# Patient Record
Sex: MALE | Race: WHITE | NOT HISPANIC OR LATINO | Employment: OTHER | URBAN - NONMETROPOLITAN AREA
[De-identification: names, ages, dates, MRNs, and addresses within clinical notes are randomized per-mention and may not be internally consistent; named-entity substitution may affect disease eponyms.]

---

## 2017-05-19 RX ORDER — VITAMIN E 268 MG
400 CAPSULE ORAL DAILY
COMMUNITY
End: 2020-04-04

## 2017-05-19 RX ORDER — LANSOPRAZOLE 30 MG/1
30 CAPSULE, DELAYED RELEASE ORAL DAILY
COMMUNITY
End: 2020-04-04

## 2017-05-19 RX ORDER — FLUOXETINE HYDROCHLORIDE 20 MG/1
60 CAPSULE ORAL DAILY
COMMUNITY
End: 2020-04-04

## 2017-05-19 RX ORDER — ASPIRIN 81 MG/1
81 TABLET ORAL DAILY
COMMUNITY

## 2017-05-19 RX ORDER — ATORVASTATIN CALCIUM 80 MG/1
80 TABLET, FILM COATED ORAL DAILY
COMMUNITY

## 2017-05-19 RX ORDER — ASCORBIC ACID 500 MG
500 TABLET ORAL 2 TIMES DAILY
COMMUNITY
End: 2020-04-04

## 2017-05-19 RX ORDER — METOPROLOL TARTRATE 50 MG/1
50 TABLET, FILM COATED ORAL DAILY
Status: ON HOLD | COMMUNITY
End: 2020-04-04 | Stop reason: ALTCHOICE

## 2017-05-20 ENCOUNTER — PREP FOR SURGERY (OUTPATIENT)
Dept: SURGERY | Facility: HOSPITAL | Age: 77
End: 2017-05-20

## 2017-05-20 RX ORDER — SODIUM CHLORIDE 0.9 % (FLUSH) 0.9 %
1-10 SYRINGE (ML) INJECTION AS NEEDED
Status: CANCELLED | OUTPATIENT
Start: 2017-05-20

## 2017-05-20 RX ORDER — ENALAPRILAT 2.5 MG/2ML
1.25 INJECTION INTRAVENOUS ONCE AS NEEDED
Status: CANCELLED | OUTPATIENT
Start: 2017-05-20

## 2017-05-20 RX ORDER — PREDNISOLONE ACETATE 10 MG/ML
1 SUSPENSION/ DROPS OPHTHALMIC
Status: CANCELLED | OUTPATIENT
Start: 2017-05-20

## 2017-05-24 ENCOUNTER — ANESTHESIA (OUTPATIENT)
Dept: PERIOP | Facility: HOSPITAL | Age: 77
End: 2017-05-24

## 2017-05-24 ENCOUNTER — HOSPITAL ENCOUNTER (OUTPATIENT)
Facility: HOSPITAL | Age: 77
Setting detail: HOSPITAL OUTPATIENT SURGERY
Discharge: HOME | End: 2017-05-24
Attending: OPHTHALMOLOGY | Admitting: OPHTHALMOLOGY

## 2017-05-24 ENCOUNTER — ANESTHESIA EVENT (OUTPATIENT)
Dept: PERIOP | Facility: HOSPITAL | Age: 77
End: 2017-05-24

## 2017-05-24 VITALS
HEART RATE: 72 BPM | BODY MASS INDEX: 20.49 KG/M2 | OXYGEN SATURATION: 99 % | RESPIRATION RATE: 18 BRPM | DIASTOLIC BLOOD PRESSURE: 69 MMHG | TEMPERATURE: 98.9 F | SYSTOLIC BLOOD PRESSURE: 145 MMHG | WEIGHT: 120 LBS | HEIGHT: 64 IN

## 2017-05-24 PROBLEM — H26.9 CATARACT, LEFT EYE: Status: RESOLVED | Noted: 2017-05-24 | Resolved: 2017-05-24

## 2017-05-24 PROBLEM — H26.9 CATARACT, LEFT EYE: Status: ACTIVE | Noted: 2017-05-24

## 2017-05-24 LAB — GLUCOSE BLDC GLUCOMTR-MCNC: 77 MG/DL (ref 70–130)

## 2017-05-24 PROCEDURE — 25010000002 EPINEPHRINE 1 MG/ML SOLUTION: Performed by: OPHTHALMOLOGY

## 2017-05-24 PROCEDURE — 82962 GLUCOSE BLOOD TEST: CPT

## 2017-05-24 PROCEDURE — 25010000002 MIDAZOLAM PER 1 MG: Performed by: NURSE ANESTHETIST, CERTIFIED REGISTERED

## 2017-05-24 PROCEDURE — V2632 POST CHMBR INTRAOCULAR LENS: HCPCS | Performed by: OPHTHALMOLOGY

## 2017-05-24 PROCEDURE — V2787 ASTIGMATISM-CORRECT FUNCTION: HCPCS | Performed by: OPHTHALMOLOGY

## 2017-05-24 DEVICE — IMPLANTABLE DEVICE: Type: IMPLANTABLE DEVICE | Site: POSTERIOR CHAMBER | Status: FUNCTIONAL

## 2017-05-24 RX ORDER — TETRACAINE HYDROCHLORIDE 5 MG/ML
SOLUTION OPHTHALMIC
Status: COMPLETED
Start: 2017-05-24 | End: 2017-05-24

## 2017-05-24 RX ORDER — PREDNISOLONE ACETATE 10 MG/ML
1 SUSPENSION/ DROPS OPHTHALMIC
Status: DISCONTINUED | OUTPATIENT
Start: 2017-05-24 | End: 2017-05-24 | Stop reason: HOSPADM

## 2017-05-24 RX ORDER — MIDAZOLAM HYDROCHLORIDE 1 MG/ML
INJECTION INTRAMUSCULAR; INTRAVENOUS AS NEEDED
Status: DISCONTINUED | OUTPATIENT
Start: 2017-05-24 | End: 2017-05-24 | Stop reason: SURG

## 2017-05-24 RX ORDER — TETRACAINE HYDROCHLORIDE 5 MG/ML
1 SOLUTION OPHTHALMIC SEE ADMIN INSTRUCTIONS
Status: COMPLETED | OUTPATIENT
Start: 2017-05-24 | End: 2017-05-24

## 2017-05-24 RX ORDER — LIDOCAINE HYDROCHLORIDE 40 MG/ML
INJECTION, SOLUTION RETROBULBAR; TOPICAL AS NEEDED
Status: DISCONTINUED | OUTPATIENT
Start: 2017-05-24 | End: 2017-05-24 | Stop reason: HOSPADM

## 2017-05-24 RX ORDER — SODIUM CHLORIDE 0.9 % (FLUSH) 0.9 %
1-10 SYRINGE (ML) INJECTION AS NEEDED
Status: DISCONTINUED | OUTPATIENT
Start: 2017-05-24 | End: 2017-05-24 | Stop reason: HOSPADM

## 2017-05-24 RX ORDER — ENALAPRILAT 2.5 MG/2ML
1.25 INJECTION INTRAVENOUS ONCE AS NEEDED
Status: DISCONTINUED | OUTPATIENT
Start: 2017-05-24 | End: 2017-05-24 | Stop reason: HOSPADM

## 2017-05-24 RX ORDER — PREDNISOLONE ACETATE 10 MG/ML
SUSPENSION/ DROPS OPHTHALMIC
Status: DISCONTINUED
Start: 2017-05-24 | End: 2017-05-24 | Stop reason: HOSPADM

## 2017-05-24 RX ORDER — BALANCED SALT SOLUTION 6.4; .75; .48; .3; 3.9; 1.7 MG/ML; MG/ML; MG/ML; MG/ML; MG/ML; MG/ML
SOLUTION OPHTHALMIC AS NEEDED
Status: DISCONTINUED | OUTPATIENT
Start: 2017-05-24 | End: 2017-05-24 | Stop reason: HOSPADM

## 2017-05-24 RX ORDER — PHENYLEPHRINE HYDROCHLORIDE 100 MG/ML
SOLUTION/ DROPS OPHTHALMIC
Status: COMPLETED
Start: 2017-05-24 | End: 2017-05-24

## 2017-05-24 RX ORDER — NEOMYCIN SULFATE, POLYMYXIN B SULFATE, AND DEXAMETHASONE 3.5; 10000; 1 MG/G; [USP'U]/G; MG/G
OINTMENT OPHTHALMIC AS NEEDED
Status: DISCONTINUED | OUTPATIENT
Start: 2017-05-24 | End: 2017-05-24 | Stop reason: HOSPADM

## 2017-05-24 RX ORDER — CYCLOPENTOLATE HYDROCHLORIDE 20 MG/ML
1 SOLUTION/ DROPS OPHTHALMIC
Status: COMPLETED | OUTPATIENT
Start: 2017-05-24 | End: 2017-05-24

## 2017-05-24 RX ORDER — EPINEPHRINE 1 MG/ML
INJECTION INTRAMUSCULAR; INTRAVENOUS; SUBCUTANEOUS AS NEEDED
Status: DISCONTINUED | OUTPATIENT
Start: 2017-05-24 | End: 2017-05-24 | Stop reason: HOSPADM

## 2017-05-24 RX ORDER — PREDNISOLONE ACETATE 10 MG/ML
SUSPENSION/ DROPS OPHTHALMIC AS NEEDED
Status: DISCONTINUED | OUTPATIENT
Start: 2017-05-24 | End: 2017-05-24 | Stop reason: HOSPADM

## 2017-05-24 RX ORDER — CYCLOPENTOLATE HYDROCHLORIDE 20 MG/ML
SOLUTION/ DROPS OPHTHALMIC
Status: COMPLETED
Start: 2017-05-24 | End: 2017-05-24

## 2017-05-24 RX ORDER — SODIUM CHLORIDE, SODIUM LACTATE, POTASSIUM CHLORIDE, CALCIUM CHLORIDE 600; 310; 30; 20 MG/100ML; MG/100ML; MG/100ML; MG/100ML
1000 INJECTION, SOLUTION INTRAVENOUS CONTINUOUS PRN
Status: DISCONTINUED | OUTPATIENT
Start: 2017-05-24 | End: 2017-05-24 | Stop reason: HOSPADM

## 2017-05-24 RX ORDER — PHENYLEPHRINE HYDROCHLORIDE 100 MG/ML
1 SOLUTION/ DROPS OPHTHALMIC
Status: COMPLETED | OUTPATIENT
Start: 2017-05-24 | End: 2017-05-24

## 2017-05-24 RX ORDER — SODIUM CHLORIDE 0.9 % (FLUSH) 0.9 %
3 SYRINGE (ML) INJECTION AS NEEDED
Status: DISCONTINUED | OUTPATIENT
Start: 2017-05-24 | End: 2017-05-24 | Stop reason: HOSPADM

## 2017-05-24 RX ORDER — PILOCARPINE HYDROCHLORIDE 10 MG/ML
SOLUTION/ DROPS OPHTHALMIC AS NEEDED
Status: DISCONTINUED | OUTPATIENT
Start: 2017-05-24 | End: 2017-05-24 | Stop reason: HOSPADM

## 2017-05-24 RX ADMIN — CYCLOPENTOLATE HYDROCHLORIDE 1 DROP: 20 SOLUTION/ DROPS OPHTHALMIC at 10:13

## 2017-05-24 RX ADMIN — PHENYLEPHRINE HYDROCHLORIDE 1 DROP: 100 SOLUTION/ DROPS OPHTHALMIC at 10:18

## 2017-05-24 RX ADMIN — PHENYLEPHRINE HYDROCHLORIDE 1 DROP: 100 SOLUTION/ DROPS OPHTHALMIC at 10:23

## 2017-05-24 RX ADMIN — MIDAZOLAM HYDROCHLORIDE 1 MG: 1 INJECTION, SOLUTION INTRAMUSCULAR; INTRAVENOUS at 11:56

## 2017-05-24 RX ADMIN — TETRACAINE HYDROCHLORIDE 1 DROP: 5 SOLUTION OPHTHALMIC at 10:12

## 2017-05-24 RX ADMIN — TETRACAINE HYDROCHLORIDE 1 DROP: 5 SOLUTION OPHTHALMIC at 10:11

## 2017-05-24 RX ADMIN — PHENYLEPHRINE HYDROCHLORIDE 1 DROP: 100 SOLUTION/ DROPS OPHTHALMIC at 10:13

## 2017-05-24 RX ADMIN — CYCLOPENTOLATE HYDROCHLORIDE 1 DROP: 20 SOLUTION/ DROPS OPHTHALMIC at 10:18

## 2017-05-24 RX ADMIN — SODIUM CHLORIDE, POTASSIUM CHLORIDE, SODIUM LACTATE AND CALCIUM CHLORIDE 1000 ML: 600; 310; 30; 20 INJECTION, SOLUTION INTRAVENOUS at 10:19

## 2017-05-24 RX ADMIN — CYCLOPENTOLATE HYDROCHLORIDE 1 DROP: 20 SOLUTION/ DROPS OPHTHALMIC at 10:23

## 2017-05-24 RX ADMIN — MIDAZOLAM HYDROCHLORIDE 1 MG: 1 INJECTION, SOLUTION INTRAMUSCULAR; INTRAVENOUS at 12:05

## 2017-06-14 ENCOUNTER — ANESTHESIA EVENT (OUTPATIENT)
Dept: PERIOP | Facility: HOSPITAL | Age: 77
End: 2017-06-14

## 2017-06-14 ENCOUNTER — ANESTHESIA (OUTPATIENT)
Dept: PERIOP | Facility: HOSPITAL | Age: 77
End: 2017-06-14

## 2017-06-14 ENCOUNTER — HOSPITAL ENCOUNTER (OUTPATIENT)
Facility: HOSPITAL | Age: 77
Setting detail: HOSPITAL OUTPATIENT SURGERY
Discharge: HOME | End: 2017-06-14
Attending: OPHTHALMOLOGY | Admitting: OPHTHALMOLOGY

## 2017-06-14 VITALS
SYSTOLIC BLOOD PRESSURE: 148 MMHG | BODY MASS INDEX: 20.49 KG/M2 | TEMPERATURE: 97.7 F | DIASTOLIC BLOOD PRESSURE: 87 MMHG | WEIGHT: 120 LBS | HEIGHT: 64 IN | OXYGEN SATURATION: 96 % | HEART RATE: 45 BPM | RESPIRATION RATE: 16 BRPM

## 2017-06-14 PROBLEM — H26.9 CATARACT OF RIGHT EYE: Status: ACTIVE | Noted: 2017-06-14

## 2017-06-14 PROBLEM — H26.9 CATARACT OF RIGHT EYE: Status: RESOLVED | Noted: 2017-06-14 | Resolved: 2017-06-14

## 2017-06-14 PROCEDURE — V2632 POST CHMBR INTRAOCULAR LENS: HCPCS | Performed by: OPHTHALMOLOGY

## 2017-06-14 PROCEDURE — 25010000002 EPINEPHRINE 1 MG/ML SOLUTION: Performed by: OPHTHALMOLOGY

## 2017-06-14 PROCEDURE — 25010000002 MIDAZOLAM PER 1 MG: Performed by: NURSE ANESTHETIST, CERTIFIED REGISTERED

## 2017-06-14 PROCEDURE — V2787 ASTIGMATISM-CORRECT FUNCTION: HCPCS | Performed by: OPHTHALMOLOGY

## 2017-06-14 DEVICE — IMPLANTABLE DEVICE: Type: IMPLANTABLE DEVICE | Site: POSTERIOR CHAMBER | Status: FUNCTIONAL

## 2017-06-14 RX ORDER — ENALAPRILAT 2.5 MG/2ML
1.25 INJECTION INTRAVENOUS ONCE AS NEEDED
Status: DISCONTINUED | OUTPATIENT
Start: 2017-06-14 | End: 2017-06-14 | Stop reason: HOSPADM

## 2017-06-14 RX ORDER — MIDAZOLAM HYDROCHLORIDE 1 MG/ML
INJECTION INTRAMUSCULAR; INTRAVENOUS AS NEEDED
Status: DISCONTINUED | OUTPATIENT
Start: 2017-06-14 | End: 2017-06-14 | Stop reason: SURG

## 2017-06-14 RX ORDER — PILOCARPINE HYDROCHLORIDE 10 MG/ML
SOLUTION/ DROPS OPHTHALMIC AS NEEDED
Status: DISCONTINUED | OUTPATIENT
Start: 2017-06-14 | End: 2017-06-14 | Stop reason: HOSPADM

## 2017-06-14 RX ORDER — NEOMYCIN SULFATE, POLYMYXIN B SULFATE, AND DEXAMETHASONE 3.5; 10000; 1 MG/G; [USP'U]/G; MG/G
OINTMENT OPHTHALMIC AS NEEDED
Status: DISCONTINUED | OUTPATIENT
Start: 2017-06-14 | End: 2017-06-14 | Stop reason: HOSPADM

## 2017-06-14 RX ORDER — SODIUM CHLORIDE 0.9 % (FLUSH) 0.9 %
3 SYRINGE (ML) INJECTION AS NEEDED
Status: DISCONTINUED | OUTPATIENT
Start: 2017-06-14 | End: 2017-06-14 | Stop reason: HOSPADM

## 2017-06-14 RX ORDER — PHENYLEPHRINE HYDROCHLORIDE 100 MG/ML
1 SOLUTION/ DROPS OPHTHALMIC
Status: COMPLETED | OUTPATIENT
Start: 2017-06-14 | End: 2017-06-14

## 2017-06-14 RX ORDER — TETRACAINE HYDROCHLORIDE 5 MG/ML
1 SOLUTION OPHTHALMIC SEE ADMIN INSTRUCTIONS
Status: COMPLETED | OUTPATIENT
Start: 2017-06-14 | End: 2017-06-14

## 2017-06-14 RX ORDER — PREDNISOLONE ACETATE 10 MG/ML
SUSPENSION/ DROPS OPHTHALMIC AS NEEDED
Status: DISCONTINUED | OUTPATIENT
Start: 2017-06-14 | End: 2017-06-14 | Stop reason: HOSPADM

## 2017-06-14 RX ORDER — LIDOCAINE HYDROCHLORIDE 40 MG/ML
INJECTION, SOLUTION RETROBULBAR; TOPICAL AS NEEDED
Status: DISCONTINUED | OUTPATIENT
Start: 2017-06-14 | End: 2017-06-14 | Stop reason: HOSPADM

## 2017-06-14 RX ORDER — TETRACAINE HYDROCHLORIDE 5 MG/ML
SOLUTION OPHTHALMIC
Status: COMPLETED
Start: 2017-06-14 | End: 2017-06-14

## 2017-06-14 RX ORDER — EPINEPHRINE 1 MG/ML
INJECTION INTRAMUSCULAR; INTRAVENOUS; SUBCUTANEOUS AS NEEDED
Status: DISCONTINUED | OUTPATIENT
Start: 2017-06-14 | End: 2017-06-14 | Stop reason: HOSPADM

## 2017-06-14 RX ORDER — BALANCED SALT SOLUTION 6.4; .75; .48; .3; 3.9; 1.7 MG/ML; MG/ML; MG/ML; MG/ML; MG/ML; MG/ML
SOLUTION OPHTHALMIC AS NEEDED
Status: DISCONTINUED | OUTPATIENT
Start: 2017-06-14 | End: 2017-06-14 | Stop reason: HOSPADM

## 2017-06-14 RX ORDER — PREDNISOLONE ACETATE 10 MG/ML
SUSPENSION/ DROPS OPHTHALMIC
Status: DISCONTINUED
Start: 2017-06-14 | End: 2017-06-14 | Stop reason: HOSPADM

## 2017-06-14 RX ORDER — CYCLOPENTOLATE HYDROCHLORIDE 20 MG/ML
SOLUTION/ DROPS OPHTHALMIC
Status: COMPLETED
Start: 2017-06-14 | End: 2017-06-14

## 2017-06-14 RX ORDER — SODIUM CHLORIDE, SODIUM LACTATE, POTASSIUM CHLORIDE, CALCIUM CHLORIDE 600; 310; 30; 20 MG/100ML; MG/100ML; MG/100ML; MG/100ML
1000 INJECTION, SOLUTION INTRAVENOUS CONTINUOUS PRN
Status: DISCONTINUED | OUTPATIENT
Start: 2017-06-14 | End: 2017-06-14 | Stop reason: HOSPADM

## 2017-06-14 RX ORDER — PHENYLEPHRINE HYDROCHLORIDE 100 MG/ML
SOLUTION/ DROPS OPHTHALMIC
Status: COMPLETED
Start: 2017-06-14 | End: 2017-06-14

## 2017-06-14 RX ORDER — PREDNISOLONE ACETATE 10 MG/ML
1 SUSPENSION/ DROPS OPHTHALMIC
Status: DISCONTINUED | OUTPATIENT
Start: 2017-06-14 | End: 2017-06-14 | Stop reason: HOSPADM

## 2017-06-14 RX ORDER — CYCLOPENTOLATE HYDROCHLORIDE 20 MG/ML
1 SOLUTION/ DROPS OPHTHALMIC
Status: COMPLETED | OUTPATIENT
Start: 2017-06-14 | End: 2017-06-14

## 2017-06-14 RX ORDER — SODIUM CHLORIDE 0.9 % (FLUSH) 0.9 %
1-10 SYRINGE (ML) INJECTION AS NEEDED
Status: DISCONTINUED | OUTPATIENT
Start: 2017-06-14 | End: 2017-06-14 | Stop reason: HOSPADM

## 2017-06-14 RX ADMIN — PHENYLEPHRINE HYDROCHLORIDE 1 DROP: 100 SOLUTION/ DROPS OPHTHALMIC at 08:50

## 2017-06-14 RX ADMIN — TETRACAINE HYDROCHLORIDE 1 DROP: 5 SOLUTION OPHTHALMIC at 08:47

## 2017-06-14 RX ADMIN — PHENYLEPHRINE HYDROCHLORIDE 1 DROP: 100 SOLUTION/ DROPS OPHTHALMIC at 08:55

## 2017-06-14 RX ADMIN — SODIUM CHLORIDE, POTASSIUM CHLORIDE, SODIUM LACTATE AND CALCIUM CHLORIDE 1000 ML: 600; 310; 30; 20 INJECTION, SOLUTION INTRAVENOUS at 09:10

## 2017-06-14 RX ADMIN — MIDAZOLAM HYDROCHLORIDE 1 MG: 1 INJECTION, SOLUTION INTRAMUSCULAR; INTRAVENOUS at 10:08

## 2017-06-14 RX ADMIN — TETRACAINE HYDROCHLORIDE 1 DROP: 5 SOLUTION OPHTHALMIC at 08:48

## 2017-06-14 RX ADMIN — PHENYLEPHRINE HYDROCHLORIDE 1 DROP: 100 SOLUTION/ DROPS OPHTHALMIC at 09:00

## 2017-06-14 RX ADMIN — CYCLOPENTOLATE HYDROCHLORIDE 1 DROP: 20 SOLUTION/ DROPS OPHTHALMIC at 08:50

## 2017-06-14 RX ADMIN — CYCLOPENTOLATE HYDROCHLORIDE 1 DROP: 20 SOLUTION/ DROPS OPHTHALMIC at 08:55

## 2017-06-14 RX ADMIN — CYCLOPENTOLATE HYDROCHLORIDE 1 DROP: 20 SOLUTION/ DROPS OPHTHALMIC at 09:00

## 2017-06-14 NOTE — ANESTHESIA POSTPROCEDURE EVALUATION
Patient: Gama Davila    Procedure Summary     Date Anesthesia Start Anesthesia Stop Room / Location    06/14/17 0951 1019  [facility]       Procedure Diagnosis Surgeon Provider    CATARACT PHACO EXTRACTION WITH INTRAOCULAR LENS IMPLANT RIGHT WITH TORIC LENS (Right Eye) No diagnosis on file. MD Peace Abdi CRNA          Anesthesia Type: MAC  Last vitals  /62 (06/14/17 1027)    Temp 97.7 °F (36.5 °C) (06/14/17 1027)    Pulse (!) 42 (06/14/17 1027)   Resp 16 (06/14/17 1027)    SpO2 96 % (06/14/17 1027)      Post Anesthesia Care and Evaluation    Patient location during evaluation: PHASE II  Patient participation: complete - patient participated  Level of consciousness: awake and alert  Pain score: 0  Pain management: satisfactory to patient  Airway patency: patent  Anesthetic complications: No anesthetic complications  PONV Status: none  Cardiovascular status: acceptable and stable  Respiratory status: acceptable  Hydration status: acceptable

## 2017-06-14 NOTE — ANESTHESIA PREPROCEDURE EVALUATION
Anesthesia Evaluation     Patient summary reviewed and Nursing notes reviewed   no history of anesthetic complications:  NPO Solid Status: > 8 hours  NPO Liquid Status: > 8 hours     Airway   Mallampati: I  TM distance: >3 FB  Neck ROM: full  Dental    (+) upper dentures    Pulmonary - normal exam   (+) a smoker Former,   Cardiovascular - normal exam    Patient on routine beta blocker and Beta blocker given within 24 hours of surgery    (+) hypertension, CAD, CABG, dysrhythmias (ablation now in nsr), hyperlipidemia      Neuro/Psych- negative ROS  GI/Hepatic/Renal/Endo    (+)  diabetes mellitus,     Musculoskeletal     Abdominal    Substance History - negative use     OB/GYN negative ob/gyn ROS         Other   (+) arthritis   history of cancer remission                                  Anesthesia Plan    ASA 3     MAC   (Risks and benefits discussed including risk of aspiration, recall and dental damage. All patient questions answered. Will continue with POC.)  intravenous induction   Anesthetic plan and risks discussed with patient.    Plan discussed with CRNA.

## 2017-06-14 NOTE — BRIEF OP NOTE
CATARACT PHACO EXTRACTION WITH INTRAOCULAR LENS IMPLANT  Procedure Note    Gama Davila  6/14/2017    Pre-op Diagnosis:   * No pre-op diagnosis entered *    Post-op Diagnosis:     * No Diagnosis Codes entered *    Procedure/CPT® Codes:      Procedure(s):  CATARACT PHACO EXTRACTION WITH INTRAOCULAR LENS IMPLANT RIGHT WITH TORIC LENS    Surgeon(s):  Alma Benavidez MD    Anesthesia: Monitor Anesthesia Care    Staff:   Circulator: Melodie Chandler RN; Joceline Anderson RN  Scrub Person: Radha Dominguez    Estimated Blood Loss: 0 mL  Urine Voided: * No values recorded between 6/14/2017  9:50 AM and 6/14/2017 10:24 AM *    Specimens:                * No specimens in log *      Drains:           Findings: none    Complications: none      Alma Benavidez MD     Date: 6/14/2017  Time: 10:39 AM

## 2017-06-14 NOTE — PLAN OF CARE
Problem: Cataract (Adult)  Goal: Signs and Symptoms of Listed Potential Problems Will be Absent or Manageable (Cataract)    06/14/17 0908   Cataract   Problems Assessed (Cataract) all   Problems Present (Cataract) none

## 2017-06-14 NOTE — DISCHARGE INSTRUCTIONS
Rest today  No pushing,pulling,tugging,heavy lifting, or strenuous activity   No major decision making,driving,or drinking alcoholic beverages for 24 hours due to the sedation you received  Always use good hand hygiene/washing technique  No driving on pain medicationsRest today  No pushing,pulling,tugging,heavy lifting, or strenuous activity   No major decision making,driving,or drinking alcoholic beverages for 24 hours due to the sedation you received  Always use good hand hygiene/washing technique  No driving on pain medicationsTo assist you in voiding:  Drink plenty of fluids  Listen to running water while attempting to void.    If you are unable to urinate and you have an uncomfortable urge to void or it has been   6 hours since you were discharged, return to the Emergency Room

## 2017-06-17 NOTE — OP NOTE
DATE OF PROCEDURE: 06/14/2017    PREOPERATIVE DIAGNOSIS: Right nuclear sclerotic cataract.    POSTOPERATIVE DIAGNOSIS: Right nuclear sclerotic cataract.    PROCEDURE PERFORMED: Phacoemulsification with implantation of a 22.0 diopter, 3.00 CYL foldable posterior chamber intraocular lens, right eye.    SURGEON:  Alma Benavidez MD     INDICATIONS:  The patient is a 76-year-old male with a right nuclear sclerotic cataract with a preoperative visual acuity of 20/80. The patient desires better vision and is brought to the operating room at this time for elective cataract extraction with planned IOL.      DESCRIPTION OF PROCEDURE:  The patient was brought to the operating room in the fasting state. Once all the vital signs were established to be within normal limits and supplemental oxygen was given, the area of the operative eye was prepped and draped in the usual fashion.  A wire lid speculum was inserted into the cul-de-sac. Additional topical anesthetic drops were instilled. A fornix-based conjunctival flap was performed from the 11-oclock to 1-o'clock position. A stab incision was made in the peripheral cornea with the #75 Micro-Sharp blade. Next 0.3 mL of 1% unpreserved Xylocaine was injected in the anterior chamber. The anterior chamber was then entered with a 2.4 mm keratome blade and then under viscoelastic a capsulotomy was performed using the disposable cystotome in a continuous curvilinear fashion.  The anterior capsule was then removed and the phacoemulsification handpiece was then introduced into the anterior segment and the nucleus was emulsified without difficulty. The CDE was 7.83. Once this was accomplished, the irrigation and aspiration handpiece was then used to aspirate the residual cortex. The posterior capsule was cleaned of any residual material and then polished with the irrigation and aspiration handpiece and the viscoelastic was used to inflate the capsular bag.  The [employer] TORIC IOL  implant, style [id number] lens was then loaded into the microcartridge. It measured 22.0 diopters and the serial number was [id number]. The injector was then introduced into the capsular bag and the implant was slowly implanted without difficulty. The lens was then rotated to the appropriate axis and the viscoelastic was aspirated. The conjunctiva was then repositioned. Topical Betoptic-S, pilocarpine, and Pred-Forte drops were applied. Polysporin ointment was then instilled. Chilled BSS was used as the irrigating solution. There were no anesthetic or surgical difficulties. The patient tolerated the procedure very well and was returned to same day surgery in satisfactory condition.         Alma Benavidez M.D.  D: DANNY 06/17/2017 00:00:00  T: abe 06/17/2017 16:23:46  Job ID: [id number]  Document ID: [id number]

## 2018-03-12 ENCOUNTER — TELEPHONE (OUTPATIENT)
Dept: CARDIAC SURGERY | Facility: CLINIC | Age: 78
End: 2018-03-12

## 2018-03-12 NOTE — TELEPHONE ENCOUNTER
CALLED PT REGARDING APPT THAT WAS MADE FOR HIM WITH DR BLAND. PT'S WIFE (MIKAEL) STATED THAT PT HAS AN APPT WITH ANOTHER DR AND DOESN'T WISH TO BE SEEN AT THIS TIME BY DR BLNAD. PT'S WIFE SAID SHE HAD CALLED THE REFERRING DR AND LET THEM KNOW.

## 2019-04-04 ENCOUNTER — NURSING HOME (OUTPATIENT)
Dept: INTERNAL MEDICINE | Facility: CLINIC | Age: 79
End: 2019-04-04

## 2019-04-04 VITALS
HEART RATE: 84 BPM | BODY MASS INDEX: 24.89 KG/M2 | RESPIRATION RATE: 14 BRPM | WEIGHT: 145 LBS | TEMPERATURE: 99.2 F | DIASTOLIC BLOOD PRESSURE: 96 MMHG | SYSTOLIC BLOOD PRESSURE: 136 MMHG

## 2019-04-04 DIAGNOSIS — E78.00 HIGH CHOLESTEROL: ICD-10-CM

## 2019-04-04 DIAGNOSIS — I10 ESSENTIAL HYPERTENSION: ICD-10-CM

## 2019-04-04 DIAGNOSIS — I25.119 CORONARY ARTERY DISEASE INVOLVING NATIVE HEART WITH ANGINA PECTORIS, UNSPECIFIED VESSEL OR LESION TYPE (HCC): ICD-10-CM

## 2019-04-04 DIAGNOSIS — I49.9 IRREGULAR HEART BEAT: ICD-10-CM
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
## 2019-04-04 DIAGNOSIS — E11.9 TYPE 2 DIABETES MELLITUS WITHOUT COMPLICATION, WITHOUT LONG-TERM CURRENT USE OF INSULIN (HCC): ICD-10-CM

## 2019-04-04 PROCEDURE — 99305 1ST NF CARE MODERATE MDM 35: CPT | Performed by: INTERNAL MEDICINE

## 2019-04-08 PROBLEM — E11.9 DIABETES MELLITUS (HCC): Status: ACTIVE | Noted: 2019-04-08

## 2019-04-08 PROBLEM — H91.90 HOH (HARD OF HEARING): Status: ACTIVE | Noted: 2019-04-08

## 2019-04-08 PROBLEM — I25.10 CORONARY ARTERY DISEASE: Status: ACTIVE | Noted: 2019-04-08

## 2019-04-08 PROBLEM — E78.00 HIGH CHOLESTEROL: Status: ACTIVE | Noted: 2019-04-08

## 2019-04-08 PROBLEM — I49.9 IRREGULAR HEART BEAT: Status: ACTIVE | Noted: 2019-04-08

## 2019-04-08 PROBLEM — I10 HYPERTENSION: Status: ACTIVE | Noted: 2019-04-08

## 2019-04-08 NOTE — PROGRESS NOTES
Nursing Home Progress Note        Mitul Stephen DO ?  ALESSANDRA Ho ?  [address], [place] [address]  Phone: [phone]  Fax: [phone] Inés Vega MD ?  Alex Burk DO [x]   [address] [place] [address]  Phone: [phone]  Fax: [phone]     PATIENT NAME: Gama Davila                                                                          YOB: 1940           DATE OF SERVICE: 04/04/2019  FACILITY:  [] [facility]   [] [facility]   [] [facility]   [x] [facility]   [] Other ______________________________________________________________________     CHIEF COMPLAINT:  Falls      HISTORY OF PRESENT ILLNESS:   Patient is a 77 yo W M with history of CABG, hiatal hernia s/p repair, and chronic back pain s/p lumbar spine surgery who was admitted to [facility] after a fall and injury to right shoulder.  He was evaluated in the ER with imaging and bloodwork.  No significant abnormalities were noted. PT was started and will be continued in the nursing facility for strengthening.  Patient was seen this AM and was not very pleasant as he was furstrated at the fact he did not have his clothing as his wife had taken it home to wash.     PAST MEDICAL & SURGICAL HISTORY:   Past Medical History:   Diagnosis Date   • Arthritis    • Cancer (CMS/HCC)     SKIN    • Cataract    • Coronary artery disease    • Diabetes mellitus (CMS/HCC)    • High cholesterol    • [place] (hard of hearing)     PATIENT HAS HEARING AIDS   • Hypertension    • Irregular heart beat     HISTORY OF CARDIAC ABLATION IN 2003   • Wears dentures     FULL UPPER PLATE      Past Surgical History:   Procedure Laterality Date   • BACK SURGERY  1978    THEN AGAIN IN 1982   • CARDIAC ABLATION  2003   • CARDIAC SURGERY     • CATARACT EXTRACTION W/ INTRAOCULAR LENS IMPLANT Left 5/24/2017    Procedure: CATARACT PHACO EXTRACTION WITH INTRAOCULAR LENS IMPLANT LEFT WITH TORIC LENS;  Surgeon: Alma Benavidez,  MD;  Location: [facility];  Service:    • CATARACT EXTRACTION W/ INTRAOCULAR LENS IMPLANT Right 2017    Procedure: CATARACT PHACO EXTRACTION WITH INTRAOCULAR LENS IMPLANT RIGHT WITH TORIC LENS;  Surgeon: Alma Benavidez MD;  Location: [facility];  Service:    • CORONARY ARTERY BYPASS GRAFT      SPOUSE UNSURE OF HOW MANY VESSELS   • HIATAL HERNIA REPAIR     • JOINT REPLACEMENT Left     HIP - DONE BY DR DALAL   • KNEE ARTHROSCOPY Left    • NOSE SURGERY      SPOUSE REPORTS FOR A BROKEN NOSE   • OTHER SURGICAL HISTORY Left     TENDON TORN LOOSE FROM BONE, LEFT ELBOW   • OTHER SURGICAL HISTORY      RUPTURED DISC   • OTHER SURGICAL HISTORY      NECK SURGERY FOR RUPTURED DISC   • OTHER SURGICAL HISTORY      HAD SECOND NECK SURGERY   • OTHER SURGICAL HISTORY      RUPTURED DISC   • OTHER SURGICAL HISTORY      RUPTURED DISC   • OTHER SURGICAL HISTORY      HARDWARE REMOVAL FROM BACK BY DR HAY         MEDICATIONS:  I have reviewed and reconciled the patients medication list in the patients chart at the skilled nursing facility today.      ALLERGIES:  Allergies   Allergen Reactions   • Phenergan [Promethazine Hcl] Nausea And Vomiting         SOCIAL HISTORY:  Social History     Socioeconomic History   • Marital status:      Spouse name: Not on file   • Number of children: Not on file   • Years of education: Not on file   • Highest education level: Not on file   Tobacco Use   • Smoking status: Former Smoker     Types: Cigarettes     Last attempt to quit:      Years since quittin.2   • Smokeless tobacco: Never Used   Substance and Sexual Activity   • Alcohol use: No   • Drug use: No   • Sexual activity: Defer       FAMILY HISTORY:  No family history on file.    REVIEW OF SYSTEMS:  Review of Systems   Constitutional: Negative for chills, fatigue and fever.   HENT: Negative for congestion, ear pain, rhinorrhea, sinus pressure and sore throat.    Eyes: Negative for  visual disturbance.   Respiratory: Negative for cough, chest tightness, shortness of breath and wheezing.    Cardiovascular: Negative for chest pain, palpitations and leg swelling.   Gastrointestinal: Negative for abdominal pain, blood in stool, constipation, diarrhea, nausea and vomiting.   Endocrine: Negative for polydipsia and polyuria.   Genitourinary: Negative for dysuria and hematuria.   Musculoskeletal: Negative for arthralgias and back pain.   Skin: Negative for rash.   Neurological: Negative for dizziness, light-headedness, numbness and headaches.   Psychiatric/Behavioral: Negative for dysphoric mood and sleep disturbance. The patient is not nervous/anxious.           PHYSICAL EXAMINATION:     VITAL SIGNS:  /96   Pulse 84   Temp 99.2 °F (37.3 °C)   Resp 14   Wt 65.8 kg (145 lb)   BMI 24.89 kg/m²     Physical Exam   Constitutional: He is oriented to person, place, and time. He appears well-developed and well-nourished.   HENT:   Head: Normocephalic and atraumatic.   Mouth/Throat: Oropharynx is clear and moist. No oropharyngeal exudate.   Eyes: Conjunctivae and EOM are normal. Pupils are equal, round, and reactive to light. No scleral icterus.   Neck: Normal range of motion. Neck supple. No thyromegaly present.   Cardiovascular: Normal rate, regular rhythm and normal heart sounds. Exam reveals no gallop and no friction rub.   No murmur heard.  Pulmonary/Chest: Effort normal and breath sounds normal. No respiratory distress. He has no wheezes.   Abdominal: Soft. Bowel sounds are normal. He exhibits no distension. There is no tenderness.   Musculoskeletal: Normal range of motion.   Lymphadenopathy:     He has no cervical adenopathy.   Neurological: He is alert and oriented to person, place, and time.   Skin: Skin is warm and dry. No rash noted.   Psychiatric: He has a normal mood and affect. His behavior is normal.   Nursing note and vitals reviewed.      RECORDS REVIEW:   Discharge summary  [facility]  4/3/19     ASSESSMENT     Diagnoses and all orders for this visit:    Fall, subsequent encounter    High cholesterol    Coronary artery disease involving native heart with angina pectoris, unspecified vessel or lesion type (CMS/[facility])    Essential hypertension    Irregular heart beat    Type 2 diabetes mellitus without complication, without long-term current use of insulin (CMS/[facility])        PLAN    Falls - PT/OT    HLD - stable with current meds    CAD s/p CABG / AFIB - stable with current cardiac medications    T2DM - cont home medications, will monitor glucose in facility.    GERD - stable.     Mood disorder - cont seroquel    Alzheimer disease - stable    Check CBC and CMP in one week.     [x]  Discussed Patient in detail with nursing/staff, addressed all needs today.     [x]  Plan of Care Reviewed   [x]  PT/OT Reviewed   [x]  Order Changes  []  Discharge Plans Reviewed  [x]  Advance Directive on file with Nursing Home.   [x]  POA on file with Nursing Home.    [x]  Code Status listed and reviewed.          Alex Burk DO.  4/8/2019

## 2019-04-18 ENCOUNTER — NURSING HOME (OUTPATIENT)
Dept: INTERNAL MEDICINE | Facility: CLINIC | Age: 79
End: 2019-04-18

## 2019-04-18 VITALS
DIASTOLIC BLOOD PRESSURE: 80 MMHG | TEMPERATURE: 98.7 F | SYSTOLIC BLOOD PRESSURE: 128 MMHG | WEIGHT: 160 LBS | HEART RATE: 84 BPM | BODY MASS INDEX: 27.46 KG/M2 | RESPIRATION RATE: 20 BRPM

## 2019-04-18 DIAGNOSIS — E11.9 TYPE 2 DIABETES MELLITUS WITHOUT COMPLICATION, WITHOUT LONG-TERM CURRENT USE OF INSULIN (HCC): ICD-10-CM

## 2019-04-18 DIAGNOSIS — F02.80 LATE ONSET ALZHEIMER'S DISEASE WITHOUT BEHAVIORAL DISTURBANCE (HCC): ICD-10-CM

## 2019-04-18 DIAGNOSIS — G30.1 LATE ONSET ALZHEIMER'S DISEASE WITHOUT BEHAVIORAL DISTURBANCE (HCC): ICD-10-CM
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
## 2019-04-18 DIAGNOSIS — E78.00 HIGH CHOLESTEROL: Primary | ICD-10-CM

## 2019-04-18 DIAGNOSIS — I10 ESSENTIAL HYPERTENSION: ICD-10-CM

## 2019-04-18 DIAGNOSIS — I49.9 IRREGULAR HEART BEAT: ICD-10-CM

## 2019-04-18 DIAGNOSIS — I25.119 CORONARY ARTERY DISEASE INVOLVING NATIVE HEART WITH ANGINA PECTORIS, UNSPECIFIED VESSEL OR LESION TYPE (HCC): ICD-10-CM

## 2019-04-18 PROCEDURE — 99309 SBSQ NF CARE MODERATE MDM 30: CPT | Performed by: INTERNAL MEDICINE

## 2019-04-22 NOTE — PROGRESS NOTES
Nursing Home Progress Note        Mitul Stephen DO ?  Charleen Randall, APRN ?  [address], [place] [address]  Phone: [phone]  Fax: [phone] Inés Vega MD ?  Alex Burk DO [x]   [address] [place] [address]  Phone: [phone]  Fax: [phone]     PATIENT NAME: Gama Davila                                                                          YOB: 1940           DATE OF SERVICE: 04/18/2019  FACILITY:  [] [facility]   [] [place]   [] [facility]   [x] [facility]   [] Other ______________________________________________________________________     CHIEF COMPLAINT:  Falls      HISTORY OF PRESENT ILLNESS:   Patient has been showing signs of confusion and agitation intermittently while being in the facility.  Distraction and nurses talking to the patient usually calms him down.    Patient has been working with physical therapy quite well.  Labs:    PAST MEDICAL & SURGICAL HISTORY:   Past Medical History:   Diagnosis Date   • Arthritis    • Cancer (CMS/HCC)     SKIN    • Cataract    • Coronary artery disease    • Diabetes mellitus (CMS/HCC)    • High cholesterol    • [place] (hard of hearing)     PATIENT HAS HEARING AIDS   • Hypertension    • Irregular heart beat     HISTORY OF CARDIAC ABLATION IN 2003   • Wears dentures     FULL UPPER PLATE      Past Surgical History:   Procedure Laterality Date   • BACK SURGERY  1978    THEN AGAIN IN 1982   • CARDIAC ABLATION  2003   • CARDIAC SURGERY     • CATARACT EXTRACTION W/ INTRAOCULAR LENS IMPLANT Left 5/24/2017    Procedure: CATARACT PHACO EXTRACTION WITH INTRAOCULAR LENS IMPLANT LEFT WITH TORIC LENS;  Surgeon: Alma Benavidez MD;  Location: [facility] OR;  Service:    • CATARACT EXTRACTION W/ INTRAOCULAR LENS IMPLANT Right 6/14/2017    Procedure: CATARACT PHACO EXTRACTION WITH INTRAOCULAR LENS IMPLANT RIGHT WITH TORIC LENS;  Surgeon: Alma Benavidez MD;  Location: [facility] OR;  Service:    • CORONARY ARTERY  BYPASS GRAFT      SPOUSE UNSURE OF HOW MANY VESSELS   • HIATAL HERNIA REPAIR     • JOINT REPLACEMENT Left     HIP - DONE BY DR DALAL   • KNEE ARTHROSCOPY Left    • NOSE SURGERY  1970    SPOUSE REPORTS FOR A BROKEN NOSE   • OTHER SURGICAL HISTORY Left     TENDON TORN LOOSE FROM BONE, LEFT ELBOW   • OTHER SURGICAL HISTORY      RUPTURED DISC   • OTHER SURGICAL HISTORY      NECK SURGERY FOR RUPTURED DISC   • OTHER SURGICAL HISTORY      HAD SECOND NECK SURGERY   • OTHER SURGICAL HISTORY      RUPTURED DISC   • OTHER SURGICAL HISTORY      RUPTURED DISC   • OTHER SURGICAL HISTORY      HARDWARE REMOVAL FROM BACK BY DR HAY         MEDICATIONS:  I have reviewed and reconciled the patients medication list in the patients chart at the skilled nursing facility today.      ALLERGIES:  Allergies   Allergen Reactions   • Phenergan [Promethazine Hcl] Nausea And Vomiting         SOCIAL HISTORY:  Social History     Socioeconomic History   • Marital status:      Spouse name: Not on file   • Number of children: Not on file   • Years of education: Not on file   • Highest education level: Not on file   Tobacco Use   • Smoking status: Former Smoker     Types: Cigarettes     Last attempt to quit:      Years since quittin.3   • Smokeless tobacco: Never Used   Substance and Sexual Activity   • Alcohol use: No   • Drug use: No   • Sexual activity: Defer       FAMILY HISTORY:  No family history on file.    REVIEW OF SYSTEMS:  Review of Systems   Constitutional: Negative for chills, fatigue and fever.   HENT: Negative for congestion, ear pain, rhinorrhea, sinus pressure and sore throat.    Eyes: Negative for visual disturbance.   Respiratory: Negative for cough, chest tightness, shortness of breath and wheezing.    Cardiovascular: Negative for chest pain, palpitations and leg swelling.   Gastrointestinal: Negative for abdominal pain, blood in stool, constipation, diarrhea, nausea and vomiting.    Endocrine: Negative for polydipsia and polyuria.   Genitourinary: Negative for dysuria and hematuria.   Musculoskeletal: Negative for arthralgias and back pain.   Skin: Negative for rash.   Neurological: Negative for dizziness, light-headedness, numbness and headaches.   Psychiatric/Behavioral: Positive for behavioral problems and confusion. Negative for dysphoric mood and sleep disturbance. The patient is not nervous/anxious.           PHYSICAL EXAMINATION:     VITAL SIGNS:  /80   Pulse 84   Temp 98.7 °F (37.1 °C)   Resp 20   Wt 72.6 kg (160 lb)   BMI 27.46 kg/m²     Physical Exam   Constitutional: He is oriented to person, place, and time. He appears well-developed and well-nourished.   HENT:   Head: Normocephalic and atraumatic.   Mouth/Throat: Oropharynx is clear and moist. No oropharyngeal exudate.   Eyes: Conjunctivae and EOM are normal. Pupils are equal, round, and reactive to light. No scleral icterus.   Neck: Normal range of motion. Neck supple. No thyromegaly present.   Cardiovascular: Normal rate, regular rhythm and normal heart sounds. Exam reveals no gallop and no friction rub.   No murmur heard.  Pulmonary/Chest: Effort normal and breath sounds normal. No respiratory distress. He has no wheezes.   Abdominal: Soft. Bowel sounds are normal. He exhibits no distension. There is no tenderness.   Musculoskeletal: Normal range of motion.   Lymphadenopathy:     He has no cervical adenopathy.   Neurological: He is alert and oriented to person, place, and time.   Skin: Skin is warm and dry. No rash noted.   Psychiatric: He has a normal mood and affect. His behavior is normal.   Nursing note and vitals reviewed.      RECORDS REVIEW:   A1c 5.8, CBC, CMP: Creatinine 1.4, hemoglobin 9.8.    ASSESSMENT     Diagnoses and all orders for this visit:    High cholesterol    Coronary artery disease involving native heart with angina pectoris, unspecified vessel or lesion type (CMS/HCC)    Essential  hypertension    Irregular heart beat    Type 2 diabetes mellitus without complication, without long-term current use of insulin (CMS/[facility])    Fall, subsequent encounter    Late onset Alzheimer's disease without behavioral disturbance        PLAN    Dementia with intermittent delirium  - start seroquel at night time, monitor symptoms.     Falls - PT/OT    HLD - stable with current meds    CAD s/p CABG / AFIB - stable with current cardiac medications    T2DM - cont home medications, will monitor glucose in facility.    GERD - stable.     Mood disorder - cont seroquel    Alzheimer disease - stable    CKD III - Cr. Stable at 1.4     [x]  Discussed Patient in detail with nursing/staff, addressed all needs today.     [x]  Plan of Care Reviewed   [x]  PT/OT Reviewed   [x]  Order Changes  []  Discharge Plans Reviewed  [x]  Advance Directive on file with Nursing Home.   [x]  POA on file with Nursing Home.    [x]  Code Status listed and reviewed.          Alex Burk DO.  4/22/2019

## 2019-04-23 RX ORDER — TRAMADOL HYDROCHLORIDE 50 MG/1
50 TABLET ORAL EVERY 6 HOURS PRN
Qty: 90 TABLET | Refills: 0 | Status: CANCELLED | OUTPATIENT
Start: 2019-04-23

## 2019-04-25 RX ORDER — GABAPENTIN 600 MG/1
600 TABLET ORAL 3 TIMES DAILY
Qty: 90 TABLET | Refills: 5 | Status: SHIPPED | OUTPATIENT
Start: 2019-04-25 | End: 2020-04-04

## 2020-01-16 ENCOUNTER — APPOINTMENT (OUTPATIENT)
Dept: CT IMAGING | Facility: HOSPITAL | Age: 80
End: 2020-01-16

## 2020-01-16 ENCOUNTER — HOSPITAL ENCOUNTER (EMERGENCY)
Facility: HOSPITAL | Age: 80
Discharge: HOME | End: 2020-01-16
Attending: EMERGENCY MEDICINE | Admitting: EMERGENCY MEDICINE

## 2020-01-16 VITALS
OXYGEN SATURATION: 94 % | DIASTOLIC BLOOD PRESSURE: 77 MMHG | TEMPERATURE: 97.8 F | BODY MASS INDEX: 27.31 KG/M2 | HEIGHT: 64 IN | RESPIRATION RATE: 18 BRPM | SYSTOLIC BLOOD PRESSURE: 122 MMHG | HEART RATE: 73 BPM | WEIGHT: 160 LBS

## 2020-01-16 DIAGNOSIS — E88.09 HYPOALBUMINEMIA: ICD-10-CM

## 2020-01-16 DIAGNOSIS — R73.09 ELEVATED GLUCOSE: ICD-10-CM

## 2020-01-16 DIAGNOSIS — M54.50 LOW BACK PAIN AT MULTIPLE SITES: ICD-10-CM

## 2020-01-16 DIAGNOSIS — N28.9 RENAL INSUFFICIENCY: ICD-10-CM

## 2020-01-16 DIAGNOSIS — R06.00 DYSPNEA, UNSPECIFIED TYPE: ICD-10-CM
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
## 2020-01-16 DIAGNOSIS — S20.212A RIB CONTUSION, LEFT, INITIAL ENCOUNTER: ICD-10-CM

## 2020-01-16 LAB
ALBUMIN SERPL-MCNC: 3.4 G/DL (ref 3.5–5.2)
ALBUMIN/GLOB SERPL: 1.3 G/DL
ALP SERPL-CCNC: 65 U/L (ref 39–117)
ALT SERPL W P-5'-P-CCNC: 16 U/L (ref 1–41)
ANION GAP SERPL CALCULATED.3IONS-SCNC: 13 MMOL/L (ref 5–15)
AST SERPL-CCNC: 17 U/L (ref 1–40)
BASOPHILS # BLD AUTO: 0.01 10*3/MM3 (ref 0–0.2)
BASOPHILS NFR BLD AUTO: 0.1 % (ref 0–1.5)
BILIRUB SERPL-MCNC: 0.3 MG/DL (ref 0.2–1.2)
BILIRUB UR QL STRIP: NEGATIVE
BUN BLD-MCNC: 24 MG/DL (ref 8–23)
BUN/CREAT SERPL: 16.7 (ref 7–25)
CALCIUM SPEC-SCNC: 8.6 MG/DL (ref 8.6–10.5)
CHLORIDE SERPL-SCNC: 99 MMOL/L (ref 98–107)
CLARITY UR: CLEAR
CO2 SERPL-SCNC: 28 MMOL/L (ref 22–29)
COLOR UR: YELLOW
CREAT BLD-MCNC: 1.44 MG/DL (ref 0.76–1.27)
DEPRECATED RDW RBC AUTO: 47.4 FL (ref 37–54)
EOSINOPHIL # BLD AUTO: 0.14 10*3/MM3 (ref 0–0.4)
EOSINOPHIL NFR BLD AUTO: 2 % (ref 0.3–6.2)
ERYTHROCYTE [DISTWIDTH] IN BLOOD BY AUTOMATED COUNT: 13.7 % (ref 12.3–15.4)
GFR SERPL CREATININE-BSD FRML MDRD: 47 ML/MIN/1.73
GLOBULIN UR ELPH-MCNC: 2.7 GM/DL
GLUCOSE BLD-MCNC: 136 MG/DL (ref 65–99)
GLUCOSE UR STRIP-MCNC: NEGATIVE MG/DL
HCT VFR BLD AUTO: 38.6 % (ref 37.5–51)
HGB BLD-MCNC: 12.6 G/DL (ref 13–17.7)
HGB UR QL STRIP.AUTO: NEGATIVE
IMM GRANULOCYTES # BLD AUTO: 0.02 10*3/MM3 (ref 0–0.05)
IMM GRANULOCYTES NFR BLD AUTO: 0.3 % (ref 0–0.5)
KETONES UR QL STRIP: NEGATIVE
LEUKOCYTE ESTERASE UR QL STRIP.AUTO: NEGATIVE
LYMPHOCYTES # BLD AUTO: 2.16 10*3/MM3 (ref 0.7–3.1)
LYMPHOCYTES NFR BLD AUTO: 30.3 % (ref 19.6–45.3)
MCH RBC QN AUTO: 30.8 PG (ref 26.6–33)
MCHC RBC AUTO-ENTMCNC: 32.6 G/DL (ref 31.5–35.7)
MCV RBC AUTO: 94.4 FL (ref 79–97)
MONOCYTES # BLD AUTO: 1.09 10*3/MM3 (ref 0.1–0.9)
MONOCYTES NFR BLD AUTO: 15.3 % (ref 5–12)
NEUTROPHILS # BLD AUTO: 3.72 10*3/MM3 (ref 1.7–7)
NEUTROPHILS NFR BLD AUTO: 52 % (ref 42.7–76)
NITRITE UR QL STRIP: NEGATIVE
NRBC BLD AUTO-RTO: 0 /100 WBC (ref 0–0.2)
NT-PROBNP SERPL-MCNC: 321.8 PG/ML (ref 5–1800)
PH UR STRIP.AUTO: 5.5 [PH] (ref 5–8)
PLATELET # BLD AUTO: 188 10*3/MM3 (ref 140–450)
PMV BLD AUTO: 10.7 FL (ref 6–12)
POTASSIUM BLD-SCNC: 3.4 MMOL/L (ref 3.5–5.2)
PROT SERPL-MCNC: 6.1 G/DL (ref 6–8.5)
PROT UR QL STRIP: NEGATIVE
RBC # BLD AUTO: 4.09 10*6/MM3 (ref 4.14–5.8)
SODIUM BLD-SCNC: 140 MMOL/L (ref 136–145)
SP GR UR STRIP: 1.01 (ref 1–1.03)
TROPONIN T SERPL-MCNC: <0.01 NG/ML (ref 0–0.03)
UROBILINOGEN UR QL STRIP: NORMAL
WBC NRBC COR # BLD: 7.14 10*3/MM3 (ref 3.4–10.8)

## 2020-01-16 PROCEDURE — 99284 EMERGENCY DEPT VISIT MOD MDM: CPT

## 2020-01-16 PROCEDURE — 85025 COMPLETE CBC W/AUTO DIFF WBC: CPT | Performed by: EMERGENCY MEDICINE

## 2020-01-16 PROCEDURE — 83880 ASSAY OF NATRIURETIC PEPTIDE: CPT | Performed by: EMERGENCY MEDICINE

## 2020-01-16 PROCEDURE — 96375 TX/PRO/DX INJ NEW DRUG ADDON: CPT

## 2020-01-16 PROCEDURE — 96374 THER/PROPH/DIAG INJ IV PUSH: CPT

## 2020-01-16 PROCEDURE — 25010000002 HYDROMORPHONE PER 4 MG: Performed by: EMERGENCY MEDICINE

## 2020-01-16 PROCEDURE — 93005 ELECTROCARDIOGRAM TRACING: CPT | Performed by: EMERGENCY MEDICINE

## 2020-01-16 PROCEDURE — 25010000002 ONDANSETRON PER 1 MG: Performed by: EMERGENCY MEDICINE

## 2020-01-16 PROCEDURE — 84484 ASSAY OF TROPONIN QUANT: CPT | Performed by: EMERGENCY MEDICINE

## 2020-01-16 PROCEDURE — 74176 CT ABD & PELVIS W/O CONTRAST: CPT

## 2020-01-16 PROCEDURE — 81003 URINALYSIS AUTO W/O SCOPE: CPT | Performed by: EMERGENCY MEDICINE

## 2020-01-16 PROCEDURE — 80053 COMPREHEN METABOLIC PANEL: CPT | Performed by: EMERGENCY MEDICINE

## 2020-01-16 PROCEDURE — 71250 CT THORAX DX C-: CPT

## 2020-01-16 RX ORDER — ACETAMINOPHEN 500 MG
1000 TABLET ORAL ONCE
Status: COMPLETED | OUTPATIENT
Start: 2020-01-16 | End: 2020-01-16

## 2020-01-16 RX ORDER — HYDROMORPHONE HYDROCHLORIDE 1 MG/ML
0.25 INJECTION, SOLUTION INTRAMUSCULAR; INTRAVENOUS; SUBCUTANEOUS ONCE
Status: COMPLETED | OUTPATIENT
Start: 2020-01-16 | End: 2020-01-16

## 2020-01-16 RX ORDER — HYDROCODONE BITARTRATE AND ACETAMINOPHEN 5; 325 MG/1; MG/1
1 TABLET ORAL EVERY 6 HOURS PRN
Qty: 12 TABLET | Refills: 0 | Status: SHIPPED | OUTPATIENT
Start: 2020-01-16 | End: 2020-04-07 | Stop reason: HOSPADM

## 2020-01-16 RX ORDER — ONDANSETRON 2 MG/ML
4 INJECTION INTRAMUSCULAR; INTRAVENOUS ONCE
Status: COMPLETED | OUTPATIENT
Start: 2020-01-16 | End: 2020-01-16

## 2020-01-16 RX ADMIN — HYDROMORPHONE HYDROCHLORIDE 0.25 MG: 1 INJECTION, SOLUTION INTRAMUSCULAR; INTRAVENOUS; SUBCUTANEOUS at 06:41

## 2020-01-16 RX ADMIN — ACETAMINOPHEN 1000 MG: 500 TABLET, FILM COATED ORAL at 06:41

## 2020-01-16 RX ADMIN — ONDANSETRON 4 MG: 2 INJECTION INTRAMUSCULAR; INTRAVENOUS at 06:40

## 2020-01-16 NOTE — ED PROVIDER NOTES
Subjective   This is a pleasantly demented 79-year-old is brought to the emergency room today by EMS.  I suspect he is in a nursing facility of some type.  I reviewed his old charts and it appears that he was admitted to [facility] in April 2019 after a fall this had a couple visits with a primary care doctor in [facility] due to dementia.    He tells me he gets around without the help of a cane or a walker.  He tells me this morning that he was feeling short of breath and try to get out of bed and put on his pants and ended up stuck between a shower stall and a toilet after falling.  Currently complains of left back and rib pain as well as left lower back pain and some pain around his left hip.  He also tells me he struck his left face and head he has no neck pain or headache currently.  Tells me he still feels a little bit short of breath.  Tells me has had problems with chronic back pain but can ever remember having any sort of back procedure done.  With careful questioning I can actually get a fairly good review of systems from him he tells me he has not had fever or chills has not had any recent illness such as runny nose, sore throat, or cough.  His appetite is been okay.  Tells me he is thirsty currently.  He has had no urinary or bowel complaints.  He does not know the date or quite where he lives at however.          Review of Systems   All other systems reviewed and are negative.      Past Medical History:   Diagnosis Date   • Arthritis    • Cancer (CMS/[facility])     SKIN    • Cataract    • Coronary artery disease    • Diabetes mellitus (CMS/HCC)    • High cholesterol    • [place] (hard of hearing)     PATIENT HAS HEARING AIDS   • Hypertension    • Irregular heart beat     HISTORY OF CARDIAC ABLATION IN 2003   • Wears dentures     FULL UPPER PLATE       Allergies   Allergen Reactions   • Phenergan [Promethazine Hcl] Nausea And Vomiting       Past Surgical History:   Procedure Laterality Date   •  BACK SURGERY      THEN AGAIN IN    • CARDIAC ABLATION     • CARDIAC SURGERY     • CATARACT EXTRACTION W/ INTRAOCULAR LENS IMPLANT Left 2017    Procedure: CATARACT PHACO EXTRACTION WITH INTRAOCULAR LENS IMPLANT LEFT WITH TORIC LENS;  Surgeon: Alma Benavidez MD;  Location: [facility];  Service:    • CATARACT EXTRACTION W/ INTRAOCULAR LENS IMPLANT Right 2017    Procedure: CATARACT PHACO EXTRACTION WITH INTRAOCULAR LENS IMPLANT RIGHT WITH TORIC LENS;  Surgeon: Alma Benavidez MD;  Location: [facility];  Service:    • CORONARY ARTERY BYPASS GRAFT      SPOUSE UNSURE OF HOW MANY VESSELS   • HIATAL HERNIA REPAIR     • JOINT REPLACEMENT Left     HIP - DONE BY DR DALAL   • KNEE ARTHROSCOPY Left    • NOSE SURGERY      SPOUSE REPORTS FOR A BROKEN NOSE   • OTHER SURGICAL HISTORY Left     TENDON TORN LOOSE FROM BONE, LEFT ELBOW   • OTHER SURGICAL HISTORY      RUPTURED DISC   • OTHER SURGICAL HISTORY      NECK SURGERY FOR RUPTURED DISC   • OTHER SURGICAL HISTORY      HAD SECOND NECK SURGERY   • OTHER SURGICAL HISTORY      RUPTURED DISC   • OTHER SURGICAL HISTORY      RUPTURED DISC   • OTHER SURGICAL HISTORY      HARDWARE REMOVAL FROM BACK BY DR HAY       History reviewed. No pertinent family history.    Social History     Socioeconomic History   • Marital status:      Spouse name: Not on file   • Number of children: Not on file   • Years of education: Not on file   • Highest education level: Not on file   Tobacco Use   • Smoking status: Former Smoker     Types: Cigarettes     Last attempt to quit:      Years since quittin.0   • Smokeless tobacco: Never Used   Substance and Sexual Activity   • Alcohol use: No   • Drug use: No   • Sexual activity: Defer           Objective   Physical Exam   Constitutional: He appears well-developed and well-nourished.   This is an older man in no acute distress.  Engages in conversation slowly with careful  questioning I can get answers to most of my questions.   HENT:   Head: Normocephalic and atraumatic.   Right Ear: External ear normal.   Left Ear: External ear normal.   Nose: Nose normal.   Mouth/Throat: Oropharynx is clear and moist.   I will see any marks on his head I palpated his head and it is nontender.    Denture plate is in place   Eyes: Pupils are equal, round, and reactive to light. Conjunctivae and EOM are normal.   Neck: Normal range of motion. Neck supple. No JVD present. No tracheal deviation present. No thyromegaly present.   Cervical spine is nontender demonstrates good range of motion without pain   Cardiovascular: Normal rate, regular rhythm, normal heart sounds and intact distal pulses.   No murmur heard.  Pulmonary/Chest: Effort normal and breath sounds normal. No respiratory distress.   His left posterior ribs are modestly tender to palpation without crepitation or bruising that I can see.  His thoracic spine has moderate tenderness but no mass or bruising or rash   Abdominal: Soft. Bowel sounds are normal. He exhibits no distension. There is no tenderness.   Is a well healed lower midline incision.  His abdomen is soft but he is tender in the left flank area and into the left hip again without mass or rash or ecchymosis that I see currently.   Musculoskeletal:   His upper extremities demonstrate good range of motion and really is not tender neurovascularly intact.  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  His lower extremities see issues in place he is nontender to palpation from his feet but is little tender in his left hip.  When I do a straight leg raise on the left he has back pain with this but no radicular component.  His good pulses and no edema in his feet no active synovitis   Lymphadenopathy:     He has no cervical adenopathy.   Neurological: No sensory deficit. He exhibits normal muscle tone.   He is alert but has what appears to be moderate forgetfulness but is not  completely oriented but engages in conversation slowly and cautiously.  His face is symmetric, his voice is strong, vision, hearing, and speech are all preserved.  He can move his upper and lower extremities symmetrically but it hurts when he moves his left leg in his low back area.   Skin: Skin is warm and dry. Capillary refill takes less than 2 seconds.   Psychiatric: He has a normal mood and affect. His behavior is normal.   Nursing note and vitals reviewed.      Procedures           ED Course  ED Course as of Jan 16 0803   Th Jan 16, 2020   0756 SERGEY Sawant complete. [id number]    [TB]      ED Course User Index  [TB] Meza, Karlos      Recent Results (from the past 24 hour(s))   Comprehensive Metabolic Panel    Collection Time: 01/16/20  6:42 AM   Result Value Ref Range    Glucose 136 (H) 65 - 99 mg/dL    BUN 24 (H) 8 - 23 mg/dL    Creatinine 1.44 (H) 0.76 - 1.27 mg/dL    Sodium 140 136 - 145 mmol/L    Potassium 3.4 (L) 3.5 - 5.2 mmol/L    Chloride 99 98 - 107 mmol/L    CO2 28.0 22.0 - 29.0 mmol/L    Calcium 8.6 8.6 - 10.5 mg/dL    Total Protein 6.1 6.0 - 8.5 g/dL    Albumin 3.40 (L) 3.50 - 5.20 g/dL    ALT (SGPT) 16 1 - 41 U/L    AST (SGOT) 17 1 - 40 U/L    Alkaline Phosphatase 65 39 - 117 U/L    Total Bilirubin 0.3 0.2 - 1.2 mg/dL    eGFR Non African Amer 47 (L) >60 mL/min/1.73    Globulin 2.7 gm/dL    A/G Ratio 1.3 g/dL    BUN/Creatinine Ratio 16.7 7.0 - 25.0    Anion Gap 13.0 5.0 - 15.0 mmol/L   Troponin    Collection Time: 01/16/20  6:42 AM   Result Value Ref Range    Troponin T <0.010 0.000 - 0.030 ng/mL   BNP    Collection Time: 01/16/20  6:42 AM   Result Value Ref Range    proBNP 321.8 5.0-1,800.0 pg/mL   CBC Auto Differential    Collection Time: 01/16/20  6:42 AM   Result Value Ref Range    WBC 7.14 3.40 - 10.80 10*3/mm3    RBC 4.09 (L) 4.14 - 5.80 10*6/mm3    Hemoglobin 12.6 (L) 13.0 - 17.7 g/dL    Hematocrit 38.6 37.5 - 51.0 %    MCV 94.4 79.0 - 97.0 fL    MCH 30.8 26.6 - 33.0 pg    MCHC 32.6 31.5 -  35.7 g/dL    RDW 13.7 12.3 - 15.4 %    RDW-SD 47.4 37.0 - 54.0 fl    MPV 10.7 6.0 - 12.0 fL    Platelets 188 140 - 450 10*3/mm3    Neutrophil % 52.0 42.7 - 76.0 %    Lymphocyte % 30.3 19.6 - 45.3 %    Monocyte % 15.3 (H) 5.0 - 12.0 %    Eosinophil % 2.0 0.3 - 6.2 %    Basophil % 0.1 0.0 - 1.5 %    Immature Grans % 0.3 0.0 - 0.5 %    Neutrophils, Absolute 3.72 1.70 - 7.00 10*3/mm3    Lymphocytes, Absolute 2.16 0.70 - 3.10 10*3/mm3    Monocytes, Absolute 1.09 (H) 0.10 - 0.90 10*3/mm3    Eosinophils, Absolute 0.14 0.00 - 0.40 10*3/mm3    Basophils, Absolute 0.01 0.00 - 0.20 10*3/mm3    Immature Grans, Absolute 0.02 0.00 - 0.05 10*3/mm3    nRBC 0.0 0.0 - 0.2 /100 WBC   Urinalysis With Microscopic If Indicated (No Culture) - Urine, Clean Catch    Collection Time: 01/16/20  6:56 AM   Result Value Ref Range    Color, UA Yellow Yellow, Straw    Appearance, UA Clear Clear    pH, UA 5.5 5.0 - 8.0    Specific Gravity, UA 1.010 1.001 - 1.030    Glucose, UA Negative Negative    Ketones, UA Negative Negative    Bilirubin, UA Negative Negative    Blood, UA Negative Negative    Protein, UA Negative Negative    Leuk Esterase, UA Negative Negative    Nitrite, UA Negative Negative    Urobilinogen, UA 1.0 E.U./dL 0.2 - 1.0 E.U./dL     Note: In addition to lab results from this visit, the labs listed above may include labs taken at another facility or during a different encounter within the last 24 hours. Please correlate lab times with ED admission and discharge times for further clarification of the services performed during this visit.    CT Chest Without Contrast   Final Result   Old rib fractures noted on the left. No acute fractures. Both lungs are fully expanded and clear. A hiatal hernia is noted.      Signer Name: Tremayne Kidd MD    Signed: 1/16/2020 6:44 AM    Workstation Name: [id number]     [facility]      CT Abdomen Pelvis Without Contrast   Final Result   No acute findings. Diverticulosis. Fatty  atrophy of the pancreas.               Signer Name: Tremayne Kidd MD    Signed: 1/16/2020 6:46 AM    Workstation Name: [id number]     [facility]        Vitals:    01/16/20 0645 01/16/20 0700 01/16/20 0729 01/16/20 0730   BP:  161/82  125/86   BP Location:       Patient Position:       Pulse:  73     Resp:       Temp:       TempSrc:       SpO2: 95% 94% 94% 94%   Weight:       Height:         Medications   acetaminophen (TYLENOL) tablet 1,000 mg (1,000 mg Oral Given 1/16/20 0641)   HYDROmorphone (DILAUDID) injection 0.25 mg (0.25 mg Intravenous Given 1/16/20 0641)   ondansetron (ZOFRAN) injection 4 mg (4 mg Intravenous Given 1/16/20 0640)     ECG/EMG Results (last 24 hours)     Procedure Component Value Units Date/Time    ECG 12 Lead [[id number]] Collected:  01/16/20 0642     Updated:  01/16/20 0649    Narrative:       Test Reason : dyspnea  Blood Pressure : **/** mmHG  Vent. Rate : 070 BPM     Atrial Rate : 070 BPM     P-R Int : 140 ms          QRS Dur : 092 ms      QT Int : 436 ms       P-R-T Axes : 060 -10 155 degrees     QTc Int : 470 ms    Normal sinus rhythm  Cannot rule out Anterior infarct , age undetermined  T wave abnormality, consider lateral ischemia  Abnormal ECG  No previous ECGs available  Confirmed by NE ROSALES MD ([id number]) on 1/16/2020 6:49:16 AM    Referred By:  luther           Confirmed By:NE ROSALES MD        ECG 12 Lead   Final Result   Test Reason : dyspnea   Blood Pressure : **/** mmHG   Vent. Rate : 070 BPM     Atrial Rate : 070 BPM      P-R Int : 140 ms          QRS Dur : 092 ms       QT Int : 436 ms       P-R-T Axes : 060 -10 155 degrees      QTc Int : 470 ms      Normal sinus rhythm   Cannot rule out Anterior infarct , age undetermined   T wave abnormality, consider lateral ischemia   Abnormal ECG   No previous ECGs available   Confirmed by NE ROSALES MD ([id number]) on 1/16/2020 6:49:16 AM      Referred By:  luther           Confirmed By:NE ROSALES MD                         [place] Coma Scale Score: 14                          MDM  Number of Diagnoses or Management Options  Dyspnea, unspecified type:   Elevated glucose:   Fall (on) (from) other stairs and steps, initial encounter:   Hypoalbuminemia:   Low back pain at multiple sites:   Renal insufficiency:   Rib contusion, left, initial encounter:   Diagnosis management comments:       I have reviewed all available studies at the bedside with the patient.  He is resting comfortably.  The pain medicine seem to help him quite a bit.  On review of his CT scan he has had previous PLIF of his back and hardware appears to be intact.  I see no evidence of intra-abdominal bleeding.  His old rib fractures on the right but nothing on the left.  His labs are bland.  His vital signs have been acceptable here.    This is a gentleman with moderate dementia had a fall today but likely even though he was complaining of dyspnea cannot find any etiology for this he has no evidence of heart failure MI.  This point a minute discharge him back to his memory care facility.  Of asked that he follow-up with his primary doctor if he has ongoing pain he may need physical therapy.  I did write him a short course of pain medicine to take and I reviewed his Jese mostly is on Neurontin for a suspect chronic neuropathic pain.    He does have a few minor lab abnormalities such as minor renal insufficiency and hyperglycemia and his albumin is a little bit on the low side I suspect this just indicates a senescent process.    All are agreeable with the plan       Amount and/or Complexity of Data Reviewed  Clinical lab tests: reviewed  Tests in the radiology section of CPT®: reviewed  Tests in the medicine section of CPT®: reviewed  Decide to obtain previous medical records or to obtain history from someone other than the patient: yes        Final diagnoses:   Fall (on) (from) other stairs and steps, initial encounter   Low back pain at multiple  sites   Renal insufficiency   Hypoalbuminemia   Elevated glucose   Rib contusion, left, initial encounter   Dyspnea, unspecified type            Lewis Mendoza MD  01/16/20 0846

## 2020-04-04 ENCOUNTER — APPOINTMENT (OUTPATIENT)
Dept: CT IMAGING | Facility: HOSPITAL | Age: 80
End: 2020-04-04

## 2020-04-04 ENCOUNTER — HOSPITAL ENCOUNTER (INPATIENT)
Facility: HOSPITAL | Age: 80
LOS: 3 days | Discharge: HOME HEALTH | End: 2020-04-07
Attending: EMERGENCY MEDICINE | Admitting: INTERNAL MEDICINE

## 2020-04-04 DIAGNOSIS — R50.9 ACUTE FEBRILE ILLNESS: ICD-10-CM

## 2020-04-04 DIAGNOSIS — R21 RASH: ICD-10-CM

## 2020-04-04 DIAGNOSIS — R41.82 ALTERED MENTAL STATUS, UNSPECIFIED ALTERED MENTAL STATUS TYPE: Primary | ICD-10-CM

## 2020-04-04 PROBLEM — G93.40 ACUTE ENCEPHALOPATHY: Status: ACTIVE | Noted: 2020-04-04

## 2020-04-04 PROBLEM — R65.21 SEPTIC SHOCK: Status: ACTIVE | Noted: 2020-04-04

## 2020-04-04 PROBLEM — D64.9 ANEMIA: Status: ACTIVE | Noted: 2020-04-04

## 2020-04-04 PROBLEM — A41.9 SEPSIS: Status: ACTIVE | Noted: 2020-04-04

## 2020-04-04 PROBLEM — R09.02 HYPOXIA: Status: ACTIVE | Noted: 2020-04-04

## 2020-04-04 PROBLEM — M62.82 RHABDOMYOLYSIS: Status: ACTIVE | Noted: 2020-04-04

## 2020-04-04 PROBLEM — F03.90 DEMENTIA (HCC): Status: ACTIVE | Noted: 2020-04-04

## 2020-04-04 PROBLEM — R60.9 EDEMA: Status: ACTIVE | Noted: 2020-04-04

## 2020-04-04 PROBLEM — N17.9 AKI (ACUTE KIDNEY INJURY): Status: ACTIVE | Noted: 2020-04-04

## 2020-04-04 PROBLEM — E83.42 HYPOMAGNESEMIA: Status: ACTIVE | Noted: 2020-04-04

## 2020-04-04 LAB
ALBUMIN SERPL-MCNC: 3.6 G/DL (ref 3.5–5.2)
ALBUMIN/GLOB SERPL: 1.2 G/DL
ALP SERPL-CCNC: 53 U/L (ref 39–117)
ALT SERPL W P-5'-P-CCNC: 25 U/L (ref 1–41)
ANION GAP SERPL CALCULATED.3IONS-SCNC: 16 MMOL/L (ref 5–15)
APTT PPP: 28.1 SECONDS (ref 24–37)
ARTERIAL PATENCY WRIST A: ABNORMAL
AST SERPL-CCNC: 44 U/L (ref 1–40)
ATMOSPHERIC PRESS: ABNORMAL MM[HG]
B PARAPERT DNA SPEC QL NAA+PROBE: NOT DETECTED
B PERT DNA SPEC QL NAA+PROBE: NOT DETECTED
BACTERIA UR QL AUTO: ABNORMAL /HPF
BASE EXCESS BLDA CALC-SCNC: 0.4 MMOL/L (ref 0–2)
BASOPHILS # BLD AUTO: 0.02 10*3/MM3 (ref 0–0.2)
BASOPHILS NFR BLD AUTO: 0.4 % (ref 0–1.5)
BDY SITE: ABNORMAL
BILIRUB SERPL-MCNC: 0.4 MG/DL (ref 0.2–1.2)
BILIRUB UR QL STRIP: ABNORMAL
BODY TEMPERATURE: 37 C
BUN BLD-MCNC: 29 MG/DL (ref 8–23)
BUN/CREAT SERPL: 16.4 (ref 7–25)
C PNEUM DNA NPH QL NAA+NON-PROBE: NOT DETECTED
CA-I SERPL ISE-MCNC: 1.19 MMOL/L (ref 1.12–1.32)
CALCIUM SPEC-SCNC: 8.6 MG/DL (ref 8.6–10.5)
CHLORIDE SERPL-SCNC: 101 MMOL/L (ref 98–107)
CK SERPL-CCNC: 614 U/L (ref 20–200)
CLARITY UR: CLEAR
CO2 BLDA-SCNC: 25.9 MMOL/L (ref 22–33)
CO2 SERPL-SCNC: 24 MMOL/L (ref 22–29)
COHGB MFR BLD: 1 % (ref 0–2)
COLOR UR: ABNORMAL
CREAT BLD-MCNC: 1.77 MG/DL (ref 0.76–1.27)
CRP SERPL-MCNC: 0.17 MG/DL (ref 0–0.5)
D DIMER PPP FEU-MCNC: 0.96 MCGFEU/ML (ref 0–0.56)
D-LACTATE SERPL-SCNC: 2.9 MMOL/L (ref 0.5–2)
DEPRECATED RDW RBC AUTO: 50 FL (ref 37–54)
EOSINOPHIL # BLD AUTO: 0.02 10*3/MM3 (ref 0–0.4)
EOSINOPHIL NFR BLD AUTO: 0.4 % (ref 0.3–6.2)
ERYTHROCYTE [DISTWIDTH] IN BLOOD BY AUTOMATED COUNT: 14.6 % (ref 12.3–15.4)
FERRITIN SERPL-MCNC: 252 NG/ML (ref 30–400)
FLUAV H1 2009 PAND RNA NPH QL NAA+PROBE: NOT DETECTED
FLUAV H1 HA GENE NPH QL NAA+PROBE: NOT DETECTED
FLUAV H3 RNA NPH QL NAA+PROBE: NOT DETECTED
FLUAV SUBTYP SPEC NAA+PROBE: NOT DETECTED
FLUBV RNA ISLT QL NAA+PROBE: NOT DETECTED
GFR SERPL CREATININE-BSD FRML MDRD: 37 ML/MIN/1.73
GLOBULIN UR ELPH-MCNC: 3 GM/DL
GLUCOSE BLD-MCNC: 176 MG/DL (ref 65–99)
GLUCOSE BLDC GLUCOMTR-MCNC: 113 MG/DL (ref 70–130)
GLUCOSE UR STRIP-MCNC: NEGATIVE MG/DL
HADV DNA SPEC NAA+PROBE: NOT DETECTED
HCO3 BLDA-SCNC: 24.7 MMOL/L (ref 20–26)
HCOV 229E RNA SPEC QL NAA+PROBE: NOT DETECTED
HCOV HKU1 RNA SPEC QL NAA+PROBE: NOT DETECTED
HCOV NL63 RNA SPEC QL NAA+PROBE: NOT DETECTED
HCOV OC43 RNA SPEC QL NAA+PROBE: NOT DETECTED
HCT VFR BLD AUTO: 37.5 % (ref 37.5–51)
HCT VFR BLD CALC: 33.4 %
HGB BLD-MCNC: 12.6 G/DL (ref 13–17.7)
HGB BLDA-MCNC: 10.9 G/DL (ref 13.5–17.5)
HGB UR QL STRIP.AUTO: NEGATIVE
HMPV RNA NPH QL NAA+NON-PROBE: NOT DETECTED
HOLD SPECIMEN: NORMAL
HOLD SPECIMEN: NORMAL
HOROWITZ INDEX BLD+IHG-RTO: 28 %
HPIV1 RNA SPEC QL NAA+PROBE: NOT DETECTED
HPIV2 RNA SPEC QL NAA+PROBE: NOT DETECTED
HPIV3 RNA NPH QL NAA+PROBE: NOT DETECTED
HPIV4 P GENE NPH QL NAA+PROBE: NOT DETECTED
HYALINE CASTS UR QL AUTO: ABNORMAL /LPF
IMM GRANULOCYTES # BLD AUTO: 0.05 10*3/MM3 (ref 0–0.05)
IMM GRANULOCYTES NFR BLD AUTO: 0.9 % (ref 0–0.5)
INR PPP: 1.04 (ref 0.85–1.16)
KETONES UR QL STRIP: ABNORMAL
LACTATE HOLD SPECIMEN: NORMAL
LDH SERPL-CCNC: 241 U/L (ref 135–225)
LEUKOCYTE ESTERASE UR QL STRIP.AUTO: ABNORMAL
LYMPHOCYTES # BLD AUTO: 1.23 10*3/MM3 (ref 0.7–3.1)
LYMPHOCYTES NFR BLD AUTO: 21.8 % (ref 19.6–45.3)
M PNEUMO IGG SER IA-ACNC: NOT DETECTED
MAGNESIUM SERPL-MCNC: 1.4 MG/DL (ref 1.6–2.4)
MCH RBC QN AUTO: 31.4 PG (ref 26.6–33)
MCHC RBC AUTO-ENTMCNC: 33.6 G/DL (ref 31.5–35.7)
MCV RBC AUTO: 93.5 FL (ref 79–97)
METHGB BLD QL: 1.1 % (ref 0–1.5)
MODALITY: ABNORMAL
MONOCYTES # BLD AUTO: 0.77 10*3/MM3 (ref 0.1–0.9)
MONOCYTES NFR BLD AUTO: 13.6 % (ref 5–12)
NEUTROPHILS # BLD AUTO: 3.56 10*3/MM3 (ref 1.7–7)
NEUTROPHILS NFR BLD AUTO: 62.9 % (ref 42.7–76)
NITRITE UR QL STRIP: NEGATIVE
NOTE: ABNORMAL
NRBC BLD AUTO-RTO: 0 /100 WBC (ref 0–0.2)
OXYHGB MFR BLDV: 94.5 % (ref 94–99)
PCO2 BLDA: 37.6 MM HG (ref 35–45)
PCO2 TEMP ADJ BLD: 37.6 MM HG (ref 35–48)
PH BLDA: 7.43 PH UNITS (ref 7.35–7.45)
PH UR STRIP.AUTO: <=5 [PH] (ref 5–8)
PH, TEMP CORRECTED: 7.43 PH UNITS
PHOSPHATE SERPL-MCNC: 2.3 MG/DL (ref 2.5–4.5)
PLATELET # BLD AUTO: 208 10*3/MM3 (ref 140–450)
PMV BLD AUTO: 10.6 FL (ref 6–12)
PO2 BLDA: 88.7 MM HG (ref 83–108)
PO2 TEMP ADJ BLD: 88.7 MM HG (ref 83–108)
POTASSIUM BLD-SCNC: 3.8 MMOL/L (ref 3.5–5.2)
PROT SERPL-MCNC: 6.6 G/DL (ref 6–8.5)
PROT UR QL STRIP: NEGATIVE
PROTHROMBIN TIME: 13.3 SECONDS (ref 11.5–14)
RBC # BLD AUTO: 4.01 10*6/MM3 (ref 4.14–5.8)
RBC # UR: ABNORMAL /HPF
REF LAB TEST METHOD: ABNORMAL
RHINOVIRUS RNA SPEC NAA+PROBE: NOT DETECTED
RSV RNA NPH QL NAA+NON-PROBE: NOT DETECTED
SODIUM BLD-SCNC: 141 MMOL/L (ref 136–145)
SP GR UR STRIP: 1.03 (ref 1–1.03)
SQUAMOUS #/AREA URNS HPF: ABNORMAL /HPF
TSH SERPL DL<=0.05 MIU/L-ACNC: 2.01 UIU/ML (ref 0.27–4.2)
UROBILINOGEN UR QL STRIP: ABNORMAL
VALPROATE SERPL-MCNC: 74.6 MCG/ML (ref 50–125)
VENTILATOR MODE: ABNORMAL
WBC NRBC COR # BLD: 5.65 10*3/MM3 (ref 3.4–10.8)
WBC UR QL AUTO: ABNORMAL /HPF
WHOLE BLOOD HOLD SPECIMEN: NORMAL
WHOLE BLOOD HOLD SPECIMEN: NORMAL

## 2020-04-04 PROCEDURE — 85379 FIBRIN DEGRADATION QUANT: CPT | Performed by: INTERNAL MEDICINE

## 2020-04-04 PROCEDURE — 25010000002 PIPERACILLIN SOD-TAZOBACTAM PER 1 G: Performed by: EMERGENCY MEDICINE

## 2020-04-04 PROCEDURE — 82330 ASSAY OF CALCIUM: CPT | Performed by: EMERGENCY MEDICINE

## 2020-04-04 PROCEDURE — 84443 ASSAY THYROID STIM HORMONE: CPT | Performed by: INTERNAL MEDICINE

## 2020-04-04 PROCEDURE — 70450 CT HEAD/BRAIN W/O DYE: CPT

## 2020-04-04 PROCEDURE — G0378 HOSPITAL OBSERVATION PER HR: HCPCS

## 2020-04-04 PROCEDURE — 85610 PROTHROMBIN TIME: CPT | Performed by: EMERGENCY MEDICINE

## 2020-04-04 PROCEDURE — 25010000002 VANCOMYCIN 10 G RECONSTITUTED SOLUTION: Performed by: EMERGENCY MEDICINE

## 2020-04-04 PROCEDURE — 25010000002 MAGNESIUM SULFATE 2 GM/50ML SOLUTION: Performed by: INTERNAL MEDICINE

## 2020-04-04 PROCEDURE — 99223 1ST HOSP IP/OBS HIGH 75: CPT | Performed by: INTERNAL MEDICINE

## 2020-04-04 PROCEDURE — 84100 ASSAY OF PHOSPHORUS: CPT | Performed by: EMERGENCY MEDICINE

## 2020-04-04 PROCEDURE — 36600 WITHDRAWAL OF ARTERIAL BLOOD: CPT

## 2020-04-04 PROCEDURE — 74176 CT ABD & PELVIS W/O CONTRAST: CPT

## 2020-04-04 PROCEDURE — 82550 ASSAY OF CK (CPK): CPT | Performed by: EMERGENCY MEDICINE

## 2020-04-04 PROCEDURE — 82962 GLUCOSE BLOOD TEST: CPT

## 2020-04-04 PROCEDURE — 82805 BLOOD GASES W/O2 SATURATION: CPT

## 2020-04-04 PROCEDURE — 81001 URINALYSIS AUTO W/SCOPE: CPT | Performed by: EMERGENCY MEDICINE

## 2020-04-04 PROCEDURE — 85025 COMPLETE CBC W/AUTO DIFF WBC: CPT | Performed by: EMERGENCY MEDICINE

## 2020-04-04 PROCEDURE — 0100U HC BIOFIRE FILMARRAY RESP PANEL 2: CPT | Performed by: INTERNAL MEDICINE

## 2020-04-04 PROCEDURE — 87040 BLOOD CULTURE FOR BACTERIA: CPT | Performed by: EMERGENCY MEDICINE

## 2020-04-04 PROCEDURE — 71250 CT THORAX DX C-: CPT

## 2020-04-04 PROCEDURE — 80053 COMPREHEN METABOLIC PANEL: CPT | Performed by: EMERGENCY MEDICINE

## 2020-04-04 PROCEDURE — 80164 ASSAY DIPROPYLACETIC ACD TOT: CPT | Performed by: INTERNAL MEDICINE

## 2020-04-04 PROCEDURE — 86140 C-REACTIVE PROTEIN: CPT | Performed by: EMERGENCY MEDICINE

## 2020-04-04 PROCEDURE — 83615 LACTATE (LD) (LDH) ENZYME: CPT | Performed by: INTERNAL MEDICINE

## 2020-04-04 PROCEDURE — 82728 ASSAY OF FERRITIN: CPT | Performed by: INTERNAL MEDICINE

## 2020-04-04 PROCEDURE — 85730 THROMBOPLASTIN TIME PARTIAL: CPT | Performed by: EMERGENCY MEDICINE

## 2020-04-04 PROCEDURE — 93005 ELECTROCARDIOGRAM TRACING: CPT | Performed by: EMERGENCY MEDICINE

## 2020-04-04 PROCEDURE — 83605 ASSAY OF LACTIC ACID: CPT | Performed by: EMERGENCY MEDICINE

## 2020-04-04 PROCEDURE — 83735 ASSAY OF MAGNESIUM: CPT | Performed by: EMERGENCY MEDICINE

## 2020-04-04 PROCEDURE — 99285 EMERGENCY DEPT VISIT HI MDM: CPT

## 2020-04-04 PROCEDURE — 84145 PROCALCITONIN (PCT): CPT | Performed by: INTERNAL MEDICINE

## 2020-04-04 PROCEDURE — 87086 URINE CULTURE/COLONY COUNT: CPT | Performed by: EMERGENCY MEDICINE

## 2020-04-04 RX ORDER — ACETAMINOPHEN 325 MG/1
650 TABLET ORAL EVERY 6 HOURS PRN
COMMUNITY

## 2020-04-04 RX ORDER — MELATONIN 200 MCG
3 TABLET ORAL NIGHTLY PRN
COMMUNITY
End: 2020-05-13 | Stop reason: HOSPADM

## 2020-04-04 RX ORDER — ACETAMINOPHEN 650 MG/1
650 SUPPOSITORY RECTAL EVERY 4 HOURS PRN
Status: DISCONTINUED | OUTPATIENT
Start: 2020-04-04 | End: 2020-04-07 | Stop reason: HOSPADM

## 2020-04-04 RX ORDER — NICOTINE POLACRILEX 4 MG
15 LOZENGE BUCCAL
Status: DISCONTINUED | OUTPATIENT
Start: 2020-04-04 | End: 2020-04-07 | Stop reason: HOSPADM

## 2020-04-04 RX ORDER — ATORVASTATIN CALCIUM 40 MG/1
80 TABLET, FILM COATED ORAL NIGHTLY
Status: DISCONTINUED | OUTPATIENT
Start: 2020-04-05 | End: 2020-04-07 | Stop reason: HOSPADM

## 2020-04-04 RX ORDER — MAGNESIUM SULFATE HEPTAHYDRATE 40 MG/ML
2 INJECTION, SOLUTION INTRAVENOUS AS NEEDED
Status: DISCONTINUED | OUTPATIENT
Start: 2020-04-04 | End: 2020-04-07 | Stop reason: HOSPADM

## 2020-04-04 RX ORDER — SODIUM CHLORIDE 0.9 % (FLUSH) 0.9 %
10 SYRINGE (ML) INJECTION AS NEEDED
Status: DISCONTINUED | OUTPATIENT
Start: 2020-04-04 | End: 2020-04-07 | Stop reason: HOSPADM

## 2020-04-04 RX ORDER — DIVALPROEX SODIUM 125 MG/1
500 CAPSULE, COATED PELLETS ORAL 2 TIMES DAILY
COMMUNITY
End: 2020-05-13 | Stop reason: HOSPADM

## 2020-04-04 RX ORDER — ACETAMINOPHEN 325 MG/1
650 TABLET ORAL EVERY 4 HOURS PRN
Status: DISCONTINUED | OUTPATIENT
Start: 2020-04-04 | End: 2020-04-07 | Stop reason: HOSPADM

## 2020-04-04 RX ORDER — METOPROLOL TARTRATE 50 MG/1
50 TABLET, FILM COATED ORAL DAILY
Status: DISCONTINUED | OUTPATIENT
Start: 2020-04-05 | End: 2020-04-07 | Stop reason: HOSPADM

## 2020-04-04 RX ORDER — MAGNESIUM SULFATE HEPTAHYDRATE 40 MG/ML
4 INJECTION, SOLUTION INTRAVENOUS AS NEEDED
Status: DISCONTINUED | OUTPATIENT
Start: 2020-04-04 | End: 2020-04-07 | Stop reason: HOSPADM

## 2020-04-04 RX ORDER — CITALOPRAM 20 MG/1
20 TABLET ORAL EVERY MORNING
COMMUNITY
End: 2020-04-07 | Stop reason: HOSPADM

## 2020-04-04 RX ORDER — MAGNESIUM SULFATE HEPTAHYDRATE 40 MG/ML
2 INJECTION, SOLUTION INTRAVENOUS ONCE
Status: COMPLETED | OUTPATIENT
Start: 2020-04-04 | End: 2020-04-05

## 2020-04-04 RX ORDER — HYDRALAZINE HYDROCHLORIDE 100 MG/1
100 TABLET, FILM COATED ORAL 3 TIMES DAILY
COMMUNITY
End: 2020-04-07 | Stop reason: HOSPADM

## 2020-04-04 RX ORDER — DOCUSATE SODIUM 100 MG/1
200 CAPSULE, LIQUID FILLED ORAL DAILY PRN
COMMUNITY

## 2020-04-04 RX ORDER — POTASSIUM CHLORIDE 750 MG/1
40 CAPSULE, EXTENDED RELEASE ORAL AS NEEDED
Status: DISCONTINUED | OUTPATIENT
Start: 2020-04-04 | End: 2020-04-07 | Stop reason: HOSPADM

## 2020-04-04 RX ORDER — POTASSIUM CHLORIDE 1.5 G/1.77G
40 POWDER, FOR SOLUTION ORAL AS NEEDED
Status: DISCONTINUED | OUTPATIENT
Start: 2020-04-04 | End: 2020-04-07 | Stop reason: HOSPADM

## 2020-04-04 RX ORDER — SODIUM CHLORIDE 9 MG/ML
75 INJECTION, SOLUTION INTRAVENOUS CONTINUOUS
Status: DISCONTINUED | OUTPATIENT
Start: 2020-04-04 | End: 2020-04-04

## 2020-04-04 RX ORDER — FLUOXETINE HYDROCHLORIDE 20 MG/1
60 CAPSULE ORAL DAILY
Status: DISCONTINUED | OUTPATIENT
Start: 2020-04-05 | End: 2020-04-07 | Stop reason: HOSPADM

## 2020-04-04 RX ORDER — DEXTROSE MONOHYDRATE 25 G/50ML
25 INJECTION, SOLUTION INTRAVENOUS
Status: DISCONTINUED | OUTPATIENT
Start: 2020-04-04 | End: 2020-04-07 | Stop reason: HOSPADM

## 2020-04-04 RX ORDER — POTASSIUM CHLORIDE 7.45 MG/ML
10 INJECTION INTRAVENOUS
Status: DISCONTINUED | OUTPATIENT
Start: 2020-04-04 | End: 2020-04-07 | Stop reason: HOSPADM

## 2020-04-04 RX ORDER — ACETAMINOPHEN 160 MG/5ML
650 SOLUTION ORAL EVERY 4 HOURS PRN
Status: DISCONTINUED | OUTPATIENT
Start: 2020-04-04 | End: 2020-04-07 | Stop reason: HOSPADM

## 2020-04-04 RX ORDER — SODIUM CHLORIDE 0.9 % (FLUSH) 0.9 %
10 SYRINGE (ML) INJECTION EVERY 12 HOURS SCHEDULED
Status: DISCONTINUED | OUTPATIENT
Start: 2020-04-04 | End: 2020-04-07 | Stop reason: HOSPADM

## 2020-04-04 RX ORDER — GABAPENTIN 100 MG/1
100 CAPSULE ORAL 3 TIMES DAILY
COMMUNITY
End: 2020-05-13 | Stop reason: HOSPADM

## 2020-04-04 RX ORDER — QUETIAPINE FUMARATE 100 MG/1
100 TABLET, FILM COATED ORAL NIGHTLY
COMMUNITY
End: 2020-05-13 | Stop reason: HOSPADM

## 2020-04-04 RX ORDER — BISACODYL 5 MG/1
5 TABLET, DELAYED RELEASE ORAL DAILY PRN
Status: DISCONTINUED | OUTPATIENT
Start: 2020-04-04 | End: 2020-04-07 | Stop reason: HOSPADM

## 2020-04-04 RX ORDER — PANTOPRAZOLE SODIUM 40 MG/1
40 TABLET, DELAYED RELEASE ORAL DAILY
COMMUNITY

## 2020-04-04 RX ORDER — ASPIRIN 81 MG/1
81 TABLET ORAL DAILY
Status: DISCONTINUED | OUTPATIENT
Start: 2020-04-05 | End: 2020-04-07 | Stop reason: HOSPADM

## 2020-04-04 RX ORDER — HEPARIN SODIUM 5000 [USP'U]/ML
5000 INJECTION, SOLUTION INTRAVENOUS; SUBCUTANEOUS EVERY 8 HOURS SCHEDULED
Status: DISCONTINUED | OUTPATIENT
Start: 2020-04-05 | End: 2020-04-07 | Stop reason: HOSPADM

## 2020-04-04 RX ORDER — MEMANTINE HYDROCHLORIDE 10 MG/1
10 TABLET ORAL 2 TIMES DAILY
COMMUNITY
End: 2020-04-07 | Stop reason: HOSPADM

## 2020-04-04 RX ORDER — ONDANSETRON 2 MG/ML
4 INJECTION INTRAMUSCULAR; INTRAVENOUS EVERY 6 HOURS PRN
Status: DISCONTINUED | OUTPATIENT
Start: 2020-04-04 | End: 2020-04-07 | Stop reason: HOSPADM

## 2020-04-04 RX ADMIN — MAGNESIUM SULFATE 2 G: 2 INJECTION INTRAVENOUS at 22:26

## 2020-04-04 RX ADMIN — SODIUM CHLORIDE 2178 ML: 9 INJECTION, SOLUTION INTRAVENOUS at 17:00

## 2020-04-04 RX ADMIN — TAZOBACTAM SODIUM AND PIPERACILLIN SODIUM 3.38 G: 375; 3 INJECTION, SOLUTION INTRAVENOUS at 18:14

## 2020-04-04 RX ADMIN — SODIUM CHLORIDE 1000 ML: 9 INJECTION, SOLUTION INTRAVENOUS at 18:40

## 2020-04-04 RX ADMIN — VANCOMYCIN HYDROCHLORIDE 1500 MG: 10 INJECTION, POWDER, LYOPHILIZED, FOR SOLUTION INTRAVENOUS at 16:58

## 2020-04-05 ENCOUNTER — APPOINTMENT (OUTPATIENT)
Dept: MRI IMAGING | Facility: HOSPITAL | Age: 80
End: 2020-04-05

## 2020-04-05 ENCOUNTER — APPOINTMENT (OUTPATIENT)
Dept: CARDIOLOGY | Facility: HOSPITAL | Age: 80
End: 2020-04-05

## 2020-04-05 ENCOUNTER — APPOINTMENT (OUTPATIENT)
Dept: CT IMAGING | Facility: HOSPITAL | Age: 80
End: 2020-04-05

## 2020-04-05 PROBLEM — R33.8 ACUTE RETENTION OF URINE: Status: ACTIVE | Noted: 2020-04-05

## 2020-04-05 LAB
ALBUMIN SERPL-MCNC: 2.9 G/DL (ref 3.5–5.2)
ALBUMIN/GLOB SERPL: 1.2 G/DL
ALP SERPL-CCNC: 42 U/L (ref 39–117)
ALT SERPL W P-5'-P-CCNC: 18 U/L (ref 1–41)
ANION GAP SERPL CALCULATED.3IONS-SCNC: 10 MMOL/L (ref 5–15)
AST SERPL-CCNC: 30 U/L (ref 1–40)
BACTERIA SPEC AEROBE CULT: NO GROWTH
BASOPHILS # BLD AUTO: 0.02 10*3/MM3 (ref 0–0.2)
BASOPHILS NFR BLD AUTO: 0.3 % (ref 0–1.5)
BH CV LOW VAS RIGHT LESSER SAPH VESSEL: 1
BH CV LOWER VASCULAR LEFT COMMON FEMORAL AUGMENT: NORMAL
BH CV LOWER VASCULAR LEFT COMMON FEMORAL COMPRESS: NORMAL
BH CV LOWER VASCULAR LEFT COMMON FEMORAL PHASIC: NORMAL
BH CV LOWER VASCULAR LEFT COMMON FEMORAL SPONT: NORMAL
BH CV LOWER VASCULAR LEFT DISTAL FEMORAL AUGMENT: NORMAL
BH CV LOWER VASCULAR LEFT DISTAL FEMORAL COMPRESS: NORMAL
BH CV LOWER VASCULAR LEFT DISTAL FEMORAL PHASIC: NORMAL
BH CV LOWER VASCULAR LEFT DISTAL FEMORAL SPONT: NORMAL
BH CV LOWER VASCULAR LEFT GASTRONEMIUS COMPRESS: NORMAL
BH CV LOWER VASCULAR LEFT GREATER SAPH AK COMPRESS: NORMAL
BH CV LOWER VASCULAR LEFT GREATER SAPH BK COMPRESS: NORMAL
BH CV LOWER VASCULAR LEFT LESSER SAPH COMPRESS: NORMAL
BH CV LOWER VASCULAR LEFT MID FEMORAL AUGMENT: NORMAL
BH CV LOWER VASCULAR LEFT MID FEMORAL COMPRESS: NORMAL
BH CV LOWER VASCULAR LEFT MID FEMORAL PHASIC: NORMAL
BH CV LOWER VASCULAR LEFT MID FEMORAL SPONT: NORMAL
BH CV LOWER VASCULAR LEFT PERONEAL AUGMENT: NORMAL
BH CV LOWER VASCULAR LEFT PERONEAL COMPRESS: NORMAL
BH CV LOWER VASCULAR LEFT POPLITEAL AUGMENT: NORMAL
BH CV LOWER VASCULAR LEFT POPLITEAL COMPRESS: NORMAL
BH CV LOWER VASCULAR LEFT POPLITEAL PHASIC: NORMAL
BH CV LOWER VASCULAR LEFT POPLITEAL SPONT: NORMAL
BH CV LOWER VASCULAR LEFT POSTERIOR TIBIAL AUGMENT: NORMAL
BH CV LOWER VASCULAR LEFT POSTERIOR TIBIAL COMPRESS: NORMAL
BH CV LOWER VASCULAR LEFT PROFUNDA FEMORAL AUGMENT: NORMAL
BH CV LOWER VASCULAR LEFT PROFUNDA FEMORAL COMPRESS: NORMAL
BH CV LOWER VASCULAR LEFT PROFUNDA FEMORAL PHASIC: NORMAL
BH CV LOWER VASCULAR LEFT PROFUNDA FEMORAL SPONT: NORMAL
BH CV LOWER VASCULAR LEFT PROXIMAL FEMORAL AUGMENT: NORMAL
BH CV LOWER VASCULAR LEFT PROXIMAL FEMORAL COMPRESS: NORMAL
BH CV LOWER VASCULAR LEFT PROXIMAL FEMORAL PHASIC: NORMAL
BH CV LOWER VASCULAR LEFT PROXIMAL FEMORAL SPONT: NORMAL
BH CV LOWER VASCULAR LEFT SAPHENOFEMORAL JUNCTION AUGMENT: NORMAL
BH CV LOWER VASCULAR LEFT SAPHENOFEMORAL JUNCTION COMPRESS: NORMAL
BH CV LOWER VASCULAR LEFT SAPHENOFEMORAL JUNCTION PHASIC: NORMAL
BH CV LOWER VASCULAR LEFT SAPHENOFEMORAL JUNCTION SPONT: NORMAL
BH CV LOWER VASCULAR RIGHT COMMON FEMORAL AUGMENT: NORMAL
BH CV LOWER VASCULAR RIGHT COMMON FEMORAL COMPRESS: NORMAL
BH CV LOWER VASCULAR RIGHT COMMON FEMORAL PHASIC: NORMAL
BH CV LOWER VASCULAR RIGHT COMMON FEMORAL SPONT: NORMAL
BH CV LOWER VASCULAR RIGHT DISTAL FEMORAL AUGMENT: NORMAL
BH CV LOWER VASCULAR RIGHT DISTAL FEMORAL COMPRESS: NORMAL
BH CV LOWER VASCULAR RIGHT DISTAL FEMORAL PHASIC: NORMAL
BH CV LOWER VASCULAR RIGHT DISTAL FEMORAL SPONT: NORMAL
BH CV LOWER VASCULAR RIGHT GASTRONEMIUS COMPRESS: NORMAL
BH CV LOWER VASCULAR RIGHT GREATER SAPH AK COMPRESS: NORMAL
BH CV LOWER VASCULAR RIGHT GREATER SAPH BK COMPRESS: NORMAL
BH CV LOWER VASCULAR RIGHT LESSER SAPH COMPRESS: NORMAL
BH CV LOWER VASCULAR RIGHT MID FEMORAL AUGMENT: NORMAL
BH CV LOWER VASCULAR RIGHT MID FEMORAL COMPRESS: NORMAL
BH CV LOWER VASCULAR RIGHT MID FEMORAL PHASIC: NORMAL
BH CV LOWER VASCULAR RIGHT MID FEMORAL SPONT: NORMAL
BH CV LOWER VASCULAR RIGHT PERONEAL AUGMENT: NORMAL
BH CV LOWER VASCULAR RIGHT PERONEAL COMPRESS: NORMAL
BH CV LOWER VASCULAR RIGHT POPLITEAL AUGMENT: NORMAL
BH CV LOWER VASCULAR RIGHT POPLITEAL COMPRESS: NORMAL
BH CV LOWER VASCULAR RIGHT POPLITEAL PHASIC: NORMAL
BH CV LOWER VASCULAR RIGHT POPLITEAL SPONT: NORMAL
BH CV LOWER VASCULAR RIGHT POSTERIOR TIBIAL AUGMENT: NORMAL
BH CV LOWER VASCULAR RIGHT POSTERIOR TIBIAL COMPRESS: NORMAL
BH CV LOWER VASCULAR RIGHT PROFUNDA FEMORAL AUGMENT: NORMAL
BH CV LOWER VASCULAR RIGHT PROFUNDA FEMORAL COMPRESS: NORMAL
BH CV LOWER VASCULAR RIGHT PROFUNDA FEMORAL PHASIC: NORMAL
BH CV LOWER VASCULAR RIGHT PROFUNDA FEMORAL SPONT: NORMAL
BH CV LOWER VASCULAR RIGHT PROXIMAL FEMORAL AUGMENT: NORMAL
BH CV LOWER VASCULAR RIGHT PROXIMAL FEMORAL COMPRESS: NORMAL
BH CV LOWER VASCULAR RIGHT PROXIMAL FEMORAL PHASIC: NORMAL
BH CV LOWER VASCULAR RIGHT PROXIMAL FEMORAL SPONT: NORMAL
BH CV LOWER VASCULAR RIGHT SAPHENOFEMORAL JUNCTION AUGMENT: NORMAL
BH CV LOWER VASCULAR RIGHT SAPHENOFEMORAL JUNCTION COMPRESS: NORMAL
BH CV LOWER VASCULAR RIGHT SAPHENOFEMORAL JUNCTION PHASIC: NORMAL
BH CV LOWER VASCULAR RIGHT SAPHENOFEMORAL JUNCTION SPONT: NORMAL
BH CV LOWER VASCULAR RIGHT SOLEAL COMPRESS: NORMAL
BH CV POP FLUID COLLECT LEFT: 1
BILIRUB SERPL-MCNC: 0.3 MG/DL (ref 0.2–1.2)
BUN BLD-MCNC: 24 MG/DL (ref 8–23)
BUN/CREAT SERPL: 17 (ref 7–25)
CALCIUM SPEC-SCNC: 7.8 MG/DL (ref 8.6–10.5)
CHLORIDE SERPL-SCNC: 107 MMOL/L (ref 98–107)
CK SERPL-CCNC: 362 U/L (ref 20–200)
CO2 SERPL-SCNC: 24 MMOL/L (ref 22–29)
CREAT BLD-MCNC: 1.41 MG/DL (ref 0.76–1.27)
D-LACTATE SERPL-SCNC: 0.8 MMOL/L (ref 0.5–2)
D-LACTATE SERPL-SCNC: 1.2 MMOL/L (ref 0.5–2)
DEPRECATED RDW RBC AUTO: 52.4 FL (ref 37–54)
EOSINOPHIL # BLD AUTO: 0.02 10*3/MM3 (ref 0–0.4)
EOSINOPHIL NFR BLD AUTO: 0.3 % (ref 0.3–6.2)
ERYTHROCYTE [DISTWIDTH] IN BLOOD BY AUTOMATED COUNT: 14.7 % (ref 12.3–15.4)
GFR SERPL CREATININE-BSD FRML MDRD: 48 ML/MIN/1.73
GLOBULIN UR ELPH-MCNC: 2.4 GM/DL
GLUCOSE BLD-MCNC: 108 MG/DL (ref 65–99)
GLUCOSE BLDC GLUCOMTR-MCNC: 116 MG/DL (ref 70–130)
GLUCOSE BLDC GLUCOMTR-MCNC: 262 MG/DL (ref 70–130)
GLUCOSE BLDC GLUCOMTR-MCNC: 88 MG/DL (ref 70–130)
GLUCOSE BLDC GLUCOMTR-MCNC: 97 MG/DL (ref 70–130)
HCT VFR BLD AUTO: 34.9 % (ref 37.5–51)
HGB BLD-MCNC: 11.4 G/DL (ref 13–17.7)
IMM GRANULOCYTES # BLD AUTO: 0.03 10*3/MM3 (ref 0–0.05)
IMM GRANULOCYTES NFR BLD AUTO: 0.4 % (ref 0–0.5)
LYMPHOCYTES # BLD AUTO: 1.79 10*3/MM3 (ref 0.7–3.1)
LYMPHOCYTES NFR BLD AUTO: 26.3 % (ref 19.6–45.3)
MAGNESIUM SERPL-MCNC: 1.9 MG/DL (ref 1.6–2.4)
MCH RBC QN AUTO: 31.8 PG (ref 26.6–33)
MCHC RBC AUTO-ENTMCNC: 32.7 G/DL (ref 31.5–35.7)
MCV RBC AUTO: 97.2 FL (ref 79–97)
MONOCYTES # BLD AUTO: 1.17 10*3/MM3 (ref 0.1–0.9)
MONOCYTES NFR BLD AUTO: 17.2 % (ref 5–12)
MRSA DNA SPEC QL NAA+PROBE: NEGATIVE
NEUTROPHILS # BLD AUTO: 3.78 10*3/MM3 (ref 1.7–7)
NEUTROPHILS NFR BLD AUTO: 55.5 % (ref 42.7–76)
NRBC BLD AUTO-RTO: 0 /100 WBC (ref 0–0.2)
NT-PROBNP SERPL-MCNC: 932.1 PG/ML (ref 5–1800)
PLATELET # BLD AUTO: 167 10*3/MM3 (ref 140–450)
PMV BLD AUTO: 10.6 FL (ref 6–12)
POTASSIUM BLD-SCNC: 4.1 MMOL/L (ref 3.5–5.2)
PROCALCITONIN SERPL-MCNC: 0.07 NG/ML (ref 0.1–0.25)
PROCALCITONIN SERPL-MCNC: 0.23 NG/ML (ref 0.1–0.25)
PROT SERPL-MCNC: 5.3 G/DL (ref 6–8.5)
RBC # BLD AUTO: 3.59 10*6/MM3 (ref 4.14–5.8)
SARS-COV-2 RNA RESP QL NAA+PROBE: NOT DETECTED
SODIUM BLD-SCNC: 141 MMOL/L (ref 136–145)
TROPONIN T SERPL-MCNC: <0.01 NG/ML (ref 0–0.03)
TSH SERPL DL<=0.05 MIU/L-ACNC: 1.1 UIU/ML (ref 0.27–4.2)
WBC NRBC COR # BLD: 6.81 10*3/MM3 (ref 3.4–10.8)

## 2020-04-05 PROCEDURE — 84443 ASSAY THYROID STIM HORMONE: CPT | Performed by: PHYSICIAN ASSISTANT

## 2020-04-05 PROCEDURE — 84145 PROCALCITONIN (PCT): CPT | Performed by: PHYSICIAN ASSISTANT

## 2020-04-05 PROCEDURE — 82962 GLUCOSE BLOOD TEST: CPT

## 2020-04-05 PROCEDURE — 83880 ASSAY OF NATRIURETIC PEPTIDE: CPT | Performed by: PHYSICIAN ASSISTANT

## 2020-04-05 PROCEDURE — 93970 EXTREMITY STUDY: CPT | Performed by: INTERNAL MEDICINE

## 2020-04-05 PROCEDURE — 83605 ASSAY OF LACTIC ACID: CPT | Performed by: PHYSICIAN ASSISTANT

## 2020-04-05 PROCEDURE — 70551 MRI BRAIN STEM W/O DYE: CPT

## 2020-04-05 PROCEDURE — 25010000002 HEPARIN (PORCINE) PER 1000 UNITS: Performed by: PHYSICIAN ASSISTANT

## 2020-04-05 PROCEDURE — 82550 ASSAY OF CK (CPK): CPT | Performed by: INTERNAL MEDICINE

## 2020-04-05 PROCEDURE — 83605 ASSAY OF LACTIC ACID: CPT | Performed by: EMERGENCY MEDICINE

## 2020-04-05 PROCEDURE — 87641 MR-STAPH DNA AMP PROBE: CPT | Performed by: INTERNAL MEDICINE

## 2020-04-05 PROCEDURE — 94799 UNLISTED PULMONARY SVC/PX: CPT

## 2020-04-05 PROCEDURE — 99233 SBSQ HOSP IP/OBS HIGH 50: CPT | Performed by: INTERNAL MEDICINE

## 2020-04-05 PROCEDURE — 80053 COMPREHEN METABOLIC PANEL: CPT | Performed by: PHYSICIAN ASSISTANT

## 2020-04-05 PROCEDURE — 93970 EXTREMITY STUDY: CPT

## 2020-04-05 PROCEDURE — 85025 COMPLETE CBC W/AUTO DIFF WBC: CPT | Performed by: PHYSICIAN ASSISTANT

## 2020-04-05 PROCEDURE — 84484 ASSAY OF TROPONIN QUANT: CPT | Performed by: PHYSICIAN ASSISTANT

## 2020-04-05 PROCEDURE — 83735 ASSAY OF MAGNESIUM: CPT | Performed by: PHYSICIAN ASSISTANT

## 2020-04-05 PROCEDURE — 25010000002 PIPERACILLIN SOD-TAZOBACTAM PER 1 G: Performed by: PHYSICIAN ASSISTANT

## 2020-04-05 PROCEDURE — 25010000003 MAGNESIUM SULFATE 4 GM/100ML SOLUTION: Performed by: INTERNAL MEDICINE

## 2020-04-05 PROCEDURE — 87635 SARS-COV-2 COVID-19 AMP PRB: CPT | Performed by: INTERNAL MEDICINE

## 2020-04-05 RX ORDER — TAMSULOSIN HYDROCHLORIDE 0.4 MG/1
0.4 CAPSULE ORAL DAILY
Status: DISCONTINUED | OUTPATIENT
Start: 2020-04-05 | End: 2020-04-07 | Stop reason: HOSPADM

## 2020-04-05 RX ORDER — DIVALPROEX SODIUM 125 MG/1
500 CAPSULE, COATED PELLETS ORAL 2 TIMES DAILY
Status: DISCONTINUED | OUTPATIENT
Start: 2020-04-05 | End: 2020-04-07 | Stop reason: HOSPADM

## 2020-04-05 RX ORDER — PANTOPRAZOLE SODIUM 40 MG/1
40 TABLET, DELAYED RELEASE ORAL DAILY
Status: DISCONTINUED | OUTPATIENT
Start: 2020-04-05 | End: 2020-04-07 | Stop reason: HOSPADM

## 2020-04-05 RX ORDER — DOCUSATE SODIUM 100 MG/1
200 CAPSULE, LIQUID FILLED ORAL DAILY PRN
Status: DISCONTINUED | OUTPATIENT
Start: 2020-04-05 | End: 2020-04-07 | Stop reason: HOSPADM

## 2020-04-05 RX ORDER — CHOLECALCIFEROL (VITAMIN D3) 125 MCG
2.5 CAPSULE ORAL NIGHTLY
Status: DISCONTINUED | OUTPATIENT
Start: 2020-04-05 | End: 2020-04-07 | Stop reason: HOSPADM

## 2020-04-05 RX ORDER — MEMANTINE HYDROCHLORIDE 10 MG/1
5 TABLET ORAL EVERY 12 HOURS SCHEDULED
Status: DISCONTINUED | OUTPATIENT
Start: 2020-04-05 | End: 2020-04-07 | Stop reason: HOSPADM

## 2020-04-05 RX ORDER — QUETIAPINE FUMARATE 25 MG/1
25 TABLET, FILM COATED ORAL EVERY 6 HOURS PRN
Status: DISCONTINUED | OUTPATIENT
Start: 2020-04-05 | End: 2020-04-07 | Stop reason: HOSPADM

## 2020-04-05 RX ADMIN — HEPARIN SODIUM 5000 UNITS: 5000 INJECTION, SOLUTION INTRAVENOUS; SUBCUTANEOUS at 21:38

## 2020-04-05 RX ADMIN — DIVALPROEX SODIUM 500 MG: 125 CAPSULE ORAL at 21:38

## 2020-04-05 RX ADMIN — HEPARIN SODIUM 5000 UNITS: 5000 INJECTION, SOLUTION INTRAVENOUS; SUBCUTANEOUS at 06:35

## 2020-04-05 RX ADMIN — MEMANTINE 5 MG: 10 TABLET ORAL at 21:38

## 2020-04-05 RX ADMIN — MEMANTINE 5 MG: 10 TABLET ORAL at 11:37

## 2020-04-05 RX ADMIN — METOPROLOL TARTRATE 50 MG: 50 TABLET, FILM COATED ORAL at 08:27

## 2020-04-05 RX ADMIN — TAZOBACTAM SODIUM AND PIPERACILLIN SODIUM 3.38 G: 375; 3 INJECTION, SOLUTION INTRAVENOUS at 11:37

## 2020-04-05 RX ADMIN — MAGNESIUM SULFATE 4 G: 4 INJECTION INTRAVENOUS at 08:27

## 2020-04-05 RX ADMIN — PANTOPRAZOLE SODIUM 40 MG: 40 TABLET, DELAYED RELEASE ORAL at 11:37

## 2020-04-05 RX ADMIN — TAZOBACTAM SODIUM AND PIPERACILLIN SODIUM 3.38 G: 375; 3 INJECTION, SOLUTION INTRAVENOUS at 21:37

## 2020-04-05 RX ADMIN — TAMSULOSIN HYDROCHLORIDE 0.4 MG: 0.4 CAPSULE ORAL at 11:37

## 2020-04-05 RX ADMIN — HEPARIN SODIUM 5000 UNITS: 5000 INJECTION, SOLUTION INTRAVENOUS; SUBCUTANEOUS at 14:49

## 2020-04-05 RX ADMIN — TAZOBACTAM SODIUM AND PIPERACILLIN SODIUM 3.38 G: 375; 3 INJECTION, SOLUTION INTRAVENOUS at 04:00

## 2020-04-05 RX ADMIN — ASPIRIN 81 MG: 81 TABLET, COATED ORAL at 08:27

## 2020-04-05 RX ADMIN — INSULIN LISPRO 4 UNITS: 100 INJECTION, SOLUTION INTRAVENOUS; SUBCUTANEOUS at 11:42

## 2020-04-05 RX ADMIN — FLUOXETINE HYDROCHLORIDE 60 MG: 20 CAPSULE ORAL at 08:27

## 2020-04-05 RX ADMIN — MELATONIN TAB 5 MG 2.5 MG: 5 TAB at 21:38

## 2020-04-05 RX ADMIN — ATORVASTATIN CALCIUM 80 MG: 40 TABLET, FILM COATED ORAL at 21:37

## 2020-04-06 LAB
ANION GAP SERPL CALCULATED.3IONS-SCNC: 9 MMOL/L (ref 5–15)
BUN BLD-MCNC: 19 MG/DL (ref 8–23)
BUN/CREAT SERPL: 14.3 (ref 7–25)
CALCIUM SPEC-SCNC: 7.9 MG/DL (ref 8.6–10.5)
CHLORIDE SERPL-SCNC: 99 MMOL/L (ref 98–107)
CO2 SERPL-SCNC: 26 MMOL/L (ref 22–29)
CREAT BLD-MCNC: 1.33 MG/DL (ref 0.76–1.27)
DEPRECATED RDW RBC AUTO: 51.4 FL (ref 37–54)
ERYTHROCYTE [DISTWIDTH] IN BLOOD BY AUTOMATED COUNT: 14.4 % (ref 12.3–15.4)
GFR SERPL CREATININE-BSD FRML MDRD: 52 ML/MIN/1.73
GLUCOSE BLD-MCNC: 110 MG/DL (ref 65–99)
GLUCOSE BLDC GLUCOMTR-MCNC: 156 MG/DL (ref 70–130)
GLUCOSE BLDC GLUCOMTR-MCNC: 194 MG/DL (ref 70–130)
GLUCOSE BLDC GLUCOMTR-MCNC: 92 MG/DL (ref 70–130)
GLUCOSE BLDC GLUCOMTR-MCNC: 97 MG/DL (ref 70–130)
HCT VFR BLD AUTO: 35.6 % (ref 37.5–51)
HGB BLD-MCNC: 11.5 G/DL (ref 13–17.7)
MAGNESIUM SERPL-MCNC: 1.8 MG/DL (ref 1.6–2.4)
MCH RBC QN AUTO: 31.3 PG (ref 26.6–33)
MCHC RBC AUTO-ENTMCNC: 32.3 G/DL (ref 31.5–35.7)
MCV RBC AUTO: 97 FL (ref 79–97)
PLATELET # BLD AUTO: 160 10*3/MM3 (ref 140–450)
PMV BLD AUTO: 10.7 FL (ref 6–12)
POTASSIUM BLD-SCNC: 3.7 MMOL/L (ref 3.5–5.2)
RBC # BLD AUTO: 3.67 10*6/MM3 (ref 4.14–5.8)
SODIUM BLD-SCNC: 134 MMOL/L (ref 136–145)
WBC NRBC COR # BLD: 7.47 10*3/MM3 (ref 3.4–10.8)

## 2020-04-06 PROCEDURE — 82962 GLUCOSE BLOOD TEST: CPT

## 2020-04-06 PROCEDURE — 83735 ASSAY OF MAGNESIUM: CPT | Performed by: INTERNAL MEDICINE

## 2020-04-06 PROCEDURE — 25010000002 HEPARIN (PORCINE) PER 1000 UNITS: Performed by: PHYSICIAN ASSISTANT

## 2020-04-06 PROCEDURE — 99233 SBSQ HOSP IP/OBS HIGH 50: CPT | Performed by: INTERNAL MEDICINE

## 2020-04-06 PROCEDURE — 25010000002 PIPERACILLIN SOD-TAZOBACTAM PER 1 G: Performed by: PHYSICIAN ASSISTANT

## 2020-04-06 PROCEDURE — 85027 COMPLETE CBC AUTOMATED: CPT | Performed by: INTERNAL MEDICINE

## 2020-04-06 PROCEDURE — 80048 BASIC METABOLIC PNL TOTAL CA: CPT | Performed by: INTERNAL MEDICINE

## 2020-04-06 PROCEDURE — 25010000003 MAGNESIUM SULFATE 4 GM/100ML SOLUTION: Performed by: INTERNAL MEDICINE

## 2020-04-06 RX ORDER — AMOXICILLIN AND CLAVULANATE POTASSIUM 875; 125 MG/1; MG/1
1 TABLET, FILM COATED ORAL EVERY 12 HOURS SCHEDULED
Status: DISCONTINUED | OUTPATIENT
Start: 2020-04-06 | End: 2020-04-07 | Stop reason: HOSPADM

## 2020-04-06 RX ADMIN — SODIUM CHLORIDE, PRESERVATIVE FREE 10 ML: 5 INJECTION INTRAVENOUS at 20:34

## 2020-04-06 RX ADMIN — DIVALPROEX SODIUM 500 MG: 125 CAPSULE ORAL at 20:33

## 2020-04-06 RX ADMIN — TAMSULOSIN HYDROCHLORIDE 0.4 MG: 0.4 CAPSULE ORAL at 08:52

## 2020-04-06 RX ADMIN — ASPIRIN 81 MG: 81 TABLET, COATED ORAL at 08:52

## 2020-04-06 RX ADMIN — HEPARIN SODIUM 5000 UNITS: 5000 INJECTION, SOLUTION INTRAVENOUS; SUBCUTANEOUS at 20:40

## 2020-04-06 RX ADMIN — TAZOBACTAM SODIUM AND PIPERACILLIN SODIUM 3.38 G: 375; 3 INJECTION, SOLUTION INTRAVENOUS at 05:05

## 2020-04-06 RX ADMIN — INSULIN LISPRO 2 UNITS: 100 INJECTION, SOLUTION INTRAVENOUS; SUBCUTANEOUS at 17:06

## 2020-04-06 RX ADMIN — METOPROLOL TARTRATE 50 MG: 50 TABLET, FILM COATED ORAL at 08:52

## 2020-04-06 RX ADMIN — ATORVASTATIN CALCIUM 80 MG: 40 TABLET, FILM COATED ORAL at 20:33

## 2020-04-06 RX ADMIN — MELATONIN TAB 5 MG 2.5 MG: 5 TAB at 20:33

## 2020-04-06 RX ADMIN — MEMANTINE 5 MG: 10 TABLET ORAL at 08:52

## 2020-04-06 RX ADMIN — MAGNESIUM SULFATE 4 G: 4 INJECTION INTRAVENOUS at 08:52

## 2020-04-06 RX ADMIN — HEPARIN SODIUM 5000 UNITS: 5000 INJECTION, SOLUTION INTRAVENOUS; SUBCUTANEOUS at 05:05

## 2020-04-06 RX ADMIN — HEPARIN SODIUM 5000 UNITS: 5000 INJECTION, SOLUTION INTRAVENOUS; SUBCUTANEOUS at 14:06

## 2020-04-06 RX ADMIN — MEMANTINE 5 MG: 10 TABLET ORAL at 20:33

## 2020-04-06 RX ADMIN — SODIUM CHLORIDE, PRESERVATIVE FREE 10 ML: 5 INJECTION INTRAVENOUS at 08:53

## 2020-04-06 RX ADMIN — FLUOXETINE HYDROCHLORIDE 60 MG: 20 CAPSULE ORAL at 08:52

## 2020-04-06 RX ADMIN — PANTOPRAZOLE SODIUM 40 MG: 40 TABLET, DELAYED RELEASE ORAL at 08:52

## 2020-04-06 RX ADMIN — AMOXICILLIN AND CLAVULANATE POTASSIUM 1 TABLET: 875; 125 TABLET, FILM COATED ORAL at 20:33

## 2020-04-06 RX ADMIN — TAZOBACTAM SODIUM AND PIPERACILLIN SODIUM 3.38 G: 375; 3 INJECTION, SOLUTION INTRAVENOUS at 11:25

## 2020-04-06 RX ADMIN — DIVALPROEX SODIUM 500 MG: 125 CAPSULE ORAL at 08:52

## 2020-04-07 VITALS
RESPIRATION RATE: 16 BRPM | HEIGHT: 67 IN | TEMPERATURE: 98.2 F | WEIGHT: 151.2 LBS | SYSTOLIC BLOOD PRESSURE: 136 MMHG | DIASTOLIC BLOOD PRESSURE: 89 MMHG | OXYGEN SATURATION: 96 % | HEART RATE: 53 BPM | BODY MASS INDEX: 23.73 KG/M2

## 2020-04-07 LAB
ANION GAP SERPL CALCULATED.3IONS-SCNC: 11 MMOL/L (ref 5–15)
BASOPHILS # BLD AUTO: 0.02 10*3/MM3 (ref 0–0.2)
BASOPHILS NFR BLD AUTO: 0.2 % (ref 0–1.5)
BUN BLD-MCNC: 19 MG/DL (ref 8–23)
BUN/CREAT SERPL: 15.7 (ref 7–25)
CALCIUM SPEC-SCNC: 7.8 MG/DL (ref 8.6–10.5)
CHLORIDE SERPL-SCNC: 98 MMOL/L (ref 98–107)
CO2 SERPL-SCNC: 26 MMOL/L (ref 22–29)
CREAT BLD-MCNC: 1.21 MG/DL (ref 0.76–1.27)
DEPRECATED RDW RBC AUTO: 50.2 FL (ref 37–54)
EOSINOPHIL # BLD AUTO: 0.17 10*3/MM3 (ref 0–0.4)
EOSINOPHIL NFR BLD AUTO: 2 % (ref 0.3–6.2)
ERYTHROCYTE [DISTWIDTH] IN BLOOD BY AUTOMATED COUNT: 14.6 % (ref 12.3–15.4)
GFR SERPL CREATININE-BSD FRML MDRD: 58 ML/MIN/1.73
GLUCOSE BLD-MCNC: 111 MG/DL (ref 65–99)
GLUCOSE BLDC GLUCOMTR-MCNC: 118 MG/DL (ref 70–130)
GLUCOSE BLDC GLUCOMTR-MCNC: 131 MG/DL (ref 70–130)
GLUCOSE BLDC GLUCOMTR-MCNC: 157 MG/DL (ref 70–130)
HCT VFR BLD AUTO: 33.8 % (ref 37.5–51)
HGB BLD-MCNC: 11.1 G/DL (ref 13–17.7)
IMM GRANULOCYTES # BLD AUTO: 0.06 10*3/MM3 (ref 0–0.05)
IMM GRANULOCYTES NFR BLD AUTO: 0.7 % (ref 0–0.5)
LYMPHOCYTES # BLD AUTO: 1.99 10*3/MM3 (ref 0.7–3.1)
LYMPHOCYTES NFR BLD AUTO: 23.4 % (ref 19.6–45.3)
MAGNESIUM SERPL-MCNC: 1.9 MG/DL (ref 1.6–2.4)
MCH RBC QN AUTO: 30.9 PG (ref 26.6–33)
MCHC RBC AUTO-ENTMCNC: 32.8 G/DL (ref 31.5–35.7)
MCV RBC AUTO: 94.2 FL (ref 79–97)
MONOCYTES # BLD AUTO: 1.62 10*3/MM3 (ref 0.1–0.9)
MONOCYTES NFR BLD AUTO: 19 % (ref 5–12)
NEUTROPHILS # BLD AUTO: 4.65 10*3/MM3 (ref 1.7–7)
NEUTROPHILS NFR BLD AUTO: 54.7 % (ref 42.7–76)
NRBC BLD AUTO-RTO: 0 /100 WBC (ref 0–0.2)
PLATELET # BLD AUTO: 162 10*3/MM3 (ref 140–450)
PMV BLD AUTO: 11.3 FL (ref 6–12)
POTASSIUM BLD-SCNC: 3.6 MMOL/L (ref 3.5–5.2)
RBC # BLD AUTO: 3.59 10*6/MM3 (ref 4.14–5.8)
SODIUM BLD-SCNC: 135 MMOL/L (ref 136–145)
WBC NRBC COR # BLD: 8.51 10*3/MM3 (ref 3.4–10.8)

## 2020-04-07 PROCEDURE — 83735 ASSAY OF MAGNESIUM: CPT | Performed by: INTERNAL MEDICINE

## 2020-04-07 PROCEDURE — 85025 COMPLETE CBC W/AUTO DIFF WBC: CPT | Performed by: INTERNAL MEDICINE

## 2020-04-07 PROCEDURE — 63710000001 INSULIN LISPRO (HUMAN) PER 5 UNITS: Performed by: PHYSICIAN ASSISTANT

## 2020-04-07 PROCEDURE — 82962 GLUCOSE BLOOD TEST: CPT

## 2020-04-07 PROCEDURE — 99239 HOSP IP/OBS DSCHRG MGMT >30: CPT | Performed by: INTERNAL MEDICINE

## 2020-04-07 PROCEDURE — 25010000002 HEPARIN (PORCINE) PER 1000 UNITS: Performed by: PHYSICIAN ASSISTANT

## 2020-04-07 PROCEDURE — 80048 BASIC METABOLIC PNL TOTAL CA: CPT | Performed by: INTERNAL MEDICINE

## 2020-04-07 RX ORDER — AMOXICILLIN AND CLAVULANATE POTASSIUM 875; 125 MG/1; MG/1
1 TABLET, FILM COATED ORAL EVERY 12 HOURS SCHEDULED
Qty: 12 TABLET | Refills: 0 | Status: SHIPPED | OUTPATIENT
Start: 2020-04-07 | End: 2020-04-13

## 2020-04-07 RX ORDER — METOPROLOL TARTRATE 50 MG/1
50 TABLET, FILM COATED ORAL DAILY
Qty: 30 TABLET | Refills: 0 | Status: ON HOLD | OUTPATIENT
Start: 2020-04-07 | End: 2020-05-13

## 2020-04-07 RX ORDER — TAMSULOSIN HYDROCHLORIDE 0.4 MG/1
0.4 CAPSULE ORAL DAILY
Qty: 30 CAPSULE | Refills: 0 | Status: SHIPPED | OUTPATIENT
Start: 2020-04-08 | End: 2020-05-13 | Stop reason: HOSPADM

## 2020-04-07 RX ORDER — MEMANTINE HYDROCHLORIDE 5 MG/1
5 TABLET ORAL EVERY 12 HOURS SCHEDULED
Qty: 60 TABLET | Refills: 0 | Status: SHIPPED | OUTPATIENT
Start: 2020-04-07 | End: 2020-05-13 | Stop reason: HOSPADM

## 2020-04-07 RX ORDER — FLUOXETINE HYDROCHLORIDE 20 MG/1
60 CAPSULE ORAL DAILY
Qty: 90 CAPSULE | Refills: 0 | Status: SHIPPED | OUTPATIENT
Start: 2020-04-07 | End: 2020-05-13 | Stop reason: HOSPADM

## 2020-04-07 RX ADMIN — INSULIN LISPRO 2 UNITS: 100 INJECTION, SOLUTION INTRAVENOUS; SUBCUTANEOUS at 11:36

## 2020-04-07 RX ADMIN — ASPIRIN 81 MG: 81 TABLET, COATED ORAL at 08:50

## 2020-04-07 RX ADMIN — PANTOPRAZOLE SODIUM 40 MG: 40 TABLET, DELAYED RELEASE ORAL at 08:50

## 2020-04-07 RX ADMIN — MEMANTINE 5 MG: 10 TABLET ORAL at 08:50

## 2020-04-07 RX ADMIN — METOPROLOL TARTRATE 50 MG: 50 TABLET, FILM COATED ORAL at 08:50

## 2020-04-07 RX ADMIN — DIVALPROEX SODIUM 500 MG: 125 CAPSULE ORAL at 08:50

## 2020-04-07 RX ADMIN — HEPARIN SODIUM 5000 UNITS: 5000 INJECTION, SOLUTION INTRAVENOUS; SUBCUTANEOUS at 05:17

## 2020-04-07 RX ADMIN — FLUOXETINE HYDROCHLORIDE 60 MG: 20 CAPSULE ORAL at 08:50

## 2020-04-07 RX ADMIN — HEPARIN SODIUM 5000 UNITS: 5000 INJECTION, SOLUTION INTRAVENOUS; SUBCUTANEOUS at 14:42

## 2020-04-07 RX ADMIN — TAMSULOSIN HYDROCHLORIDE 0.4 MG: 0.4 CAPSULE ORAL at 08:50

## 2020-04-07 RX ADMIN — AMOXICILLIN AND CLAVULANATE POTASSIUM 1 TABLET: 875; 125 TABLET, FILM COATED ORAL at 08:50

## 2020-04-09 LAB
BACTERIA SPEC AEROBE CULT: NORMAL
BACTERIA SPEC AEROBE CULT: NORMAL

## 2020-04-17 ENCOUNTER — LAB REQUISITION (OUTPATIENT)
Dept: LAB | Facility: HOSPITAL | Age: 80
End: 2020-04-17

## 2020-04-17 DIAGNOSIS — R53.1 WEAKNESS: ICD-10-CM

## 2020-04-17 LAB
ALBUMIN SERPL-MCNC: 3.5 G/DL (ref 3.5–5.2)
ALBUMIN/GLOB SERPL: 1 G/DL
ALP SERPL-CCNC: 53 U/L (ref 39–117)
ALT SERPL W P-5'-P-CCNC: 21 U/L (ref 1–41)
ANION GAP SERPL CALCULATED.3IONS-SCNC: 11.5 MMOL/L (ref 5–15)
AST SERPL-CCNC: 29 U/L (ref 1–40)
BASOPHILS # BLD AUTO: 0.02 10*3/MM3 (ref 0–0.2)
BASOPHILS NFR BLD AUTO: 0.3 % (ref 0–1.5)
BILIRUB SERPL-MCNC: 0.4 MG/DL (ref 0.2–1.2)
BUN BLD-MCNC: 15 MG/DL (ref 8–23)
BUN/CREAT SERPL: 11.1 (ref 7–25)
CALCIUM SPEC-SCNC: 9.3 MG/DL (ref 8.6–10.5)
CHLORIDE SERPL-SCNC: 98 MMOL/L (ref 98–107)
CO2 SERPL-SCNC: 26.5 MMOL/L (ref 22–29)
CREAT BLD-MCNC: 1.35 MG/DL (ref 0.76–1.27)
DEPRECATED RDW RBC AUTO: 47.6 FL (ref 37–54)
EOSINOPHIL # BLD AUTO: 0.07 10*3/MM3 (ref 0–0.4)
EOSINOPHIL NFR BLD AUTO: 1 % (ref 0.3–6.2)
ERYTHROCYTE [DISTWIDTH] IN BLOOD BY AUTOMATED COUNT: 13.9 % (ref 12.3–15.4)
GFR SERPL CREATININE-BSD FRML MDRD: 51 ML/MIN/1.73
GLOBULIN UR ELPH-MCNC: 3.5 GM/DL
GLUCOSE BLD-MCNC: 73 MG/DL (ref 65–99)
HCT VFR BLD AUTO: 43.5 % (ref 37.5–51)
HGB BLD-MCNC: 14.9 G/DL (ref 13–17.7)
IMM GRANULOCYTES # BLD AUTO: 0.05 10*3/MM3 (ref 0–0.05)
IMM GRANULOCYTES NFR BLD AUTO: 0.7 % (ref 0–0.5)
LYMPHOCYTES # BLD AUTO: 2.33 10*3/MM3 (ref 0.7–3.1)
LYMPHOCYTES NFR BLD AUTO: 31.8 % (ref 19.6–45.3)
MCH RBC QN AUTO: 31.8 PG (ref 26.6–33)
MCHC RBC AUTO-ENTMCNC: 34.3 G/DL (ref 31.5–35.7)
MCV RBC AUTO: 92.9 FL (ref 79–97)
MONOCYTES # BLD AUTO: 0.84 10*3/MM3 (ref 0.1–0.9)
MONOCYTES NFR BLD AUTO: 11.5 % (ref 5–12)
NEUTROPHILS # BLD AUTO: 4.02 10*3/MM3 (ref 1.7–7)
NEUTROPHILS NFR BLD AUTO: 54.7 % (ref 42.7–76)
NRBC BLD AUTO-RTO: 0 /100 WBC (ref 0–0.2)
NT-PROBNP SERPL-MCNC: 1034 PG/ML (ref 5–1800)
PLATELET # BLD AUTO: 286 10*3/MM3 (ref 140–450)
PMV BLD AUTO: 10.6 FL (ref 6–12)
POTASSIUM BLD-SCNC: 4.8 MMOL/L (ref 3.5–5.2)
PROT SERPL-MCNC: 7 G/DL (ref 6–8.5)
RBC # BLD AUTO: 4.68 10*6/MM3 (ref 4.14–5.8)
SODIUM BLD-SCNC: 136 MMOL/L (ref 136–145)
WBC NRBC COR # BLD: 7.33 10*3/MM3 (ref 3.4–10.8)

## 2020-04-17 PROCEDURE — 85025 COMPLETE CBC W/AUTO DIFF WBC: CPT | Performed by: PHYSICAL MEDICINE & REHABILITATION

## 2020-04-17 PROCEDURE — 83880 ASSAY OF NATRIURETIC PEPTIDE: CPT | Performed by: PHYSICAL MEDICINE & REHABILITATION

## 2020-04-17 PROCEDURE — 80053 COMPREHEN METABOLIC PANEL: CPT | Performed by: PHYSICAL MEDICINE & REHABILITATION

## 2020-05-06 ENCOUNTER — APPOINTMENT (OUTPATIENT)
Dept: CT IMAGING | Facility: HOSPITAL | Age: 80
End: 2020-05-06

## 2020-05-06 ENCOUNTER — HOSPITAL ENCOUNTER (INPATIENT)
Facility: HOSPITAL | Age: 80
LOS: 7 days | Discharge: NURSING, REHAB OR LONG-TERM CARE | End: 2020-05-13
Attending: EMERGENCY MEDICINE | Admitting: INTERNAL MEDICINE

## 2020-05-06 ENCOUNTER — APPOINTMENT (OUTPATIENT)
Dept: GENERAL RADIOLOGY | Facility: HOSPITAL | Age: 80
End: 2020-05-06

## 2020-05-06 DIAGNOSIS — R41.82 ALTERED MENTAL STATUS, UNSPECIFIED ALTERED MENTAL STATUS TYPE: Primary | ICD-10-CM

## 2020-05-06 DIAGNOSIS — E86.0 MILD DEHYDRATION: ICD-10-CM

## 2020-05-06 DIAGNOSIS — Z74.09 IMPAIRED MOBILITY AND ADLS: ICD-10-CM

## 2020-05-06 DIAGNOSIS — Z78.9 IMPAIRED MOBILITY AND ADLS: ICD-10-CM

## 2020-05-06 DIAGNOSIS — R13.10 DYSPHAGIA, UNSPECIFIED TYPE: ICD-10-CM

## 2020-05-06 DIAGNOSIS — J18.9 PNEUMONIA OF BOTH LOWER LOBES DUE TO INFECTIOUS ORGANISM: ICD-10-CM

## 2020-05-06 LAB
ALBUMIN SERPL-MCNC: 2.9 G/DL (ref 3.5–5.2)
ALBUMIN/GLOB SERPL: 0.9 G/DL
ALP SERPL-CCNC: 51 U/L (ref 39–117)
ALT SERPL W P-5'-P-CCNC: 14 U/L (ref 1–41)
ANION GAP SERPL CALCULATED.3IONS-SCNC: 10 MMOL/L (ref 5–15)
AST SERPL-CCNC: 27 U/L (ref 1–40)
BASOPHILS # BLD AUTO: 0.02 10*3/MM3 (ref 0–0.2)
BASOPHILS NFR BLD AUTO: 0.2 % (ref 0–1.5)
BILIRUB SERPL-MCNC: 0.4 MG/DL (ref 0.2–1.2)
BILIRUB UR QL STRIP: NEGATIVE
BUN BLD-MCNC: 20 MG/DL (ref 8–23)
BUN/CREAT SERPL: 18.2 (ref 7–25)
CALCIUM SPEC-SCNC: 8.5 MG/DL (ref 8.6–10.5)
CHLORIDE SERPL-SCNC: 102 MMOL/L (ref 98–107)
CLARITY UR: CLEAR
CO2 SERPL-SCNC: 28 MMOL/L (ref 22–29)
COLOR UR: ABNORMAL
CREAT BLD-MCNC: 1.1 MG/DL (ref 0.76–1.27)
D-LACTATE SERPL-SCNC: 0.9 MMOL/L (ref 0.5–2)
DEPRECATED RDW RBC AUTO: 51.7 FL (ref 37–54)
EOSINOPHIL # BLD AUTO: 0.05 10*3/MM3 (ref 0–0.4)
EOSINOPHIL NFR BLD AUTO: 0.6 % (ref 0.3–6.2)
ERYTHROCYTE [DISTWIDTH] IN BLOOD BY AUTOMATED COUNT: 14.8 % (ref 12.3–15.4)
GFR SERPL CREATININE-BSD FRML MDRD: 65 ML/MIN/1.73
GLOBULIN UR ELPH-MCNC: 3.2 GM/DL
GLUCOSE BLD-MCNC: 101 MG/DL (ref 65–99)
GLUCOSE BLDC GLUCOMTR-MCNC: 113 MG/DL (ref 70–130)
GLUCOSE UR STRIP-MCNC: NEGATIVE MG/DL
HCT VFR BLD AUTO: 43 % (ref 37.5–51)
HGB BLD-MCNC: 14 G/DL (ref 13–17.7)
HGB UR QL STRIP.AUTO: NEGATIVE
HOLD SPECIMEN: NORMAL
HOLD SPECIMEN: NORMAL
IMM GRANULOCYTES # BLD AUTO: 0.06 10*3/MM3 (ref 0–0.05)
IMM GRANULOCYTES NFR BLD AUTO: 0.7 % (ref 0–0.5)
KETONES UR QL STRIP: ABNORMAL
LEUKOCYTE ESTERASE UR QL STRIP.AUTO: NEGATIVE
LYMPHOCYTES # BLD AUTO: 1.76 10*3/MM3 (ref 0.7–3.1)
LYMPHOCYTES NFR BLD AUTO: 21 % (ref 19.6–45.3)
MAGNESIUM SERPL-MCNC: 1.2 MG/DL (ref 1.6–2.4)
MCH RBC QN AUTO: 30.7 PG (ref 26.6–33)
MCHC RBC AUTO-ENTMCNC: 32.6 G/DL (ref 31.5–35.7)
MCV RBC AUTO: 94.3 FL (ref 79–97)
MONOCYTES # BLD AUTO: 1.15 10*3/MM3 (ref 0.1–0.9)
MONOCYTES NFR BLD AUTO: 13.7 % (ref 5–12)
NEUTROPHILS # BLD AUTO: 5.33 10*3/MM3 (ref 1.7–7)
NEUTROPHILS NFR BLD AUTO: 63.8 % (ref 42.7–76)
NITRITE UR QL STRIP: NEGATIVE
NRBC BLD AUTO-RTO: 0 /100 WBC (ref 0–0.2)
PH UR STRIP.AUTO: 5.5 [PH] (ref 5–8)
PLATELET # BLD AUTO: 213 10*3/MM3 (ref 140–450)
PMV BLD AUTO: 10.8 FL (ref 6–12)
POTASSIUM BLD-SCNC: 4 MMOL/L (ref 3.5–5.2)
PROCALCITONIN SERPL-MCNC: <0.02 NG/ML (ref 0.1–0.25)
PROT SERPL-MCNC: 6.1 G/DL (ref 6–8.5)
PROT UR QL STRIP: NEGATIVE
RBC # BLD AUTO: 4.56 10*6/MM3 (ref 4.14–5.8)
SARS-COV-2 RNA RESP QL NAA+PROBE: NOT DETECTED
SODIUM BLD-SCNC: 140 MMOL/L (ref 136–145)
SP GR UR STRIP: 1.02 (ref 1–1.03)
TROPONIN T SERPL-MCNC: <0.01 NG/ML (ref 0–0.03)
UROBILINOGEN UR QL STRIP: ABNORMAL
VALPROATE SERPL-MCNC: 50.5 MCG/ML (ref 50–125)
WBC NRBC COR # BLD: 8.37 10*3/MM3 (ref 3.4–10.8)
WHOLE BLOOD HOLD SPECIMEN: NORMAL
WHOLE BLOOD HOLD SPECIMEN: NORMAL

## 2020-05-06 PROCEDURE — 84484 ASSAY OF TROPONIN QUANT: CPT | Performed by: EMERGENCY MEDICINE

## 2020-05-06 PROCEDURE — 84145 PROCALCITONIN (PCT): CPT | Performed by: FAMILY MEDICINE

## 2020-05-06 PROCEDURE — 80164 ASSAY DIPROPYLACETIC ACD TOT: CPT | Performed by: EMERGENCY MEDICINE

## 2020-05-06 PROCEDURE — 25010000002 CEFTRIAXONE PER 250 MG: Performed by: EMERGENCY MEDICINE

## 2020-05-06 PROCEDURE — 83605 ASSAY OF LACTIC ACID: CPT | Performed by: EMERGENCY MEDICINE

## 2020-05-06 PROCEDURE — 83735 ASSAY OF MAGNESIUM: CPT | Performed by: EMERGENCY MEDICINE

## 2020-05-06 PROCEDURE — 99285 EMERGENCY DEPT VISIT HI MDM: CPT

## 2020-05-06 PROCEDURE — 82962 GLUCOSE BLOOD TEST: CPT

## 2020-05-06 PROCEDURE — 93005 ELECTROCARDIOGRAM TRACING: CPT | Performed by: EMERGENCY MEDICINE

## 2020-05-06 PROCEDURE — 25010000003 MAGNESIUM SULFATE 4 GM/100ML SOLUTION: Performed by: FAMILY MEDICINE

## 2020-05-06 PROCEDURE — 70450 CT HEAD/BRAIN W/O DYE: CPT

## 2020-05-06 PROCEDURE — 99223 1ST HOSP IP/OBS HIGH 75: CPT | Performed by: FAMILY MEDICINE

## 2020-05-06 PROCEDURE — 87635 SARS-COV-2 COVID-19 AMP PRB: CPT | Performed by: FAMILY MEDICINE

## 2020-05-06 PROCEDURE — 25010000002 PIPERACILLIN SOD-TAZOBACTAM PER 1 G: Performed by: FAMILY MEDICINE

## 2020-05-06 PROCEDURE — 25010000002 AZITHROMYCIN PER 500 MG: Performed by: EMERGENCY MEDICINE

## 2020-05-06 PROCEDURE — 87086 URINE CULTURE/COLONY COUNT: CPT | Performed by: EMERGENCY MEDICINE

## 2020-05-06 PROCEDURE — 80053 COMPREHEN METABOLIC PANEL: CPT | Performed by: EMERGENCY MEDICINE

## 2020-05-06 PROCEDURE — 85025 COMPLETE CBC W/AUTO DIFF WBC: CPT | Performed by: EMERGENCY MEDICINE

## 2020-05-06 PROCEDURE — 25010000002 ENOXAPARIN PER 10 MG: Performed by: FAMILY MEDICINE

## 2020-05-06 PROCEDURE — 25010000002 MAGNESIUM SULFATE 2 GM/50ML SOLUTION: Performed by: FAMILY MEDICINE

## 2020-05-06 PROCEDURE — 87040 BLOOD CULTURE FOR BACTERIA: CPT | Performed by: EMERGENCY MEDICINE

## 2020-05-06 PROCEDURE — 81003 URINALYSIS AUTO W/O SCOPE: CPT | Performed by: EMERGENCY MEDICINE

## 2020-05-06 PROCEDURE — 71045 X-RAY EXAM CHEST 1 VIEW: CPT

## 2020-05-06 PROCEDURE — P9612 CATHETERIZE FOR URINE SPEC: HCPCS

## 2020-05-06 RX ORDER — MAGNESIUM SULFATE HEPTAHYDRATE 40 MG/ML
2 INJECTION, SOLUTION INTRAVENOUS AS NEEDED
Status: DISCONTINUED | OUTPATIENT
Start: 2020-05-06 | End: 2020-05-13 | Stop reason: HOSPADM

## 2020-05-06 RX ORDER — SODIUM CHLORIDE 0.9 % (FLUSH) 0.9 %
10 SYRINGE (ML) INJECTION AS NEEDED
Status: DISCONTINUED | OUTPATIENT
Start: 2020-05-06 | End: 2020-05-13 | Stop reason: HOSPADM

## 2020-05-06 RX ORDER — QUETIAPINE FUMARATE 100 MG/1
100 TABLET, FILM COATED ORAL NIGHTLY
Status: DISCONTINUED | OUTPATIENT
Start: 2020-05-06 | End: 2020-05-07

## 2020-05-06 RX ORDER — GABAPENTIN 100 MG/1
100 CAPSULE ORAL 3 TIMES DAILY
Status: DISCONTINUED | OUTPATIENT
Start: 2020-05-06 | End: 2020-05-07

## 2020-05-06 RX ORDER — DIVALPROEX SODIUM 125 MG/1
500 CAPSULE, COATED PELLETS ORAL NIGHTLY
Status: DISCONTINUED | OUTPATIENT
Start: 2020-05-06 | End: 2020-05-07

## 2020-05-06 RX ORDER — ASPIRIN 81 MG/1
81 TABLET ORAL DAILY
Status: DISCONTINUED | OUTPATIENT
Start: 2020-05-06 | End: 2020-05-13 | Stop reason: HOSPADM

## 2020-05-06 RX ORDER — POTASSIUM CHLORIDE 1.5 G/1.77G
40 POWDER, FOR SOLUTION ORAL AS NEEDED
Status: DISCONTINUED | OUTPATIENT
Start: 2020-05-06 | End: 2020-05-13 | Stop reason: HOSPADM

## 2020-05-06 RX ORDER — SODIUM CHLORIDE 9 MG/ML
250 INJECTION, SOLUTION INTRAVENOUS CONTINUOUS
Status: DISCONTINUED | OUTPATIENT
Start: 2020-05-06 | End: 2020-05-06

## 2020-05-06 RX ORDER — NICOTINE POLACRILEX 4 MG
15 LOZENGE BUCCAL
Status: DISCONTINUED | OUTPATIENT
Start: 2020-05-06 | End: 2020-05-13 | Stop reason: HOSPADM

## 2020-05-06 RX ORDER — TAMSULOSIN HYDROCHLORIDE 0.4 MG/1
0.4 CAPSULE ORAL DAILY
Status: DISPENSED | OUTPATIENT
Start: 2020-05-06 | End: 2020-05-08

## 2020-05-06 RX ORDER — MAGNESIUM SULFATE HEPTAHYDRATE 40 MG/ML
4 INJECTION, SOLUTION INTRAVENOUS AS NEEDED
Status: DISCONTINUED | OUTPATIENT
Start: 2020-05-06 | End: 2020-05-13 | Stop reason: HOSPADM

## 2020-05-06 RX ORDER — CASTOR OIL AND BALSAM, PERU 788; 87 MG/G; MG/G
OINTMENT TOPICAL EVERY 12 HOURS SCHEDULED
Status: DISCONTINUED | OUTPATIENT
Start: 2020-05-06 | End: 2020-05-13 | Stop reason: HOSPADM

## 2020-05-06 RX ORDER — POTASSIUM CHLORIDE 750 MG/1
40 CAPSULE, EXTENDED RELEASE ORAL AS NEEDED
Status: DISCONTINUED | OUTPATIENT
Start: 2020-05-06 | End: 2020-05-13 | Stop reason: HOSPADM

## 2020-05-06 RX ORDER — ACETAMINOPHEN 650 MG/1
650 SUPPOSITORY RECTAL EVERY 4 HOURS PRN
Status: DISCONTINUED | OUTPATIENT
Start: 2020-05-06 | End: 2020-05-13 | Stop reason: HOSPADM

## 2020-05-06 RX ORDER — SODIUM CHLORIDE 0.9 % (FLUSH) 0.9 %
10 SYRINGE (ML) INJECTION EVERY 12 HOURS SCHEDULED
Status: DISCONTINUED | OUTPATIENT
Start: 2020-05-06 | End: 2020-05-13 | Stop reason: HOSPADM

## 2020-05-06 RX ORDER — SODIUM CHLORIDE 9 MG/ML
100 INJECTION, SOLUTION INTRAVENOUS CONTINUOUS
Status: ACTIVE | OUTPATIENT
Start: 2020-05-06 | End: 2020-05-07

## 2020-05-06 RX ORDER — MEMANTINE HYDROCHLORIDE 10 MG/1
5 TABLET ORAL EVERY 12 HOURS SCHEDULED
Status: COMPLETED | OUTPATIENT
Start: 2020-05-06 | End: 2020-05-07

## 2020-05-06 RX ORDER — ATORVASTATIN CALCIUM 40 MG/1
80 TABLET, FILM COATED ORAL NIGHTLY
Status: DISCONTINUED | OUTPATIENT
Start: 2020-05-06 | End: 2020-05-13 | Stop reason: HOSPADM

## 2020-05-06 RX ORDER — ACETAMINOPHEN 325 MG/1
650 TABLET ORAL EVERY 4 HOURS PRN
Status: DISCONTINUED | OUTPATIENT
Start: 2020-05-06 | End: 2020-05-13 | Stop reason: HOSPADM

## 2020-05-06 RX ORDER — POTASSIUM CHLORIDE 7.45 MG/ML
10 INJECTION INTRAVENOUS
Status: DISCONTINUED | OUTPATIENT
Start: 2020-05-06 | End: 2020-05-13 | Stop reason: HOSPADM

## 2020-05-06 RX ORDER — METOPROLOL TARTRATE 50 MG/1
50 TABLET, FILM COATED ORAL DAILY
Status: ACTIVE | OUTPATIENT
Start: 2020-05-06 | End: 2020-05-07

## 2020-05-06 RX ORDER — FLUOXETINE HYDROCHLORIDE 20 MG/1
60 CAPSULE ORAL DAILY
Status: ACTIVE | OUTPATIENT
Start: 2020-05-06 | End: 2020-05-07

## 2020-05-06 RX ORDER — PANTOPRAZOLE SODIUM 40 MG/1
40 TABLET, DELAYED RELEASE ORAL DAILY
Status: DISCONTINUED | OUTPATIENT
Start: 2020-05-06 | End: 2020-05-13 | Stop reason: HOSPADM

## 2020-05-06 RX ORDER — ACETAMINOPHEN 160 MG/5ML
650 SOLUTION ORAL EVERY 4 HOURS PRN
Status: DISCONTINUED | OUTPATIENT
Start: 2020-05-06 | End: 2020-05-13 | Stop reason: HOSPADM

## 2020-05-06 RX ORDER — ATORVASTATIN CALCIUM 40 MG/1
80 TABLET, FILM COATED ORAL DAILY
Status: DISCONTINUED | OUTPATIENT
Start: 2020-05-06 | End: 2020-05-06

## 2020-05-06 RX ORDER — DEXTROSE MONOHYDRATE 25 G/50ML
25 INJECTION, SOLUTION INTRAVENOUS
Status: DISCONTINUED | OUTPATIENT
Start: 2020-05-06 | End: 2020-05-13 | Stop reason: HOSPADM

## 2020-05-06 RX ADMIN — MAGNESIUM SULFATE 4 G: 4 INJECTION INTRAVENOUS at 17:09

## 2020-05-06 RX ADMIN — SODIUM CHLORIDE, PRESERVATIVE FREE 10 ML: 5 INJECTION INTRAVENOUS at 20:54

## 2020-05-06 RX ADMIN — CASTOR OIL AND BALSAM, PERU: 788; 87 OINTMENT TOPICAL at 20:54

## 2020-05-06 RX ADMIN — SODIUM CHLORIDE 100 ML/HR: 9 INJECTION, SOLUTION INTRAVENOUS at 15:16

## 2020-05-06 RX ADMIN — CEFTRIAXONE 1 G: 1 INJECTION, POWDER, FOR SOLUTION INTRAMUSCULAR; INTRAVENOUS at 12:49

## 2020-05-06 RX ADMIN — SODIUM CHLORIDE 100 ML/HR: 9 INJECTION, SOLUTION INTRAVENOUS at 22:26

## 2020-05-06 RX ADMIN — QUETIAPINE FUMARATE 100 MG: 100 TABLET ORAL at 20:52

## 2020-05-06 RX ADMIN — ENOXAPARIN SODIUM 40 MG: 80 INJECTION SUBCUTANEOUS at 15:17

## 2020-05-06 RX ADMIN — TAZOBACTAM SODIUM AND PIPERACILLIN SODIUM 3.38 G: 375; 3 INJECTION, SOLUTION INTRAVENOUS at 18:18

## 2020-05-06 RX ADMIN — MEMANTINE 5 MG: 10 TABLET ORAL at 20:52

## 2020-05-06 RX ADMIN — MAGNESIUM SULFATE 2 G: 2 INJECTION INTRAVENOUS at 15:18

## 2020-05-06 RX ADMIN — SODIUM CHLORIDE 250 ML/HR: 9 INJECTION, SOLUTION INTRAVENOUS at 10:55

## 2020-05-06 RX ADMIN — DIVALPROEX SODIUM 500 MG: 125 CAPSULE ORAL at 20:48

## 2020-05-06 RX ADMIN — GABAPENTIN 100 MG: 100 CAPSULE ORAL at 20:52

## 2020-05-06 RX ADMIN — ATORVASTATIN CALCIUM 80 MG: 40 TABLET, FILM COATED ORAL at 20:50

## 2020-05-06 RX ADMIN — AZITHROMYCIN 500 MG: 500 INJECTION, POWDER, LYOPHILIZED, FOR SOLUTION INTRAVENOUS at 12:49

## 2020-05-06 NOTE — PLAN OF CARE
VSS. Afib on monitor and currently RA. Pt mostly non-verbal and mumbles when asked questions. Spoke with patient's daughter and he is normally alert and oriented to self and situation at baseline. Patient was also found to have a deep tissue injury upon admission - WOC consulted (see orders). Mag 1.2 and replacing per protocol. Swabbed for COVID-19. No current complaints from pt at this time. Will continue to monitor.

## 2020-05-06 NOTE — ED PROVIDER NOTES
Subjective   This patient is a very nice 79-year-old gentleman sent from [facility] secondary to altered mental status.  According to the report we received, the patient has been relatively nonverbal since Friday of last week.  He has not been answering questions.  When EMS arrived, they indicated that the patient was answering questions for them, yet he has not been doing so with a nursing home for 3 to 4 days and was unwilling to do it here on HPI.  Patient has had no record of fever, chills, cough, shortness of breath or COVID-19 exposure.  No complaints of nausea, vomiting, diarrhea, or other specific complaints.  He does have a history of atrial fibrillation according to report.  Medication list indicates seizure disorder as well, with history of Depakote.  Patient also on Namenda.  Despite history of atrial fibrillation, no history of anticoagulant other than aspirin noted on medication reconciliation form.  Patient is nonverbal and will not provide history.  Accordingly, all history is provided by documentation from the nursing staff and the nurse to nurse transfer, in addition to what was provided by EMS, which is relatively limited.  It should be noted that transfer note, which was handwritten by the nursing home staff, also indicated that the patient has had a tremor since last week as well.  Tremor appears to be in the right arm but was not noted here.    Past medical history  Dementia, hypercholesterolemia, seizure disorder, atrial fibrillation    Family history  Unable to provide secondary to patient's current medical status          Review of Systems   Constitutional: Positive for activity change. Negative for appetite change, chills and diaphoresis.   HENT: Negative for sore throat, trouble swallowing and voice change.    Respiratory: Negative for cough, chest tightness, shortness of breath and wheezing.    Gastrointestinal: Positive for constipation. Negative for abdominal pain, diarrhea  and nausea.   Skin: Negative for rash and wound.   Neurological: Positive for tremors and speech difficulty. Negative for dizziness and seizures.   Psychiatric/Behavioral: Positive for confusion.       Past Medical History:   Diagnosis Date   • Arthritis    • Cancer (CMS/HCC)     SKIN    • Cataract    • Coronary artery disease    • Diabetes mellitus (CMS/HCC)    • High cholesterol    • [place] (hard of hearing)     PATIENT HAS HEARING AIDS   • Hypertension    • Irregular heart beat     HISTORY OF CARDIAC ABLATION IN 2003   • Wears dentures     FULL UPPER PLATE       Allergies   Allergen Reactions   • Phenergan [Promethazine Hcl] Nausea And Vomiting       Past Surgical History:   Procedure Laterality Date   • BACK SURGERY  1978    THEN AGAIN IN 1982   • CARDIAC ABLATION  2003   • CARDIAC SURGERY     • CATARACT EXTRACTION W/ INTRAOCULAR LENS IMPLANT Left 5/24/2017    Procedure: CATARACT PHACO EXTRACTION WITH INTRAOCULAR LENS IMPLANT LEFT WITH TORIC LENS;  Surgeon: Alma Benavidez MD;  Location: [facility];  Service:    • CATARACT EXTRACTION W/ INTRAOCULAR LENS IMPLANT Right 6/14/2017    Procedure: CATARACT PHACO EXTRACTION WITH INTRAOCULAR LENS IMPLANT RIGHT WITH TORIC LENS;  Surgeon: Alma Benavidez MD;  Location: [facility];  Service:    • CORONARY ARTERY BYPASS GRAFT  2003    SPOUSE UNSURE OF HOW MANY VESSELS   • HIATAL HERNIA REPAIR  1972   • JOINT REPLACEMENT Left     HIP - DONE BY DR DALAL   • KNEE ARTHROSCOPY Left    • NOSE SURGERY  1970    SPOUSE REPORTS FOR A BROKEN NOSE   • OTHER SURGICAL HISTORY Left     TENDON TORN LOOSE FROM BONE, LEFT ELBOW   • OTHER SURGICAL HISTORY  1986    RUPTURED DISC   • OTHER SURGICAL HISTORY  1994    NECK SURGERY FOR RUPTURED DISC   • OTHER SURGICAL HISTORY  1996    HAD SECOND NECK SURGERY   • OTHER SURGICAL HISTORY  1998    RUPTURED DISC   • OTHER SURGICAL HISTORY  2003    RUPTURED DISC   • OTHER SURGICAL HISTORY  2009    HARDWARE REMOVAL FROM BACK BY DR HAY        History reviewed. No pertinent family history.    Social History     Socioeconomic History   • Marital status:      Spouse name: Not on file   • Number of children: Not on file   • Years of education: Not on file   • Highest education level: Not on file   Tobacco Use   • Smoking status: Former Smoker     Types: Cigarettes     Last attempt to quit:      Years since quittin.3   • Smokeless tobacco: Never Used   Substance and Sexual Activity   • Alcohol use: No   • Drug use: No   • Sexual activity: Defer           Objective   Physical Exam   Constitutional: He appears well-developed.  Non-toxic appearance. No distress.   HENT:   Head: Normocephalic and atraumatic.   Right Ear: Tympanic membrane, external ear and ear canal normal.   Left Ear: Tympanic membrane, external ear and ear canal normal.   Nose: Nose normal. No nasal septal hematoma.   Mouth/Throat: Mucous membranes are not dry. No oral lesions. No trismus in the jaw. No dental abscesses or uvula swelling. No posterior oropharyngeal erythema or tonsillar abscesses. No tonsillar exudate.   Dry mucous membranes   Eyes: Pupils are equal, round, and reactive to light. EOM are normal. Right conjunctiva is not injected. Left conjunctiva is not injected.   Neck: Normal range of motion. Neck supple. No JVD present. No tracheal tenderness present. No neck rigidity. Normal range of motion present.   Cardiovascular: Normal rate and normal heart sounds. Exam reveals no gallop and no friction rub.   Pulmonary/Chest: Effort normal and breath sounds normal. He has no wheezes. He has no rales. He exhibits no tenderness.   Abdominal: Soft. Bowel sounds are normal. He exhibits no distension, no pulsatile midline mass and no mass. There is no tenderness. There is no rigidity, no rebound, no guarding and no tenderness at McBurney's point.   No signs of acute abdomen.  No pain at McBurney's point.  No pulsatile abdominal mass.   Musculoskeletal: Normal range of  motion. He exhibits no edema, tenderness or deformity.   Lymphadenopathy:     He has no cervical adenopathy.   Neurological: He has normal strength. He displays no tremor. No cranial nerve deficit or sensory deficit. He exhibits normal muscle tone.   4/5 strength bilaterally with flexion and extension of fingers wrist and elbows.  Moves hips and knees equally and symmetrically with good strength, but not on verbal command, but rather only in response to painful stimuli.  Awake but with eyes closed.   Skin: Skin is warm and dry. No rash noted. No erythema.   No diaphoresis, lesions, nevi, petechia, purpura   Psychiatric: He has a normal mood and affect. His speech is normal. He is attentive.   Nursing note and vitals reviewed.      Procedures           ED Course  ED Course as of May 06 1321   Wed May 06, 2020   1031 I have reviewed the patient's past medical documentation and the information provided by the nursing home.  It does look like he has a power of  we plan to have the nursing staff contact them for additional information and update them on presentation here and plan.  At this point, the patient is certainly answering some questions but does appear to be confused.  Without any family here at the bedside we have to go on the nursing home report.  Unfortunately it is relatively lacking and only indicates that he has not talked for the last 3 days.  No record or evidence currently of fever, chills, cough, or COVID exposure.  He currently has no signs of trauma or specific complaint.  Patient appears to be resting comfortably.  He does attempt to answer questions but at the same time appears to be purposely keeping his eyes closed and not answering some questions while choosing to answer others.    [CATHLEEN]   1032 I have added a CT of the head in addition to multiple laboratory studies.  At this point, I am on the fence on whether or not the patient will require admission.  Plan to keep a close eye on the  patient, and monitor him closely in addition to the results.    [CATHLEEN]   1033   Final disposition and plan following completion of work-up.    [CATHLEEN]   1054 Alert back through past medical documentation and confirmed that the patient has had a COVID-19 test in April.  On April 5, 2020, patient had a negative test.  Depending on how things progress here and where the patient ends up going from the emergency department, another test may be warranted.    [CATHLEEN]   1218 CBC is unremarkable.  CT of the head shows chronic changes but no evidence of acute disease process.  Chest x-ray shows bibasilar opacifications concerning for atelectasis versus early airspace disease.    [CATHLEEN]   1219 I plan to add blood cultures and give the patient Rocephin secondary to chest x-ray results.    [CATHLEEN]   1231 Discussed case in detail with the nursing staff.  Antibiotics in the form of Rocephin and Zithromax have been ordered.  Blood cultures as well.  I discussed the case with [facility] and let them know about my concern for potential COVID exposure given his current residence.  We have tried several times to call the family in [place] including several different numbers without success.  I will continue to have the nursing staff attempt this in hopes that we can update them on the plan for admission.  Given the altered mental status, concern for early pneumonia on chest x-ray and need for COVID testing, I plan to bring the patient into telemetry under the care of the hospitalist.  Hospitalist has been paged accordingly.  IV fluid rehydration continues.  Impression will include altered mental status, pneumonia, suspected COVID-19, mild dehydration.    [CATHLEEN]   1243 Discussed the case in detail with Dr. Patterson.  He is going to place the patient on a COVID floor and check another study today.  Patient will be admitted accordingly.  We discussed the CT of the chest findings from last month as well as the x-ray findings today which are relatively consistent.   We talked about this being an exacerbation of his chronic dementia.  Patient will be admitted with the aforementioned impressions and plans in place.    [CATHLEEN]      ED Course User Index  [CATHLEEN] Mick Mccauley MD     Recent Results (from the past 24 hour(s))   Light Blue Top    Collection Time: 05/06/20 10:27 AM   Result Value Ref Range    Extra Tube hold for add-on    Lavender Top    Collection Time: 05/06/20 10:27 AM   Result Value Ref Range    Extra Tube hold for add-on    CBC Auto Differential    Collection Time: 05/06/20 10:27 AM   Result Value Ref Range    WBC 8.37 3.40 - 10.80 10*3/mm3    RBC 4.56 4.14 - 5.80 10*6/mm3    Hemoglobin 14.0 13.0 - 17.7 g/dL    Hematocrit 43.0 37.5 - 51.0 %    MCV 94.3 79.0 - 97.0 fL    MCH 30.7 26.6 - 33.0 pg    MCHC 32.6 31.5 - 35.7 g/dL    RDW 14.8 12.3 - 15.4 %    RDW-SD 51.7 37.0 - 54.0 fl    MPV 10.8 6.0 - 12.0 fL    Platelets 213 140 - 450 10*3/mm3    Neutrophil % 63.8 42.7 - 76.0 %    Lymphocyte % 21.0 19.6 - 45.3 %    Monocyte % 13.7 (H) 5.0 - 12.0 %    Eosinophil % 0.6 0.3 - 6.2 %    Basophil % 0.2 0.0 - 1.5 %    Immature Grans % 0.7 (H) 0.0 - 0.5 %    Neutrophils, Absolute 5.33 1.70 - 7.00 10*3/mm3    Lymphocytes, Absolute 1.76 0.70 - 3.10 10*3/mm3    Monocytes, Absolute 1.15 (H) 0.10 - 0.90 10*3/mm3    Eosinophils, Absolute 0.05 0.00 - 0.40 10*3/mm3    Basophils, Absolute 0.02 0.00 - 0.20 10*3/mm3    Immature Grans, Absolute 0.06 (H) 0.00 - 0.05 10*3/mm3    nRBC 0.0 0.0 - 0.2 /100 WBC   Urinalysis With Culture If Indicated - Urine, Catheter In/Out    Collection Time: 05/06/20 10:36 AM   Result Value Ref Range    Color, UA Dark Yellow (A) Yellow, Straw    Appearance, UA Clear Clear    pH, UA 5.5 5.0 - 8.0    Specific Gravity, UA 1.025 1.001 - 1.030    Glucose, UA Negative Negative    Ketones, UA Trace (A) Negative    Bilirubin, UA Negative Negative    Blood, UA Negative Negative    Protein, UA Negative Negative    Leuk Esterase, UA Negative Negative    Nitrite, UA  Negative Negative    Urobilinogen, UA 2.0 E.U./dL (A) 0.2 - 1.0 E.U./dL   Comprehensive Metabolic Panel    Collection Time: 05/06/20 12:17 PM   Result Value Ref Range    Glucose 101 (H) 65 - 99 mg/dL    BUN 20 8 - 23 mg/dL    Creatinine 1.10 0.76 - 1.27 mg/dL    Sodium 140 136 - 145 mmol/L    Potassium 4.0 3.5 - 5.2 mmol/L    Chloride 102 98 - 107 mmol/L    CO2 28.0 22.0 - 29.0 mmol/L    Calcium 8.5 (L) 8.6 - 10.5 mg/dL    Total Protein 6.1 6.0 - 8.5 g/dL    Albumin 2.90 (L) 3.50 - 5.20 g/dL    ALT (SGPT) 14 1 - 41 U/L    AST (SGOT) 27 1 - 40 U/L    Alkaline Phosphatase 51 39 - 117 U/L    Total Bilirubin 0.4 0.2 - 1.2 mg/dL    eGFR Non African Amer 65 >60 mL/min/1.73    Globulin 3.2 gm/dL    A/G Ratio 0.9 g/dL    BUN/Creatinine Ratio 18.2 7.0 - 25.0    Anion Gap 10.0 5.0 - 15.0 mmol/L   Troponin    Collection Time: 05/06/20 12:17 PM   Result Value Ref Range    Troponin T <0.010 0.000 - 0.030 ng/mL   Magnesium    Collection Time: 05/06/20 12:17 PM   Result Value Ref Range    Magnesium 1.2 (L) 1.6 - 2.4 mg/dL   Valproic Acid Level, Total    Collection Time: 05/06/20 12:17 PM   Result Value Ref Range    Valproic Acid 50.5 50.0 - 125.0 mcg/mL   Lactic Acid, Plasma    Collection Time: 05/06/20 12:36 PM   Result Value Ref Range    Lactate 0.9 0.5 - 2.0 mmol/L     Note: In addition to lab results from this visit, the labs listed above may include labs taken at another facility or during a different encounter within the last 24 hours. Please correlate lab times with ED admission and discharge times for further clarification of the services performed during this visit.    CT Head Without Contrast   Preliminary Result   No acute intracranial abnormality with questionable mild   central volume loss unchanged from prior comparison.              XR Chest 1 View   Preliminary Result   Bibasilar opacifications concerning for atelectasis versus   early airspace disease without effusion or overt edema.       D:  05/06/2020   E:   "05/06/2020            Vitals:    05/06/20 1021 05/06/20 1041 05/06/20 1042 05/06/20 1120   BP:    137/100   BP Location:       Patient Position:       Pulse:    (!) 48   Resp:    20   Temp: 96.4 °F (35.8 °C)      TempSrc: Axillary      SpO2:  99%  100%   Weight:   68 kg (150 lb)    Height:   172.7 cm (68\")      Medications   sodium chloride 0.9 % flush 10 mL (has no administration in time range)   sodium chloride 0.9 % infusion (250 mL/hr Intravenous New Bag 5/6/20 1055)   azithromycin (ZITHROMAX) 500 mg in 250mL NS IVPB (500 mg Intravenous New Bag 5/6/20 1249)   cefTRIAXone (ROCEPHIN) 1 g/50 mL 0.9% NS VTB (mbp) (1 g Intravenous Given 5/6/20 1249)     ECG/EMG Results (last 24 hours)     Procedure Component Value Units Date/Time    ECG 12 Lead [[id number]] Collected:  05/06/20 1019     Updated:  05/06/20 1238    Narrative:       Test Reason : Weak/Dizzy/AMS protocol  Blood Pressure : **/** mmHG  Vent. Rate : 059 BPM     Atrial Rate : 059 BPM     P-R Int : 116 ms          QRS Dur : 086 ms      QT Int : 444 ms       P-R-T Axes : 034 -15 178 degrees     QTc Int : 439 ms    Sinus bradycardia with premature atrial complexes  Possible Anterior infarct , age undetermined  T wave abnormality, consider lateral ischemia  Abnormal ECG  When compared with ECG of 04-APR-2020 16:45,  premature atrial complexes are now present  Vent. rate has decreased BY  44 BPM  Inverted T waves have replaced nonspecific T wave abnormality in Anterior  leads  Confirmed by ALE BERG MD ([id number]) on 5/6/2020 12:38:01 PM    Referred By:  EDMD           Confirmed By:ALE BERG MD        ECG 12 Lead   Final Result   Test Reason : Weak/Dizzy/AMS protocol   Blood Pressure : **/** mmHG   Vent. Rate : 059 BPM     Atrial Rate : 059 BPM      P-R Int : 116 ms          QRS Dur : 086 ms       QT Int : 444 ms       P-R-T Axes : 034 -15 178 degrees      QTc Int : 439 ms      Sinus bradycardia with premature atrial complexes   Possible Anterior infarct , " age undetermined   T wave abnormality, consider lateral ischemia   Abnormal ECG   When compared with ECG of 04-APR-2020 16:45,   premature atrial complexes are now present   Vent. rate has decreased BY  44 BPM   Inverted T waves have replaced nonspecific T wave abnormality in Anterior   leads   Confirmed by MICK MCCAULEY MD ([id number]) on 5/6/2020 12:38:01 PM      Referred By:  EDMD           Confirmed By:MICK MCCAULEY MD                                                  [facility]    Final diagnoses:   Altered mental status, unspecified altered mental status type   Mild dehydration   Pneumonia of both lower lobes due to infectious organism (CMS/HCC)            Mick Mccauley MD  05/06/20 [id number]

## 2020-05-06 NOTE — NURSING NOTE
Consulted to assess patient of a POA wound to his coccyx:     Patient is a COVID (rule out).     Patient presents with a DTPI to his coccyx POA.   Wound bed is purple/maroon and non-blanching.     Will order PT wound care for MIST therapy if COVID is negative.  Venelex ointment ordered BID.   Will order a Dolphin bed without topper.     See order for care needs.   Will contnue to follow.     Thanks

## 2020-05-06 NOTE — H&P
"    [facility] Medicine Services  HISTORY AND PHYSICAL    Patient Name: Gama Davila  : 1940  MRN: [id number]  Primary Care Physician: Provider, No Known  Date of admission: 2020      Subjective   Subjective     Chief Complaint:  AMS    HPI:  Gama Davila is a 79 y.o. male with PMH Dementia, HTN, CAD, A fib, DM II resident of [facility] was sent for AMS. Per his daughter, whom has been in contact with facility, he has not been back to baseline in since his last admission. He has been especially confused and has been \"staring off\" and less \"spunky\" for the past 5 days. He also has not been eating as much as well. Patient is oriented to self only is not able to answer questions. He did make eye contact and say, \"I am Mr Daivla.\" His daughter is concerned that he is having depression symptoms due to his lack of family contact.   In the ED, CT of head showed no acute findings. CXR showed bibasilar opacifications concerning for atelectasis versus  early airspace disease without effusion or overt edema.   He will be admitted to hospitalist service for futher treatment and evaluation of AMS and pneumonia.     Review of Systems   Unable to obtain due to mental status.   All other systems reviewed and are negative.     Personal History     Past Medical History:   Diagnosis Date   • Arthritis    • Cancer (CMS/HCC)     SKIN    • Cataract    • Coronary artery disease    • Diabetes mellitus (CMS/HCC)    • High cholesterol    • [place] (hard of hearing)     PATIENT HAS HEARING AIDS   • Hypertension    • Irregular heart beat     HISTORY OF CARDIAC ABLATION IN    • Wears dentures     FULL UPPER PLATE       Past Surgical History:   Procedure Laterality Date   • BACK SURGERY      THEN AGAIN IN    • CARDIAC ABLATION     • CARDIAC SURGERY     • CATARACT EXTRACTION W/ INTRAOCULAR LENS IMPLANT Left 2017    Procedure: CATARACT PHACO EXTRACTION WITH INTRAOCULAR LENS IMPLANT LEFT WITH " TORIC LENS;  Surgeon: Alma Benavidez MD;  Location: [facility];  Service:    • CATARACT EXTRACTION W/ INTRAOCULAR LENS IMPLANT Right 6/14/2017    Procedure: CATARACT PHACO EXTRACTION WITH INTRAOCULAR LENS IMPLANT RIGHT WITH TORIC LENS;  Surgeon: Alma Benavidez MD;  Location: [facility];  Service:    • CORONARY ARTERY BYPASS GRAFT  2003    SPOUSE UNSURE OF HOW MANY VESSELS   • HIATAL HERNIA REPAIR  1972   • JOINT REPLACEMENT Left     HIP - DONE BY DR DALAL   • KNEE ARTHROSCOPY Left    • NOSE SURGERY  1970    SPOUSE REPORTS FOR A BROKEN NOSE   • OTHER SURGICAL HISTORY Left     TENDON TORN LOOSE FROM BONE, LEFT ELBOW   • OTHER SURGICAL HISTORY  1986    RUPTURED DISC   • OTHER SURGICAL HISTORY  1994    NECK SURGERY FOR RUPTURED DISC   • OTHER SURGICAL HISTORY  1996    HAD SECOND NECK SURGERY   • OTHER SURGICAL HISTORY  1998    RUPTURED DISC   • OTHER SURGICAL HISTORY  2003    RUPTURED DISC   • OTHER SURGICAL HISTORY  2009    HARDWARE REMOVAL FROM BACK BY DR HAY       Family History: family history is not on file. Otherwise pertinent FHx was reviewed and unremarkable.     Social History:  reports that he quit smoking about 37 years ago. His smoking use included cigarettes. He has never used smokeless tobacco. He reports that he does not drink alcohol or use drugs.  Social History     Social History Narrative   • Not on file       Medications:  Available home medication information reviewed.  Medications Prior to Admission   Medication Sig Dispense Refill Last Dose   • acetaminophen (TYLENOL) 325 MG tablet Take 650 mg by mouth Every 6 (Six) Hours As Needed for Mild Pain  or Fever.   Unknown at Unknown time   • aspirin 81 MG EC tablet Take 81 mg by mouth Daily.   4/4/2020 at Unknown time   • atorvastatin (LIPITOR) 80 MG tablet Take 80 mg by mouth Daily.   4/4/2020 at Unknown time   • Divalproex Sodium (DEPAKOTE SPRINKLE) 125 MG capsule Take 500 mg by mouth 2 (Two) Times a Day.   4/4/2020 at Unknown  time   • docusate sodium (COLACE) 100 MG capsule Take 200 mg by mouth Daily As Needed for Constipation.   4/4/2020 at Unknown time   • FLUoxetine (PROzac) 20 MG capsule Take 3 capsules by mouth Daily for 30 days. 90 capsule 0    • gabapentin (NEURONTIN) 100 MG capsule Take 100 mg by mouth 3 (Three) Times a Day.   4/4/2020 at Unknown time   • insulin aspart (NovoLOG FlexPen) 100 UNIT/ML solution pen-injector sc pen Inject  under the skin into the appropriate area as directed 3 (Three) Times a Day With Meals. Sliding scale   Unknown at Unknown time   • Melatonin 3 MG tablet Take 3 mg by mouth At Night As Needed for Sleep.   Unknown at Unknown time   • memantine (NAMENDA) 5 MG tablet Take 1 tablet by mouth Every 12 (Twelve) Hours for 30 days. 60 tablet 0    • metFORMIN (GLUCOPHAGE) 500 MG tablet Take 500 mg by mouth Daily With Breakfast.   4/4/2020 at Unknown time   • metoprolol tartrate (LOPRESSOR) 50 MG tablet Take 1 tablet by mouth Daily for 30 days. 30 tablet 0    • pantoprazole (PROTONIX) 40 MG EC tablet Take 40 mg by mouth Daily.   4/4/2020 at Unknown time   • QUEtiapine (SEROquel) 100 MG tablet Take 100 mg by mouth Every Night.   4/3/2020 at Unknown time   • tamsulosin (FLOMAX) 0.4 MG capsule 24 hr capsule Take 1 capsule by mouth Daily for 30 days. 30 capsule 0 Unknown at Unknown time       Allergies   Allergen Reactions   • Phenergan [Promethazine Hcl] Nausea And Vomiting       Objective   Objective     Vital Signs:   Temp:  [96.4 °F (35.8 °C)-97.4 °F (36.3 °C)] 97.4 °F (36.3 °C)  Heart Rate:  [48-64] 53  Resp:  [18-20] 18  BP: (105-147)/() 105/91        Physical Exam   Constitutional: No acute distress, awake, elderly male in no acute distress   HENT: NCAT, mucous membranes moist  Respiratory: Clear to auscultation bilaterally, respiratory effort normal   Cardiovascular: irregularly irregular , no murmurs, rubs, or gallops  Gastrointestinal: Positive bowel sounds, soft, nontender,  nondistended  Musculoskeletal: No bilateral ankle edema  Psychiatric: unable to assess   Neurologic: Oriented to person, moves all 4 extremities equally   Skin: wound (see pic)       Results Reviewed:  I have personally reviewed current lab and radiology data.    Results from last 7 days   Lab Units 05/06/20  1027   WBC 10*3/mm3 8.37   HEMOGLOBIN g/dL 14.0   HEMATOCRIT % 43.0   PLATELETS 10*3/mm3 213     Results from last 7 days   Lab Units 05/06/20  1236 05/06/20  1217   SODIUM mmol/L  --  140   POTASSIUM mmol/L  --  4.0   CHLORIDE mmol/L  --  102   CO2 mmol/L  --  28.0   BUN mg/dL  --  20   CREATININE mg/dL  --  1.10   GLUCOSE mg/dL  --  101*   CALCIUM mg/dL  --  8.5*   ALT (SGPT) U/L  --  14   AST (SGOT) U/L  --  27   TROPONIN T ng/mL  --  <0.010   LACTATE mmol/L 0.9  --      Estimated Creatinine Clearance: 52.4 mL/min (by C-G formula based on SCr of 1.1 mg/dL).  Brief Urine Lab Results  (Last result in the past 365 days)      Color   Clarity   Blood   Leuk Est   Nitrite   Protein   CREAT   Urine HCG        05/06/20 1036 Dark Yellow Clear Negative Negative Negative Negative             Imaging Results (Last 24 Hours)     Procedure Component Value Units Date/Time    XR Chest 1 View [[id number]] Collected:  05/06/20 1126     Updated:  05/06/20 1223    Narrative:       EXAMINATION: XR CHEST 1 VW-      INDICATION: Weak/Dizzy/AMS triage protocol.      COMPARISON: None.     FINDINGS: Cardiac size borderline enlarged status post median sternotomy  without overt edema, however, minimal opacifications at the lung bases  concerning for atelectasis versus airspace disease. No pneumothorax or  large effusion. Degenerative changes of the spine.           Impression:       Bibasilar opacifications concerning for atelectasis versus  early airspace disease without effusion or overt edema.     D:  05/06/2020  E:  05/06/2020       CT Head Without Contrast [[id number]] Collected:  05/06/20 1141     Updated:  05/06/20 1142     Narrative:       EXAMINATION: CT HEAD WO CONTRAST-      INDICATION: AMS      TECHNIQUE: CT head without intravenous contrast     The radiation dose reduction device was turned on for each scan per the  ALARA (As Low as Reasonably Achievable) protocol.     COMPARISON: CT head dated 04/04/2020     FINDINGS: Midline structures are symmetric without evidence of mass,  mass effect or midline shift. Adrenals demonstrate mild central  prominence concerning for central volume loss however no intra-axial  hemorrhage or extra-axial fluid collection and no low-attenuation signal  gradient in the periventricular and deep white matter to 5. Globes and  orbits unremarkable. Visualized paranasal sinuses and mastoid air cells  are grossly clear and well pneumatized. Calvarium intact.             Impression:       No acute intracranial abnormality with questionable mild  central volume loss unchanged from prior comparison.                   Assessment/Plan   Assessment & Plan     Active Hospital Problems    Diagnosis POA   • AMS (altered mental status) [R41.82] Yes     79 year old with Dementia, AFib, DM admitted for AMS and pneumonia.     AMS  Dementia   Depression  -CT head no acute finding  -Consider neurology consult when COVID negative  -Cont namenda, seroquel, prozac, for now   Pneumonia  -received rocephin and azithromycin in ED, start Zosyn (MRSA PCR was neg last admit)  -check procal   NANCI  -IV fluids  -Most likely volume depletion due to poor po intake   DM  -SSI  HTN  Afib  -metoprolol  -not anticoagulated   CAD  -ASA    DVT prophylaxis:  lovenox       Admission Communication  Due to current limited visitation policies, an attempt will be made to update patient's identified best point-of-contact(s) at admission to discuss plan of care.   Contact: Palma Jones [phone]   Relation: Daughter   Time of communication: [phone]   Notes (if applicable): She was happy for update.          CODE STATUS:    Code Status and  Medical Interventions:   Ordered at: 05/06/20 1533     Limited Support to NOT Include:    Intubation     Level Of Support Discussed With:    Health Care Surrogate     Code Status:    No CPR     Medical Interventions (Level of Support Prior to Arrest):    Limited       Admission Status:  I believe this patient meets INPATIENT status due to AMS and pneumonia.  I feel patient’s risk for adverse outcomes and need for care warrant INPATIENT evaluation and I predict the patient’s care encounter to likely last beyond 2 midnights.      Electronically signed by Eboni Bowles DO, 05/06/20, 3:35 PM.

## 2020-05-07 LAB
ALBUMIN SERPL-MCNC: 2.5 G/DL (ref 3.5–5.2)
ALBUMIN/GLOB SERPL: 0.9 G/DL
ALP SERPL-CCNC: 46 U/L (ref 39–117)
ALT SERPL W P-5'-P-CCNC: 12 U/L (ref 1–41)
ANION GAP SERPL CALCULATED.3IONS-SCNC: 10 MMOL/L (ref 5–15)
AST SERPL-CCNC: 21 U/L (ref 1–40)
BACTERIA SPEC AEROBE CULT: NO GROWTH
BILIRUB SERPL-MCNC: 0.3 MG/DL (ref 0.2–1.2)
BUN BLD-MCNC: 14 MG/DL (ref 8–23)
BUN/CREAT SERPL: 12.8 (ref 7–25)
CALCIUM SPEC-SCNC: 7.9 MG/DL (ref 8.6–10.5)
CHLORIDE SERPL-SCNC: 104 MMOL/L (ref 98–107)
CO2 SERPL-SCNC: 25 MMOL/L (ref 22–29)
CREAT BLD-MCNC: 1.09 MG/DL (ref 0.76–1.27)
DEPRECATED RDW RBC AUTO: 51.6 FL (ref 37–54)
ERYTHROCYTE [DISTWIDTH] IN BLOOD BY AUTOMATED COUNT: 14.5 % (ref 12.3–15.4)
GFR SERPL CREATININE-BSD FRML MDRD: 65 ML/MIN/1.73
GLOBULIN UR ELPH-MCNC: 2.7 GM/DL
GLUCOSE BLD-MCNC: 93 MG/DL (ref 65–99)
GLUCOSE BLDC GLUCOMTR-MCNC: 126 MG/DL (ref 70–130)
GLUCOSE BLDC GLUCOMTR-MCNC: 189 MG/DL (ref 70–130)
GLUCOSE BLDC GLUCOMTR-MCNC: 91 MG/DL (ref 70–130)
GLUCOSE BLDC GLUCOMTR-MCNC: 98 MG/DL (ref 70–130)
HCT VFR BLD AUTO: 35.4 % (ref 37.5–51)
HGB BLD-MCNC: 11.4 G/DL (ref 13–17.7)
MAGNESIUM SERPL-MCNC: 2 MG/DL (ref 1.6–2.4)
MCH RBC QN AUTO: 31.2 PG (ref 26.6–33)
MCHC RBC AUTO-ENTMCNC: 32.2 G/DL (ref 31.5–35.7)
MCV RBC AUTO: 97 FL (ref 79–97)
PLATELET # BLD AUTO: 178 10*3/MM3 (ref 140–450)
PMV BLD AUTO: 10.5 FL (ref 6–12)
POTASSIUM BLD-SCNC: 3.5 MMOL/L (ref 3.5–5.2)
PROT SERPL-MCNC: 5.2 G/DL (ref 6–8.5)
RBC # BLD AUTO: 3.65 10*6/MM3 (ref 4.14–5.8)
SODIUM BLD-SCNC: 139 MMOL/L (ref 136–145)
WBC NRBC COR # BLD: 6.38 10*3/MM3 (ref 3.4–10.8)

## 2020-05-07 PROCEDURE — 82962 GLUCOSE BLOOD TEST: CPT

## 2020-05-07 PROCEDURE — 25010000002 PIPERACILLIN SOD-TAZOBACTAM PER 1 G: Performed by: FAMILY MEDICINE

## 2020-05-07 PROCEDURE — 25010000002 ENOXAPARIN PER 10 MG: Performed by: FAMILY MEDICINE

## 2020-05-07 PROCEDURE — 99233 SBSQ HOSP IP/OBS HIGH 50: CPT | Performed by: INTERNAL MEDICINE

## 2020-05-07 PROCEDURE — 83735 ASSAY OF MAGNESIUM: CPT | Performed by: PHYSICIAN ASSISTANT

## 2020-05-07 PROCEDURE — 85027 COMPLETE CBC AUTOMATED: CPT | Performed by: FAMILY MEDICINE

## 2020-05-07 PROCEDURE — 97162 PT EVAL MOD COMPLEX 30 MIN: CPT

## 2020-05-07 PROCEDURE — 80053 COMPREHEN METABOLIC PANEL: CPT | Performed by: FAMILY MEDICINE

## 2020-05-07 PROCEDURE — 97610 LOW FREQUENCY NON-THERMAL US: CPT

## 2020-05-07 PROCEDURE — 99223 1ST HOSP IP/OBS HIGH 75: CPT | Performed by: PSYCHIATRY & NEUROLOGY

## 2020-05-07 RX ORDER — FLUOXETINE HYDROCHLORIDE 20 MG/1
60 CAPSULE ORAL DAILY
Status: DISCONTINUED | OUTPATIENT
Start: 2020-05-07 | End: 2020-05-07

## 2020-05-07 RX ORDER — FLUOXETINE HYDROCHLORIDE 20 MG/1
40 CAPSULE ORAL DAILY
Status: DISCONTINUED | OUTPATIENT
Start: 2020-05-08 | End: 2020-05-08

## 2020-05-07 RX ORDER — QUETIAPINE FUMARATE 25 MG/1
50 TABLET, FILM COATED ORAL NIGHTLY
Status: DISCONTINUED | OUTPATIENT
Start: 2020-05-07 | End: 2020-05-08

## 2020-05-07 RX ORDER — GABAPENTIN 100 MG/1
100 CAPSULE ORAL EVERY 12 HOURS SCHEDULED
Status: DISCONTINUED | OUTPATIENT
Start: 2020-05-08 | End: 2020-05-11

## 2020-05-07 RX ORDER — DIVALPROEX SODIUM 125 MG/1
250 CAPSULE, COATED PELLETS ORAL EVERY 8 HOURS SCHEDULED
Status: DISCONTINUED | OUTPATIENT
Start: 2020-05-07 | End: 2020-05-08

## 2020-05-07 RX ADMIN — GABAPENTIN 100 MG: 100 CAPSULE ORAL at 09:09

## 2020-05-07 RX ADMIN — TAZOBACTAM SODIUM AND PIPERACILLIN SODIUM 3.38 G: 375; 3 INJECTION, SOLUTION INTRAVENOUS at 15:57

## 2020-05-07 RX ADMIN — GABAPENTIN 100 MG: 100 CAPSULE ORAL at 15:57

## 2020-05-07 RX ADMIN — CARBIDOPA AND LEVODOPA 1 TABLET: 25; 100 TABLET ORAL at 19:21

## 2020-05-07 RX ADMIN — MEMANTINE 5 MG: 10 TABLET ORAL at 09:09

## 2020-05-07 RX ADMIN — TAZOBACTAM SODIUM AND PIPERACILLIN SODIUM 3.38 G: 375; 3 INJECTION, SOLUTION INTRAVENOUS at 23:48

## 2020-05-07 RX ADMIN — SODIUM CHLORIDE, PRESERVATIVE FREE 10 ML: 5 INJECTION INTRAVENOUS at 21:01

## 2020-05-07 RX ADMIN — ASPIRIN 81 MG: 81 TABLET, COATED ORAL at 09:09

## 2020-05-07 RX ADMIN — PANTOPRAZOLE SODIUM 40 MG: 40 TABLET, DELAYED RELEASE ORAL at 09:09

## 2020-05-07 RX ADMIN — TAZOBACTAM SODIUM AND PIPERACILLIN SODIUM 3.38 G: 375; 3 INJECTION, SOLUTION INTRAVENOUS at 00:20

## 2020-05-07 RX ADMIN — ATORVASTATIN CALCIUM 80 MG: 40 TABLET, FILM COATED ORAL at 21:01

## 2020-05-07 RX ADMIN — SODIUM CHLORIDE 100 ML/HR: 9 INJECTION, SOLUTION INTRAVENOUS at 09:07

## 2020-05-07 RX ADMIN — ENOXAPARIN SODIUM 40 MG: 80 INJECTION SUBCUTANEOUS at 15:57

## 2020-05-07 RX ADMIN — TAZOBACTAM SODIUM AND PIPERACILLIN SODIUM 3.38 G: 375; 3 INJECTION, SOLUTION INTRAVENOUS at 09:06

## 2020-05-07 RX ADMIN — TAMSULOSIN HYDROCHLORIDE 0.4 MG: 0.4 CAPSULE ORAL at 09:09

## 2020-05-07 RX ADMIN — DIVALPROEX SODIUM 250 MG: 125 CAPSULE ORAL at 21:01

## 2020-05-07 RX ADMIN — QUETIAPINE FUMARATE 50 MG: 25 TABLET ORAL at 21:01

## 2020-05-07 RX ADMIN — FLUOXETINE HYDROCHLORIDE 60 MG: 20 CAPSULE ORAL at 15:57

## 2020-05-07 RX ADMIN — SODIUM CHLORIDE, PRESERVATIVE FREE 10 ML: 5 INJECTION INTRAVENOUS at 09:13

## 2020-05-07 RX ADMIN — INSULIN LISPRO 2 UNITS: 100 INJECTION, SOLUTION INTRAVENOUS; SUBCUTANEOUS at 17:59

## 2020-05-07 RX ADMIN — CASTOR OIL AND BALSAM, PERU: 788; 87 OINTMENT TOPICAL at 22:08

## 2020-05-07 NOTE — CONSULTS
Neurology    Referring provider:   No referring provider defined for this encounter.    Reason for Consultation: Failure to thrive    Chief complaint: No complaints    History of present illness: 79-year-old man seen for Dr. Bowles for evaluation of failure to thrive.    I spoke to his daughter Virginia who indicates that he was diagnosed with dementia 3 years ago.  He has had some mild difficulty getting around and she has been suspicious that he might of had Parkinson's disease in addition.    He was getting along reasonably well until he developed septic shock last month and subsequently has been apathetic.    He has been at the [facility] but he has not been particularly active.    His medication burden seems quite heavy including Prozac 60 mg daily.  Depakote 500 mg twice daily.  Seroquel 100 mg nightly.  Gabapentin 100 mg 3 times daily.    Review of Systems: Patient is unable to participate    Home meds:   Medications Prior to Admission   Medication Sig Dispense Refill Last Dose   • acetaminophen (TYLENOL) 325 MG tablet Take 650 mg by mouth Every 6 (Six) Hours As Needed for Mild Pain  or Fever.   Unknown at Unknown time   • aspirin 81 MG EC tablet Take 81 mg by mouth Daily.   4/4/2020 at Unknown time   • atorvastatin (LIPITOR) 80 MG tablet Take 80 mg by mouth Daily.   4/4/2020 at Unknown time   • Divalproex Sodium (DEPAKOTE SPRINKLE) 125 MG capsule Take 500 mg by mouth 2 (Two) Times a Day.   4/4/2020 at Unknown time   • docusate sodium (COLACE) 100 MG capsule Take 200 mg by mouth Daily As Needed for Constipation.   4/4/2020 at Unknown time   • FLUoxetine (PROzac) 20 MG capsule Take 3 capsules by mouth Daily for 30 days. 90 capsule 0    • gabapentin (NEURONTIN) 100 MG capsule Take 100 mg by mouth 3 (Three) Times a Day.   4/4/2020 at Unknown time   • insulin aspart (NovoLOG FlexPen) 100 UNIT/ML solution pen-injector sc pen Inject  under the skin into the appropriate area as directed 3 (Three)  Times a Day With Meals. Sliding scale   Unknown at Unknown time   • Melatonin 3 MG tablet Take 3 mg by mouth At Night As Needed for Sleep.   Unknown at Unknown time   • memantine (NAMENDA) 5 MG tablet Take 1 tablet by mouth Every 12 (Twelve) Hours for 30 days. 60 tablet 0    • metFORMIN (GLUCOPHAGE) 500 MG tablet Take 500 mg by mouth Daily With Breakfast.   4/4/2020 at Unknown time   • metoprolol tartrate (LOPRESSOR) 50 MG tablet Take 1 tablet by mouth Daily for 30 days. 30 tablet 0    • pantoprazole (PROTONIX) 40 MG EC tablet Take 40 mg by mouth Daily.   4/4/2020 at Unknown time   • QUEtiapine (SEROquel) 100 MG tablet Take 100 mg by mouth Every Night.   4/3/2020 at Unknown time   • tamsulosin (FLOMAX) 0.4 MG capsule 24 hr capsule Take 1 capsule by mouth Daily for 30 days. 30 capsule 0 Unknown at Unknown time       History  Past Medical History:   Diagnosis Date   • Arthritis    • Cancer (CMS/HCC)     SKIN    • Cataract    • Coronary artery disease    • Diabetes mellitus (CMS/HCC)    • High cholesterol    • [place] (hard of hearing)     PATIENT HAS HEARING AIDS   • Hypertension    • Irregular heart beat     HISTORY OF CARDIAC ABLATION IN 2003   • Wears dentures     FULL UPPER PLATE   ,   Past Surgical History:   Procedure Laterality Date   • BACK SURGERY  1978    THEN AGAIN IN 1982   • CARDIAC ABLATION  2003   • CARDIAC SURGERY     • CATARACT EXTRACTION W/ INTRAOCULAR LENS IMPLANT Left 5/24/2017    Procedure: CATARACT PHACO EXTRACTION WITH INTRAOCULAR LENS IMPLANT LEFT WITH TORIC LENS;  Surgeon: Alma Benavidez MD;  Location: [facility] OR;  Service:    • CATARACT EXTRACTION W/ INTRAOCULAR LENS IMPLANT Right 6/14/2017    Procedure: CATARACT PHACO EXTRACTION WITH INTRAOCULAR LENS IMPLANT RIGHT WITH TORIC LENS;  Surgeon: Alma Benavidez MD;  Location: [facility] OR;  Service:    • CORONARY ARTERY BYPASS GRAFT  2003    SPOUSE UNSURE OF HOW MANY VESSELS   • HIATAL HERNIA REPAIR  1972   • JOINT REPLACEMENT Left   "   HIP - DONE BY DR DALAL   • KNEE ARTHROSCOPY Left    • NOSE SURGERY      SPOUSE REPORTS FOR A BROKEN NOSE   • OTHER SURGICAL HISTORY Left     TENDON TORN LOOSE FROM BONE, LEFT ELBOW   • OTHER SURGICAL HISTORY      RUPTURED DISC   • OTHER SURGICAL HISTORY      NECK SURGERY FOR RUPTURED DISC   • OTHER SURGICAL HISTORY      HAD SECOND NECK SURGERY   • OTHER SURGICAL HISTORY      RUPTURED DISC   • OTHER SURGICAL HISTORY      RUPTURED DISC   • OTHER SURGICAL HISTORY      HARDWARE REMOVAL FROM BACK BY DR HAY   , History reviewed. No pertinent family history.,   Social History     Tobacco Use   • Smoking status: Former Smoker     Types: Cigarettes     Last attempt to quit:      Years since quittin.3   • Smokeless tobacco: Never Used   Substance Use Topics   • Alcohol use: No   • Drug use: No    and Allergies:  Phenergan [promethazine hcl],    Vital Signs   Blood pressure 135/90, pulse 54, temperature 97.3 °F (36.3 °C), temperature source Oral, resp. rate 18, height 172.7 cm (68\"), weight 68 kg (150 lb), SpO2 93 %.  Body mass index is 22.81 kg/m².    Physical Exam:   General: Calm elderly white male              Head: Positive glabellar tap              Neck: Mild stiffness              Resp: Normal breath sounds              Cor: Regular rhythm              Extremities: No edema              Skin: Warm and dry              Neuro: Apathetic.  The patient will awaken and answer a few simple questions.  He will follow some commands.    Speech is soft.  Is articulate.  He does not seem to have word finding problems.    Cranial nerves show a positive glabellar tap.  His eye movements are full his face is masked with marked immobility he opens his mouth and elevates his palate 6 out his tongue without difficulty.    Reflexes are 1+ and equal bilaterally.    Motor testing shows bilateral cogwheel rigidity.    Sensory testing cannot be done adequately.        Results Review: CT scan shows " mild atrophy    Labs:  Lab Results (last 72 hours)     Procedure Component Value Units Date/Time    Blood Culture - Blood, Blood, Venous Line [[id number]] Collected:  05/06/20 1236    Specimen:  Blood, Venous Line Updated:  05/07/20 1315     Blood Culture No growth at 24 hours    Blood Culture - Blood, Blood, Venous Line [[id number]] Collected:  05/06/20 1236    Specimen:  Blood, Venous Line Updated:  05/07/20 1315     Blood Culture No growth at 24 hours    Urine Culture - Urine, Urine, Catheter In/Out [[id number]]  (Normal) Collected:  05/06/20 1036    Specimen:  Urine, Catheter In/Out Updated:  05/07/20 1015     Urine Culture No growth    POC Glucose Once [[id number]]  (Normal) Collected:  05/07/20 0831    Specimen:  Blood Updated:  05/07/20 0851     Glucose 126 mg/dL     Magnesium [[id number]]  (Normal) Collected:  05/07/20 0544    Specimen:  Blood Updated:  05/07/20 0802     Magnesium 2.0 mg/dL     Comprehensive Metabolic Panel [[id number]]  (Abnormal) Collected:  05/07/20 0544    Specimen:  Blood Updated:  05/07/20 0756     Glucose 93 mg/dL      BUN 14 mg/dL      Creatinine 1.09 mg/dL      Sodium 139 mmol/L      Potassium 3.5 mmol/L      Chloride 104 mmol/L      CO2 25.0 mmol/L      Calcium 7.9 mg/dL      Total Protein 5.2 g/dL      Albumin 2.50 g/dL      ALT (SGPT) 12 U/L      AST (SGOT) 21 U/L      Alkaline Phosphatase 46 U/L      Total Bilirubin 0.3 mg/dL      eGFR Non African Amer 65 mL/min/1.73      Globulin 2.7 gm/dL      A/G Ratio 0.9 g/dL      BUN/Creatinine Ratio 12.8     Anion Gap 10.0 mmol/L     Narrative:       GFR Normal >60  Chronic Kidney Disease <60  Kidney Failure <15      CBC (No Diff) [[id number]]  (Abnormal) Collected:  05/07/20 0544    Specimen:  Blood Updated:  05/07/20 0744     WBC 6.38 10*3/mm3      RBC 3.65 10*6/mm3      Hemoglobin 11.4 g/dL      Hematocrit 35.4 %      MCV 97.0 fL      MCH 31.2 pg      MCHC 32.2 g/dL      RDW 14.5 %      RDW-SD 51.6 fl      MPV 10.5 fL      Platelets 178  "10*3/mm3     POC Glucose Once [[id number]]  (Normal) Collected:  05/06/20 1709    Specimen:  Blood Updated:  05/07/20 0032     Glucose 91 mg/dL     POC Glucose Once [[id number]]  (Normal) Collected:  05/06/20 2206    Specimen:  Blood Updated:  05/06/20 2207     Glucose 113 mg/dL     SARS-CoV-2 PCR ([facility] IN-HOUSE PERFORMED), NP SWAB IN TRANSPORT MEDIA - Swab, Nasopharynx [[id number]]  (Normal) Collected:  05/06/20 1520    Specimen:  Swab from Nasopharynx Updated:  05/06/20 2001     COVID19 Not Detected    Procalcitonin [[id number]]  (Abnormal) Collected:  05/06/20 1217    Specimen:  Blood Updated:  05/06/20 1753     Procalcitonin <0.02 ng/mL     Narrative:       As a Marker for Sepsis (Non-Neonates):   1. <0.5 ng/mL represents a low risk of severe sepsis and/or septic shock.  1. >2 ng/mL represents a high risk of severe sepsis and/or septic shock.    As a Marker for Lower Respiratory Tract Infections that require antibiotic therapy:  PCT on Admission     Antibiotic Therapy             6-12 Hrs later  > 0.5                Strongly Recommended            >0.25 - <0.5         Recommended  0.1 - 0.25           Discouraged                   Remeasure/reassess PCT  <0.1                 Strongly Discouraged          Remeasure/reassess PCT      As 28 day mortality risk marker: \"Change in Procalcitonin Result\" (> 80 % or <=80 %) if Day 0 (or Day 1) and Day 4 values are available. Refer to http://[email or web address]-pct-calculator.com/   Change in PCT <=80 %   A decrease of PCT levels below or equal to 80 % defines a positive change in PCT test result representing a higher risk for 28-day all-cause mortality of patients diagnosed with severe sepsis or septic shock.  Change in PCT > 80 %   A decrease of PCT levels of more than 80 % defines a negative change in PCT result representing a lower risk for 28-day all-cause mortality of patients diagnosed with severe sepsis or septic shock.                Results may be falsely decreased if " patient taking Biotin.     [place] Draw [[id number]] Collected:  05/06/20 1027    Specimen:  Blood Updated:  05/06/20 1330    Narrative:       The following orders were created for panel order [place] Draw.  Procedure                               Abnormality         Status                     ---------                               -----------         ------                     Light Blue Top[[id number]]                                   Final result               Green Top (Gel)[[id number]]                                  Final result               Lavender Top[[id number]]                                     Final result               Gold Top - SST[[id number]]                                   Final result                 Please view results for these tests on the individual orders.    Green Top (Gel) [[id number]] Collected:  05/06/20 1217    Specimen:  Blood Updated:  05/06/20 1330     Extra Tube Hold for add-ons.     Comment: Auto resulted.       Gold Top - SST [[id number]] Collected:  05/06/20 1217    Specimen:  Blood Updated:  05/06/20 1330     Extra Tube Hold for add-ons.     Comment: Auto resulted.       Lactic Acid, Plasma [[id number]]  (Normal) Collected:  05/06/20 1236    Specimen:  Blood Updated:  05/06/20 1315     Lactate 0.9 mmol/L      Comment: Falsely depressed results may occur on samples drawn from patients receiving N-Acetylcysteine (NAC) or Metamizole.       Comprehensive Metabolic Panel [[id number]]  (Abnormal) Collected:  05/06/20 1217    Specimen:  Blood Updated:  05/06/20 1259     Glucose 101 mg/dL      BUN 20 mg/dL      Creatinine 1.10 mg/dL      Sodium 140 mmol/L      Potassium 4.0 mmol/L      Chloride 102 mmol/L      CO2 28.0 mmol/L      Calcium 8.5 mg/dL      Total Protein 6.1 g/dL      Albumin 2.90 g/dL      ALT (SGPT) 14 U/L      AST (SGOT) 27 U/L      Alkaline Phosphatase 51 U/L      Total Bilirubin 0.4 mg/dL      eGFR Non African Amer 65 mL/min/1.73      Globulin 3.2 gm/dL      A/G Ratio 0.9  g/dL      BUN/Creatinine Ratio 18.2     Anion Gap 10.0 mmol/L     Narrative:       GFR Normal >60  Chronic Kidney Disease <60  Kidney Failure <15      Valproic Acid Level, Total [[id number]]  (Normal) Collected:  05/06/20 1217    Specimen:  Blood Updated:  05/06/20 1259     Valproic Acid 50.5 mcg/mL     Magnesium [[id number]]  (Abnormal) Collected:  05/06/20 1217    Specimen:  Blood Updated:  05/06/20 1259     Magnesium 1.2 mg/dL     Troponin [[id number]]  (Normal) Collected:  05/06/20 1217    Specimen:  Blood Updated:  05/06/20 1255     Troponin T <0.010 ng/mL     Narrative:       Troponin T Reference Range:  <= 0.03 ng/mL-   Negative for AMI  >0.03 ng/mL-     Abnormal for myocardial necrosis.  Clinicians would have to utilize clinical acumen, EKG, Troponin and serial changes to determine if it is an Acute Myocardial Infarction or myocardial injury due to an underlying chronic condition.       Results may be falsely decreased if patient taking Biotin.      Lavender Top [[id number]] Collected:  05/06/20 1027    Specimen:  Blood Updated:  05/06/20 1130     Extra Tube hold for add-on     Comment: Auto resulted       Light Blue Top [[id number]] Collected:  05/06/20 1027    Specimen:  Blood Updated:  05/06/20 1130     Extra Tube hold for add-on     Comment: Auto resulted       CBC & Differential [[id number]] Collected:  05/06/20 1027    Specimen:  Blood Updated:  05/06/20 1109    Narrative:       The following orders were created for panel order CBC & Differential.  Procedure                               Abnormality         Status                     ---------                               -----------         ------                     CBC Auto Differential[[id number]]        Abnormal            Final result                 Please view results for these tests on the individual orders.    CBC Auto Differential [[id number]]  (Abnormal) Collected:  05/06/20 1027    Specimen:  Blood Updated:  05/06/20 1109     WBC 8.37 10*3/mm3       RBC 4.56 10*6/mm3      Hemoglobin 14.0 g/dL      Hematocrit 43.0 %      MCV 94.3 fL      MCH 30.7 pg      MCHC 32.6 g/dL      RDW 14.8 %      RDW-SD 51.7 fl      MPV 10.8 fL      Platelets 213 10*3/mm3      Neutrophil % 63.8 %      Lymphocyte % 21.0 %      Monocyte % 13.7 %      Eosinophil % 0.6 %      Basophil % 0.2 %      Immature Grans % 0.7 %      Neutrophils, Absolute 5.33 10*3/mm3      Lymphocytes, Absolute 1.76 10*3/mm3      Monocytes, Absolute 1.15 10*3/mm3      Eosinophils, Absolute 0.05 10*3/mm3      Basophils, Absolute 0.02 10*3/mm3      Immature Grans, Absolute 0.06 10*3/mm3      nRBC 0.0 /100 WBC     Urinalysis With Culture If Indicated - Urine, Catheter In/Out [[id number]]  (Abnormal) Collected:  05/06/20 1036    Specimen:  Urine, Catheter In/Out Updated:  05/06/20 1102     Color, UA Dark Yellow     Appearance, UA Clear     pH, UA 5.5     Specific Gravity, UA 1.025     Glucose, UA Negative     Ketones, UA Trace     Bilirubin, UA Negative     Blood, UA Negative     Protein, UA Negative     Leuk Esterase, UA Negative     Nitrite, UA Negative     Urobilinogen, UA 2.0 E.U./dL    Narrative:       Urine microscopic not indicated.          Rads:  Imaging Results (Last 72 Hours)     Procedure Component Value Units Date/Time    CT Head Without Contrast [[id number]] Collected:  05/06/20 1141     Updated:  05/06/20 1701    Narrative:       EXAMINATION: CT HEAD WO CONTRAST-05/06/2020:      INDICATION: AMS.      TECHNIQUE: CT head without intravenous contrast.     The radiation dose reduction device was turned on for each scan per the  ALARA (As Low as Reasonably Achievable) protocol.     COMPARISON: CT head dated 04/04/2020.     FINDINGS: The midline structures are symmetric without evidence of mass,  mass effect or midline shift. The ventricles demonstrate mild central  prominence concerning for central volume loss, however, no intra-axial  hemorrhage or extra-axial fluid collection and no low-attenuation  signal  gradient in the periventricular and deep white matter identified. Globes  and orbits are unremarkable.  The visualized paranasal sinuses and  mastoid air cells are grossly clear and well pneumatized. Calvarium  intact.       Impression:       No acute intracranial abnormality with questionable mild  central volume loss unchanged from prior comparison.     D:  05/06/2020  E:  05/06/2020           This report was finalized on 5/6/2020 4:57 PM by Dr. aDmián Dewitt.       XR Chest 1 View [[id number]] Collected:  05/06/20 1126     Updated:  05/06/20 1701    Narrative:       EXAMINATION: XR CHEST 1 VW-      INDICATION: Weak/Dizzy/AMS triage protocol.      COMPARISON: None.     FINDINGS: Cardiac size borderline enlarged status post median sternotomy  without overt edema, however, minimal opacifications at the lung bases  concerning for atelectasis versus airspace disease. No pneumothorax or  large effusion. Degenerative changes of the spine.           Impression:       Bibasilar opacifications concerning for atelectasis versus  early airspace disease without effusion or overt edema.     D:  05/06/2020  E:  05/06/2020     This report was finalized on 5/6/2020 4:57 PM by Dr. Damián Dewitt.               Assessment: Dementia, worse following hospitalization for septic shock.    Increased muscle tone compatible with Parkinson's disease.    Polypharmacy       Plan:    Agree with cutting the Seroquel to 50 mg.    Decrease Depakote to 250 mg every 8 hours.    Depakene level in the morning.    Decrease gabapentin 200 mg twice daily.    Decrease Prozac to 40 mg daily.    Add Sinemet 25 100 three times daily.    Up in a chair twice daily.    PT and OT consult    Comment:   The patient is currently in the process of dying of immobility unless we are able to get him to mobilize will likely not survive long-term.    He is already developing pressure sores.    Question is does he need all of the medication that he is taking and at  this point will make an effort to reduce his medication burden and continue to do that throughout his hospitalization if he does not wake back up.    That said people with septic encephalopathy as well off and never could back to baseline and the daughter was apprised of the fact that this process may not be reversible.    I discussed the patients findings and my recommendations with patient, family and nursing staff      Rebel Perry MD  05/07/20  16:30

## 2020-05-07 NOTE — PLAN OF CARE
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 Pt will benefit from MIST therapy to help improve healing potential.

## 2020-05-07 NOTE — PROGRESS NOTES
Discharge Planning Assessment  [facility]     Patient Name: Gama Davila  MRN: [id number]  Today's Date: 5/7/2020    Admit Date: 5/6/2020    Discharge Needs Assessment     Row Name 05/07/20 1511       Living Environment    Lives With  facility resident    Current Living Arrangements  residential facility    Primary Care Provided by  self;other (see comments);homecare agency facility staff    Provides Primary Care For  no one, unable/limited ability to care for self    Family Caregiver if Needed  spouse    Quality of Family Relationships  supportive;involved;helpful    Able to Return to Prior Arrangements  -- TBD       Transition Planning    Patient/Family Anticipates Transition to  other (see comments)    Patient/Family Anticipated Services at Transition      Transportation Anticipated  -- TBD       Discharge Needs Assessment    Readmission Within the Last 30 Days  previous discharge plan unsuccessful    Concerns to be Addressed  discharge planning    Equipment Currently Used at Home  walker, rolling    Anticipated Changes Related to Illness  other (see comments) Inability to return to Memory Care Unit    Equipment Needed After Discharge  -- TBD    Outpatient/Agency/Support Group Needs  skilled nursing facility    Discharge Facility/Level of Care Needs  nursing facility, skilled    Current Discharge Risk  chronically ill;dependent with mobility/activities of daily living        Discharge Plan     Row Name 05/07/20 1515       Plan    Plan  ONGOING    Patient/Family in Agreement with Plan  yes    Plan Comments  Spoke with wife via phone and with the  at [facility] in [place], [place] where pt is a resident in their memory care unit.  Both report that pt has had a functional decline over the last month.  At baseline, he is able to dress himself, feed himself, and toilet himself.  Despite having [facility] services (SN, PT, OT, SLP.  Dx:  sepsis, PNA, DMII), pt has failed to improve.   Confirmed pt has Medicare with Rx coverage.  Anticipate STR will be necessary upon DC.  PT/OT evals pending.  CM will cont to follow.    Final Discharge Disposition Code  30 - still a patient        Destination      Coordination has not been started for this encounter.      Durable Medical Equipment      Coordination has not been started for this encounter.      Dialysis/Infusion      Coordination has not been started for this encounter.      Home Medical Care      Service Provider Request Status Selected Services Address Phone Number Fax Number    [facility] [place] Pending - No Request Sent N/A [address] [address] [phone]      Therapy      Coordination has not been started for this encounter.      Community Resources      Coordination has not been started for this encounter.        Expected Discharge Date and Time     Expected Discharge Date Expected Discharge Time    May 8, 2020         Demographic Summary     Row Name 05/07/20 1507       General Information    Admission Type  inpatient    Referral Source  admission list    Reason for Consult  discharge planning    Preferred Language  English       Contact Information    Permission Granted to Share Info With      Contact Information Obtained for          Functional Status     Row Name 05/07/20 1510       Functional Status    Usual Activity Tolerance  fair       Functional Status, IADL    Medications  completely dependent    Meal Preparation  completely dependent    Housekeeping  completely dependent    Laundry  completely dependent    Shopping  completely dependent        Psychosocial    No documentation.       Abuse/Neglect    No documentation.       Legal    No documentation.       Substance Abuse    No documentation.       Patient Forms    No documentation.           Charleen Celis RN

## 2020-05-07 NOTE — PLAN OF CARE
Problem: Patient Care Overview  Goal: Plan of Care Review  Outcome: Ongoing (interventions implemented as appropriate)  Flowsheets (Taken 5/7/2020 7461)  Progress: no change  Plan of Care Reviewed With: patient  Outcome Summary: Bradycardic throughout night with frequent PACs; HR in 50's majority of the time; BP tolerating low HR. Remains mostly nonverbal, but occasionally answers yes/no questions. COVID-19 results negative, but remains in Airborne precautions per APRN.

## 2020-05-07 NOTE — PLAN OF CARE
Problem: Patient Care Overview  Goal: Plan of Care Review  Outcome: Ongoing (interventions implemented as appropriate)  Flowsheets (Taken 5/7/2020 9309)  Progress: no change  Plan of Care Reviewed With: patient  Outcome Summary: Pt received from 6A today. VSS. Oriented to self. No reports of pain. Pt takes medications with encouragement. Will continue to monitor.

## 2020-05-07 NOTE — NURSING NOTE
Spoke with Dior GONZALES regarding low Raphael of 11. Per Dior, pt on Dophin bed; no further skin issues; skin interventions in place, including Q2H turning.  Nurse will f/u. Please contact [facility] nurse as needed for concerns.

## 2020-05-07 NOTE — THERAPY EVALUATION
Acute Care - Wound/Debridement Initial Evaluation   [place]     Patient Name: Gama Davila  : 1940  MRN: [id number]  Today's Date: 2020                Admit Date: 2020    Visit Dx:    ICD-10-CM ICD-9-CM   1. Altered mental status, unspecified altered mental status type R41.82 780.97   2. Mild dehydration E86.0 276.51   3. Pneumonia of both lower lobes due to infectious organism (CMS/HCC) J18.1 483.8       Patient Active Problem List   Diagnosis   • [place] (hard of hearing)   • High cholesterol   • Coronary artery disease   • Hypertension   • Irregular heart beat   • Diabetes mellitus (CMS/HCC)   • Septic shock (CMS/HCC)   • NANCI (acute kidney injury) (CMS/HCC)   • AMS (altered mental status)   • Hypoxia   • Anemia   • Edema   • Acute encephalopathy   • Dementia (CMS/HCC)   • Hypomagnesemia   • Rhabdomyolysis   • Acute retention of urine        Past Medical History:   Diagnosis Date   • Arthritis    • Cancer (CMS/HCC)     SKIN    • Cataract    • Coronary artery disease    • Diabetes mellitus (CMS/HCC)    • High cholesterol    • [place] (hard of hearing)     PATIENT HAS HEARING AIDS   • Hypertension    • Irregular heart beat     HISTORY OF CARDIAC ABLATION IN    • Wears dentures     FULL UPPER PLATE        Past Surgical History:   Procedure Laterality Date   • BACK SURGERY      THEN AGAIN IN    • CARDIAC ABLATION     • CARDIAC SURGERY     • CATARACT EXTRACTION W/ INTRAOCULAR LENS IMPLANT Left 2017    Procedure: CATARACT PHACO EXTRACTION WITH INTRAOCULAR LENS IMPLANT LEFT WITH TORIC LENS;  Surgeon: Alma Benavidez MD;  Location: [facility] OR;  Service:    • CATARACT EXTRACTION W/ INTRAOCULAR LENS IMPLANT Right 2017    Procedure: CATARACT PHACO EXTRACTION WITH INTRAOCULAR LENS IMPLANT RIGHT WITH TORIC LENS;  Surgeon: Alma Benavidez MD;  Location: [facility] OR;  Service:    • CORONARY ARTERY BYPASS GRAFT      SPOUSE UNSURE OF HOW MANY VESSELS   • HIATAL HERNIA  REPAIR  1972   • JOINT REPLACEMENT Left     HIP - DONE BY DR DALAL   • KNEE ARTHROSCOPY Left    • NOSE SURGERY  1970    SPOUSE REPORTS FOR A BROKEN NOSE   • OTHER SURGICAL HISTORY Left     TENDON TORN LOOSE FROM BONE, LEFT ELBOW   • OTHER SURGICAL HISTORY  1986    RUPTURED DISC   • OTHER SURGICAL HISTORY  1994    NECK SURGERY FOR RUPTURED DISC   • OTHER SURGICAL HISTORY  1996    HAD SECOND NECK SURGERY   • OTHER SURGICAL HISTORY  1998    RUPTURED DISC   • OTHER SURGICAL HISTORY  2003    RUPTURED DISC   • OTHER SURGICAL HISTORY  2009    HARDWARE REMOVAL FROM BACK BY DR HAY           Wound 05/06/20 1410 coccyx Pressure Injury (Active)   Dressing Appearance dry;intact 5/7/2020 12:00 PM   Base non-blanchable;purple 5/7/2020 11:00 AM   Periwound intact;pink;blanchable 5/7/2020 11:00 AM   Periwound Temperature warm 5/7/2020 11:00 AM   Periwound Skin Turgor soft 5/7/2020 11:00 AM   Edges irregular 5/7/2020 11:00 AM   Wound Length (cm) 4.5 cm 5/7/2020 11:00 AM   Wound Width (cm) 4 cm 5/7/2020 11:00 AM   Wound Depth (cm) 0.1 cm 5/7/2020 11:00 AM   Care, Wound cleansed with;soap and water 5/7/2020  4:29 AM   Dressing Care, Wound other (see comments);low-adherent;foam;dressing applied 5/7/2020  4:29 AM         WOUND DEBRIDEMENT                        PT ASSESSMENT (last 12 hours)      Physical Therapy Evaluation     Row Name 05/07/20 1100          PT Evaluation Time/Intention    Subjective Information  no complaints  -MF     Document Type  evaluation;therapy note (daily note);wound care  -MF     Mode of Treatment  individual therapy;physical therapy  -     Row Name 05/07/20 1100          General Information    Patient Profile Reviewed?  yes  -MF     Patient Observations  alert;cooperative;agree to therapy  -MF     Pertinent History of Current Functional Problem  Pt with limited mobility and POA DTPI to scarum  -MF     Risks Reviewed  patient:;increased discomfort  -MF     Benefits Reviewed  patient:;improve skin integrity   -     Barriers to Rehab  medically complex;previous functional deficit;physical barrier;cognitive status  -     Row Name 05/07/20 1100          Cognitive Assessment/Intervention- PT/OT    Orientation Status (Cognition)  oriented to;person  -MF     Follows Commands (Cognition)  follows one step commands;50-74% accuracy;verbal cues/prompting required  -     Row Name 05/07/20 1100          Wound 05/06/20 1410 coccyx Pressure Injury    Wound - Properties Group Date first assessed: 05/06/20  -AS Time first assessed: 1410  -AS Present on Hospital Admission: Y  -AS Location: coccyx  -AS Primary Wound Type: Pressure inj  -AS Stage, Pressure Injury: deep tissue injury  -AS    Dressing Appearance  dry;intact  -MF     Base  non-blanchable;purple  -MF     Periwound  intact;pink;blanchable  -     Periwound Temperature  warm  -     Periwound Skin Turgor  soft  -     Edges  irregular  -MF     Wound Length (cm)  4.5 cm  -MF     Wound Width (cm)  4 cm  -MF     Wound Depth (cm)  0.1 cm  -     Row Name 05/07/20 1100          Coping    Observed Emotional State  accepting;calm;cooperative  -     Verbalized Emotional State  acceptance  -     Row Name 05/07/20 1100          Plan of Care Review    Plan of Care Reviewed With  patient  -     Outcome Summary  Pt presents with complex DTPI to sacrum with limited mobility and high risk for further skin break down. Pt will benefit from MIST therapy to help improve healing potential.   -     Row Name 05/07/20 1100          Physical Therapy Clinical Impression    PT Diagnosis (PT Clinical Impression)  DTPI to sacrum POA   -     Criteria for Skilled Interventions Met (PT Clinical Impression)  yes;treatment indicated  -     Pathology/Pathophysiology Noted (Describe Specifically for Each System)  integumentary  -     Rehab Potential (PT Clinical Summary)  fair, will monitor progress closely  -     Care Plan Review (PT)  evaluation/treatment results reviewed;care  plan/treatment goals reviewed;risks/benefits reviewed;current/potential barriers reviewed  -     Row Name 05/07/20 1100          Physical Therapy Goals    Wound Care Goal Selection (PT)  wound care, PT goal 1  -     Additional Documentation  Wound Care Goal Selection (PT) (Row)  -     Row Name 05/07/20 1100          Wound Care Goal 1 (PT)    Wound Care Goal 1 (PT)  Decrease wound size by 10% as evidence of improved skin integrity and wound healing .  -     Time Frame (Wound Care Goal 1, PT)  10 days  -     Row Name 05/07/20 1100          Positioning and Restraints    Pre-Treatment Position  in bed  -     Post Treatment Position  bed  -     In Bed  supine;call light within reach  -       User Key  (r) = Recorded By, (t) = Taken By, (c) = Cosigned By    Initials Name Provider Type    John Gonzalez, PT Physical Therapist    AS Cornel Sloan RN Registered Nurse            Recommendation and Plan  Planned Therapy Interventions (PT Eval): wound care, patient/family education  Plan of Care Reviewed With: patient           Outcome Summary: Pt presents with complex DTPI to sacrum with limited mobility and high risk for further skin break down. Pt will benefit from MIST therapy to help improve healing potential.   Plan of Care Reviewed With: patient            Time Calculation  PT Charges     Row Name 05/07/20 1100             Time Calculation    Start Time  1100  -      PT Goal Re-Cert Due Date  05/17/20  -        User Key  (r) = Recorded By, (t) = Taken By, (c) = Cosigned By    Initials Name Provider Type    John Gonzalez, PT Physical Therapist            Therapy Charges for Today     Code Description Service Date Service Provider Modifiers Qty    [id number]  PT EVAL MOD COMPLEXITY 4 5/7/2020 John Liu, PT GP 1    [id number]  PT NLFU MIST 5/7/2020 John Liu, PT GP 1                    John Liu, PT  5/7/2020

## 2020-05-07 NOTE — PROGRESS NOTES
"    [facility] Medicine Services  PROGRESS NOTE    Patient Name: Gama Davila  : 1940  MRN: [id number]    Date of Admission: 2020  Primary Care Physician: Provider, No Known    Subjective   Subjective     CC:  Altered mental status    HPI:  Patient denies problems, no dyspnea or cough. Introduces self as \"Hello!  My name is Gama Davila.\"   However, history limited because he sometimes stares blankly and doesn't answer me.     Doesn't answer when I ask if I can call family for him.     Review of Systems  Limited as above.     Objective   Objective     Vital Signs:   Temp:  [96.3 °F (35.7 °C)-97.4 °F (36.3 °C)] 96.4 °F (35.8 °C)  Heart Rate:  [47-64] 52  Resp:  [16-20] 16  BP: ()/() 97/83        Physical Exam:  Gen:  WD/WN elderly man in bed, feeding himself a cupcake which is partly in his beard.  Initially responds appropriately and introduces himself; later he stops responding, is caught up in licking his fingers, and stops answering questions.    Neuro: alert, oriented to self, clear speech, inattentive, BEST.   HEENT:  NC/AT PERRL, OP benign  Neck:  Supple, no LAD  Heart RRR no murmur, rub, or gallop  Lungs cTA nonlabored  Abd:  Soft, nontender, no rebound or guarding, pos BS  Extrem:  No c/c/e; partial finger amputation noted.       Results Reviewed:  Results from last 7 days   Lab Units 20  0544 20  1217 20  1027   WBC 10*3/mm3 6.38  --  8.37   HEMOGLOBIN g/dL 11.4*  --  14.0   HEMATOCRIT % 35.4*  --  43.0   PLATELETS 10*3/mm3 178  --  213   PROCALCITONIN ng/mL  --  <0.02*  --      Results from last 7 days   Lab Units 20  0544 20  1217   SODIUM mmol/L 139 140   POTASSIUM mmol/L 3.5 4.0   CHLORIDE mmol/L 104 102   CO2 mmol/L 25.0 28.0   BUN mg/dL 14 20   CREATININE mg/dL 1.09 1.10   GLUCOSE mg/dL 93 101*   CALCIUM mg/dL 7.9* 8.5*   ALT (SGPT) U/L 12 14   AST (SGOT) U/L 21 27   TROPONIN T ng/mL  --  <0.010     Estimated Creatinine " Clearance: 52.9 mL/min (by C-G formula based on SCr of 1.09 mg/dL).    Microbiology Results Abnormal     Procedure Component Value - Date/Time    SARS-CoV-2 PCR ([place] IN-HOUSE PERFORMED), NP SWAB IN TRANSPORT MEDIA - Swab, Nasopharynx [[id number]]  (Normal) Collected:  05/06/20 1520    Lab Status:  Final result Specimen:  Swab from Nasopharynx Updated:  05/06/20 2001     COVID19 Not Detected          Imaging Results (Last 24 Hours)     Procedure Component Value Units Date/Time    CT Head Without Contrast [[id number]] Collected:  05/06/20 1141     Updated:  05/06/20 1701    Narrative:       EXAMINATION: CT HEAD WO CONTRAST-05/06/2020:      INDICATION: AMS.      TECHNIQUE: CT head without intravenous contrast.     The radiation dose reduction device was turned on for each scan per the  ALARA (As Low as Reasonably Achievable) protocol.     COMPARISON: CT head dated 04/04/2020.     FINDINGS: The midline structures are symmetric without evidence of mass,  mass effect or midline shift. The ventricles demonstrate mild central  prominence concerning for central volume loss, however, no intra-axial  hemorrhage or extra-axial fluid collection and no low-attenuation signal  gradient in the periventricular and deep white matter identified. Globes  and orbits are unremarkable.  The visualized paranasal sinuses and  mastoid air cells are grossly clear and well pneumatized. Calvarium  intact.       Impression:       No acute intracranial abnormality with questionable mild  central volume loss unchanged from prior comparison.     D:  05/06/2020  E:  05/06/2020           This report was finalized on 5/6/2020 4:57 PM by Dr. Damián Dewitt.       XR Chest 1 View [[id number]] Collected:  05/06/20 1126     Updated:  05/06/20 1701    Narrative:       EXAMINATION: XR CHEST 1 VW-      INDICATION: Weak/Dizzy/AMS triage protocol.      COMPARISON: None.     FINDINGS: Cardiac size borderline enlarged status post median sternotomy  without  "overt edema, however, minimal opacifications at the lung bases  concerning for atelectasis versus airspace disease. No pneumothorax or  large effusion. Degenerative changes of the spine.           Impression:       Bibasilar opacifications concerning for atelectasis versus  early airspace disease without effusion or overt edema.     D:  05/06/2020  E:  05/06/2020     This report was finalized on 5/6/2020 4:57 PM by Dr. Damián Dewitt.                  I have reviewed the medications:  Scheduled Meds:  aspirin 81 mg Oral Daily   atorvastatin 80 mg Oral Nightly   castor oil-balsam peru  Topical Q12H   Divalproex Sodium 500 mg Oral Nightly   enoxaparin 40 mg Subcutaneous Q24H   FLUoxetine 60 mg Oral Daily   gabapentin 100 mg Oral TID   insulin lispro 0-7 Units Subcutaneous TID AC   memantine 5 mg Oral Q12H   metoprolol tartrate 50 mg Oral Daily   pantoprazole 40 mg Oral Daily   piperacillin-tazobactam 3.375 g Intravenous Q8H   QUEtiapine 100 mg Oral Nightly   sodium chloride 10 mL Intravenous Q12H   tamsulosin 0.4 mg Oral Daily     Continuous Infusions:  sodium chloride 100 mL/hr Last Rate: 100 mL/hr (05/06/20 2226)     PRN Meds:.•  acetaminophen **OR** acetaminophen **OR** acetaminophen  •  dextrose  •  dextrose  •  glucagon (human recombinant)  •  magnesium sulfate **OR** magnesium sulfate **OR** magnesium sulfate  •  potassium chloride **OR** potassium chloride **OR** potassium chloride  •  sodium chloride  •  sodium chloride    Assessment/Plan   Assessment & Plan     Active Hospital Problems    Diagnosis  POA   • AMS (altered mental status) [R41.82]  Yes      Resolved Hospital Problems   No resolved problems to display.        Brief Hospital Course to date:  Gama Davila is a 79 y.o. male with Dementia, AFib, DM  Lives at [facility].  Brought for confusion and staring, not his usual \"spunky\" self.  Admitted for AMS and question of pneumonia. Was admiotted for pna and sepsis in early April and DCd to [facility] " care.     AMS  Dementia   Depression  -CT and  MRI brain no acute findings  -Cont namenda, prozac, for now  - Cut seroquel in half. QTC nl  - may be at his baseline    Mild bibasilar infiltrates by CXR  - COVID neg  - temps 96.5 range, WBC nl, procal nl  -received rocephin and azithromycin in ED, started Zosyn day 2 (MRSA PCR was neg last admit)  - unclear that this is pna - wean abx tomorrow if no fevers    DM  -SSI; controlled    HTN, currently low-nl  Afib  - fritz on metoprolol - decrease  -not anticoagulated     CAD  -ASA    Debility  - lives in memory care and reportedly is able to walk.  PT pending.      DVT prophylaxis:  lovenox          Daily Care Communication  Due to current limited visitation policies, an attempt will be made daily to update patient's identified best point-of-contact(s)   Contact: matt ramirez    Relation: wife    Type of communication (phone or televideo): phone   Time of communication: 1300   Notes (if applicable): called, no answer, unable to leave msg.  RN talked to wife this morning.      Disposition: I expect the patient to be discharged tbd    CODE STATUS:   Code Status and Medical Interventions:   Ordered at: 05/06/20 1533     Limited Support to NOT Include:    Intubation     Level Of Support Discussed With:    Health Care Surrogate     Code Status:    No CPR     Medical Interventions (Level of Support Prior to Arrest):    Limited         Electronically signed by Edie Rosado MD, 05/07/20, 08:34.

## 2020-05-08 ENCOUNTER — APPOINTMENT (OUTPATIENT)
Dept: GENERAL RADIOLOGY | Facility: HOSPITAL | Age: 80
End: 2020-05-08

## 2020-05-08 PROBLEM — J96.01 ACUTE RESPIRATORY FAILURE WITH HYPOXIA: Status: ACTIVE | Noted: 2020-05-08

## 2020-05-08 PROBLEM — F03.90 DEMENTIA: Chronic | Status: ACTIVE | Noted: 2020-04-04

## 2020-05-08 PROBLEM — J18.9 HCAP (HEALTHCARE-ASSOCIATED PNEUMONIA): Status: ACTIVE | Noted: 2020-05-08

## 2020-05-08 PROBLEM — E11.9 DIABETES MELLITUS (HCC): Chronic | Status: ACTIVE | Noted: 2019-04-08

## 2020-05-08 LAB
ANION GAP SERPL CALCULATED.3IONS-SCNC: 10 MMOL/L (ref 5–15)
BUN BLD-MCNC: 11 MG/DL (ref 8–23)
BUN/CREAT SERPL: 10.5 (ref 7–25)
CALCIUM SPEC-SCNC: 8 MG/DL (ref 8.6–10.5)
CHLORIDE SERPL-SCNC: 100 MMOL/L (ref 98–107)
CO2 SERPL-SCNC: 25 MMOL/L (ref 22–29)
CREAT BLD-MCNC: 1.05 MG/DL (ref 0.76–1.27)
DEPRECATED RDW RBC AUTO: 50.5 FL (ref 37–54)
ERYTHROCYTE [DISTWIDTH] IN BLOOD BY AUTOMATED COUNT: 14.3 % (ref 12.3–15.4)
GFR SERPL CREATININE-BSD FRML MDRD: 68 ML/MIN/1.73
GLUCOSE BLD-MCNC: 120 MG/DL (ref 65–99)
GLUCOSE BLDC GLUCOMTR-MCNC: 116 MG/DL (ref 70–130)
GLUCOSE BLDC GLUCOMTR-MCNC: 130 MG/DL (ref 70–130)
GLUCOSE BLDC GLUCOMTR-MCNC: 206 MG/DL (ref 70–130)
HCT VFR BLD AUTO: 35.3 % (ref 37.5–51)
HGB BLD-MCNC: 11.5 G/DL (ref 13–17.7)
MCH RBC QN AUTO: 31.3 PG (ref 26.6–33)
MCHC RBC AUTO-ENTMCNC: 32.6 G/DL (ref 31.5–35.7)
MCV RBC AUTO: 95.9 FL (ref 79–97)
PLATELET # BLD AUTO: 176 10*3/MM3 (ref 140–450)
PMV BLD AUTO: 10.1 FL (ref 6–12)
POTASSIUM BLD-SCNC: 3.7 MMOL/L (ref 3.5–5.2)
RBC # BLD AUTO: 3.68 10*6/MM3 (ref 4.14–5.8)
SODIUM BLD-SCNC: 135 MMOL/L (ref 136–145)
VALPROATE SERPL-MCNC: 35.5 MCG/ML (ref 50–125)
WBC NRBC COR # BLD: 6.12 10*3/MM3 (ref 3.4–10.8)

## 2020-05-08 PROCEDURE — 85027 COMPLETE CBC AUTOMATED: CPT | Performed by: INTERNAL MEDICINE

## 2020-05-08 PROCEDURE — 92610 EVALUATE SWALLOWING FUNCTION: CPT

## 2020-05-08 PROCEDURE — 99232 SBSQ HOSP IP/OBS MODERATE 35: CPT | Performed by: FAMILY MEDICINE

## 2020-05-08 PROCEDURE — 99232 SBSQ HOSP IP/OBS MODERATE 35: CPT | Performed by: PSYCHIATRY & NEUROLOGY

## 2020-05-08 PROCEDURE — 25010000002 PIPERACILLIN SOD-TAZOBACTAM PER 1 G: Performed by: FAMILY MEDICINE

## 2020-05-08 PROCEDURE — 80048 BASIC METABOLIC PNL TOTAL CA: CPT | Performed by: INTERNAL MEDICINE

## 2020-05-08 PROCEDURE — 97166 OT EVAL MOD COMPLEX 45 MIN: CPT | Performed by: OCCUPATIONAL THERAPIST

## 2020-05-08 PROCEDURE — 97164 PT RE-EVAL EST PLAN CARE: CPT

## 2020-05-08 PROCEDURE — 25010000002 ENOXAPARIN PER 10 MG: Performed by: FAMILY MEDICINE

## 2020-05-08 PROCEDURE — 97610 LOW FREQUENCY NON-THERMAL US: CPT

## 2020-05-08 PROCEDURE — 97110 THERAPEUTIC EXERCISES: CPT

## 2020-05-08 PROCEDURE — 82962 GLUCOSE BLOOD TEST: CPT

## 2020-05-08 PROCEDURE — 80164 ASSAY DIPROPYLACETIC ACD TOT: CPT | Performed by: PSYCHIATRY & NEUROLOGY

## 2020-05-08 PROCEDURE — 71045 X-RAY EXAM CHEST 1 VIEW: CPT

## 2020-05-08 PROCEDURE — 97530 THERAPEUTIC ACTIVITIES: CPT

## 2020-05-08 RX ORDER — CITALOPRAM 20 MG/1
20 TABLET ORAL DAILY
Status: DISCONTINUED | OUTPATIENT
Start: 2020-05-09 | End: 2020-05-13 | Stop reason: HOSPADM

## 2020-05-08 RX ORDER — QUETIAPINE FUMARATE 25 MG/1
25 TABLET, FILM COATED ORAL NIGHTLY
Status: DISCONTINUED | OUTPATIENT
Start: 2020-05-08 | End: 2020-05-08

## 2020-05-08 RX ADMIN — DIVALPROEX SODIUM 250 MG: 125 CAPSULE ORAL at 06:06

## 2020-05-08 RX ADMIN — INSULIN LISPRO 3 UNITS: 100 INJECTION, SOLUTION INTRAVENOUS; SUBCUTANEOUS at 17:50

## 2020-05-08 RX ADMIN — TAZOBACTAM SODIUM AND PIPERACILLIN SODIUM 3.38 G: 375; 3 INJECTION, SOLUTION INTRAVENOUS at 08:54

## 2020-05-08 RX ADMIN — ATORVASTATIN CALCIUM 80 MG: 40 TABLET, FILM COATED ORAL at 20:32

## 2020-05-08 RX ADMIN — ENOXAPARIN SODIUM 40 MG: 80 INJECTION SUBCUTANEOUS at 15:31

## 2020-05-08 RX ADMIN — ASPIRIN 81 MG: 81 TABLET, COATED ORAL at 09:02

## 2020-05-08 RX ADMIN — CARBIDOPA AND LEVODOPA 1 TABLET: 25; 100 TABLET ORAL at 08:56

## 2020-05-08 RX ADMIN — FLUOXETINE HYDROCHLORIDE 40 MG: 20 CAPSULE ORAL at 08:59

## 2020-05-08 RX ADMIN — CARBIDOPA AND LEVODOPA 1 TABLET: 25; 100 TABLET ORAL at 17:51

## 2020-05-08 RX ADMIN — CARBIDOPA AND LEVODOPA 1 TABLET: 25; 100 TABLET ORAL at 14:10

## 2020-05-08 RX ADMIN — GABAPENTIN 100 MG: 100 CAPSULE ORAL at 08:54

## 2020-05-08 RX ADMIN — PANTOPRAZOLE SODIUM 40 MG: 40 TABLET, DELAYED RELEASE ORAL at 08:54

## 2020-05-08 RX ADMIN — TAZOBACTAM SODIUM AND PIPERACILLIN SODIUM 3.38 G: 375; 3 INJECTION, SOLUTION INTRAVENOUS at 23:15

## 2020-05-08 RX ADMIN — SODIUM CHLORIDE, PRESERVATIVE FREE 10 ML: 5 INJECTION INTRAVENOUS at 20:32

## 2020-05-08 RX ADMIN — CASTOR OIL AND BALSAM, PERU: 788; 87 OINTMENT TOPICAL at 20:32

## 2020-05-08 RX ADMIN — SODIUM CHLORIDE, PRESERVATIVE FREE 10 ML: 5 INJECTION INTRAVENOUS at 09:01

## 2020-05-08 RX ADMIN — TAZOBACTAM SODIUM AND PIPERACILLIN SODIUM 3.38 G: 375; 3 INJECTION, SOLUTION INTRAVENOUS at 15:32

## 2020-05-08 RX ADMIN — CASTOR OIL AND BALSAM, PERU: 788; 87 OINTMENT TOPICAL at 08:55

## 2020-05-08 RX ADMIN — SODIUM CHLORIDE, PRESERVATIVE FREE 10 ML: 5 INJECTION INTRAVENOUS at 15:34

## 2020-05-08 RX ADMIN — GABAPENTIN 100 MG: 100 CAPSULE ORAL at 20:31

## 2020-05-08 NOTE — THERAPY WOUND CARE TREATMENT
Acute Care - Wound/Debridement Treatment Note  [facility]     Patient Name: Gama Davila  : 1940  MRN: [id number]  Today's Date: 2020                  Admit Date: 2020    Visit Dx:    ICD-10-CM ICD-9-CM   1. Altered mental status, unspecified altered mental status type R41.82 780.97   2. Mild dehydration E86.0 276.51   3. Pneumonia of both lower lobes due to infectious organism (CMS/[facility]) J18.1 483.8       Patient Active Problem List   Diagnosis   • [place] (hard of hearing)   • High cholesterol   • Coronary artery disease   • Hypertension   • Irregular heart beat   • Diabetes mellitus (CMS/[facility])   • Septic shock (CMS/[facility])   • NANCI (acute kidney injury) (CMS/[facility])   • AMS (altered mental status)   • Hypoxia   • Anemia   • Edema   • Acute encephalopathy   • Dementia (CMS/[facility])   • Hypomagnesemia   • Rhabdomyolysis   • Acute retention of urine   • HCAP (healthcare-associated pneumonia)   • Acute respiratory failure with hypoxia (CMS/[facility])           Wound 20 1410 coccyx Pressure Injury (Active)   Dressing Appearance intact;dry 2020 10:42 AM   Base moist;purple;red 2020 10:42 AM   Periwound intact;pink;blanchable 2020 10:42 AM   Periwound Temperature warm 2020 10:42 AM   Periwound Skin Turgor soft 2020 10:42 AM   Edges irregular 2020 10:42 AM   Care, Wound cleansed with;wound cleanser;ultrasound therapy, non contact low frequency 2020 10:42 AM   Dressing Care, Wound dressing applied;low-adherent;foam 2020 10:42 AM   Periwound Care, Wound cleansed with pH balanced cleanser;dry periwound area maintained 2020 10:42 AM         WOUND DEBRIDEMENT                  Therapy Treatment    Rehabilitation Treatment Summary     Row Name 20 1042             Treatment Time/Intention    Discipline  physical therapist  -      Document Type  wound care;therapy note (daily note)  -      Subjective Information  no complaints  -      Mode of Treatment  physical therapy;individual  therapy  -MC      Recorded by [] Stephanie Zacarias, PT 05/08/20 1237      Row Name 05/08/20 1042             Cognitive Assessment/Intervention- PT/OT    Orientation Status (Cognition)  oriented to;person;unable/difficult to assess limited/slow processing  -MC      Recorded by [] Stephanie Zacarias, PT 05/08/20 1237      Row Name 05/08/20 1042             Bed Mobility Assessment/Treatment    Bed Mobility Assessment/Treatment  rolling right  -MC      Rolling Right [place] (Bed Mobility)  maximum assist (25% patient effort)  -MC      Recorded by [] Stephanie Zacarias, PT 05/08/20 1237      Row Name 05/08/20 1042             Positioning and Restraints    Pre-Treatment Position  in bed  -MC      Post Treatment Position  bed  -MC      In Bed  supine;call light within reach;encouraged to call for assist;with other staff;exit alarm on;waffle boots/both  -MC      Recorded by [] Stephanie Zacarias, PT 05/08/20 1237      Row Name 05/08/20 1042             Pain Assessment    Additional Documentation  Pain Scale: FACES Pre/Post-Treatment (Group)  -      Recorded by [] Stephanie Zacarias, PT 05/08/20 1237      Row Name 05/08/20 1042             Pain Scale: FACES Pre/Post-Treatment    Pain: FACES Scale, Pretreatment  0-->no hurt  -      Pain: FACES Scale, Post-Treatment  0-->no hurt  -MC      Recorded by [] Stephanie Zacarias, PT 05/08/20 1237      Row Name 05/08/20 1042             Wound 05/06/20 1410 coccyx Pressure Injury    Wound - Properties Group Date first assessed: 05/06/20 [AS] Time first assessed: 1410 [AS] Present on Hospital Admission: Y [AS] Location: coccyx [AS] Primary Wound Type: Pressure inj [AS] Stage, Pressure Injury: deep tissue injury [AS] Recorded by:  [AS] Cornel Sloan RN 05/06/20 1418    Dressing Appearance  intact;dry  -      Base  moist;purple;red  -MC      Periwound  intact;pink;blanchable  -MC      Periwound Temperature  warm  -MC      Periwound Skin Turgor  soft  -MC       "Edges  irregular  -      Care, Wound  cleansed with;wound cleanser;ultrasound therapy, non contact low frequency MIST 6 minutes, venelex  -      Dressing Care, Wound  dressing applied;low-adherent;foam 6\" optifoam  -      Periwound Care, Wound  cleansed with pH balanced cleanser;dry periwound area maintained  -      Recorded by [] Stephanie Zacarias, PT 05/08/20 1237      Row Name 05/08/20 1042             Coping    Observed Emotional State  accepting;calm;cooperative  -      Verbalized Emotional State  acceptance  -MC      Recorded by [] Stephanie Zacarias, PT 05/08/20 1237      Row Name 05/08/20 1042             Plan of Care Review    Plan of Care Reviewed With  patient  -      Progress  no change  -      Outcome Summary  Buttock DTPIs stable since initial assessment. Pt will continue to benefit from MIST ultrasound to increase blood flow and promote cellular activity to promote healing.  -      Recorded by [] Stephanie Zacarias, PT 05/08/20 1237        User Key  (r) = Recorded By, (t) = Taken By, (c) = Cosigned By    Initials Name Effective Dates Discipline    Stephanie Fernandez, PT 04/03/18 -  PT    AS Cornel Sloan, RN 06/16/16 -  Nurse                PT Recommendation and Plan  Planned Therapy Interventions (PT Eval): wound care, patient/family education        Progress: no change      Progress: no change  Outcome Summary: Buttock DTPIs stable since initial assessment. Pt will continue to benefit from MIST ultrasound to increase blood flow and promote cellular activity to promote healing.  Plan of Care Reviewed With: patient            Time Calculation  PT Charges     Row Name 05/08/20 1042             Time Calculation    Start Time  1042  -      PT Goal Re-Cert Due Date  05/17/20  -        User Key  (r) = Recorded By, (t) = Taken By, (c) = Cosigned By    Initials Name Provider Type    Stephanie Fernandez, PT Physical Therapist           Therapy Charges for Today     Code " Description Service Date Service Provider Modifiers Qty    [id number] HC PT NLFU MIST 5/8/2020 Stephanie Zacarias, PT GP 1                     Stephanie Zacarias, PT  5/8/2020

## 2020-05-08 NOTE — PLAN OF CARE
Problem: Patient Care Overview  Goal: Plan of Care Review  Outcome: Ongoing (interventions implemented as appropriate)  Flowsheets (Taken 5/8/2020 1042 by Stephanie Zacarias, PT)  Plan of Care Reviewed With: patient  Note:   SLP evaluation completed. Will address dysphagia in treatment via monitor of diet tolerance and adjustments as appropriate. Please see note for further details and recommendations.

## 2020-05-08 NOTE — PROGRESS NOTES
Neurology       Patient Care Team:  Provider, No Known as PCP - General    Chief complaint: Weakness and confusion    History: The patient remains weak and confused although he was able to walk about with physical therapy earlier today.      Past Medical History:   Diagnosis Date   • Arthritis    • Cancer (CMS/HCC)     SKIN    • Cataract    • Coronary artery disease    • Diabetes mellitus (CMS/HCC)    • High cholesterol    • [place] (hard of hearing)     PATIENT HAS HEARING AIDS   • Hypertension    • Irregular heart beat     HISTORY OF CARDIAC ABLATION IN 2003   • Wears dentures     FULL UPPER PLATE       Vital Signs   Vitals:    05/08/20 0500 05/08/20 0708 05/08/20 1111 05/08/20 1512   BP:  134/93 129/83 123/93   BP Location:  Right arm Right arm Left arm   Patient Position:  Lying Lying Sitting   Pulse: 54 56 62 70   Resp:  18 18 16   Temp:  98.1 °F (36.7 °C) 98.5 °F (36.9 °C) 98.8 °F (37.1 °C)   TempSrc:  Oral Oral Oral   SpO2: 100% 100% 96% 99%   Weight:       Height:           Physical Exam:   General: Awake              Neuro: He will speak to me but would not converse with his son.    Face is still masked.    Is a mild resting tremor and increased tone with cogwheel rigidity bilaterally.          Results Review:  Valproate level 35.5.      Results from last 7 days   Lab Units 05/08/20  0730   WBC 10*3/mm3 6.12   HEMOGLOBIN g/dL 11.5*   HEMATOCRIT % 35.3*   PLATELETS 10*3/mm3 176     Results from last 7 days   Lab Units 05/08/20  0730 05/07/20  0544 05/06/20  1217   SODIUM mmol/L 135* 139 140   POTASSIUM mmol/L 3.7 3.5 4.0   CHLORIDE mmol/L 100 104 102   CO2 mmol/L 25.0 25.0 28.0   BUN mg/dL 11 14 20   CREATININE mg/dL 1.05 1.09 1.10   CALCIUM mg/dL 8.0* 7.9* 8.5*   BILIRUBIN mg/dL  --  0.3 0.4   ALK PHOS U/L  --  46 51   ALT (SGPT) U/L  --  12 14   AST (SGOT) U/L  --  21 27   GLUCOSE mg/dL 120* 93 101*       Imaging Results (Last 24 Hours)     Procedure Component Value Units Date/Time    XR Chest 1 View  [[id number]] Collected:  05/08/20 1154     Updated:  05/08/20 1354    Narrative:       EXAMINATION: XR CHEST 1 VW - 05/08/2020     INDICATION: Evaluate for pneumonia.     R41.82-Altered mental status, unspecified; E86.0-Dehydration;  J18.1-Lobar pneumonia, unspecified organism.     COMPARISON: 05/06/2020     FINDINGS: Lung volumes are relatively low. Right lung appears clear.  Left lung shows slight coarsening of interstitial markings, similar to  prior study, and small focus of discoid atelectasis and peribronchial  thickening in the medial left base which appears a little increased, not  extensive. This appears to be associated with a small hiatal hernia. No  pneumothorax, lung consolidation or effusion is seen. There are multiple  old healed left-sided rib fractures.       Impression:       1. Low lung volumes.  2. Mildly increased peribronchial thickening and patchy interstitial  opacity in the medial left lung base compared to prior study.  3. Small hiatal hernia noted.      DICTATED:   05/08/2020  EDITED/ls :   05/08/2020              Assessment:  Parkinson's disease versus parkinsonism    Dementia    Plan:  DC Seroquel.    DALI Prozac.    DC valproate    Add Celexa 20 mg daily    Comment:  Question is whether this is sedation related to polypharmacy, residual septic encephalopathy on top of an ongoing dementia, or some combination of the above.    Situation would not be clear etiology is been cleared of medications.    The next trying to taper and discontinue his gabapentin.    In the meantime we will wait to see the results of Sinemet therapy.         I discussed the patients findings and my recommendations with patient, nursing staff and primary care team    Rebel Perry MD  05/08/20  15:17

## 2020-05-08 NOTE — PROGRESS NOTES
Continued Stay Note  [facility]     Patient Name: Gama Davila  MRN: [id number]  Today's Date: 5/8/2020    Admit Date: 5/6/2020    Discharge Plan     Row Name 05/08/20 1344       Plan    Plan  TBD    Patient/Family in Agreement with Plan  yes    Plan Comments  Anticipate the need for SNF at discharge.  Will need therapy evals prior to making referrals.  Attempted to update wife by phone.  No answer.  CM will continue to follow.  Evi Pablo RN [phone]    Final Discharge Disposition Code  30 - still a patient        Discharge Codes    No documentation.       Expected Discharge Date and Time     Expected Discharge Date Expected Discharge Time    May 11, 2020             Evi Pablo RN

## 2020-05-08 NOTE — PLAN OF CARE
Problem: Patient Care Overview  Goal: Plan of Care Review  Outcome: Ongoing (interventions implemented as appropriate)  Flowsheets (Taken 5/8/2020 2886)  Progress: no change  Plan of Care Reviewed With: patient  Outcome Summary: Patient mostly nonverbal. No pain noted on assessments.  Patient up in chair in afternoon and tolerated well. Skin and pericare done several times for uninary incontinence.  Patient had zoom meeting today with son. Appetite good and requires feeding assistance.

## 2020-05-08 NOTE — THERAPY EVALUATION
Acute Care - Occupational Therapy Initial Evaluation   [facility]     Patient Name: Gama Davila  : 1940  MRN: [id number]  Today's Date: 2020             Admit Date: 2020       ICD-10-CM ICD-9-CM   1. Altered mental status, unspecified altered mental status type R41.82 780.97   2. Mild dehydration E86.0 276.51   3. Pneumonia of both lower lobes due to infectious organism (CMS/HCC) J18.1 483.8   4. Dysphagia, unspecified type R13.10 787.20   5. Impaired mobility and ADLs Z74.09 799.89     Patient Active Problem List   Diagnosis   • [place] (hard of hearing)   • High cholesterol   • Coronary artery disease   • Hypertension   • Irregular heart beat   • Diabetes mellitus (CMS/HCC)   • Septic shock (CMS/HCC)   • NANCI (acute kidney injury) (CMS/HCC)   • AMS (altered mental status)   • Hypoxia   • Anemia   • Edema   • Acute encephalopathy   • Dementia (CMS/HCC)   • Hypomagnesemia   • Rhabdomyolysis   • Acute retention of urine   • HCAP (healthcare-associated pneumonia)   • Acute respiratory failure with hypoxia (CMS/HCC)     Past Medical History:   Diagnosis Date   • Arthritis    • Cancer (CMS/HCC)     SKIN    • Cataract    • Coronary artery disease    • Diabetes mellitus (CMS/HCC)    • High cholesterol    • [place] (hard of hearing)     PATIENT HAS HEARING AIDS   • Hypertension    • Irregular heart beat     HISTORY OF CARDIAC ABLATION IN    • Wears dentures     FULL UPPER PLATE     Past Surgical History:   Procedure Laterality Date   • BACK SURGERY      THEN AGAIN IN    • CARDIAC ABLATION     • CARDIAC SURGERY     • CATARACT EXTRACTION W/ INTRAOCULAR LENS IMPLANT Left 2017    Procedure: CATARACT PHACO EXTRACTION WITH INTRAOCULAR LENS IMPLANT LEFT WITH TORIC LENS;  Surgeon: Alma Benavidez MD;  Location: [facility];  Service:    • CATARACT EXTRACTION W/ INTRAOCULAR LENS IMPLANT Right 2017    Procedure: CATARACT PHACO EXTRACTION WITH INTRAOCULAR LENS IMPLANT RIGHT WITH TORIC LENS;   Surgeon: Alma Benavidez MD;  Location: [facility] OR;  Service:    • CORONARY ARTERY BYPASS GRAFT  2003    SPOUSE UNSURE OF HOW MANY VESSELS   • HIATAL HERNIA REPAIR  1972   • JOINT REPLACEMENT Left     HIP - DONE BY DR DALAL   • KNEE ARTHROSCOPY Left    • NOSE SURGERY  1970    SPOUSE REPORTS FOR A BROKEN NOSE   • OTHER SURGICAL HISTORY Left     TENDON TORN LOOSE FROM BONE, LEFT ELBOW   • OTHER SURGICAL HISTORY  1986    RUPTURED DISC   • OTHER SURGICAL HISTORY  1994    NECK SURGERY FOR RUPTURED DISC   • OTHER SURGICAL HISTORY  1996    HAD SECOND NECK SURGERY   • OTHER SURGICAL HISTORY  1998    RUPTURED DISC   • OTHER SURGICAL HISTORY  2003    RUPTURED DISC   • OTHER SURGICAL HISTORY  2009    HARDWARE REMOVAL FROM BACK BY DR HAY          OT ASSESSMENT FLOWSHEET (last 12 hours)      Occupational Therapy Evaluation     Row Name 05/08/20 1310                   OT Evaluation Time/Intention    Subjective Information  no complaints  -AR        Document Type  evaluation  -AR        Mode of Treatment  occupational therapy  -AR           General Information    Patient Profile Reviewed?  yes  -AR        Existing Precautions/Restrictions  fall  -AR        Barriers to Rehab  cognitive status;medically complex;previous functional deficit  -AR           Relationship/Environment    Primary Source of Support/Comfort  child(christiano)  -AR        Lives With  facility resident  -AR           Resource/Environmental Concerns    Current Living Arrangements  residential facility  -AR           Cognitive Assessment/Interventions    Additional Documentation  Cognitive Assessment/Intervention (Group)  -AR           Cognitive Assessment/Intervention- PT/OT    Affect/Mental Status (Cognitive)  flat/blunted affect  -AR        Orientation Status (Cognition)  oriented to;person;disoriented to;place;situation;time  -AR        Follows Commands (Cognition)  follows one step commands;50-74% accuracy  -AR           Safety Issues, Functional  Mobility    Safety Issues Affecting Function (Mobility)  judgment;problem solving;safety precaution awareness;safety precautions follow-through/compliance;sequencing abilities  -AR        Impairments Affecting Function (Mobility)  balance;cognition;endurance/activity tolerance;pain;range of motion (ROM);strength  -AR           Bed Mobility Assessment/Treatment    Bed Mobility Assessment/Treatment  scooting/bridging;supine-sit  -AR        Rolling Right [place] (Bed Mobility)  maximum assist (25% patient effort);2 person assist;verbal cues  -AR        Supine-Sit [place] (Bed Mobility)  dependent (less than 25% patient effort)  -AR        Assistive Device (Bed Mobility)  bed rails;draw sheet;head of bed elevated  -AR           Transfer Assessment/Treatment    Transfer Assessment/Treatment  stand-sit transfer;sit-stand transfer;bed-chair transfer  -AR        Comment (Transfers)  Cues for hand placement and chair approach. Initial retrograde stance and forward-flexed posture.   -AR           Bed-Chair Transfer    Bed-Chair [place] (Transfers)  maximum assist (25% patient effort);2 person assist;verbal cues  -AR        Assistive Device (Bed-Chair Transfers)  other (see comments) BUE support  -AR           Sit-Stand Transfer    Sit-Stand [place] (Transfers)  maximum assist (25% patient effort);2 person assist;verbal cues  -AR        Assistive Device (Sit-Stand Transfers)  other (see comments) BUE support  -AR           Stand-Sit Transfer    Stand-Sit [place] (Transfers)  maximum assist (25% patient effort);2 person assist;verbal cues  -AR        Assistive Device (Stand-Sit Transfers)  other (see comments) BUE support  -AR           ADL Assessment/Intervention    BADL Assessment/Intervention  upper body dressing;lower body dressing;feeding  -AR           Upper Body Dressing Assessment/Training    Upper Body Dressing [place] Level  don;sonia/jessica;maximum assist (25% patient effort)  -AR         Upper Body Dressing Position  edge of bed sitting  -AR           Lower Body Dressing Assessment/Training    Lower Body Dressing [place] Level  don;socks;dependent (less than 25% patient effort)  -AR        Lower Body Dressing Position  supine  -AR           Self-Feeding Assessment/Training    [place] Level (Feeding)  feeding skills;supervision;set up  -AR        Self-Feeding Assess/Train, Spillage Amount  minimal  -AR        Position (Self-Feeding)  supported sitting  -AR           BADL Safety/Performance    Impairments, BADL Safety/Performance  balance;cognition;endurance/activity tolerance;coordination;motor control;motor planning;range of motion;strength  -AR        Cognitive Impairments, BADL Safety/Performance  judgment;problem solving/reasoning;safety precaution awareness;safety precaution follow-through;sequencing abilities;attention  -AR           General ROM    GENERAL ROM COMMENTS  B shoulder FE limited 120, difficult to assess d/t guarding and rigidity  -AR           MMT (Manual Muscle Testing)    General MMT Comments  Grossly 3/5  -AR           Motor Assessment/Interventions    Additional Documentation  Balance (Group);Balance Interventions (Group)  -AR           Static Sitting Balance    Level of [place] (Unsupported Sitting, Static Balance)  maximal assist, 25 to 49% patient effort  -AR        Sitting Position (Unsupported Sitting, Static Balance)  sitting on edge of bed  -AR        Time Able to Maintain Position (Unsupported Sitting, Static Balance)  more than 5 minutes  -AR           Static Standing Balance    Level of [place] (Supported Standing, Static Balance)  minimal assist, 75% patient effort;2 person assist  -AR        Assistive Device Utilized (Supported Standing, Static Balance)  other (see comments) BUE support  -AR           Positioning and Restraints    Pre-Treatment Position  in bed  -AR        Post Treatment Position  chair  -AR        In Chair  reclined;call light  within reach;encouraged to call for assist;exit alarm on;notified nsg;legs elevated  -AR           Pain Scale: FACES Pre/Post-Treatment    Pain: FACES Scale, Pretreatment  0-->no hurt  -AR        Pain: FACES Scale, Post-Treatment  0-->no hurt  -AR           Wound 05/06/20 1410 coccyx Pressure Injury    Wound - Properties Group Date first assessed: 05/06/20  -AS Time first assessed: 1410  -AS Present on Hospital Admission: Y  -AS Location: coccyx  -AS Primary Wound Type: Pressure inj  -AS Stage, Pressure Injury: deep tissue injury  -AS       Plan of Care Review    Plan of Care Reviewed With  patient  -AR           Clinical Impression (OT)    Therapy Frequency (OT Eval)  daily  -AR        Anticipated Discharge Disposition (OT)  skilled nursing facility  -AR           Vital Signs    Pre Patient Position  Supine  -AR        Intra Patient Position  Standing  -AR        Post Patient Position  Sitting  -AR           OT Goals    Transfer Goal Selection (OT)  transfer, OT goal 1  -AR        Dressing Goal Selection (OT)  dressing, OT goal 1  -AR        Balance Goal Selection (OT)  balance, OT goal 1  -AR        Additional Documentation  Balance Goal Selection (OT) (Row)  -AR           Transfer Goal 1 (OT)    Activity/Assistive Device (Transfer Goal 1, OT)  sit-to-stand/stand-to-sit;toilet;commode, bedside without drop arms;walker, rolling  -AR        [place] Level/Cues Needed (Transfer Goal 1, OT)  moderate assist (50-74% patient effort);verbal cues required  -AR        Time Frame (Transfer Goal 1, OT)  short term goal (STG);1 week  -AR        Progress/Outcome (Transfer Goal 1, OT)  goal ongoing  -AR           Dressing Goal 1 (OT)    Activity/Assistive Device (Dressing Goal 1, OT)  upper body dressing  -AR        [place]/Cues Needed (Dressing Goal 1, OT)  minimum assist (75% or more patient effort);verbal cues required  -AR        Time Frame (Dressing Goal 1, OT)  short term goal (STG);1 week  -AR         Progress/Outcome (Dressing Goal 1, OT)  goal ongoing  -AR           Balance Goal 1 (OT)    Activity/Assistive Device (Balance Goal 1, OT)  sitting, static  -AR        [place] Level/Cues Needed (Balance Goal 1, OT)  minimum assist (75% or more patient effort);verbal cues required  -AR        Time Frame (Balance Goal 1, OT)  short term goal (STG);1 week  -AR        Progress/Outcomes (Balance Goal 1, OT)  goal ongoing  -AR          User Key  (r) = Recorded By, (t) = Taken By, (c) = Cosigned By    Initials Name Effective Dates    Meeta Rosado, OT 06/22/15 -     AS Cornel Sloan RN 06/16/16 -                OT Recommendation and Plan  Outcome Summary/Treatment Plan (OT)  Anticipated Discharge Disposition (OT): skilled nursing facility  Therapy Frequency (OT Eval): daily  Plan of Care Review  Plan of Care Reviewed With: patient  Plan of Care Reviewed With: patient  Outcome Summary: Pt alert, oriented to self only and follows commands to participate with cues. He required dependence bed mobility, dependence LB dressing, UB dressing with max assist, transfer to chair with max assist x2. He presents with confusion, impaired balance, rigidity. Recommend SNF-level rehab.    Outcome Measures     Row Name 05/08/20 1310             How much help from another is currently needed...    Putting on and taking off regular lower body clothing?  1  -AR      Bathing (including washing, rinsing, and drying)  1  -AR      Toileting (which includes using toilet bed pan or urinal)  1  -AR      Putting on and taking off regular upper body clothing  2  -AR      Taking care of personal grooming (such as brushing teeth)  3  -AR      Eating meals  3  -AR      AM-PAC 6 Clicks Score (OT)  11  -AR         Functional Assessment    Outcome Measure Options  AM-PAC 6 Clicks Daily Activity (OT)  -AR        User Key  (r) = Recorded By, (t) = Taken By, (c) = Cosigned By    Initials Name Provider Type    Meeta Rosado, OT  Occupational Therapist          Time Calculation:   Time Calculation- OT     Row Name 05/08/20 1310             Time Calculation- OT    OT Start Time  1310  -AR      OT Received On  05/08/20  -AR      OT Goal Re-Cert Due Date  05/18/20  -AR        User Key  (r) = Recorded By, (t) = Taken By, (c) = Cosigned By    Initials Name Provider Type    Meeta Rosado OT Occupational Therapist        Therapy Charges for Today     Code Description Service Date Service Provider Modifiers Qty    [id number] HC OT EVAL MOD COMPLEXITY 4 5/8/2020 Meeta Waldron OT GO 1               Meeta Waldron OT  5/8/2020

## 2020-05-08 NOTE — PLAN OF CARE
Problem: Patient Care Overview  Goal: Plan of Care Review  Flowsheets  Taken 5/8/2020 1410 by Samantha Leo PT  Plan of Care Reviewed With: patient  Taken 5/8/2020 1718 by Meeta Waldron, ANDREW  Outcome Summary: Pt alert, oriented to self only and follows commands to participate with cues. He required dependence bed mobility, dependence LB dressing, UB dressing with max assist, transfer to chair with max assist x2. He presents with confusion, impaired balance, rigidity. Recommend SNF-level rehab.

## 2020-05-08 NOTE — PLAN OF CARE
Problem: Patient Care Overview  Goal: Plan of Care Review  Outcome: Ongoing (interventions implemented as appropriate)  Flowsheets (Taken 5/8/2020 1042)  Progress: no change  Plan of Care Reviewed With: patient  Outcome Summary: Buttock DTPIs stable since initial assessment. Pt will continue to benefit from MIST ultrasound to increase blood flow and promote cellular activity to promote healing.

## 2020-05-08 NOTE — PLAN OF CARE
Problem: Patient Care Overview  Goal: Plan of Care Review  Outcome: Ongoing (interventions implemented as appropriate)  Flowsheets (Taken 5/8/2020 1410)  Plan of Care Reviewed With: patient  Outcome Summary: PT PRESENTS WITH EVOLVING SYMPTOMS TO INCLUDE IMPAIRED BALANCE, GENERALIZED WEAKNESS, RIGIDITY WITH ROM AND DECLINE IN FUNCTIONAL MOBILITY. PT FOLLOWING COMMANDS LESS THAN 50% AND NEEDS CUES FOR SEQUENCING BUT IS COOPERATIVE. REQUIRED MAX ASSIST OF 2 FOR PIVOT TRANSFER AND GAIT SHORT DISTANCE VIA B UE SUPPORT AND GAIT BELT. PT HAS SACRAL WOUND. RECOMMEND SNF AT D/C.

## 2020-05-08 NOTE — PROGRESS NOTES
"    [facility] Medicine Services  PROGRESS NOTE    Patient Name: Gama Davila  : 1940  MRN: [id number]    Date of Admission: 2020  Primary Care Physician: Provider, No Known    Subjective   Subjective     CC:  Altered mental status    HPI:  Patient was admitted with altered mental status from a rehab facility after he had been hospitalized for pneumonia and sepsis in April.  He was tested for COVID-19 and was negative.  The patient's daughter Virginia noted that even during face time at the rehab facility he was taking a lot of time to process things and had to be prompted 3 or 4 times just to tell her hello or to tell her he left her.  She reports he was never on a medication that had to have levels checked at home.  He is never had a history of seizures.  She had never heard of Depakote previously.  I reviewed Dr. Perry's notes about reducing some of these medications and I discussed with Virginia that we should probably just stop the Depakote and she is in agreement.  I will also go ahead and lower his Seroquel dosing although he was taking some Seroquel at home at night for dementia.  The patient was able to tolerate some breakfast.  He is unable to have a conversation with me.  He stares blankly when asked questions.  He made a few attempts at trying to talk to me repeating mostly what I asked him such as \"you are 1 of my doctors.\"    I gave him a drink of orange juice which was on his tray and he seemed to have a little difficulty with getting a little choked.    Review of Systems  General: No fever, chills, pos fatigue  ENT: No sore throat, possibly some trouble swallowing  Respiratory: Does not complain of any shortness of air in particular but unable to answer most of my questions specifically; O2 was noted to be as low as 75 on room air overnight he is currently on nasal cannula   Psychiatric: Apathetic, lack of interest, slow processing.       Objective   Objective "     Vital Signs:   Temp:  [97.3 °F (36.3 °C)-98.5 °F (36.9 °C)] 98.5 °F (36.9 °C)  Heart Rate:  [53-62] 62  Resp:  [18] 18  BP: (101-136)/(65-93) 129/83        Physical Exam:  Constitutional: No acute distress, awake, alert, nontoxic, elderly and frail body habitus  Eyes: Pupils equal, sclerae anicteric, no conjunctival injection  HENT: NCAT, mucous membranes moist  Neck: Supple, no thyromegaly, no lymphadenopathy  Respiratory: Clear to auscultation bilaterally, poor effort, nonlabored respirations   Cardiovascular: RRR  Gastrointestinal: Positive bowel sounds, soft, nontender, nondistended  Musculoskeletal: No peripheral edema, generally weak with some rigidity  Psychiatric: Flat affect, apathetic, slow processing    Results Reviewed:  Results from last 7 days   Lab Units 05/08/20  0730 05/07/20  0544 05/06/20  1217 05/06/20  1027   WBC 10*3/mm3 6.12 6.38  --  8.37   HEMOGLOBIN g/dL 11.5* 11.4*  --  14.0   HEMATOCRIT % 35.3* 35.4*  --  43.0   PLATELETS 10*3/mm3 176 178  --  213   PROCALCITONIN ng/mL  --   --  <0.02*  --      Results from last 7 days   Lab Units 05/08/20  0730 05/07/20  0544 05/06/20  1217   SODIUM mmol/L 135* 139 140   POTASSIUM mmol/L 3.7 3.5 4.0   CHLORIDE mmol/L 100 104 102   CO2 mmol/L 25.0 25.0 28.0   BUN mg/dL 11 14 20   CREATININE mg/dL 1.05 1.09 1.10   GLUCOSE mg/dL 120* 93 101*   CALCIUM mg/dL 8.0* 7.9* 8.5*   ALT (SGPT) U/L  --  12 14   AST (SGOT) U/L  --  21 27   TROPONIN T ng/mL  --   --  <0.010     Estimated Creatinine Clearance: 54.9 mL/min (by C-G formula based on SCr of 1.05 mg/dL).    Microbiology Results Abnormal     Procedure Component Value - Date/Time    Urine Culture - Urine, Urine, Catheter In/Out [[id number]]  (Normal) Collected:  05/06/20 1036    Lab Status:  Final result Specimen:  Urine, Catheter In/Out Updated:  05/07/20 2046     Urine Culture No growth    Blood Culture - Blood, Blood, Venous Line [[id number]] Collected:  05/06/20 1236    Lab Status:  Preliminary result  Specimen:  Blood, Venous Line Updated:  05/07/20 1315     Blood Culture No growth at 24 hours    Blood Culture - Blood, Blood, Venous Line [[id number]] Collected:  05/06/20 1236    Lab Status:  Preliminary result Specimen:  Blood, Venous Line Updated:  05/07/20 1315     Blood Culture No growth at 24 hours    SARS-CoV-2 PCR ([place] IN-HOUSE PERFORMED), NP SWAB IN TRANSPORT MEDIA - Swab, Nasopharynx [[id number]]  (Normal) Collected:  05/06/20 1520    Lab Status:  Final result Specimen:  Swab from Nasopharynx Updated:  05/06/20 2001     COVID19 Not Detected          Imaging Results (Last 24 Hours)     ** No results found for the last 24 hours. **               I have reviewed the medications:  Scheduled Meds:    aspirin 81 mg Oral Daily   atorvastatin 80 mg Oral Nightly   carbidopa-levodopa 1 tablet Oral TID   castor oil-balsam peru  Topical Q12H   enoxaparin 40 mg Subcutaneous Q24H   FLUoxetine 40 mg Oral Daily   gabapentin 100 mg Oral Q12H   insulin lispro 0-7 Units Subcutaneous TID AC   pantoprazole 40 mg Oral Daily   piperacillin-tazobactam 3.375 g Intravenous Q8H   QUEtiapine 25 mg Oral Nightly   sodium chloride 10 mL Intravenous Q12H     Continuous Infusions:   PRN Meds:.•  acetaminophen **OR** acetaminophen **OR** acetaminophen  •  dextrose  •  dextrose  •  glucagon (human recombinant)  •  magnesium sulfate **OR** magnesium sulfate **OR** magnesium sulfate  •  potassium chloride **OR** potassium chloride **OR** potassium chloride  •  sodium chloride  •  sodium chloride    Assessment/Plan   Assessment & Plan     Active Hospital Problems    Diagnosis  POA   • **HCAP (healthcare-associated pneumonia) [J18.9]  Yes   • AMS (altered mental status) [R41.82]  Yes   • Dementia (CMS/HCC) [F03.90]  Yes   • Hypoxia [R09.02]  Yes   • Diabetes mellitus (CMS/HCC) [E11.9]  Yes      Resolved Hospital Problems   No resolved problems to display.        Brief Hospital Course to date:  Gama Davila is a 79 y.o. male admitted  with altered mental status of uncertain etiology and found to have healthcare associated pneumonia, acute respiratory failure with hypoxia, possibly drug-induced apathy and weakness.  Neurology is consulted and following.  They are considering Parkinson's.    Altered mental status of uncertain etiology  -Continuing to address polypharmacy and reducing mind altering drugs  -Specifically stopped Depakote, discussed with neurology  -Plan to taper or stop other meds like Seroquel, Prozac    Healthcare associated pneumonia  -Currently on Zosyn, could switch to Augmentin  -Order chest x-ray pending on 5/8/2020    Acute respiratory failure with hypoxia  -Continue O2 to maintain sats greater than 90    Polypharmacy    Dementia, possibly Parkinson's    Diabetes mellitus    Ataxia and immobility    Skin breakdown      DVT Prophylaxis:  lovenox      Daily Care Communication  Due to current limited visitation policies, an attempt will be made daily to update patient's identified best point-of-contact(s)   Contact: Virginia   Relation: poa daughter   Type of communication (phone or televideo): phone   Time of communication: 11:30am   Notes (if applicable): discussed continued changes in meds and coordination with neurology.      Disposition: I expect the patient's care to extend several more days otherwise we are stopping and/or tapering off many medications and he has had a significant change in his baseline mental status and alertness.    CODE STATUS:   Code Status and Medical Interventions:   Ordered at: 05/06/20 1533     Limited Support to NOT Include:    Intubation     Level Of Support Discussed With:    Health Care Surrogate     Code Status:    No CPR     Medical Interventions (Level of Support Prior to Arrest):    Limited         Electronically signed by Maggie Dominguez MD, 05/08/20, 11:26.

## 2020-05-08 NOTE — THERAPY EVALUATION
Acute Care - Speech Language Pathology   Swallow Initial Evaluation [facility]   Clinical Swallow Evaluation       Patient Name: Gama Davila  : 1940  MRN: [id number]  Today's Date: 2020               Admit Date: 2020    Visit Dx:     ICD-10-CM ICD-9-CM   1. Altered mental status, unspecified altered mental status type R41.82 780.97   2. Mild dehydration E86.0 276.51   3. Pneumonia of both lower lobes due to infectious organism (CMS/HCC) J18.1 483.8   4. Dysphagia, unspecified type R13.10 787.20     Patient Active Problem List   Diagnosis   • [place] (hard of hearing)   • High cholesterol   • Coronary artery disease   • Hypertension   • Irregular heart beat   • Diabetes mellitus (CMS/HCC)   • Septic shock (CMS/HCC)   • NANCI (acute kidney injury) (CMS/HCC)   • AMS (altered mental status)   • Hypoxia   • Anemia   • Edema   • Acute encephalopathy   • Dementia (CMS/HCC)   • Hypomagnesemia   • Rhabdomyolysis   • Acute retention of urine   • HCAP (healthcare-associated pneumonia)   • Acute respiratory failure with hypoxia (CMS/HCC)     Past Medical History:   Diagnosis Date   • Arthritis    • Cancer (CMS/HCC)     SKIN    • Cataract    • Coronary artery disease    • Diabetes mellitus (CMS/HCC)    • High cholesterol    • [place] (hard of hearing)     PATIENT HAS HEARING AIDS   • Hypertension    • Irregular heart beat     HISTORY OF CARDIAC ABLATION IN    • Wears dentures     FULL UPPER PLATE     Past Surgical History:   Procedure Laterality Date   • BACK SURGERY      THEN AGAIN IN    • CARDIAC ABLATION     • CARDIAC SURGERY     • CATARACT EXTRACTION W/ INTRAOCULAR LENS IMPLANT Left 2017    Procedure: CATARACT PHACO EXTRACTION WITH INTRAOCULAR LENS IMPLANT LEFT WITH TORIC LENS;  Surgeon: Alma Benavidez MD;  Location: [facility];  Service:    • CATARACT EXTRACTION W/ INTRAOCULAR LENS IMPLANT Right 2017    Procedure: CATARACT PHACO EXTRACTION WITH INTRAOCULAR LENS IMPLANT RIGHT  WITH TORIC LENS;  Surgeon: Alma Benavidez MD;  Location: [facility];  Service:    • CORONARY ARTERY BYPASS GRAFT  2003    SPOUSE UNSURE OF HOW MANY VESSELS   • HIATAL HERNIA REPAIR  1972   • JOINT REPLACEMENT Left     HIP - DONE BY DR DALAL   • KNEE ARTHROSCOPY Left    • NOSE SURGERY  1970    SPOUSE REPORTS FOR A BROKEN NOSE   • OTHER SURGICAL HISTORY Left     TENDON TORN LOOSE FROM BONE, LEFT ELBOW   • OTHER SURGICAL HISTORY  1986    RUPTURED DISC   • OTHER SURGICAL HISTORY  1994    NECK SURGERY FOR RUPTURED DISC   • OTHER SURGICAL HISTORY  1996    HAD SECOND NECK SURGERY   • OTHER SURGICAL HISTORY  1998    RUPTURED DISC   • OTHER SURGICAL HISTORY  2003    RUPTURED DISC   • OTHER SURGICAL HISTORY  2009    HARDWARE REMOVAL FROM BACK BY DR HAY        SWALLOW EVALUATION (last 72 hours)      SLP Adult Swallow Evaluation     Row Name 05/08/20 1145                   Rehab Evaluation    Document Type  evaluation  -CH        Subjective Information  no complaints  -        Patient Observations  alert;cooperative;agree to therapy  -CH        Patient/Family Observations  Patient seen during noon meal. Sitting upright in bed. No family present  -CH        Patient Effort  good  -CH           General Information    Patient Profile Reviewed  yes  -CH        Pertinent History Of Current Problem  79 y.o. male admitted with altered mental status of uncertain etiology and found to have healthcare associated pneumonia, acute respiratory failure with hypoxia, possibly drug-induced apathy and weakness. Dysphagia consult received d/t patient getting choked on his orange juice this am. CSE completed to assess for dysphagia   -        Current Method of Nutrition  regular textures;honey-thick liquids;other (see comments)  -CH        Precautions/Limitations, Vision  WFL;for purposes of eval  -CH        Precautions/Limitations, Hearing  WFL;for purposes of eval  -CH        Prior Level of Function-Communication   cognitive-linguistic impairment;other (see comments) dementia at baseline  -        Prior Level of Function-Swallowing  no diet consistency restrictions  -        Plans/Goals Discussed with  patient;agreed upon  -        Barriers to Rehab  none identified  -        Patient's Goals for Discharge  patient did not state  -           Pain Assessment    Additional Documentation  Pain Scale: FACES Pre/Post-Treatment (Group)  -           Pain Scale: FACES Pre/Post-Treatment    Pain: FACES Scale, Pretreatment  0-->no hurt  -        Pain: FACES Scale, Post-Treatment  0-->no hurt  -           Oral Motor and Function    Dentition Assessment  missing teeth  -        Secretion Management  WNL/WFL  -CH        Mucosal Quality  moist, healthy  -        Volitional Swallow  WFL  -           Oral Musculature and Cranial Nerve Assessment    Oral Motor General Assessment  generalized oral motor weakness  -           General Eating/Swallowing Observations    Eating/Swallowing Skills  self-fed;appropriate self-feeding skills observed  -        Positioning During Eating  upright 90 degree;upright in bed  -        Utensils Used  spoon;cup;straw  -        Consistencies Trialed  regular textures;soft textures;mechanical soft, no mixed consistencies;pureed;thin liquids  -           Clinical Swallow Eval    Oral Prep Phase  impaired  -        Oral Transit  WFL  -        Oral Residue  WFL  -CH        Pharyngeal Phase  no overt signs/symptoms of pharyngeal impairment  -        Esophageal Phase  unremarkable  -        Clinical Swallow Evaluation Summary  CSE completed. Patient seen at noon meal. Given trials of thin via spoon/cup/straw, pureed, soft solids, mixed consistencies and regular solids. Oral holding of thin liquids with eventual independent swallow. Increased mastication with regular solids. No s/s of aspiration with any trial observed. Recommend soft whole and thin liquids via small bites and sips  and upright positioning.  -           Oral Prep Concerns    Oral Prep Concerns  oral holding;increased prep time  -        Oral Holding  thin  -        Increased Prep Time  thin;regular consistencies  -           Clinical Impression    SLP Swallowing Diagnosis  mild;oral dysfunction;other (see comments) no s/s of pharyngeal dysfunction observed  -        Functional Impact  risk of aspiration/pneumonia;risk of malnutrition;risk of dehydration  -        Rehab Potential/Prognosis, Swallowing  good, to achieve stated therapy goals  -        Swallow Criteria for Skilled Therapeutic Interventions Met  demonstrates skilled criteria  -           Recommendations    Therapy Frequency (Swallow)  PRN  -        SLP Diet Recommendation  soft textures;whole;thin liquids  -        Recommended Precautions and Strategies  upright posture during/after eating;small bites of food and sips of liquid  -        SLP Rec. for Method of Medication Administration  meds whole;with thin liquids;with pudding or applesauce;as tolerated  -        Monitor for Signs of Aspiration  yes;notify SLP if any concerns  -        Anticipated Dischage Disposition  unknown;anticipate therapy at next level of care  -          User Key  (r) = Recorded By, (t) = Taken By, (c) = Cosigned By    Initials Name Effective Dates     Zuly Harris, MS CCC-SLP 02/14/19 -           EDUCATION  The patient has been educated in the following areas:   Dysphagia (Swallowing Impairment) Modified Diet Instruction.    SLP Recommendation and Plan  SLP Swallowing Diagnosis: mild, oral dysfunction, other (see comments)(no s/s of pharyngeal dysfunction observed)  SLP Diet Recommendation: soft textures, whole, thin liquids  Recommended Precautions and Strategies: upright posture during/after eating, small bites of food and sips of liquid  SLP Rec. for Method of Medication Administration: meds whole, with thin liquids, with pudding or applesauce, as  tolerated     Monitor for Signs of Aspiration: yes, notify SLP if any concerns     Swallow Criteria for Skilled Therapeutic Interventions Met: demonstrates skilled criteria  Anticipated Dischage Disposition: unknown, anticipate therapy at next level of care  Rehab Potential/Prognosis, Swallowing: good, to achieve stated therapy goals  Therapy Frequency (Swallow): PRN               SLP GOALS     Row Name 05/08/20 1145             Oral Nutrition/Hydration Goal 1 (SLP)    Oral Nutrition/Hydration Goal 1, SLP  LTG: Patient will tolerate soft whole and thin liquid diet consistency without s/s of aspiration or dfifficulty  -      Time Frame (Oral Nutrition/Hydration Goal 1, SLP)  by discharge  -         Oral Nutrition/Hydration Goal 2 (SLP)    Oral Nutrition/Hydration Goal 2, SLP  STG: Patient will tolerate soft solid and thin liquid trials without s/s of aspiration  -      Time Frame (Oral Nutrition/Hydration Goal 2, SLP)  by discharge  -        User Key  (r) = Recorded By, (t) = Taken By, (c) = Cosigned By    Initials Name Provider Type    Zuly Skelton MS CCC-SLP Speech and Language Pathologist             Time Calculation:   Time Calculation- SLP     Row Name 05/08/20 1405             Time Calculation- SLP    SLP Start Time  1145  -      SLP Received On  05/08/20  -        User Key  (r) = Recorded By, (t) = Taken By, (c) = Cosigned By    Initials Name Provider Type    Zuly Skelton MS CCC-SLP Speech and Language Pathologist          Therapy Charges for Today     Code Description Service Date Service Provider Modifiers Qty    [id number] HC ST EVAL ORAL PHARYNG SWALLOW 4 5/8/2020 Zuly Harris MS CCC-SLP GN 1               Zuly Harris MS CCC-MARIA TERESA  5/8/2020

## 2020-05-08 NOTE — PLAN OF CARE
Patient fed himself 80 % of dinner under supervision, o2 sat monitor on forehead, changed as needed, pleasant and cooperative disposition.

## 2020-05-08 NOTE — THERAPY RE-EVALUATION
Patient Name: Gama Davila  : 1940    MRN: [id number]                              Today's Date: 2020       Admit Date: 2020    Visit Dx:     ICD-10-CM ICD-9-CM   1. Altered mental status, unspecified altered mental status type R41.82 780.97   2. Mild dehydration E86.0 276.51   3. Pneumonia of both lower lobes due to infectious organism (CMS/HCC) J18.1 483.8   4. Dysphagia, unspecified type R13.10 787.20     Patient Active Problem List   Diagnosis   • [place] (hard of hearing)   • High cholesterol   • Coronary artery disease   • Hypertension   • Irregular heart beat   • Diabetes mellitus (CMS/HCC)   • Septic shock (CMS/HCC)   • NANCI (acute kidney injury) (CMS/HCC)   • AMS (altered mental status)   • Hypoxia   • Anemia   • Edema   • Acute encephalopathy   • Dementia (CMS/HCC)   • Hypomagnesemia   • Rhabdomyolysis   • Acute retention of urine   • HCAP (healthcare-associated pneumonia)   • Acute respiratory failure with hypoxia (CMS/HCC)     Past Medical History:   Diagnosis Date   • Arthritis    • Cancer (CMS/HCC)     SKIN    • Cataract    • Coronary artery disease    • Diabetes mellitus (CMS/HCC)    • High cholesterol    • [place] (hard of hearing)     PATIENT HAS HEARING AIDS   • Hypertension    • Irregular heart beat     HISTORY OF CARDIAC ABLATION IN    • Wears dentures     FULL UPPER PLATE     Past Surgical History:   Procedure Laterality Date   • BACK SURGERY      THEN AGAIN IN    • CARDIAC ABLATION     • CARDIAC SURGERY     • CATARACT EXTRACTION W/ INTRAOCULAR LENS IMPLANT Left 2017    Procedure: CATARACT PHACO EXTRACTION WITH INTRAOCULAR LENS IMPLANT LEFT WITH TORIC LENS;  Surgeon: Alma Benavidez MD;  Location: [facility] OR;  Service:    • CATARACT EXTRACTION W/ INTRAOCULAR LENS IMPLANT Right 2017    Procedure: CATARACT PHACO EXTRACTION WITH INTRAOCULAR LENS IMPLANT RIGHT WITH TORIC LENS;  Surgeon: Alma Benavidez MD;  Location: [facility] OR;  Service:    •  CORONARY ARTERY BYPASS GRAFT  2003    SPOUSE UNSURE OF HOW MANY VESSELS   • HIATAL HERNIA REPAIR  1972   • JOINT REPLACEMENT Left     HIP - DONE BY DR DALAL   • KNEE ARTHROSCOPY Left    • NOSE SURGERY  1970    SPOUSE REPORTS FOR A BROKEN NOSE   • OTHER SURGICAL HISTORY Left     TENDON TORN LOOSE FROM BONE, LEFT ELBOW   • OTHER SURGICAL HISTORY  1986    RUPTURED DISC   • OTHER SURGICAL HISTORY  1994    NECK SURGERY FOR RUPTURED DISC   • OTHER SURGICAL HISTORY  1996    HAD SECOND NECK SURGERY   • OTHER SURGICAL HISTORY  1998    RUPTURED DISC   • OTHER SURGICAL HISTORY  2003    RUPTURED DISC   • OTHER SURGICAL HISTORY  2009    HARDWARE REMOVAL FROM BACK BY DR HAY     General Information     Row Name 05/08/20 1357          PT Evaluation Time/Intention    Document Type  evaluation  -CD     Mode of Treatment  physical therapy  -CD     Row Name 05/08/20 1357          General Information    Patient Profile Reviewed?  yes  -CD     Prior Level of Function  -- PT POOR HISTORIAN. PER CHART RECENT DECLINE SINCE LAST ADMIT WITH SEPTIC SHOCK. LIVES AT [facility].   -CD     Existing Precautions/Restrictions  fall  -CD     Barriers to Rehab  medically complex;previous functional deficit;cognitive status  -CD     Row Name 05/08/20 1353          Relationship/Environment    Lives With  facility resident  -CD     Row Name 05/08/20 1357          Resource/Environmental Concerns    Current Living Arrangements  residential facility  -CD     Row Name 05/08/20 135          Cognitive Assessment/Intervention- PT/OT    Orientation Status (Cognition)  oriented to;person;disoriented to;place;situation;time  -CD     Cognitive Assessment/Intervention Comment  SLOW TO RESPOND. FOLLOWS COMMANDS <50% BUT COOPERATIVE AND SOME LAUGHING/SMILING.   -CD     Row Name 05/08/20 1359          Safety Issues, Functional Mobility    Safety Issues Affecting Function (Mobility)  ability to follow commands;awareness of need for assistance;insight into  deficits/self awareness;safety precaution awareness;safety precautions follow-through/compliance  -CD     Impairments Affecting Function (Mobility)  balance;cognition;coordination;endurance/activity tolerance;strength;postural/trunk control;motor planning  -CD       User Key  (r) = Recorded By, (t) = Taken By, (c) = Cosigned By    Initials Name Provider Type    CD Samantha Leo, PT Physical Therapist        Mobility     Row Name 05/08/20 1402          Bed Mobility Assessment/Treatment    Bed Mobility Assessment/Treatment  supine-sit  -CD     Supine-Sit [place] (Bed Mobility)  dependent (less than 25% patient effort);2 person assist  -CD     Assistive Device (Bed Mobility)  draw sheet;head of bed elevated  -CD     Comment (Bed Mobility)  PT IN CAI'S LEANING HEAVILY TO THE L UPON ARRIVING.   -CD     Row Name 05/08/20 1402          Transfer Assessment/Treatment    Comment (Transfers)  CUES FOR HAND PLACEMENT.   -CD     Row Name 05/08/20 1402          Bed-Chair Transfer    Bed-Chair [place] (Transfers)  maximum assist (25% patient effort);2 person assist VIA B UE SUPPORT AND GAIT BELT.   -CD     Row Name 05/08/20 1402          Sit-Stand Transfer    Sit-Stand [place] (Transfers)  verbal cues;maximum assist (25% patient effort);2 person assist  -CD     Assistive Device (Sit-Stand Transfers)  -- VIA B UE SUPPORT AND GAIT BELT.   -CD     Row Name 05/08/20 1402          Gait/Stairs Assessment/Training    Gait/Stairs Assessment/Training  gait/ambulation independence  -CD     [place] Level (Gait)  maximum assist (25% patient effort);2 person assist  -CD     Assistive Device (Gait)  -- VIA B UE SUPPORT AND GAIT BELT.   -CD     Distance in Feet (Gait)  6 STEPS AWAY FROM BED TO TURN AND SIT IN RECLINER. TRANSFERS WITH O.T. FOR SAFETY.   -CD     Deviations/Abnormal Patterns (Gait)  ataxic;base of support, wide;gait speed decreased;stride length decreased;festinating/shuffling  -CD     Bilateral Gait  Deviations  heel strike decreased  -CD     Comment (Gait/Stairs)  RECLINER BROUGHT UP TO PT TO SIT.   -CD       User Key  (r) = Recorded By, (t) = Taken By, (c) = Cosigned By    Initials Name Provider Type    CD Samantha Leo, PT Physical Therapist        Obj/Interventions     Row Name 05/08/20 1406          General ROM    GENERAL ROM COMMENTS  LIMITED APPROX 25% B LE - RIGIDITY NOTED.   -CD     Row Name 05/08/20 1406          MMT (Manual Muscle Testing)    General MMT Comments  GROSSLY 3/5. FUNCTIONAL TO STAND WITH ASSIST OF 2.   -CD     Row Name 05/08/20 1406          Static Sitting Balance    Level of [place] (Unsupported Sitting, Static Balance)  maximal assist, 25 to 49% patient effort  -CD     Sitting Position (Unsupported Sitting, Static Balance)  sitting on edge of bed  -CD     Time Able to Maintain Position (Unsupported Sitting, Static Balance)  more than 5 minutes  -CD     Comment (Unsupported Sitting, Static Balance)  PROGRESSED TO MIN ASSIST WITH CUES AND POSITIONING.   -CD     Row Name 05/08/20 1406          Dynamic Sitting Balance    Level of [place], Reaches Outside Midline (Sitting, Dynamic Balance)  maximal assist, 25 to 49% patient effort  -CD     Comment, Reaches Outside Midline (Sitting, Dynamic Balance)  LOSES BALANCE TO THE R. PROGRESSED TO MIN ASSIST WITH CUES AND UE SUPPORT.   -CD     Row Name 05/08/20 1406          Static Standing Balance    Level of [place] (Supported Standing, Static Balance)  minimal assist, 75% patient effort;2 person assist  -CD     Assistive Device Utilized (Supported Standing, Static Balance)  walker, rolling  -CD     Row Name 05/08/20 1406          Dynamic Standing Balance    Level of [place], Reaches Outside Midline (Standing, Dynamic Balance)  maximal assist, 25 to 49% patient effort;2 person assist  -CD     Assistive Device Utilized (Supported Standing, Dynamic Balance)  -- VIA B UE SUPPORT AND GAIT BELT.   -CD       User Key  (r) = Recorded  By, (t) = Taken By, (c) = Cosigned By    Initials Name Provider Type    CD Samantha eLo PT Physical Therapist        Goals/Plan     Row Name 05/08/20 1415          Bed Mobility Goal 1 (PT)    Activity/Assistive Device (Bed Mobility Goal 1, PT)  sit to supine/supine to sit  -CD     [place] Level/Cues Needed (Bed Mobility Goal 1, PT)  moderate assist (50-74% patient effort)  -CD     Time Frame (Bed Mobility Goal 1, PT)  long term goal (LTG);2 weeks  -CD     Row Name 05/08/20 1415          Transfer Goal 1 (PT)    Activity/Assistive Device (Transfer Goal 1, PT)  sit-to-stand/stand-to-sit;bed-to-chair/chair-to-bed  -CD     [place] Level/Cues Needed (Transfer Goal 1, PT)  moderate assist (50-74% patient effort)  -CD     Time Frame (Transfer Goal 1, PT)  long term goal (LTG);2 weeks  -CD     Row Name 05/08/20 1415          Gait Training Goal 1 (PT)    Activity/Assistive Device (Gait Training Goal 1, PT)  gait (walking locomotion);walker, rolling  -CD     [place] Level (Gait Training Goal 1, PT)  moderate assist (50-74% patient effort)  -CD     Distance (Gait Goal 1, PT)  200 FEET  -CD     Time Frame (Gait Training Goal 1, PT)  long term goal (LTG);2 weeks  -CD       User Key  (r) = Recorded By, (t) = Taken By, (c) = Cosigned By    Initials Name Provider Type     Samantha Leo PT Physical Therapist        Clinical Impression     Row Name 05/08/20 1410          Pain Scale: FACES Pre/Post-Treatment    Pain: FACES Scale, Pretreatment  0-->no hurt  -CD     Pain: FACES Scale, Post-Treatment  0-->no hurt  -CD     Row Name 05/08/20 1410          Plan of Care Review    Plan of Care Reviewed With  patient  -CD     Outcome Summary  PT PRESENTS WITH EVOLVING SYMPTOMS TO INCLUDE IMPAIRED BALANCE, GENERALIZED WEAKNESS, RIGIDITY WITH ROM AND DECLINE IN FUNCTIONAL MOBILITY. PT FOLLOWING COMMANDS LESS THAN 50% AND NEEDS CUES FOR SEQUENCING BUT IS COOPERATIVE. REQUIRED MAX ASSIST OF 2 FOR PIVOT TRANSFER AND GAIT SHORT  DISTANCE VIA B UE SUPPORT AND GAIT BELT. PT HAS SACRAL WOUND. RECOMMEND SNF AT D/C.   -CD     Row Name 05/08/20 1410          Physical Therapy Clinical Impression    Patient/Family Goals Statement (PT Clinical Impression)  DID NOT STATE.   -CD     Criteria for Skilled Interventions Met (PT Clinical Impression)  yes;treatment indicated  -CD     Rehab Potential (PT Clinical Summary)  good, to achieve stated therapy goals  -CD     Row Name 05/08/20 1410          Vital Signs    Pre Systolic BP Rehab  -- VSS. NSG CLEARED FOR TREATMENT.   -CD     Pre Patient Position  Supine  -CD     Intra Patient Position  Standing  -CD     Post Patient Position  Sitting  -CD     Row Name 05/08/20 1410          Positioning and Restraints    Pre-Treatment Position  in bed  -CD     Post Treatment Position  chair  -CD     In Chair  reclined;notified nsg;call light within reach;encouraged to call for assist;exit alarm on;legs elevated;LUE elevated PT SET UP WITH LUNCH TRAY AND ATTEMPTING TO EAT. NSG NOTIFIED OF CURRENT MOBILITY STATUS AND REC FOR LIFT BACK TO SPECIALTY BED.   -CD       User Key  (r) = Recorded By, (t) = Taken By, (c) = Cosigned By    Initials Name Provider Type    CD Samantha Leo, PT Physical Therapist        Outcome Measures     Row Name 05/08/20 1416          How much help from another person do you currently need...    Turning from your back to your side while in flat bed without using bedrails?  1  -CD     Moving from lying on back to sitting on the side of a flat bed without bedrails?  1  -CD     Moving to and from a bed to a chair (including a wheelchair)?  2  -CD     Standing up from a chair using your arms (e.g., wheelchair, bedside chair)?  2  -CD     Climbing 3-5 steps with a railing?  1  -CD     To walk in hospital room?  2  -CD     AM-PAC 6 Clicks Score (PT)  9  -CD     Row Name 05/08/20 1416          Functional Assessment    Outcome Measure Options  AM-PAC 6 Clicks Basic Mobility (PT)  -CD       User Key  (r)  = Recorded By, (t) = Taken By, (c) = Cosigned By    Initials Name Provider Type    CD Samantha Leo PT Physical Therapist        Physical Therapy Education                 Title: PT OT SLP Therapies (In Progress)     Topic: Physical Therapy (In Progress)     Point: Mobility training (In Progress)     Description:   Instruct learner(s) on safety and technique for assisting patient out of bed, chair or wheelchair.  Instruct in the proper use of assistive devices, such as walker, crutches, cane or brace.              Patient Friendly Description:   It's important to get you on your feet again, but we need to do so in a way that is safe for you. Falling has serious consequences, and your personal safety is the most important thing of all.        When it's time to get out of bed, one of us or a family member will sit next to you on the bed to give you support.     If your doctor or nurse tells you to use a walker, crutches, a cane, or a brace, be sure you use it every time you get out of bed, even if you think you don't need it.    Learning Progress Summary           Patient Acceptance, E, NR,NL by CD at 5/8/2020 1417    Comment:  BENEFITS OF OOB ACTIVITY, SAFETY WITH MOBILITY, PROGRESSION OF POC.                   Point: Home exercise program (In Progress)     Description:   Instruct learner(s) on appropriate technique for monitoring, assisting and/or progressing patient with therapeutic exercises and activities.              Learning Progress Summary           Patient Acceptance, E, NR,NL by CD at 5/8/2020 1417    Comment:  BENEFITS OF OOB ACTIVITY, SAFETY WITH MOBILITY, PROGRESSION OF POC.                   Point: Body mechanics (In Progress)     Description:   Instruct learner(s) on proper positioning and spine alignment for patient and/or caregiver during mobility tasks and/or exercises.              Learning Progress Summary           Patient Acceptance, E, NR,NL by CD at 5/8/2020 1417    Comment:  BENEFITS OF  OOB ACTIVITY, SAFETY WITH MOBILITY, PROGRESSION OF POC.                   Point: Precautions (In Progress)     Description:   Instruct learner(s) on prescribed precautions during mobility and gait tasks              Learning Progress Summary           Patient Acceptance, E, NR,NL by  at 5/8/2020 1417    Comment:  BENEFITS OF OOB ACTIVITY, SAFETY WITH MOBILITY, PROGRESSION OF POC.                               User Key     Initials Effective Dates Name Provider Type Discipline     06/19/15 -  Samantha Leo, PT Physical Therapist PT              PT Recommendation and Plan  Planned Therapy Interventions (PT Eval): balance training, bed mobility training, gait training, home exercise program, transfer training, strengthening  Outcome Summary/Treatment Plan (PT)  Anticipated Discharge Disposition (PT): skilled nursing facility  Plan of Care Reviewed With: patient  Outcome Summary: PT PRESENTS WITH EVOLVING SYMPTOMS TO INCLUDE IMPAIRED BALANCE, GENERALIZED WEAKNESS, RIGIDITY WITH ROM AND DECLINE IN FUNCTIONAL MOBILITY. PT FOLLOWING COMMANDS LESS THAN 50% AND NEEDS CUES FOR SEQUENCING BUT IS COOPERATIVE. REQUIRED MAX ASSIST OF 2 FOR PIVOT TRANSFER AND GAIT SHORT DISTANCE VIA B UE SUPPORT AND GAIT BELT. PT HAS SACRAL WOUND. RECOMMEND SNF AT D/C.      Time Calculation:   PT Charges     Row Name 05/08/20 1419 05/08/20 1042          Time Calculation    Start Time  1315  -  1042  -     PT Received On  05/08/20  -  --     PT Goal Re-Cert Due Date  05/17/20  -  05/17/20  -        Time Calculation- PT    Total Timed Code Minutes- PT  30 minute(s)  -CD  --        Timed Charges    43388 - PT Therapeutic Exercise Minutes  10  -CD  --     97646 - PT Therapeutic Activity Minutes  20  -CD  --       User Key  (r) = Recorded By, (t) = Taken By, (c) = Cosigned By    Initials Name Provider Type    CD Samantha Leo, PT Physical Therapist    Stephanie Fernandez PT Physical Therapist        Therapy Charges for Today     Code  Description Service Date Service Provider Modifiers Qty    86441769520 HC PT THER PROC EA 15 MIN 5/8/2020 Samantha Leo, PT GP 1    42177595621 HC PT THERAPEUTIC ACT EA 15 MIN 5/8/2020 Samantha Leo, PT GP 1    11438711249 HC PT RE-EVAL ESTABLISHED PLAN 2 5/8/2020 Samantha Leo, PT GP 1          PT G-Codes  Outcome Measure Options: AM-PAC 6 Clicks Basic Mobility (PT)  AM-PAC 6 Clicks Score (PT): 9    Samantha Leo, PT  5/8/2020

## 2020-05-09 LAB
GLUCOSE BLDC GLUCOMTR-MCNC: 112 MG/DL (ref 70–130)
GLUCOSE BLDC GLUCOMTR-MCNC: 114 MG/DL (ref 70–130)
GLUCOSE BLDC GLUCOMTR-MCNC: 161 MG/DL (ref 70–130)

## 2020-05-09 PROCEDURE — 99233 SBSQ HOSP IP/OBS HIGH 50: CPT | Performed by: PSYCHIATRY & NEUROLOGY

## 2020-05-09 PROCEDURE — 25010000002 PIPERACILLIN SOD-TAZOBACTAM PER 1 G: Performed by: FAMILY MEDICINE

## 2020-05-09 PROCEDURE — 25010000002 ENOXAPARIN PER 10 MG: Performed by: FAMILY MEDICINE

## 2020-05-09 PROCEDURE — 97610 LOW FREQUENCY NON-THERMAL US: CPT

## 2020-05-09 PROCEDURE — 99232 SBSQ HOSP IP/OBS MODERATE 35: CPT | Performed by: FAMILY MEDICINE

## 2020-05-09 PROCEDURE — 82962 GLUCOSE BLOOD TEST: CPT

## 2020-05-09 PROCEDURE — 92526 ORAL FUNCTION THERAPY: CPT

## 2020-05-09 RX ADMIN — TAZOBACTAM SODIUM AND PIPERACILLIN SODIUM 3.38 G: 375; 3 INJECTION, SOLUTION INTRAVENOUS at 11:25

## 2020-05-09 RX ADMIN — CARBIDOPA AND LEVODOPA 1 TABLET: 25; 100 TABLET ORAL at 17:38

## 2020-05-09 RX ADMIN — PANTOPRAZOLE SODIUM 40 MG: 40 TABLET, DELAYED RELEASE ORAL at 09:30

## 2020-05-09 RX ADMIN — CASTOR OIL AND BALSAM, PERU: 788; 87 OINTMENT TOPICAL at 11:25

## 2020-05-09 RX ADMIN — GABAPENTIN 100 MG: 100 CAPSULE ORAL at 09:30

## 2020-05-09 RX ADMIN — TAZOBACTAM SODIUM AND PIPERACILLIN SODIUM 3.38 G: 375; 3 INJECTION, SOLUTION INTRAVENOUS at 23:31

## 2020-05-09 RX ADMIN — CARBIDOPA AND LEVODOPA 1 TABLET: 25; 100 TABLET ORAL at 09:30

## 2020-05-09 RX ADMIN — CITALOPRAM HYDROBROMIDE 20 MG: 20 TABLET ORAL at 09:30

## 2020-05-09 RX ADMIN — SODIUM CHLORIDE, PRESERVATIVE FREE 10 ML: 5 INJECTION INTRAVENOUS at 09:30

## 2020-05-09 RX ADMIN — ENOXAPARIN SODIUM 40 MG: 80 INJECTION SUBCUTANEOUS at 15:14

## 2020-05-09 RX ADMIN — ATORVASTATIN CALCIUM 80 MG: 40 TABLET, FILM COATED ORAL at 20:06

## 2020-05-09 RX ADMIN — CARBIDOPA AND LEVODOPA 1 TABLET: 25; 100 TABLET ORAL at 15:14

## 2020-05-09 RX ADMIN — CASTOR OIL AND BALSAM, PERU: 788; 87 OINTMENT TOPICAL at 20:06

## 2020-05-09 RX ADMIN — GABAPENTIN 100 MG: 100 CAPSULE ORAL at 20:06

## 2020-05-09 RX ADMIN — ASPIRIN 81 MG: 81 TABLET, COATED ORAL at 09:30

## 2020-05-09 RX ADMIN — TAZOBACTAM SODIUM AND PIPERACILLIN SODIUM 3.38 G: 375; 3 INJECTION, SOLUTION INTRAVENOUS at 17:38

## 2020-05-09 RX ADMIN — INSULIN LISPRO 2 UNITS: 100 INJECTION, SOLUTION INTRAVENOUS; SUBCUTANEOUS at 11:25

## 2020-05-09 NOTE — PROGRESS NOTES
"    [facility] Medicine Services  PROGRESS NOTE    Patient Name: Gama Davila  : 1940  MRN: [id number]    Date of Admission: 2020  Primary Care Physician: Provider, No Known    Subjective   Subjective     CC:  Altered mental status    HPI:   - Patient was admitted with altered mental status from a rehab facility after he had been hospitalized for pneumonia and sepsis in April.  He was tested for COVID-19 and was negative.  The patient's daughter Virginia noted that even during face time at the rehab facility he was taking a lot of time to process things and had to be prompted 3 or 4 times just to tell her hello or to tell her he left her.  She reports he was never on a medication that had to have levels checked at home.  He is never had a history of seizures.  She had never heard of Depakote previously.  I reviewed Dr. Perry's notes about reducing some of these medications and I discussed with Virginia that we should probably just stop the Depakote and she is in agreement.  I will also go ahead and lower his Seroquel dosing although he was taking some Seroquel at home at night for dementia.  The patient was able to tolerate some breakfast.  He is unable to have a conversation with me.  He stares blankly when asked questions.  He made a few attempts at trying to talk to me repeating mostly what I asked him such as \"you are 1 of my doctors.\"  I gave him a drink of orange juice which was on his tray and he seemed to have a little difficulty with getting a little choked.     -patient continues to struggle with communication.  He is trying to eat breakfast this morning but he is using his fork in his right hand to stab a muffin in his left hand.  I tried to coax him have to eat the muffin but he did not want to follow my instructions and wanted to do things on his own.  He did stare at me but did not respond any of my questions today.    Review of Systems    General: No " fever  ENT: possibly some trouble swallowing  Respiratory: Does not complain of any shortness of air in particular, remains on nasal cannula   psychiatric: Apathetic, lack of interest, slow processing.  Seems to be electively not answering my questions       Objective   Objective     Vital Signs:   Temp:  [98.2 °F (36.8 °C)-98.8 °F (37.1 °C)] 98.4 °F (36.9 °C)  Heart Rate:  [61-78] 68  Resp:  [16-18] 18  BP: ()/(62-93) 97/69        Physical Exam:  Constitutional: No acute distress, awake, alert, nontoxic, elderly and frail body habitus; feeding himself breakfast with odd behaviors  Eyes: Pupils equal, sclerae anicteric, no conjunctival injection  HENT: NCAT, mucous membranes moist  Neck: Supple, no thyromegaly, no lymphadenopathy  Respiratory: Clear to auscultation bilaterally, poor effort, nonlabored respirations   Cardiovascular: RRR  Gastrointestinal: Positive bowel sounds, soft, nontender, nondistended  Musculoskeletal: No peripheral edema, generally weak with some rigidity  Neuro/psychiatric: Flat affect, apathetic, slow processing, does not answer any of my questions, does not follow commands    Results Reviewed:  Results from last 7 days   Lab Units 05/08/20  0730 05/07/20  0544 05/06/20  1217 05/06/20  1027   WBC 10*3/mm3 6.12 6.38  --  8.37   HEMOGLOBIN g/dL 11.5* 11.4*  --  14.0   HEMATOCRIT % 35.3* 35.4*  --  43.0   PLATELETS 10*3/mm3 176 178  --  213   PROCALCITONIN ng/mL  --   --  <0.02*  --      Results from last 7 days   Lab Units 05/08/20  0730 05/07/20  0544 05/06/20  1217   SODIUM mmol/L 135* 139 140   POTASSIUM mmol/L 3.7 3.5 4.0   CHLORIDE mmol/L 100 104 102   CO2 mmol/L 25.0 25.0 28.0   BUN mg/dL 11 14 20   CREATININE mg/dL 1.05 1.09 1.10   GLUCOSE mg/dL 120* 93 101*   CALCIUM mg/dL 8.0* 7.9* 8.5*   ALT (SGPT) U/L  --  12 14   AST (SGOT) U/L  --  21 27   TROPONIN T ng/mL  --   --  <0.010     Estimated Creatinine Clearance: 54.9 mL/min (by C-G formula based on SCr of 1.05  mg/dL).    Microbiology Results Abnormal     Procedure Component Value - Date/Time    Blood Culture - Blood, Blood, Venous Line [[id number]] Collected:  05/06/20 1236    Lab Status:  Preliminary result Specimen:  Blood, Venous Line Updated:  05/08/20 1315     Blood Culture No growth at 2 days    Blood Culture - Blood, Blood, Venous Line [[id number]] Collected:  05/06/20 1236    Lab Status:  Preliminary result Specimen:  Blood, Venous Line Updated:  05/08/20 1315     Blood Culture No growth at 2 days    Urine Culture - Urine, Urine, Catheter In/Out [[id number]]  (Normal) Collected:  05/06/20 1036    Lab Status:  Final result Specimen:  Urine, Catheter In/Out Updated:  05/07/20 2046     Urine Culture No growth    SARS-CoV-2 PCR ([place] IN-HOUSE PERFORMED), NP SWAB IN TRANSPORT MEDIA - Swab, Nasopharynx [[id number]]  (Normal) Collected:  05/06/20 1520    Lab Status:  Final result Specimen:  Swab from Nasopharynx Updated:  05/06/20 2001     COVID19 Not Detected          Imaging Results (Last 24 Hours)     Procedure Component Value Units Date/Time    XR Chest 1 View [[id number]] Collected:  05/08/20 1154     Updated:  05/08/20 2300    Narrative:       EXAMINATION: XR CHEST 1 VW - 05/08/2020     INDICATION: Evaluate for pneumonia.     R41.82-Altered mental status, unspecified; E86.0-Dehydration;  J18.1-Lobar pneumonia, unspecified organism.     COMPARISON: 05/06/2020     FINDINGS: Lung volumes are relatively low. Right lung appears clear.  Left lung shows slight coarsening of interstitial markings, similar to  prior study, and small focus of discoid atelectasis and peribronchial  thickening in the medial left base which appears a little increased, not  extensive. This appears to be associated with a small hiatal hernia. No  pneumothorax, lung consolidation or effusion is seen. There are multiple  old healed left-sided rib fractures.       Impression:       1. Low lung volumes.  2. Mildly increased peribronchial thickening  and patchy interstitial  opacity in the medial left lung base compared to prior study.  3. Small hiatal hernia noted.      DICTATED:   05/08/2020  EDITED/ls :   05/08/2020      This report was finalized on 5/8/2020 10:57 PM by Dr. Steve Sow MD.                  I have reviewed the medications:  Scheduled Meds:    aspirin 81 mg Oral Daily   atorvastatin 80 mg Oral Nightly   carbidopa-levodopa 1 tablet Oral TID   castor oil-balsam peru  Topical Q12H   citalopram 20 mg Oral Daily   enoxaparin 40 mg Subcutaneous Q24H   gabapentin 100 mg Oral Q12H   insulin lispro 0-7 Units Subcutaneous TID AC   pantoprazole 40 mg Oral Daily   piperacillin-tazobactam 3.375 g Intravenous Q8H   sodium chloride 10 mL Intravenous Q12H     Continuous Infusions:   PRN Meds:.•  acetaminophen **OR** acetaminophen **OR** acetaminophen  •  dextrose  •  dextrose  •  glucagon (human recombinant)  •  magnesium sulfate **OR** magnesium sulfate **OR** magnesium sulfate  •  potassium chloride **OR** potassium chloride **OR** potassium chloride  •  sodium chloride  •  sodium chloride    Assessment/Plan   Assessment & Plan     Active Hospital Problems    Diagnosis  POA   • **HCAP (healthcare-associated pneumonia) [J18.9]  Yes   • Acute respiratory failure with hypoxia (CMS/HCC) [J96.01]  Yes   • AMS (altered mental status) [R41.82]  Yes   • Dementia (CMS/HCC) [F03.90]  Yes   • Hypoxia [R09.02]  Yes   • Diabetes mellitus (CMS/HCC) [E11.9]  Yes      Resolved Hospital Problems   No resolved problems to display.        Brief Hospital Course to date:  Gama Davila is a 79 y.o. male admitted with altered mental status of uncertain etiology and found to have healthcare associated pneumonia, acute respiratory failure with hypoxia, possibly drug-induced apathy and weakness.  Neurology is consulted and following.  They are considering Parkinson's.    Altered mental status of uncertain etiology  -Continuing to address polypharmacy and reducing mind altering  drugs  -Specifically stopped Depakote, Seroquel, Prozac discussed with neurology  -Plan to taper or stop other meds like gabapentin  -Empiric trial of Sinemet    Healthcare associated pneumonia  -Currently on Zosyn, could switch to Augmentin  -Order chest x-ray on 5/8/2020 shows left lower lobe opacity    Acute respiratory failure with hypoxia  -Continue O2 to maintain sats greater than 90    Polypharmacy    Dementia, possibly Parkinson's    Diabetes mellitus    Ataxia and immobility    Skin breakdown      DVT Prophylaxis:  lovenox      Daily Care Communication  Due to current limited visitation policies, an attempt will be made daily to update patient's identified best point-of-contact(s)   Contact: Virginia [phone]   Relation: poa daughter   Type of communication (phone or televideo): phone   Time of communication: 10:40am   Notes (if applicable): discussed continued changes in meds and coordination with neurology.  Really no change today as he is still not communicative and does not really want to follow directions.  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    CODE STATUS:   Code Status and Medical Interventions:   Ordered at: 05/06/20 1533     Limited Support to NOT Include:    Intubation     Level Of Support Discussed With:    Health Care Surrogate     Code Status:    No CPR     Medical Interventions (Level of Support Prior to Arrest):    Limited         Electronically signed by Maggie Dominguez MD, 05/09/20, 10:37.

## 2020-05-09 NOTE — THERAPY DISCHARGE NOTE
Acute Care - Speech Language Pathology   Swallow Treatment Note/Discharge   [facility]     Patient Name: Gama Davila  : 1940  MRN: [id number]  Today's Date: 2020               Admit Date: 2020    Visit Dx:      ICD-10-CM ICD-9-CM   1. Altered mental status, unspecified altered mental status type R41.82 780.97   2. Mild dehydration E86.0 276.51   3. Pneumonia of both lower lobes due to infectious organism (CMS/HCC) J18.1 483.8   4. Dysphagia, unspecified type R13.10 787.20   5. Impaired mobility and ADLs Z74.09 799.89     Patient Active Problem List   Diagnosis   • [place] (hard of hearing)   • High cholesterol   • Coronary artery disease   • Hypertension   • Irregular heart beat   • Diabetes mellitus (CMS/HCC)   • Septic shock (CMS/HCC)   • NANCI (acute kidney injury) (CMS/[facility])   • AMS (altered mental status)   • Hypoxia   • Anemia   • Edema   • Acute encephalopathy   • Dementia (CMS/HCC)   • Hypomagnesemia   • Rhabdomyolysis   • Acute retention of urine   • HCAP (healthcare-associated pneumonia)   • Acute respiratory failure with hypoxia (CMS/HCC)       Therapy Treatment  Rehabilitation Treatment Summary     Row Name 20 0980             Treatment Time/Intention    Discipline  speech language pathologist  -      Document Type  discharge treatment  -      Subjective Information  no complaints  -      Mode of Treatment  individual therapy;speech-language pathology  -      Patient/Family Observations  No familyu present. Patient with no verbal responses today. Only head nods/shakes.  -      Care Plan Review  care plan/treatment goals reviewed;patient/other agree to care plan  -      Therapy Frequency (Swallow)  PRN  -      Patient Effort  adequate  -      Existing Precautions/Restrictions  fall  -CH      Recorded by [] Zuly Harris, MS CCC-SLP 20 1036      Row Name 20 7769             Pain Assessment    Additional Documentation  Pain Scale: FACES Pre/Post-Treatment  (Group)  -CH      Recorded by [CH] Zuly Harris MS CCC-SLP 05/09/20 1036      Row Name 05/09/20 0940             Pain Scale: FACES Pre/Post-Treatment    Pain: FACES Scale, Pretreatment  0-->no hurt  -CH      Pain: FACES Scale, Post-Treatment  0-->no hurt  -CH      Recorded by [CH] Zuly Harris MS CCC-SLP 05/09/20 1036      Row Name                Wound 05/06/20 1410 coccyx Pressure Injury    Wound - Properties Group Date first assessed: 05/06/20 [AS] Time first assessed: 1410 [AS] Present on Hospital Admission: Y [AS] Location: coccyx [AS] Primary Wound Type: Pressure inj [AS] Stage, Pressure Injury: deep tissue injury [AS] Recorded by:  [AS] Cornel Sloan RN 05/06/20 1418    Row Name 05/09/20 0940             Outcome Summary/Treatment Plan (SLP)    Daily Summary of Progress (SLP)  progress toward functional goals as expected  -      Plan for Continued Treatment (SLP)  Will sign off as patient tolerating soft whole diet and thin liquids without s/s of aspiration or difficulty at this time.  -CH      Anticipated Dischage Disposition  unknown  -CH      Reason for Discharge  all goals and outcomes met, no further needs identified  -CH      Recorded by [CH] Zuly Harris MS CCC-SLP 05/09/20 1036        User Key  (r) = Recorded By, (t) = Taken By, (c) = Cosigned By    Initials Name Effective Dates Discipline    AS Cornel Sloan RN 06/16/16 -  Nurse    CH Zuly Harris MS CCC-SLP 02/14/19 -  SLP        Outcome Summary  Outcome Summary/Treatment Plan (SLP)  Daily Summary of Progress (SLP): progress toward functional goals as expected (05/09/20 0940 : Zuly Harris MS CCC-SLP)  Plan for Continued Treatment (SLP): Will sign off as patient tolerating soft whole diet and thin liquids without s/s of aspiration or difficulty at this time. (05/09/20 0940 : Zuly Harris MS CCC-SLP)  Anticipated Dischage Disposition: unknown (05/09/20 0940 : Zuly Harris MS CCC-SLP)  Reason for Discharge:  all goals and outcomes met, no further needs identified (05/09/20 0940 : Zuly Harris MS CCC-SLP)  SLP GOALS     Row Name 05/09/20 0940 05/08/20 1145          Oral Nutrition/Hydration Goal 1 (SLP)    Oral Nutrition/Hydration Goal 1, SLP  LTG: Patient will tolerate soft whole and thin liquid diet consistency without s/s of aspiration or dfifficulty  -CH  LTG: Patient will tolerate soft whole and thin liquid diet consistency without s/s of aspiration or dfifficulty  -CH     Time Frame (Oral Nutrition/Hydration Goal 1, SLP)  by discharge  -CH  by discharge  -CH     Progress/Outcomes (Oral Nutrition/Hydration Goal 1, SLP)  goal met  -CH  --        Oral Nutrition/Hydration Goal 2 (SLP)    Oral Nutrition/Hydration Goal 2, SLP  STG: Patient will tolerate soft solid and thin liquid trials without s/s of aspiration  -CH  STG: Patient will tolerate soft solid and thin liquid trials without s/s of aspiration  -CH     Time Frame (Oral Nutrition/Hydration Goal 2, SLP)  by discharge  -CH  by discharge  -CH     Progress/Outcomes (Oral Nutrition/Hydration Goal 2, SLP)  goal met  -CH  --       User Key  (r) = Recorded By, (t) = Taken By, (c) = Cosigned By    Initials Name Provider Type     Zuly Harris MS CCC-SLP Speech and Language Pathologist          EDUCATION  The patient has been educated in the following areas:   Dysphagia (Swallowing Impairment) Oral Care/Hydration Modified Diet Instruction.    SLP Recommendation and Plan  Daily Summary of Progress (SLP): progress toward functional goals as expected     Plan for Continued Treatment (SLP): Will sign off as patient tolerating soft whole diet and thin liquids without s/s of aspiration or difficulty at this time.  Anticipated Dischage Disposition: unknown        Reason for Discharge: all goals and outcomes met, no further needs identified           Time Calculation:   Time Calculation- SLP     Row Name 05/09/20 1036             Time Calculation- SLP    SLP Start  Time  0940  -      SLP Received On  05/09/20  -        User Key  (r) = Recorded By, (t) = Taken By, (c) = Cosigned By    Initials Name Provider Type    Zuly Skelton MS CCC-SLP Speech and Language Pathologist          Therapy Charges for Today     Code Description Service Date Service Provider Modifiers Qty    84039652113 HC ST EVAL ORAL PHARYNG SWALLOW 4 5/8/2020 Zuly Harris MS CCC-SLP GN 1    13595233932 HC ST TREATMENT SWALLOW 3 5/9/2020 Zuly Harris MS CCC-SLP GN 1               SLP Discharge Summary  Anticipated Dischage Disposition: unknown  Reason for Discharge: all goals and outcomes met, no further needs identified    MS CHECO Morales  5/9/2020

## 2020-05-09 NOTE — PLAN OF CARE
Problem: Patient Care Overview  Goal: Plan of Care Review  Outcome: Ongoing (interventions implemented as appropriate)  Flowsheets (Taken 5/9/2020 0800 by Eladia Wiggins RN)  Plan of Care Reviewed With: patient  Note:   SLP treatment completed. Will sign-off as patient is tolerating recommended diet consistency with no s/s of aspiration or difficulty at this time. Please see note for further details and recommendations.

## 2020-05-09 NOTE — PLAN OF CARE
Problem: Patient Care Overview  Goal: Plan of Care Review  Outcome: Ongoing (interventions implemented as appropriate)  Flowsheets (Taken 5/9/2020 1100)  Plan of Care Reviewed With: patient  Outcome Summary: sacral DTPI stable with no increase in size noted and no darkening of discolored areas noted. PT to cont with MIST therapy to help further improve healing potential.

## 2020-05-09 NOTE — PROGRESS NOTES
Daily Progress Note  Neurology     LOS: 3 days     Subjective     Chief Complaint: Altered mental status.    Interval History: No interval change.  Continues to have difficulty with communication and to follow instructions or commands.    ROS: Unable to obtain due to patient's mental status.    Objective     Vital signs in last 24 hours:  Temp:  [98 °F (36.7 °C)-98.8 °F (37.1 °C)] 98 °F (36.7 °C)  Heart Rate:  [67-78] 67  Resp:  [16-18] 18  BP: ()/(62-93) 115/88      Physical Exam:      Neurologic Exam:  Elderly male laying in bed with eyes open.  Not in apparent distress.  Does not follow commands has mask facies, very slow in processing, does not answer any questions.  Moves all 4 extremities spontaneously and to pain stimuli.  DTRs 1+ in both upper and lower extremity.  Rigidity noted in both upper extremities.      Results Review:    Results from last 7 days   Lab Units 05/08/20  0730   WBC 10*3/mm3 6.12   HEMOGLOBIN g/dL 11.5*   HEMATOCRIT % 35.3*   PLATELETS 10*3/mm3 176     Results from last 7 days   Lab Units 05/08/20  0730   SODIUM mmol/L 135*   POTASSIUM mmol/L 3.7   CHLORIDE mmol/L 100   CO2 mmol/L 25.0   BUN mg/dL 11   CREATININE mg/dL 1.05   CALCIUM mg/dL 8.0*       Assessment/Plan     1.  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  He is on Celexa 20 mg daily now.  -Can consider stopping gabapentin as well.  -Started Sinemet yesterday  mg tablet, 1 tablet 3 times daily.  -OT, PT.    Natanael Bueno MD  05/09/20  14:54

## 2020-05-09 NOTE — THERAPY WOUND CARE TREATMENT
Acute Care - Wound/Debridement Treatment Note  [facility]     Patient Name: Gama Davila  : 1940  MRN: [id number]  Today's Date: 2020                  Admit Date: 2020    Visit Dx:    ICD-10-CM ICD-9-CM   1. Altered mental status, unspecified altered mental status type R41.82 780.97   2. Mild dehydration E86.0 276.51   3. Pneumonia of both lower lobes due to infectious organism (CMS/[facility]) J18.1 483.8   4. Dysphagia, unspecified type R13.10 787.20   5. Impaired mobility and ADLs Z74.09 799.89       Patient Active Problem List   Diagnosis   • [place] (hard of hearing)   • High cholesterol   • Coronary artery disease   • Hypertension   • Irregular heart beat   • Diabetes mellitus (CMS/[facility])   • Septic shock (CMS/[facility])   • NANCI (acute kidney injury) (CMS/[facility])   • AMS (altered mental status)   • Hypoxia   • Anemia   • Edema   • Acute encephalopathy   • Dementia (CMS/[facility])   • Hypomagnesemia   • Rhabdomyolysis   • Acute retention of urine   • HCAP (healthcare-associated pneumonia)   • Acute respiratory failure with hypoxia (CMS/[facility])           Wound 20 1410 coccyx Pressure Injury (Active)   Dressing Appearance dry;intact 2020 11:00 AM   Base non-blanchable;maroon/purple;granulating;slough 2020 11:00 AM   Periwound intact;blanchable;pink 2020 11:00 AM   Periwound Temperature warm 2020 11:00 AM   Periwound Skin Turgor soft 2020 11:00 AM   Edges irregular 2020 11:00 AM   Care, Wound irrigated with;sterile normal saline;ultrasound therapy, non contact low frequency 2020 11:00 AM         WOUND DEBRIDEMENT                  Therapy Treatment    Rehabilitation Treatment Summary     Row Name 20 1100 20 0940          Treatment Time/Intention    Discipline  physical therapist  -MF  speech language pathologist  -CH     Document Type  therapy note (daily note);wound care  -MF  discharge treatment  -CH     Subjective Information  no complaints  -MF  no complaints  -CH     Mode of  Treatment  --  individual therapy;speech-language pathology  -CH     Patient/Family Observations  --  No familyu present. Patient with no verbal responses today. Only head nods/shakes.  -     Care Plan Review  --  care plan/treatment goals reviewed;patient/other agree to care plan  -     Therapy Frequency (Swallow)  --  PRN  -CH     Patient Effort  --  adequate  -     Existing Precautions/Restrictions  --  fall  -     Recorded by [] John Liu, PT 05/09/20 1342 [CH] Zuly Harris, MS CCC-SLP 05/09/20 1036     Row Name 05/09/20 1100             Positioning and Restraints    Pre-Treatment Position  in bed  -      Post Treatment Position  bed  -      In Bed  supine;call light within reach  -      Recorded by [] John Liu, PT 05/09/20 1342      Row Name 05/09/20 1100 05/09/20 0940          Pain Assessment    Additional Documentation  Pain Scale: FACES Pre/Post-Treatment (Group)  -  Pain Scale: FACES Pre/Post-Treatment (Group)  -     Recorded by [MF] John Liu, PT 05/09/20 1342 [CH] Zuly Harris, MS CCC-SLP 05/09/20 1036     Row Name 05/09/20 1100 05/09/20 0940          Pain Scale: FACES Pre/Post-Treatment    Pain: FACES Scale, Pretreatment  0-->no hurt  -MF  0-->no hurt  -     Pain: FACES Scale, Post-Treatment  0-->no hurt  -MF  0-->no hurt  -     Pre/Post Treatment Pain Comment  2/10 with rolling  -  --     Recorded by [MF] John Liu, PT 05/09/20 1342 [CH] Zuly Harris, MS CCC-SLP 05/09/20 1036     Row Name 05/09/20 1100             Wound 05/06/20 1410 coccyx Pressure Injury    Wound - Properties Group Date first assessed: 05/06/20 [AS] Time first assessed: 1410 [AS] Present on Hospital Admission: Y [AS] Location: coccyx [AS] Primary Wound Type: Pressure inj [AS] Stage, Pressure Injury: deep tissue injury [AS] Recorded by:  [AS] Cornel Sloan RN 05/06/20 1418    Dressing Appearance  dry;intact  -      Base   non-blanchable;maroon/purple;granulating;slough  -MF      Periwound  intact;blanchable;pink  -MF      Periwound Temperature  warm  -MF      Periwound Skin Turgor  soft  -MF      Edges  irregular  -MF      Care, Wound  irrigated with;sterile normal saline;ultrasound therapy, non contact low frequency 6 min MIST   -MF      Recorded by [] John Liu, PT 05/09/20 1342      Row Name 05/09/20 1100             Coping    Observed Emotional State  calm;cooperative  -MF      Verbalized Emotional State  acceptance  -MF      Recorded by [] John Liu, PT 05/09/20 1342      Row Name 05/09/20 1100             Plan of Care Review    Plan of Care Reviewed With  patient  -MF      Outcome Summary  sacral DTPI stable with no increase in size noted and no darkening of discolored areas noted. PT to cont with MIST therapy to help further improve healing potential.    -MF      Recorded by [] John Liu, PT 05/09/20 1342      Row Name 05/09/20 0940             Outcome Summary/Treatment Plan (SLP)    Daily Summary of Progress (SLP)  progress toward functional goals as expected  -      Plan for Continued Treatment (SLP)  Will sign off as patient tolerating soft whole diet and thin liquids without s/s of aspiration or difficulty at this time.  -      Anticipated Dischage Disposition  unknown  -      Reason for Discharge  all goals and outcomes met, no further needs identified  -      Recorded by [] Zuly Harris MS CCC-SLP 05/09/20 1036        User Key  (r) = Recorded By, (t) = Taken By, (c) = Cosigned By    Initials Name Effective Dates Discipline     John Liu, PT 06/19/15 -  PT    AS Cornel Sloan RN 06/16/16 -  Nurse    CH Zuly Harris, MS CCC-SLP 02/14/19 -  SLP        Rehab Goal Summary     Row Name 05/09/20 0940             Swallow Goals (SLP)    Oral Nutrition/Hydration Goal Selection (SLP)  oral nutrition/hydration, SLP goal 1;oral nutrition/hydration, SLP goal 2  -          Oral Nutrition/Hydration Goal 1 (SLP)    Oral Nutrition/Hydration Goal 1, SLP  LTG: Patient will tolerate soft whole and thin liquid diet consistency without s/s of aspiration or dfifficulty  -CH      Time Frame (Oral Nutrition/Hydration Goal 1, SLP)  by discharge  -CH      Progress/Outcomes (Oral Nutrition/Hydration Goal 1, SLP)  goal met  -CH         Oral Nutrition/Hydration Goal 2 (SLP)    Oral Nutrition/Hydration Goal 2, SLP  STG: Patient will tolerate soft solid and thin liquid trials without s/s of aspiration  -CH      Time Frame (Oral Nutrition/Hydration Goal 2, SLP)  by discharge  -CH      Progress/Outcomes (Oral Nutrition/Hydration Goal 2, SLP)  goal met  -CH        User Key  (r) = Recorded By, (t) = Taken By, (c) = Cosigned By    Initials Name Provider Type Discipline    Zuly Skelton MS CCC-SLP Speech and Language Pathologist SLP        Physical Therapy Education                 Title: PT OT SLP Therapies (In Progress)     Topic: Physical Therapy (In Progress)     Point: Mobility training (In Progress)     Description:   Instruct learner(s) on safety and technique for assisting patient out of bed, chair or wheelchair.  Instruct in the proper use of assistive devices, such as walker, crutches, cane or brace.              Patient Friendly Description:   It's important to get you on your feet again, but we need to do so in a way that is safe for you. Falling has serious consequences, and your personal safety is the most important thing of all.        When it's time to get out of bed, one of us or a family member will sit next to you on the bed to give you support.     If your doctor or nurse tells you to use a walker, crutches, a cane, or a brace, be sure you use it every time you get out of bed, even if you think you don't need it.    Learning Progress Summary           Patient Acceptance, E, NR,NL by CD at 5/8/2020 9442    Comment:  BENEFITS OF OOB ACTIVITY, SAFETY WITH MOBILITY, PROGRESSION OF  POC.                   Point: Home exercise program (In Progress)     Description:   Instruct learner(s) on appropriate technique for monitoring, assisting and/or progressing patient with therapeutic exercises and activities.              Learning Progress Summary           Patient Acceptance, E, NR,NL by CD at 5/8/2020 1417    Comment:  BENEFITS OF OOB ACTIVITY, SAFETY WITH MOBILITY, PROGRESSION OF POC.                   Point: Body mechanics (In Progress)     Description:   Instruct learner(s) on proper positioning and spine alignment for patient and/or caregiver during mobility tasks and/or exercises.              Learning Progress Summary           Patient Acceptance, E, NR,NL by CD at 5/8/2020 1417    Comment:  BENEFITS OF OOB ACTIVITY, SAFETY WITH MOBILITY, PROGRESSION OF POC.                   Point: Precautions (In Progress)     Description:   Instruct learner(s) on prescribed precautions during mobility and gait tasks              Learning Progress Summary           Patient Acceptance, E, NR,NL by CD at 5/8/2020 1417    Comment:  BENEFITS OF OOB ACTIVITY, SAFETY WITH MOBILITY, PROGRESSION OF POC.                               User Key     Initials Effective Dates Name Provider Type Discipline    CD 06/19/15 -  Samantha Leo, PT Physical Therapist PT                  PT Recommendation and Plan  Anticipated Discharge Disposition (PT): skilled nursing facility  Planned Therapy Interventions (PT Eval): balance training, bed mobility training, gait training, home exercise program, transfer training, strengthening  Therapy Frequency (PT Clinical Impression): daily               Outcome Summary: sacral DTPI stable with no increase in size noted and no darkening of discolored areas noted. PT to cont with MIST therapy to help further improve healing potential.    Plan of Care Reviewed With: patient    Outcome Measures     Row Name 05/08/20 1310             How much help from another is currently needed...     Putting on and taking off regular lower body clothing?  1  -AR      Bathing (including washing, rinsing, and drying)  1  -AR      Toileting (which includes using toilet bed pan or urinal)  1  -AR      Putting on and taking off regular upper body clothing  2  -AR      Taking care of personal grooming (such as brushing teeth)  3  -AR      Eating meals  3  -AR      AM-PAC 6 Clicks Score (OT)  11  -AR         Functional Assessment    Outcome Measure Options  AM-PAC 6 Clicks Daily Activity (OT)  -AR        User Key  (r) = Recorded By, (t) = Taken By, (c) = Cosigned By    Initials Name Provider Type    Meeta Rosado, OT Occupational Therapist              Time Calculation  PT Charges     Row Name 05/09/20 1100             Time Calculation    Start Time  1100  -      PT Goal Re-Cert Due Date  05/17/20  -        User Key  (r) = Recorded By, (t) = Taken By, (c) = Cosigned By    Initials Name Provider Type    John Gonzalez, PT Physical Therapist           Therapy Charges for Today     Code Description Service Date Service Provider Modifiers Qty    17275660310 HC PT [facility] MIST 5/9/2020 John Liu, PT GP 1            PT G-Codes  Outcome Measure Options: AM-PAC 6 Clicks Basic Mobility (PT)  AM-PAC 6 Clicks Score (PT): 9  AM-PAC 6 Clicks Score (OT): 11        John Liu, PT  5/9/2020

## 2020-05-10 LAB
GLUCOSE BLDC GLUCOMTR-MCNC: 103 MG/DL (ref 70–130)
GLUCOSE BLDC GLUCOMTR-MCNC: 105 MG/DL (ref 70–130)
GLUCOSE BLDC GLUCOMTR-MCNC: 169 MG/DL (ref 70–130)

## 2020-05-10 PROCEDURE — 25010000002 ENOXAPARIN PER 10 MG: Performed by: FAMILY MEDICINE

## 2020-05-10 PROCEDURE — 97535 SELF CARE MNGMENT TRAINING: CPT | Performed by: OCCUPATIONAL THERAPIST

## 2020-05-10 PROCEDURE — 97597 DBRDMT OPN WND 1ST 20 CM/<: CPT

## 2020-05-10 PROCEDURE — 99233 SBSQ HOSP IP/OBS HIGH 50: CPT | Performed by: PSYCHIATRY & NEUROLOGY

## 2020-05-10 PROCEDURE — 97610 LOW FREQUENCY NON-THERMAL US: CPT

## 2020-05-10 PROCEDURE — 99233 SBSQ HOSP IP/OBS HIGH 50: CPT | Performed by: INTERNAL MEDICINE

## 2020-05-10 PROCEDURE — 25010000002 PIPERACILLIN SOD-TAZOBACTAM PER 1 G: Performed by: FAMILY MEDICINE

## 2020-05-10 PROCEDURE — 82962 GLUCOSE BLOOD TEST: CPT

## 2020-05-10 RX ORDER — DONEPEZIL HYDROCHLORIDE 5 MG/1
5 TABLET, FILM COATED ORAL NIGHTLY
Status: DISCONTINUED | OUTPATIENT
Start: 2020-05-10 | End: 2020-05-11

## 2020-05-10 RX ADMIN — ENOXAPARIN SODIUM 40 MG: 80 INJECTION SUBCUTANEOUS at 17:16

## 2020-05-10 RX ADMIN — GABAPENTIN 100 MG: 100 CAPSULE ORAL at 21:13

## 2020-05-10 RX ADMIN — INSULIN LISPRO 2 UNITS: 100 INJECTION, SOLUTION INTRAVENOUS; SUBCUTANEOUS at 12:30

## 2020-05-10 RX ADMIN — CASTOR OIL AND BALSAM, PERU: 788; 87 OINTMENT TOPICAL at 21:13

## 2020-05-10 RX ADMIN — ASPIRIN 81 MG: 81 TABLET, COATED ORAL at 08:33

## 2020-05-10 RX ADMIN — DONEPEZIL HYDROCHLORIDE 5 MG: 5 TABLET, FILM COATED ORAL at 21:13

## 2020-05-10 RX ADMIN — PANTOPRAZOLE SODIUM 40 MG: 40 TABLET, DELAYED RELEASE ORAL at 08:34

## 2020-05-10 RX ADMIN — CARBIDOPA AND LEVODOPA 1 TABLET: 25; 100 TABLET ORAL at 17:19

## 2020-05-10 RX ADMIN — ATORVASTATIN CALCIUM 80 MG: 40 TABLET, FILM COATED ORAL at 21:14

## 2020-05-10 RX ADMIN — SODIUM CHLORIDE, PRESERVATIVE FREE 10 ML: 5 INJECTION INTRAVENOUS at 08:36

## 2020-05-10 RX ADMIN — CITALOPRAM HYDROBROMIDE 20 MG: 20 TABLET ORAL at 08:34

## 2020-05-10 RX ADMIN — GABAPENTIN 100 MG: 100 CAPSULE ORAL at 08:34

## 2020-05-10 RX ADMIN — CARBIDOPA AND LEVODOPA 1 TABLET: 25; 100 TABLET ORAL at 08:33

## 2020-05-10 RX ADMIN — CARBIDOPA AND LEVODOPA 1 TABLET: 25; 100 TABLET ORAL at 15:00

## 2020-05-10 RX ADMIN — TAZOBACTAM SODIUM AND PIPERACILLIN SODIUM 3.38 G: 375; 3 INJECTION, SOLUTION INTRAVENOUS at 08:32

## 2020-05-10 RX ADMIN — CASTOR OIL AND BALSAM, PERU: 788; 87 OINTMENT TOPICAL at 08:34

## 2020-05-10 RX ADMIN — SODIUM CHLORIDE, PRESERVATIVE FREE 10 ML: 5 INJECTION INTRAVENOUS at 21:13

## 2020-05-10 RX ADMIN — TAZOBACTAM SODIUM AND PIPERACILLIN SODIUM 3.38 G: 375; 3 INJECTION, SOLUTION INTRAVENOUS at 17:17

## 2020-05-10 NOTE — PLAN OF CARE
Problem: Patient Care Overview  Goal: Plan of Care Review  Outcome: Ongoing (interventions implemented as appropriate)  Flowsheets (Taken 5/10/2020 6707)  Plan of Care Reviewed With: patient  Outcome Summary: PT able to debride moderate amount of slough / biofilm from sacral wound to help decrease bioburden and improve healing potential.  PT will cont with mist therapy and debridement 2-3 x/week to help further improve skin integrity.

## 2020-05-10 NOTE — PLAN OF CARE
Problem: Patient Care Overview  Goal: Plan of Care Review  Outcome: Ongoing (interventions implemented as appropriate)  Flowsheets  Taken 5/10/2020 1752 by Aida Gamble, Nursing Student  Progress: no change (Pended)  Taken 5/10/2020 1631 by Meeta Waldron OT  Plan of Care Reviewed With: patient  Note:   Pt oriented to self only today, affect remains flat. Tremors continue this afternoon. Venelex and new dressing applied to deep tissue injury on coccyx. Turned q2 hours, heel boots applied. Able to feed himself with tray set up and appropriate utensils. VSS, antibiotics given as ordered. Condom cath changed, perineal area looks itchy, red, yeasty. Noted for MD to please evaluate.   Goal: Individualization and Mutuality  Outcome: Ongoing (interventions implemented as appropriate)  Goal: Discharge Needs Assessment  Outcome: Ongoing (interventions implemented as appropriate)  Goal: Interprofessional Rounds/Family Conf  Outcome: Ongoing (interventions implemented as appropriate)     Problem: Fall Risk (Adult)  Goal: Absence of Fall  Outcome: Ongoing (interventions implemented as appropriate)     Problem: Skin Injury Risk (Adult)  Goal: Skin Health and Integrity  Outcome: Ongoing (interventions implemented as appropriate)     Problem: Confusion, Acute (Adult)  Goal: Cognitive/Functional Impairments Minimized  Outcome: Ongoing (interventions implemented as appropriate)  Goal: Safety  Outcome: Ongoing (interventions implemented as appropriate)     Problem: Pneumonia (Adult)  Goal: Signs and Symptoms of Listed Potential Problems Will be Absent, Minimized or Managed (Pneumonia)  Outcome: Ongoing (interventions implemented as appropriate)

## 2020-05-10 NOTE — PROGRESS NOTES
[facility] Medicine Services  PROGRESS NOTE    Patient Name: Gama Davila  : 1940  MRN: [id number]    Date of Admission: 2020  Primary Care Physician: Provider, No Known    Subjective   Subjective     CC:  Altered mental status    HPI:  Patient remains minimally interactive. He is confused. He is able to tell me his name after several prompts. He continues to stare off into the distance and loose focus.    Review of Systems  Unable to obtain secondary to confusion.    Objective   Objective     Vital Signs:   Temp:  [97.5 °F (36.4 °C)-98.4 °F (36.9 °C)] 98 °F (36.7 °C)  Heart Rate:  [54-70] 58  Resp:  [16-18] 18  BP: (110-172)/(57-88) 172/69        Physical Exam:  Constitutional: No acute distress, frail appearing  HENT: NCAT, mucous membranes moist  Respiratory: Clear to auscultation bilaterally, respiratory effort normal   Cardiovascular: RRR, no murmurs, rubs, or gallops  Gastrointestinal: Positive bowel sounds, soft, nontender, nondistended  Musculoskeletal: No bilateral ankle edema  Psychiatric: flat affect  Neurologic: confused, does not follow commands, answers simple questions only with redirection. Rigid upper extremities  Skin: No rashes    Results Reviewed:  Results from last 7 days   Lab Units 20  0730 20  0544 20  1217 20  1027   WBC 10*3/mm3 6.12 6.38  --  8.37   HEMOGLOBIN g/dL 11.5* 11.4*  --  14.0   HEMATOCRIT % 35.3* 35.4*  --  43.0   PLATELETS 10*3/mm3 176 178  --  213   PROCALCITONIN ng/mL  --   --  <0.02*  --      Results from last 7 days   Lab Units 20  0730 20  0544 20  1217   SODIUM mmol/L 135* 139 140   POTASSIUM mmol/L 3.7 3.5 4.0   CHLORIDE mmol/L 100 104 102   CO2 mmol/L 25.0 25.0 28.0   BUN mg/dL 11 14 20   CREATININE mg/dL 1.05 1.09 1.10   GLUCOSE mg/dL 120* 93 101*   CALCIUM mg/dL 8.0* 7.9* 8.5*   ALT (SGPT) U/L  --  12 14   AST (SGOT) U/L  --  21 27   TROPONIN T ng/mL  --   --  <0.010     Estimated  Creatinine Clearance: 54.9 mL/min (by C-G formula based on SCr of 1.05 mg/dL).    Microbiology Results Abnormal     Procedure Component Value - Date/Time    Blood Culture - Blood, Blood, Venous Line [[id number]] Collected:  05/06/20 1236    Lab Status:  Preliminary result Specimen:  Blood, Venous Line Updated:  05/09/20 1316     Blood Culture No growth at 3 days    Blood Culture - Blood, Blood, Venous Line [[id number]] Collected:  05/06/20 1236    Lab Status:  Preliminary result Specimen:  Blood, Venous Line Updated:  05/09/20 1316     Blood Culture No growth at 3 days    Urine Culture - Urine, Urine, Catheter In/Out [[id number]]  (Normal) Collected:  05/06/20 1036    Lab Status:  Final result Specimen:  Urine, Catheter In/Out Updated:  05/07/20 2046     Urine Culture No growth    SARS-CoV-2 PCR ([place] IN-HOUSE PERFORMED), NP SWAB IN TRANSPORT MEDIA - Swab, Nasopharynx [[id number]]  (Normal) Collected:  05/06/20 1520    Lab Status:  Final result Specimen:  Swab from Nasopharynx Updated:  05/06/20 2001     COVID19 Not Detected          Imaging Results (Last 24 Hours)     ** No results found for the last 24 hours. **               I have reviewed the medications:  Scheduled Meds:    aspirin 81 mg Oral Daily   atorvastatin 80 mg Oral Nightly   carbidopa-levodopa 1 tablet Oral TID   castor oil-balsam peru  Topical Q12H   citalopram 20 mg Oral Daily   enoxaparin 40 mg Subcutaneous Q24H   gabapentin 100 mg Oral Q12H   insulin lispro 0-7 Units Subcutaneous TID AC   pantoprazole 40 mg Oral Daily   piperacillin-tazobactam 3.375 g Intravenous Q8H   sodium chloride 10 mL Intravenous Q12H     Continuous Infusions:   PRN Meds:.•  acetaminophen **OR** acetaminophen **OR** acetaminophen  •  dextrose  •  dextrose  •  glucagon (human recombinant)  •  magnesium sulfate **OR** magnesium sulfate **OR** magnesium sulfate  •  potassium chloride **OR** potassium chloride **OR** potassium chloride  •  sodium chloride  •  sodium  chloride    Assessment/Plan   Assessment & Plan     Active Hospital Problems    Diagnosis  POA   • **HCAP (healthcare-associated pneumonia) [J18.9]  Yes   • Acute respiratory failure with hypoxia (CMS/[facility]) [J96.01]  Yes   • AMS (altered mental status) [R41.82]  Yes   • Dementia (CMS/[facility]) [F03.90]  Yes   • Hypoxia [R09.02]  Yes   • Diabetes mellitus (CMS/[facility]) [E11.9]  Yes   • Coronary artery disease [I25.10]  Yes      Resolved Hospital Problems   No resolved problems to display.        Brief Hospital Course to date:  Gama Davila is a 79 y.o. male admitted with altered mental status of uncertain etiology and found to have healthcare associated pneumonia, acute respiratory failure with hypoxia, possibly drug-induced apathy and weakness.  Neurology is consulted and following.  They are considering Parkinson's.    This patient's hospital course, problems, and plans entered by my partner were reviewed and updated as appropriate by me on 5/10/2020    Encephalopathy   Dementia with possible Parkinsonism  -Continuing to address polypharmacy, neurology following  -Specifically stopped Depakote, Seroquel, Prozac  -taper gabapentin, consider stopping celexa  -Empiric trial of Sinemet  -PT/OT for mobility    [facility] associated pneumonia  -Currently on Zosyn, plan to switch to Augmentin at discharge to complete course  -demonstrated on 5/8/2020   - SLP found no signs or symptoms of aspiration    Acute respiratory failure with hypoxia  -resolved, now on room air, likely secondary to pneumonia    Diabetes mellitus  - SSI    Skin breakdown  - PT wound care following    CAD:  - continue ASA and statin    DVT Prophylaxis:  lovenox      Daily Care Communication  Due to current limited visitation policies, an attempt will be made daily to update patient's identified best point-of-contact(s)   Contact: [place] [phone]   Relation: Daughter and POA   Type of communication (phone or televideo): phone   Time of communication:  10:40am   Notes (if applicable): discussed current status.  No big changes today in his mental status, but daughter understands this may take a couple days. Appreciative of call and is going to try to zoom with patient later.     Disposition: likely here several more days as we make medication changes and monitor his mental status.    CODE STATUS:   Code Status and Medical Interventions:   Ordered at: 05/06/20 1533     Limited Support to NOT Include:    Intubation     Level Of Support Discussed With:    Health Care Surrogate     Code Status:    No CPR     Medical Interventions (Level of Support Prior to Arrest):    Limited     >35 minutes spent on patient care, this includes chart review, discussion with specialists, Case Management and nursing staff, as well as patient and family. more than 50% of time spent face to face counseling patient on current illness and plan of care.      Electronically signed by Pavithra Crowell DO, 05/10/20, 09:57.

## 2020-05-10 NOTE — PROGRESS NOTES
Daily Progress Note  Neurology     LOS: 4 days     Subjective     Chief Complaint: Altered mental status.    Interval History: No acute issues overnight.  Remains minimally interactive and confused.  Unable to tell me his name even after asking repeatedly.    ROS: Unable to obtain due to patient's mental status.    Objective     Vital signs in last 24 hours:  Temp:  [97.5 °F (36.4 °C)-98.4 °F (36.9 °C)] 97.9 °F (36.6 °C)  Heart Rate:  [54-70] 62  Resp:  [16-18] 16  BP: (106-172)/(51-78) 130/78         Physical Exam:                            Neurologic Exam:  Elderly male laying in bed with eyes open.  Not in apparent distress.  Does not follow commands has mask facies, very slow in processing, does not answer any questions.  Moves all 4 extremities spontaneously and to pain stimuli.  DTRs 1+ in both upper and lower extremity.  Rigidity noted in both upper extremities.       Results Review:    Results from last 7 days   Lab Units 05/08/20  0730   WBC 10*3/mm3 6.12   HEMOGLOBIN g/dL 11.5*   HEMATOCRIT % 35.3*   PLATELETS 10*3/mm3 176     Results from last 7 days   Lab Units 05/08/20  0730   SODIUM mmol/L 135*   POTASSIUM mmol/L 3.7   CHLORIDE mmol/L 100   CO2 mmol/L 25.0   BUN mg/dL 11   CREATININE mg/dL 1.05   CALCIUM mg/dL 8.0*         Assessment/Plan     1.  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  Dementia  -Mental status remains unchanged.  -Seroquel, Prozac and valproic acid has been stopped.  Gabapentin is being tapered.  -Continue with Sinemet  mg tablet, 1 tablet 3 times a day.  This will eventually need to be increased for better therapeutic effect.  -Start Aricept 5 mg at bedtime.    Natanael Bueno MD  05/10/20  12:40

## 2020-05-10 NOTE — THERAPY WOUND CARE TREATMENT
Acute Care - Wound/Debridement Treatment Note  [facility]     Patient Name: Gama Davila  : 1940  MRN: [id number]  Today's Date: 5/10/2020                  Admit Date: 2020    Visit Dx:    ICD-10-CM ICD-9-CM   1. Altered mental status, unspecified altered mental status type R41.82 780.97   2. Mild dehydration E86.0 276.51   3. Pneumonia of both lower lobes due to infectious organism (CMS/[facility]) J18.1 483.8   4. Dysphagia, unspecified type R13.10 787.20   5. Impaired mobility and ADLs Z74.09 799.89       Patient Active Problem List   Diagnosis   • [place] (hard of hearing)   • High cholesterol   • Coronary artery disease   • Hypertension   • Irregular heart beat   • Diabetes mellitus (CMS/[facility])   • Septic shock (CMS/[facility])   • NANCI (acute kidney injury) (CMS/[facility])   • AMS (altered mental status)   • Hypoxia   • Anemia   • Edema   • Acute encephalopathy   • Dementia (CMS/[facility])   • Hypomagnesemia   • Rhabdomyolysis   • Acute retention of urine   • HCAP (healthcare-associated pneumonia)   • Acute respiratory failure with hypoxia (CMS/[facility])           Wound 20 1410 coccyx Pressure Injury (Active)   Dressing Appearance dry;intact 5/10/2020  1:45 PM   Closure Adhesive bandage 5/10/2020  8:45 AM   Base maroon/purple;slough;red;yellow 5/10/2020  1:45 PM   Periwound intact;pink;warm;dry 5/10/2020  1:45 PM   Periwound Temperature warm 5/10/2020  1:45 PM   Periwound Skin Turgor soft 5/10/2020  1:45 PM   Edges irregular 5/10/2020  1:45 PM   Drainage Characteristics/Odor sanguineous 5/10/2020  1:45 PM   Drainage Amount small 5/10/2020  1:45 PM   Care, Wound irrigated with;sterile normal saline;debrided;ultrasound therapy, non contact low frequency 5/10/2020  1:45 PM   Dressing Care, Wound foam;low-adherent 5/10/2020  1:45 PM         WOUND DEBRIDEMENT  Total area of Debridement: ~5cm2  Debridement Site 1  Location- Site 1: sacrum  Selective Debridement- Site 1: Wound Surface <20cmsq  Instruments- Site 1: tweezers  Excised  Tissue Description- Site 1: minimum, slough  Bleeding- Site 1: seeping, held pressure, 1 minute            Therapy Treatment    Rehabilitation Treatment Summary     Row Name 05/10/20 1345             Treatment Time/Intention    Discipline  physical therapist  -MF      Document Type  therapy note (daily note);wound care  -MF      Subjective Information  no complaints  -MF      Recorded by [MF] John Liu, PT 05/10/20 1421      Row Name 05/10/20 1345             Positioning and Restraints    Pre-Treatment Position  in bed  -MF      Post Treatment Position  bed  -MF      In Bed  supine;call light within reach  -MF      Recorded by [MF] John Liu, PT 05/10/20 1421      Row Name 05/10/20 1345             Pain Assessment    Additional Documentation  Pain Scale: FACES Pre/Post-Treatment (Group)  -MF      Recorded by [MF] John Liu, PT 05/10/20 1421      Row Name 05/10/20 1345             Pain Scale: FACES Pre/Post-Treatment    Pain: FACES Scale, Pretreatment  0-->no hurt  -MF      Pain: FACES Scale, Post-Treatment  0-->no hurt  -MF      Recorded by [MF] John Liu, PT 05/10/20 1421      Row Name 05/10/20 1345             Wound 05/06/20 1410 coccyx Pressure Injury    Wound - Properties Group Date first assessed: 05/06/20 [AS] Time first assessed: 1410 [AS] Present on Hospital Admission: Y [AS] Location: coccyx [AS] Primary Wound Type: Pressure inj [AS] Stage, Pressure Injury: deep tissue injury [AS] Recorded by:  [AS] Cornel Sloan RN 05/06/20 1418    Dressing Appearance  dry;intact  -MF      Base  maroon/purple;slough;red;yellow  -MF      Periwound  intact;pink;warm;dry  -MF      Periwound Temperature  warm  -MF      Periwound Skin Turgor  soft  -MF      Edges  irregular  -MF      Drainage Characteristics/Odor  sanguineous  -MF      Drainage Amount  small  -MF      Care, Wound  irrigated with;sterile normal saline;debrided;ultrasound therapy, non contact low frequency 5 min MIST  -MF       Dressing Care, Wound  foam;low-adherent venelex  -MF      Recorded by [MF] John Liu, PT 05/10/20 1421      Row Name 05/10/20 1345             Coping    Observed Emotional State  calm;cooperative  -MF      Verbalized Emotional State  acceptance  -MF      Recorded by [MF] John Liu, PT 05/10/20 1421      Row Name 05/10/20 1345             Plan of Care Review    Plan of Care Reviewed With  patient  -MF      Outcome Summary  PT able to debride moderate amount of slough / biofilm from sacral wound to help decrease bioburden and improve healing potential.  PT will cont with mist therapy and debridement 2-3 x/week to help further improve skin integrity.   -MF      Recorded by [] John Liu, PT 05/10/20 1421        User Key  (r) = Recorded By, (t) = Taken By, (c) = Cosigned By    Initials Name Effective Dates Discipline     LetaJohn june, PT 06/19/15 -  PT    AS Cornel Sloan RN 06/16/16 -  Nurse          Physical Therapy Education                 Title: PT OT SLP Therapies (In Progress)     Topic: Physical Therapy (In Progress)     Point: Mobility training (In Progress)     Description:   Instruct learner(s) on safety and technique for assisting patient out of bed, chair or wheelchair.  Instruct in the proper use of assistive devices, such as walker, crutches, cane or brace.              Patient Friendly Description:   It's important to get you on your feet again, but we need to do so in a way that is safe for you. Falling has serious consequences, and your personal safety is the most important thing of all.        When it's time to get out of bed, one of us or a family member will sit next to you on the bed to give you support.     If your doctor or nurse tells you to use a walker, crutches, a cane, or a brace, be sure you use it every time you get out of bed, even if you think you don't need it.    Learning Progress Summary           Patient Acceptance, E, NR,NL by CD at 5/8/2020 4695     Comment:  BENEFITS OF OOB ACTIVITY, SAFETY WITH MOBILITY, PROGRESSION OF POC.                   Point: Home exercise program (In Progress)     Description:   Instruct learner(s) on appropriate technique for monitoring, assisting and/or progressing patient with therapeutic exercises and activities.              Learning Progress Summary           Patient Acceptance, E, NR,NL by CD at 5/8/2020 1417    Comment:  BENEFITS OF OOB ACTIVITY, SAFETY WITH MOBILITY, PROGRESSION OF POC.                   Point: Body mechanics (In Progress)     Description:   Instruct learner(s) on proper positioning and spine alignment for patient and/or caregiver during mobility tasks and/or exercises.              Learning Progress Summary           Patient Acceptance, E, NR,NL by CD at 5/8/2020 1417    Comment:  BENEFITS OF OOB ACTIVITY, SAFETY WITH MOBILITY, PROGRESSION OF POC.                   Point: Precautions (In Progress)     Description:   Instruct learner(s) on prescribed precautions during mobility and gait tasks              Learning Progress Summary           Patient Acceptance, E, NR,NL by CD at 5/8/2020 1417    Comment:  BENEFITS OF OOB ACTIVITY, SAFETY WITH MOBILITY, PROGRESSION OF POC.                               User Key     Initials Effective Dates Name Provider Type Discipline    CD 06/19/15 -  Samantha Leo, PT Physical Therapist PT                  PT Recommendation and Plan  Anticipated Discharge Disposition (PT): skilled nursing facility  Planned Therapy Interventions (PT Eval): balance training, bed mobility training, gait training, home exercise program, transfer training, strengthening  Therapy Frequency (PT Clinical Impression): daily               Outcome Summary: PT able to debride moderate amount of slough / biofilm from sacral wound to help decrease bioburden and improve healing potential.  PT will cont with mist therapy and debridement 2-3 x/week to help further improve skin integrity.   Plan of Care  Reviewed With: patient    Outcome Measures     Row Name 05/08/20 1310             How much help from another is currently needed...    Putting on and taking off regular lower body clothing?  1  -AR      Bathing (including washing, rinsing, and drying)  1  -AR      Toileting (which includes using toilet bed pan or urinal)  1  -AR      Putting on and taking off regular upper body clothing  2  -AR      Taking care of personal grooming (such as brushing teeth)  3  -AR      Eating meals  3  -AR      AM-PAC 6 Clicks Score (OT)  11  -AR         Functional Assessment    Outcome Measure Options  AM-PAC 6 Clicks Daily Activity (OT)  -AR        User Key  (r) = Recorded By, (t) = Taken By, (c) = Cosigned By    Initials Name Provider Type    Meeta Rosado, OT Occupational Therapist              Time Calculation  PT Charges     Row Name 05/10/20 1345             Time Calculation    Start Time  1345  -MF      PT Goal Re-Cert Due Date  05/17/20  -        User Key  (r) = Recorded By, (t) = Taken By, (c) = Cosigned By    Initials Name Provider Type    John Gonzalez, PT Physical Therapist           Therapy Charges for Today     Code Description Service Date Service Provider Modifiers Qty    83210715098 HC PT NLFU MIST 5/9/2020 John Liu, PT GP 1    52323861649 HC PT NLFU MIST 5/10/2020 John Liu, PT GP 1    98659945765 HC CHANDRIKA DEBRIDE OPEN WOUND UP TO 20CM 5/10/2020 John Liu, PT GP 1            PT G-Codes  Outcome Measure Options: AM-PAC 6 Clicks Basic Mobility (PT)  AM-PAC 6 Clicks Score (PT): 9  AM-PAC 6 Clicks Score (OT): 11        John Liu, PT  5/10/2020

## 2020-05-10 NOTE — PLAN OF CARE
Problem: Patient Care Overview  Goal: Plan of Care Review  Flowsheets  Taken 5/10/2020 1631  Plan of Care Reviewed With: patient  Taken 5/10/2020 1701  Outcome Summary: Pt alert, minimal verbalization and oriented to self. He completed BUE AAROM, limited with rigidity. He required mod assist complete simple grooming task. Issued good  utensils per request of nurse, pt able to complete hand-to-mouth pattern. Pt following 25% 1-step commands with cues, increased processing time required. Recommend SNF-level rehab.

## 2020-05-10 NOTE — THERAPY TREATMENT NOTE
Acute Care - Occupational Therapy Treatment Note   [facility]     Patient Name: Gama Davila  : 1940  MRN: [id number]  Today's Date: 5/10/2020             Admit Date: 2020       ICD-10-CM ICD-9-CM   1. Altered mental status, unspecified altered mental status type R41.82 780.97   2. Mild dehydration E86.0 276.51   3. Pneumonia of both lower lobes due to infectious organism (CMS/[facility]) J18.1 483.8   4. Dysphagia, unspecified type R13.10 787.20   5. Impaired mobility and ADLs Z74.09 799.89     Patient Active Problem List   Diagnosis   • [place] (hard of hearing)   • High cholesterol   • Coronary artery disease   • Hypertension   • Irregular heart beat   • Diabetes mellitus (CMS/[facility])   • Septic shock (CMS/[facility])   • NANCI (acute kidney injury) (CMS/[facility])   • AMS (altered mental status)   • Hypoxia   • Anemia   • Edema   • Acute encephalopathy   • Dementia (CMS/HCC)   • Hypomagnesemia   • Rhabdomyolysis   • Acute retention of urine   • HCAP (healthcare-associated pneumonia)   • Acute respiratory failure with hypoxia (CMS/[facility])     Past Medical History:   Diagnosis Date   • Arthritis    • Cancer (CMS/HCC)     SKIN    • Cataract    • Coronary artery disease    • Diabetes mellitus (CMS/[facility])    • High cholesterol    • [place] (hard of hearing)     PATIENT HAS HEARING AIDS   • Hypertension    • Irregular heart beat     HISTORY OF CARDIAC ABLATION IN    • Wears dentures     FULL UPPER PLATE     Past Surgical History:   Procedure Laterality Date   • BACK SURGERY      THEN AGAIN IN    • CARDIAC ABLATION     • CARDIAC SURGERY     • CATARACT EXTRACTION W/ INTRAOCULAR LENS IMPLANT Left 2017    Procedure: CATARACT PHACO EXTRACTION WITH INTRAOCULAR LENS IMPLANT LEFT WITH TORIC LENS;  Surgeon: Alma Benavidez MD;  Location: [facility];  Service:    • CATARACT EXTRACTION W/ INTRAOCULAR LENS IMPLANT Right 2017    Procedure: CATARACT PHACO EXTRACTION WITH INTRAOCULAR LENS IMPLANT RIGHT WITH TORIC LENS;   Surgeon: Alma Benavidez MD;  Location: [facility];  Service:    • CORONARY ARTERY BYPASS GRAFT  2003    SPOUSE UNSURE OF HOW MANY VESSELS   • HIATAL HERNIA REPAIR  1972   • JOINT REPLACEMENT Left     HIP - DONE BY DR DALAL   • KNEE ARTHROSCOPY Left    • NOSE SURGERY  1970    SPOUSE REPORTS FOR A BROKEN NOSE   • OTHER SURGICAL HISTORY Left     TENDON TORN LOOSE FROM BONE, LEFT ELBOW   • OTHER SURGICAL HISTORY  1986    RUPTURED DISC   • OTHER SURGICAL HISTORY  1994    NECK SURGERY FOR RUPTURED DISC   • OTHER SURGICAL HISTORY  1996    HAD SECOND NECK SURGERY   • OTHER SURGICAL HISTORY  1998    RUPTURED DISC   • OTHER SURGICAL HISTORY  2003    RUPTURED DISC   • OTHER SURGICAL HISTORY  2009    HARDWARE REMOVAL FROM BACK BY DR HAY       Therapy Treatment    Rehabilitation Treatment Summary     Row Name 05/10/20 1631 05/10/20 1345          Treatment Time/Intention    Discipline  occupational therapist  -AR  physical therapist  -MF     Document Type  therapy note (daily note)  -AR  therapy note (daily note);wound care  -MF     Subjective Information  no complaints  -AR  no complaints  -MF     Existing Precautions/Restrictions  fall alejandra   -AR  --     Recorded by [AR] Meeta Waldron, OT 05/10/20 1700 [MF] John Liu, PT 05/10/20 1421     Row Name 05/10/20 1631             Vital Signs    Pre Patient Position  Supine  -AR      Intra Patient Position  Supine  -AR      Post Patient Position  Supine  -AR      Recorded by [AR] Meeta Waldron OT 05/10/20 1700      Row Name 05/10/20 1631             Cognitive Assessment/Intervention- PT/OT    Affect/Mental Status (Cognitive)  flat/blunted affect  -AR      Orientation Status (Cognition)  oriented to;person  -AR      Follows Commands (Cognition)  follows one step commands;25-49% accuracy  -AR      Recorded by [AR] Meeta Waldron OT 05/10/20 1700      Row Name 05/10/20 1631             Safety Issues, Functional Mobility    Safety Issues Affecting  Function (Mobility)  ability to follow commands;judgment;problem solving  -AR      Impairments Affecting Function (Mobility)  balance;cognition;endurance/activity tolerance;pain;range of motion (ROM);strength;motor control  -AR      Recorded by [AR] Meeta Waldron, OT 05/10/20 1700      Row Name 05/10/20 1631             ADL Assessment/Intervention    04964 - OT Self Care/Mgmt Minutes  8  -AR      BADL Assessment/Intervention  grooming;feeding  -AR      Recorded by [AR] Meeta Waldron OT 05/10/20 1700      Row Name 05/10/20 1631             Grooming Assessment/Training    [place] Level (Grooming)  wash face, hands;moderate assist (50% patient effort)  -AR      Grooming Position  supine  -AR      Comment (Grooming)  Initially required dependence and [place] assist to initiate, pt perseverating on washing hands, then started to scrub adaptive utensils that were at bedside. Not following cues for assist.   -AR      Recorded by [AR] Meeta Waldron OT 05/10/20 1700      Row Name 05/10/20 1631             Self-Feeding Assessment/Training    [place] Level (Feeding)  liquids to mouth;minimum assist (75% patient effort)  -AR      Position (Self-Feeding)  supine  -AR      Comment (Feeding)  Issued good  utensils per nurse request and curved in at angle. Pt able to hold in R hand without issue and complete hand-to-mouth pattern.   -AR      Recorded by [AR] Meeta Waldron OT 05/10/20 1700      Row Name 05/10/20 1631             Motor Skills Assessment/Interventions    Additional Documentation  Therapeutic Exercise (Group);Therapeutic Exercise Interventions (Group)  -AR      Recorded by [AR] Meeta Waldron OT 05/10/20 1700      Row Name 05/10/20 1631             Therapeutic Exercise    49974 - OT Therapeutic Exercise Minutes  5  -AR      Recorded by [AR] Meeta Waldron OT 05/10/20 1700      Row Name 05/10/20 1631             Therapeutic Exercise    Upper Extremity Range of Motion  (Therapeutic Exercise)  shoulder flexion/extension, bilateral;elbow flexion/extension, bilateral;wrist flexion/extension, bilateral  -AR      Hand (Therapeutic Exercise)  finger flexion/extension, bilateral  -AR      Exercise Type (Therapeutic Exercise)  AAROM (active assistive range of motion)  -AR      Position (Therapeutic Exercise)  supine  -AR      Sets/Reps (Therapeutic Exercise)  1/10  -AR      Comment (Therapeutic Exercise)  Limited with rigidity  -AR      Recorded by [AR] Meeta Waldron, OT 05/10/20 1700      Row Name 05/10/20 1631 05/10/20 1345          Positioning and Restraints    Pre-Treatment Position  in bed  -AR  in bed  -MF     Post Treatment Position  bed  -AR  bed  -MF     In Bed  supine;call light within reach;encouraged to call for assist;exit alarm on;waffle boots/both  -AR  supine;call light within reach  -MF     Recorded by [AR] Meeta Waldron, OT 05/10/20 1700 [MF] John Liu, PT 05/10/20 1421     Row Name 05/10/20 1345             Pain Assessment    Additional Documentation  Pain Scale: FACES Pre/Post-Treatment (Group)  -MF      Recorded by [MF] John Liu, PT 05/10/20 1421      Row Name 05/10/20 1631 05/10/20 1345          Pain Scale: FACES Pre/Post-Treatment    Pain: FACES Scale, Pretreatment  0-->no hurt  -AR  0-->no hurt  -MF     Pain: FACES Scale, Post-Treatment  0-->no hurt  -AR  0-->no hurt  -MF     Recorded by [AR] Meeta Waldron, OT 05/10/20 1700 [MF] John Liu, PT 05/10/20 1421     Row Name 05/10/20 1345             Wound 05/06/20 1410 coccyx Pressure Injury    Wound - Properties Group Date first assessed: 05/06/20 [AS] Time first assessed: 1410 [AS] Present on Hospital Admission: Y [AS] Location: coccyx [AS] Primary Wound Type: Pressure inj [AS] Stage, Pressure Injury: deep tissue injury [AS] Recorded by:  [AS] Cornel Sloan RN 05/06/20 1418    Dressing Appearance  dry;intact  -MF      Base  maroon/purple;slough;red;yellow  -MF       Periwound  intact;pink;warm;dry  -MF      Periwound Temperature  warm  -MF      Periwound Skin Turgor  soft  -MF      Edges  irregular  -MF      Drainage Characteristics/Odor  sanguineous  -MF      Drainage Amount  small  -MF      Care, Wound  irrigated with;sterile normal saline;debrided;ultrasound therapy, non contact low frequency 5 min MIST  -MF      Dressing Care, Wound  foam;low-adherent venelex  -MF      Recorded by [MF] John Liu, PT 05/10/20 1421      Row Name 05/10/20 1345             Coping    Observed Emotional State  calm;cooperative  -MF      Verbalized Emotional State  acceptance  -MF      Recorded by [MF] John Liu, PT 05/10/20 1421      Row Name 05/10/20 1631 05/10/20 1345          Plan of Care Review    Plan of Care Reviewed With  patient  -AR  patient  -MF     Progress  no change  -AR  --     Outcome Summary  --  PT able to debride moderate amount of slough / biofilm from sacral wound to help decrease bioburden and improve healing potential.  PT will cont with mist therapy and debridement 2-3 x/week to help further improve skin integrity.   -MF     Recorded by [AR] Meeta Waldron, OT 05/10/20 1700 [MF] John Liu, PT 05/10/20 1421     Row Name 05/10/20 1631             Outcome Summary/Treatment Plan (OT)    Daily Summary of Progress (OT)  progress toward functional goals is gradual  -AR      Anticipated Discharge Disposition (OT)  skilled nursing facility  -AR      Recorded by [AR] Meeta Waldron, OT 05/10/20 1700        User Key  (r) = Recorded By, (t) = Taken By, (c) = Cosigned By    Initials Name Effective Dates Discipline     John Liu, PT 06/19/15 -  PT    AR Meeta Waldron, OT 06/22/15 -  OT    AS Cornel Sloan RN 06/16/16 -  Nurse        Wound 05/06/20 1410 coccyx Pressure Injury (Active)   Dressing Appearance dry;intact 5/10/2020  1:45 PM   Closure Adhesive bandage 5/10/2020  8:45 AM   Base maroon/purple;slough;red;yellow 5/10/2020  1:45  PM   Periwound intact;pink;warm;dry 5/10/2020  1:45 PM   Periwound Temperature warm 5/10/2020  1:45 PM   Periwound Skin Turgor soft 5/10/2020  1:45 PM   Edges irregular 5/10/2020  1:45 PM   Drainage Characteristics/Odor sanguineous 5/10/2020  1:45 PM   Drainage Amount small 5/10/2020  1:45 PM   Care, Wound irrigated with;sterile normal saline;debrided;ultrasound therapy, non contact low frequency 5/10/2020  1:45 PM   Dressing Care, Wound foam;low-adherent 5/10/2020  1:45 PM       Occupational Therapy Education                 Title: PT OT SLP Therapies (In Progress)     Topic: Occupational Therapy (In Progress)     Point: ADL training (In Progress)     Description:   Instruct learner(s) on proper safety adaptation and remediation techniques during self care or transfers.   Instruct in proper use of assistive devices.              Learning Progress Summary           Patient Acceptance, E,D, NL by AR at 5/10/2020 1631                   Point: Precautions (In Progress)     Description:   Instruct learner(s) on prescribed precautions during self-care and functional transfers.              Learning Progress Summary           Patient Acceptance, E,D, NL by AR at 5/10/2020 1631                   Point: Body mechanics (In Progress)     Description:   Instruct learner(s) on proper positioning and spine alignment during self-care, functional mobility activities and/or exercises.              Learning Progress Summary           Patient Acceptance, E,D, NL by AR at 5/10/2020 1631                               User Key     Initials Effective Dates Name Provider Type Discipline    AR 06/22/15 -  Meeta Waldron, OT Occupational Therapist OT                OT Recommendation and Plan  Outcome Summary/Treatment Plan (OT)  Daily Summary of Progress (OT): progress toward functional goals is gradual  Anticipated Discharge Disposition (OT): skilled nursing facility  Therapy Frequency (OT Eval): daily  Daily Summary of Progress (OT):  progress toward functional goals is gradual  Plan of Care Review  Plan of Care Reviewed With: patient  Plan of Care Reviewed With: patient  Outcome Summary: Pt alert, minimal verbalization and oriented to self. He completed BUE AAROM, limited with rigidity. He required mod assist complete simple grooming task. Issued good  utensils per request of nurse, pt able to complete hand-to-mouth pattern. Pt following 25% 1-step commands with cues, increased processing time required. Recommend SNF-level rehab.  Outcome Measures     Row Name 05/10/20 1631 05/08/20 1310          How much help from another is currently needed...    Putting on and taking off regular lower body clothing?  1  -AR  1  -AR     Bathing (including washing, rinsing, and drying)  1  -AR  1  -AR     Toileting (which includes using toilet bed pan or urinal)  1  -AR  1  -AR     Putting on and taking off regular upper body clothing  2  -AR  2  -AR     Taking care of personal grooming (such as brushing teeth)  2  -AR  3  -AR     Eating meals  2  -AR  3  -AR     AM-PAC 6 Clicks Score (OT)  9  -AR  11  -AR        Functional Assessment    Outcome Measure Options  AM-PAC 6 Clicks Daily Activity (OT)  -AR  AM-PAC 6 Clicks Daily Activity (OT)  -AR       User Key  (r) = Recorded By, (t) = Taken By, (c) = Cosigned By    Initials Name Provider Type    Meeta Rosado, OT Occupational Therapist           Time Calculation:   Time Calculation- OT     Row Name 05/10/20 1631             Time Calculation- OT    OT Start Time  1631  -AR      Total Timed Code Minutes- OT  13 minute(s)  -AR      OT Received On  05/10/20  -AR      OT Goal Re-Cert Due Date  05/18/20  -AR         Timed Charges    47538 - OT Therapeutic Exercise Minutes  5  -AR      19634 - OT Self Care/Mgmt Minutes  8  -AR        User Key  (r) = Recorded By, (t) = Taken By, (c) = Cosigned By    Initials Name Provider Type    Meeta Rosado, OT Occupational Therapist        Therapy Charges for  Today     Code Description Service Date Service Provider Modifiers Qty    81502195153 HC OT SELF CARE/MGMT/TRAIN EA 15 MIN 5/10/2020 Meeta Waldron OT GO 1               Meeta Waldron OT  5/10/2020

## 2020-05-11 LAB
ANION GAP SERPL CALCULATED.3IONS-SCNC: 9 MMOL/L (ref 5–15)
BACTERIA SPEC AEROBE CULT: NORMAL
BACTERIA SPEC AEROBE CULT: NORMAL
BASOPHILS # BLD AUTO: 0.03 10*3/MM3 (ref 0–0.2)
BASOPHILS NFR BLD AUTO: 0.4 % (ref 0–1.5)
BUN BLD-MCNC: 12 MG/DL (ref 8–23)
BUN/CREAT SERPL: 10.2 (ref 7–25)
CALCIUM SPEC-SCNC: 8.5 MG/DL (ref 8.6–10.5)
CHLORIDE SERPL-SCNC: 97 MMOL/L (ref 98–107)
CO2 SERPL-SCNC: 28 MMOL/L (ref 22–29)
CREAT BLD-MCNC: 1.18 MG/DL (ref 0.76–1.27)
DEPRECATED RDW RBC AUTO: 48.5 FL (ref 37–54)
EOSINOPHIL # BLD AUTO: 0.04 10*3/MM3 (ref 0–0.4)
EOSINOPHIL NFR BLD AUTO: 0.6 % (ref 0.3–6.2)
ERYTHROCYTE [DISTWIDTH] IN BLOOD BY AUTOMATED COUNT: 14 % (ref 12.3–15.4)
GFR SERPL CREATININE-BSD FRML MDRD: 60 ML/MIN/1.73
GLUCOSE BLD-MCNC: 98 MG/DL (ref 65–99)
GLUCOSE BLDC GLUCOMTR-MCNC: 139 MG/DL (ref 70–130)
GLUCOSE BLDC GLUCOMTR-MCNC: 172 MG/DL (ref 70–130)
GLUCOSE BLDC GLUCOMTR-MCNC: 208 MG/DL (ref 70–130)
GLUCOSE BLDC GLUCOMTR-MCNC: 94 MG/DL (ref 70–130)
HCT VFR BLD AUTO: 34.5 % (ref 37.5–51)
HGB BLD-MCNC: 11.3 G/DL (ref 13–17.7)
IMM GRANULOCYTES # BLD AUTO: 0.06 10*3/MM3 (ref 0–0.05)
IMM GRANULOCYTES NFR BLD AUTO: 0.8 % (ref 0–0.5)
LYMPHOCYTES # BLD AUTO: 2.27 10*3/MM3 (ref 0.7–3.1)
LYMPHOCYTES NFR BLD AUTO: 31.2 % (ref 19.6–45.3)
MCH RBC QN AUTO: 30.9 PG (ref 26.6–33)
MCHC RBC AUTO-ENTMCNC: 32.8 G/DL (ref 31.5–35.7)
MCV RBC AUTO: 94.3 FL (ref 79–97)
MONOCYTES # BLD AUTO: 1.41 10*3/MM3 (ref 0.1–0.9)
MONOCYTES NFR BLD AUTO: 19.4 % (ref 5–12)
NEUTROPHILS # BLD AUTO: 3.46 10*3/MM3 (ref 1.7–7)
NEUTROPHILS NFR BLD AUTO: 47.6 % (ref 42.7–76)
NRBC BLD AUTO-RTO: 0 /100 WBC (ref 0–0.2)
PLATELET # BLD AUTO: 171 10*3/MM3 (ref 140–450)
PMV BLD AUTO: 9.8 FL (ref 6–12)
POTASSIUM BLD-SCNC: 3.8 MMOL/L (ref 3.5–5.2)
RBC # BLD AUTO: 3.66 10*6/MM3 (ref 4.14–5.8)
SODIUM BLD-SCNC: 134 MMOL/L (ref 136–145)
WBC NRBC COR # BLD: 7.27 10*3/MM3 (ref 3.4–10.8)

## 2020-05-11 PROCEDURE — 25010000002 ENOXAPARIN PER 10 MG: Performed by: FAMILY MEDICINE

## 2020-05-11 PROCEDURE — 80048 BASIC METABOLIC PNL TOTAL CA: CPT | Performed by: INTERNAL MEDICINE

## 2020-05-11 PROCEDURE — 85025 COMPLETE CBC W/AUTO DIFF WBC: CPT | Performed by: INTERNAL MEDICINE

## 2020-05-11 PROCEDURE — 97530 THERAPEUTIC ACTIVITIES: CPT

## 2020-05-11 PROCEDURE — 82962 GLUCOSE BLOOD TEST: CPT

## 2020-05-11 PROCEDURE — 99231 SBSQ HOSP IP/OBS SF/LOW 25: CPT | Performed by: PSYCHIATRY & NEUROLOGY

## 2020-05-11 PROCEDURE — 25010000002 PIPERACILLIN SOD-TAZOBACTAM PER 1 G: Performed by: FAMILY MEDICINE

## 2020-05-11 PROCEDURE — 99232 SBSQ HOSP IP/OBS MODERATE 35: CPT | Performed by: INTERNAL MEDICINE

## 2020-05-11 RX ORDER — GABAPENTIN 100 MG/1
100 CAPSULE ORAL NIGHTLY
Status: DISCONTINUED | OUTPATIENT
Start: 2020-05-11 | End: 2020-05-12

## 2020-05-11 RX ORDER — DONEPEZIL HYDROCHLORIDE 10 MG/1
10 TABLET, FILM COATED ORAL NIGHTLY
Status: DISCONTINUED | OUTPATIENT
Start: 2020-05-11 | End: 2020-05-13 | Stop reason: HOSPADM

## 2020-05-11 RX ORDER — NYSTATIN 100000 [USP'U]/G
POWDER TOPICAL EVERY 12 HOURS SCHEDULED
Status: DISCONTINUED | OUTPATIENT
Start: 2020-05-11 | End: 2020-05-13 | Stop reason: HOSPADM

## 2020-05-11 RX ADMIN — NYSTATIN: 100000 POWDER TOPICAL at 12:17

## 2020-05-11 RX ADMIN — TAZOBACTAM SODIUM AND PIPERACILLIN SODIUM 3.38 G: 375; 3 INJECTION, SOLUTION INTRAVENOUS at 00:12

## 2020-05-11 RX ADMIN — TAZOBACTAM SODIUM AND PIPERACILLIN SODIUM 3.38 G: 375; 3 INJECTION, SOLUTION INTRAVENOUS at 16:06

## 2020-05-11 RX ADMIN — TAZOBACTAM SODIUM AND PIPERACILLIN SODIUM 3.38 G: 375; 3 INJECTION, SOLUTION INTRAVENOUS at 09:11

## 2020-05-11 RX ADMIN — ASPIRIN 81 MG: 81 TABLET, COATED ORAL at 09:12

## 2020-05-11 RX ADMIN — NYSTATIN: 100000 POWDER TOPICAL at 22:40

## 2020-05-11 RX ADMIN — GABAPENTIN 100 MG: 100 CAPSULE ORAL at 22:41

## 2020-05-11 RX ADMIN — CARBIDOPA AND LEVODOPA 1 TABLET: 25; 100 TABLET ORAL at 17:59

## 2020-05-11 RX ADMIN — CASTOR OIL AND BALSAM, PERU: 788; 87 OINTMENT TOPICAL at 22:42

## 2020-05-11 RX ADMIN — SODIUM CHLORIDE, PRESERVATIVE FREE 10 ML: 5 INJECTION INTRAVENOUS at 09:14

## 2020-05-11 RX ADMIN — INSULIN LISPRO 3 UNITS: 100 INJECTION, SOLUTION INTRAVENOUS; SUBCUTANEOUS at 17:59

## 2020-05-11 RX ADMIN — CASTOR OIL AND BALSAM, PERU: 788; 87 OINTMENT TOPICAL at 10:00

## 2020-05-11 RX ADMIN — ENOXAPARIN SODIUM 40 MG: 80 INJECTION SUBCUTANEOUS at 17:59

## 2020-05-11 RX ADMIN — DONEPEZIL HYDROCHLORIDE 10 MG: 10 TABLET, FILM COATED ORAL at 22:41

## 2020-05-11 RX ADMIN — ATORVASTATIN CALCIUM 80 MG: 40 TABLET, FILM COATED ORAL at 22:41

## 2020-05-11 RX ADMIN — CITALOPRAM HYDROBROMIDE 20 MG: 20 TABLET ORAL at 09:12

## 2020-05-11 RX ADMIN — PANTOPRAZOLE SODIUM 40 MG: 40 TABLET, DELAYED RELEASE ORAL at 09:12

## 2020-05-11 RX ADMIN — SODIUM CHLORIDE, PRESERVATIVE FREE 10 ML: 5 INJECTION INTRAVENOUS at 22:41

## 2020-05-11 RX ADMIN — CARBIDOPA AND LEVODOPA 1 TABLET: 25; 100 TABLET ORAL at 14:29

## 2020-05-11 RX ADMIN — CARBIDOPA AND LEVODOPA 1 TABLET: 25; 100 TABLET ORAL at 09:12

## 2020-05-11 RX ADMIN — GABAPENTIN 100 MG: 100 CAPSULE ORAL at 09:12

## 2020-05-11 NOTE — PLAN OF CARE
Problem: Patient Care Overview  Goal: Plan of Care Review  Outcome: Ongoing (interventions implemented as appropriate)  Flowsheets (Taken 5/11/2020 8269)  Outcome Summary: Patient continues to be limited by congition and generalized weakness. He completed B rolling Dep x2, supine>sit with Max A x2, and completed squat pivot transfer to the recliner with Max A x2 and BUE support with B knees blocked. Pt following approx. 25% of commands today. Will continue per PT POC.

## 2020-05-11 NOTE — THERAPY TREATMENT NOTE
Patient Name: Gama Davila  : 1940    MRN: [id number]                              Today's Date: 2020       Admit Date: 2020    Visit Dx:     ICD-10-CM ICD-9-CM   1. Altered mental status, unspecified altered mental status type R41.82 780.97   2. Mild dehydration E86.0 276.51   3. Pneumonia of both lower lobes due to infectious organism (CMS/HCC) J18.1 483.8   4. Dysphagia, unspecified type R13.10 787.20   5. Impaired mobility and ADLs Z74.09 799.89     Patient Active Problem List   Diagnosis   • [place] (hard of hearing)   • High cholesterol   • Coronary artery disease   • Hypertension   • Irregular heart beat   • Diabetes mellitus (CMS/HCC)   • Septic shock (CMS/HCC)   • NANCI (acute kidney injury) (CMS/HCC)   • AMS (altered mental status)   • Hypoxia   • Anemia   • Edema   • Acute encephalopathy   • Dementia (CMS/HCC)   • Hypomagnesemia   • Rhabdomyolysis   • Acute retention of urine   • HCAP (healthcare-associated pneumonia)   • Acute respiratory failure with hypoxia (CMS/HCC)     Past Medical History:   Diagnosis Date   • Arthritis    • Cancer (CMS/HCC)     SKIN    • Cataract    • Coronary artery disease    • Diabetes mellitus (CMS/HCC)    • High cholesterol    • [place] (hard of hearing)     PATIENT HAS HEARING AIDS   • Hypertension    • Irregular heart beat     HISTORY OF CARDIAC ABLATION IN    • Wears dentures     FULL UPPER PLATE     Past Surgical History:   Procedure Laterality Date   • BACK SURGERY      THEN AGAIN IN    • CARDIAC ABLATION     • CARDIAC SURGERY     • CATARACT EXTRACTION W/ INTRAOCULAR LENS IMPLANT Left 2017    Procedure: CATARACT PHACO EXTRACTION WITH INTRAOCULAR LENS IMPLANT LEFT WITH TORIC LENS;  Surgeon: Alma Benavidez MD;  Location: [facility];  Service:    • CATARACT EXTRACTION W/ INTRAOCULAR LENS IMPLANT Right 2017    Procedure: CATARACT PHACO EXTRACTION WITH INTRAOCULAR LENS IMPLANT RIGHT WITH TORIC LENS;  Surgeon: Alma Orozco  MD Reema;  Location: [facility] OR;  Service:    • CORONARY ARTERY BYPASS GRAFT  2003    SPOUSE UNSURE OF HOW MANY VESSELS   • HIATAL HERNIA REPAIR  1972   • JOINT REPLACEMENT Left     HIP - DONE BY DR DALAL   • KNEE ARTHROSCOPY Left    • NOSE SURGERY  1970    SPOUSE REPORTS FOR A BROKEN NOSE   • OTHER SURGICAL HISTORY Left     TENDON TORN LOOSE FROM BONE, LEFT ELBOW   • OTHER SURGICAL HISTORY  1986    RUPTURED DISC   • OTHER SURGICAL HISTORY  1994    NECK SURGERY FOR RUPTURED DISC   • OTHER SURGICAL HISTORY  1996    HAD SECOND NECK SURGERY   • OTHER SURGICAL HISTORY  1998    RUPTURED DISC   • OTHER SURGICAL HISTORY  2003    RUPTURED DISC   • OTHER SURGICAL HISTORY  2009    HARDWARE REMOVAL FROM BACK BY DR HAY     General Information     Row Name 05/11/20 0929          PT Evaluation Time/Intention    Document Type  therapy note (daily note)  -NS     Mode of Treatment  individual therapy;physical therapy  -NS     Row Name 05/11/20 0929          General Information    Patient Profile Reviewed?  yes  -NS     Existing Precautions/Restrictions  fall;other (see comments) aleajndra, dementia  -NS     Row Name 05/11/20 0929          Cognitive Assessment/Intervention- PT/OT    Orientation Status (Cognition)  oriented to;person  -NS     Cognitive Assessment/Intervention Comment  Patient often staring into space and is slow to respond. Pt followed approx. 25% of commands today.   -NS     Row Name 05/11/20 0929          Safety Issues, Functional Mobility    Safety Issues Affecting Function (Mobility)  ability to follow commands;judgment;sequencing abilities;insight into deficits/self awareness  -NS     Impairments Affecting Function (Mobility)  balance;cognition;endurance/activity tolerance;pain;range of motion (ROM);strength;motor control;postural/trunk control  -NS       User Key  (r) = Recorded By, (t) = Taken By, (c) = Cosigned By    Initials Name Provider Type    NS Mala Gamble PT Physical Therapist        Mobility     Row  Name 05/11/20 0929          Bed Mobility Assessment/Treatment    Bed Mobility Assessment/Treatment  supine-sit;rolling left;rolling right  -NS     Rolling Left [place] (Bed Mobility)  dependent (less than 25% patient effort);2 person assist;verbal cues  -NS     Rolling Right [place] (Bed Mobility)  dependent (less than 25% patient effort);2 person assist;verbal cues  -NS     Supine-Sit [place] (Bed Mobility)  maximum assist (25% patient effort);2 person assist;verbal cues  -NS     Assistive Device (Bed Mobility)  draw sheet;head of bed elevated  -NS     Comment (Bed Mobility)  Patient completed B rolling for pericare. Dependent for pericare. Supine>sit with Max A x2 and VCs for sequencing bed mobility.   -NS     Row Name 05/11/20 0929          Transfer Assessment/Treatment    Comment (Transfers)  VCs for hand placement. B knees blocked due to weakness and VCs for sequencing. Patient completed a squat pivot transfer to the chair; unable to come to full standing. Rigidity noted in BLEs.   -NS     Row Name 05/11/20 0929          Bed-Chair Transfer    Bed-Chair [place] (Transfers)  maximum assist (25% patient effort);2 person assist;verbal cues  -NS     Assistive Device (Bed-Chair Transfers)  other (see comments) BUE support  -NS     Row Name 05/11/20 0929          Sit-Stand Transfer    Sit-Stand [place] (Transfers)  maximum assist (25% patient effort);2 person assist;verbal cues  -NS     Assistive Device (Sit-Stand Transfers)  other (see comments) BUE support  -NS       User Key  (r) = Recorded By, (t) = Taken By, (c) = Cosigned By    Initials Name Provider Type    Mala Gaston PT Physical Therapist        Obj/Interventions     Row Name 05/11/20 0929          Therapeutic Exercise    Lower Extremity (Therapeutic Exercise)  marching while seated;LAQ (long arc quad), bilateral  -NS     Lower Extremity Range of Motion (Therapeutic Exercise)  ankle dorsiflexion/plantar flexion, bilateral  -NS      Exercise Type (Therapeutic Exercise)  AAROM (active assistive range of motion)  -NS     Position (Therapeutic Exercise)  seated  -NS     Sets/Reps (Therapeutic Exercise)  1x10  -NS     Expected Outcome (Therapeutic Exercise)  facilitate normal movement patterns;improve neuromuscular control  -NS     Comment (Therapeutic Exercise)  Cues for technique, rigidity noted.   -NS     Row Name 05/11/20 0929          Static Sitting Balance    Level of [place] (Unsupported Sitting, Static Balance)  moderate assist, 50 to 74% patient effort  -NS     Sitting Position (Unsupported Sitting, Static Balance)  sitting on edge of bed  -NS     Time Able to Maintain Position (Unsupported Sitting, Static Balance)  30 to 45 seconds  -NS     Comment (Unsupported Sitting, Static Balance)  Pt leaning to L  -NS     Row Name 05/11/20 0929          Static Standing Balance    Level of [place] (Supported Standing, Static Balance)  maximal assist, 25 to 49% patient effort;2 person assist  -NS     Time Able to Maintain Position (Supported Standing, Static Balance)  less than 15 seconds  -NS     Assistive Device Utilized (Supported Standing, Static Balance)  other (see comments) BUE support  -NS     Row Name 05/11/20 0929          Dynamic Standing Balance    Level of [place], Reaches Outside Midline (Standing, Dynamic Balance)  maximal assist, 25 to 49% patient effort;2 person assist  -NS     Time Able to Maintain Position, Reaches Outside Midline (Standing, Dynamic Balance)  less than 15 seconds  -NS     Assistive Device Utilized (Supported Standing, Dynamic Balance)  other (see comments) BUE support  -NS       User Key  (r) = Recorded By, (t) = Taken By, (c) = Cosigned By    Initials Name Provider Type    Mala Gaston PT Physical Therapist        Goals/Plan    No documentation.       Clinical Impression     Row Name 05/11/20 0929          Pain Assessment    Additional Documentation  Pain Scale: FACES Pre/Post-Treatment  (Group)  -NS     Row Name 05/11/20 0929          Pain Scale: FACES Pre/Post-Treatment    Pain: FACES Scale, Pretreatment  0-->no hurt  -NS     Pain: FACES Scale, Post-Treatment  0-->no hurt  -NS     Row Name 05/11/20 0929          Plan of Care Review    Plan of Care Reviewed With  patient  -NS     Progress  no change  -NS     [facility] Name 05/11/20 0929          Vital Signs    Pre Systolic BP Rehab  -- VSS- RN cleared for PT treatment  -NS     Pre Patient Position  Supine  -NS     Intra Patient Position  Standing  -NS     Post Patient Position  Sitting  -NS     Row Name 05/11/20 0929          Positioning and Restraints    Pre-Treatment Position  in bed  -NS     Post Treatment Position  chair  -NS     In Bed  supine;call light within reach;encouraged to call for assist;exit alarm on;heels elevated  -NS       User Key  (r) = Recorded By, (t) = Taken By, (c) = Cosigned By    Initials Name Provider Type    Mala Gaston PT Physical Therapist        Outcome Measures     Row Name 05/11/20 0929          How much help from another person do you currently need...    Turning from your back to your side while in flat bed without using bedrails?  1  -NS     Moving from lying on back to sitting on the side of a flat bed without bedrails?  2  -NS     Moving to and from a bed to a chair (including a wheelchair)?  2  -NS     Standing up from a chair using your arms (e.g., wheelchair, bedside chair)?  2  -NS     Climbing 3-5 steps with a railing?  1  -NS     To walk in hospital room?  1  -NS     AM-PAC 6 Clicks Score (PT)  9  -NS     Row Name 05/11/20 0929          Functional Assessment    Outcome Measure Options  AM-PAC 6 Clicks Basic Mobility (PT)  -NS       User Key  (r) = Recorded By, (t) = Taken By, (c) = Cosigned By    Initials Name Provider Type    Mala Gaston PT Physical Therapist        Physical Therapy Education                 Title: PT OT SLP Therapies (In Progress)     Topic: Physical Therapy (In Progress)      Point: Mobility training (In Progress)     Description:   Instruct learner(s) on safety and technique for assisting patient out of bed, chair or wheelchair.  Instruct in the proper use of assistive devices, such as walker, crutches, cane or brace.              Patient Friendly Description:   It's important to get you on your feet again, but we need to do so in a way that is safe for you. Falling has serious consequences, and your personal safety is the most important thing of all.        When it's time to get out of bed, one of us or a family member will sit next to you on the bed to give you support.     If your doctor or nurse tells you to use a walker, crutches, a cane, or a brace, be sure you use it every time you get out of bed, even if you think you don't need it.    Learning Progress Summary           Patient Acceptance, E, NL,NR by NS at 5/11/2020 1039    Acceptance, E, NR,NL by CD at 5/8/2020 1417    Comment:  BENEFITS OF OOB ACTIVITY, SAFETY WITH MOBILITY, PROGRESSION OF POC.                   Point: Home exercise program (In Progress)     Description:   Instruct learner(s) on appropriate technique for monitoring, assisting and/or progressing patient with therapeutic exercises and activities.              Learning Progress Summary           Patient Acceptance, E, NL,NR by NS at 5/11/2020 1039    Acceptance, E, NR,NL by CD at 5/8/2020 1417    Comment:  BENEFITS OF OOB ACTIVITY, SAFETY WITH MOBILITY, PROGRESSION OF POC.                   Point: Body mechanics (In Progress)     Description:   Instruct learner(s) on proper positioning and spine alignment for patient and/or caregiver during mobility tasks and/or exercises.              Learning Progress Summary           Patient Acceptance, E, NL,NR by NS at 5/11/2020 1039    Acceptance, E, NR,NL by CD at 5/8/2020 1417    Comment:  BENEFITS OF OOB ACTIVITY, SAFETY WITH MOBILITY, PROGRESSION OF POC.                   Point: Precautions (In Progress)      Description:   Instruct learner(s) on prescribed precautions during mobility and gait tasks              Learning Progress Summary           Patient Acceptance, E, NL,NR by NS at 5/11/2020 1039    Acceptance, E, NR,NL by CD at 5/8/2020 1417    Comment:  BENEFITS OF OOB ACTIVITY, SAFETY WITH MOBILITY, PROGRESSION OF POC.                               User Key     Initials Effective Dates Name Provider Type Discipline    CD 06/19/15 -  Samantha Leo PT Physical Therapist PT    NS 09/10/19 -  Mala Gamble PT Physical Therapist PT              PT Recommendation and Plan     Outcome Summary/Treatment Plan (PT)  Anticipated Discharge Disposition (PT): skilled nursing facility  Plan of Care Reviewed With: patient  Progress: no change  Outcome Summary: Patient continues to be limited by congition and generalized weakness. He completed B rolling Dep x2, supine>sit with Max A x2, and completed squat pivot transfer to the recliner with Max A x2 and BUE support with B knees blocked. Pt following approx. 25% of commands today. Will continue per PT POC.     Time Calculation:   PT Charges     Row Name 05/11/20 0929             Time Calculation    Start Time  0929  -NS      PT Received On  05/11/20  -NS      PT Goal Re-Cert Due Date  05/17/20  -NS         Timed Charges    72397 - PT Therapeutic Activity Minutes  31  -NS        User Key  (r) = Recorded By, (t) = Taken By, (c) = Cosigned By    Initials Name Provider Type    Mala Gaston PT Physical Therapist        Therapy Charges for Today     Code Description Service Date Service Provider Modifiers Qty    05524761645  PT THERAPEUTIC ACT EA 15 MIN 5/11/2020 Mala Gamble, PT GP 2    [id number]  PT THER SUPP EA 15 MIN 5/11/2020 Mala Gamble, PT GP 2          PT G-Codes  Outcome Measure Options: AM-PAC 6 Clicks Basic Mobility (PT)  AM-PAC 6 Clicks Score (PT): 9  AM-PAC 6 Clicks Score (OT): 9    Mala Gamble PT  5/11/2020

## 2020-05-11 NOTE — PROGRESS NOTES
Neurology       Patient Care Team:  Provider, No Known as PCP - General    Chief complaint: Altered mental status, parkinsonism    History: Patient shown little if any improvement.    He still quite confused.    He is on Celexa 20 mg daily and gabapentin 100 mg twice daily.    Other sedating medicines have been discontinued.      Past Medical History:   Diagnosis Date   • Arthritis    • Cancer (CMS/HCC)     SKIN    • Cataract    • Coronary artery disease    • Diabetes mellitus (CMS/HCC)    • High cholesterol    • [place] (hard of hearing)     PATIENT HAS HEARING AIDS   • Hypertension    • Irregular heart beat     HISTORY OF CARDIAC ABLATION IN 2003   • Wears dentures     FULL UPPER PLATE       Vital Signs   Vitals:    05/10/20 1910 05/11/20 0342 05/11/20 0759 05/11/20 1210   BP: 146/77  138/82 127/81   BP Location: Left arm  Right arm Right arm   Patient Position: Lying  Lying Sitting   Pulse: 79      Resp: 18 18 18 18   Temp: 98.3 °F (36.8 °C) 98.3 °F (36.8 °C) 97.7 °F (36.5 °C) 97.5 °F (36.4 °C)   TempSrc: Oral Oral Oral Oral   SpO2: 96%      Weight:       Height:           Physical Exam:   General: Sleeping and easily aroused.    No complaints              Neuro: Masked facies.    Flat affect.    Minimal speech.    Mild cogwheel rigidity which seems to be improved.  No tremor is seen today.        Results Review:  Reviewed  Results from last 7 days   Lab Units 05/11/20  0416   WBC 10*3/mm3 7.27   HEMOGLOBIN g/dL 11.3*   HEMATOCRIT % 34.5*   PLATELETS 10*3/mm3 171     Results from last 7 days   Lab Units 05/11/20  0416 05/08/20  0730 05/07/20  0544 05/06/20  1217   SODIUM mmol/L 134* 135* 139 140   POTASSIUM mmol/L 3.8 3.7 3.5 4.0   CHLORIDE mmol/L 97* 100 104 102   CO2 mmol/L 28.0 25.0 25.0 28.0   BUN mg/dL 12 11 14 20   CREATININE mg/dL 1.18 1.05 1.09 1.10   CALCIUM mg/dL 8.5* 8.0* 7.9* 8.5*   BILIRUBIN mg/dL  --   --  0.3 0.4   ALK PHOS U/L  --   --  46 51   ALT (SGPT) U/L  --   --  12 14   AST (SGOT) U/L  --   --   21 27   GLUCOSE mg/dL 98 120* 93 101*       Imaging Results (Last 24 Hours)     ** No results found for the last 24 hours. **          Assessment:  Parkinsonism    Dementia    Ongoing encephalopathy    Plan:  Decrease gabapentin to 100 mg at bedtime.    Increase Aricept to 10 mg daily.        Comment:  Guarded prognosis         I discussed the patients findings and my recommendations with patient and nursing staff    Rebel Perry MD  05/11/20  13:30

## 2020-05-11 NOTE — DISCHARGE PLACEMENT REQUEST
"Gama Davila (79 y.o. Male)     From [place],   [phone]      Date of Birth Social Security Number Address Home Phone MRN    1940  [address] [place] [address] [place] [id number]  [id number]    Taoism Marital Status          Congregation        Admission Date Admission Type Admitting Provider Attending Provider Department, Room/Bed    20 Emergency Pavithra Crowell DO Barbato, Hayley R, DO [facility] 3E, S335/1    Discharge Date Discharge Disposition Discharge Destination                       Attending Provider:  Pavithra Crowell DO    Allergies:  Phenergan [Promethazine Hcl]    Isolation:  None   Infection:  None   Code Status:  No CPR    Ht:  172.7 cm (68\")   Wt:  68 kg (150 lb)    Admission Cmt:  None   Principal Problem:  HCAP (healthcare-associated pneumonia) [J18.9]                 Active Insurance as of 2020     Primary Coverage     Payor Plan Insurance Group Employer/Plan Group    MEDICARE MEDICARE A & B      Payor Plan Address Payor Plan Phone Number Payor Plan Fax Number Effective Dates    [address] [phone]  2001 - None Entered    [place] [id number]       Subscriber Name Subscriber Birth Date Member ID       GAMA DAVILA 1940 [id number]                 Emergency Contacts      (Rel.) Home Phone Work Phone Mobile Phone    Taryn Davila (Spouse) -- -- [phone]    CEFERINO SEVILLA (Daughter) -- -- [phone]               History & Physical      Eboni Bowles DO at 20 [facility]5              [facility] Medicine Services  HISTORY AND PHYSICAL    Patient Name: Gama Davila  : 1940  MRN: [id number]  Primary Care Physician: Provider, No Known  Date of admission: 2020      Subjective   Subjective     Chief Complaint:  AMS    HPI:  Gama Davila is a 79 y.o. male with PMH Dementia, HTN, CAD, A fib, DM II resident of [place] was sent for AMS. Per his daughter, " "whom has been in contact with facility, he has not been back to baseline in since his last admission. He has been especially confused and has been \"staring off\" and less \"spunky\" for the past 5 days. He also has not been eating as much as well. Patient is oriented to self only is not able to answer questions. He did make eye contact and say, \"I am Mr Davila.\" His daughter is concerned that he is having depression symptoms due to his lack of family contact.   In the ED, CT of head showed no acute findings. CXR showed bibasilar opacifications concerning for atelectasis versus  early airspace disease without effusion or overt edema.   He will be admitted to hospitalist service for futher treatment and evaluation of AMS and pneumonia.     Review of Systems   Unable to obtain due to mental status.   All other systems reviewed and are negative.     Personal History     Past Medical History:   Diagnosis Date   • Arthritis    • Cancer (CMS/HCC)     SKIN    • Cataract    • Coronary artery disease    • Diabetes mellitus (CMS/HCC)    • High cholesterol    • [place] (hard of hearing)     PATIENT HAS HEARING AIDS   • Hypertension    • Irregular heart beat     HISTORY OF CARDIAC ABLATION IN 2003   • Wears dentures     FULL UPPER PLATE       Past Surgical History:   Procedure Laterality Date   • BACK SURGERY  1978    THEN AGAIN IN 1982   • CARDIAC ABLATION  2003   • CARDIAC SURGERY     • CATARACT EXTRACTION W/ INTRAOCULAR LENS IMPLANT Left 5/24/2017    Procedure: CATARACT PHACO EXTRACTION WITH INTRAOCULAR LENS IMPLANT LEFT WITH TORIC LENS;  Surgeon: Alma Benavidez MD;  Location: [facility] OR;  Service:    • CATARACT EXTRACTION W/ INTRAOCULAR LENS IMPLANT Right 6/14/2017    Procedure: CATARACT PHACO EXTRACTION WITH INTRAOCULAR LENS IMPLANT RIGHT WITH TORIC LENS;  Surgeon: Alma Benavidez MD;  Location: [facility] OR;  Service:    • CORONARY ARTERY BYPASS GRAFT  2003    SPOUSE UNSURE OF HOW MANY VESSELS   • HIATAL HERNIA " REPAIR  1972   • JOINT REPLACEMENT Left     HIP - DONE BY DR DALAL   • KNEE ARTHROSCOPY Left    • NOSE SURGERY  1970    SPOUSE REPORTS FOR A BROKEN NOSE   • OTHER SURGICAL HISTORY Left     TENDON TORN LOOSE FROM BONE, LEFT ELBOW   • OTHER SURGICAL HISTORY  1986    RUPTURED DISC   • OTHER SURGICAL HISTORY  1994    NECK SURGERY FOR RUPTURED DISC   • OTHER SURGICAL HISTORY  1996    HAD SECOND NECK SURGERY   • OTHER SURGICAL HISTORY  1998    RUPTURED DISC   • OTHER SURGICAL HISTORY  2003    RUPTURED DISC   • OTHER SURGICAL HISTORY  2009    HARDWARE REMOVAL FROM BACK BY DR HAY       Family History: family history is not on file. Otherwise pertinent FHx was reviewed and unremarkable.     Social History:  reports that he quit smoking about 37 years ago. His smoking use included cigarettes. He has never used smokeless tobacco. He reports that he does not drink alcohol or use drugs.  Social History     Social History Narrative   • Not on file       Medications:  Available home medication information reviewed.  Medications Prior to Admission   Medication Sig Dispense Refill Last Dose   • acetaminophen (TYLENOL) 325 MG tablet Take 650 mg by mouth Every 6 (Six) Hours As Needed for Mild Pain  or Fever.   Unknown at Unknown time   • aspirin 81 MG EC tablet Take 81 mg by mouth Daily.   4/4/2020 at Unknown time   • atorvastatin (LIPITOR) 80 MG tablet Take 80 mg by mouth Daily.   4/4/2020 at Unknown time   • Divalproex Sodium (DEPAKOTE SPRINKLE) 125 MG capsule Take 500 mg by mouth 2 (Two) Times a Day.   4/4/2020 at Unknown time   • docusate sodium (COLACE) 100 MG capsule Take 200 mg by mouth Daily As Needed for Constipation.   4/4/2020 at Unknown time   • FLUoxetine (PROzac) 20 MG capsule Take 3 capsules by mouth Daily for 30 days. 90 capsule 0    • gabapentin (NEURONTIN) 100 MG capsule Take 100 mg by mouth 3 (Three) Times a Day.   4/4/2020 at Unknown time   • insulin aspart (NovoLOG FlexPen) 100 UNIT/ML solution pen-injector sc  pen Inject  under the skin into the appropriate area as directed 3 (Three) Times a Day With Meals. Sliding scale   Unknown at Unknown time   • Melatonin 3 MG tablet Take 3 mg by mouth At Night As Needed for Sleep.   Unknown at Unknown time   • memantine (NAMENDA) 5 MG tablet Take 1 tablet by mouth Every 12 (Twelve) Hours for 30 days. 60 tablet 0    • metFORMIN (GLUCOPHAGE) 500 MG tablet Take 500 mg by mouth Daily With Breakfast.   4/4/2020 at Unknown time   • metoprolol tartrate (LOPRESSOR) 50 MG tablet Take 1 tablet by mouth Daily for 30 days. 30 tablet 0    • pantoprazole (PROTONIX) 40 MG EC tablet Take 40 mg by mouth Daily.   4/4/2020 at Unknown time   • QUEtiapine (SEROquel) 100 MG tablet Take 100 mg by mouth Every Night.   4/3/2020 at Unknown time   • tamsulosin (FLOMAX) 0.4 MG capsule 24 hr capsule Take 1 capsule by mouth Daily for 30 days. 30 capsule 0 Unknown at Unknown time       Allergies   Allergen Reactions   • Phenergan [Promethazine Hcl] Nausea And Vomiting       Objective   Objective     Vital Signs:   Temp:  [96.4 °F (35.8 °C)-97.4 °F (36.3 °C)] 97.4 °F (36.3 °C)  Heart Rate:  [48-64] 53  Resp:  [18-20] 18  BP: (105-147)/() 105/91        Physical Exam   Constitutional: No acute distress, awake, elderly male in no acute distress   HENT: NCAT, mucous membranes moist  Respiratory: Clear to auscultation bilaterally, respiratory effort normal   Cardiovascular: irregularly irregular , no murmurs, rubs, or gallops  Gastrointestinal: Positive bowel sounds, soft, nontender, nondistended  Musculoskeletal: No bilateral ankle edema  Psychiatric: unable to assess   Neurologic: Oriented to person, moves all 4 extremities equally   Skin: wound (see pic)       Results Reviewed:  I have personally reviewed current lab and radiology data.    Results from last 7 days   Lab Units 05/06/20  1027   WBC 10*3/mm3 8.37   HEMOGLOBIN g/dL 14.0   HEMATOCRIT % 43.0   PLATELETS 10*3/mm3 213     Results from last 7 days    Lab Units 05/06/20  1236 05/06/20  1217   SODIUM mmol/L  --  140   POTASSIUM mmol/L  --  4.0   CHLORIDE mmol/L  --  102   CO2 mmol/L  --  28.0   BUN mg/dL  --  20   CREATININE mg/dL  --  1.10   GLUCOSE mg/dL  --  101*   CALCIUM mg/dL  --  8.5*   ALT (SGPT) U/L  --  14   AST (SGOT) U/L  --  27   TROPONIN T ng/mL  --  <0.010   LACTATE mmol/L 0.9  --      Estimated Creatinine Clearance: 52.4 mL/min (by C-G formula based on SCr of 1.1 mg/dL).  Brief Urine Lab Results  (Last result in the past 365 days)      Color   Clarity   Blood   Leuk Est   Nitrite   Protein   CREAT   Urine HCG        05/06/20 1036 Dark Yellow Clear Negative Negative Negative Negative             Imaging Results (Last 24 Hours)     Procedure Component Value Units Date/Time    XR Chest 1 View [[id number]] Collected:  05/06/20 1126     Updated:  05/06/20 1223    Narrative:       EXAMINATION: XR CHEST 1 VW-      INDICATION: Weak/Dizzy/AMS triage protocol.      COMPARISON: None.     FINDINGS: Cardiac size borderline enlarged status post median sternotomy  without overt edema, however, minimal opacifications at the lung bases  concerning for atelectasis versus airspace disease. No pneumothorax or  large effusion. Degenerative changes of the spine.           Impression:       Bibasilar opacifications concerning for atelectasis versus  early airspace disease without effusion or overt edema.     D:  05/06/2020  E:  05/06/2020       CT Head Without Contrast [[id number]] Collected:  05/06/20 1141     Updated:  05/06/20 1142    Narrative:       EXAMINATION: CT HEAD WO CONTRAST-      INDICATION: AMS      TECHNIQUE: CT head without intravenous contrast     The radiation dose reduction device was turned on for each scan per the  ALARA (As Low as Reasonably Achievable) protocol.     COMPARISON: CT head dated 04/04/2020     FINDINGS: Midline structures are symmetric without evidence of mass,  mass effect or midline shift. Adrenals demonstrate mild  central  prominence concerning for central volume loss however no intra-axial  hemorrhage or extra-axial fluid collection and no low-attenuation signal  gradient in the periventricular and deep white matter to 5. Globes and  orbits unremarkable. Visualized paranasal sinuses and mastoid air cells  are grossly clear and well pneumatized. Calvarium intact.             Impression:       No acute intracranial abnormality with questionable mild  central volume loss unchanged from prior comparison.                   Assessment/Plan   Assessment & Plan     Active Hospital Problems    Diagnosis POA   • AMS (altered mental status) [R41.82] Yes     79 year old with Dementia, AFib, DM admitted for AMS and pneumonia.     AMS  Dementia   Depression  -CT head no acute finding  -Consider neurology consult when COVID negative  -Cont namenda, seroquel, prozac, for now   Pneumonia  -received rocephin and azithromycin in ED, start Zosyn (MRSA PCR was neg last admit)  -check procal   NANCI  -IV fluids  -Most likely volume depletion due to poor po intake   DM  -SSI  HTN  Afib  -metoprolol  -not anticoagulated   CAD  -ASA    DVT prophylaxis:  lovenox       Admission Communication  Due to current limited visitation policies, an attempt will be made to update patient's identified best point-of-contact(s) at admission to discuss plan of care.   Contact: Palma Jones [phone]   Relation: Daughter   Time of communication: [phone]   Notes (if applicable): She was happy for update.          CODE STATUS:    Code Status and Medical Interventions:   Ordered at: 05/06/20 1533     Limited Support to NOT Include:    Intubation     Level Of Support Discussed With:    Health Care Surrogate     Code Status:    No CPR     Medical Interventions (Level of Support Prior to Arrest):    Limited       Admission Status:  I believe this patient meets INPATIENT status due to AMS and pneumonia.  I feel patient’s risk for adverse outcomes and need for care warrant  INPATIENT evaluation and I predict the patient’s care encounter to likely last beyond 2 midnights.      Electronically signed by Eboni Bowles DO, 05/06/20, 3:35 PM.      Electronically signed by Eboni Bowles DO at 05/06/20 1731       {Outbreak/Travel/Exposure Documentation......;  Question Available Choices Patient Response   Outbreak Screen: Do you currently have the following symptoms?        Fever/Cough/Shortness of breath/Sudden loss of taste or smell/Extreme fatigue/Sore throat/Muscleaches/Chills/Headache/No/Unknown  Unknown (05/06/20 1017)   Outbreak Screen: In the last 14 days, have you had contact with anyone to have the 2019 Novel Coronavirus or anyone being tested for the 2019 Novel Coronavirus?  Yes/No/Unknown              Unknown (05/06/20 1017)   Outbreak Screen: Who was notified?    Free text  (not recorded)   Travel Screen: Have you traveled in the last month? If so, to what country have you traveled? If [place] what state? Yes/No/Unknown  List of all countries  List of all States Unknown (05/06/20 1016)  (not recorded)  (not recorded)   Infection Risk: Do you currently have the following symptoms?  (If cough is selected, the Tuberculosis Screen is performed.) Cough/Fever/Rash/No No(PT UNABLE TO ANSWER ) (05/06/20 1016)   Tuberculosis Screen: Do you have any of the following Tuberculosis Risks?  · Have you lived or spent time with anyone who had or may have TB?  · Have you lived in or visited any of the following areas for more than one month: [place], [place], [place], [place] or [place], the [place] or [place]?  · Do you have HIV/AIDS?  · Have you lived in or worked in a nursing home, homeless shelter, correctional facility, or substance abuse treatment facility?   · No    If Yes do you have any of the following symptoms? Yes responses display to the right    If Yes symptoms listed are:  Cough greater than or equal to 3 weeks/Loss of appetite/Unexplained weight  loss/Night sweats/Bloody sputum or hemoptysis/Hoarseness/Fever/Fatique/Chest pain/No (not recorded)  (not recorded)   Exposure Screen: Have you been exposed to any of these contagious diseases in the last month? Measles/Chickenpox/Meningitis/Pertussis/Whooping Cough/No No (05/06/20 1016)          Physician Progress Notes (last 24 hours) (Notes from 05/10/20 1537 through 05/11/20 1537)      Rebel Perry MD at 05/11/20 1330          Neurology       Patient Care Team:  Provider, No Known as PCP - General    Chief complaint: Altered mental status, parkinsonism    History: Patient shown little if any improvement.    He still quite confused.    He is on Celexa 20 mg daily and gabapentin 100 mg twice daily.    Other sedating medicines have been discontinued.      Past Medical History:   Diagnosis Date   • Arthritis    • Cancer (CMS/HCC)     SKIN    • Cataract    • Coronary artery disease    • Diabetes mellitus (CMS/HCC)    • High cholesterol    • [place] (hard of hearing)     PATIENT HAS HEARING AIDS   • Hypertension    • Irregular heart beat     HISTORY OF CARDIAC ABLATION IN 2003   • Wears dentures     FULL UPPER PLATE       Vital Signs   Vitals:    05/10/20 1910 05/11/20 0342 05/11/20 0759 05/11/20 1210   BP: 146/77  138/82 127/81   BP Location: Left arm  Right arm Right arm   Patient Position: Lying  Lying Sitting   Pulse: 79      Resp: 18 18 18 18   Temp: 98.3 °F (36.8 °C) 98.3 °F (36.8 °C) 97.7 °F (36.5 °C) 97.5 °F (36.4 °C)   TempSrc: Oral Oral Oral Oral   SpO2: 96%      Weight:       Height:           Physical Exam:   General: Sleeping and easily aroused.    No complaints              Neuro: Masked facies.    Flat affect.    Minimal speech.    Mild cogwheel rigidity which seems to be improved.  No tremor is seen today.        Results Review:  Reviewed  Results from last 7 days   Lab Units 05/11/20  0416   WBC 10*3/mm3 7.27   HEMOGLOBIN g/dL 11.3*   HEMATOCRIT % 34.5*   PLATELETS 10*3/mm3 171     Results from  last 7 days   Lab Units 20  0416 20  0730 20  0544 20  1217   SODIUM mmol/L 134* 135* 139 140   POTASSIUM mmol/L 3.8 3.7 3.5 4.0   CHLORIDE mmol/L 97* 100 104 102   CO2 mmol/L 28.0 25.0 25.0 28.0   BUN mg/dL 12 11 14 20   CREATININE mg/dL 1.18 1.05 1.09 1.10   CALCIUM mg/dL 8.5* 8.0* 7.9* 8.5*   BILIRUBIN mg/dL  --   --  0.3 0.4   ALK PHOS U/L  --   --  46 51   ALT (SGPT) U/L  --   --  12 14   AST (SGOT) U/L  --   --  21 27   GLUCOSE mg/dL 98 120* 93 101*       Imaging Results (Last 24 Hours)     ** No results found for the last 24 hours. **          Assessment:  Parkinsonism    Dementia    Ongoing encephalopathy    Plan:  Decrease gabapentin to 100 mg at bedtime.    Increase Aricept to 10 mg daily.        Comment:  Guarded prognosis         I discussed the patients findings and my recommendations with patient and nursing staff    Rebel Perry MD  20  13:30          Electronically signed by Rebel Perry MD at 20 1332     Pavithra Crowell DO at 20 0901              [facility] Medicine Services  PROGRESS NOTE    Patient Name: Gama Davila  : 1940  MRN: [id number]    Date of Admission: 2020  Primary Care Physician: Provider, No Known    Subjective   Subjective     CC:  Altered mental status    HPI:  Patient seems more alert today. He is sitting up in bed eating breakfast. Feeding himself. He is still only oriented to person, but more interactive today.    Review of Systems  Unable to obtain secondary to confusion.    Objective   Objective     Vital Signs:   Temp:  [97.7 °F (36.5 °C)-98.3 °F (36.8 °C)] 97.7 °F (36.5 °C)  Heart Rate:  [60-79] 79  Resp:  [16-18] 18  BP: (106-146)/(51-83) 138/82        Physical Exam:  Constitutional: No acute distress, frail appearing, sitting up in bed eating breakfast  HENT: NCAT, mucous membranes moist  Respiratory: Clear to auscultation bilaterally, respiratory effort normal   Cardiovascular:  RRR, no murmurs, rubs, or gallops  Gastrointestinal: Positive bowel sounds, soft, nontender, nondistended  Musculoskeletal: No bilateral ankle edema  Psychiatric: flat affect  Neurologic: confused, does not follow commands, answers simple questions only with redirection, knows his name Rigid upper extremities with mild resting tremor noted.  Skin: No rashes    Results Reviewed:  Results from last 7 days   Lab Units 05/11/20 0416 05/08/20 0730 05/07/20  0544 05/06/20  1217   WBC 10*3/mm3 7.27 6.12 6.38  --    HEMOGLOBIN g/dL 11.3* 11.5* 11.4*  --    HEMATOCRIT % 34.5* 35.3* 35.4*  --    PLATELETS 10*3/mm3 171 176 178  --    PROCALCITONIN ng/mL  --   --   --  <0.02*     Results from last 7 days   Lab Units 05/11/20 0416 05/08/20 0730 05/07/20  0544 05/06/20  1217   SODIUM mmol/L 134* 135* 139 140   POTASSIUM mmol/L 3.8 3.7 3.5 4.0   CHLORIDE mmol/L 97* 100 104 102   CO2 mmol/L 28.0 25.0 25.0 28.0   BUN mg/dL 12 11 14 20   CREATININE mg/dL 1.18 1.05 1.09 1.10   GLUCOSE mg/dL 98 120* 93 101*   CALCIUM mg/dL 8.5* 8.0* 7.9* 8.5*   ALT (SGPT) U/L  --   --  12 14   AST (SGOT) U/L  --   --  21 27   TROPONIN T ng/mL  --   --   --  <0.010     Estimated Creatinine Clearance: 48.8 mL/min (by C-G formula based on SCr of 1.18 mg/dL).    Microbiology Results Abnormal     Procedure Component Value - Date/Time    Blood Culture - Blood, Blood, Venous Line [[id number]] Collected:  05/06/20 1236    Lab Status:  Preliminary result Specimen:  Blood, Venous Line Updated:  05/10/20 1315     Blood Culture No growth at 4 days    Blood Culture - Blood, Blood, Venous Line [[id number]] Collected:  05/06/20 1236    Lab Status:  Preliminary result Specimen:  Blood, Venous Line Updated:  05/10/20 1315     Blood Culture No growth at 4 days    Urine Culture - Urine, Urine, Catheter In/Out [[id number]]  (Normal) Collected:  05/06/20 1036    Lab Status:  Final result Specimen:  Urine, Catheter In/Out Updated:  05/07/20 2046     Urine Culture No growth     SARS-CoV-2 PCR ([place] IN-HOUSE PERFORMED), NP SWAB IN TRANSPORT MEDIA - Swab, Nasopharynx [[id number]]  (Normal) Collected:  05/06/20 1520    Lab Status:  Final result Specimen:  Swab from Nasopharynx Updated:  05/06/20 2001     COVID19 Not Detected          Imaging Results (Last 24 Hours)     ** No results found for the last 24 hours. **               I have reviewed the medications:  Scheduled Meds:    aspirin 81 mg Oral Daily   atorvastatin 80 mg Oral Nightly   carbidopa-levodopa 1 tablet Oral TID   castor oil-balsam peru  Topical Q12H   citalopram 20 mg Oral Daily   donepezil 5 mg Oral Nightly   enoxaparin 40 mg Subcutaneous Q24H   gabapentin 100 mg Oral Q12H   insulin lispro 0-7 Units Subcutaneous TID AC   pantoprazole 40 mg Oral Daily   piperacillin-tazobactam 3.375 g Intravenous Q8H   sodium chloride 10 mL Intravenous Q12H     Continuous Infusions:   PRN Meds:.•  acetaminophen **OR** acetaminophen **OR** acetaminophen  •  dextrose  •  dextrose  •  glucagon (human recombinant)  •  magnesium sulfate **OR** magnesium sulfate **OR** magnesium sulfate  •  potassium chloride **OR** potassium chloride **OR** potassium chloride  •  sodium chloride  •  sodium chloride    Assessment/Plan   Assessment & Plan     Active Hospital Problems    Diagnosis  POA   • **HCAP (healthcare-associated pneumonia) [J18.9]  Yes   • Acute respiratory failure with hypoxia (CMS/HCC) [J96.01]  Yes   • AMS (altered mental status) [R41.82]  Yes   • Dementia (CMS/HCC) [F03.90]  Yes   • Hypoxia [R09.02]  Yes   • Diabetes mellitus (CMS/HCC) [E11.9]  Yes   • Coronary artery disease [I25.10]  Yes      Resolved Hospital Problems   No resolved problems to display.        Brief Hospital Course to date:  Gama Davila is a 79 y.o. male admitted with altered mental status of uncertain etiology and found to have healthcare associated pneumonia, acute respiratory failure with hypoxia, possibly drug-induced apathy and weakness.  Neurology is  consulted and following.  They are considering Parkinson's.    Encephalopathy   Dementia with possible Parkinsonism  - Continuing to address polypharmacy, neurology following  - Specifically stopped Depakote, Seroquel, Prozac  - taper gabapentin, consider stopping celexa  - Empiric trial of Sinemet, Aricept added 5/10  -PT/OT for mobility    [employer] associated pneumonia  - Currently on Zosyn, plan to switch to Augmentin at discharge to complete course  - demonstrated on CXR 5/8/2020   - SLP found no signs or symptoms of aspiration    Acute respiratory failure with hypoxia  - resolved, now on room air, likely secondary to pneumonia    Diabetes mellitus  - SSI    Skin breakdown  - PT wound care following    CAD:  - continue ASA and statin    Add nystatin for yeast in groin    DVT Prophylaxis:  lovenox      Daily Care Communication  Due to current limited visitation policies, an attempt will be made daily to update patient's identified best point-of-contact(s)   Contact: [place] [phone]   Relation: Daughter and POA   Type of communication (phone or televideo): phone   Time of communication: 9:05am   Notes (if applicable): Daughter updated on patient. Able to feed himself this morning. She was encouraged by this news.     Disposition: likely here several more days as we make medication changes and monitor his mental status.    CODE STATUS:   Code Status and Medical Interventions:   Ordered at: 05/06/20 1533     Limited Support to NOT Include:    Intubation     Level Of Support Discussed With:    Health Care Surrogate     Code Status:    No CPR     Medical Interventions (Level of Support Prior to Arrest):    Limited       Electronically signed by Pavithra Crowell DO, 05/11/20, 09:01.        Electronically signed by Pavithra Crowell DO at 05/11/20 0906          Physical Therapy Notes (last 24 hours) (Notes from 05/10/20 1537 through 05/11/20 1537)      Mala Gamble, PT at 05/11/20 0929  Version 1 of 1          Problem: Patient Care Overview  Goal: Plan of Care Review  Outcome: Ongoing (interventions implemented as appropriate)  Flowsheets (Taken 2020)  Outcome Summary: Patient continues to be limited by congition and generalized weakness. He completed B rolling Dep x2, supine>sit with Max A x2, and completed squat pivot transfer to the recliner with Max A x2 and BUE support with B knees blocked. Pt following approx. 25% of commands today. Will continue per PT POC.       Electronically signed by Mala Gamble, PT at 20 1040     Mala Gamble, PT at 20  Version 1 of 1         Patient Name: Gama Davila  : 1940    MRN: [id number]                              Today's Date: 2020       Admit Date: 2020    Visit Dx:     ICD-10-CM ICD-9-CM   1. Altered mental status, unspecified altered mental status type R41.82 780.97   2. Mild dehydration E86.0 276.51   3. Pneumonia of both lower lobes due to infectious organism (CMS/HCC) J18.1 483.8   4. Dysphagia, unspecified type R13.10 787.20   5. Impaired mobility and ADLs Z74.09 799.89     Patient Active Problem List   Diagnosis   • [place] (hard of hearing)   • High cholesterol   • Coronary artery disease   • Hypertension   • Irregular heart beat   • Diabetes mellitus (CMS/HCC)   • Septic shock (CMS/HCC)   • NANCI (acute kidney injury) (CMS/HCC)   • AMS (altered mental status)   • Hypoxia   • Anemia   • Edema   • Acute encephalopathy   • Dementia (CMS/HCC)   • Hypomagnesemia   • Rhabdomyolysis   • Acute retention of urine   • HCAP (healthcare-associated pneumonia)   • Acute respiratory failure with hypoxia (CMS/HCC)     Past Medical History:   Diagnosis Date   • Arthritis    • Cancer (CMS/HCC)     SKIN    • Cataract    • Coronary artery disease    • Diabetes mellitus (CMS/HCC)    • High cholesterol    • [place] (hard of hearing)     PATIENT HAS HEARING AIDS   • Hypertension    • Irregular heart beat     HISTORY OF CARDIAC ABLATION IN    • Wears  dentures     FULL UPPER PLATE     Past Surgical History:   Procedure Laterality Date   • BACK SURGERY  1978    THEN AGAIN IN 1982   • CARDIAC ABLATION  2003   • CARDIAC SURGERY     • CATARACT EXTRACTION W/ INTRAOCULAR LENS IMPLANT Left 5/24/2017    Procedure: CATARACT PHACO EXTRACTION WITH INTRAOCULAR LENS IMPLANT LEFT WITH TORIC LENS;  Surgeon: Alma Benavidez MD;  Location: [facility];  Service:    • CATARACT EXTRACTION W/ INTRAOCULAR LENS IMPLANT Right 6/14/2017    Procedure: CATARACT PHACO EXTRACTION WITH INTRAOCULAR LENS IMPLANT RIGHT WITH TORIC LENS;  Surgeon: Alma Benavidez MD;  Location: [facility];  Service:    • CORONARY ARTERY BYPASS GRAFT  2003    SPOUSE UNSURE OF HOW MANY VESSELS   • HIATAL HERNIA REPAIR  1972   • JOINT REPLACEMENT Left     HIP - DONE BY DR DALAL   • KNEE ARTHROSCOPY Left    • NOSE SURGERY  1970    SPOUSE REPORTS FOR A BROKEN NOSE   • OTHER SURGICAL HISTORY Left     TENDON TORN LOOSE FROM BONE, LEFT ELBOW   • OTHER SURGICAL HISTORY  1986    RUPTURED DISC   • OTHER SURGICAL HISTORY  1994    NECK SURGERY FOR RUPTURED DISC   • OTHER SURGICAL HISTORY  1996    HAD SECOND NECK SURGERY   • OTHER SURGICAL HISTORY  1998    RUPTURED DISC   • OTHER SURGICAL HISTORY  2003    RUPTURED DISC   • OTHER SURGICAL HISTORY  2009    HARDWARE REMOVAL FROM BACK BY DR HAY     General Information     Row Name 05/11/20 0929          PT Evaluation Time/Intention    Document Type  therapy note (daily note)  -NS     Mode of Treatment  individual therapy;physical therapy  -NS     Row Name 05/11/20 0929          General Information    Patient Profile Reviewed?  yes  -NS     Existing Precautions/Restrictions  fall;other (see comments) alejandra, dementia  -NS     Row Name 05/11/20 0929          Cognitive Assessment/Intervention- PT/OT    Orientation Status (Cognition)  oriented to;person  -NS     Cognitive Assessment/Intervention Comment  Patient often staring into space and is slow to respond. Pt  followed approx. 25% of commands today.   -NS     Row Name 05/11/20 0929          Safety Issues, Functional Mobility    Safety Issues Affecting Function (Mobility)  ability to follow commands;judgment;sequencing abilities;insight into deficits/self awareness  -NS     Impairments Affecting Function (Mobility)  balance;cognition;endurance/activity tolerance;pain;range of motion (ROM);strength;motor control;postural/trunk control  -NS       User Key  (r) = Recorded By, (t) = Taken By, (c) = Cosigned By    Initials Name Provider Type    Mala Gaston PT Physical Therapist        Mobility     Row Name 05/11/20 0929          Bed Mobility Assessment/Treatment    Bed Mobility Assessment/Treatment  supine-sit;rolling left;rolling right  -NS     Rolling Left [place] (Bed Mobility)  dependent (less than 25% patient effort);2 person assist;verbal cues  -NS     Rolling Right [place] (Bed Mobility)  dependent (less than 25% patient effort);2 person assist;verbal cues  -NS     Supine-Sit [place] (Bed Mobility)  maximum assist (25% patient effort);2 person assist;verbal cues  -NS     Assistive Device (Bed Mobility)  draw sheet;head of bed elevated  -NS     Comment (Bed Mobility)  Patient completed B rolling for pericare. Dependent for pericare. Supine>sit with Max A x2 and VCs for sequencing bed mobility.   -NS     Row Name 05/11/20 0929          Transfer Assessment/Treatment    Comment (Transfers)  VCs for hand placement. B knees blocked due to weakness and VCs for sequencing. Patient completed a squat pivot transfer to the chair; unable to come to full standing. Rigidity noted in BLEs.   -NS     Row Name 05/11/20 0929          Bed-Chair Transfer    Bed-Chair [place] (Transfers)  maximum assist (25% patient effort);2 person assist;verbal cues  -NS     Assistive Device (Bed-Chair Transfers)  other (see comments) BUE support  -NS     Row Name 05/11/20 0929          Sit-Stand Transfer    Sit-Stand [place]  (Transfers)  maximum assist (25% patient effort);2 person assist;verbal cues  -NS     Assistive Device (Sit-Stand Transfers)  other (see comments) BUE support  -NS       User Key  (r) = Recorded By, (t) = Taken By, (c) = Cosigned By    Initials Name Provider Type    Mala Gaston, PT Physical Therapist        Obj/Interventions     Row Name 05/11/20 0929          Therapeutic Exercise    Lower Extremity (Therapeutic Exercise)  marching while seated;LAQ (long arc quad), bilateral  -NS     Lower Extremity Range of Motion (Therapeutic Exercise)  ankle dorsiflexion/plantar flexion, bilateral  -NS     Exercise Type (Therapeutic Exercise)  AAROM (active assistive range of motion)  -NS     Position (Therapeutic Exercise)  seated  -NS     Sets/Reps (Therapeutic Exercise)  1x10  -NS     Expected Outcome (Therapeutic Exercise)  facilitate normal movement patterns;improve neuromuscular control  -NS     Comment (Therapeutic Exercise)  Cues for technique, rigidity noted.   -NS     Row Name 05/11/20 0929          Static Sitting Balance    Level of [place] (Unsupported Sitting, Static Balance)  moderate assist, 50 to 74% patient effort  -NS     Sitting Position (Unsupported Sitting, Static Balance)  sitting on edge of bed  -NS     Time Able to Maintain Position (Unsupported Sitting, Static Balance)  30 to 45 seconds  -NS     Comment (Unsupported Sitting, Static Balance)  Pt leaning to L  -NS     Row Name 05/11/20 0929          Static Standing Balance    Level of [place] (Supported Standing, Static Balance)  maximal assist, 25 to 49% patient effort;2 person assist  -NS     Time Able to Maintain Position (Supported Standing, Static Balance)  less than 15 seconds  -NS     Assistive Device Utilized (Supported Standing, Static Balance)  other (see comments) BUE support  -NS     Row Name 05/11/20 0929          Dynamic Standing Balance    Level of [place], Reaches Outside Midline (Standing, Dynamic Balance)  maximal  assist, 25 to 49% patient effort;2 person assist  -NS     Time Able to Maintain Position, Reaches Outside Midline (Standing, Dynamic Balance)  less than 15 seconds  -NS     Assistive Device Utilized (Supported Standing, Dynamic Balance)  other (see comments) BUE support  -NS       User Key  (r) = Recorded By, (t) = Taken By, (c) = Cosigned By    Initials Name Provider Type    Mala Gaston, PT Physical Therapist        Goals/Plan    No documentation.       Clinical Impression     Row Name 05/11/20 0929          Pain Assessment    Additional Documentation  Pain Scale: FACES Pre/Post-Treatment (Group)  -NS     Row Name 05/11/20 0929          Pain Scale: FACES Pre/Post-Treatment    Pain: FACES Scale, Pretreatment  0-->no hurt  -NS     Pain: FACES Scale, Post-Treatment  0-->no hurt  -NS     Row Name 05/11/20 0929          Plan of Care Review    Plan of Care Reviewed With  patient  -NS     Progress  no change  -NS     Row Name 05/11/20 0929          Vital Signs    Pre Systolic BP Rehab  -- VSS- RN cleared for PT treatment  -NS     Pre Patient Position  Supine  -NS     Intra Patient Position  Standing  -NS     Post Patient Position  Sitting  -NS     Row Name 05/11/20 0929          Positioning and Restraints    Pre-Treatment Position  in bed  -NS     Post Treatment Position  chair  -NS     In Bed  supine;call light within reach;encouraged to call for assist;exit alarm on;heels elevated  -NS       User Key  (r) = Recorded By, (t) = Taken By, (c) = Cosigned By    Initials Name Provider Type    Mala Gaston, PT Physical Therapist        Outcome Measures     Row Name 05/11/20 0929          How much help from another person do you currently need...    Turning from your back to your side while in flat bed without using bedrails?  1  -NS     Moving from lying on back to sitting on the side of a flat bed without bedrails?  2  -NS     Moving to and from a bed to a chair (including a wheelchair)?  2  -NS     Standing up from  a chair using your arms (e.g., wheelchair, bedside chair)?  2  -NS     Climbing 3-5 steps with a railing?  1  -NS     To walk in hospital room?  1  -NS     AM-PAC 6 Clicks Score (PT)  9  -NS     Row Name 05/11/20 0929          Functional Assessment    Outcome Measure Options  AM-PAC 6 Clicks Basic Mobility (PT)  -NS       User Key  (r) = Recorded By, (t) = Taken By, (c) = Cosigned By    Initials Name Provider Type    Mala Gaston PT Physical Therapist        Physical Therapy Education                 Title: PT OT SLP Therapies (In Progress)     Topic: Physical Therapy (In Progress)     Point: Mobility training (In Progress)     Description:   Instruct learner(s) on safety and technique for assisting patient out of bed, chair or wheelchair.  Instruct in the proper use of assistive devices, such as walker, crutches, cane or brace.              Patient Friendly Description:   It's important to get you on your feet again, but we need to do so in a way that is safe for you. Falling has serious consequences, and your personal safety is the most important thing of all.        When it's time to get out of bed, one of us or a family member will sit next to you on the bed to give you support.     If your doctor or nurse tells you to use a walker, crutches, a cane, or a brace, be sure you use it every time you get out of bed, even if you think you don't need it.    Learning Progress Summary           Patient Acceptance, E, NL,NR by NS at 5/11/2020 1039    Acceptance, E, NR,NL by CD at 5/8/2020 1417    Comment:  BENEFITS OF OOB ACTIVITY, SAFETY WITH MOBILITY, PROGRESSION OF POC.                   Point: Home exercise program (In Progress)     Description:   Instruct learner(s) on appropriate technique for monitoring, assisting and/or progressing patient with therapeutic exercises and activities.              Learning Progress Summary           Patient Acceptance, E, NL,NR by NS at 5/11/2020 1039    Acceptance, E, NR,NL by  CD at 5/8/2020 1417    Comment:  BENEFITS OF OOB ACTIVITY, SAFETY WITH MOBILITY, PROGRESSION OF POC.                   Point: Body mechanics (In Progress)     Description:   Instruct learner(s) on proper positioning and spine alignment for patient and/or caregiver during mobility tasks and/or exercises.              Learning Progress Summary           Patient Acceptance, E, NL,NR by NS at 5/11/2020 1039    Acceptance, E, NR,NL by CD at 5/8/2020 1417    Comment:  BENEFITS OF OOB ACTIVITY, SAFETY WITH MOBILITY, PROGRESSION OF POC.                   Point: Precautions (In Progress)     Description:   Instruct learner(s) on prescribed precautions during mobility and gait tasks              Learning Progress Summary           Patient Acceptance, E, NL,NR by NS at 5/11/2020 1039    Acceptance, E, NR,NL by CD at 5/8/2020 1417    Comment:  BENEFITS OF OOB ACTIVITY, SAFETY WITH MOBILITY, PROGRESSION OF POC.                               User Key     Initials Effective Dates Name Provider Type Discipline    CD 06/19/15 -  Samantha Leo, PT Physical Therapist PT    NS 09/10/19 -  Mala Gamble PT Physical Therapist PT              PT Recommendation and Plan     Outcome Summary/Treatment Plan (PT)  Anticipated Discharge Disposition (PT): skilled nursing facility  Plan of Care Reviewed With: patient  Progress: no change  Outcome Summary: Patient continues to be limited by congition and generalized weakness. He completed B rolling Dep x2, supine>sit with Max A x2, and completed squat pivot transfer to the recliner with Max A x2 and BUE support with B knees blocked. Pt following approx. 25% of commands today. Will continue per PT POC.     Time Calculation:   PT Charges     Row Name 05/11/20 0929             Time Calculation    Start Time  0929  -NS      PT Received On  05/11/20  -NS      PT Goal Re-Cert Due Date  05/17/20  -NS         Timed Charges    74087 - PT Therapeutic Activity Minutes  31  -NS        User Key  (r) =  Recorded By, (t) = Taken By, (c) = Cosigned By    Initials Name Provider Type    NS Mala Gamble, PT Physical Therapist        Therapy Charges for Today     Code Description Service Date Service Provider Modifiers Qty    90436944178 HC PT THERAPEUTIC ACT EA 15 MIN 2020 Mala Gamble, PT GP 2    01186216130 HC PT THER SUPP EA 15 MIN 2020 Mala Gamble PT GP 2          PT G-Codes  Outcome Measure Options: AM-PAC 6 Clicks Basic Mobility (PT)  AM-PAC 6 Clicks Score (PT): 9  AM-PAC 6 Clicks Score (OT): 9    Mala Gamble PT  2020         Electronically signed by Mala Gamble PT at 20 1041          Occupational Therapy Notes (most recent note)      Meeta Waldron, OT at 05/10/20 1631          Acute Care - Occupational Therapy Treatment Note  [facility]     Patient Name: Gama Davila  : 1940  MRN: [id number]  Today's Date: 5/10/2020             Admit Date: 2020       ICD-10-CM ICD-9-CM   1. Altered mental status, unspecified altered mental status type R41.82 780.97   2. Mild dehydration E86.0 276.51   3. Pneumonia of both lower lobes due to infectious organism (CMS/HCC) J18.1 483.8   4. Dysphagia, unspecified type R13.10 787.20   5. Impaired mobility and ADLs Z74.09 799.89     Patient Active Problem List   Diagnosis   • [place] (hard of hearing)   • High cholesterol   • Coronary artery disease   • Hypertension   • Irregular heart beat   • Diabetes mellitus (CMS/HCC)   • Septic shock (CMS/HCC)   • NANCI (acute kidney injury) (CMS/HCC)   • AMS (altered mental status)   • Hypoxia   • Anemia   • Edema   • Acute encephalopathy   • Dementia (CMS/HCC)   • Hypomagnesemia   • Rhabdomyolysis   • Acute retention of urine   • HCAP (healthcare-associated pneumonia)   • Acute respiratory failure with hypoxia (CMS/HCC)     Past Medical History:   Diagnosis Date   • Arthritis    • Cancer (CMS/HCC)     SKIN    • Cataract    • Coronary artery disease    • Diabetes mellitus (CMS/HCC)    • High  cholesterol    • [place] (hard of hearing)     PATIENT HAS HEARING AIDS   • Hypertension    • Irregular heart beat     HISTORY OF CARDIAC ABLATION IN 2003   • Wears dentures     FULL UPPER PLATE     Past Surgical History:   Procedure Laterality Date   • BACK SURGERY  1978    THEN AGAIN IN 1982   • CARDIAC ABLATION  2003   • CARDIAC SURGERY     • CATARACT EXTRACTION W/ INTRAOCULAR LENS IMPLANT Left 5/24/2017    Procedure: CATARACT PHACO EXTRACTION WITH INTRAOCULAR LENS IMPLANT LEFT WITH TORIC LENS;  Surgeon: Alma Benavidez MD;  Location: [facility];  Service:    • CATARACT EXTRACTION W/ INTRAOCULAR LENS IMPLANT Right 6/14/2017    Procedure: CATARACT PHACO EXTRACTION WITH INTRAOCULAR LENS IMPLANT RIGHT WITH TORIC LENS;  Surgeon: Alma Benavidez MD;  Location: [facility];  Service:    • CORONARY ARTERY BYPASS GRAFT  2003    SPOUSE UNSURE OF HOW MANY VESSELS   • HIATAL HERNIA REPAIR  1972   • JOINT REPLACEMENT Left     HIP - DONE BY DR DALAL   • KNEE ARTHROSCOPY Left    • NOSE SURGERY  1970    SPOUSE REPORTS FOR A BROKEN NOSE   • OTHER SURGICAL HISTORY Left     TENDON TORN LOOSE FROM BONE, LEFT ELBOW   • OTHER SURGICAL HISTORY  1986    RUPTURED DISC   • OTHER SURGICAL HISTORY  1994    NECK SURGERY FOR RUPTURED DISC   • OTHER SURGICAL HISTORY  1996    HAD SECOND NECK SURGERY   • OTHER SURGICAL HISTORY  1998    RUPTURED DISC   • OTHER SURGICAL HISTORY  2003    RUPTURED DISC   • OTHER SURGICAL HISTORY  2009    HARDWARE REMOVAL FROM BACK BY DR HAY       Therapy Treatment    Rehabilitation Treatment Summary     Row Name 05/10/20 1631 05/10/20 1345          Treatment Time/Intention    Discipline  occupational therapist  -AR  physical therapist  -MF     Document Type  therapy note (daily note)  -AR  therapy note (daily note);wound care  -MF     Subjective Information  no complaints  -AR  no complaints  -MF     Existing Precautions/Restrictions  fall alejandra   -AR  --     Recorded by [AR] Meeta Waldron, OT  05/10/20 1700 [MF] John Liu, PT 05/10/20 1421     Row Name 05/10/20 1631             Vital Signs    Pre Patient Position  Supine  -AR      Intra Patient Position  Supine  -AR      Post Patient Position  Supine  -AR      Recorded by [AR] Meeta Waldron, OT 05/10/20 1700      Row Name 05/10/20 1631             Cognitive Assessment/Intervention- PT/OT    Affect/Mental Status (Cognitive)  flat/blunted affect  -AR      Orientation Status (Cognition)  oriented to;person  -AR      Follows Commands (Cognition)  follows one step commands;25-49% accuracy  -AR      Recorded by [AR] Meeta Waldron, OT 05/10/20 1700      Row Name 05/10/20 1631             Safety Issues, Functional Mobility    Safety Issues Affecting Function (Mobility)  ability to follow commands;judgment;problem solving  -AR      Impairments Affecting Function (Mobility)  balance;cognition;endurance/activity tolerance;pain;range of motion (ROM);strength;motor control  -AR      Recorded by [AR] Meeta Waldron OT 05/10/20 1700      Row Name 05/10/20 1631             ADL Assessment/Intervention    47103 - OT Self Care/Mgmt Minutes  8  -AR      BADL Assessment/Intervention  grooming;feeding  -AR      Recorded by [AR] Meeta Waldron, OT 05/10/20 1700      Row Name 05/10/20 1631             Grooming Assessment/Training    [place] Level (Grooming)  wash face, hands;moderate assist (50% patient effort)  -AR      Grooming Position  supine  -AR      Comment (Grooming)  Initially required dependence and [place] assist to initiate, pt perseverating on washing hands, then started to scrub adaptive utensils that were at bedside. Not following cues for assist.   -AR      Recorded by [AR] Meeta Waldron, OT 05/10/20 1700      Row Name 05/10/20 1631             Self-Feeding Assessment/Training    [place] Level (Feeding)  liquids to mouth;minimum assist (75% patient effort)  -AR      Position (Self-Feeding)  supine  -AR      Comment  (Feeding)  Issued good  utensils per nurse request and curved in at angle. Pt able to hold in R hand without issue and complete hand-to-mouth pattern.   -AR      Recorded by [AR] Meeta Waldron, OT 05/10/20 1700      Row Name 05/10/20 1631             Motor Skills Assessment/Interventions    Additional Documentation  Therapeutic Exercise (Group);Therapeutic Exercise Interventions (Group)  -AR      Recorded by [AR] Meeta Waldron, OT 05/10/20 1700      Row Name 05/10/20 1631             Therapeutic Exercise    84085 - OT Therapeutic Exercise Minutes  5  -AR      Recorded by [AR] Meeta Waldron, OT 05/10/20 1700      Row Name 05/10/20 1631             Therapeutic Exercise    Upper Extremity Range of Motion (Therapeutic Exercise)  shoulder flexion/extension, bilateral;elbow flexion/extension, bilateral;wrist flexion/extension, bilateral  -AR      Hand (Therapeutic Exercise)  finger flexion/extension, bilateral  -AR      Exercise Type (Therapeutic Exercise)  AAROM (active assistive range of motion)  -AR      Position (Therapeutic Exercise)  supine  -AR      Sets/Reps (Therapeutic Exercise)  1/10  -AR      Comment (Therapeutic Exercise)  Limited with rigidity  -AR      Recorded by [AR] Meeta Waldron, OT 05/10/20 1700      Row Name 05/10/20 1631 05/10/20 1345          Positioning and Restraints    Pre-Treatment Position  in bed  -AR  in bed  -MF     Post Treatment Position  bed  -AR  bed  -MF     In Bed  supine;call light within reach;encouraged to call for assist;exit alarm on;waffle boots/both  -AR  supine;call light within reach  -     Recorded by [AR] Meeta Waldron, OT 05/10/20 1700 [MF] John Liu, PT 05/10/20 1421     Row Name 05/10/20 1345             Pain Assessment    Additional Documentation  Pain Scale: FACES Pre/Post-Treatment (Group)  -MF      Recorded by [MF] John Liu, PT 05/10/20 1421      Row Name 05/10/20 1631 05/10/20 1345          Pain Scale: FACES  Pre/Post-Treatment    Pain: FACES Scale, Pretreatment  0-->no hurt  -AR  0-->no hurt  -MF     Pain: FACES Scale, Post-Treatment  0-->no hurt  -AR  0-->no hurt  -MF     Recorded by [AR] Meeta Waldron, OT 05/10/20 1700 [MF] John Liu, PT 05/10/20 1421     Row Name 05/10/20 1345             Wound 05/06/20 1410 coccyx Pressure Injury    Wound - Properties Group Date first assessed: 05/06/20 [AS] Time first assessed: 1410 [AS] Present on Hospital Admission: Y [AS] Location: coccyx [AS] Primary Wound Type: Pressure inj [AS] Stage, Pressure Injury: deep tissue injury [AS] Recorded by:  [AS] Cornel Sloan RN 05/06/20 1418    Dressing Appearance  dry;intact  -MF      Base  maroon/purple;slough;red;yellow  -MF      Periwound  intact;pink;warm;dry  -MF      Periwound Temperature  warm  -MF      Periwound Skin Turgor  soft  -MF      Edges  irregular  -MF      Drainage Characteristics/Odor  sanguineous  -MF      Drainage Amount  small  -MF      Care, Wound  irrigated with;sterile normal saline;debrided;ultrasound therapy, non contact low frequency 5 min MIST  -MF      Dressing Care, Wound  foam;low-adherent venelex  -MF      Recorded by [MF] John Liu, PT 05/10/20 1421      Row Name 05/10/20 1345             Coping    Observed Emotional State  calm;cooperative  -MF      Verbalized Emotional State  acceptance  -MF      Recorded by [MF] John Liu, PT 05/10/20 1421      Row Name 05/10/20 1631 05/10/20 1345          Plan of Care Review    Plan of Care Reviewed With  patient  -AR  patient  -MF     Progress  no change  -AR  --     Outcome Summary  --  PT able to debride moderate amount of slough / biofilm from sacral wound to help decrease bioburden and improve healing potential.  PT will cont with mist therapy and debridement 2-3 x/week to help further improve skin integrity.   -MF     Recorded by [AR] Metea Waldron, OT 05/10/20 1700 [MF] John Liu, PT 05/10/20 1421     Row Name  05/10/20 1631             Outcome Summary/Treatment Plan (OT)    Daily Summary of Progress (OT)  progress toward functional goals is gradual  -AR      Anticipated Discharge Disposition (OT)  skilled nursing facility  -AR      Recorded by [AR] Meeta Waldron OT 05/10/20 1700        User Key  (r) = Recorded By, (t) = Taken By, (c) = Cosigned By    Initials Name Effective Dates Discipline    MF John Liu, PT 06/19/15 -  PT    AR Meeta Waldron OT 06/22/15 -  OT    AS Cornel Sloan RN 06/16/16 -  Nurse        Wound 05/06/20 1410 coccyx Pressure Injury (Active)   Dressing Appearance dry;intact 5/10/2020  1:45 PM   Closure Adhesive bandage 5/10/2020  8:45 AM   Base maroon/purple;slough;red;yellow 5/10/2020  1:45 PM   Periwound intact;pink;warm;dry 5/10/2020  1:45 PM   Periwound Temperature warm 5/10/2020  1:45 PM   Periwound Skin Turgor soft 5/10/2020  1:45 PM   Edges irregular 5/10/2020  1:45 PM   Drainage Characteristics/Odor sanguineous 5/10/2020  1:45 PM   Drainage Amount small 5/10/2020  1:45 PM   Care, Wound irrigated with;sterile normal saline;debrided;ultrasound therapy, non contact low frequency 5/10/2020  1:45 PM   Dressing Care, Wound foam;low-adherent 5/10/2020  1:45 PM       Occupational Therapy Education                 Title: PT OT SLP Therapies (In Progress)     Topic: Occupational Therapy (In Progress)     Point: ADL training (In Progress)     Description:   Instruct learner(s) on proper safety adaptation and remediation techniques during self care or transfers.   Instruct in proper use of assistive devices.              Learning Progress Summary           Patient Acceptance, E,D, NL by AR at 5/10/2020 1631                   Point: Precautions (In Progress)     Description:   Instruct learner(s) on prescribed precautions during self-care and functional transfers.              Learning Progress Summary           Patient Acceptance, E,D, NL by AR at 5/10/2020 1631                    Point: Body mechanics (In Progress)     Description:   Instruct learner(s) on proper positioning and spine alignment during self-care, functional mobility activities and/or exercises.              Learning Progress Summary           Patient Acceptance, E,D, NL by AR at 5/10/2020 1631                               User Key     Initials Effective Dates Name Provider Type Discipline    AR 06/22/15 -  Meeta Waldron, OT Occupational Therapist OT                OT Recommendation and Plan  Outcome Summary/Treatment Plan (OT)  Daily Summary of Progress (OT): progress toward functional goals is gradual  Anticipated Discharge Disposition (OT): skilled nursing facility  Therapy Frequency (OT Eval): daily  Daily Summary of Progress (OT): progress toward functional goals is gradual  Plan of Care Review  Plan of Care Reviewed With: patient  Plan of Care Reviewed With: patient  Outcome Summary: Pt alert, minimal verbalization and oriented to self. He completed BUE AAROM, limited with rigidity. He required mod assist complete simple grooming task. Issued good  utensils per request of nurse, pt able to complete hand-to-mouth pattern. Pt following 25% 1-step commands with cues, increased processing time required. Recommend SNF-level rehab.  Outcome Measures     Row Name 05/10/20 1631 05/08/20 1310          How much help from another is currently needed...    Putting on and taking off regular lower body clothing?  1  -AR  1  -AR     Bathing (including washing, rinsing, and drying)  1  -AR  1  -AR     Toileting (which includes using toilet bed pan or urinal)  1  -AR  1  -AR     Putting on and taking off regular upper body clothing  2  -AR  2  -AR     Taking care of personal grooming (such as brushing teeth)  2  -AR  3  -AR     Eating meals  2  -AR  3  -AR     AM-PAC 6 Clicks Score (OT)  9  -AR  11  -AR        Functional Assessment    Outcome Measure Options  AM-PAC 6 Clicks Daily Activity (OT)  -AR  AM-PAC 6 Clicks Daily  Activity (OT)  -AR       User Key  (r) = Recorded By, (t) = Taken By, (c) = Cosigned By    Initials Name Provider Type    Meeta Rosado OT Occupational Therapist           Time Calculation:   Time Calculation- OT     Row Name 05/10/20 1631             Time Calculation- OT    OT Start Time  1631  -AR      Total Timed Code Minutes- OT  13 minute(s)  -AR      OT Received On  05/10/20  -AR      OT Goal Re-Cert Due Date  05/18/20  -AR         Timed Charges    68280 - OT Therapeutic Exercise Minutes  5  -AR      48042 - OT Self Care/Mgmt Minutes  8  -AR        User Key  (r) = Recorded By, (t) = Taken By, (c) = Cosigned By    Initials Name Provider Type    Meeta Rosado OT Occupational Therapist        Therapy Charges for Today     Code Description Service Date Service Provider Modifiers Qty    83708536603 HC OT SELF CARE/MGMT/TRAIN EA 15 MIN 5/10/2020 Meeta Waldron OT GO 1               Meeta Waldron OT  5/10/2020    Electronically signed by Meeta Waldron OT at 05/10/20 1704          Speech Language Pathology Notes (most recent note)      Zuly Harris MS CCC-SLP at 05/09/20 1038          Problem: Patient Care Overview  Goal: Plan of Care Review  Outcome: Ongoing (interventions implemented as appropriate)  Flowsheets (Taken 5/9/2020 0800 by Eladia Wiggins RN)  Plan of Care Reviewed With: patient  Note:   SLP treatment completed. Will sign-off as patient is tolerating recommended diet consistency with no s/s of aspiration or difficulty at this time. Please see note for further details and recommendations.         Electronically signed by Zuly Harris MS CCC-SLP at 05/09/20 1038

## 2020-05-11 NOTE — PLAN OF CARE
Problem: Patient Care Overview  Goal: Plan of Care Review  Outcome: Ongoing (interventions implemented as appropriate)  Flowsheets  Taken 5/11/2020 1848 by Aida Gamble, Nursing Student  Progress: improving (Pended)  Taken 5/11/2020 0929 by Mala Gamble PT  Plan of Care Reviewed With: patient  Note:   Patient VSS. Alert to self only, affect improved. Sat in chair today, tolerated well. Eating well with tray set up assistance. Condom cath changed. Antibiotics given as ordered.   Goal: Individualization and Mutuality  Outcome: Ongoing (interventions implemented as appropriate)  Goal: Discharge Needs Assessment  Outcome: Ongoing (interventions implemented as appropriate)  Goal: Interprofessional Rounds/Family Conf  Outcome: Ongoing (interventions implemented as appropriate)     Problem: Fall Risk (Adult)  Goal: Absence of Fall  Outcome: Ongoing (interventions implemented as appropriate)     Problem: Skin Injury Risk (Adult)  Goal: Skin Health and Integrity  Outcome: Ongoing (interventions implemented as appropriate)     Problem: Confusion, Acute (Adult)  Goal: Cognitive/Functional Impairments Minimized  Outcome: Ongoing (interventions implemented as appropriate)  Goal: Safety  Outcome: Ongoing (interventions implemented as appropriate)     Problem: Pneumonia (Adult)  Goal: Signs and Symptoms of Listed Potential Problems Will be Absent, Minimized or Managed (Pneumonia)  Outcome: Ongoing (interventions implemented as appropriate)

## 2020-05-11 NOTE — PROGRESS NOTES
Continued Stay Note  [facility]     Patient Name: Gama Davila  MRN: [id number]  Today's Date: 5/11/2020    Admit Date: 5/6/2020    Discharge Plan     Row Name 05/11/20 1520       Plan    Plan  SNF Rehab    Patient/Family in Agreement with Plan  yes    Plan Comments  Per discussion in [facility] and review of MD notes, patient will likely be here several more days. Patient is a resident in memory care at [facility] in [place]. He must be an assist of one in order to return but is currently up with a mechanical lift. PT and OT have recommended SNF rehab. Spoke with patient's daughter/POA, Virginia, by phone. She would like referrals to SNFs in the [facility] area or close to [facility]. Called referral to Aliyah with [facility] near [facility]. Left  referral and faxed clinical info to Mary Anne with [facility] in [place]. Patient will need a negative Covid screen within 48 of admission to SNF in order for them to accept him. He will likely need ambulance transportation. [place] states the ultimate goal is back to [facility] if/when patient is able to return.  will continue to follow.         Discharge Codes    No documentation.       Expected Discharge Date and Time     Expected Discharge Date Expected Discharge Time    May 14, 2020             Stephanie Sanchez RN

## 2020-05-11 NOTE — PLAN OF CARE
Problem: Patient Care Overview  Goal: Plan of Care Review  Outcome: Ongoing (interventions implemented as appropriate)  Flowsheets (Taken 5/11/2020 0406)  Progress: improving  Plan of Care Reviewed With: patient     Problem: Confusion, Acute (Adult)  Goal: Cognitive/Functional Impairments Minimized  Flowsheets (Taken 5/11/2020 0406)  Cognitive/Functional Impairments Minimized: making progress toward outcome     Problem: Skin Injury Risk (Adult)  Goal: Skin Health and Integrity  Flowsheets (Taken 5/11/2020 0406)  Skin Health and Integrity: making progress toward outcome     Problem: Patient Care Overview  Goal: Interprofessional Rounds/Family Conf  Outcome: Ongoing (interventions implemented as appropriate)     Problem: Patient Care Overview  Goal: Discharge Needs Assessment  Outcome: Ongoing (interventions implemented as appropriate)     Problem: Patient Care Overview  Goal: Individualization and Mutuality  Outcome: Ongoing (interventions implemented as appropriate)

## 2020-05-11 NOTE — PROGRESS NOTES
[facility] Medicine Services  PROGRESS NOTE    Patient Name: Gama Davila  : 1940  MRN: [id number]    Date of Admission: 2020  Primary Care Physician: Provider, No Known    Subjective   Subjective     CC:  Altered mental status    HPI:  Patient seems more alert today. He is sitting up in bed eating breakfast. Feeding himself. He is still only oriented to person, but more interactive today.    Review of Systems  Unable to obtain secondary to confusion.    Objective   Objective     Vital Signs:   Temp:  [97.7 °F (36.5 °C)-98.3 °F (36.8 °C)] 97.7 °F (36.5 °C)  Heart Rate:  [60-79] 79  Resp:  [16-18] 18  BP: (106-146)/(51-83) 138/82        Physical Exam:  Constitutional: No acute distress, frail appearing, sitting up in bed eating breakfast  HENT: NCAT, mucous membranes moist  Respiratory: Clear to auscultation bilaterally, respiratory effort normal   Cardiovascular: RRR, no murmurs, rubs, or gallops  Gastrointestinal: Positive bowel sounds, soft, nontender, nondistended  Musculoskeletal: No bilateral ankle edema  Psychiatric: flat affect  Neurologic: confused, does not follow commands, answers simple questions only with redirection, knows his name Rigid upper extremities with mild resting tremor noted.  Skin: No rashes    Results Reviewed:  Results from last 7 days   Lab Units 20  0544 20  1217   WBC 10*3/mm3 7.27 6.12 6.38  --    HEMOGLOBIN g/dL 11.3* 11.5* 11.4*  --    HEMATOCRIT % 34.5* 35.3* 35.4*  --    PLATELETS 10*3/mm3 171 176 178  --    PROCALCITONIN ng/mL  --   --   --  <0.02*     Results from last 7 days   Lab Units 20  0720  0544 20  1217   SODIUM mmol/L 134* 135* 139 140   POTASSIUM mmol/L 3.8 3.7 3.5 4.0   CHLORIDE mmol/L 97* 100 104 102   CO2 mmol/L 28.0 25.0 25.0 28.0   BUN mg/dL 12 11 14 20   CREATININE mg/dL 1.18 1.05 1.09 1.10   GLUCOSE mg/dL 98 120* 93 101*   CALCIUM mg/dL 8.5* 8.0*  7.9* 8.5*   ALT (SGPT) U/L  --   --  12 14   AST (SGOT) U/L  --   --  21 27   TROPONIN T ng/mL  --   --   --  <0.010     Estimated Creatinine Clearance: 48.8 mL/min (by C-G formula based on SCr of 1.18 mg/dL).    Microbiology Results Abnormal     Procedure Component Value - Date/Time    Blood Culture - Blood, Blood, Venous Line [[id number]] Collected:  05/06/20 1236    Lab Status:  Preliminary result Specimen:  Blood, Venous Line Updated:  05/10/20 1315     Blood Culture No growth at 4 days    Blood Culture - Blood, Blood, Venous Line [[id number]] Collected:  05/06/20 1236    Lab Status:  Preliminary result Specimen:  Blood, Venous Line Updated:  05/10/20 1315     Blood Culture No growth at 4 days    Urine Culture - Urine, Urine, Catheter In/Out [[id number]]  (Normal) Collected:  05/06/20 1036    Lab Status:  Final result Specimen:  Urine, Catheter In/Out Updated:  05/07/20 2046     Urine Culture No growth    SARS-CoV-2 PCR ([facility] IN-HOUSE PERFORMED), NP SWAB IN TRANSPORT MEDIA - Swab, Nasopharynx [[id number]]  (Normal) Collected:  05/06/20 1520    Lab Status:  Final result Specimen:  Swab from Nasopharynx Updated:  05/06/20 2001     COVID19 Not Detected          Imaging Results (Last 24 Hours)     ** No results found for the last 24 hours. **               I have reviewed the medications:  Scheduled Meds:    aspirin 81 mg Oral Daily   atorvastatin 80 mg Oral Nightly   carbidopa-levodopa 1 tablet Oral TID   castor oil-balsam peru  Topical Q12H   citalopram 20 mg Oral Daily   donepezil 5 mg Oral Nightly   enoxaparin 40 mg Subcutaneous Q24H   gabapentin 100 mg Oral Q12H   insulin lispro 0-7 Units Subcutaneous TID AC   pantoprazole 40 mg Oral Daily   piperacillin-tazobactam 3.375 g Intravenous Q8H   sodium chloride 10 mL Intravenous Q12H     Continuous Infusions:   PRN Meds:.•  acetaminophen **OR** acetaminophen **OR** acetaminophen  •  dextrose  •  dextrose  •  glucagon (human recombinant)  •  magnesium sulfate  **OR** magnesium sulfate **OR** magnesium sulfate  •  potassium chloride **OR** potassium chloride **OR** potassium chloride  •  sodium chloride  •  sodium chloride    Assessment/Plan   Assessment & Plan     Active Hospital Problems    Diagnosis  POA   • **HCAP (healthcare-associated pneumonia) [J18.9]  Yes   • Acute respiratory failure with hypoxia (CMS/HCC) [J96.01]  Yes   • AMS (altered mental status) [R41.82]  Yes   • Dementia (CMS/[facility]) [F03.90]  Yes   • Hypoxia [R09.02]  Yes   • Diabetes mellitus (CMS/HCC) [E11.9]  Yes   • Coronary artery disease [I25.10]  Yes      Resolved Hospital Problems   No resolved problems to display.        Brief Hospital Course to date:  Gama Davila is a 79 y.o. male admitted with altered mental status of uncertain etiology and found to have healthcare associated pneumonia, acute respiratory failure with hypoxia, possibly drug-induced apathy and weakness.  Neurology is consulted and following.  They are considering Parkinson's.    Encephalopathy   Dementia with possible Parkinsonism  - Continuing to address polypharmacy, neurology following  - Specifically stopped Depakote, Seroquel, Prozac  - taper gabapentin, consider stopping celexa  - Empiric trial of Sinemet, Aricept added 5/10  -PT/OT for mobility    [facility] Healthcare associated pneumonia  - Currently on Zosyn, plan to switch to Augmentin at discharge to complete course  - demonstrated on CXR 5/8/2020   - SLP found no signs or symptoms of aspiration    Acute respiratory failure with hypoxia  - resolved, now on room air, likely secondary to pneumonia    Diabetes mellitus  - SSI    Skin breakdown  - PT wound care following    CAD:  - continue ASA and statin    Add nystatin for yeast in groin    DVT Prophylaxis:  lovenox      Daily Care Communication  Due to current limited visitation policies, an attempt will be made daily to update patient's identified best point-of-contact(s)   Contact: [place] [phone]   Relation: Daughter  and POA   Type of communication (phone or televideo): phone   Time of communication: 9:05am   Notes (if applicable): Daughter updated on patient. Able to feed himself this morning. She was encouraged by this news.     Disposition: likely here several more days as we make medication changes and monitor his mental status.    CODE STATUS:   Code Status and Medical Interventions:   Ordered at: 05/06/20 1533     Limited Support to NOT Include:    Intubation     Level Of Support Discussed With:    Health Care Surrogate     Code Status:    No CPR     Medical Interventions (Level of Support Prior to Arrest):    Limited       Electronically signed by Pavithra Crowell DO, 05/11/20, 09:01.

## 2020-05-12 LAB
GLUCOSE BLDC GLUCOMTR-MCNC: 109 MG/DL (ref 70–130)
GLUCOSE BLDC GLUCOMTR-MCNC: 143 MG/DL (ref 70–130)
GLUCOSE BLDC GLUCOMTR-MCNC: 171 MG/DL (ref 70–130)

## 2020-05-12 PROCEDURE — 99232 SBSQ HOSP IP/OBS MODERATE 35: CPT | Performed by: INTERNAL MEDICINE

## 2020-05-12 PROCEDURE — 63710000001 INSULIN LISPRO (HUMAN) PER 5 UNITS: Performed by: FAMILY MEDICINE

## 2020-05-12 PROCEDURE — 97610 LOW FREQUENCY NON-THERMAL US: CPT

## 2020-05-12 PROCEDURE — 25010000002 PIPERACILLIN SOD-TAZOBACTAM PER 1 G: Performed by: FAMILY MEDICINE

## 2020-05-12 PROCEDURE — 97597 DBRDMT OPN WND 1ST 20 CM/<: CPT

## 2020-05-12 PROCEDURE — 25010000002 ENOXAPARIN PER 10 MG: Performed by: FAMILY MEDICINE

## 2020-05-12 PROCEDURE — 99231 SBSQ HOSP IP/OBS SF/LOW 25: CPT | Performed by: PSYCHIATRY & NEUROLOGY

## 2020-05-12 PROCEDURE — 82962 GLUCOSE BLOOD TEST: CPT

## 2020-05-12 RX ADMIN — DONEPEZIL HYDROCHLORIDE 10 MG: 10 TABLET, FILM COATED ORAL at 20:19

## 2020-05-12 RX ADMIN — NYSTATIN: 100000 POWDER TOPICAL at 20:19

## 2020-05-12 RX ADMIN — CASTOR OIL AND BALSAM, PERU: 788; 87 OINTMENT TOPICAL at 09:50

## 2020-05-12 RX ADMIN — INSULIN LISPRO 2 UNITS: 100 INJECTION, SOLUTION INTRAVENOUS; SUBCUTANEOUS at 12:14

## 2020-05-12 RX ADMIN — SODIUM CHLORIDE, PRESERVATIVE FREE 10 ML: 5 INJECTION INTRAVENOUS at 08:46

## 2020-05-12 RX ADMIN — PANTOPRAZOLE SODIUM 40 MG: 40 TABLET, DELAYED RELEASE ORAL at 08:47

## 2020-05-12 RX ADMIN — CARBIDOPA AND LEVODOPA 1 TABLET: 25; 100 TABLET ORAL at 08:47

## 2020-05-12 RX ADMIN — ENOXAPARIN SODIUM 40 MG: 80 INJECTION SUBCUTANEOUS at 17:05

## 2020-05-12 RX ADMIN — NYSTATIN: 100000 POWDER TOPICAL at 08:47

## 2020-05-12 RX ADMIN — TAZOBACTAM SODIUM AND PIPERACILLIN SODIUM 3.38 G: 375; 3 INJECTION, SOLUTION INTRAVENOUS at 00:37

## 2020-05-12 RX ADMIN — TAZOBACTAM SODIUM AND PIPERACILLIN SODIUM 3.38 G: 375; 3 INJECTION, SOLUTION INTRAVENOUS at 23:57

## 2020-05-12 RX ADMIN — CARBIDOPA AND LEVODOPA 1 TABLET: 25; 100 TABLET ORAL at 14:22

## 2020-05-12 RX ADMIN — CARBIDOPA AND LEVODOPA 1 TABLET: 25; 100 TABLET ORAL at 17:05

## 2020-05-12 RX ADMIN — CASTOR OIL AND BALSAM, PERU: 788; 87 OINTMENT TOPICAL at 20:19

## 2020-05-12 RX ADMIN — SODIUM CHLORIDE, PRESERVATIVE FREE 10 ML: 5 INJECTION INTRAVENOUS at 20:19

## 2020-05-12 RX ADMIN — ASPIRIN 81 MG: 81 TABLET, COATED ORAL at 08:47

## 2020-05-12 RX ADMIN — TAZOBACTAM SODIUM AND PIPERACILLIN SODIUM 3.38 G: 375; 3 INJECTION, SOLUTION INTRAVENOUS at 08:46

## 2020-05-12 RX ADMIN — ATORVASTATIN CALCIUM 80 MG: 40 TABLET, FILM COATED ORAL at 20:19

## 2020-05-12 RX ADMIN — CITALOPRAM HYDROBROMIDE 20 MG: 20 TABLET ORAL at 08:47

## 2020-05-12 RX ADMIN — ACETAMINOPHEN 650 MG: 325 TABLET, FILM COATED ORAL at 20:19

## 2020-05-12 RX ADMIN — TAZOBACTAM SODIUM AND PIPERACILLIN SODIUM 3.38 G: 375; 3 INJECTION, SOLUTION INTRAVENOUS at 17:05

## 2020-05-12 NOTE — PROGRESS NOTES
Case Management Discharge Note      Final Note: Patient's plan is a skilled bed at [facility] tomorrow, 5/13.  Nurse to call report to [phone], ask for [facility] unit.  CM will fax transfer summary when available to [phone].  Ambulance scheduled for 1400.  PCS on chartlet.  Updated daughter by phone.  Please send a copy of the transfer summary with patient to the facility.  Evi Pablo RN [phone]         Destination - Selection Complete      Service Provider Request Status Selected Services Address Phone Number Fax Number    [facility] Selected Skilled Nursing [address] [address] [phone]      Durable Medical Equipment      No service has been selected for the patient.      Dialysis/Infusion      No service has been selected for the patient.      Home Medical Care      No service has been selected for the patient.      Therapy      No service has been selected for the patient.      Community Resources      No service has been selected for the patient.             Final Discharge Disposition Code: 03 - skilled nursing facility (SNF)

## 2020-05-12 NOTE — PLAN OF CARE
Problem: Patient Care Overview  Goal: Plan of Care Review  Outcome: Ongoing (interventions implemented as appropriate)  Flowsheets (Taken 5/12/2020 1000)  Plan of Care Reviewed With: patient  Outcome Summary: Sacral wound progressing well with decreased purple discoloration and moderate biofilm covering wound.  PT able to lightly debride moderate slough from wound base to help decrease bioburden and improve healing potential.

## 2020-05-12 NOTE — PLAN OF CARE
Problem: Patient Care Overview  Goal: Plan of Care Review  Outcome: Ongoing (interventions implemented as appropriate)  Flowsheets (Taken 5/12/2020 0419)  Plan of Care Reviewed With: patient  Outcome Summary: Patient alert but confused, No CPR, generalized weakness, q2 turn, has tremors from possible PD- MD aware, Strong UWAx 2/ mechanical lift, Skin care 2x daily pressure injury on his coccyx apply venelex/foam dressing. New IV placed last night IV antibiotics ongoing. Plan: They D/C'd sedating meds, Tapering gabapentin, increased Aricept to 10mg daily, if patient is okay to be d/c'd they must be screened for COVID-19 48 hours before admission to a SNF so they can accept him. Will continue to monitor.

## 2020-05-12 NOTE — PROGRESS NOTES
Neurology       Patient Care Team:  Provider, No Known as PCP - General    Chief complaint: Altered mental status    History: Patient has shown no signs of improvement.    He apparently sits in the bed and stares.    He is doing that at the moment and his lungs are sitting in front of him on touch.      Past Medical History:   Diagnosis Date   • Arthritis    • Cancer (CMS/HCC)     SKIN    • Cataract    • Coronary artery disease    • Diabetes mellitus (CMS/HCC)    • High cholesterol    • [place] (hard of hearing)     PATIENT HAS HEARING AIDS   • Hypertension    • Irregular heart beat     HISTORY OF CARDIAC ABLATION IN 2003   • Wears dentures     FULL UPPER PLATE       Vital Signs   Vitals:    05/12/20 0358 05/12/20 0732 05/12/20 0842 05/12/20 1127   BP: 164/96 174/82 154/92 126/65   BP Location: Right arm Right arm  Left arm   Patient Position: Lying Lying  Lying   Pulse: 64 60  62   Resp: 18 18  18   Temp: 97.8 °F (36.6 °C) 97.6 °F (36.4 °C)  97.6 °F (36.4 °C)   TempSrc: Oral Oral  Axillary   SpO2: 91% 95%  98%   Weight:       Height:           Physical Exam:   General: Awake and alert              Neuro: Mild cogwheel rigidity.  No resting tremor.        Results Review:  Reviewed  Results from last 7 days   Lab Units 05/11/20  0416   WBC 10*3/mm3 7.27   HEMOGLOBIN g/dL 11.3*   HEMATOCRIT % 34.5*   PLATELETS 10*3/mm3 171     Results from last 7 days   Lab Units 05/11/20  0416 05/08/20  0730 05/07/20  0544 05/06/20  1217   SODIUM mmol/L 134* 135* 139 140   POTASSIUM mmol/L 3.8 3.7 3.5 4.0   CHLORIDE mmol/L 97* 100 104 102   CO2 mmol/L 28.0 25.0 25.0 28.0   BUN mg/dL 12 11 14 20   CREATININE mg/dL 1.18 1.05 1.09 1.10   CALCIUM mg/dL 8.5* 8.0* 7.9* 8.5*   BILIRUBIN mg/dL  --   --  0.3 0.4   ALK PHOS U/L  --   --  46 51   ALT (SGPT) U/L  --   --  12 14   AST (SGOT) U/L  --   --  21 27   GLUCOSE mg/dL 98 120* 93 101*       Imaging Results (Last 24 Hours)     ** No results found for the last 24 hours. **           Assessment:  Acute on chronic dementia.  Current worsening is likely related to a septic encephalopathy which is not resolved.    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    Continue Sinemet at the current dose 25/100, 3 times daily with meals.    Continue to Aricept 10 mg daily    Comment:  Guarded prognosis with poor long-term prognosis given the worsening of his dementia following sepsis.         I discussed the patients findings and my recommendations with patient    Rebel Perry MD  05/12/20  14:26

## 2020-05-12 NOTE — THERAPY WOUND CARE TREATMENT
Acute Care - Wound/Debridement Treatment Note  [facility]     Patient Name: Gama Davila  : 1940  MRN: [id number]  Today's Date: 2020                  Admit Date: 2020    Visit Dx:    ICD-10-CM ICD-9-CM   1. Altered mental status, unspecified altered mental status type R41.82 780.97   2. Mild dehydration E86.0 276.51   3. Pneumonia of both lower lobes due to infectious organism (CMS/[facility]) J18.1 483.8   4. Dysphagia, unspecified type R13.10 787.20   5. Impaired mobility and ADLs Z74.09 799.89       Patient Active Problem List   Diagnosis   • [place] (hard of hearing)   • High cholesterol   • Coronary artery disease   • Hypertension   • Irregular heart beat   • Diabetes mellitus (CMS/[facility])   • Septic shock (CMS/[facility])   • NANCI (acute kidney injury) (CMS/[facility])   • AMS (altered mental status)   • Hypoxia   • Anemia   • Edema   • Acute encephalopathy   • Dementia (CMS/[facility])   • Hypomagnesemia   • Rhabdomyolysis   • Acute retention of urine   • HCAP (healthcare-associated pneumonia)   • Acute respiratory failure with hypoxia (CMS/[facility])           Wound 20 1410 coccyx Pressure Injury (Active)   Dressing Appearance intact;dry 2020 10:00 AM   Closure Open to air 2020  8:00 AM   Base non-blanchable;granulating;moist;pink;red;yellow 2020 10:00 AM   Periwound pink;warm;intact 2020 10:00 AM   Periwound Temperature warm 2020 10:00 AM   Periwound Skin Turgor soft 2020 10:00 AM   Edges irregular 2020 10:00 AM   Drainage Characteristics/Odor serosanguineous 2020 10:00 AM   Drainage Amount scant 2020 10:00 AM   Care, Wound irrigated with;sterile normal saline;debrided;ultrasound therapy, non contact low frequency 2020 10:00 AM   Dressing Care, Wound skin barrier agent applied 2020 10:00 AM   Periwound Care, Wound cleansed with pH balanced cleanser;barrier ointment applied 2020  8:00 AM         WOUND DEBRIDEMENT  Total area of Debridement: ~2cm2  Debridement Site  1  Location- Site 1: sacrum  Selective Debridement- Site 1: Wound Surface <20cmsq  Instruments- Site 1: tweezers  Excised Tissue Description- Site 1: moderate, slough  Bleeding- Site 1: none            Therapy Treatment    Rehabilitation Treatment Summary     Row Name 05/12/20 1000             Treatment Time/Intention    Discipline  physical therapist  -      Document Type  therapy note (daily note);wound care  -MF      Subjective Information  complains of;weakness;fatigue  -      Recorded by [] John Liu, PT 05/12/20 1054      Row Name 05/12/20 1000             Positioning and Restraints    Pre-Treatment Position  in bed  -      Post Treatment Position  bed  -MF      In Bed  supine;call light within reach  -MF      Recorded by [MF] John Liu, PT 05/12/20 1054      Row Name 05/12/20 1000             Pain Assessment    Additional Documentation  Pain Scale: FACES Pre/Post-Treatment (Group)  -      Recorded by [MF] John Liu, PT 05/12/20 1054      Row Name 05/12/20 1000             Pain Scale: FACES Pre/Post-Treatment    Pain: FACES Scale, Pretreatment  0-->no hurt  -      Pain: FACES Scale, Post-Treatment  0-->no hurt  -      Recorded by [MF] John Liu, PT 05/12/20 1054      Row Name 05/12/20 1000             Wound 05/06/20 1410 coccyx Pressure Injury    Wound - Properties Group Date first assessed: 05/06/20 [AS] Time first assessed: 1410 [AS] Present on Hospital Admission: Y [AS] Location: coccyx [AS] Primary Wound Type: Pressure inj [AS] Stage, Pressure Injury: deep tissue injury [AS] Recorded by:  [AS] Cornel Sloan RN 05/06/20 1418    Dressing Appearance  intact;dry  -MF      Base  non-blanchable;granulating;moist;pink;red;yellow  -MF      Periwound  pink;warm;intact  -MF      Periwound Temperature  warm  -MF      Periwound Skin Turgor  soft  -MF      Edges  irregular  -MF      Drainage Characteristics/Odor  serosanguineous  -MF      Drainage Amount  scant  -MF       Care, Wound  irrigated with;sterile normal saline;debrided;ultrasound therapy, non contact low frequency 5 min MIST   -MF      Dressing Care, Wound  skin barrier agent applied  -MF      Recorded by [MF] John Liu, PT 05/12/20 1054      Row Name 05/12/20 1000             Coping    Observed Emotional State  calm;cooperative  -MF      Verbalized Emotional State  acceptance  -MF      Recorded by [MF] John Liu, PT 05/12/20 1054      Row Name 05/12/20 1000             Plan of Care Review    Plan of Care Reviewed With  patient  -MF      Outcome Summary  Sacral wound progressing well with decreased purple discoloration and moderate biofilm covering wound.  PT able to lightly debride moderate slough from wound base to help decrease bioburden and improve healing potential.   -MF      Recorded by [MF] John Liu, PT 05/12/20 1054        User Key  (r) = Recorded By, (t) = Taken By, (c) = Cosigned By    Initials Name Effective Dates Discipline     John Liu, PT 06/19/15 -  PT    AS Cornel Sloan RN 06/16/16 -  Nurse          Physical Therapy Education                 Title: PT OT SLP Therapies (In Progress)     Topic: Physical Therapy (In Progress)     Point: Mobility training (In Progress)     Description:   Instruct learner(s) on safety and technique for assisting patient out of bed, chair or wheelchair.  Instruct in the proper use of assistive devices, such as walker, crutches, cane or brace.              Patient Friendly Description:   It's important to get you on your feet again, but we need to do so in a way that is safe for you. Falling has serious consequences, and your personal safety is the most important thing of all.        When it's time to get out of bed, one of us or a family member will sit next to you on the bed to give you support.     If your doctor or nurse tells you to use a walker, crutches, a cane, or a brace, be sure you use it every time you get out of bed, even  if you think you don't need it.    Learning Progress Summary           Patient Acceptance, E, NL,NR by NS at 5/11/2020 1039    Acceptance, E, NR,NL by CD at 5/8/2020 1417    Comment:  BENEFITS OF OOB ACTIVITY, SAFETY WITH MOBILITY, PROGRESSION OF POC.                   Point: Home exercise program (In Progress)     Description:   Instruct learner(s) on appropriate technique for monitoring, assisting and/or progressing patient with therapeutic exercises and activities.              Learning Progress Summary           Patient Acceptance, E, NL,NR by NS at 5/11/2020 1039    Acceptance, E, NR,NL by CD at 5/8/2020 1417    Comment:  BENEFITS OF OOB ACTIVITY, SAFETY WITH MOBILITY, PROGRESSION OF POC.                   Point: Body mechanics (In Progress)     Description:   Instruct learner(s) on proper positioning and spine alignment for patient and/or caregiver during mobility tasks and/or exercises.              Learning Progress Summary           Patient Acceptance, E, NL,NR by NS at 5/11/2020 1039    Acceptance, E, NR,NL by CD at 5/8/2020 1417    Comment:  BENEFITS OF OOB ACTIVITY, SAFETY WITH MOBILITY, PROGRESSION OF POC.                   Point: Precautions (In Progress)     Description:   Instruct learner(s) on prescribed precautions during mobility and gait tasks              Learning Progress Summary           Patient Acceptance, E, NL,NR by NS at 5/11/2020 1039    Acceptance, E, NR,NL by CD at 5/8/2020 1417    Comment:  BENEFITS OF OOB ACTIVITY, SAFETY WITH MOBILITY, PROGRESSION OF POC.                               User Key     Initials Effective Dates Name Provider Type Discipline    CD 06/19/15 -  Samantha Leo PT Physical Therapist PT    NS 09/10/19 -  Mala Gamble PT Physical Therapist PT                  PT Recommendation and Plan  Anticipated Discharge Disposition (PT): skilled nursing facility  Planned Therapy Interventions (PT Eval): balance training, bed mobility training, gait training, home  exercise program, transfer training, strengthening  Therapy Frequency (PT Clinical Impression): daily               Outcome Summary: Sacral wound progressing well with decreased purple discoloration and moderate biofilm covering wound.  PT able to lightly debride moderate slough from wound base to help decrease bioburden and improve healing potential.   Plan of Care Reviewed With: patient    Outcome Measures     Row Name 05/10/20 1631             How much help from another is currently needed...    Putting on and taking off regular lower body clothing?  1  -AR      Bathing (including washing, rinsing, and drying)  1  -AR      Toileting (which includes using toilet bed pan or urinal)  1  -AR      Putting on and taking off regular upper body clothing  2  -AR      Taking care of personal grooming (such as brushing teeth)  2  -AR      Eating meals  2  -AR      AM-PAC 6 Clicks Score (OT)  9  -AR         Functional Assessment    Outcome Measure Options  AM-PAC 6 Clicks Daily Activity (OT)  -AR        User Key  (r) = Recorded By, (t) = Taken By, (c) = Cosigned By    Initials Name Provider Type    Meeta Rosado, OT Occupational Therapist              Time Calculation  PT Charges     Row Name 05/12/20 1000             Time Calculation    Start Time  1000  -MF      PT Goal Re-Cert Due Date  05/17/20  -        User Key  (r) = Recorded By, (t) = Taken By, (c) = Cosigned By    Initials Name Provider Type    John Gonzalez, PT Physical Therapist           Therapy Charges for Today     Code Description Service Date Service Provider Modifiers Qty    88177977702 HC CHANDRIKA DEBRIDE OPEN WOUND UP TO 20CM 5/12/2020 John Liu, PT GP 1    [id number]  PT [facility] MIST 5/12/2020 John Liu, PT GP 1            PT G-Codes  Outcome Measure Options: AM-PAC 6 Clicks Basic Mobility (PT)  AM-PAC 6 Clicks Score (PT): 9  AM-PAC 6 Clicks Score (OT): 9        John Liu, PT  5/12/2020

## 2020-05-12 NOTE — PROGRESS NOTES
Neurology       Patient Care Team:  Provider, No Known as PCP - General    Chief complaint: Altered mental state    History: The patient has lucid moments according to her sitter but other times is talking to people that are not there.    She is currently sleeping and calm    She has had picking behavior.      Past Medical History:   Diagnosis Date   • Arthritis    • Cancer (CMS/HCC)     SKIN    • Cataract    • Coronary artery disease    • Diabetes mellitus (CMS/HCC)    • High cholesterol    • [place] (hard of hearing)     PATIENT HAS HEARING AIDS   • Hypertension    • Irregular heart beat     HISTORY OF CARDIAC ABLATION IN 2003   • Wears dentures     FULL UPPER PLATE       Vital Signs   Vitals:    05/12/20 0358 05/12/20 0732 05/12/20 0842 05/12/20 1127   BP: 164/96 174/82 154/92 126/65   BP Location: Right arm Right arm  Left arm   Patient Position: Lying Lying  Lying   Pulse: 64 60  62   Resp: 18 18  18   Temp: 97.8 °F (36.6 °C) 97.6 °F (36.4 °C)  97.6 °F (36.4 °C)   TempSrc: Oral Oral  Axillary   SpO2: 91% 95%  98%   Weight:       Height:           Physical Exam:   General: Sleepy              Neuro: Not a rash    Results Review:  Reviewed  Results from last 7 days   Lab Units 05/11/20  0416   WBC 10*3/mm3 7.27   HEMOGLOBIN g/dL 11.3*   HEMATOCRIT % 34.5*   PLATELETS 10*3/mm3 171     Results from last 7 days   Lab Units 05/11/20  0416 05/08/20  0730 05/07/20  0544 05/06/20  1217   SODIUM mmol/L 134* 135* 139 140   POTASSIUM mmol/L 3.8 3.7 3.5 4.0   CHLORIDE mmol/L 97* 100 104 102   CO2 mmol/L 28.0 25.0 25.0 28.0   BUN mg/dL 12 11 14 20   CREATININE mg/dL 1.18 1.05 1.09 1.10   CALCIUM mg/dL 8.5* 8.0* 7.9* 8.5*   BILIRUBIN mg/dL  --   --  0.3 0.4   ALK PHOS U/L  --   --  46 51   ALT (SGPT) U/L  --   --  12 14   AST (SGOT) U/L  --   --  21 27   GLUCOSE mg/dL 98 120* 93 101*       Imaging Results (Last 24 Hours)     ** No results found for the last 24 hours. **          Assessment:  Waxing and waning  delirium    Plan:  Continue Seroquel 25 twice daily    Comment:  Slow progress          I discussed the patients findings and my recommendations with nursing staff    Rebel Perry MD  05/12/20  14:10

## 2020-05-12 NOTE — PROGRESS NOTES
[facility] Medicine Services  PROGRESS NOTE    Patient Name: Gama Davila  : 1940  MRN: [id number]    Date of Admission: 2020  Primary Care Physician: Provider, No Known    Subjective   Subjective     CC:  Altered mental status    HPI:  Patient is sleeping today. He arouses to his name, remains confused. Does not answer questions. Mental status remains about the same.    Review of Systems  Unable to obtain secondary to confusion.    Objective   Objective     Vital Signs:   Temp:  [97.5 °F (36.4 °C)-97.9 °F (36.6 °C)] 97.6 °F (36.4 °C)  Heart Rate:  [60-81] 60  Resp:  [18] 18  BP: (113-174)/(56-96) 154/92        Physical Exam:  Constitutional: No acute distress, frail appearing, sleeping  HENT: NCAT, mucous membranes moist  Respiratory: Clear to auscultation bilaterally, respiratory effort normal   Cardiovascular: RRR, no murmurs, rubs, or gallops  Gastrointestinal: Positive bowel sounds, soft, nontender, nondistended  Musculoskeletal: No bilateral ankle edema  Psychiatric: flat affect  Neurologic: confused, does not follow commands, answers simple questions only with redirection, knows his name Rigid upper extremities with mild resting tremor noted.  Skin: No rashes    Results Reviewed:  Results from last 7 days   Lab Units 20  1217   WBC 10*3/mm3 7.27 6.12 6.38  --    HEMOGLOBIN g/dL 11.3* 11.5* 11.4*  --    HEMATOCRIT % 34.5* 35.3* 35.4*  --    PLATELETS 10*3/mm3 171 176 178  --    PROCALCITONIN ng/mL  --   --   --  <0.02*     Results from last 7 days   Lab Units 20  0520  1217   SODIUM mmol/L 134* 135* 139 140   POTASSIUM mmol/L 3.8 3.7 3.5 4.0   CHLORIDE mmol/L 97* 100 104 102   CO2 mmol/L 28.0 25.0 25.0 28.0   BUN mg/dL 12 11 14 20   CREATININE mg/dL 1.18 1.05 1.09 1.10   GLUCOSE mg/dL 98 120* 93 101*   CALCIUM mg/dL 8.5* 8.0* 7.9* 8.5*   ALT (SGPT) U/L  --   --  12 14   AST  (SGOT) U/L  --   --  21 27   TROPONIN T ng/mL  --   --   --  <0.010     Estimated Creatinine Clearance: 48.8 mL/min (by C-G formula based on SCr of 1.18 mg/dL).    Microbiology Results Abnormal     Procedure Component Value - Date/Time    Blood Culture - Blood, Blood, Venous Line [[id number]] Collected:  05/06/20 1236    Lab Status:  Final result Specimen:  Blood, Venous Line Updated:  05/11/20 1315     Blood Culture No growth at 5 days    Blood Culture - Blood, Blood, Venous Line [[id number]] Collected:  05/06/20 1236    Lab Status:  Final result Specimen:  Blood, Venous Line Updated:  05/11/20 1315     Blood Culture No growth at 5 days    Urine Culture - Urine, Urine, Catheter In/Out [[id number]]  (Normal) Collected:  05/06/20 1036    Lab Status:  Final result Specimen:  Urine, Catheter In/Out Updated:  05/07/20 2046     Urine Culture No growth    SARS-CoV-2 PCR ([place] IN-HOUSE PERFORMED), NP SWAB IN TRANSPORT MEDIA - Swab, Nasopharynx [[id number]]  (Normal) Collected:  05/06/20 1520    Lab Status:  Final result Specimen:  Swab from Nasopharynx Updated:  05/06/20 2001     COVID19 Not Detected          Imaging Results (Last 24 Hours)     ** No results found for the last 24 hours. **               I have reviewed the medications:  Scheduled Meds:    aspirin 81 mg Oral Daily   atorvastatin 80 mg Oral Nightly   carbidopa-levodopa 1 tablet Oral TID   castor oil-balsam peru  Topical Q12H   citalopram 20 mg Oral Daily   donepezil 10 mg Oral Nightly   enoxaparin 40 mg Subcutaneous Q24H   gabapentin 100 mg Oral Nightly   insulin lispro 0-7 Units Subcutaneous TID AC   nystatin  Topical Q12H   pantoprazole 40 mg Oral Daily   piperacillin-tazobactam 3.375 g Intravenous Q8H   sodium chloride 10 mL Intravenous Q12H     Continuous Infusions:   PRN Meds:.•  acetaminophen **OR** acetaminophen **OR** acetaminophen  •  dextrose  •  dextrose  •  glucagon (human recombinant)  •  magnesium sulfate **OR** magnesium sulfate **OR**  magnesium sulfate  •  potassium chloride **OR** potassium chloride **OR** potassium chloride  •  sodium chloride  •  sodium chloride    Assessment/Plan   Assessment & Plan     Active Hospital Problems    Diagnosis  POA   • **HCAP (healthcare-associated pneumonia) [J18.9]  Yes   • Acute respiratory failure with hypoxia (CMS/HCC) [J96.01]  Yes   • AMS (altered mental status) [R41.82]  Yes   • Dementia (CMS/HCC) [F03.90]  Yes   • Hypoxia [R09.02]  Yes   • Diabetes mellitus (CMS/HCC) [E11.9]  Yes   • Coronary artery disease [I25.10]  Yes      Resolved Hospital Problems   No resolved problems to display.        Brief Hospital Course to date:  Gama Davila is a 79 y.o. male admitted with altered mental status of uncertain etiology and found to have healthcare associated pneumonia, acute respiratory failure with hypoxia, possibly drug-induced apathy and weakness.  Neurology is consulted and following.  They are considering Parkinson's.    Encephalopathy   Dementia with possible Parkinsonism  - Continuing to address polypharmacy, neurology following  - Specifically stopped Depakote, Seroquel, Prozac  - taper gabapentin, consider stopping celexa  - Empiric trial of Sinemet, Aricept added 5/10, dose increased last night  - PT/OT for mobility, will need SNF at discharge    [facility] associated pneumonia  - Currently on Zosyn, plan to switch to Augmentin at discharge to complete course  - demonstrated on CXR 5/8/2020   - SLP found no signs or symptoms of aspiration    Acute respiratory failure with hypoxia  - resolved, now on room air, likely secondary to pneumonia    Diabetes mellitus  - SSI    Skin breakdown  - PT wound care following    CAD:  - continue ASA and statin    DVT Prophylaxis:  lovenox    Restest COVID today, in anticipation of placement soon.    COVID-19 Status Testing      Asymptomatic COVID testing has been performed on Gama Davila to determine status per current protocols in preparation for planned  procedure and/or discharge disposition.     The patient does not have concerning clinical findings or high risk screen for COVID and this diagnosis is not clinically suspected.  However, due to prevalence of asymptomatic COVID infection testing was warranted to facilitate appropriate care.        COVID result:    COVID19   Date Value Ref Range Status   05/06/2020 Not Detected Not Detected - Ref. Range Final              Daily Care Communication  Due to current limited visitation policies, an attempt will be made daily to update patient's identified best point-of-contact(s)   Contact: [place] [phone]   Relation: Daughter and POA   Type of communication (phone or televideo): phone   Time of communication: 11:06am   Notes (if applicable): Daughter updated on patient. She is aware he may never improve, agreeable to SNF placement and hopeful for increase in mobility.     Disposition: when SNF bed available    CODE STATUS:   Code Status and Medical Interventions:   Ordered at: 05/06/20 1533     Limited Support to NOT Include:    Intubation     Level Of Support Discussed With:    Health Care Surrogate     Code Status:    No CPR     Medical Interventions (Level of Support Prior to Arrest):    Limited       Electronically signed by Pavithra Crowell DO, 05/12/20, 10:58.

## 2020-05-12 NOTE — NURSING NOTE
WOC nurse for follow up on DTPI to coccyx.     PT wound care is following for coccyx wound.  All skin intervention in place, on [place].    Spoke with RN Neelam, no new skin issues noted at this time.    No further WOC nurse need.  Will sign off please reconsult if needed.  Thank you

## 2020-05-13 ENCOUNTER — TELEPHONE (OUTPATIENT)
Dept: NEUROLOGY | Facility: CLINIC | Age: 80
End: 2020-05-13

## 2020-05-13 VITALS
HEIGHT: 68 IN | TEMPERATURE: 97.8 F | RESPIRATION RATE: 18 BRPM | OXYGEN SATURATION: 100 % | WEIGHT: 150 LBS | HEART RATE: 58 BPM | SYSTOLIC BLOOD PRESSURE: 154 MMHG | DIASTOLIC BLOOD PRESSURE: 71 MMHG | BODY MASS INDEX: 22.73 KG/M2

## 2020-05-13 LAB
GLUCOSE BLDC GLUCOMTR-MCNC: 128 MG/DL (ref 70–130)
GLUCOSE BLDC GLUCOMTR-MCNC: 97 MG/DL (ref 70–130)

## 2020-05-13 PROCEDURE — 82962 GLUCOSE BLOOD TEST: CPT

## 2020-05-13 PROCEDURE — 99231 SBSQ HOSP IP/OBS SF/LOW 25: CPT | Performed by: PSYCHIATRY & NEUROLOGY

## 2020-05-13 PROCEDURE — 99239 HOSP IP/OBS DSCHRG MGMT >30: CPT | Performed by: INTERNAL MEDICINE

## 2020-05-13 PROCEDURE — 25010000002 PIPERACILLIN SOD-TAZOBACTAM PER 1 G: Performed by: FAMILY MEDICINE

## 2020-05-13 RX ORDER — CITALOPRAM 20 MG/1
20 TABLET ORAL DAILY
Start: 2020-05-14

## 2020-05-13 RX ORDER — DONEPEZIL HYDROCHLORIDE 10 MG/1
10 TABLET, FILM COATED ORAL NIGHTLY
Start: 2020-05-13

## 2020-05-13 RX ORDER — METOPROLOL TARTRATE 50 MG/1
50 TABLET, FILM COATED ORAL DAILY
Qty: 30 TABLET | Refills: 0
Start: 2020-05-13 | End: 2020-06-12

## 2020-05-13 RX ORDER — AMOXICILLIN AND CLAVULANATE POTASSIUM 875; 125 MG/1; MG/1
1 TABLET, FILM COATED ORAL 2 TIMES DAILY
Start: 2020-05-13 | End: 2020-05-15

## 2020-05-13 RX ADMIN — TAZOBACTAM SODIUM AND PIPERACILLIN SODIUM 3.38 G: 375; 3 INJECTION, SOLUTION INTRAVENOUS at 08:29

## 2020-05-13 RX ADMIN — CARBIDOPA AND LEVODOPA 1 TABLET: 25; 100 TABLET ORAL at 08:29

## 2020-05-13 RX ADMIN — CASTOR OIL AND BALSAM, PERU: 788; 87 OINTMENT TOPICAL at 08:29

## 2020-05-13 RX ADMIN — CITALOPRAM HYDROBROMIDE 20 MG: 20 TABLET ORAL at 08:29

## 2020-05-13 RX ADMIN — ASPIRIN 81 MG: 81 TABLET, COATED ORAL at 08:29

## 2020-05-13 RX ADMIN — PANTOPRAZOLE SODIUM 40 MG: 40 TABLET, DELAYED RELEASE ORAL at 08:29

## 2020-05-13 RX ADMIN — SODIUM CHLORIDE, PRESERVATIVE FREE 10 ML: 5 INJECTION INTRAVENOUS at 08:28

## 2020-05-13 RX ADMIN — NYSTATIN: 100000 POWDER TOPICAL at 08:29

## 2020-05-13 NOTE — DISCHARGE SUMMARY
[facility] Medicine Services  DISCHARGE SUMMARY    Patient Name: Gama Davila  : 1940  MRN: [id number]    Date of Admission: 2020 10:11 AM  Date of Discharge:  2020  Primary Care Physician: Provider, No Known    Consults     Date and Time Order Name Status Description    2020 0030 Inpatient Neurology Consult General Completed           Hospital Course     Presenting Problem:   Altered mental status, unspecified altered mental status type [R41.82]  Altered mental status, unspecified altered mental status type [R41.82]    Active Hospital Problems    Diagnosis  POA   • **HCAP (healthcare-associated pneumonia) [J18.9]  Yes   • Acute respiratory failure with hypoxia (CMS/HCC) [J96.01]  Yes   • AMS (altered mental status) [R41.82]  Yes   • Dementia (CMS/HCC) [F03.90]  Yes   • Hypoxia [R09.02]  Yes   • Diabetes mellitus (CMS/HCC) [E11.9]  Yes   • Coronary artery disease [I25.10]  Yes      Resolved Hospital Problems   No resolved problems to display.          Hospital Course:  Gama Davila is a 79 y.o. male with PMHx significant for DM, CAD, dementia admitted with altered mental status of uncertain etiology and found to have healthcare associated pneumonia, acute respiratory failure with hypoxia, possibly drug-induced apathy and weakness.  Neurology was consulted and followed patient.    Encephalopathy   Dementia with possible Parkinsonism  - felt to be partially related to polypharmacy and underlying sepsis.   - Specifically stopped Depakote, Seroquel, Prozac. Gabapentin was tapered and will be discontinued at discharge. Continue Celexa.  - plan to continue Sinemet TID, dose may eventually need to be increased  - continue Aricept 10mg daily  - PT/OT followed and recommend SNF At discharge. Plans to transfer to the [facility] today.  Daughter is hopeful he will eventually be able to return to his memory care unit at [place], however she is aware that his prognosis remains  poor given his lack of improvement despite medication changes.     [facility] associated pneumonia  - treated with Zosyn and transitioned to PO Augmentin at discharge to complete 7 day course. He is now stable on room air.  - SLP found no signs or symptoms of aspiration     Acute respiratory failure with hypoxia  - resolved, now on room air, likely secondary to pneumonia    Discharge Follow Up Recommendations for outpatient labs/diagnostics:  - follow up with PCP or facility provider in 1 week  - follow up with [facility] Neurology group in 4 weeks  - consider palliative medicine involvement at facility    Day of Discharge     HPI:   Patient remains minimally responsive. Only oriented to his name. Unable to really follow commands, but does awaken to his name.    Review of Systems  Unable to obtain    Vital Signs:   Temp:  [97.6 °F (36.4 °C)-98.4 °F (36.9 °C)] 97.6 °F (36.4 °C)  Heart Rate:  [53-84] 68  Resp:  [18-20] 18  BP: (126-165)/(63-92) 165/92     Physical Exam:  Constitutional: No acute distress, frail appearing, resting in bed  HENT: NCAT, mucous membranes moist  Respiratory: Clear to auscultation bilaterally, respiratory effort normal   Cardiovascular: RRR, no murmurs, rubs, or gallops  Gastrointestinal: Positive bowel sounds, soft, nontender, nondistended  Musculoskeletal: No bilateral ankle edema  Psychiatric: flat affect  Neurologic: confused, does not follow commands, answers simple questions only with redirection, knows his name Rigid upper extremities with mild resting tremor noted.  Skin: No rashes    Pertinent  and/or Most Recent Results     Results from last 7 days   Lab Units 05/11/20  0416 05/08/20  0730 05/07/20  0544 05/06/20  1217 05/06/20  1027   WBC 10*3/mm3 7.27 6.12 6.38  --  8.37   HEMOGLOBIN g/dL 11.3* 11.5* 11.4*  --  14.0   HEMATOCRIT % 34.5* 35.3* 35.4*  --  43.0   PLATELETS 10*3/mm3 171 176 178  --  213   SODIUM mmol/L 134* 135* 139 140  --    POTASSIUM mmol/L 3.8 3.7 3.5 4.0  --     CHLORIDE mmol/L 97* 100 104 102  --    CO2 mmol/L 28.0 25.0 25.0 28.0  --    BUN mg/dL 12 11 14 20  --    CREATININE mg/dL 1.18 1.05 1.09 1.10  --    GLUCOSE mg/dL 98 120* 93 101*  --    CALCIUM mg/dL 8.5* 8.0* 7.9* 8.5*  --      Results from last 7 days   Lab Units 05/07/20  0544 05/06/20  1217   BILIRUBIN mg/dL 0.3 0.4   ALK PHOS U/L 46 51   ALT (SGPT) U/L 12 14   AST (SGOT) U/L 21 27           Invalid input(s): TG, LDLCALC, LDLREALC  Results from last 7 days   Lab Units 05/06/20  1236 05/06/20  1217   TROPONIN T ng/mL  --  <0.010   PROCALCITONIN ng/mL  --  <0.02*   LACTATE mmol/L 0.9  --        Brief Urine Lab Results  (Last result in the past 365 days)      Color   Clarity   Blood   Leuk Est   Nitrite   Protein   CREAT   Urine HCG        05/06/20 1036 Dark Yellow Clear Negative Negative Negative Negative               Microbiology Results Abnormal     Procedure Component Value - Date/Time    Blood Culture - Blood, Blood, Venous Line [[id number]] Collected:  05/06/20 1236    Lab Status:  Final result Specimen:  Blood, Venous Line Updated:  05/11/20 1315     Blood Culture No growth at 5 days    Blood Culture - Blood, Blood, Venous Line [[id number]] Collected:  05/06/20 1236    Lab Status:  Final result Specimen:  Blood, Venous Line Updated:  05/11/20 1315     Blood Culture No growth at 5 days    Urine Culture - Urine, Urine, Catheter In/Out [[id number]]  (Normal) Collected:  05/06/20 1036    Lab Status:  Final result Specimen:  Urine, Catheter In/Out Updated:  05/07/20 2046     Urine Culture No growth    SARS-CoV-2 PCR ([place] IN-HOUSE PERFORMED), NP SWAB IN TRANSPORT MEDIA - Swab, Nasopharynx [[id number]]  (Normal) Collected:  05/06/20 1520    Lab Status:  Final result Specimen:  Swab from Nasopharynx Updated:  05/06/20 2001     COVID19 Not Detected          Imaging Results (All)     Procedure Component Value Units Date/Time    XR Chest 1 View [[id number]] Collected:  05/08/20 1154     Updated:  05/08/20 2302     Narrative:       EXAMINATION: XR CHEST 1 VW - 05/08/2020     INDICATION: Evaluate for pneumonia.     R41.82-Altered mental status, unspecified; E86.0-Dehydration;  J18.1-Lobar pneumonia, unspecified organism.     COMPARISON: 05/06/2020     FINDINGS: Lung volumes are relatively low. Right lung appears clear.  Left lung shows slight coarsening of interstitial markings, similar to  prior study, and small focus of discoid atelectasis and peribronchial  thickening in the medial left base which appears a little increased, not  extensive. This appears to be associated with a small hiatal hernia. No  pneumothorax, lung consolidation or effusion is seen. There are multiple  old healed left-sided rib fractures.       Impression:       1. Low lung volumes.  2. Mildly increased peribronchial thickening and patchy interstitial  opacity in the medial left lung base compared to prior study.  3. Small hiatal hernia noted.      DICTATED:   05/08/2020  EDITED/ls :   05/08/2020      This report was finalized on 5/8/2020 10:57 PM by Dr. Steve Sow MD.       CT Head Without Contrast [[id number]] Collected:  05/06/20 1141     Updated:  05/06/20 1701    Narrative:       EXAMINATION: CT HEAD WO CONTRAST-05/06/2020:      INDICATION: AMS.      TECHNIQUE: CT head without intravenous contrast.     The radiation dose reduction device was turned on for each scan per the  ALARA (As Low as Reasonably Achievable) protocol.     COMPARISON: CT head dated 04/04/2020.     FINDINGS: The midline structures are symmetric without evidence of mass,  mass effect or midline shift. The ventricles demonstrate mild central  prominence concerning for central volume loss, however, no intra-axial  hemorrhage or extra-axial fluid collection and no low-attenuation signal  gradient in the periventricular and deep white matter identified. Globes  and orbits are unremarkable.  The visualized paranasal sinuses and  mastoid air cells are grossly clear and well pneumatized.  Calvarium  intact.       Impression:       No acute intracranial abnormality with questionable mild  central volume loss unchanged from prior comparison.     D:  05/06/2020  E:  05/06/2020           This report was finalized on 5/6/2020 4:57 PM by Dr. Damián Dewitt.       XR Chest 1 View [[id number]] Collected:  05/06/20 1126     Updated:  05/06/20 1701    Narrative:       EXAMINATION: XR CHEST 1 VW-      INDICATION: Weak/Dizzy/AMS triage protocol.      COMPARISON: None.     FINDINGS: Cardiac size borderline enlarged status post median sternotomy  without overt edema, however, minimal opacifications at the lung bases  concerning for atelectasis versus airspace disease. No pneumothorax or  large effusion. Degenerative changes of the spine.           Impression:       Bibasilar opacifications concerning for atelectasis versus  early airspace disease without effusion or overt edema.     D:  05/06/2020  E:  05/06/2020     This report was finalized on 5/6/2020 4:57 PM by Dr. Damián Dewitt.             Results for orders placed during the hospital encounter of 04/04/20   Duplex Venous Lower Extremity - Bilateral CAR    Narrative · A focal area of superficial thrombosis is seen involving the right   lesser saphenous vein  · No deep venous thrombosis is seen in the right or left lower   extremities.  · Left popliteal fossa fluid collection possibly c/w a small Baker's cyst.          Results for orders placed during the hospital encounter of 04/04/20   Duplex Venous Lower Extremity - Bilateral CAR    Narrative · A focal area of superficial thrombosis is seen involving the right   lesser saphenous vein  · No deep venous thrombosis is seen in the right or left lower   extremities.  · Left popliteal fossa fluid collection possibly c/w a small Baker's cyst.               Plan for Follow-up of Pending Labs/Results:     Discharge Details        Discharge Medications      New Medications      Instructions Start Date    amoxicillin-clavulanate 875-125 MG per tablet  Commonly known as:  Augmentin   1 tablet, Oral, 2 Times Daily      carbidopa-levodopa  MG per tablet  Commonly known as:  SINEMET   1 tablet, Oral, 3 Times Daily      citalopram 20 MG tablet  Commonly known as:  CeleXA   20 mg, Oral, Daily   Start Date:  May 14, 2020     donepezil 10 MG tablet  Commonly known as:  ARICEPT   10 mg, Oral, Nightly         Continue These Medications      Instructions Start Date   acetaminophen 325 MG tablet  Commonly known as:  TYLENOL   650 mg, Oral, Every 6 Hours PRN      aspirin 81 MG EC tablet   81 mg, Oral, Daily      atorvastatin 80 MG tablet  Commonly known as:  LIPITOR   80 mg, Oral, Daily      docusate sodium 100 MG capsule  Commonly known as:  COLACE   200 mg, Oral, Daily PRN      metFORMIN 500 MG tablet  Commonly known as:  GLUCOPHAGE   500 mg, Oral, Daily With Breakfast      metoprolol tartrate 50 MG tablet  Commonly known as:  LOPRESSOR   50 mg, Oral, Daily      NovoLOG FlexPen 100 UNIT/ML solution pen-injector sc pen  Generic drug:  insulin aspart   Subcutaneous, 3 Times Daily With Meals, Sliding scale       pantoprazole 40 MG EC tablet  Commonly known as:  PROTONIX   40 mg, Oral, Daily         Stop These Medications    Divalproex Sodium 125 MG capsule  Commonly known as:  DEPAKOTE SPRINKLE     FLUoxetine 20 MG capsule  Commonly known as:  PROzac     gabapentin 100 MG capsule  Commonly known as:  NEURONTIN     Melatonin 3 MG tablet     memantine 5 MG tablet  Commonly known as:  NAMENDA     QUEtiapine 100 MG tablet  Commonly known as:  SEROquel     tamsulosin 0.4 MG capsule 24 hr capsule  Commonly known as:  FLOMAX            Allergies   Allergen Reactions   • Phenergan [Promethazine Hcl] Nausea And Vomiting         Discharge Disposition:  Skilled Nursing Facility (DC - External)    Diet:  Hospital:  Diet Order   Procedures   • Diet Soft Texture; Whole Foods; Thin; Consistent Carbohydrate       Activity:  - as  tolerated    Restrictions or Other Recommendations:  - none       CODE STATUS:    Code Status and Medical Interventions:   Ordered at: 05/06/20 1533     Limited Support to NOT Include:    Intubation     Level Of Support Discussed With:    Health Care Surrogate     Code Status:    No CPR     Medical Interventions (Level of Support Prior to Arrest):    Limited       No future appointments.        Time Spent on Discharge:  45 minutes    Electronically signed by Pavithra Crowell DO, 05/13/20, 9:30 AM.

## 2020-05-13 NOTE — PLAN OF CARE
Patient slept well throughout shift on 2L NC. Plans to go to [facility] via ambulance at 1400 today. Will continue to monitor.

## 2020-05-13 NOTE — TELEPHONE ENCOUNTER
PT WAS SEEN IN THE HOSPITAL ON 5/6/20 AND IS BEING D/C TODAY, 5/13/20 FOR ALTERED MENTAL STATUS AND IS NEEDING TO SCHEDULE A HOSPITAL FOLLOW UP WITH NEUROLOGY. PLEASE REVIEW AND DETERMINE TIME FRAME IN WHICH PT CAN BE SEEN

## 2020-05-13 NOTE — PROGRESS NOTES
Neurology       Patient Care Team:  Provider, No Known as PCP - General    Chief complaint: Cognitive decline    History: Patient remains about the same.    He is not eating spontaneously.    He is not interacting.      Past Medical History:   Diagnosis Date   • Arthritis    • Cancer (CMS/HCC)     SKIN    • Cataract    • Coronary artery disease    • Diabetes mellitus (CMS/HCC)    • High cholesterol    • [place] (hard of hearing)     PATIENT HAS HEARING AIDS   • Hypertension    • Irregular heart beat     HISTORY OF CARDIAC ABLATION IN 2003   • Wears dentures     FULL UPPER PLATE       Vital Signs   Vitals:    05/13/20 0422 05/13/20 0500 05/13/20 0706 05/13/20 1122   BP: 151/63  165/92 154/71   BP Location: Right leg  Right arm Left arm   Patient Position: Lying  Lying Lying   Pulse: 71 53 68 58   Resp: 20 18 18   Temp: 97.7 °F (36.5 °C)  97.6 °F (36.4 °C) 97.8 °F (36.6 °C)   TempSrc: Oral  Axillary Axillary   SpO2: 100% 100% 100% 100%   Weight:       Height:           Physical Exam:   General: Awake lying in bed              Neuro: Answers simple questions but is not able to carry on a conversation.    He has mild cogwheeling and no tremor.        Results Review:  Reviewed  Results from last 7 days   Lab Units 05/11/20  0416   WBC 10*3/mm3 7.27   HEMOGLOBIN g/dL 11.3*   HEMATOCRIT % 34.5*   PLATELETS 10*3/mm3 171     Results from last 7 days   Lab Units 05/11/20  0416 05/08/20  0730 05/07/20  0544   SODIUM mmol/L 134* 135* 139   POTASSIUM mmol/L 3.8 3.7 3.5   CHLORIDE mmol/L 97* 100 104   CO2 mmol/L 28.0 25.0 25.0   BUN mg/dL 12 11 14   CREATININE mg/dL 1.18 1.05 1.09   CALCIUM mg/dL 8.5* 8.0* 7.9*   BILIRUBIN mg/dL  --   --  0.3   ALK PHOS U/L  --   --  46   ALT (SGPT) U/L  --   --  12   AST (SGOT) U/L  --   --  21   GLUCOSE mg/dL 98 120* 93       Imaging Results (Last 24 Hours)     ** No results found for the last 24 hours. **          Assessment:  Acute on chronic dementia.    Parkinsonism says he with Alzheimer's  disease    Plan:  Back to nursing home    Continue Sinemet 25/100, 3 times a day    Continue Aricept 10 mg daily    Comment:  Medication reduction has had no impact on the patient's cognitive function.    This would lead to the likely conclusion that this is an aftermath of sepsis and is unlikely to make a meaningful recovery in the future.         I discussed the patients findings and my recommendations with patient and primary care team    Rebel Perry MD  05/13/20  12:49

## 2020-05-13 NOTE — TELEPHONE ENCOUNTER
CALLED PT TO SET UP HOSPITAL FOLLOW UP FOR ALTERED MENTAL STATUS. PER YADI, PT CAN BE SCHEDULED FOR ANY TIME AFTER 5/25/20. PT DID NOT ANSWER. WAITING ON CALL BACK AS VOICE MAILBOX IS NOT SET UP

## 2020-05-13 NOTE — PROGRESS NOTES
Continued Stay Note   [place]     Patient Name: Gama Davila  MRN: [id number]  Today's Date: 5/13/2020    Admit Date: 5/6/2020    Discharge Plan     Row Name 05/13/20 1102       Plan    Plan Comments  CM spoke with patient's daughter via phone and she is in agreement with discharge plan today to [facility]. CM faxed discharge summary to [facility] @ [phone]. CM notified Gauri in pastoral services for EMS/DNR to [facility].             Expected Discharge Date and Time     Expected Discharge Date Expected Discharge Time    May 13, 2020             Viji Lan

## 2020-05-13 NOTE — DISCHARGE PLACEMENT REQUEST
"Gama Davila SOFIA (79 y.o. Male)   Viji Lan RN  P: [phone]    Date of Birth Social Security Number Address Home Phone MRN    1940  [address] JARVIS [address] [place] [id number]  [id number]    Roman Catholic Marital Status          Buddhist        Admission Date Admission Type Admitting Provider Attending Provider Department, Room/Bed    5/6/20 Emergency Pavithra Crowell DO Barbato, Hayley R, DO [facility] 3E, S335/1    Discharge Date Discharge Disposition Discharge Destination         Skilled Nursing Facility (DC - External)              Attending Provider:  Pavithra Crowell DO    Allergies:  Phenergan [Promethazine Hcl]    Isolation:  None   Infection:  COVID Screen (preop/placement) (05/12/20)   Code Status:  No CPR    Ht:  172.7 cm (68\")   Wt:  68 kg (150 lb)    Admission Cmt:  None   Principal Problem:  HCAP (healthcare-associated pneumonia) [J18.9]                 Active Insurance as of 5/6/2020     Primary Coverage     Payor Plan Insurance Group Employer/Plan Group    MEDICARE MEDICARE A & B      Payor Plan Address Payor Plan Phone Number Payor Plan Fax Number Effective Dates    [address] [phone]  5/1/2001 - None Entered    [facility] [id number]       Subscriber Name Subscriber Birth Date Member ID       GAMA DAVILA 1940 [id number]           Secondary Coverage     Payor Plan Insurance Group Employer/Plan Group    [facility] [id number]     Payor Plan Address Payor Plan Phone Number Payor Plan Fax Number Effective Dates    [address] [address]   2/1/2020 - None Entered    [facility] [id number]       Subscriber Name Subscriber Birth Date Member ID       GAMA DAVILA 1940 [id number]                 Emergency Contacts      (Rel.) Home Phone Work Phone Mobile Phone    LolaTaryn (Spouse) -- -- [phone]    CEFERINO SEVILLA (Daughter) -- -- [phone]            Insurance Information                MEDICARE/MEDICARE A " & B Phone: [phone]    Subscriber: Gama Davila Subscriber#: [id number]    Group#:  Precert#:         [facility] Phone:     Subscriber: Gama Dvaila Subscriber#: [id number]    Group#: [id number] Precert#:                Discharge Summary      Pavithra Crowell, DO at 20 0929              [facility] Medicine Services  DISCHARGE SUMMARY    Patient Name: Gama Davila  : 1940  MRN: [id number]    Date of Admission: 2020 10:11 AM  Date of Discharge:  2020  Primary Care Physician: Provider, No Known    Consults     Date and Time Order Name Status Description    2020 0030 Inpatient Neurology Consult General Completed           Hospital Course     Presenting Problem:   Altered mental status, unspecified altered mental status type [R41.82]  Altered mental status, unspecified altered mental status type [R41.82]    Active Hospital Problems    Diagnosis  POA   • **HCAP (healthcare-associated pneumonia) [J18.9]  Yes   • Acute respiratory failure with hypoxia (CMS/HCC) [J96.01]  Yes   • AMS (altered mental status) [R41.82]  Yes   • Dementia (CMS/HCC) [F03.90]  Yes   • Hypoxia [R09.02]  Yes   • Diabetes mellitus (CMS/HCC) [E11.9]  Yes   • Coronary artery disease [I25.10]  Yes      Resolved Hospital Problems   No resolved problems to display.          Hospital Course:  Gama Davila is a 79 y.o. male with PMHx significant for DM, CAD, dementia admitted with altered mental status of uncertain etiology and found to have healthcare associated pneumonia, acute respiratory failure with hypoxia, possibly drug-induced apathy and weakness.  Neurology  was consulted and followed patient.    Encephalopathy   Dementia with possible Parkinsonism  - felt to be partially related to polypharmacy and underlying sepsis.   - Specifically stopped Depakote, Seroquel, Prozac. Gabapentin was tapered and will be discontinued at discharge. Continue Celexa.  - plan to  continue Sinemet TID, dose may eventually need to be increased  - continue Aricept 10mg daily  - PT/OT followed and recommend SNF At discharge. Plans to transfer to the [facility] today.  Daughter is hopeful he will eventually be able to return to his memory care unit at [facility], however she is aware that his prognosis remains poor given his lack of improvement despite medication changes.     [facility] associated pneumonia  - treated with Zosyn and transitioned to PO Augmentin at discharge to complete 7 day course. He is now stable on room air.  - SLP found no signs or symptoms of aspiration     Acute respiratory failure with hypoxia  - resolved, now on room air, likely secondary to pneumonia    Discharge Follow Up Recommendations for outpatient labs/diagnostics:  - follow up with PCP or facility provider in 1 week  - follow up with [facility] Neurology group in 4 weeks  - consider palliative medicine involvement at facility    Day of Discharge     HPI:   Patient remains minimally responsive. Only oriented to his name. Unable to really follow commands, but does awaken to his name.    Review of Systems  Unable to obtain    Vital Signs:   Temp:  [97.6 °F (36.4 °C)-98.4 °F (36.9 °C)] 97.6 °F (36.4 °C)  Heart Rate:  [53-84] 68  Resp:  [18-20] 18  BP: (126-165)/(63-92) 165/92     Physical Exam:  Constitutional: No acute distress, frail appearing,  resting in bed  HENT: NCAT, mucous membranes moist  Respiratory: Clear to auscultation bilaterally, respiratory effort normal   Cardiovascular: RRR, no murmurs, rubs, or gallops  Gastrointestinal: Positive bowel sounds, soft, nontender, nondistended  Musculoskeletal: No bilateral ankle edema  Psychiatric: flat affect  Neurologic: confused, does not follow commands, answers simple questions only with redirection, knows his name Rigid upper extremities with mild resting tremor noted.  Skin: No rashes    Pertinent  and/or Most Recent Results     Results from last 7 days   Lab Units  05/11/20  0416 05/08/20  0730 05/07/20  0544 05/06/20  1217 05/06/20  1027   WBC 10*3/mm3 7.27 6.12 6.38  --  8.37   HEMOGLOBIN g/dL 11.3* 11.5* 11.4*  --  14.0   HEMATOCRIT % 34.5* 35.3* 35.4*  --  43.0   PLATELETS 10*3/mm3 171 176 178  --  213   SODIUM mmol/L 134* 135* 139 140  --    POTASSIUM mmol/L 3.8 3.7 3.5 4.0  --    CHLORIDE mmol/L 97* 100 104 102  --    CO2 mmol/L 28.0 25.0 25.0 28.0  --    BUN mg/dL 12 11 14 20  --    CREATININE mg/dL 1.18 1.05 1.09 1.10  --    GLUCOSE mg/dL 98 120* 93 101*  --    CALCIUM mg/dL 8.5* 8.0* 7.9* 8.5*  --      Results from last 7 days   Lab Units 05/07/20  0544 05/06/20  1217   BILIRUBIN mg/dL 0.3 0.4   ALK PHOS U/L 46 51   ALT (SGPT) U/L 12 14   AST (SGOT) U/L 21 27           Invalid input(s): TG, LDLCALC, LDLREALC  Results from last 7 days   Lab Units 05/06/20  1236 05/06/20  1217   TROPONIN T ng/mL  --  <0.010   PROCALCITONIN ng/mL  --  <0.02*   LACTATE mmol/L 0.9  --        Brief Urine Lab Results  (Last result in the past 365 days)      Color   Clarity   Blood   Leuk Est   Nitrite   Protein   CREAT   Urine HCG        05/06/20 1036 Dark Yellow Clear Negative Negative Negative Negative               Microbiology Results Abnormal     Procedure Component Value - Date/Time    Blood Culture - Blood, Blood, Venous Line [[id number]] Collected:  05/06/20 1236    Lab Status:  Final result Specimen:  Blood, Venous Line Updated:  05/11/20 1315     Blood Culture No growth at 5 days    Blood Culture - Blood, Blood, Venous Line [[id number]] Collected:  05/06/20 1236    Lab Status:  Final result Specimen:  Blood, Venous Line Updated:  05/11/20 1315     Blood Culture No growth at 5 days    Urine Culture - Urine, Urine, Catheter In/Out [[id number]]  (Normal) Collected:  05/06/20 1036    Lab Status:  Final result Specimen:  Urine, Catheter In/Out Updated:  05/07/20 2046     Urine Culture No growth    SARS-CoV-2 PCR ([facility] IN-HOUSE PERFORMED), NP SWAB IN TRANSPORT MEDIA - Swab,  Nasopharynx [[id number]]  (Normal) Collected:  05/06/20 1520    Lab Status:  Final result Specimen:  Swab from Nasopharynx Updated:  05/06/20 2001     COVID19 Not Detected          Imaging Results (All)     Procedure Component Value Units Date/Time    XR Chest 1 View [[id number]] Collected:  05/08/20 1154     Updated:  05/08/20 2300    Narrative:       EXAMINATION: XR CHEST 1 VW - 05/08/2020     INDICATION: Evaluate for pneumonia.     R41.82-Altered mental status, unspecified; E86.0-Dehydration;  J18.1-Lobar pneumonia, unspecified organism.     COMPARISON: 05/06/2020     FINDINGS: Lung volumes are relatively low. Right lung appears clear.  Left lung shows slight coarsening of interstitial markings, similar to  prior study, and small focus of discoid atelectasis and peribronchial  thickening in the medial left base which appears a little increased, not  extensive. This appears to be associated with a small hiatal hernia. No  pneumothorax, lung consolidation or effusion is seen. There are multiple  old healed left-sided rib fractures.       Impression:       1. Low lung volumes.  2. Mildly increased peribronchial thickening and patchy interstitial  opacity in the medial left lung base compared to prior study.  3. Small hiatal hernia noted.      DICTATED:   05/08/2020  EDITED/ls :   05/08/2020      This report was finalized on 5/8/2020 10:57 PM by Dr. Steve Sow MD.       CT Head Without Contrast [[id number]] Collected:  05/06/20 1141     Updated:  05/06/20 1701    Narrative:       EXAMINATION: CT HEAD WO CONTRAST-05/06/2020:      INDICATION: AMS.      TECHNIQUE: CT head without intravenous contrast.     The radiation dose reduction device was turned on for each scan per the  ALARA (As Low as Reasonably Achievable) protocol.     COMPARISON: CT head dated 04/04/2020.     FINDINGS: The midline structures are symmetric without evidence of mass,  mass effect or midline shift. The ventricles demonstrate mild  central  prominence concerning for central volume loss, however, no intra-axial  hemorrhage or extra-axial fluid collection and no low-attenuation signal  gradient in the periventricular and deep white matter identified. Globes  and orbits are unremarkable.  The visualized paranasal sinuses and  mastoid air cells are grossly clear and well pneumatized. Calvarium  intact.       Impression:       No acute intracranial abnormality with questionable mild  central volume loss unchanged from prior comparison.     D:  05/06/2020  E:  05/06/2020           This report was finalized on 5/6/2020 4:57 PM by Dr. Damián Dewitt.       XR Chest 1 View [[id number]] Collected:  05/06/20 1126     Updated:  05/06/20 1701    Narrative:       EXAMINATION: XR CHEST 1 VW-      INDICATION: Weak/Dizzy/AMS triage protocol.      COMPARISON: None.     FINDINGS: Cardiac size borderline enlarged status post median sternotomy  without overt edema, however, minimal opacifications at the lung bases  concerning for atelectasis versus airspace disease. No pneumothorax or  large effusion. Degenerative changes of the spine.           Impression:       Bibasilar opacifications concerning for atelectasis versus  early airspace disease without effusion or overt edema.     D:  05/06/2020  E:  05/06/2020     This report was finalized on 5/6/2020 4:57 PM by Dr. Damián Dewitt.             Results for orders placed during the hospital encounter of 04/04/20   Duplex Venous Lower Extremity - Bilateral CAR    Narrative · A focal area of superficial thrombosis is seen involving the right   lesser saphenous vein  · No deep venous thrombosis is seen in the right or left lower   extremities.  · Left popliteal fossa fluid collection possibly c/w a small Baker's cyst.          Results for orders placed during the hospital encounter of 04/04/20   Duplex Venous Lower Extremity - Bilateral CAR    Narrative · A focal area of superficial thrombosis is seen involving the right    lesser saphenous vein  · No deep venous thrombosis is seen in the right or left lower   extremities.  · Left popliteal fossa fluid collection possibly c/w a small Baker's cyst.               Plan for Follow-up of Pending Labs/Results:     Discharge Details        Discharge Medications      New Medications      Instructions Start Date   amoxicillin-clavulanate 875-125 MG per tablet  Commonly known as:  Augmentin   1 tablet, Oral, 2 Times Daily      carbidopa-levodopa  MG per tablet  Commonly known as:  SINEMET   1 tablet, Oral, 3 Times Daily      citalopram 20 MG tablet  Commonly known as:  CeleXA   20 mg, Oral, Daily   Start Date:  May 14, 2020     donepezil 10 MG tablet  Commonly known as:  ARICEPT   10 mg, Oral, Nightly         Continue These Medications      Instructions Start Date   acetaminophen 325 MG tablet  Commonly known as:  TYLENOL   650 mg, Oral, Every 6 Hours PRN      aspirin 81 MG EC tablet   81 mg, Oral, Daily      atorvastatin 80 MG tablet  Commonly known as:  LIPITOR   80 mg, Oral, Daily      docusate sodium 100 MG capsule  Commonly known as:  COLACE   200 mg, Oral, Daily PRN      metFORMIN 500 MG tablet  Commonly known as:  GLUCOPHAGE   500 mg, Oral, Daily With Breakfast      metoprolol tartrate 50 MG tablet  Commonly known as:  LOPRESSOR   50 mg, Oral, Daily      NovoLOG FlexPen 100 UNIT/ML solution pen-injector sc pen  Generic drug:  insulin aspart   Subcutaneous, 3 Times Daily With Meals, Sliding scale       pantoprazole 40 MG EC tablet  Commonly known as:  PROTONIX   40 mg, Oral, Daily         Stop These Medications    Divalproex Sodium 125 MG capsule  Commonly known as:  DEPAKOTE SPRINKLE     FLUoxetine 20 MG capsule  Commonly known as:  PROzac     gabapentin 100 MG capsule  Commonly known as:  NEURONTIN     Melatonin 3 MG tablet     memantine 5 MG tablet  Commonly known as:  NAMENDA     QUEtiapine 100 MG tablet  Commonly known as:  SEROquel     tamsulosin 0.4 MG capsule 24 hr  capsule  Commonly known as:  FLOMAX            Allergies   Allergen Reactions   • Phenergan [Promethazine Hcl] Nausea And Vomiting         Discharge Disposition:  Skilled Nursing Facility (DC - External)    Diet:  Hospital:  Diet Order   Procedures   • Diet Soft Texture; Whole Foods; Thin; Consistent Carbohydrate       Activity:  - as tolerated    Restrictions or Other Recommendations:  - none       CODE STATUS:    Code Status and Medical Interventions:   Ordered at: 05/06/20 1533     Limited Support to NOT Include:    Intubation     Level Of Support Discussed With:    Health Care Surrogate     Code Status:    No CPR     Medical Interventions (Level of Support Prior to Arrest):    Limited       No future appointments.        Time Spent on Discharge:  45 minutes    Electronically signed by Pavithra Crowell DO, 05/13/20, 9:30 AM.        Electronically signed by Pavithra Crowell DO at 05/13/20 0937       Discharge Order (From admission, onward)     Start     Ordered    05/13/20 0928  Discharge patient  Once     Expected Discharge Date:  05/13/20    Discharge Disposition:  Skilled Nursing Facility (DC - External)    Physician of Record for Attribution - Please select from Treatment Team:  PAVITHRA CROWELL [[id number]]    Review needed by CMO to determine Physician of Record:  No       Question Answer Comment   Physician of Record for Attribution - Please select from Treatment Team PAVITHRA CROWELL    Review needed by CMO to determine Physician of Record No        05/13/20 0939

## 2020-05-14 ENCOUNTER — NURSING HOME (OUTPATIENT)
Dept: INTERNAL MEDICINE | Facility: CLINIC | Age: 80
End: 2020-05-14

## 2020-05-14 VITALS
WEIGHT: 129 LBS | OXYGEN SATURATION: 92 % | SYSTOLIC BLOOD PRESSURE: 148 MMHG | RESPIRATION RATE: 18 BRPM | DIASTOLIC BLOOD PRESSURE: 68 MMHG | TEMPERATURE: 98.2 F | BODY MASS INDEX: 19.61 KG/M2 | HEART RATE: 82 BPM

## 2020-05-14 DIAGNOSIS — E11.42 TYPE 2 DIABETES MELLITUS WITH DIABETIC POLYNEUROPATHY, WITHOUT LONG-TERM CURRENT USE OF INSULIN (HCC): ICD-10-CM

## 2020-05-14 DIAGNOSIS — I49.9 IRREGULAR HEART BEAT: ICD-10-CM

## 2020-05-14 DIAGNOSIS — J18.9 PNEUMONIA OF LEFT LOWER LOBE DUE TO INFECTIOUS ORGANISM: ICD-10-CM

## 2020-05-14 DIAGNOSIS — G20 DEMENTIA DUE TO PARKINSON'S DISEASE WITH BEHAVIORAL DISTURBANCE (HCC): Primary | ICD-10-CM

## 2020-05-14 DIAGNOSIS — I25.119 CORONARY ARTERY DISEASE INVOLVING NATIVE HEART WITH ANGINA PECTORIS, UNSPECIFIED VESSEL OR LESION TYPE (HCC): ICD-10-CM

## 2020-05-14 DIAGNOSIS — F02.818 DEMENTIA DUE TO PARKINSON'S DISEASE WITH BEHAVIORAL DISTURBANCE (HCC): Primary | ICD-10-CM

## 2020-05-14 PROCEDURE — 99305 1ST NF CARE MODERATE MDM 35: CPT | Performed by: INTERNAL MEDICINE

## 2020-05-15 NOTE — PROGRESS NOTES
Telemedicine nursing Home Progress Note        Mitul Stephen DO []  ALESSANDRA Ho []  [address] [place] [address]  Phone: [phone]  Fax: [phone] Inés Vega MD []  Alex Burk DO [x]   [address] [place] [address]  Phone: [phone]  Fax: [phone]     PATIENT NAME: Gama Davila                                                                          YOB: 1940           DATE OF SERVICE: 05/14/2020  FACILITY:  [] [facility]   [] [facility]   [] [facility]   [x] [facility]  []  [facility]  [] Other     ______________________________________________________________________     CHIEF COMPLAINT:  Altered mental Status, Dementia       HISTORY OF PRESENT ILLNESS:   Patient is a 79-year-old white male with a history of type 2 diabetes mellitus, coronary artery disease, and dementia who was admitted for healthcare associated pneumonia along with encephalopathy likely associated with medications.  During his hospitalization at [facility], patient had medication adjustments particularly stopping Depakote, Seroquel, and Prozac.  Patient was started on Celexa.  Aricept was continued.  Sinemet was continued with the idea that the dose may need to be increased.  Patient was also discharged with Augmentin to complete therapy of his left lower lobe pneumonia.    Upon evaluation today, patient was seen via telemedicine and was essentially nonverbal and not interactive.  Nurses shared that the patient's Seroquel was stopped and they were not sure if this could have any withdrawal effect.    PAST MEDICAL & SURGICAL HISTORY:   Past Medical History:   Diagnosis Date   • Arthritis    • Cancer (CMS/HCC)     SKIN    • Cataract    • Coronary artery disease    • Diabetes mellitus (CMS/HCC)    • High cholesterol    • [place] (hard of hearing)     PATIENT HAS HEARING AIDS   • Hypertension    • Irregular heart beat     HISTORY OF CARDIAC ABLATION IN 2003    • Wears dentures     FULL UPPER PLATE      Past Surgical History:   Procedure Laterality Date   • BACK SURGERY      THEN AGAIN IN    • CARDIAC ABLATION     • CARDIAC SURGERY     • CATARACT EXTRACTION W/ INTRAOCULAR LENS IMPLANT Left 2017    Procedure: CATARACT PHACO EXTRACTION WITH INTRAOCULAR LENS IMPLANT LEFT WITH TORIC LENS;  Surgeon: Alma Benavidez MD;  Location: [facility];  Service:    • CATARACT EXTRACTION W/ INTRAOCULAR LENS IMPLANT Right 2017    Procedure: CATARACT PHACO EXTRACTION WITH INTRAOCULAR LENS IMPLANT RIGHT WITH TORIC LENS;  Surgeon: Alma Benavidez MD;  Location: [facility];  Service:    • CORONARY ARTERY BYPASS GRAFT      SPOUSE UNSURE OF HOW MANY VESSELS   • HIATAL HERNIA REPAIR     • JOINT REPLACEMENT Left     HIP - DONE BY DR DALAL   • KNEE ARTHROSCOPY Left    • NOSE SURGERY      SPOUSE REPORTS FOR A BROKEN NOSE   • OTHER SURGICAL HISTORY Left     TENDON TORN LOOSE FROM BONE, LEFT ELBOW   • OTHER SURGICAL HISTORY      RUPTURED DISC   • OTHER SURGICAL HISTORY      NECK SURGERY FOR RUPTURED DISC   • OTHER SURGICAL HISTORY      HAD SECOND NECK SURGERY   • OTHER SURGICAL HISTORY      RUPTURED DISC   • OTHER SURGICAL HISTORY      RUPTURED DISC   • OTHER SURGICAL HISTORY      HARDWARE REMOVAL FROM BACK BY DR HAY         MEDICATIONS:  I have reviewed and reconciled the patients medication list in the patients chart at the skilled nursing facility today.      ALLERGIES:  Allergies   Allergen Reactions   • Phenergan [Promethazine Hcl] Nausea And Vomiting         SOCIAL HISTORY:  Social History     Socioeconomic History   • Marital status:      Spouse name: Not on file   • Number of children: Not on file   • Years of education: Not on file   • Highest education level: Not on file   Tobacco Use   • Smoking status: Former Smoker     Types: Cigarettes     Last attempt to quit:      Years since quittin.3   •  Smokeless tobacco: Never Used   Substance and Sexual Activity   • Alcohol use: No   • Drug use: No   • Sexual activity: Defer       FAMILY HISTORY:  No family history on file.    REVIEW OF SYSTEMS:  Review of Systems   Unable to perform ROS: Dementia          PHYSICAL EXAMINATION:     VITAL SIGNS:  /68   Pulse 82   Temp 98.2 °F (36.8 °C)   Resp 18   Wt 58.5 kg (129 lb)   SpO2 92%   BMI 19.61 kg/m²     Physical Exam   Constitutional: He is oriented to person, place, and time.   Frail elderly male bedridden not very interactive.   HENT:   Head: Normocephalic and atraumatic.   Eyes: Conjunctivae are normal.   Neck: Normal range of motion. Neck supple.   Pulmonary/Chest: Effort normal.   Musculoskeletal: Normal range of motion.   Neurological: He is alert and oriented to person, place, and time.   Skin: No rash noted.   Psychiatric: He has a normal mood and affect. His behavior is normal.   Nursing note and vitals reviewed.      RECORDS REVIEW:   [facility] discharge summary 5/13/2020    ASSESSMENT     Diagnoses and all orders for this visit:    Dementia due to Parkinson's disease with behavioral disturbance (CMS/HCC)    Type 2 diabetes mellitus with diabetic polyneuropathy, without long-term current use of insulin (CMS/HCC)    Irregular heart beat    Coronary artery disease involving native heart with angina pectoris, unspecified vessel or lesion type (CMS/HCC)    Pneumonia of left lower lobe due to infectious organism (CMS/HCC)        PLAN  79-year-old white male admitted to the [facility] for rehab following hospitalization for encephalopathy.    Dementia due to Parkinson's disease  - Patient has not been very interactive in the facility.  Sedating medications were reduced during his last hospitalization.  Seroquel was also recently stopped.  Mood and behaviors will need to be monitored over the next couple of weeks.  Physical therapy will also need to be encouraged as much as  tolerated by the patient.    Coccyx wound  -Present on admission.  Continue wound care per nursing staff.    Left lower lobe pneumonia  -Patient is completing therapy with Augmentin.  No changes needed.    Coronary artery disease/irregular heartbeat  - stable on current cardiac medications.  No changes needed.    Type 2 diabetes mellitus  Patient is due for CBC, CMP, and A1c to be obtained at next routine labs    Patient and or POA have given consent and permission for telemedicine visits per nursing home.   35 minutes was spent via telemedicine with the patient and nurse at bedside for adjustment of orders and review of labs.       [x]  Discussed Patient in detail with nursing/staff, addressed all needs today.     [x]  Plan of Care Reviewed   [x]  PT/OT Reviewed   [x]  Order Changes  []  Discharge Plans Reviewed  [x]  Advance Directive on file with Nursing Home.   [x]  POA on file with Nursing Home.    [x]  Code Status listed and reviewed.          Alex Burk, .  5/15/2020

## 2020-06-03 ENCOUNTER — NURSING HOME (OUTPATIENT)
Dept: FAMILY MEDICINE CLINIC | Facility: CLINIC | Age: 80
End: 2020-06-03

## 2020-06-03 DIAGNOSIS — F02.818 DEMENTIA DUE TO PARKINSON'S DISEASE WITH BEHAVIORAL DISTURBANCE (HCC): Chronic | ICD-10-CM

## 2020-06-03 DIAGNOSIS — M62.838 MUSCLE SPASM: ICD-10-CM

## 2020-06-03 DIAGNOSIS — G20 PARKINSON'S DISEASE (TREMOR, STIFFNESS, SLOW MOTION, UNSTABLE POSTURE) (HCC): Primary | ICD-10-CM

## 2020-06-03 DIAGNOSIS — G20 DEMENTIA DUE TO PARKINSON'S DISEASE WITH BEHAVIORAL DISTURBANCE (HCC): Chronic | ICD-10-CM

## 2020-06-03 DIAGNOSIS — Z74.09 IMPAIRED MOBILITY AND ADLS: ICD-10-CM

## 2020-06-03 DIAGNOSIS — Z78.9 IMPAIRED MOBILITY AND ADLS: ICD-10-CM

## 2020-06-03 PROCEDURE — 99309 SBSQ NF CARE MODERATE MDM 30: CPT | Performed by: NURSE PRACTITIONER

## 2020-06-04 ENCOUNTER — NURSING HOME (OUTPATIENT)
Dept: FAMILY MEDICINE CLINIC | Facility: CLINIC | Age: 80
End: 2020-06-04

## 2020-06-04 VITALS
RESPIRATION RATE: 20 BRPM | HEART RATE: 64 BPM | DIASTOLIC BLOOD PRESSURE: 72 MMHG | BODY MASS INDEX: 19.31 KG/M2 | SYSTOLIC BLOOD PRESSURE: 130 MMHG | TEMPERATURE: 97.2 F | OXYGEN SATURATION: 95 % | WEIGHT: 127 LBS

## 2020-06-04 VITALS
TEMPERATURE: 97.8 F | DIASTOLIC BLOOD PRESSURE: 72 MMHG | WEIGHT: 128 LBS | HEART RATE: 76 BPM | BODY MASS INDEX: 19.46 KG/M2 | OXYGEN SATURATION: 94 % | SYSTOLIC BLOOD PRESSURE: 126 MMHG | RESPIRATION RATE: 18 BRPM

## 2020-06-04 DIAGNOSIS — G20 PARKINSON'S DISEASE (TREMOR, STIFFNESS, SLOW MOTION, UNSTABLE POSTURE) (HCC): ICD-10-CM

## 2020-06-04 DIAGNOSIS — I50.9 ACUTE CONGESTIVE HEART FAILURE, UNSPECIFIED HEART FAILURE TYPE (HCC): ICD-10-CM

## 2020-06-04 DIAGNOSIS — R05.9 COUGH: ICD-10-CM

## 2020-06-04 DIAGNOSIS — Z78.9 IMPAIRED MOBILITY AND ADLS: ICD-10-CM

## 2020-06-04 DIAGNOSIS — R09.89 CHEST CONGESTION: ICD-10-CM

## 2020-06-04 DIAGNOSIS — J90 PLEURAL EFFUSION, BILATERAL: ICD-10-CM

## 2020-06-04 DIAGNOSIS — Z74.09 IMPAIRED MOBILITY AND ADLS: ICD-10-CM

## 2020-06-04 PROBLEM — A41.9 SEPTIC SHOCK (HCC): Status: RESOLVED | Noted: 2020-04-04 | Resolved: 2020-06-04

## 2020-06-04 PROBLEM — N17.9 AKI (ACUTE KIDNEY INJURY): Status: RESOLVED | Noted: 2020-04-04 | Resolved: 2020-06-04

## 2020-06-04 PROBLEM — R65.21 SEPTIC SHOCK: Status: RESOLVED | Noted: 2020-04-04 | Resolved: 2020-06-04

## 2020-06-04 PROBLEM — R60.9 EDEMA: Status: RESOLVED | Noted: 2020-04-04 | Resolved: 2020-06-04

## 2020-06-04 PROBLEM — G20.A1 PARKINSON'S DISEASE (TREMOR, STIFFNESS, SLOW MOTION, UNSTABLE POSTURE): Status: ACTIVE | Noted: 2020-06-04

## 2020-06-04 PROCEDURE — 99309 SBSQ NF CARE MODERATE MDM 30: CPT | Performed by: NURSE PRACTITIONER

## 2020-06-04 NOTE — PROGRESS NOTES
Telemedicine nursing Home Progress Note        Mitul Stephen DO []  ALESSANDRA Ho [x]  [address] [place] [address]  Phone: [phone]  Fax: [phone] Inés Vega MD []  ALESS[facility]DRA Ho [x]   [address] [place] [address]  Phone: [phone]  Fax: [phone]     PATIENT NAME: Gama Davila                                                                          YOB: 1940           DATE OF SERVICE: 06/3/2020  FACILITY:  [] [place]   [] [place]   [] [facility]   [x] [facility]  []  [facility]  [] Other     ______________________________________________________________________     CHIEF COMPLAINT:  Muscle spasms      HISTORY OF PRESENT ILLNESS:   Per nursing, PT reports that patient is having muscle spasms and stiffness, when they are trying to work with him, and it makes it very difficult. They would like to know if he could get something to help with the spasms.     Upon evaluation today, patient was nonverbal and not interactive. He did make eye contact, and acted as if he wanted to talk, but he is unable.     PAST MEDICAL & SURGICAL HISTORY:   Past Medical History:   Diagnosis Date   • Arthritis    • Cancer (CMS/HCC)     SKIN    • Cataract    • Coronary artery disease    • Diabetes mellitus (CMS/HCC)    • High cholesterol    • [place] (hard of hearing)     PATIENT HAS HEARING AIDS   • Hypertension    • Irregular heart beat     HISTORY OF CARDIAC ABLATION IN 2003   • Wears dentures     FULL UPPER PLATE      Past Surgical History:   Procedure Laterality Date   • BACK SURGERY  1978    THEN AGAIN IN 1982   • CARDIAC ABLATION  2003   • CARDIAC SURGERY     • CATARACT EXTRACTION W/ INTRAOCULAR LENS IMPLANT Left 5/24/2017    Procedure: CATARACT PHACO EXTRACTION WITH INTRAOCULAR LENS IMPLANT LEFT WITH TORIC LENS;  Surgeon: Alma Benavidez MD;  Location: [facility];  Service:    • CATARACT EXTRACTION W/ INTRAOCULAR LENS IMPLANT Right  2017    Procedure: CATARACT PHACO EXTRACTION WITH INTRAOCULAR LENS IMPLANT RIGHT WITH TORIC LENS;  Surgeon: Alma Benavidez MD;  Location: [facility];  Service:    • CORONARY ARTERY BYPASS GRAFT      SPOUSE UNSURE OF HOW MANY VESSELS   • HIATAL HERNIA REPAIR     • JOINT REPLACEMENT Left     HIP - DONE BY DR DALAL   • KNEE ARTHROSCOPY Left    • NOSE SURGERY      SPOUSE REPORTS FOR A BROKEN NOSE   • OTHER SURGICAL HISTORY Left     TENDON TORN LOOSE FROM BONE, LEFT ELBOW   • OTHER SURGICAL HISTORY      RUPTURED DISC   • OTHER SURGICAL HISTORY      NECK SURGERY FOR RUPTURED DISC   • OTHER SURGICAL HISTORY      HAD SECOND NECK SURGERY   • OTHER SURGICAL HISTORY      RUPTURED DISC   • OTHER SURGICAL HISTORY      RUPTURED DISC   • OTHER SURGICAL HISTORY      HARDWARE REMOVAL FROM BACK BY DR HAY         MEDICATIONS:  I have reviewed and reconciled the patients medication list in the patients chart at the skilled nursing facility today.      ALLERGIES:  Allergies   Allergen Reactions   • Phenergan [Promethazine Hcl] Nausea And Vomiting         SOCIAL HISTORY:  Social History     Socioeconomic History   • Marital status:      Spouse name: Not on file   • Number of children: Not on file   • Years of education: Not on file   • Highest education level: Not on file   Tobacco Use   • Smoking status: Former Smoker     Types: Cigarettes     Last attempt to quit:      Years since quittin.4   • Smokeless tobacco: Never Used   Substance and Sexual Activity   • Alcohol use: No   • Drug use: No   • Sexual activity: Defer       FAMILY HISTORY:  No family history on file.    REVIEW OF SYSTEMS:  Review of Systems   Unable to perform ROS: Dementia   Constitutional: Negative.    HENT: Positive for trouble swallowing. Negative for drooling, facial swelling, mouth sores and rhinorrhea.    Eyes: Negative for discharge and redness.   Respiratory: Negative.    Cardiovascular:  Negative.  Negative for chest pain and palpitations.   Gastrointestinal: Negative for abdominal distention, anal bleeding, blood in stool, constipation, diarrhea and vomiting.   Genitourinary: Negative for difficulty urinating, discharge, frequency, penile swelling and scrotal swelling.        Incontinence   Musculoskeletal:        Muscles stiffness and spasms    contractures   Skin: Negative.    Neurological: Negative for weakness.   Hematological: Negative for adenopathy.   Psychiatric/Behavioral: Negative for confusion and suicidal ideas. The patient is not nervous/anxious.           PHYSICAL EXAMINATION:     VITAL SIGNS:  /72   Pulse 64   Temp 97.2 °F (36.2 °C)   Resp 20   Wt 57.6 kg (127 lb)   SpO2 95%   BMI 19.31 kg/m²     Physical Exam   Constitutional: He is oriented to person, place, and time.   Frail elderly male bedridden not very interactive.   HENT:   Head: Normocephalic and atraumatic.   Eyes: Conjunctivae are normal.   Neck: Normal range of motion. Neck supple.   Pulmonary/Chest: Effort normal.   Musculoskeletal: Normal range of motion.   Chronic NM defs, and contractures, s/t Parkinson's   Neurological: He is alert and oriented to person, place, and time.   Skin: No rash noted.   Psychiatric: He has a normal mood and affect. His behavior is normal.   Nursing note and vitals reviewed.      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 Will monitor and adjust as indicated.     Dementia due to Parkinson's disease  -No acute behaviors. His appetite and weight are stable. He has only lost 2 pounds in the past 2 weeks. Will  monitor.     Nursing encouraged to keep me informed of any acute changes, lack of improvement, or any new concerning symptoms.    Staff to continue supportive care for all ADLs.     [x]  Discussed Patient in detail with nursing/staff, addressed all needs today.     [x]  Plan of Care Reviewed   [x]  PT/OT Reviewed   [x]  Order Changes  []  Discharge Plans Reviewed  [x]  Advance Directive on file with Nursing Home.   [x]  POA on file with Nursing Home.    [x]  Code Status listed and reviewed.     “I confirm accuracy of unchanged data/findings which have been carried forward from previous visit, as well as I have updated appropriately those that have changed.”           Charleen Randall, APRN.  6/4/2020

## 2020-06-05 PROBLEM — J96.01 ACUTE RESPIRATORY FAILURE WITH HYPOXIA: Status: RESOLVED | Noted: 2020-05-08 | Resolved: 2020-06-05

## 2020-06-05 PROBLEM — J18.9 HCAP (HEALTHCARE-ASSOCIATED PNEUMONIA): Status: RESOLVED | Noted: 2020-05-08 | Resolved: 2020-06-05

## 2020-06-05 PROBLEM — R09.02 HYPOXIA: Status: RESOLVED | Noted: 2020-04-04 | Resolved: 2020-06-05

## 2020-06-05 NOTE — PROGRESS NOTES
Telemedicine nursing Home Progress Note        Mitul Stephen DO []  ALESSANDRA Ho [x]  [address] [place] [address]  Phone: [phone]  Fax: [phone] Inés Vega MD []  ALESSANDRA Ho [x]   [address] [place] [address]  Phone: [phone]  Fax: [phone]     PATIENT NAME: Gama Davila                                                                          YOB: 1940           DATE OF SERVICE: 06/4/2020  FACILITY:  [] [place]   [] [place]   [] [facility]   [x] [facility]  []  [facility]  [] Other     ______________________________________________________________________     CHIEF COMPLAINT:  Cough and congestion    HISTORY OF PRESENT ILLNESS:   Per nursing, patient has had congestion and coughing today, coughing up clear sputum. He had his FEES test today, and per , he is not aspirating with puree diet. He has history of pneumonia, so concerns for recurrence.     Upon evaluation today, patient did make contact, and attempt to mumble, but was nonverbal and did not follow commands.      PAST MEDICAL & SURGICAL HISTORY:   Past Medical History:   Diagnosis Date   • Arthritis    • Cancer (CMS/HCC)     SKIN    • Cataract    • Coronary artery disease    • Diabetes mellitus (CMS/HCC)    • High cholesterol    • [place] (hard of hearing)     PATIENT HAS HEARING AIDS   • Hypertension    • Irregular heart beat     HISTORY OF CARDIAC ABLATION IN 2003   • Wears dentures     FULL UPPER PLATE      Past Surgical History:   Procedure Laterality Date   • BACK SURGERY  1978    THEN AGAIN IN 1982   • CARDIAC ABLATION  2003   • CARDIAC SURGERY     • CATARACT EXTRACTION W/ INTRAOCULAR LENS IMPLANT Left 5/24/2017    Procedure: CATARACT PHACO EXTRACTION WITH INTRAOCULAR LENS IMPLANT LEFT WITH TORIC LENS;  Surgeon: Alma Benavidez MD;  Location: [facility];  Service:    • CATARACT EXTRACTION W/ INTRAOCULAR LENS IMPLANT Right 6/14/2017    Procedure:  CATARACT PHACO EXTRACTION WITH INTRAOCULAR LENS IMPLANT RIGHT WITH TORIC LENS;  Surgeon: Alma Benavidez MD;  Location: [facility];  Service:    • CORONARY ARTERY BYPASS GRAFT      SPOUSE UNSURE OF HOW MANY VESSELS   • HIATAL HERNIA REPAIR     • JOINT REPLACEMENT Left     HIP - DONE BY DR DALAL   • KNEE ARTHROSCOPY Left    • NOSE SURGERY      SPOUSE REPORTS FOR A BROKEN NOSE   • OTHER SURGICAL HISTORY Left     TENDON TORN LOOSE FROM BONE, LEFT ELBOW   • OTHER SURGICAL HISTORY      RUPTURED DISC   • OTHER SURGICAL HISTORY      NECK SURGERY FOR RUPTURED DISC   • OTHER SURGICAL HISTORY      HAD SECOND NECK SURGERY   • OTHER SURGICAL HISTORY      RUPTURED DISC   • OTHER SURGICAL HISTORY      RUPTURED DISC   • OTHER SURGICAL HISTORY      HARDWARE REMOVAL FROM BACK BY DR HAY         MEDICATIONS:  I have reviewed and reconciled the patients medication list in the patients chart at the skilled nursing facility today.      ALLERGIES:  Allergies   Allergen Reactions   • Phenergan [Promethazine Hcl] Nausea And Vomiting         SOCIAL HISTORY:  Social History     Socioeconomic History   • Marital status:      Spouse name: Not on file   • Number of children: Not on file   • Years of education: Not on file   • Highest education level: Not on file   Tobacco Use   • Smoking status: Former Smoker     Types: Cigarettes     Last attempt to quit:      Years since quittin.4   • Smokeless tobacco: Never Used   Substance and Sexual Activity   • Alcohol use: No   • Drug use: No   • Sexual activity: Defer       FAMILY HISTORY:  No family history on file.    REVIEW OF SYSTEMS:  Review of Systems   Unable to perform ROS: Dementia   Constitutional: Negative.    HENT: Positive for trouble swallowing. Negative for drooling, facial swelling, mouth sores and rhinorrhea.    Eyes: Negative for discharge and redness.   Respiratory: Positive for cough.    Cardiovascular: Negative.  Negative  for chest pain and palpitations.   Gastrointestinal: Negative for abdominal distention, anal bleeding, blood in stool, constipation, diarrhea and vomiting.   Genitourinary: Negative for difficulty urinating, discharge, frequency, penile swelling and scrotal swelling.        Incontinence   Musculoskeletal:        Muscles stiffness and spasms    contractures   Skin: Negative.    Neurological: Negative for weakness.   Hematological: Negative for adenopathy.   Psychiatric/Behavioral: Negative for confusion and suicidal ideas. The patient is not nervous/anxious.           PHYSICAL EXAMINATION:     VITAL SIGNS:  /72   Pulse 76   Temp 97.8 °F (36.6 °C)   Resp 18   Wt 58.1 kg (128 lb)   SpO2 94%   BMI 19.46 kg/m²     Physical Exam   Constitutional: He is oriented to person, place, and time. He is not intubated.   Frail elderly male bedridden not very interactive.   HENT:   Head: Normocephalic and atraumatic.   Eyes: Conjunctivae are normal.   Neck: Normal range of motion. Neck supple.   Pulmonary/Chest: Effort normal. He is not intubated. He has decreased breath sounds in the right lower field and the left lower field. He has rhonchi in the right upper field and the left upper field.   Musculoskeletal: Normal range of motion.   Chronic NM defs, and contractures, s/t Parkinson's   Neurological: He is alert and oriented to person, place, and time.   Skin: No rash noted.   Psychiatric: He has a normal mood and affect. His behavior is normal.   Nursing note and vitals reviewed.      RECORDS REVIEW:   Most recent labs    ASSESSMENT     Diagnoses and all orders for this visit:    Chest congestion    Cough    Acute congestive heart failure, unspecified heart failure type (CMS/HCC)    Pleural effusion, bilateral    Parkinson's disease (tremor, stiffness, slow motion, unstable posture) (CMS/HCC)    Impaired mobility and ADLs        PLAN    Cough/congestion/CHF/BLL pleural effusions  -Given Robitussin DM, 10 ml, tid x 1  week, and Duo Nebs q 6 hours x 1 week, for cough and congestion. CXR performed, and reports small BLL effusions, mild CHF. Orders to give Lasix 20 mg po bid x 3 days, with potassium 10 meq daily, x 3 days, then stop. Encourage fluids, to prevent dehydration. Check BMP Tuesday. Will c/t monitor.     Dementia due to Parkinson's disease  -No acute behaviors. His appetite and weight are stable. He has only lost 2 pounds in the past 2 weeks. Will monitor.     Nursing encouraged to keep me informed of any acute changes, lack of improvement, or any new concerning symptoms.    Staff to continue supportive care for all ADLs.     [x]  Discussed Patient in detail with nursing/staff, addressed all needs today.     [x]  Plan of Care Reviewed   [x]  PT/OT Reviewed   [x]  Order Changes  []  Discharge Plans Reviewed  [x]  Advance Directive on file with Nursing Home.   [x]  POA on file with Nursing Home.    [x]  Code Status listed and reviewed.     “I confirm accuracy of unchanged data/findings which have been carried forward from previous visit, as well as I have updated appropriately those that have changed.”               Charleen Randall, APRN.  6/5/2020

## 2020-06-12 ENCOUNTER — NURSING HOME (OUTPATIENT)
Dept: FAMILY MEDICINE CLINIC | Facility: CLINIC | Age: 80
End: 2020-06-12

## 2020-06-12 VITALS
TEMPERATURE: 96.7 F | WEIGHT: 126 LBS | BODY MASS INDEX: 19.16 KG/M2 | SYSTOLIC BLOOD PRESSURE: 142 MMHG | DIASTOLIC BLOOD PRESSURE: 68 MMHG | RESPIRATION RATE: 16 BRPM | HEART RATE: 68 BPM

## 2020-06-12 DIAGNOSIS — Z78.9 IMPAIRED MOBILITY AND ADLS: ICD-10-CM

## 2020-06-12 DIAGNOSIS — Z74.09 IMPAIRED MOBILITY AND ADLS: ICD-10-CM

## 2020-06-12 DIAGNOSIS — R05.9 COUGH: Primary | ICD-10-CM

## 2020-06-12 DIAGNOSIS — I50.9 ACUTE ON CHRONIC CONGESTIVE HEART FAILURE, UNSPECIFIED HEART FAILURE TYPE (HCC): ICD-10-CM

## 2020-06-12 DIAGNOSIS — J90 PLEURAL EFFUSION, BILATERAL: ICD-10-CM

## 2020-06-12 PROCEDURE — 99308 SBSQ NF CARE LOW MDM 20: CPT | Performed by: NURSE PRACTITIONER

## 2020-06-14 NOTE — PROGRESS NOTES
Telemedicine nursing Home Progress Note        Mitul Stephen DO []  ALESSANDRA Ho [x]  [address] [place] [address]  Phone: [phone]  Fax: [phone] Inés Vega MD []  ALESSANDRA Ho [x]   [address] [place] [address]  Phone: [phone]  Fax: [phone]     PATIENT NAME: Gama Davila                                                                          YOB: 1940           DATE OF SERVICE: 06/12/2020  FACILITY:  [] [place]   [] [place]   [] [facility]   [x] [facility]  []  [facility]  [] Other     ______________________________________________________________________     CHIEF COMPLAINT:  Follow up cough/CHF    Follow up muscle stiffnesss    HISTORY OF PRESENT ILLNESS:   Per nursing, patient has not had any cough the past couple days and has been feeling better. Patient reports today, that he is feeling better.     Per nursing, muscle stiffness has improved with muscle relaxer.        PAST MEDICAL & SURGICAL HISTORY:   Past Medical History:   Diagnosis Date   • Arthritis    • Cancer (CMS/HCC)     SKIN    • Cataract    • Coronary artery disease    • Diabetes mellitus (CMS/HCC)    • High cholesterol    • [place] (hard of hearing)     PATIENT HAS HEARING AIDS   • Hypertension    • Irregular heart beat     HISTORY OF CARDIAC ABLATION IN 2003   • Wears dentures     FULL UPPER PLATE      Past Surgical History:   Procedure Laterality Date   • BACK SURGERY  1978    THEN AGAIN IN 1982   • CARDIAC ABLATION  2003   • CARDIAC SURGERY     • CATARACT EXTRACTION W/ INTRAOCULAR LENS IMPLANT Left 5/24/2017    Procedure: CATARACT PHACO EXTRACTION WITH INTRAOCULAR LENS IMPLANT LEFT WITH TORIC LENS;  Surgeon: Alma Benavidez MD;  Location: [facility];  Service:    • CATARACT EXTRACTION W/ INTRAOCULAR LENS IMPLANT Right 6/14/2017    Procedure: CATARACT PHACO EXTRACTION WITH INTRAOCULAR LENS IMPLANT RIGHT WITH TORIC LENS;  Surgeon: Alma  Pam Benavidez MD;  Location: [facility];  Service:    • CORONARY ARTERY BYPASS GRAFT      SPOUSE UNSURE OF HOW MANY VESSELS   • HIATAL HERNIA REPAIR     • JOINT REPLACEMENT Left     HIP - DONE BY DR DALAL   • KNEE ARTHROSCOPY Left    • NOSE SURGERY      SPOUSE REPORTS FOR A BROKEN NOSE   • OTHER SURGICAL HISTORY Left     TENDON TORN LOOSE FROM BONE, LEFT ELBOW   • OTHER SURGICAL HISTORY      RUPTURED DISC   • OTHER SURGICAL HISTORY      NECK SURGERY FOR RUPTURED DISC   • OTHER SURGICAL HISTORY      HAD SECOND NECK SURGERY   • OTHER SURGICAL HISTORY      RUPTURED DISC   • OTHER SURGICAL HISTORY      RUPTURED DISC   • OTHER SURGICAL HISTORY      HARDWARE REMOVAL FROM BACK BY DR HAY         MEDICATIONS:  I have reviewed and reconciled the patients medication list in the patients chart at the skilled nursing facility today.      ALLERGIES:  Allergies   Allergen Reactions   • Phenergan [Promethazine Hcl] Nausea And Vomiting         SOCIAL HISTORY:  Social History     Socioeconomic History   • Marital status:      Spouse name: Not on file   • Number of children: Not on file   • Years of education: Not on file   • Highest education level: Not on file   Tobacco Use   • Smoking status: Former Smoker     Types: Cigarettes     Last attempt to quit:      Years since quittin.4   • Smokeless tobacco: Never Used   Substance and Sexual Activity   • Alcohol use: No   • Drug use: No   • Sexual activity: Defer       FAMILY HISTORY:  No family history on file.    REVIEW OF SYSTEMS:  Review of Systems   Unable to perform ROS: Dementia (ROS per nursing and patient)   Constitutional: Negative.    HENT: Positive for trouble swallowing. Negative for drooling, facial swelling, mouth sores and rhinorrhea.    Eyes: Negative for discharge and redness.   Respiratory: Negative for cough.    Cardiovascular: Negative.  Negative for chest pain and palpitations.   Gastrointestinal: Negative for  abdominal distention, anal bleeding, blood in stool, constipation, diarrhea and vomiting.   Genitourinary: Negative for difficulty urinating, discharge, frequency, penile swelling and scrotal swelling.        Incontinence   Musculoskeletal:        Muscles stiffness and spasms improved    contractures   Skin: Negative.    Neurological: Positive for speech difficulty. Negative for weakness.   Hematological: Negative for adenopathy.   Psychiatric/Behavioral: Negative for confusion and suicidal ideas. The patient is not nervous/anxious.           PHYSICAL EXAMINATION:     VITAL SIGNS:  /68   Pulse 68   Temp 96.7 °F (35.9 °C)   Resp 16   Wt 57.2 kg (126 lb)   BMI 19.16 kg/m²     Physical Exam   Constitutional: He is oriented to person, place, and time. He is not intubated.   Frail elderly male bedridden not very interactive.   HENT:   Head: Normocephalic and atraumatic.   Eyes: Conjunctivae are normal.   Neck: Normal range of motion. Neck supple.   Pulmonary/Chest: Effort normal and breath sounds normal. He is not intubated.   Musculoskeletal: Normal range of motion.   Chronic NM defs, and contractures, s/t Parkinson's   Neurological: He is alert and oriented to person, place, and time.   Skin: No rash noted.   Psychiatric: He has a normal mood and affect. His behavior is normal.   Nursing note and vitals reviewed.      RECORDS REVIEW:   Most recent labs    ASSESSMENT     Diagnoses and all orders for this visit:    Cough    Pleural effusion, bilateral    Acute on chronic congestive heart failure, unspecified heart failure type (CMS/HCC)    Impaired mobility and ADLs        PLAN    Cough/CHF/BLL pleural effusions  -Cough resolved. He received treatment with 3 days of diuretics, for his CHF symptoms/pleural effusions.  No s/s CHF at this time. Will c/t monitor.     Muscle stiffness/ Parkinson's disease  -Improved with muscle relaxer. No s/s worsening symptoms. Will monitor.     Nursing encouraged to keep me  informed of any acute changes, lack of improvement, or any new concerning symptoms.    Staff to continue supportive care for all ADLs.     [x]  Discussed Patient in detail with nursing/staff, addressed all needs today.     [x]  Plan of Care Reviewed   [x]  PT/OT Reviewed   [x]  Order Changes  []  Discharge Plans Reviewed  [x]  Advance Directive on file with Nursing Home.   [x]  POA on file with Nursing Home.    [x]  Code Status listed and reviewed.     “I confirm accuracy of unchanged data/findings which have been carried forward from previous visit, as well as I have updated appropriately those that have changed.”                 Charleen Randall, APRN.  6/14/2020

## 2020-06-25 ENCOUNTER — NURSING HOME (OUTPATIENT)
Dept: INTERNAL MEDICINE | Facility: CLINIC | Age: 80
End: 2020-06-25

## 2020-06-25 VITALS
HEART RATE: 64 BPM | TEMPERATURE: 97 F | BODY MASS INDEX: 19.16 KG/M2 | DIASTOLIC BLOOD PRESSURE: 80 MMHG | WEIGHT: 126 LBS | SYSTOLIC BLOOD PRESSURE: 138 MMHG | RESPIRATION RATE: 18 BRPM | OXYGEN SATURATION: 95 %

## 2020-06-25 DIAGNOSIS — F02.818 DEMENTIA DUE TO PARKINSON'S DISEASE WITH BEHAVIORAL DISTURBANCE (HCC): ICD-10-CM

## 2020-06-25 DIAGNOSIS — G20 DEMENTIA DUE TO PARKINSON'S DISEASE WITH BEHAVIORAL DISTURBANCE (HCC): ICD-10-CM

## 2020-06-25 DIAGNOSIS — I25.119 CORONARY ARTERY DISEASE INVOLVING NATIVE HEART WITH ANGINA PECTORIS, UNSPECIFIED VESSEL OR LESION TYPE (HCC): ICD-10-CM

## 2020-06-25 DIAGNOSIS — I10 ESSENTIAL HYPERTENSION: ICD-10-CM

## 2020-06-25 DIAGNOSIS — E78.00 HIGH CHOLESTEROL: ICD-10-CM
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
## 2020-06-25 DIAGNOSIS — E11.42 TYPE 2 DIABETES MELLITUS WITH DIABETIC POLYNEUROPATHY, WITHOUT LONG-TERM CURRENT USE OF INSULIN (HCC): Primary | ICD-10-CM

## 2020-06-25 DIAGNOSIS — G30.1 LATE ONSET ALZHEIMER'S DISEASE WITHOUT BEHAVIORAL DISTURBANCE (HCC): ICD-10-CM

## 2020-06-25 DIAGNOSIS — F02.80 LATE ONSET ALZHEIMER'S DISEASE WITHOUT BEHAVIORAL DISTURBANCE (HCC): ICD-10-CM

## 2020-06-25 DIAGNOSIS — E11.9 TYPE 2 DIABETES MELLITUS WITHOUT COMPLICATION, WITHOUT LONG-TERM CURRENT USE OF INSULIN (HCC): ICD-10-CM

## 2020-06-25 PROCEDURE — 99308 SBSQ NF CARE LOW MDM 20: CPT | Performed by: INTERNAL MEDICINE

## 2020-06-29 NOTE — PROGRESS NOTES
Nursing Home Progress Note        Mitul Stephen DO []  ALESSANDRA Ho []  [address] [facility], [place] [address]  Phone: [phone]  Fax: [phone] Inés Vega MD []  Alex Burk DO [x]   [address] [place] [address]  Phone: [phone]  Fax: [phone]     PATIENT NAME: Gama Davila                                                                          YOB: 1940           DATE OF SERVICE: 06/25/2020   FACILITY:  [] [facility]   [] [place]   [] [facility]   [x] [facility]  []  [facility]  [] Other     ______________________________________________________________________     CHIEF COMPLAINT:  Altered mental Status, Dementia       HISTORY OF PRESENT ILLNESS:   Patient was resting comfortably and had no new apparent complaints or concerns.  Most of his history was obtained with nursing staff.  Patient remains essentially nonverbal most of the time.  Speech therapy has started to follow the patient to help with feeding and speech.  There was a concern for choking and possible aspiration.  Chest x-ray was negative.    PAST MEDICAL & SURGICAL HISTORY:   Past Medical History:   Diagnosis Date   • Arthritis    • Cancer (CMS/HCC)     SKIN    • Cataract    • Coronary artery disease    • Diabetes mellitus (CMS/HCC)    • High cholesterol    • [place] (hard of hearing)     PATIENT HAS HEARING AIDS   • Hypertension    • Irregular heart beat     HISTORY OF CARDIAC ABLATION IN 2003   • Wears dentures     FULL UPPER PLATE      Past Surgical History:   Procedure Laterality Date   • BACK SURGERY  1978    THEN AGAIN IN 1982   • CARDIAC ABLATION  2003   • CARDIAC SURGERY     • CATARACT EXTRACTION W/ INTRAOCULAR LENS IMPLANT Left 5/24/2017    Procedure: CATARACT PHACO EXTRACTION WITH INTRAOCULAR LENS IMPLANT LEFT WITH TORIC LENS;  Surgeon: Alma Benavidez MD;  Location: [facility];  Service:    • CATARACT EXTRACTION W/ INTRAOCULAR LENS IMPLANT Right 6/14/2017     Procedure: CATARACT PHACO EXTRACTION WITH INTRAOCULAR LENS IMPLANT RIGHT WITH TORIC LENS;  Surgeon: Alma Benavidez MD;  Location: [facility];  Service:    • CORONARY ARTERY BYPASS GRAFT      SPOUSE UNSURE OF HOW MANY VESSELS   • HIATAL HERNIA REPAIR     • JOINT REPLACEMENT Left     HIP - DONE BY DR DALAL   • KNEE ARTHROSCOPY Left    • NOSE SURGERY      SPOUSE REPORTS FOR A BROKEN NOSE   • OTHER SURGICAL HISTORY Left     TENDON TORN LOOSE FROM BONE, LEFT ELBOW   • OTHER SURGICAL HISTORY      RUPTURED DISC   • OTHER SURGICAL HISTORY      NECK SURGERY FOR RUPTURED DISC   • OTHER SURGICAL HISTORY      HAD SECOND NECK SURGERY   • OTHER SURGICAL HISTORY      RUPTURED DISC   • OTHER SURGICAL HISTORY      RUPTURED DISC   • OTHER SURGICAL HISTORY      HARDWARE REMOVAL FROM BACK BY DR HAY         MEDICATIONS:  I have reviewed and reconciled the patients medication list in the patients chart at the skilled nursing facility today.      ALLERGIES:  Allergies   Allergen Reactions   • Phenergan [Promethazine Hcl] Nausea And Vomiting         SOCIAL HISTORY:  Social History     Socioeconomic History   • Marital status:      Spouse name: Not on file   • Number of children: Not on file   • Years of education: Not on file   • Highest education level: Not on file   Tobacco Use   • Smoking status: Former Smoker     Types: Cigarettes     Last attempt to quit:      Years since quittin.5   • Smokeless tobacco: Never Used   Substance and Sexual Activity   • Alcohol use: No   • Drug use: No   • Sexual activity: Defer       FAMILY HISTORY:  No family history on file.    REVIEW OF SYSTEMS:  Review of Systems   Unable to perform ROS: Dementia          PHYSICAL EXAMINATION:     VITAL SIGNS:  /80   Pulse 64   Temp 97 °F (36.1 °C)   Resp 18   Wt 57.2 kg (126 lb)   SpO2 95%   BMI 19.16 kg/m²     Physical Exam   Constitutional: He is oriented to person, place, and time.    Frail elderly male bedridden not very interactive.   HENT:   Head: Normocephalic and atraumatic.   Eyes: Conjunctivae are normal.   Neck: Normal range of motion. Neck supple.   Pulmonary/Chest: Effort normal.   Musculoskeletal: Normal range of motion.   Neurological: He is alert and oriented to person, place, and time.   Skin: No rash noted.   Psychiatric: He has a normal mood and affect. His behavior is normal.   Nursing note and vitals reviewed.       RECORDS REVIEW:   `    ASSESSMENT     Diagnoses and all orders for this visit:    Type 2 diabetes mellitus with diabetic polyneuropathy, without long-term current use of insulin (CMS/HCC)    Coronary artery disease involving native heart with angina pectoris, unspecified vessel or lesion type (CMS/HCC)    Dementia due to Parkinson's disease with behavioral disturbance (CMS/HCC)    Type 2 diabetes mellitus without complication, without long-term current use of insulin (CMS/HCC)    Essential hypertension    High cholesterol    Late onset Alzheimer's disease without behavioral disturbance (CMS/HCC)    Fall, subsequent encounter        PLAN  79-year-old white male admitted to the [facility] for rehab following hospitalization for encephalopathy.    Dementia due to Parkinson's disease  - Mood and behaviors remain stable on current medication regimen.  Does not appear that medications are over sedating at this point.    Coccyx wound  -Present on admission.  Continue wound care per nursing staff.    Left lower lobe pneumonia  -Completed therapy.  Resolved.  Patient had recent concerns for aspiration.  Recent chest x-ray was clear.    Coronary artery disease/irregular heartbeat  - stable on current cardiac medications.  No changes needed.    Type 2 diabetes mellitus  Labs are stable.    [x]  Discussed Patient in detail with nursing/staff, addressed all needs today.     [x]  Plan of Care Reviewed   [x]  PT/OT Reviewed   []  Order Changes  []  Discharge Plans  Reviewed  [x]  Advance Directive on file with Nursing Home.   [x]  POA on file with Nursing Home.    [x]  Code Status listed and reviewed.          Alex Burk DO.  6/29/2020

## 2020-07-08 ENCOUNTER — NURSING HOME (OUTPATIENT)
Dept: FAMILY MEDICINE CLINIC | Facility: CLINIC | Age: 80
End: 2020-07-08

## 2020-07-08 VITALS
HEART RATE: 80 BPM | SYSTOLIC BLOOD PRESSURE: 130 MMHG | TEMPERATURE: 97.8 F | RESPIRATION RATE: 20 BRPM | WEIGHT: 126 LBS | DIASTOLIC BLOOD PRESSURE: 80 MMHG | BODY MASS INDEX: 19.16 KG/M2

## 2020-07-08 DIAGNOSIS — F02.818 DEMENTIA DUE TO PARKINSON'S DISEASE WITH BEHAVIORAL DISTURBANCE (HCC): Chronic | ICD-10-CM

## 2020-07-08 DIAGNOSIS — G20 DEMENTIA DUE TO PARKINSON'S DISEASE WITH BEHAVIORAL DISTURBANCE (HCC): Chronic | ICD-10-CM

## 2020-07-08 DIAGNOSIS — Z74.09 IMPAIRED MOBILITY AND ADLS: ICD-10-CM

## 2020-07-08 DIAGNOSIS — Z78.9 IMPAIRED MOBILITY AND ADLS: ICD-10-CM

## 2020-07-08 PROCEDURE — 99308 SBSQ NF CARE LOW MDM 20: CPT | Performed by: NURSE PRACTITIONER

## 2020-07-10 NOTE — PROGRESS NOTES
Telemedicine nursing Home Progress Note        Mitul Stephen DO []  ALESSANDRA Ho [x]  [address] [place] [address]  Phone: [phone]  Fax: [phone] Inés Vega MD []  ALESSANDRA Ho [x]   [address] [place] [address]  Phone: [phone]  Fax: [phone]     PATIENT NAME: Gama Davila                                                                          YOB: 1940           DATE OF SERVICE: 07/8/2020  FACILITY:  [] [place]   [] [place]   [] [facility]   [x] [facility]  []  [facility]  [] Other     ______________________________________________________________________     CHIEF COMPLAINT:  F/u for signs and symptoms of worsening dementia or Parkinson's    HISTORY OF PRESENT ILLNESS:   Per nursing, his dementia and Parkinson's are stable, no worsening signs and symptoms. No acute behaviors and appetite and weight are stable. Patient pleasant during visit, denies any problems at this time.     PAST MEDICAL & SURGICAL HISTORY:   Past Medical History:   Diagnosis Date   • Arthritis    • Cancer (CMS/HCC)     SKIN    • Cataract    • Coronary artery disease    • Diabetes mellitus (CMS/HCC)    • High cholesterol    • [place] (hard of hearing)     PATIENT HAS HEARING AIDS   • Hypertension    • Irregular heart beat     HISTORY OF CARDIAC ABLATION IN 2003   • Wears dentures     FULL UPPER PLATE      Past Surgical History:   Procedure Laterality Date   • BACK SURGERY  1978    THEN AGAIN IN 1982   • CARDIAC ABLATION  2003   • CARDIAC SURGERY     • CATARACT EXTRACTION W/ INTRAOCULAR LENS IMPLANT Left 5/24/2017    Procedure: CATARACT PHACO EXTRACTION WITH INTRAOCULAR LENS IMPLANT LEFT WITH TORIC LENS;  Surgeon: Alma Benavidez MD;  Location: [facility];  Service:    • CATARACT EXTRACTION W/ INTRAOCULAR LENS IMPLANT Right 6/14/2017    Procedure: CATARACT PHACO EXTRACTION WITH INTRAOCULAR LENS IMPLANT RIGHT WITH TORIC LENS;  Surgeon:  Alma Benavidez MD;  Location: [facility] OR;  Service:    • CORONARY ARTERY BYPASS GRAFT      SPOUSE UNSURE OF HOW MANY VESSELS   • HIATAL HERNIA REPAIR     • JOINT REPLACEMENT Left     HIP - DONE BY DR DALAL   • KNEE ARTHROSCOPY Left    • NOSE SURGERY      SPOUSE REPORTS FOR A BROKEN NOSE   • OTHER SURGICAL HISTORY Left     TENDON TORN LOOSE FROM BONE, LEFT ELBOW   • OTHER SURGICAL HISTORY      RUPTURED DISC   • OTHER SURGICAL HISTORY      NECK SURGERY FOR RUPTURED DISC   • OTHER SURGICAL HISTORY      HAD SECOND NECK SURGERY   • OTHER SURGICAL HISTORY      RUPTURED DISC   • OTHER SURGICAL HISTORY      RUPTURED DISC   • OTHER SURGICAL HISTORY      HARDWARE REMOVAL FROM BACK BY DR HAY         MEDICATIONS:  I have reviewed and reconciled the patients medication list in the patients chart at the skilled nursing facility today.      ALLERGIES:  Allergies   Allergen Reactions   • Phenergan [Promethazine Hcl] Nausea And Vomiting         SOCIAL HISTORY:  Social History     Socioeconomic History   • Marital status:      Spouse name: Not on file   • Number of children: Not on file   • Years of education: Not on file   • Highest education level: Not on file   Tobacco Use   • Smoking status: Former Smoker     Types: Cigarettes     Last attempt to quit:      Years since quittin.5   • Smokeless tobacco: Never Used   Substance and Sexual Activity   • Alcohol use: No   • Drug use: No   • Sexual activity: Defer       FAMILY HISTORY:  No family history on file.    REVIEW OF SYSTEMS:  Review of Systems   Unable to perform ROS: Dementia (ROS per nursing and patient)   Constitutional: Negative.    HENT: Negative for drooling, facial swelling, mouth sores, rhinorrhea and trouble swallowing.    Eyes: Negative for discharge and redness.   Respiratory: Negative for cough.    Cardiovascular: Negative.  Negative for chest pain and palpitations.   Gastrointestinal: Negative for  abdominal distention, anal bleeding, blood in stool, constipation, diarrhea and vomiting.   Genitourinary: Negative for difficulty urinating, discharge, frequency, penile swelling and scrotal swelling.        Incontinence   Musculoskeletal:        Muscles stiffness and spasms improved    contractures   Skin: Negative.    Neurological: Positive for speech difficulty. Negative for weakness.   Hematological: Negative for adenopathy.   Psychiatric/Behavioral: Negative for confusion and suicidal ideas. The patient is not nervous/anxious.           PHYSICAL EXAMINATION:     VITAL SIGNS:  /80   Pulse 80   Temp 97.8 °F (36.6 °C)   Resp 20   Wt 57.2 kg (126 lb)   BMI 19.16 kg/m²     Physical Exam   Constitutional: He is oriented to person, place, and time. He is not intubated.   Frail elderly male bedridden not very interactive.   HENT:   Head: Normocephalic and atraumatic.   Eyes: Conjunctivae are normal.   Neck: Normal range of motion. Neck supple.   Pulmonary/Chest: Effort normal and breath sounds normal. He is not intubated.   Musculoskeletal: Normal range of motion.   Chronic NM defs, and contractures, s/t Parkinson's   Neurological: He is alert and oriented to person, place, and time.   Skin: No rash noted.   Psychiatric: He has a normal mood and affect. His behavior is normal.   Nursing note and vitals reviewed.      RECORDS REVIEW:   Most recent labs    ASSESSMENT     Diagnoses and all orders for this visit:    Dementia due to Parkinson's disease with behavioral disturbance (CMS/HCC)    Impaired mobility and ADLs        PLAN    Dementia/Parkinson's  -Stable at this time, with no acute behavior or n/m changes. Will c/t monitor.     Nursing encouraged to keep me informed of any acute changes, lack of improvement, or any new concerning symptoms.    Staff to continue supportive care for all ADLs.     [x]  Discussed Patient in detail with nursing/staff, addressed all needs today.     [x]  Plan of Care Reviewed    [x]  PT/OT Reviewed   [x]  Order Changes  []  Discharge Plans Reviewed  [x]  Advance Directive on file with Nursing Home.   [x]  POA on file with Nursing Home.    [x]  Code Status listed and reviewed.       “I confirm accuracy of unchanged data/findings which have been carried forward from previous visit, as well as I have updated appropriately those that have changed.”                   Charleen Randall, APRN.  7/10/2020

## 2020-07-23 ENCOUNTER — NURSING HOME (OUTPATIENT)
Dept: INTERNAL MEDICINE | Facility: CLINIC | Age: 80
End: 2020-07-23

## 2020-07-23 VITALS
BODY MASS INDEX: 19.16 KG/M2 | HEART RATE: 76 BPM | WEIGHT: 126 LBS | RESPIRATION RATE: 18 BRPM | SYSTOLIC BLOOD PRESSURE: 128 MMHG | TEMPERATURE: 97 F | OXYGEN SATURATION: 96 % | DIASTOLIC BLOOD PRESSURE: 74 MMHG

## 2020-07-23 DIAGNOSIS — G20 DEMENTIA DUE TO PARKINSON'S DISEASE WITH BEHAVIORAL DISTURBANCE (HCC): ICD-10-CM

## 2020-07-23 DIAGNOSIS — I25.119 CORONARY ARTERY DISEASE INVOLVING NATIVE HEART WITH ANGINA PECTORIS, UNSPECIFIED VESSEL OR LESION TYPE (HCC): ICD-10-CM

## 2020-07-23 DIAGNOSIS — I10 ESSENTIAL HYPERTENSION: ICD-10-CM

## 2020-07-23 DIAGNOSIS — G30.1 LATE ONSET ALZHEIMER'S DISEASE WITHOUT BEHAVIORAL DISTURBANCE (HCC): ICD-10-CM

## 2020-07-23 DIAGNOSIS — E78.00 HIGH CHOLESTEROL: ICD-10-CM

## 2020-07-23 DIAGNOSIS — F02.80 LATE ONSET ALZHEIMER'S DISEASE WITHOUT BEHAVIORAL DISTURBANCE (HCC): ICD-10-CM

## 2020-07-23 DIAGNOSIS — E11.42 TYPE 2 DIABETES MELLITUS WITH DIABETIC POLYNEUROPATHY, WITHOUT LONG-TERM CURRENT USE OF INSULIN (HCC): Primary | ICD-10-CM

## 2020-07-23 DIAGNOSIS — F02.818 DEMENTIA DUE TO PARKINSON'S DISEASE WITH BEHAVIORAL DISTURBANCE (HCC): ICD-10-CM

## 2020-07-23 DIAGNOSIS — E11.9 TYPE 2 DIABETES MELLITUS WITHOUT COMPLICATION, WITHOUT LONG-TERM CURRENT USE OF INSULIN (HCC): ICD-10-CM

## 2020-07-23 PROCEDURE — 99308 SBSQ NF CARE LOW MDM 20: CPT | Performed by: INTERNAL MEDICINE

## 2020-07-27 NOTE — PROGRESS NOTES
Nursing Home Progress Note        Mitul Stephen DO []  ALESSANDRA Ho []  [address] [facility], [place] [address]  Phone: [phone]  Fax: [phone] Inés Vega MD []  Alex Burk DO [x]   [address] [place] [address]  Phone: [phone]  Fax: [phone]     PATIENT NAME: Gama Davila                                                                          YOB: 1940           DATE OF SERVICE: 07/23/2020   FACILITY:  [] [place]   [] [place]   [] [facility]   [x] [facility]  []  [facility]  [] Other     ______________________________________________________________________     CHIEF COMPLAINT:  Chronic medical management      HISTORY OF PRESENT ILLNESS:   Patient was resting comfortably with no new complaints or concerns.  He remains pleasantly confused and unable to clearly communicate verbally.  Chronic medical issues were reviewed.    PAST MEDICAL & SURGICAL HISTORY:   Past Medical History:   Diagnosis Date   • Arthritis    • Cancer (CMS/HCC)     SKIN    • Cataract    • Coronary artery disease    • Diabetes mellitus (CMS/HCC)    • High cholesterol    • [place] (hard of hearing)     PATIENT HAS HEARING AIDS   • Hypertension    • Irregular heart beat     HISTORY OF CARDIAC ABLATION IN 2003   • Wears dentures     FULL UPPER PLATE      Past Surgical History:   Procedure Laterality Date   • BACK SURGERY  1978    THEN AGAIN IN 1982   • CARDIAC ABLATION  2003   • CARDIAC SURGERY     • CATARACT EXTRACTION W/ INTRAOCULAR LENS IMPLANT Left 5/24/2017    Procedure: CATARACT PHACO EXTRACTION WITH INTRAOCULAR LENS IMPLANT LEFT WITH TORIC LENS;  Surgeon: Alma Benavidez MD;  Location: [facility] OR;  Service:    • CATARACT EXTRACTION W/ INTRAOCULAR LENS IMPLANT Right 6/14/2017    Procedure: CATARACT PHACO EXTRACTION WITH INTRAOCULAR LENS IMPLANT RIGHT WITH TORIC LENS;  Surgeon: Alma Benavidez MD;  Location: [facility] OR;  Service:    • CORONARY ARTERY  BYPASS GRAFT      SPOUSE UNSURE OF HOW MANY VESSELS   • HIATAL HERNIA REPAIR     • JOINT REPLACEMENT Left     HIP - DONE BY DR DALAL   • KNEE ARTHROSCOPY Left    • NOSE SURGERY  1970    SPOUSE REPORTS FOR A BROKEN NOSE   • OTHER SURGICAL HISTORY Left     TENDON TORN LOOSE FROM BONE, LEFT ELBOW   • OTHER SURGICAL HISTORY      RUPTURED DISC   • OTHER SURGICAL HISTORY      NECK SURGERY FOR RUPTURED DISC   • OTHER SURGICAL HISTORY      HAD SECOND NECK SURGERY   • OTHER SURGICAL HISTORY      RUPTURED DISC   • OTHER SURGICAL HISTORY      RUPTURED DISC   • OTHER SURGICAL HISTORY      HARDWARE REMOVAL FROM BACK BY DR HAY         MEDICATIONS:  I have reviewed and reconciled the patients medication list in the patients chart at the skilled nursing facility today.      ALLERGIES:  Allergies   Allergen Reactions   • Phenergan [Promethazine Hcl] Nausea And Vomiting         SOCIAL HISTORY:  Social History     Socioeconomic History   • Marital status:      Spouse name: Not on file   • Number of children: Not on file   • Years of education: Not on file   • Highest education level: Not on file   Tobacco Use   • Smoking status: Former Smoker     Types: Cigarettes     Last attempt to quit:      Years since quittin.5   • Smokeless tobacco: Never Used   Substance and Sexual Activity   • Alcohol use: No   • Drug use: No   • Sexual activity: Defer       FAMILY HISTORY:  No family history on file.    REVIEW OF SYSTEMS:  Review of Systems   Unable to perform ROS: Dementia          PHYSICAL EXAMINATION:     VITAL SIGNS:  /74   Pulse 76   Temp 97 °F (36.1 °C)   Resp 18   Wt 57.2 kg (126 lb)   SpO2 96%   BMI 19.16 kg/m²     Physical Exam   Constitutional: He is oriented to person, place, and time.   Frail elderly male bedridden not very interactive.   HENT:   Head: Normocephalic and atraumatic.   Eyes: Conjunctivae are normal.   Neck: Normal range of motion. Neck supple.    Pulmonary/Chest: Effort normal.   Musculoskeletal: Normal range of motion.   Neurological: He is alert and oriented to person, place, and time.   Skin: No rash noted.   Psychiatric: He has a normal mood and affect. His behavior is normal.   Nursing note and vitals reviewed.       RECORDS REVIEW:   `    ASSESSMENT     Diagnoses and all orders for this visit:    Type 2 diabetes mellitus with diabetic polyneuropathy, without long-term current use of insulin (CMS/[facility])    Coronary artery disease involving native heart with angina pectoris, unspecified vessel or lesion type (CMS/[facility])    Dementia due to Parkinson's disease with behavioral disturbance (CMS/[facility])    Type 2 diabetes mellitus without complication, without long-term current use of insulin (CMS/[facility])    Essential hypertension    High cholesterol    Late onset Alzheimer's disease without behavioral disturbance (CMS/[facility])        PLAN  79-year-old white male admitted to the [facility] for rehab following hospitalization for encephalopathy.    Dementia due to Parkinson's disease  - Mood and behaviors remain stable on current medication regimen., continue Sinemet. does not appear that medications are over sedating at this point.    Coccyx wound  -Present on admission.  Continue wound care per nursing staff.    Coronary artery disease/irregular heartbeat  - stable on current cardiac medications.  No changes needed.    Type 2 diabetes mellitus  Labs are stable.  Last A1c was 6.1.  Patient is on metformin and tolerating this well..    [x]  Discussed Patient in detail with nursing/staff, addressed all needs today.     [x]  Plan of Care Reviewed   [x]  PT/OT Reviewed   []  Order Changes  []  Discharge Plans Reviewed  [x]  Advance Directive on file with Nursing Home.   [x]  POA on file with Nursing Home.    [x]  Code Status listed and reviewed.          Alex Burk DO.  7/27/2020

## 2020-08-04 ENCOUNTER — NURSING HOME (OUTPATIENT)
Dept: FAMILY MEDICINE CLINIC | Facility: CLINIC | Age: 80
End: 2020-08-04

## 2020-08-04 VITALS
RESPIRATION RATE: 18 BRPM | OXYGEN SATURATION: 95 % | BODY MASS INDEX: 19.16 KG/M2 | TEMPERATURE: 98.2 F | HEART RATE: 68 BPM | DIASTOLIC BLOOD PRESSURE: 78 MMHG | WEIGHT: 126 LBS | SYSTOLIC BLOOD PRESSURE: 126 MMHG

## 2020-08-04 DIAGNOSIS — M79.642 LEFT HAND PAIN: ICD-10-CM

## 2020-08-04 DIAGNOSIS — Z78.9 IMPAIRED MOBILITY AND ADLS: ICD-10-CM

## 2020-08-04 DIAGNOSIS — M25.512 ACUTE PAIN OF LEFT SHOULDER: Primary | ICD-10-CM

## 2020-08-04 DIAGNOSIS — G20 DEMENTIA DUE TO PARKINSON'S DISEASE WITH BEHAVIORAL DISTURBANCE (HCC): Chronic | ICD-10-CM

## 2020-08-04 DIAGNOSIS — F02.818 DEMENTIA DUE TO PARKINSON'S DISEASE WITH BEHAVIORAL DISTURBANCE (HCC): Chronic | ICD-10-CM

## 2020-08-04 DIAGNOSIS — Z74.09 IMPAIRED MOBILITY AND ADLS: ICD-10-CM

## 2020-08-04 PROCEDURE — 99309 SBSQ NF CARE MODERATE MDM 30: CPT | Performed by: NURSE PRACTITIONER

## 2020-08-05 PROBLEM — G93.40 ACUTE ENCEPHALOPATHY: Status: RESOLVED | Noted: 2020-04-04 | Resolved: 2020-08-05

## 2020-08-05 PROBLEM — E83.42 HYPOMAGNESEMIA: Status: RESOLVED | Noted: 2020-04-04 | Resolved: 2020-08-05

## 2020-08-05 PROBLEM — M62.82 RHABDOMYOLYSIS: Status: RESOLVED | Noted: 2020-04-04 | Resolved: 2020-08-05

## 2020-08-06 NOTE — PROGRESS NOTES
Telemedicine nursing Home Progress Note        Mitul Stephen DO []  ALESSANDRA Ho [x]  [address] [place] [address]  Phone: [phone]  Fax: [phone] Inés Vega MD []  ALESSANDRA Ho [x]   [address] [place] [address]  Phone: [phone]  Fax: [phone]     PATIENT NAME: Gama Davila                                                                          YOB: 1940           DATE OF SERVICE: 08/4/2020  FACILITY:  [] [place]   [] [facility]   [] [facility]   [x] [facility]  []  [facility]  [] Other     ______________________________________________________________________     CHIEF COMPLAINT:    Right shoulder and hand pain    HISTORY OF PRESENT ILLNESS:   Per nursing PT reported that patient has had signs and symptoms of pain, when trying to work with his right hand. He tries to jerk it away. Nursing reports that patient has been trying to hit at them, with patient care, when they have to turn him in the bed. They asked him about pain anywhere else, and her reported pain in his right shoulder.     PAST MEDICAL & SURGICAL HISTORY:   Past Medical History:   Diagnosis Date   • Arthritis    • Cancer (CMS/HCC)     SKIN    • Cataract    • Coronary artery disease    • Diabetes mellitus (CMS/HCC)    • High cholesterol    • [place] (hard of hearing)     PATIENT HAS HEARING AIDS   • Hypertension    • Irregular heart beat     HISTORY OF CARDIAC ABLATION IN 2003   • Wears dentures     FULL UPPER PLATE      Past Surgical History:   Procedure Laterality Date   • BACK SURGERY  1978    THEN AGAIN IN 1982   • CARDIAC ABLATION  2003   • CARDIAC SURGERY     • CATARACT EXTRACTION W/ INTRAOCULAR LENS IMPLANT Left 5/24/2017    Procedure: CATARACT PHACO EXTRACTION WITH INTRAOCULAR LENS IMPLANT LEFT WITH TORIC LENS;  Surgeon: Alma Benavidez MD;  Location: [facility];  Service:    • CATARACT EXTRACTION W/ INTRAOCULAR LENS IMPLANT Right 6/14/2017     Procedure: CATARACT PHACO EXTRACTION WITH INTRAOCULAR LENS IMPLANT RIGHT WITH TORIC LENS;  Surgeon: Alma Benavidez MD;  Location: [facility];  Service:    • CORONARY ARTERY BYPASS GRAFT      SPOUSE UNSURE OF HOW MANY VESSELS   • HIATAL HERNIA REPAIR     • JOINT REPLACEMENT Left     HIP - DONE BY DR DALAL   • KNEE ARTHROSCOPY Left    • NOSE SURGERY      SPOUSE REPORTS FOR A BROKEN NOSE   • OTHER SURGICAL HISTORY Left     TENDON TORN LOOSE FROM BONE, LEFT ELBOW   • OTHER SURGICAL HISTORY      RUPTURED DISC   • OTHER SURGICAL HISTORY      NECK SURGERY FOR RUPTURED DISC   • OTHER SURGICAL HISTORY      HAD SECOND NECK SURGERY   • OTHER SURGICAL HISTORY      RUPTURED DISC   • OTHER SURGICAL HISTORY      RUPTURED DISC   • OTHER SURGICAL HISTORY      HARDWARE REMOVAL FROM BACK BY DR HAY         MEDICATIONS:  I have reviewed and reconciled the patients medication list in the patients chart at the skilled nursing facility today.      ALLERGIES:  Allergies   Allergen Reactions   • Phenergan [Promethazine Hcl] Nausea And Vomiting         SOCIAL HISTORY:  Social History     Socioeconomic History   • Marital status:      Spouse name: Not on file   • Number of children: Not on file   • Years of education: Not on file   • Highest education level: Not on file   Tobacco Use   • Smoking status: Former Smoker     Types: Cigarettes     Last attempt to quit:      Years since quittin.6   • Smokeless tobacco: Never Used   Substance and Sexual Activity   • Alcohol use: No   • Drug use: No   • Sexual activity: Defer       FAMILY HISTORY:  No family history on file.    REVIEW OF SYSTEMS:  Review of Systems   Unable to perform ROS: Dementia (ROS per nursing and patient)   Constitutional: Negative.    HENT: Negative for drooling, facial swelling, mouth sores, rhinorrhea and trouble swallowing.    Eyes: Negative for discharge and redness.   Respiratory: Negative for cough.     Cardiovascular: Negative.  Negative for chest pain and palpitations.   Gastrointestinal: Negative for abdominal distention, anal bleeding, blood in stool, constipation, diarrhea and vomiting.   Genitourinary: Negative for difficulty urinating, discharge, frequency, penile swelling and scrotal swelling.        Incontinence   Musculoskeletal: Positive for arthralgias (right shoulder and hand pain).        Muscles stiffness and spasms improved    contractures   Skin: Negative.    Neurological: Positive for speech difficulty. Negative for weakness.   Hematological: Negative for adenopathy.   Psychiatric/Behavioral: Negative for confusion and suicidal ideas. The patient is not nervous/anxious.           PHYSICAL EXAMINATION:     VITAL SIGNS:  /78   Pulse 68   Temp 98.2 °F (36.8 °C)   Resp 18   Wt 57.2 kg (126 lb)   SpO2 95% Comment: RA  BMI 19.16 kg/m²     Physical Exam   Constitutional: He is oriented to person, place, and time. He is not intubated.   Frail elderly male bedridden not very interactive.   HENT:   Head: Normocephalic and atraumatic.   Eyes: Conjunctivae are normal.   Neck: Normal range of motion. Neck supple.   Pulmonary/Chest: Effort normal and breath sounds normal. He is not intubated.   Musculoskeletal:        Right shoulder: He exhibits decreased range of motion, tenderness and pain.        Right hand: He exhibits decreased range of motion and tenderness.   Chronic NM defs, and contractures, s/t Parkinson's   Neurological: He is alert and oriented to person, place, and time.   Skin: No rash noted.   Psychiatric: He has a normal mood and affect. His behavior is normal.   Nursing note and vitals reviewed.      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 Give Gabapentin 100 mg qhs. Will c/t monitor and adjust medications accordingly.    Dementia/Parkinson's  -Stable at this time, with no acute behavior or n/m changes. Will c/t monitor.     Nursing encouraged to keep me informed of any acute changes, lack of improvement, or any new concerning symptoms.    Staff to continue supportive care for all ADLs.     [x]  Discussed Patient in detail with nursing/staff, addressed all needs today.     [x]  Plan of Care Reviewed   [x]  PT/OT Reviewed   [x]  Order Changes  []  Discharge Plans Reviewed  [x]  Advance Directive on file with Nursing Home.   [x]  POA on file with Nursing Home.    [x]  Code Status listed and reviewed.       “I confirm accuracy of unchanged data/findings which have been carried forward from previous visit, as well as I have updated appropriately those that have changed.”                     Charleen Randall, APRN.  8/5/2020

## 2020-08-07 ENCOUNTER — NURSING HOME (OUTPATIENT)
Dept: FAMILY MEDICINE CLINIC | Facility: CLINIC | Age: 80
End: 2020-08-07

## 2020-08-07 VITALS
BODY MASS INDEX: 18.25 KG/M2 | HEART RATE: 76 BPM | TEMPERATURE: 98.6 F | RESPIRATION RATE: 18 BRPM | DIASTOLIC BLOOD PRESSURE: 76 MMHG | SYSTOLIC BLOOD PRESSURE: 120 MMHG | OXYGEN SATURATION: 95 % | WEIGHT: 120 LBS

## 2020-08-07 DIAGNOSIS — Z78.9 IMPAIRED MOBILITY AND ADLS: ICD-10-CM

## 2020-08-07 DIAGNOSIS — M79.642 LEFT HAND PAIN: ICD-10-CM

## 2020-08-07 DIAGNOSIS — G20 PARKINSON'S DISEASE (TREMOR, STIFFNESS, SLOW MOTION, UNSTABLE POSTURE) (HCC): ICD-10-CM

## 2020-08-07 DIAGNOSIS — R11.0 NAUSEA: Primary | ICD-10-CM

## 2020-08-07 DIAGNOSIS — Z91.89 AT HIGH RISK FOR CONSTIPATION: ICD-10-CM

## 2020-08-07 DIAGNOSIS — M25.511 ACUTE PAIN OF RIGHT SHOULDER: Primary | ICD-10-CM

## 2020-08-07 DIAGNOSIS — Z74.09 IMPAIRED MOBILITY AND ADLS: ICD-10-CM

## 2020-08-07 DIAGNOSIS — M25.512 ACUTE PAIN OF LEFT SHOULDER: ICD-10-CM

## 2020-08-07 PROCEDURE — 99309 SBSQ NF CARE MODERATE MDM 30: CPT | Performed by: NURSE PRACTITIONER

## 2020-08-07 RX ORDER — GABAPENTIN 100 MG/1
100 CAPSULE ORAL NIGHTLY
Qty: 30 CAPSULE | Refills: 5 | Status: SHIPPED | OUTPATIENT
Start: 2020-08-07 | End: 2020-09-06

## 2020-08-09 NOTE — PROGRESS NOTES
Telemedicine nursing Home Progress Note        Mitul Stephen DO []  ALESSANDRA Ho [x]  [address] [place] [address]  Phone: [phone]  Fax: [phone] Inés Vega MD []  ALESSANDRA Ho [x]   [address] [place] [address]  Phone: [phone]  Fax: [phone]     PATIENT NAME: Gama Davila                                                                          YOB: 1940           DATE OF SERVICE: 08/7/2020  FACILITY:  [] [place]   [] [place]   [] [facility]   [x] [facility]  []  [facility]  [] Other     ______________________________________________________________________     CHIEF COMPLAINT:    Nausea    HISTORY OF PRESENT ILLNESS:   Per nursing, patient has had complaints of nausea since earlier today. He has not vomited, just reports he is nauseated and does not feel well. Patient agrees to this, when asked during visit.     PAST MEDICAL & SURGICAL HISTORY:   Past Medical History:   Diagnosis Date   • Arthritis    • Cancer (CMS/HCC)     SKIN    • Cataract    • Coronary artery disease    • Diabetes mellitus (CMS/HCC)    • High cholesterol    • [place] (hard of hearing)     PATIENT HAS HEARING AIDS   • Hypertension    • Irregular heart beat     HISTORY OF CARDIAC ABLATION IN 2003   • Wears dentures     FULL UPPER PLATE      Past Surgical History:   Procedure Laterality Date   • BACK SURGERY  1978    THEN AGAIN IN 1982   • CARDIAC ABLATION  2003   • CARDIAC SURGERY     • CATARACT EXTRACTION W/ INTRAOCULAR LENS IMPLANT Left 5/24/2017    Procedure: CATARACT PHACO EXTRACTION WITH INTRAOCULAR LENS IMPLANT LEFT WITH TORIC LENS;  Surgeon: Alma Benavidez MD;  Location: [facility] OR;  Service:    • CATARACT EXTRACTION W/ INTRAOCULAR LENS IMPLANT Right 6/14/2017    Procedure: CATARACT PHACO EXTRACTION WITH INTRAOCULAR LENS IMPLANT RIGHT WITH TORIC LENS;  Surgeon: Alma Benavidez MD;  Location: [facility] OR;  Service:    •  CORONARY ARTERY BYPASS GRAFT      SPOUSE UNSURE OF HOW MANY VESSELS   • HIATAL HERNIA REPAIR     • JOINT REPLACEMENT Left     HIP - DONE BY DR DALAL   • KNEE ARTHROSCOPY Left    • NOSE SURGERY  1970    SPOUSE REPORTS FOR A BROKEN NOSE   • OTHER SURGICAL HISTORY Left     TENDON TORN LOOSE FROM BONE, LEFT ELBOW   • OTHER SURGICAL HISTORY      RUPTURED DISC   • OTHER SURGICAL HISTORY      NECK SURGERY FOR RUPTURED DISC   • OTHER SURGICAL HISTORY      HAD SECOND NECK SURGERY   • OTHER SURGICAL HISTORY      RUPTURED DISC   • OTHER SURGICAL HISTORY      RUPTURED DISC   • OTHER SURGICAL HISTORY      HARDWARE REMOVAL FROM BACK BY DR HAY         MEDICATIONS:  I have reviewed and reconciled the patients medication list in the patients chart at the [facility] nursing facility today.      ALLERGIES:  Allergies   Allergen Reactions   • Phenergan [Promethazine Hcl] Nausea And Vomiting         SOCIAL HISTORY:  Social History     Socioeconomic History   • Marital status:      Spouse name: Not on file   • Number of children: Not on file   • Years of education: Not on file   • Highest education level: Not on file   Tobacco Use   • Smoking status: Former Smoker     Types: Cigarettes     Last attempt to quit:      Years since quittin.6   • Smokeless tobacco: Never Used   Substance and Sexual Activity   • Alcohol use: No   • Drug use: No   • Sexual activity: Defer       FAMILY HISTORY:  No family history on file.    REVIEW OF SYSTEMS:  Review of Systems   Unable to perform ROS: Dementia (ROS per nursing and patient)   Constitutional: Negative.    HENT: Negative for drooling, facial swelling, mouth sores, rhinorrhea and trouble swallowing.    Eyes: Negative for discharge and redness.   Respiratory: Negative for cough.    Cardiovascular: Negative.  Negative for chest pain and palpitations.   Gastrointestinal: Positive for nausea. Negative for abdominal distention, anal bleeding, blood in  stool, constipation, diarrhea and vomiting.   Genitourinary: Negative for difficulty urinating, discharge, frequency, penile swelling and scrotal swelling.        Incontinence   Musculoskeletal: Positive for arthralgias (right shoulder and hand pain).        Muscles stiffness and spasms improved    contractures   Skin: Negative.    Neurological: Positive for speech difficulty. Negative for weakness.   Hematological: Negative for adenopathy.   Psychiatric/Behavioral: Negative for confusion and suicidal ideas. The patient is not nervous/anxious.           PHYSICAL EXAMINATION:     VITAL SIGNS:  /76   Pulse 76   Temp 98.6 °F (37 °C)   Resp 18   Wt 54.4 kg (120 lb)   SpO2 95%   BMI 18.25 kg/m²     Physical Exam   Constitutional: He is oriented to person, place, and time. He is not intubated.   Frail elderly male bedridden not very interactive.   HENT:   Head: Normocephalic and atraumatic.   Eyes: Conjunctivae are normal.   Neck: Normal range of motion. Neck supple.   Pulmonary/Chest: Effort normal and breath sounds normal. He is not intubated.   Musculoskeletal:        Right shoulder: He exhibits decreased range of motion, tenderness and pain.        Right hand: He exhibits decreased range of motion and tenderness.   Chronic NM defs, and contractures, s/t Parkinson's   Neurological: He is alert and oriented to person, place, and time.   Skin: No rash noted.   Psychiatric: He has a normal mood and affect. His behavior is normal.   Nursing note and vitals reviewed.      RECORDS REVIEW:   Most recent labs    ASSESSMENT     Diagnoses and all orders for this visit:    Nausea    At high risk for constipation    Parkinson's disease (tremor, stiffness, slow motion, unstable posture) (CMS/HCC)    Impaired mobility and ADLs        PLAN    Nausea/Risk for constipation  -Give Zofran 4 mg po q 6 hours prn nausea. If unable to tolerate, ok to give Phenergan suppository 25 mg q 6 hours prn. Nursing to evaluate for  constipation, since he is at increased risk for this. If he is constipated, follow bowel care regimen.     Dementia/Parkinson's  -Stable at this time, with no acute behavior or n/m changes. Will c/t monitor.     Nursing encouraged to keep me informed of any acute changes, lack of improvement, or any new concerning symptoms.    Staff to continue supportive care for all ADLs.     [x]  Discussed Patient in detail with nursing/staff, addressed all needs today.     [x]  Plan of Care Reviewed   [x]  PT/OT Reviewed   [x]  Order Changes  []  Discharge Plans Reviewed  [x]  Advance Directive on file with Nursing Home.   [x]  POA on file with Nursing Home.    [x]  Code Status listed and reviewed.           “I confirm accuracy of unchanged data/findings which have been carried forward from previous visit, as well as I have updated appropriately those that have changed.”                   Charleen Randall, APRN.  8/9/2020

## 2020-08-12 ENCOUNTER — NURSING HOME (OUTPATIENT)
Dept: FAMILY MEDICINE CLINIC | Facility: CLINIC | Age: 80
End: 2020-08-12

## 2020-08-12 VITALS
RESPIRATION RATE: 18 BRPM | OXYGEN SATURATION: 96 % | HEART RATE: 84 BPM | SYSTOLIC BLOOD PRESSURE: 138 MMHG | BODY MASS INDEX: 18.25 KG/M2 | DIASTOLIC BLOOD PRESSURE: 78 MMHG | TEMPERATURE: 97.2 F | WEIGHT: 120 LBS

## 2020-08-12 DIAGNOSIS — Z91.89 AT RISK FOR ASPIRATION: ICD-10-CM

## 2020-08-12 DIAGNOSIS — R05.9 COUGH: ICD-10-CM

## 2020-08-12 DIAGNOSIS — G20 DEMENTIA DUE TO PARKINSON'S DISEASE WITH BEHAVIORAL DISTURBANCE (HCC): Chronic | ICD-10-CM

## 2020-08-12 DIAGNOSIS — Z74.09 IMPAIRED MOBILITY AND ADLS: ICD-10-CM

## 2020-08-12 DIAGNOSIS — F02.818 DEMENTIA DUE TO PARKINSON'S DISEASE WITH BEHAVIORAL DISTURBANCE (HCC): Chronic | ICD-10-CM

## 2020-08-12 DIAGNOSIS — Z78.9 IMPAIRED MOBILITY AND ADLS: ICD-10-CM

## 2020-08-12 PROCEDURE — 99309 SBSQ NF CARE MODERATE MDM 30: CPT | Performed by: NURSE PRACTITIONER

## 2020-08-13 NOTE — PROGRESS NOTES
Telemedicine nursing Home Progress Note        Mitul Stephen DO []  ALESSANDRA Ho [x]  [address] [place] [address]  Phone: [phone]  Fax: [phone] Inés Vega MD []  ALESSANDRA Ho [x]   [address] [place] [address]  Phone: [phone]  Fax: [phone]     PATIENT NAME: Gama Davila                                                                          YOB: 1940           DATE OF SERVICE: 08/12/2020  FACILITY:  [] [place]   [] [place]   [] [facility]   [x] [facility]  []  [facility]  [] Other     ______________________________________________________________________     CHIEF COMPLAINT:    Cough and congestion    HISTORY OF PRESENT ILLNESS:   Per nursing, patient has cough and congestion today and they are concerned because he had some episodes of vomiting yesterday.  They are concerned about aspiration pneumonia.  His vital signs have been stable, no fever,or  increased O2 demand or increased work of breathing.    PAST MEDICAL & SURGICAL HISTORY:   Past Medical History:   Diagnosis Date   • Arthritis    • Cancer (CMS/HCC)     SKIN    • Cataract    • Coronary artery disease    • Diabetes mellitus (CMS/HCC)    • High cholesterol    • [place] (hard of hearing)     PATIENT HAS HEARING AIDS   • Hypertension    • Irregular heart beat     HISTORY OF CARDIAC ABLATION IN 2003   • Wears dentures     FULL UPPER PLATE      Past Surgical History:   Procedure Laterality Date   • BACK SURGERY  1978    THEN AGAIN IN 1982   • CARDIAC ABLATION  2003   • CARDIAC SURGERY     • CATARACT EXTRACTION W/ INTRAOCULAR LENS IMPLANT Left 5/24/2017    Procedure: CATARACT PHACO EXTRACTION WITH INTRAOCULAR LENS IMPLANT LEFT WITH TORIC LENS;  Surgeon: Alma Benavidez MD;  Location: [facility];  Service:    • CATARACT EXTRACTION W/ INTRAOCULAR LENS IMPLANT Right 6/14/2017    Procedure: CATARACT PHACO EXTRACTION WITH INTRAOCULAR LENS IMPLANT RIGHT WITH  TORIC LENS;  Surgeon: Alma Benavidez MD;  Location: [facility];  Service:    • CORONARY ARTERY BYPASS GRAFT      SPOUSE UNSURE OF HOW MANY VESSELS   • HIATAL HERNIA REPAIR     • JOINT REPLACEMENT Left     HIP - DONE BY DR DALAL   • KNEE ARTHROSCOPY Left    • NOSE SURGERY      SPOUSE REPORTS FOR A BROKEN NOSE   • OTHER SURGICAL HISTORY Left     TENDON TORN LOOSE FROM BONE, LEFT ELBOW   • OTHER SURGICAL HISTORY      RUPTURED DISC   • OTHER SURGICAL HISTORY      NECK SURGERY FOR RUPTURED DISC   • OTHER SURGICAL HISTORY      HAD SECOND NECK SURGERY   • OTHER SURGICAL HISTORY      RUPTURED DISC   • OTHER SURGICAL HISTORY      RUPTURED DISC   • OTHER SURGICAL HISTORY      HARDWARE REMOVAL FROM BACK BY DR HAY         MEDICATIONS:  I have reviewed and reconciled the patients medication list in the patients chart at the skilled nursing facility today.      ALLERGIES:  Allergies   Allergen Reactions   • Phenergan [Promethazine Hcl] Nausea And Vomiting         SOCIAL HISTORY:  Social History     Socioeconomic History   • Marital status:      Spouse name: Not on file   • Number of children: Not on file   • Years of education: Not on file   • Highest education level: Not on file   Tobacco Use   • Smoking status: Former Smoker     Types: Cigarettes     Last attempt to quit:      Years since quittin.6   • Smokeless tobacco: Never Used   Substance and Sexual Activity   • Alcohol use: No   • Drug use: No   • Sexual activity: Defer       FAMILY HISTORY:  No family history on file.    REVIEW OF SYSTEMS:  Review of Systems   Unable to perform ROS: Dementia (ROS per nursing and patient)   Constitutional: Negative.    HENT: Negative for drooling, facial swelling, mouth sores, rhinorrhea and trouble swallowing.    Eyes: Negative for discharge and redness.   Respiratory: Positive for cough.    Cardiovascular: Negative.  Negative for chest pain and palpitations.    Gastrointestinal: Negative for abdominal distention, anal bleeding, blood in stool, constipation, diarrhea, nausea and vomiting.   Genitourinary: Negative for difficulty urinating, discharge, frequency, penile swelling and scrotal swelling.        Incontinence   Musculoskeletal: Positive for arthralgias (right shoulder and hand pain).        Muscles stiffness and spasms improved    contractures   Skin: Negative.    Neurological: Positive for speech difficulty. Negative for weakness.   Hematological: Negative for adenopathy.   Psychiatric/Behavioral: Negative for confusion and suicidal ideas. The patient is not nervous/anxious.           PHYSICAL EXAMINATION:     VITAL SIGNS:  /78   Pulse 84   Temp 97.2 °F (36.2 °C)   Resp 18   Wt 54.4 kg (120 lb)   SpO2 96%   BMI 18.25 kg/m²     Physical Exam   Constitutional: He is oriented to person, place, and time. He is not intubated.   Frail elderly male bedridden not very interactive.   HENT:   Head: Normocephalic and atraumatic.   Eyes: Conjunctivae are normal.   Neck: Normal range of motion. Neck supple.   Pulmonary/Chest: Effort normal. He is not intubated. He has rhonchi in the right upper field and the left upper field.   Musculoskeletal:        Right shoulder: He exhibits decreased range of motion, tenderness and pain.        Right hand: He exhibits decreased range of motion and tenderness.   Chronic NM defs, and contractures, s/t Parkinson's   Neurological: He is alert and oriented to person, place, and time.   Skin: No rash noted.   Psychiatric: He has a normal mood and affect. His behavior is normal.   Nursing note and vitals reviewed.      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  We will follow-up with results.    Dementia/Parkinson's  -Stable at this time, with no acute behavior or n/m changes. Will c/t monitor.     Nursing encouraged to keep me informed of any acute changes, lack of improvement, or any new concerning symptoms.    Staff to continue supportive care for all ADLs.     [x]  Discussed Patient in detail with nursing/staff, addressed all needs today.     [x]  Plan of Care Reviewed   [x]  PT/OT Reviewed   [x]  Order Changes  []  Discharge Plans Reviewed  [x]  Advance Directive on file with Nursing Home.   [x]  POA on file with Nursing Home.    [x]  Code Status listed and reviewed.           “I confirm accuracy of unchanged data/findings which have been carried forward from previous visit, as well as I have updated appropriately those that have changed.”                     Charleen Randall, APRN.  8/12/2020

## 2020-08-24 ENCOUNTER — NURSING HOME (OUTPATIENT)
Dept: FAMILY MEDICINE CLINIC | Facility: CLINIC | Age: 80
End: 2020-08-24

## 2020-08-24 VITALS
SYSTOLIC BLOOD PRESSURE: 138 MMHG | BODY MASS INDEX: 18.85 KG/M2 | HEART RATE: 58 BPM | TEMPERATURE: 98 F | RESPIRATION RATE: 18 BRPM | DIASTOLIC BLOOD PRESSURE: 74 MMHG | OXYGEN SATURATION: 95 % | WEIGHT: 124 LBS

## 2020-08-24 DIAGNOSIS — G20 DEMENTIA DUE TO PARKINSON'S DISEASE WITHOUT BEHAVIORAL DISTURBANCE (HCC): ICD-10-CM

## 2020-08-24 DIAGNOSIS — G20 PARKINSON'S DISEASE (TREMOR, STIFFNESS, SLOW MOTION, UNSTABLE POSTURE) (HCC): Primary | ICD-10-CM

## 2020-08-24 DIAGNOSIS — F02.80 DEMENTIA DUE TO PARKINSON'S DISEASE WITHOUT BEHAVIORAL DISTURBANCE (HCC): ICD-10-CM

## 2020-08-24 DIAGNOSIS — Z78.9 IMPAIRED MOBILITY AND ADLS: ICD-10-CM

## 2020-08-24 DIAGNOSIS — R53.1 WEAKNESS: ICD-10-CM

## 2020-08-24 DIAGNOSIS — Z74.09 IMPAIRED MOBILITY AND ADLS: ICD-10-CM
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
## 2020-08-24 PROCEDURE — 99309 SBSQ NF CARE MODERATE MDM 30: CPT | Performed by: NURSE PRACTITIONER

## 2020-08-27 ENCOUNTER — NURSING HOME (OUTPATIENT)
Dept: INTERNAL MEDICINE | Facility: CLINIC | Age: 80
End: 2020-08-27

## 2020-08-27 VITALS
HEART RATE: 68 BPM | TEMPERATURE: 97.8 F | WEIGHT: 117 LBS | OXYGEN SATURATION: 97 % | RESPIRATION RATE: 18 BRPM | SYSTOLIC BLOOD PRESSURE: 118 MMHG | BODY MASS INDEX: 17.79 KG/M2 | DIASTOLIC BLOOD PRESSURE: 70 MMHG

## 2020-08-27 DIAGNOSIS — G30.1 LATE ONSET ALZHEIMER'S DISEASE WITHOUT BEHAVIORAL DISTURBANCE (HCC): ICD-10-CM

## 2020-08-27 DIAGNOSIS — G20 DEMENTIA DUE TO PARKINSON'S DISEASE WITH BEHAVIORAL DISTURBANCE (HCC): ICD-10-CM

## 2020-08-27 DIAGNOSIS — E11.42 TYPE 2 DIABETES MELLITUS WITH DIABETIC POLYNEUROPATHY, WITHOUT LONG-TERM CURRENT USE OF INSULIN (HCC): Primary | ICD-10-CM

## 2020-08-27 DIAGNOSIS — I10 ESSENTIAL HYPERTENSION: ICD-10-CM

## 2020-08-27 DIAGNOSIS — E78.00 HIGH CHOLESTEROL: ICD-10-CM

## 2020-08-27 DIAGNOSIS — I25.119 CORONARY ARTERY DISEASE INVOLVING NATIVE HEART WITH ANGINA PECTORIS, UNSPECIFIED VESSEL OR LESION TYPE (HCC): ICD-10-CM

## 2020-08-27 DIAGNOSIS — F02.818 DEMENTIA DUE TO PARKINSON'S DISEASE WITH BEHAVIORAL DISTURBANCE (HCC): ICD-10-CM
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
## 2020-08-27 DIAGNOSIS — F02.80 LATE ONSET ALZHEIMER'S DISEASE WITHOUT BEHAVIORAL DISTURBANCE (HCC): ICD-10-CM

## 2020-08-27 DIAGNOSIS — E11.9 TYPE 2 DIABETES MELLITUS WITHOUT COMPLICATION, WITHOUT LONG-TERM CURRENT USE OF INSULIN (HCC): ICD-10-CM

## 2020-08-27 PROCEDURE — 99308 SBSQ NF CARE LOW MDM 20: CPT | Performed by: INTERNAL MEDICINE

## 2020-08-30 ENCOUNTER — APPOINTMENT (OUTPATIENT)
Dept: CT IMAGING | Facility: HOSPITAL | Age: 80
End: 2020-08-30

## 2020-08-30 ENCOUNTER — APPOINTMENT (OUTPATIENT)
Dept: GENERAL RADIOLOGY | Facility: HOSPITAL | Age: 80
End: 2020-08-30

## 2020-08-30 ENCOUNTER — HOSPITAL ENCOUNTER (EMERGENCY)
Facility: HOSPITAL | Age: 80
Discharge: NURSING, REHAB OR LONG-TERM CARE | End: 2020-08-30
Attending: EMERGENCY MEDICINE | Admitting: EMERGENCY MEDICINE

## 2020-08-30 VITALS
SYSTOLIC BLOOD PRESSURE: 147 MMHG | TEMPERATURE: 98.5 F | RESPIRATION RATE: 16 BRPM | HEART RATE: 63 BPM | OXYGEN SATURATION: 98 % | BODY MASS INDEX: 18.19 KG/M2 | WEIGHT: 120 LBS | HEIGHT: 68 IN | DIASTOLIC BLOOD PRESSURE: 80 MMHG

## 2020-08-30 DIAGNOSIS — S50.00XA CONTUSION OF ELBOW, UNSPECIFIED LATERALITY, INITIAL ENCOUNTER: ICD-10-CM
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
## 2020-08-30 PROCEDURE — 72125 CT NECK SPINE W/O DYE: CPT

## 2020-08-30 PROCEDURE — 73080 X-RAY EXAM OF ELBOW: CPT

## 2020-08-30 PROCEDURE — 70450 CT HEAD/BRAIN W/O DYE: CPT

## 2020-08-30 PROCEDURE — 99283 EMERGENCY DEPT VISIT LOW MDM: CPT

## 2020-09-01 NOTE — PROGRESS NOTES
Nursing Home Progress Note        Mitul Stephen DO []  ALESSANDRA Ho []  [address] [place] [address]  Phone: [phone]  Fax: [phone] Inés Vega MD []  Alex Burk DO [x]   [address] [place] [address]  Phone: [phone]  Fax: [phone]     PATIENT NAME: Gama Davila                                                                          YOB: 1940           DATE OF SERVICE: 08/27/2020   FACILITY:  [] [facility]   [] [facility]   [] [facility]   [x] [facility]  []  [facility]  [] Other     ______________________________________________________________________     CHIEF COMPLAINT:  Chronic medical management      HISTORY OF PRESENT ILLNESS:   Patient was resting comfortably.  Nurses reported concerns about frequent falls off of his wheelchair.  Cushion was exchanged and has been better at preventing falls.  He remains pleasantly confused and unable to clearly communicate verbally.  Chronic medical issues were reviewed.    PAST MEDICAL & SURGICAL HISTORY:   Past Medical History:   Diagnosis Date   • Arthritis    • Cancer (CMS/HCC)     SKIN    • Cataract    • Coronary artery disease    • Diabetes mellitus (CMS/HCC)    • High cholesterol    • [place] (hard of hearing)     PATIENT HAS HEARING AIDS   • Hypertension    • Irregular heart beat     HISTORY OF CARDIAC ABLATION IN 2003   • Wears dentures     FULL UPPER PLATE      Past Surgical History:   Procedure Laterality Date   • BACK SURGERY  1978    THEN AGAIN IN 1982   • CARDIAC ABLATION  2003   • CARDIAC SURGERY     • CATARACT EXTRACTION W/ INTRAOCULAR LENS IMPLANT Left 5/24/2017    Procedure: CATARACT PHACO EXTRACTION WITH INTRAOCULAR LENS IMPLANT LEFT WITH TORIC LENS;  Surgeon: Alma Benavidez MD;  Location: [facility];  Service:    • CATARACT EXTRACTION W/ INTRAOCULAR LENS IMPLANT Right 6/14/2017    Procedure: CATARACT PHACO EXTRACTION WITH INTRAOCULAR LENS IMPLANT RIGHT WITH TORIC  LENS;  Surgeon: Alma Benavidez MD;  Location: [facility];  Service:    • CORONARY ARTERY BYPASS GRAFT      SPOUSE UNSURE OF HOW MANY VESSELS   • HIATAL HERNIA REPAIR     • JOINT REPLACEMENT Left     HIP - DONE BY DR DALAL   • KNEE ARTHROSCOPY Left    • NOSE SURGERY      SPOUSE REPORTS FOR A BROKEN NOSE   • OTHER SURGICAL HISTORY Left     TENDON TORN LOOSE FROM BONE, LEFT ELBOW   • OTHER SURGICAL HISTORY      RUPTURED DISC   • OTHER SURGICAL HISTORY      NECK SURGERY FOR RUPTURED DISC   • OTHER SURGICAL HISTORY      HAD SECOND NECK SURGERY   • OTHER SURGICAL HISTORY      RUPTURED DISC   • OTHER SURGICAL HISTORY      RUPTURED DISC   • OTHER SURGICAL HISTORY      HARDWARE REMOVAL FROM BACK BY DR HAY         MEDICATIONS:  I have reviewed and reconciled the patients medication list in the patients chart at the skilled nursing facility today.      ALLERGIES:  Allergies   Allergen Reactions   • Phenergan [Promethazine Hcl] Nausea And Vomiting   • Carbamazepine Unknown - Low Severity   • Hydrocodone Unknown - Low Severity   • Lisinopril Unknown - Low Severity         SOCIAL HISTORY:  Social History     Socioeconomic History   • Marital status:      Spouse name: Not on file   • Number of children: Not on file   • Years of education: Not on file   • Highest education level: Not on file   Tobacco Use   • Smoking status: Former Smoker     Types: Cigarettes     Last attempt to quit:      Years since quittin.6   • Smokeless tobacco: Never Used   Substance and Sexual Activity   • Alcohol use: No   • Drug use: No   • Sexual activity: Defer       FAMILY HISTORY:  No family history on file.    REVIEW OF SYSTEMS:  Review of Systems   Unable to perform ROS: Dementia          PHYSICAL EXAMINATION:     VITAL SIGNS:  /70   Pulse 68   Temp 97.8 °F (36.6 °C)   Resp 18   Wt 53.1 kg (117 lb)   SpO2 97%   BMI 17.79 kg/m²     Physical Exam   Constitutional: He is  oriented to person, place, and time.   Frail elderly male bedridden not very interactive.   HENT:   Head: Normocephalic and atraumatic.   Eyes: Conjunctivae are normal.   Neck: Normal range of motion. Neck supple.   Pulmonary/Chest: Effort normal.   Musculoskeletal: Normal range of motion.   Neurological: He is alert and oriented to person, place, and time.   Skin: No rash noted.   Psychiatric: He has a normal mood and affect. His behavior is normal.   Nursing note and vitals reviewed.       RECORDS REVIEW:   `    ASSESSMENT     Diagnoses and all orders for this visit:    Type 2 diabetes mellitus with diabetic polyneuropathy, without long-term current use of insulin (CMS/HCC)    Coronary artery disease involving native heart with angina pectoris, unspecified vessel or lesion type (CMS/HCC)    Dementia due to Parkinson's disease with behavioral disturbance (CMS/HCC)    Type 2 diabetes mellitus without complication, without long-term current use of insulin (CMS/HCC)    Essential hypertension    High cholesterol    Late onset Alzheimer's disease without behavioral disturbance (CMS/HCC)    Fall, subsequent encounter        PLAN  79-year-old white male admitted to the [facility] for rehab following hospitalization for encephalopathy.    Falls  - Mostly off of wheelchair.  Recent change in cushion for wheelchair has been helpful to prevent falls    Dementia due to Parkinson's disease  - Mood and behaviors remain stable on current medication regimen., continue Sinemet. does not appear that medications are over sedating at this point.    Coccyx wound  -Present on admission.    - Continue wound care per nursing staff.  Continue efforts to offload pressure on the area.    Coronary artery disease/irregular heartbeat  - stable on current cardiac medications.  No changes needed.    Type 2 diabetes mellitus  Labs are stable.  Last A1c was 6.1.  Continue metformin.    [x]  Discussed Patient in detail with  nursing/staff, addressed all needs today.     [x]  Plan of Care Reviewed   [x]  PT/OT Reviewed   []  Order Changes  []  Discharge Plans Reviewed  [x]  Advance Directive on file with Nursing Home.   [x]  POA on file with Nursing Home.    [x]  Code Status listed and reviewed.          Alex Burk DO.  9/1/2020

## 2020-09-24 ENCOUNTER — NURSING HOME (OUTPATIENT)
Dept: INTERNAL MEDICINE | Facility: CLINIC | Age: 80
End: 2020-09-24

## 2020-09-24 VITALS
HEART RATE: 84 BPM | OXYGEN SATURATION: 94 % | SYSTOLIC BLOOD PRESSURE: 122 MMHG | TEMPERATURE: 98.1 F | DIASTOLIC BLOOD PRESSURE: 74 MMHG | RESPIRATION RATE: 18 BRPM | BODY MASS INDEX: 19.31 KG/M2 | WEIGHT: 127 LBS

## 2020-09-24 DIAGNOSIS — E11.42 TYPE 2 DIABETES MELLITUS WITH DIABETIC POLYNEUROPATHY, WITHOUT LONG-TERM CURRENT USE OF INSULIN (HCC): ICD-10-CM

## 2020-09-24 DIAGNOSIS — G20 DEMENTIA DUE TO PARKINSON'S DISEASE WITH BEHAVIORAL DISTURBANCE (HCC): Primary | ICD-10-CM

## 2020-09-24 DIAGNOSIS — F02.818 DEMENTIA DUE TO PARKINSON'S DISEASE WITH BEHAVIORAL DISTURBANCE (HCC): Primary | ICD-10-CM

## 2020-09-24 DIAGNOSIS — I10 ESSENTIAL HYPERTENSION: ICD-10-CM

## 2020-09-24 DIAGNOSIS — E78.00 HIGH CHOLESTEROL: ICD-10-CM

## 2020-09-24 DIAGNOSIS — I25.119 CORONARY ARTERY DISEASE INVOLVING NATIVE HEART WITH ANGINA PECTORIS, UNSPECIFIED VESSEL OR LESION TYPE (HCC): ICD-10-CM

## 2020-09-24 PROCEDURE — 99308 SBSQ NF CARE LOW MDM 20: CPT | Performed by: INTERNAL MEDICINE

## 2020-09-28 NOTE — PROGRESS NOTES
Nursing Home Progress Note        Mitul Stephen DO []  ALESSANDRA Ho []  [address] [place] [address]  Phone: [phone]  Fax: [phone] Inés Vega MD []  Alex Burk DO [x]   [address] [place] [address]  Phone: [phone]  Fax: [phone]     PATIENT NAME: Gama Davila                                                                          YOB: 1940           DATE OF SERVICE: 09/24/2020    FACILITY:  [] [facility]   [] [facility]   [] [facility]   [x] [facility]  []  [facility]  [] Other     ______________________________________________________________________     CHIEF COMPLAINT:  Chronic medical management      HISTORY OF PRESENT ILLNESS:   Over the last month, patient has been more interactive and pleasant with staff and others in the facility.  He is also more talkative.  Patient shares that he has no specific complaints or concerns.  Chronic medical issues were reviewed at today's visit.    PAST MEDICAL & SURGICAL HISTORY:   Past Medical History:   Diagnosis Date   • Arthritis    • Cancer (CMS/HCC)     SKIN    • Cataract    • Coronary artery disease    • Diabetes mellitus (CMS/HCC)    • High cholesterol    • [place] (hard of hearing)     PATIENT HAS HEARING AIDS   • Hypertension    • Irregular heart beat     HISTORY OF CARDIAC ABLATION IN 2003   • Wears dentures     FULL UPPER PLATE      Past Surgical History:   Procedure Laterality Date   • BACK SURGERY  1978    THEN AGAIN IN 1982   • CARDIAC ABLATION  2003   • CARDIAC SURGERY     • CATARACT EXTRACTION W/ INTRAOCULAR LENS IMPLANT Left 5/24/2017    Procedure: CATARACT PHACO EXTRACTION WITH INTRAOCULAR LENS IMPLANT LEFT WITH TORIC LENS;  Surgeon: Alma Benavidez MD;  Location: [facility];  Service:    • CATARACT EXTRACTION W/ INTRAOCULAR LENS IMPLANT Right 6/14/2017    Procedure: CATARACT PHACO EXTRACTION WITH INTRAOCULAR LENS IMPLANT RIGHT WITH TORIC LENS;  Surgeon: Alma  Pam Benavidez MD;  Location: [facility] OR;  Service:    • CORONARY ARTERY BYPASS GRAFT      SPOUSE UNSURE OF HOW MANY VESSELS   • HIATAL HERNIA REPAIR     • JOINT REPLACEMENT Left     HIP - DONE BY DR DALAL   • KNEE ARTHROSCOPY Left    • NOSE SURGERY      SPOUSE REPORTS FOR A BROKEN NOSE   • OTHER SURGICAL HISTORY Left     TENDON TORN LOOSE FROM BONE, LEFT ELBOW   • OTHER SURGICAL HISTORY      RUPTURED DISC   • OTHER SURGICAL HISTORY      NECK SURGERY FOR RUPTURED DISC   • OTHER SURGICAL HISTORY      HAD SECOND NECK SURGERY   • OTHER SURGICAL HISTORY      RUPTURED DISC   • OTHER SURGICAL HISTORY      RUPTURED DISC   • OTHER SURGICAL HISTORY      HARDWARE REMOVAL FROM BACK BY DR HAY         MEDICATIONS:  I have reviewed and reconciled the patients medication list in the patients chart at the skilled nursing facility today.      ALLERGIES:  Allergies   Allergen Reactions   • Phenergan [Promethazine Hcl] Nausea And Vomiting   • Carbamazepine Unknown - Low Severity   • Hydrocodone Unknown - Low Severity   • Lisinopril Unknown - Low Severity         SOCIAL HISTORY:  Social History     Socioeconomic History   • Marital status:      Spouse name: Not on file   • Number of children: Not on file   • Years of education: Not on file   • Highest education level: Not on file   Tobacco Use   • Smoking status: Former Smoker     Types: Cigarettes     Quit date:      Years since quittin.7   • Smokeless tobacco: Never Used   Substance and Sexual Activity   • Alcohol use: No   • Drug use: No   • Sexual activity: Defer       FAMILY HISTORY:  No family history on file.    REVIEW OF SYSTEMS:  Review of Systems   Constitutional: Negative for chills, fatigue and fever.   HENT: Negative for congestion, ear pain, rhinorrhea, sinus pressure and sore throat.    Eyes: Negative for visual disturbance.   Respiratory: Negative for cough, chest tightness, shortness of breath and wheezing.     Cardiovascular: Negative for chest pain, palpitations and leg swelling.   Gastrointestinal: Negative for abdominal pain, blood in stool, constipation, diarrhea, nausea and vomiting.   Endocrine: Negative for polydipsia and polyuria.   Genitourinary: Negative for dysuria and hematuria.   Musculoskeletal: Negative for arthralgias and back pain.   Skin: Negative for rash.   Neurological: Negative for dizziness, light-headedness, numbness and headaches.   Psychiatric/Behavioral: Negative for dysphoric mood and sleep disturbance. The patient is not nervous/anxious.           PHYSICAL EXAMINATION:     VITAL SIGNS:  /74   Pulse 84   Temp 98.1 °F (36.7 °C)   Resp 18   Wt 57.6 kg (127 lb)   SpO2 94%   BMI 19.31 kg/m²     Physical Exam   Constitutional: He is oriented to person, place, and time.   Frail elderly male bedridden   HENT:   Head: Normocephalic and atraumatic.   Eyes: Conjunctivae are normal.   Neck: Normal range of motion. Neck supple.   Pulmonary/Chest: Effort normal.   Musculoskeletal: Normal range of motion.   Neurological: He is alert and oriented to person, place, and time.   Skin: No rash noted.   Psychiatric: His behavior is normal.   Nursing note and vitals reviewed.       RECORDS REVIEW:   `    ASSESSMENT     Diagnoses and all orders for this visit:    Dementia due to Parkinson's disease with behavioral disturbance (CMS/HCC)    Type 2 diabetes mellitus with diabetic polyneuropathy, without long-term current use of insulin (CMS/[facility])    Coronary artery disease involving native heart with angina pectoris, unspecified vessel or lesion type (CMS/HCC)    Essential hypertension    High cholesterol        PLAN  79-year-old white male admitted to the [facility] for rehab following hospitalization for encephalopathy.    Falls  - No significant reports of falls since patient has been on modified wheelchair cushion    Dementia due to Parkinson's disease  - Mood and behaviors remain stable  on current medications and Sinemet.  In fact, mood and behaviors appear to be better than last month.    Coccyx wound  -Gradual progress with wound care in the facility.    Coronary artery disease/irregular heartbeat  - stable on current cardiac medications.  No changes needed.    Type 2 diabetes mellitus  -  Last A1c was 6.1.  Continue metformin.  No need to monitor glucose too frequently in the facility.    [x]  Discussed Patient in detail with nursing/staff, addressed all needs today.     [x]  Plan of Care Reviewed   []  PT/OT Reviewed   []  Order Changes  []  Discharge Plans Reviewed  [x]  Advance Directive on file with Nursing Home.   [x]  POA on file with Nursing Home.    [x]  Code Status listed and reviewed.          Alex Burk DO.  9/28/2020

## 2020-10-05 ENCOUNTER — NURSING HOME (OUTPATIENT)
Dept: FAMILY MEDICINE CLINIC | Facility: CLINIC | Age: 80
End: 2020-10-05

## 2020-10-05 VITALS
DIASTOLIC BLOOD PRESSURE: 73 MMHG | TEMPERATURE: 97 F | OXYGEN SATURATION: 99 % | WEIGHT: 127 LBS | SYSTOLIC BLOOD PRESSURE: 140 MMHG | BODY MASS INDEX: 19.31 KG/M2 | HEART RATE: 80 BPM | RESPIRATION RATE: 18 BRPM

## 2020-10-05 DIAGNOSIS — Z78.9 IMPAIRED MOBILITY AND ADLS: ICD-10-CM

## 2020-10-05 DIAGNOSIS — M65.341 TRIGGER RING FINGER OF RIGHT HAND: ICD-10-CM

## 2020-10-05 DIAGNOSIS — Z74.09 IMPAIRED MOBILITY AND ADLS: ICD-10-CM

## 2020-10-05 PROCEDURE — 99309 SBSQ NF CARE MODERATE MDM 30: CPT | Performed by: NURSE PRACTITIONER

## 2020-10-05 NOTE — PROGRESS NOTES
Telemedicine nursing Home Progress Note        Mitul Stephen DO []  ALESSANDRA Ho [x]  [address] [place] [address]  Phone: [phone]  Fax: [phone] Inés Vega MD []  ALESSANDRA Ho [x]   [address] [place] [address]  Phone: [phone]  Fax: [phone]     PATIENT NAME: Gama Davila                                                                          YOB: 1940           DATE OF SERVICE: 10/5/2020  FACILITY:  [] [facility]   [] [facility]   [] [facility]   [x] [facility]  []  [facility]  [] Other     ______________________________________________________________________     CHIEF COMPLAINT:    Right hand pain     HISTORY OF PRESENT ILLNESS:   Per nursing, patient was attempting to open a door, with a handle, while someone was trying to open the door from the other side. He complained of some hand pain after the incident, but reports his hand is not hurting at this time, from the door incident. He reports he does have some pain in his right hand though, related to a trigger finger to the 4th digit. He has not had any treatment for it. It has hurt some for several months, for worse lately.     PAST MEDICAL & SURGICAL HISTORY:   Past Medical History:   Diagnosis Date   • Arthritis    • Cancer (CMS/HCC)     SKIN    • Cataract    • Coronary artery disease    • Diabetes mellitus (CMS/HCC)    • High cholesterol    • [place] (hard of hearing)     PATIENT HAS HEARING AIDS   • Hypertension    • Irregular heart beat     HISTORY OF CARDIAC ABLATION IN 2003   • Wears dentures     FULL UPPER PLATE      Past Surgical History:   Procedure Laterality Date   • BACK SURGERY  1978    THEN AGAIN IN 1982   • CARDIAC ABLATION  2003   • CARDIAC SURGERY     • CATARACT EXTRACTION W/ INTRAOCULAR LENS IMPLANT Left 5/24/2017    Procedure: CATARACT PHACO EXTRACTION WITH INTRAOCULAR LENS IMPLANT LEFT WITH TORIC LENS;  Surgeon: Alma Benavidez MD;   Location: [facility];  Service:    • CATARACT EXTRACTION W/ INTRAOCULAR LENS IMPLANT Right 2017    Procedure: CATARACT PHACO EXTRACTION WITH INTRAOCULAR LENS IMPLANT RIGHT WITH TORIC LENS;  Surgeon: Alma Benavidez MD;  Location: [facility];  Service:    • CORONARY ARTERY BYPASS GRAFT      SPOUSE UNSURE OF HOW MANY VESSELS   • HIATAL HERNIA REPAIR     • JOINT REPLACEMENT Left     HIP - DONE BY DR DALAL   • KNEE ARTHROSCOPY Left    • NOSE SURGERY      SPOUSE REPORTS FOR A BROKEN NOSE   • OTHER SURGICAL HISTORY Left     TENDON TORN LOOSE FROM BONE, LEFT ELBOW   • OTHER SURGICAL HISTORY      RUPTURED DISC   • OTHER SURGICAL HISTORY      NECK SURGERY FOR RUPTURED DISC   • OTHER SURGICAL HISTORY      HAD SECOND NECK SURGERY   • OTHER SURGICAL HISTORY      RUPTURED DISC   • OTHER SURGICAL HISTORY      RUPTURED DISC   • OTHER SURGICAL HISTORY      HARDWARE REMOVAL FROM BACK BY DR HAY         MEDICATIONS:  I have reviewed and reconciled the patients medication list in the patients chart at the skilled nursing [facility] today.      ALLERGIES:  Allergies   Allergen Reactions   • Phenergan [Promethazine Hcl] Nausea And Vomiting   • Carbamazepine Unknown - Low Severity   • Hydrocodone Unknown - Low Severity   • Lisinopril Unknown - Low Severity         SOCIAL HISTORY:  Social History     Socioeconomic History   • Marital status:      Spouse name: Not on file   • Number of children: Not on file   • Years of education: Not on file   • Highest education level: Not on file   Tobacco Use   • Smoking status: Former Smoker     Types: Cigarettes     Quit date:      Years since quittin.7   • Smokeless tobacco: Never Used   Substance and Sexual Activity   • Alcohol use: No   • Drug use: No   • Sexual activity: Defer       FAMILY HISTORY:  No family history on file.    REVIEW OF SYSTEMS:  Review of Systems   Unable to perform ROS: Dementia (ROS per nursing and patient)    HENT: Negative for drooling, facial swelling, mouth sores, rhinorrhea and trouble swallowing.    Eyes: Negative for discharge and redness.   Respiratory: Negative for cough, chest tightness and shortness of breath.    Cardiovascular: Negative.  Negative for chest pain and palpitations.   Gastrointestinal: Negative for abdominal distention, anal bleeding, blood in stool, constipation, diarrhea, nausea and vomiting.   Genitourinary: Negative for difficulty urinating, discharge, frequency, penile swelling and scrotal swelling.        Incontinence   Musculoskeletal: Positive for arthralgias (right shoulder and hand pain).        Trigger finger left 4th digit    contractures   Skin: Negative.    Neurological: Positive for speech difficulty (much improved) and weakness.   Hematological: Negative for adenopathy.   Psychiatric/Behavioral: Negative for confusion and suicidal ideas. The patient is not nervous/anxious.           PHYSICAL EXAMINATION:     VITAL SIGNS:  /73   Pulse 80   Temp 97 °F (36.1 °C)   Resp 18   Wt 57.6 kg (127 lb)   SpO2 99%   BMI 19.31 kg/m²     Physical Exam   Constitutional: He is oriented to person, place, and time. He is not intubated.   Frail elderly male bedridden not very interactive.   HENT:   Head: Normocephalic and atraumatic.   Eyes: Conjunctivae are normal.   Neck: Normal range of motion. Neck supple.   Pulmonary/Chest: Effort normal and breath sounds normal. He is not intubated.   Musculoskeletal:      Right shoulder: He exhibits decreased range of motion.      Right hand: He exhibits decreased range of motion.        Hands:       Comments: Chronic NM defs, and contractures, s/t Parkinson's   Neurological: He is alert and oriented to person, place, and time.   Skin: No rash noted.   Psychiatric: His behavior is normal.   Nursing note and vitals reviewed.      RECORDS REVIEW:   Most recent labs    ASSESSMENT     Diagnoses and all orders for this visit:    Trigger ring finger of  right hand    Impaired mobility and ADLs        PLAN    Trigger finger right hand  -Please have PT/OT evaluate for treatment. Will evaluate progress of PT/OT and then assess for need for Ortho referral for further treatment.    Nursing encouraged to keep me informed of any acute changes, lack of improvement, or any new concerning symptoms.    Staff to continue supportive care for all ADLs.     [x]  Discussed Patient in detail with nursing/staff, addressed all needs today.     [x]  Plan of Care Reviewed   [x]  PT/OT Reviewed   [x]  Order Changes  []  Discharge Plans Reviewed  [x]  Advance Directive on file with Nursing Home.   [x]  POA on file with Nursing Home.    [x]  Code Status listed and reviewed.           “I confirm accuracy of unchanged data/findings which have been carried forward from previous visit, as well as I have updated appropriately those that have changed.”             Charleen Randall, APRN.  10/5/2020

## 2020-10-21 ENCOUNTER — NURSING HOME (OUTPATIENT)
Dept: FAMILY MEDICINE CLINIC | Facility: CLINIC | Age: 80
End: 2020-10-21

## 2020-10-21 VITALS
TEMPERATURE: 97.7 F | BODY MASS INDEX: 19.31 KG/M2 | DIASTOLIC BLOOD PRESSURE: 72 MMHG | HEART RATE: 77 BPM | WEIGHT: 127 LBS | SYSTOLIC BLOOD PRESSURE: 122 MMHG | OXYGEN SATURATION: 95 % | RESPIRATION RATE: 20 BRPM

## 2020-10-21 DIAGNOSIS — M65.341 TRIGGER RING FINGER OF RIGHT HAND: ICD-10-CM

## 2020-10-21 DIAGNOSIS — Z74.09 IMPAIRED MOBILITY AND ADLS: ICD-10-CM

## 2020-10-21 DIAGNOSIS — F02.818 DEMENTIA DUE TO PARKINSON'S DISEASE WITH BEHAVIORAL DISTURBANCE (HCC): ICD-10-CM

## 2020-10-21 DIAGNOSIS — Z78.9 IMPAIRED MOBILITY AND ADLS: ICD-10-CM

## 2020-10-21 DIAGNOSIS — G20 DEMENTIA DUE TO PARKINSON'S DISEASE WITH BEHAVIORAL DISTURBANCE (HCC): ICD-10-CM

## 2020-10-21 DIAGNOSIS — R45.86 MOOD ALTERED: Primary | ICD-10-CM

## 2020-10-21 PROCEDURE — 99309 SBSQ NF CARE MODERATE MDM 30: CPT | Performed by: NURSE PRACTITIONER

## 2020-10-22 ENCOUNTER — NURSING HOME (OUTPATIENT)
Dept: INTERNAL MEDICINE | Facility: CLINIC | Age: 80
End: 2020-10-22

## 2020-10-22 VITALS
DIASTOLIC BLOOD PRESSURE: 74 MMHG | SYSTOLIC BLOOD PRESSURE: 120 MMHG | HEART RATE: 76 BPM | OXYGEN SATURATION: 95 % | WEIGHT: 127 LBS | TEMPERATURE: 98 F | RESPIRATION RATE: 18 BRPM | BODY MASS INDEX: 19.31 KG/M2

## 2020-10-22 DIAGNOSIS — E11.42 TYPE 2 DIABETES MELLITUS WITH DIABETIC POLYNEUROPATHY, WITHOUT LONG-TERM CURRENT USE OF INSULIN (HCC): ICD-10-CM

## 2020-10-22 DIAGNOSIS — I49.9 IRREGULAR HEART BEAT: ICD-10-CM

## 2020-10-22 DIAGNOSIS — I10 ESSENTIAL HYPERTENSION: ICD-10-CM

## 2020-10-22 DIAGNOSIS — I25.119 CORONARY ARTERY DISEASE INVOLVING NATIVE HEART WITH ANGINA PECTORIS, UNSPECIFIED VESSEL OR LESION TYPE (HCC): ICD-10-CM

## 2020-10-22 DIAGNOSIS — F02.818 DEMENTIA DUE TO PARKINSON'S DISEASE WITH BEHAVIORAL DISTURBANCE (HCC): Primary | ICD-10-CM

## 2020-10-22 DIAGNOSIS — Z74.09 IMPAIRED MOBILITY AND ADLS: ICD-10-CM

## 2020-10-22 DIAGNOSIS — G20 DEMENTIA DUE TO PARKINSON'S DISEASE WITH BEHAVIORAL DISTURBANCE (HCC): Primary | ICD-10-CM

## 2020-10-22 DIAGNOSIS — Z78.9 IMPAIRED MOBILITY AND ADLS: ICD-10-CM

## 2020-10-22 DIAGNOSIS — E78.00 HIGH CHOLESTEROL: ICD-10-CM

## 2020-10-22 LAB — HBA1C MFR BLD: 6.4 %

## 2020-10-22 PROCEDURE — 99308 SBSQ NF CARE LOW MDM 20: CPT | Performed by: INTERNAL MEDICINE
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
## 2020-10-24 NOTE — PROGRESS NOTES
Nursing Home Progress Note        Mitul Stephen DO []  ALESSANDRA Ho []  [address], [place] [address]  Phone: [phone]  Fax: [phone] Inés Vega MD []  Alex Burk DO [x]   [address] [place] [address]  Phone: [phone]  Fax: [phone]     PATIENT NAME: Gama Davila                                                                          YOB: 1940           DATE OF SERVICE:10/22/2020   FACILITY:  [] [place]   [] [place]   [] [facility]   [x] [facility]  []  [facility]  [] Other     ______________________________________________________________________     CHIEF COMPLAINT:  Behavior disturbances      HISTORY OF PRESENT ILLNESS:   Patient was seen for follow-up on angry behaviors which began last week.  Patient has been on Zoloft however Depakote was started at 125 twice daily.  Since starting the medication, patient has been much more calm and appropriate with staff.  He does remain pleasantly confused.  Nurses shared that since the patient was started on the medication, he has been overall doing well.    PAST MEDICAL & SURGICAL HISTORY:   Past Medical History:   Diagnosis Date   • Arthritis    • Cancer (CMS/HCC)     SKIN    • Cataract    • Coronary artery disease    • Diabetes mellitus (CMS/HCC)    • High cholesterol    • [place] (hard of hearing)     PATIENT HAS HEARING AIDS   • Hypertension    • Irregular heart beat     HISTORY OF CARDIAC ABLATION IN 2003   • Wears dentures     FULL UPPER PLATE      Past Surgical History:   Procedure Laterality Date   • BACK SURGERY  1978    THEN AGAIN IN 1982   • CARDIAC ABLATION  2003   • CARDIAC SURGERY     • CATARACT EXTRACTION W/ INTRAOCULAR LENS IMPLANT Left 5/24/2017    Procedure: CATARACT PHACO EXTRACTION WITH INTRAOCULAR LENS IMPLANT LEFT WITH TORIC LENS;  Surgeon: Alma Benavidez MD;  Location: [facility];  Service:    • CATARACT EXTRACTION W/ INTRAOCULAR LENS IMPLANT Right  2017    Procedure: CATARACT PHACO EXTRACTION WITH INTRAOCULAR LENS IMPLANT RIGHT WITH TORIC LENS;  Surgeon: Alma Benavidez MD;  Location: [facility];  Service:    • CORONARY ARTERY BYPASS GRAFT      SPOUSE UNSURE OF HOW MANY VESSELS   • HIATAL HERNIA REPAIR     • JOINT REPLACEMENT Left     HIP - DONE BY DR DALAL   • KNEE ARTHROSCOPY Left    • NOSE SURGERY      SPOUSE REPORTS FOR A BROKEN NOSE   • OTHER SURGICAL HISTORY Left     TENDON TORN LOOSE FROM BONE, LEFT ELBOW   • OTHER SURGICAL HISTORY      RUPTURED DISC   • OTHER SURGICAL HISTORY      NECK SURGERY FOR RUPTURED DISC   • OTHER SURGICAL HISTORY      HAD SECOND NECK SURGERY   • OTHER SURGICAL HISTORY      RUPTURED DISC   • OTHER SURGICAL HISTORY      RUPTURED DISC   • OTHER SURGICAL HISTORY      HARDWARE REMOVAL FROM BACK BY DR HAY         MEDICATIONS:  I have reviewed and reconciled the patients medication list in the patients chart at the skilled nursing facility today.      ALLERGIES:  Allergies   Allergen Reactions   • Phenergan [Promethazine Hcl] Nausea And Vomiting   • Carbamazepine Unknown - Low Severity   • Hydrocodone Unknown - Low Severity   • Lisinopril Unknown - Low Severity         SOCIAL HISTORY:  Social History     Socioeconomic History   • Marital status:      Spouse name: Not on file   • Number of children: Not on file   • Years of education: Not on file   • Highest education level: Not on file   Tobacco Use   • Smoking status: Former Smoker     Types: Cigarettes     Quit date:      Years since quittin.8   • Smokeless tobacco: Never Used   Substance and Sexual Activity   • Alcohol use: No   • Drug use: No   • Sexual activity: Defer       FAMILY HISTORY:  No family history on file.    REVIEW OF SYSTEMS:  Review of Systems   Constitutional: Negative for chills, fatigue and fever.   HENT: Negative for congestion, ear pain, rhinorrhea, sinus pressure and sore throat.    Eyes:  Negative for visual disturbance.   Respiratory: Negative for cough, chest tightness, shortness of breath and wheezing.    Cardiovascular: Negative for chest pain, palpitations and leg swelling.   Gastrointestinal: Negative for abdominal pain, blood in stool, constipation, diarrhea, nausea and vomiting.   Endocrine: Negative for polydipsia and polyuria.   Genitourinary: Negative for dysuria and hematuria.   Musculoskeletal: Negative for arthralgias and back pain.   Skin: Negative for rash.   Neurological: Negative for dizziness, light-headedness, numbness and headaches.   Psychiatric/Behavioral: Positive for confusion. Negative for dysphoric mood and sleep disturbance. The patient is not nervous/anxious.           PHYSICAL EXAMINATION:     VITAL SIGNS:  /74   Pulse 76   Temp 98 °F (36.7 °C)   Resp 18   Wt 57.6 kg (127 lb)   SpO2 95%   BMI 19.31 kg/m²     Physical Exam   Constitutional: He is oriented to person, place, and time.   Frail elderly male bedridden   HENT:   Head: Normocephalic and atraumatic.   Eyes: Conjunctivae are normal.   Neck: Normal range of motion. Neck supple.   Cardiovascular: Normal rate, regular rhythm, normal heart sounds and normal pulses.   No murmur heard.  Pulmonary/Chest: Effort normal.   Musculoskeletal: Normal range of motion.   Neurological: He is alert and oriented to person, place, and time.   Skin: No rash noted.   Psychiatric: His behavior is normal.   Pleasantly confused   Nursing note and vitals reviewed.       RECORDS REVIEW:   `    ASSESSMENT     Diagnoses and all orders for this visit:    1. Dementia due to Parkinson's disease with behavioral disturbance (CMS/formerly [facility]) (Primary)    2. Coronary artery disease involving native heart with angina pectoris, unspecified vessel or lesion type (CMS/[facility])    3. Type 2 diabetes mellitus with diabetic polyneuropathy, without long-term current use of insulin (CMS/formerly [facility])    4. Essential hypertension    5. Irregular heart beat    6.  High cholesterol    7. Impaired mobility and ADLs        PLAN    Mood disorder  -Stable on Zoloft and Depakote regimen.  No changes needed at this time.    Falls  - No significant reports of falls since patient has been on modified wheelchair cushion    Dementia due to Parkinson's disease  -Continue Sinemet.       Coccyx wound  -Gradual progress with wound care in the facility.    Coronary artery disease/irregular heartbeat  - stable on current cardiac medications.  No changes needed.    Type 2 diabetes mellitus  -  Last A1c was 6.1.  Continue metformin.     [x]  Discussed Patient in detail with nursing/staff, addressed all needs today.     [x]  Plan of Care Reviewed   []  PT/OT Reviewed   []  Order Changes  []  Discharge Plans Reviewed  [x]  Advance Directive on file with Nursing Home.   [x]  POA on file with Nursing Home.    [x]  Code Status listed and reviewed.          Alex Burk DO.  10/24/2020

## 2020-11-09 ENCOUNTER — NURSING HOME (OUTPATIENT)
Dept: FAMILY MEDICINE CLINIC | Facility: CLINIC | Age: 80
End: 2020-11-09

## 2020-11-09 VITALS
SYSTOLIC BLOOD PRESSURE: 124 MMHG | DIASTOLIC BLOOD PRESSURE: 69 MMHG | TEMPERATURE: 97.9 F | OXYGEN SATURATION: 94 % | BODY MASS INDEX: 20.07 KG/M2 | HEART RATE: 64 BPM | WEIGHT: 132 LBS | RESPIRATION RATE: 18 BRPM

## 2020-11-09 DIAGNOSIS — Z91.199 NON COMPLIANCE WITH MEDICAL TREATMENT: ICD-10-CM

## 2020-11-09 DIAGNOSIS — Z78.9 IMPAIRED MOBILITY AND ADLS: ICD-10-CM

## 2020-11-09 DIAGNOSIS — Z79.4 TYPE 2 DIABETES MELLITUS WITH DIABETIC POLYNEUROPATHY, WITH LONG-TERM CURRENT USE OF INSULIN (HCC): Chronic | ICD-10-CM

## 2020-11-09 DIAGNOSIS — R46.89 BEHAVIOR CONCERN: ICD-10-CM

## 2020-11-09 DIAGNOSIS — F39 MOOD DISORDER (HCC): ICD-10-CM

## 2020-11-09 DIAGNOSIS — R41.0 ACUTE CONFUSION: ICD-10-CM

## 2020-11-09 DIAGNOSIS — E11.42 TYPE 2 DIABETES MELLITUS WITH DIABETIC POLYNEUROPATHY, WITH LONG-TERM CURRENT USE OF INSULIN (HCC): Chronic | ICD-10-CM

## 2020-11-09 DIAGNOSIS — Z74.09 IMPAIRED MOBILITY AND ADLS: ICD-10-CM

## 2020-11-09 PROCEDURE — 99309 SBSQ NF CARE MODERATE MDM 30: CPT | Performed by: NURSE PRACTITIONER

## 2020-11-10 PROBLEM — F39 MOOD DISORDER (HCC): Status: ACTIVE | Noted: 2020-11-10

## 2020-11-13 ENCOUNTER — TELEPHONE (OUTPATIENT)
Dept: ORTHOPEDIC SURGERY | Facility: CLINIC | Age: 80
End: 2020-11-13
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
## 2021-01-15 RX ORDER — GABAPENTIN 100 MG/1
100 CAPSULE ORAL NIGHTLY
Qty: 30 CAPSULE | Refills: 5 | Status: SHIPPED | OUTPATIENT
Start: 2021-01-15 | End: 2021-03-22 | Stop reason: SDUPTHER

## 2021-01-28 ENCOUNTER — NURSING HOME (OUTPATIENT)
Dept: INTERNAL MEDICINE | Facility: CLINIC | Age: 81
End: 2021-01-28

## 2021-01-28 VITALS
BODY MASS INDEX: 20.53 KG/M2 | OXYGEN SATURATION: 95 % | WEIGHT: 135 LBS | DIASTOLIC BLOOD PRESSURE: 68 MMHG | TEMPERATURE: 97.6 F | HEART RATE: 78 BPM | RESPIRATION RATE: 20 BRPM | SYSTOLIC BLOOD PRESSURE: 118 MMHG

## 2021-01-28 DIAGNOSIS — G20 DEMENTIA DUE TO PARKINSON'S DISEASE WITH BEHAVIORAL DISTURBANCE (HCC): Primary | ICD-10-CM

## 2021-01-28 DIAGNOSIS — I49.9 IRREGULAR HEART BEAT: ICD-10-CM

## 2021-01-28 DIAGNOSIS — E78.00 HIGH CHOLESTEROL: ICD-10-CM

## 2021-01-28 DIAGNOSIS — Z74.09 IMPAIRED MOBILITY AND ADLS: ICD-10-CM

## 2021-01-28 DIAGNOSIS — Z78.9 IMPAIRED MOBILITY AND ADLS: ICD-10-CM

## 2021-01-28 DIAGNOSIS — F02.818 DEMENTIA DUE TO PARKINSON'S DISEASE WITH BEHAVIORAL DISTURBANCE (HCC): Primary | ICD-10-CM

## 2021-01-28 DIAGNOSIS — I25.119 CORONARY ARTERY DISEASE INVOLVING NATIVE HEART WITH ANGINA PECTORIS, UNSPECIFIED VESSEL OR LESION TYPE (HCC): ICD-10-CM

## 2021-01-28 DIAGNOSIS — I10 ESSENTIAL HYPERTENSION: ICD-10-CM

## 2021-01-28 DIAGNOSIS — E11.42 TYPE 2 DIABETES MELLITUS WITH DIABETIC POLYNEUROPATHY, WITHOUT LONG-TERM CURRENT USE OF INSULIN (HCC): ICD-10-CM

## 2021-01-28 PROCEDURE — 99318 PR E/M ANNUAL NURSING FACILITY ASSESS STABLE 30 MIN: CPT | Performed by: INTERNAL MEDICINE

## 2021-01-29 NOTE — PROGRESS NOTES
Annual Nursing Facility Assessment      Mitul Stephen DO []  ALESSANDRA Ho []  [address] [place] [address]  Phone: [phone]  Fax: [phone] Inés Vega MD []  Alex Burk DO [x]   [address] [place] [address]  Phone: [phone]  Fax: [phone]     PATIENT NAME: Gama Davila                                                                          YOB: 1940           DATE OF SERVICE: 01/28/2021  FACILITY:  [] [place]   [] [place]   [] [facility]   [x] [facility]  []  [facility]  [] Other ______________________________________________________________________     CHIEF COMPLAINT:  Annual nursing facility assessment      HISTORY OF PRESENT ILLNESS:   Patient is an 80-year-old white male with a history of Parkinson's disease, coronary artery disease, type 2 diabetes mellitus, and essential hypertension who has been a resident at the [facility] since May 2020.  Patient initially presented after he had issues with altered mental status, coccyx wound, and left lower lobe pneumonia.  Since admission to the facility, his mood and behaviors have been much better and he was able to come off of some antipsychotics.  His coccyx wound has gradually resolved.     More recently, patient has been pleasantly confused but appropriate with staff.  He frequently refuses labs and has not had labs done since November.        MEDICATIONS:  I have reviewed and reconciled the patients medication list in the patients chart at the [facility] nursing facility today, 01/28/2021.      ALLERGIES:  Allergies   Allergen Reactions   • Phenergan [Promethazine Hcl] Nausea And Vomiting   • Carbamazepine Unknown - Low Severity   • Hydrocodone Unknown - Low Severity   • Lisinopril Unknown - Low Severity           REVIEW OF SYSTEMS:  Review of Systems   Constitutional: Negative for chills, fatigue and fever.   HENT: Negative for congestion, ear pain, rhinorrhea,  sinus pressure and sore throat.    Eyes: Negative for visual disturbance.   Respiratory: Negative for cough, chest tightness, shortness of breath and wheezing.    Cardiovascular: Negative for chest pain, palpitations and leg swelling.   Gastrointestinal: Negative for abdominal pain, blood in stool, constipation, diarrhea, nausea and vomiting.   Endocrine: Negative for polydipsia and polyuria.   Genitourinary: Negative for dysuria and hematuria.   Musculoskeletal: Negative for arthralgias and back pain.   Skin: Negative for rash.   Neurological: Negative for dizziness, light-headedness, numbness and headaches.   Psychiatric/Behavioral: Positive for confusion. Negative for dysphoric mood and sleep disturbance. The patient is not nervous/anxious.           PHYSICAL EXAMINATION:     VITAL SIGNS:  /68   Pulse 78   Temp 97.6 °F (36.4 °C)   Resp 20   Wt 61.2 kg (135 lb)   SpO2 95%   BMI 20.53 kg/m²     Physical Exam  Vitals signs and nursing note reviewed.   Constitutional:       Appearance: He is well-developed.      Comments: Frail bedridden elderly male   HENT:      Head: Normocephalic and atraumatic.      Right Ear: External ear normal.      Left Ear: External ear normal.      Nose: Nose normal.      Mouth/Throat:      Mouth: Mucous membranes are moist.      Pharynx: No oropharyngeal exudate.   Eyes:      General: No scleral icterus.     Conjunctiva/sclera: Conjunctivae normal.      Pupils: Pupils are equal, round, and reactive to light.   Neck:      Musculoskeletal: Normal range of motion and neck supple.      Thyroid: No thyromegaly.   Cardiovascular:      Rate and Rhythm: Normal rate and regular rhythm.      Heart sounds: Normal heart sounds. No murmur. No friction rub. No gallop.    Pulmonary:      Effort: Pulmonary effort is normal. No respiratory distress.      Breath sounds: Normal breath sounds. No wheezing.   Abdominal:      General: Bowel sounds are normal. There is no distension.      Palpations:  Abdomen is soft.      Tenderness: There is no abdominal tenderness.   Musculoskeletal:         General: No deformity or signs of injury.   Lymphadenopathy:      Cervical: No cervical adenopathy.   Skin:     General: Skin is warm and dry.      Findings: No rash.   Neurological:      Mental Status: He is alert and oriented to person, place, and time.   Psychiatric:         Mood and Affect: Mood normal.         Behavior: Behavior normal.           ASSESSMENT     Diagnoses and all orders for this visit:    1. Dementia due to Parkinson's disease with behavioral disturbance (CMS/[facility]) (Primary)    2. Coronary artery disease involving native heart with angina pectoris, unspecified vessel or lesion type (CMS/[facility])    3. Type 2 diabetes mellitus with diabetic polyneuropathy, without long-term current use of insulin (CMS/[facility])    4. Essential hypertension    5. Irregular heart beat    6. High cholesterol    7. Impaired mobility and ADLs        PLAN    Type 2 diabetes mellitus  -  Last A1c was 6.8.  Continue metformin.   -Patient refused labs.  I did request that he allow us to obtain CBC, CMP, and A1c as it has been a long time since his last A1c was drawn.     Mood disorder  -Stable on Zoloft and Depakote regimen.      Falls  - No significant reports of falls since patient has been on modified wheelchair cushion      Dementia due to Parkinson's disease  -Continue Sinemet.    -Continue supportive care in the nursing facility for all ADLs.     Coronary artery disease/irregular heartbeat  - stable on current cardiac medications.  No changes needed.       [x]  Discussed Patient in detail with nursing/staff, addressed all needs today.     [x]  Plan of Care Reviewed   []  PT/OT Reviewed   []  Order Changes  []  Discharge Plans Reviewed  [x]  Advance Directive on file with Nursing Home.   [x]  POA on file with Nursing Home.    [x]  Code Status listed and reviewed.      I confirm accuracy of unchanged data/findings including physical  exam and plan which have been carried forward from previous visit, as well as I have updated appropriately those that have changed           Alex Burk DO.  1/29/2021

## 2021-02-11 ENCOUNTER — NURSING HOME (OUTPATIENT)
Dept: FAMILY MEDICINE CLINIC | Facility: CLINIC | Age: 81
End: 2021-02-11

## 2021-02-11 DIAGNOSIS — F02.818 DEMENTIA DUE TO PARKINSON'S DISEASE WITH BEHAVIORAL DISTURBANCE (HCC): Chronic | ICD-10-CM

## 2021-02-11 DIAGNOSIS — Z78.9 IMPAIRED MOBILITY AND ADLS: ICD-10-CM

## 2021-02-11 DIAGNOSIS — G20 DEMENTIA DUE TO PARKINSON'S DISEASE WITH BEHAVIORAL DISTURBANCE (HCC): Chronic | ICD-10-CM

## 2021-02-11 DIAGNOSIS — Z91.199 NON COMPLIANCE WITH MEDICAL TREATMENT: Primary | ICD-10-CM

## 2021-02-11 DIAGNOSIS — Z74.09 IMPAIRED MOBILITY AND ADLS: ICD-10-CM

## 2021-02-11 PROCEDURE — 99309 SBSQ NF CARE MODERATE MDM 30: CPT | Performed by: NURSE PRACTITIONER

## 2021-02-15 NOTE — PROGRESS NOTES
Telemedicine nursing Home Progress Note        Mitul Stephen DO []  ALESSANDRA Ho [x]  [address] [place] [address]  Phone: [phone]  Fax: [phone] Inés Vega MD []  ALESSANDRA Ho [x]   [address] [place] [address]  Phone: [phone]  Fax: [phone]     PATIENT NAME: Gama Davila                                                                          YOB: 1940           DATE OF SERVICE: 2/11/2021  FACILITY:  [] [place]   [] [place]   [] [facility]   [x] [facility]  []  [facility]  [] Other     ______________________________________________________________________     CHIEF COMPLAINT:    Non compliance with medical treatment         HISTORY OF PRESENT ILLNESS:     Per nursing, refused his labs again this week.  He has refused his lab every week since November. Lab comes in the middle of the night while patient is sleeping and he refuses to let them draw the labs. Did discuss this with patient and he reports that he will allow labs to be drawn, but he has said this in the past as well.     PAST MEDICAL & SURGICAL HISTORY:   Past Medical History:   Diagnosis Date   • Arthritis    • Cancer (CMS/HCC)     SKIN    • Cataract    • Coronary artery disease    • Diabetes mellitus (CMS/HCC)    • High cholesterol    • [place] (hard of hearing)     PATIENT HAS HEARING AIDS   • Hypertension    • Irregular heart beat     HISTORY OF CARDIAC ABLATION IN 2003   • Wears dentures     FULL UPPER PLATE      Past Surgical History:   Procedure Laterality Date   • BACK SURGERY  1978    THEN AGAIN IN 1982   • CARDIAC ABLATION  2003   • CARDIAC SURGERY     • CATARACT EXTRACTION W/ INTRAOCULAR LENS IMPLANT Left 5/24/2017    Procedure: CATARACT PHACO EXTRACTION WITH INTRAOCULAR LENS IMPLANT LEFT WITH TORIC LENS;  Surgeon: Alma Benavidez MD;  Location: [facility];  Service:    • CATARACT EXTRACTION W/ INTRAOCULAR LENS IMPLANT Right 6/14/2017     Procedure: CATARACT PHACO EXTRACTION WITH INTRAOCULAR LENS IMPLANT RIGHT WITH TORIC LENS;  Surgeon: Alma Benavidez MD;  Location: [facility];  Service:    • CORONARY ARTERY BYPASS GRAFT      SPOUSE UNSURE OF HOW MANY VESSELS   • HIATAL HERNIA REPAIR     • JOINT REPLACEMENT Left     HIP - DONE BY DR DALAL   • KNEE ARTHROSCOPY Left    • NOSE SURGERY      SPOUSE REPORTS FOR A BROKEN NOSE   • OTHER SURGICAL HISTORY Left     TENDON TORN LOOSE FROM BONE, LEFT ELBOW   • OTHER SURGICAL HISTORY      RUPTURED DISC   • OTHER SURGICAL HISTORY      NECK SURGERY FOR RUPTURED DISC   • OTHER SURGICAL HISTORY      HAD SECOND NECK SURGERY   • OTHER SURGICAL HISTORY      RUPTURED DISC   • OTHER SURGICAL HISTORY      RUPTURED DISC   • OTHER SURGICAL HISTORY      HARDWARE REMOVAL FROM BACK BY DR HAY         MEDICATIONS:  I have reviewed and reconciled the patients medication list in the patients chart at the skilled nursing facility today.      ALLERGIES:  Allergies   Allergen Reactions   • Phenergan [Promethazine Hcl] Nausea And Vomiting   • Carbamazepine Unknown - Low Severity   • Hydrocodone Unknown - Low Severity   • Lisinopril Unknown - Low Severity         SOCIAL HISTORY:  Social History     Socioeconomic History   • Marital status:      Spouse name: Not on file   • Number of children: Not on file   • Years of education: Not on file   • Highest education level: Not on file   Tobacco Use   • Smoking status: Former Smoker     Types: Cigarettes     Quit date:      Years since quittin.1   • Smokeless tobacco: Never Used   Substance and Sexual Activity   • Alcohol use: No   • Drug use: No   • Sexual activity: Defer       FAMILY HISTORY:  No family history on file.    REVIEW OF SYSTEMS:  Review of Systems   Unable to perform ROS: Dementia (ROS per nursing and patient)   HENT: Negative for drooling, facial swelling, mouth sores, rhinorrhea and trouble swallowing.    Eyes:  Negative for discharge and redness.   Respiratory: Negative for cough, chest tightness and shortness of breath.    Cardiovascular: Negative.  Negative for chest pain and palpitations.   Gastrointestinal: Negative for abdominal distention, anal bleeding, blood in stool, constipation, diarrhea, nausea and vomiting.   Genitourinary: Negative for difficulty urinating, discharge, frequency, penile swelling and scrotal swelling.        Incontinence   Musculoskeletal: Positive for arthralgias.        Trigger finger right 4th digit    contractures   Skin: Negative.    Neurological: Positive for weakness. Negative for speech difficulty.   Hematological: Negative for adenopathy.   Psychiatric/Behavioral: Positive for behavioral problems and confusion. Negative for suicidal ideas. The patient is not nervous/anxious.           PHYSICAL EXAMINATION:     VITAL SIGNS:  There were no vitals taken for this visit.    Physical Exam   Constitutional: He is oriented to person, place, and time. He is not intubated.   HENT:   Head: Normocephalic and atraumatic.   Eyes: Conjunctivae are normal.   Neck: Normal range of motion. Neck supple.   Pulmonary/Chest: Effort normal and breath sounds normal. He is not intubated.   Abdominal: Normal appearance.   Musculoskeletal:      Right shoulder: He exhibits decreased range of motion.      Right hand: He exhibits decreased range of motion.        Hands:       Comments: Chronic NM defs, and contractures, s/t Parkinson's   Neurological: He is alert and oriented to person, place, and time.   Skin: No rash noted.   Psychiatric: His behavior is normal. Cognition and memory are impaired. He has a flat affect.   Nursing note and vitals reviewed.      RECORDS REVIEW:   Most recent labs    ASSESSMENT     Diagnoses and all orders for this visit:    1. Non compliance with medical treatment (Primary)    2. Dementia due to Parkinson's disease with behavioral disturbance (CMS/[facility])    3. Impaired mobility and  ADLs        PLAN    Non compliance with treatment/dementia with Parkinson's  -Patient has refused to have labs drawn weekly since November.  Nursing to attempt to draw labs during the day when patient is more likely to be more compliant and send labs stat or take them to the hospital.  We will follow-up with this and continue to monitor.  His appetite and weight are stable.    Nursing encouraged to keep me informed of any acute changes, lack of improvement, or any new concerning symptoms.    Staff to continue supportive care for all ADLs.     [x]  Discussed Patient in detail with nursing/staff, addressed all needs today.     [x]  Plan of Care Reviewed   [x]  PT/OT Reviewed   [x]  Order Changes  []  Discharge Plans Reviewed  [x]  Advance Directive on file with Nursing Home.   [x]  POA on file with Nursing Home.    [x]  Code Status listed and reviewed.         “I confirm accuracy of unchanged data/findings which have been carried forward from previous visit, as well as I have updated appropriately those that have changed.”                   Charleen Randall, APRN.  2/15/2021

## 2021-02-22 ENCOUNTER — NURSING HOME (OUTPATIENT)
Dept: FAMILY MEDICINE CLINIC | Facility: CLINIC | Age: 81
End: 2021-02-22

## 2021-02-22 VITALS
OXYGEN SATURATION: 94 % | BODY MASS INDEX: 20.98 KG/M2 | WEIGHT: 138 LBS | DIASTOLIC BLOOD PRESSURE: 67 MMHG | SYSTOLIC BLOOD PRESSURE: 137 MMHG | HEART RATE: 60 BPM | TEMPERATURE: 97 F | RESPIRATION RATE: 18 BRPM

## 2021-02-22 DIAGNOSIS — Z91.199 NON COMPLIANCE WITH MEDICAL TREATMENT: ICD-10-CM

## 2021-02-22 DIAGNOSIS — G20 DEMENTIA DUE TO PARKINSON'S DISEASE WITH BEHAVIORAL DISTURBANCE (HCC): Chronic | ICD-10-CM
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
## 2021-02-22 DIAGNOSIS — R29.898 WEAKNESS OF BOTH LOWER EXTREMITIES: ICD-10-CM

## 2021-02-22 DIAGNOSIS — Z74.09 IMPAIRED MOBILITY AND ADLS: ICD-10-CM

## 2021-02-22 DIAGNOSIS — F02.818 DEMENTIA DUE TO PARKINSON'S DISEASE WITH BEHAVIORAL DISTURBANCE (HCC): Chronic | ICD-10-CM
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
## 2021-02-22 DIAGNOSIS — Z78.9 IMPAIRED MOBILITY AND ADLS: ICD-10-CM

## 2021-02-22 PROCEDURE — 99309 SBSQ NF CARE MODERATE MDM 30: CPT | Performed by: NURSE PRACTITIONER

## 2021-02-24 NOTE — PROGRESS NOTES
Telemedicine nursing Home Progress Note        Mitul Stephen DO []  ALESSANDRA Ho [x]  [address] [place] [address]  Phone: [phone]  Fax: [phone] Inés Vega MD []  ALESSANDRA Ho [x]   [address] [place] [address]  Phone: [phone]  Fax: [phone]     PATIENT NAME: Gama Davila                                                                          YOB: 1940           DATE OF SERVICE: 2/22/2021  FACILITY:  [] [place]   [] [place]   [] [facility]   [x] [facility]  []  [facility]  [] Other     ______________________________________________________________________     CHIEF COMPLAINT:      Follow up fall       HISTORY OF PRESENT ILLNESS:     Patient with unwitnessed fall yesterday. He was found pulling himself up from the floor to his wheelchair, by housekeeping. He reports he was trying to reach for something and fell into the floor. He denied any injury at the time, and denies any this time. He has no noted injury. He is resting in his wheelchair today with no complaints. He is pleasantly confused with mental status at baseline. His assessments and  Neuro checks have been been within normal limits since fall.    He did allow his labs to be drawn on 2/19 and they were WNL.     PAST MEDICAL & SURGICAL HISTORY:   Past Medical History:   Diagnosis Date   • Arthritis    • Cancer (CMS/HCC)     SKIN    • Cataract    • Coronary artery disease    • Diabetes mellitus (CMS/HCC)    • High cholesterol    • [place] (hard of hearing)     PATIENT HAS HEARING AIDS   • Hypertension    • Irregular heart beat     HISTORY OF CARDIAC ABLATION IN 2003   • Wears dentures     FULL UPPER PLATE      Past Surgical History:   Procedure Laterality Date   • BACK SURGERY  1978    THEN AGAIN IN 1982   • CARDIAC ABLATION  2003   • CARDIAC SURGERY     • CATARACT EXTRACTION W/ INTRAOCULAR LENS IMPLANT Left 5/24/2017    Procedure: CATARACT PHACO EXTRACTION WITH  INTRAOCULAR LENS IMPLANT LEFT WITH TORIC LENS;  Surgeon: Alma Benavidez MD;  Location: [facility];  Service:    • CATARACT EXTRACTION W/ INTRAOCULAR LENS IMPLANT Right 2017    Procedure: CATARACT PHACO EXTRACTION WITH INTRAOCULAR LENS IMPLANT RIGHT WITH TORIC LENS;  Surgeon: Alma Benavidez MD;  Location: [facility];  Service:    • CORONARY ARTERY BYPASS GRAFT      SPOUSE UNSURE OF HOW MANY VESSELS   • HIATAL HERNIA REPAIR     • JOINT REPLACEMENT Left     HIP - DONE BY DR DALAL   • KNEE ARTHROSCOPY Left    • NOSE SURGERY      SPOUSE REPORTS FOR A BROKEN NOSE   • OTHER SURGICAL HISTORY Left     TENDON TORN LOOSE FROM BONE, LEFT ELBOW   • OTHER SURGICAL HISTORY      RUPTURED DISC   • OTHER SURGICAL HISTORY      NECK SURGERY FOR RUPTURED DISC   • OTHER SURGICAL HISTORY      HAD SECOND NECK SURGERY   • OTHER SURGICAL HISTORY      RUPTURED DISC   • OTHER SURGICAL HISTORY      RUPTURED DISC   • OTHER SURGICAL HISTORY      HARDWARE REMOVAL FROM BACK BY DR HAY         MEDICATIONS:  I have reviewed and reconciled the patients medication list in the patients chart at the skilled nursing facility today.      ALLERGIES:  Allergies   Allergen Reactions   • Phenergan [Promethazine Hcl] Nausea And Vomiting   • Carbamazepine Unknown - Low Severity   • Hydrocodone Unknown - Low Severity   • Lisinopril Unknown - Low Severity         SOCIAL HISTORY:  Social History     Socioeconomic History   • Marital status:      Spouse name: Not on file   • Number of children: Not on file   • Years of education: Not on file   • Highest education level: Not on file   Tobacco Use   • Smoking status: Former Smoker     Types: Cigarettes     Quit date:      Years since quittin.1   • Smokeless tobacco: Never Used   Substance and Sexual Activity   • Alcohol use: No   • Drug use: No   • Sexual activity: Defer       FAMILY HISTORY:  No family history on file.    REVIEW OF  SYSTEMS:  Review of Systems   Unable to perform ROS: Dementia (ROS per nursing and patient)   HENT: Negative for drooling, facial swelling, mouth sores, rhinorrhea and trouble swallowing.    Eyes: Negative for discharge and redness.   Respiratory: Negative for cough, chest tightness and shortness of breath.    Cardiovascular: Negative.  Negative for chest pain and palpitations.   Gastrointestinal: Negative for abdominal distention, anal bleeding, blood in stool, constipation, diarrhea, nausea and vomiting.   Genitourinary: Negative for difficulty urinating, discharge, frequency, penile swelling and scrotal swelling.        Incontinence   Musculoskeletal: Positive for arthralgias.        Trigger finger right 4th digit    contractures   Skin: Negative.    Neurological: Positive for weakness. Negative for speech difficulty.   Hematological: Negative for adenopathy.   Psychiatric/Behavioral: Positive for behavioral problems and confusion. Negative for suicidal ideas. The patient is not nervous/anxious.           PHYSICAL EXAMINATION:     VITAL SIGNS:  /67   Pulse 60   Temp 97 °F (36.1 °C)   Resp 18   Wt 62.6 kg (138 lb)   SpO2 94%   BMI 20.98 kg/m²     Physical Exam   Constitutional: He is oriented to person, place, and time. He is not intubated.   HENT:   Head: Normocephalic and atraumatic.   Eyes: Conjunctivae are normal.   Neck: Normal range of motion. Neck supple.   Pulmonary/Chest: Effort normal and breath sounds normal. He is not intubated.   Abdominal: Normal appearance.   Musculoskeletal:      Right shoulder: He exhibits decreased range of motion.      Right hand: He exhibits decreased range of motion.        Hands:       Comments: Chronic NM defs, and contractures, s/t Parkinson's   Neurological: He is alert and oriented to person, place, and time.   Skin: No rash noted.   Psychiatric: His behavior is normal. Cognition and memory are impaired. He has a flat affect.   Nursing note and vitals  reviewed.      RECORDS REVIEW:   Most recent labs    ASSESSMENT     Diagnoses and all orders for this visit:    1. Weakness of both lower extremities    2. Fall at nursing home, initial encounter    3. Dementia due to Parkinson's disease with behavioral disturbance (CMS/[facility])    4. Non compliance with medical treatment    5. Impaired mobility and ADLs        PLAN    Weakness BLE/Fall/Dementia  -Patient with noninjury fall from wheelchair yesterday attempting to reach for something on the floor.  He has no acute dementia behaviors and appetite and weight are stable.  Will follow-up and continue to monitor.    Noncompliance medical treatment  -Patient had been noncompliant with having labs drawn for several months but nurse was able to get patient to have his labs drawn on 2/19 and there were no significant abnormal findings.    Nursing encouraged to keep me informed of any acute changes, lack of improvement, or any new concerning symptoms.    Staff to continue supportive care for all ADLs.     [x]  Discussed Patient in detail with nursing/staff, addressed all needs today.     [x]  Plan of Care Reviewed   [x]  PT/OT Reviewed   [x]  Order Changes  []  Discharge Plans Reviewed  [x]  Advance Directive on file with Nursing Home.   [x]  POA on file with Nursing Home.    [x]  Code Status listed and reviewed.         “I confirm accuracy of unchanged data/findings which have been carried forward from previous visit, as well as I have updated appropriately those that have changed.”                     Charleen Randall, APRN.  2/23/2021

## 2021-02-25 ENCOUNTER — NURSING HOME (OUTPATIENT)
Dept: INTERNAL MEDICINE | Facility: CLINIC | Age: 81
End: 2021-02-25

## 2021-02-25 VITALS
WEIGHT: 138 LBS | OXYGEN SATURATION: 95 % | BODY MASS INDEX: 20.98 KG/M2 | RESPIRATION RATE: 18 BRPM | TEMPERATURE: 97.9 F | SYSTOLIC BLOOD PRESSURE: 120 MMHG | HEART RATE: 72 BPM | DIASTOLIC BLOOD PRESSURE: 76 MMHG

## 2021-02-25 DIAGNOSIS — E11.42 TYPE 2 DIABETES MELLITUS WITH DIABETIC POLYNEUROPATHY, WITHOUT LONG-TERM CURRENT USE OF INSULIN (HCC): ICD-10-CM

## 2021-02-25 DIAGNOSIS — E78.00 HIGH CHOLESTEROL: ICD-10-CM

## 2021-02-25 DIAGNOSIS — I10 ESSENTIAL HYPERTENSION: ICD-10-CM

## 2021-02-25 DIAGNOSIS — I49.9 IRREGULAR HEART BEAT: ICD-10-CM

## 2021-02-25 DIAGNOSIS — Z78.9 IMPAIRED MOBILITY AND ADLS: ICD-10-CM

## 2021-02-25 DIAGNOSIS — Z74.09 IMPAIRED MOBILITY AND ADLS: ICD-10-CM

## 2021-02-25 DIAGNOSIS — G20 DEMENTIA DUE TO PARKINSON'S DISEASE WITH BEHAVIORAL DISTURBANCE (HCC): Primary | ICD-10-CM

## 2021-02-25 DIAGNOSIS — I25.119 CORONARY ARTERY DISEASE INVOLVING NATIVE HEART WITH ANGINA PECTORIS, UNSPECIFIED VESSEL OR LESION TYPE (HCC): ICD-10-CM

## 2021-02-25 DIAGNOSIS — F02.818 DEMENTIA DUE TO PARKINSON'S DISEASE WITH BEHAVIORAL DISTURBANCE (HCC): Primary | ICD-10-CM

## 2021-02-25 PROCEDURE — 99308 SBSQ NF CARE LOW MDM 20: CPT | Performed by: INTERNAL MEDICINE

## 2021-02-27 NOTE — PROGRESS NOTES
Nursing Home Progress Note        Mitul Stephen DO []  ALESSANDRA Ho []  [address] [place] [address]  Phone: [phone]  Fax: [phone] Inés Vega MD []  Alex Burk DO [x]   [address] [place] [address]  Phone: [phone]  Fax: [phone]     PATIENT NAME: Gama Davila                                                                          YOB: 1940           DATE OF SERVICE: 02/25/2021  FACILITY:  [] [place]   [] [place]   [] [facility]   [x] [facility]  []  [facility]  [] Other ______________________________________________________________________     CHIEF COMPLAINT:  Chronic medical management      HISTORY OF PRESENT ILLNESS:   Patient was seen for routine medical management.  Patient did have a fall on the weekend however there was no reported injury when he was found down on the floor.  On exam today, patient was sleepy and had no interest in trying much history.  He remains pleasantly confused and dependent on all ADLs with nursing staff.  Nursing staff shared that he has been fairly stable with episode of confusion and occasionally refusing medications.    PAST MEDICAL & SURGICAL HISTORY:   Past Medical History:   Diagnosis Date   • Arthritis    • Cancer (CMS/HCC)     SKIN    • Cataract    • Coronary artery disease    • Diabetes mellitus (CMS/HCC)    • High cholesterol    • [place] (hard of hearing)     PATIENT HAS HEARING AIDS   • Hypertension    • Irregular heart beat     HISTORY OF CARDIAC ABLATION IN 2003   • Wears dentures     FULL UPPER PLATE      Past Surgical History:   Procedure Laterality Date   • BACK SURGERY  1978    THEN AGAIN IN 1982   • CARDIAC ABLATION  2003   • CARDIAC SURGERY     • CATARACT EXTRACTION W/ INTRAOCULAR LENS IMPLANT Left 5/24/2017    Procedure: CATARACT PHACO EXTRACTION WITH INTRAOCULAR LENS IMPLANT LEFT WITH TORIC LENS;  Surgeon: Alma Benavidez MD;  Location: [facility];  Service:    •  CATARACT EXTRACTION W/ INTRAOCULAR LENS IMPLANT Right 2017    Procedure: CATARACT PHACO EXTRACTION WITH INTRAOCULAR LENS IMPLANT RIGHT WITH TORIC LENS;  Surgeon: Alma Benavidez MD;  Location: [facility];  Service:    • CORONARY ARTERY BYPASS GRAFT      SPOUSE UNSURE OF HOW MANY VESSELS   • HIATAL HERNIA REPAIR     • JOINT REPLACEMENT Left     HIP - DONE BY DR DALAL   • KNEE ARTHROSCOPY Left    • NOSE SURGERY      SPOUSE REPORTS FOR A BROKEN NOSE   • OTHER SURGICAL HISTORY Left     TENDON TORN LOOSE FROM BONE, LEFT ELBOW   • OTHER SURGICAL HISTORY      RUPTURED DISC   • OTHER SURGICAL HISTORY      NECK SURGERY FOR RUPTURED DISC   • OTHER SURGICAL HISTORY      HAD SECOND NECK SURGERY   • OTHER SURGICAL HISTORY      RUPTURED DISC   • OTHER SURGICAL HISTORY      RUPTURED DISC   • OTHER SURGICAL HISTORY      HARDWARE REMOVAL FROM BACK BY DR HAY         MEDICATIONS:  I have reviewed and reconciled the patients medication list in the patients chart at the skilled nursing facility today, 2021.      ALLERGIES:  Allergies   Allergen Reactions   • Phenergan [Promethazine Hcl] Nausea And Vomiting   • Carbamazepine Unknown - Low Severity   • Hydrocodone Unknown - Low Severity   • Lisinopril Unknown - Low Severity         SOCIAL HISTORY:  Social History     Socioeconomic History   • Marital status:      Spouse name: Not on file   • Number of children: Not on file   • Years of education: Not on file   • Highest education level: Not on file   Tobacco Use   • Smoking status: Former Smoker     Types: Cigarettes     Quit date:      Years since quittin.1   • Smokeless tobacco: Never Used   Substance and Sexual Activity   • Alcohol use: No   • Drug use: No   • Sexual activity: Defer       FAMILY HISTORY:  No family history on file.    REVIEW OF SYSTEMS:  Review of Systems   Constitutional: Negative for chills, fatigue and fever.   HENT: Negative for congestion,  ear pain, rhinorrhea, sinus pressure and sore throat.    Eyes: Negative for visual disturbance.   Respiratory: Negative for cough, chest tightness, shortness of breath and wheezing.    Cardiovascular: Negative for chest pain, palpitations and leg swelling.   Gastrointestinal: Negative for abdominal pain, blood in stool, constipation, diarrhea, nausea and vomiting.   Endocrine: Negative for polydipsia and polyuria.   Genitourinary: Negative for dysuria and hematuria.   Musculoskeletal: Negative for arthralgias and back pain.   Skin: Negative for rash.   Neurological: Negative for dizziness, light-headedness, numbness and headaches.   Psychiatric/Behavioral: Negative for dysphoric mood and sleep disturbance. The patient is not nervous/anxious.           PHYSICAL EXAMINATION:     VITAL SIGNS:  /76   Pulse 72   Temp 97.9 °F (36.6 °C)   Resp 18   Wt 62.6 kg (138 lb)   SpO2 95%   BMI 20.98 kg/m²     Physical Exam  Vitals signs and nursing note reviewed.   Constitutional:       Appearance: He is well-developed.      Comments: Frail bedridden elderly male   HENT:      Head: Normocephalic and atraumatic.      Right Ear: External ear normal.      Left Ear: External ear normal.      Nose: Nose normal.      Mouth/Throat:      Mouth: Mucous membranes are moist.      Pharynx: No oropharyngeal exudate.   Eyes:      General: No scleral icterus.     Conjunctiva/sclera: Conjunctivae normal.      Pupils: Pupils are equal, round, and reactive to light.   Neck:      Musculoskeletal: Normal range of motion and neck supple.      Thyroid: No thyromegaly.   Cardiovascular:      Rate and Rhythm: Normal rate and regular rhythm.      Heart sounds: Normal heart sounds. No murmur. No friction rub. No gallop.    Pulmonary:      Effort: Pulmonary effort is normal. No respiratory distress.      Breath sounds: Normal breath sounds. No wheezing.   Abdominal:      General: Bowel sounds are normal. There is no distension.      Palpations:  Abdomen is soft.      Tenderness: There is no abdominal tenderness.   Musculoskeletal:         General: No deformity or signs of injury.   Lymphadenopathy:      Cervical: No cervical adenopathy.   Skin:     General: Skin is warm and dry.      Findings: No rash.   Neurological:      Mental Status: He is alert and oriented to person, place, and time.   Psychiatric:      Comments: Sleepy         RECORDS REVIEW:       ASSESSMENT     Diagnoses and all orders for this visit:    1. Dementia due to Parkinson's disease with behavioral disturbance (CMS/[facility]) (Primary)    2. Coronary artery disease involving native heart with angina pectoris, unspecified vessel or lesion type (CMS/[facility])    3. Type 2 diabetes mellitus with diabetic polyneuropathy, without long-term current use of insulin (CMS/[facility])    4. Essential hypertension    5. Irregular heart beat    6. High cholesterol    7. Impaired mobility and ADLs        PLAN  Type 2 diabetes mellitus  -  Last A1c was 6.8.  Continue metformin.      Mood disorder  -Stable on Zoloft and Depakote regimen.      Falls  -Recent fall with no injury noted.  Continue for precautions.     Dementia due to Parkinson's disease  -Continue Sinemet.    -Continue supportive care in the nursing facility for all ADLs.  His overall functional status remains poor.     Coronary artery disease/irregular heartbeat  - stable on current cardiac medications.  No changes needed.       [x]  Discussed Patient in detail with nursing/staff, addressed all needs today.     [x]  Plan of Care Reviewed   []  PT/OT Reviewed   []  Order Changes  []  Discharge Plans Reviewed  [x]  Advance Directive on file with Nursing Home.   [x]  POA on file with Nursing Home.    [x]  Code Status listed and reviewed.     I confirm accuracy of unchanged data/findings including physical exam and plan which have been carried forward from previous visit, as well as I have updated appropriately those that have changed        Alex Burk  DO.  2/27/2021

## 2021-03-23 RX ORDER — GABAPENTIN 100 MG/1
100 CAPSULE ORAL NIGHTLY
Qty: 30 CAPSULE | Refills: 5 | Status: SHIPPED | OUTPATIENT
Start: 2021-03-23 | End: 2021-04-12 | Stop reason: SDUPTHER

## 2021-03-25 ENCOUNTER — NURSING HOME (OUTPATIENT)
Dept: INTERNAL MEDICINE | Facility: CLINIC | Age: 81
End: 2021-03-25

## 2021-03-25 VITALS
SYSTOLIC BLOOD PRESSURE: 132 MMHG | DIASTOLIC BLOOD PRESSURE: 62 MMHG | OXYGEN SATURATION: 97 % | HEART RATE: 70 BPM | RESPIRATION RATE: 18 BRPM | WEIGHT: 144 LBS | BODY MASS INDEX: 21.9 KG/M2 | TEMPERATURE: 98.1 F

## 2021-03-25 DIAGNOSIS — E11.9 TYPE 2 DIABETES MELLITUS WITHOUT COMPLICATION, WITHOUT LONG-TERM CURRENT USE OF INSULIN (HCC): ICD-10-CM

## 2021-03-25 DIAGNOSIS — I10 ESSENTIAL HYPERTENSION: ICD-10-CM

## 2021-03-25 DIAGNOSIS — F02.818 DEMENTIA DUE TO PARKINSON'S DISEASE WITH BEHAVIORAL DISTURBANCE (HCC): Primary | ICD-10-CM

## 2021-03-25 DIAGNOSIS — G20 DEMENTIA DUE TO PARKINSON'S DISEASE WITH BEHAVIORAL DISTURBANCE (HCC): Primary | ICD-10-CM

## 2021-03-25 DIAGNOSIS — F02.80 LATE ONSET ALZHEIMER'S DISEASE WITHOUT BEHAVIORAL DISTURBANCE (HCC): ICD-10-CM

## 2021-03-25 DIAGNOSIS — I25.119 CORONARY ARTERY DISEASE INVOLVING NATIVE HEART WITH ANGINA PECTORIS, UNSPECIFIED VESSEL OR LESION TYPE (HCC): ICD-10-CM

## 2021-03-25 DIAGNOSIS — G30.1 LATE ONSET ALZHEIMER'S DISEASE WITHOUT BEHAVIORAL DISTURBANCE (HCC): ICD-10-CM

## 2021-03-25 DIAGNOSIS — E11.42 TYPE 2 DIABETES MELLITUS WITH DIABETIC POLYNEUROPATHY, WITHOUT LONG-TERM CURRENT USE OF INSULIN (HCC): ICD-10-CM

## 2021-03-25 PROCEDURE — 99308 SBSQ NF CARE LOW MDM 20: CPT | Performed by: INTERNAL MEDICINE

## 2021-04-03 NOTE — PROGRESS NOTES
Nursing Home Progress Note        Mitul Stephen DO []  ALESSANDRA Ho []  [address] [place] [address]  Phone: [phone]  Fax: [phone] Inés Vega MD []  Alex Burk DO [x]   [address] [place] [address]  Phone: [phone]  Fax: [phone]     PATIENT NAME: Gama Davila                                                                          YOB: 1940           DATE OF SERVICE: 03/25/2021  FACILITY:  [] [place]   [] [place]   [] [facility]   [x] [facility]  []  [facility]  [] Other ______________________________________________________________________     CHIEF COMPLAINT:  Chronic Medical Management      HISTORY OF PRESENT ILLNESS:   Patient was resting comfortably with no specific complaints or concerns.  Nurses shared that he has been appropriate with taking his medications and with his general behaviors.  Tremors have been in reasonable control.  Glucose was reported in reasonable range.    PAST MEDICAL & SURGICAL HISTORY:   Past Medical History:   Diagnosis Date   • Arthritis    • Cancer (CMS/HCC)     SKIN    • Cataract    • Coronary artery disease    • Diabetes mellitus (CMS/HCC)    • High cholesterol    • [place] (hard of hearing)     PATIENT HAS HEARING AIDS   • Hypertension    • Irregular heart beat     HISTORY OF CARDIAC ABLATION IN 2003   • Wears dentures     FULL UPPER PLATE      Past Surgical History:   Procedure Laterality Date   • BACK SURGERY  1978    THEN AGAIN IN 1982   • CARDIAC ABLATION  2003   • CARDIAC SURGERY     • CATARACT EXTRACTION W/ INTRAOCULAR LENS IMPLANT Left 5/24/2017    Procedure: CATARACT PHACO EXTRACTION WITH INTRAOCULAR LENS IMPLANT LEFT WITH TORIC LENS;  Surgeon: Alma Benavidez MD;  Location: [facility];  Service:    • CATARACT EXTRACTION W/ INTRAOCULAR LENS IMPLANT Right 6/14/2017    Procedure: CATARACT PHACO EXTRACTION WITH INTRAOCULAR LENS IMPLANT RIGHT WITH TORIC LENS;  Surgeon: Alma  Pam Benavidez MD;  Location: [facility];  Service:    • CORONARY ARTERY BYPASS GRAFT      SPOUSE UNSURE OF HOW MANY VESSELS   • HIATAL HERNIA REPAIR     • JOINT REPLACEMENT Left     HIP - DONE BY DR DALAL   • KNEE ARTHROSCOPY Left    • NOSE SURGERY      SPOUSE REPORTS FOR A BROKEN NOSE   • OTHER SURGICAL HISTORY Left     TENDON TORN LOOSE FROM BONE, LEFT ELBOW   • OTHER SURGICAL HISTORY      RUPTURED DISC   • OTHER SURGICAL HISTORY      NECK SURGERY FOR RUPTURED DISC   • OTHER SURGICAL HISTORY      HAD SECOND NECK SURGERY   • OTHER SURGICAL HISTORY      RUPTURED DISC   • OTHER SURGICAL HISTORY      RUPTURED DISC   • OTHER SURGICAL HISTORY      HARDWARE REMOVAL FROM BACK BY DR HAY         MEDICATIONS:  I have reviewed and reconciled the patients medication list in the patients chart at the skilled nursing facility today, 2021.      ALLERGIES:  Allergies   Allergen Reactions   • Phenergan [Promethazine Hcl] Nausea And Vomiting   • Carbamazepine Unknown - Low Severity   • Hydrocodone Unknown - Low Severity   • Lisinopril Unknown - Low Severity         SOCIAL HISTORY:  Social History     Socioeconomic History   • Marital status:      Spouse name: Not on file   • Number of children: Not on file   • Years of education: Not on file   • Highest education level: Not on file   Tobacco Use   • Smoking status: Former Smoker     Types: Cigarettes     Quit date:      Years since quittin.2   • Smokeless tobacco: Never Used   Substance and Sexual Activity   • Alcohol use: No   • Drug use: No   • Sexual activity: Defer       FAMILY HISTORY:  No family history on file.    REVIEW OF SYSTEMS:  Review of Systems   Constitutional: Negative for chills, fatigue and fever.   HENT: Negative for congestion, ear pain, rhinorrhea, sinus pressure and sore throat.    Eyes: Negative for visual disturbance.   Respiratory: Negative for cough, chest tightness, shortness of breath and  wheezing.    Cardiovascular: Negative for chest pain, palpitations and leg swelling.   Gastrointestinal: Negative for abdominal pain, blood in stool, constipation, diarrhea, nausea and vomiting.   Endocrine: Negative for polydipsia and polyuria.   Genitourinary: Negative for dysuria and hematuria.   Musculoskeletal: Negative for arthralgias and back pain.   Skin: Negative for rash.   Neurological: Negative for dizziness, light-headedness, numbness and headaches.   Psychiatric/Behavioral: Negative for dysphoric mood and sleep disturbance. The patient is not nervous/anxious.           PHYSICAL EXAMINATION:     VITAL SIGNS:  /62   Pulse 70   Temp 98.1 °F (36.7 °C)   Resp 18   Wt 65.3 kg (144 lb)   SpO2 97%   BMI 21.90 kg/m²     Physical Exam  Vitals and nursing note reviewed.   Constitutional:       Appearance: He is well-developed.      Comments: Frail bedridden elderly male   HENT:      Head: Normocephalic and atraumatic.      Right Ear: External ear normal.      Left Ear: External ear normal.      Nose: Nose normal.      Mouth/Throat:      Mouth: Mucous membranes are moist.      Pharynx: No oropharyngeal exudate.   Eyes:      General: No scleral icterus.     Conjunctiva/sclera: Conjunctivae normal.      Pupils: Pupils are equal, round, and reactive to light.   Neck:      Thyroid: No thyromegaly.   Cardiovascular:      Rate and Rhythm: Normal rate and regular rhythm.      Heart sounds: Normal heart sounds. No murmur heard.   No friction rub. No gallop.    Pulmonary:      Effort: Pulmonary effort is normal. No respiratory distress.      Breath sounds: Normal breath sounds. No wheezing.   Abdominal:      General: Bowel sounds are normal. There is no distension.      Palpations: Abdomen is soft.      Tenderness: There is no abdominal tenderness.   Musculoskeletal:         General: No deformity or signs of injury.      Cervical back: Normal range of motion and neck supple.   Lymphadenopathy:      Cervical: No  cervical adenopathy.   Skin:     General: Skin is warm and dry.      Findings: No rash.   Neurological:      Mental Status: He is alert and oriented to person, place, and time.   Psychiatric:         Mood and Affect: Mood normal.         RECORDS REVIEW:        ASSESSMENT     Diagnoses and all orders for this visit:    1. Dementia due to Parkinson's disease with behavioral disturbance (CMS/HCC) (Primary)    2. Coronary artery disease involving native heart with angina pectoris, unspecified vessel or lesion type (CMS/[facility])    3. Type 2 diabetes mellitus with diabetic polyneuropathy, without long-term current use of insulin (CMS/[facility])    4. Essential hypertension    5. Type 2 diabetes mellitus without complication, without long-term current use of insulin (CMS/[facility])    6. Late onset Alzheimer's disease without behavioral disturbance (CMS/[facility])        PLAN  Dementia due to Parkinson's disease  -Continue Sinemet.    -Continue supportive care in the nursing facility for all ADLs.  His overall functional status remains poor.    Type 2 diabetes mellitus - controlled.   - good glucose control. Continue metformin.       Mood disorder  -Stable on Zoloft and Depakote regimen.      Falls  -Recent fall with no injury noted.  Continue for precautions.     Coronary artery disease/irregular heartbeat  - stable on current cardiac medications.  No changes needed.       [x]  Discussed Patient in detail with nursing/staff, addressed all needs today.     [x]  Plan of Care Reviewed   []  PT/OT Reviewed   []  Order Changes  []  Discharge Plans Reviewed  [x]  Advance Directive on file with Nursing Home.   [x]  POA on file with Nursing Home.    [x]  Code Status listed and reviewed.     I confirm accuracy of unchanged data/findings including physical exam and plan which have been carried forward from previous visit, as well as I have updated appropriately those that have changed        Alex Burk DO.  4/3/2021

## 2021-04-12 RX ORDER — GABAPENTIN 100 MG/1
100 CAPSULE ORAL NIGHTLY
Qty: 30 CAPSULE | Refills: 5 | Status: SHIPPED | OUTPATIENT
Start: 2021-04-12 | End: 2021-10-27 | Stop reason: SDUPTHER

## 2021-04-29 ENCOUNTER — NURSING HOME (OUTPATIENT)
Dept: INTERNAL MEDICINE | Facility: CLINIC | Age: 81
End: 2021-04-29

## 2021-04-29 VITALS
SYSTOLIC BLOOD PRESSURE: 128 MMHG | WEIGHT: 147 LBS | OXYGEN SATURATION: 95 % | HEART RATE: 87 BPM | BODY MASS INDEX: 22.35 KG/M2 | DIASTOLIC BLOOD PRESSURE: 68 MMHG | RESPIRATION RATE: 18 BRPM | TEMPERATURE: 98.2 F

## 2021-04-29 DIAGNOSIS — I25.119 CORONARY ARTERY DISEASE INVOLVING NATIVE HEART WITH ANGINA PECTORIS, UNSPECIFIED VESSEL OR LESION TYPE (HCC): ICD-10-CM

## 2021-04-29 DIAGNOSIS — E78.00 HIGH CHOLESTEROL: ICD-10-CM

## 2021-04-29 DIAGNOSIS — E11.42 TYPE 2 DIABETES MELLITUS WITH DIABETIC POLYNEUROPATHY, WITHOUT LONG-TERM CURRENT USE OF INSULIN (HCC): ICD-10-CM

## 2021-04-29 DIAGNOSIS — E11.9 TYPE 2 DIABETES MELLITUS WITHOUT COMPLICATION, WITHOUT LONG-TERM CURRENT USE OF INSULIN (HCC): ICD-10-CM

## 2021-04-29 DIAGNOSIS — F02.818 DEMENTIA DUE TO PARKINSON'S DISEASE WITH BEHAVIORAL DISTURBANCE (HCC): Primary | ICD-10-CM

## 2021-04-29 DIAGNOSIS — I49.9 IRREGULAR HEART BEAT: ICD-10-CM

## 2021-04-29 DIAGNOSIS — F02.80 LATE ONSET ALZHEIMER'S DISEASE WITHOUT BEHAVIORAL DISTURBANCE (HCC): ICD-10-CM

## 2021-04-29 DIAGNOSIS — G20 DEMENTIA DUE TO PARKINSON'S DISEASE WITH BEHAVIORAL DISTURBANCE (HCC): Primary | ICD-10-CM

## 2021-04-29 DIAGNOSIS — I10 ESSENTIAL HYPERTENSION: ICD-10-CM

## 2021-04-29 DIAGNOSIS — G30.1 LATE ONSET ALZHEIMER'S DISEASE WITHOUT BEHAVIORAL DISTURBANCE (HCC): ICD-10-CM

## 2021-04-29 PROCEDURE — 99308 SBSQ NF CARE LOW MDM 20: CPT | Performed by: INTERNAL MEDICINE

## 2021-05-03 ENCOUNTER — DOCUMENTATION (OUTPATIENT)
Dept: FAMILY MEDICINE CLINIC | Facility: CLINIC | Age: 81
End: 2021-05-03

## 2021-05-03 LAB — HBA1C MFR BLD: 7 %

## 2021-05-03 NOTE — PROGRESS NOTES
Nursing Home Progress Note        Mitul Stephen DO []  ALESSANDRA Ho []  [address] [place] [address]  Phone: [phone]  Fax: [phone] Inés Vega MD []  Alex Burk DO [x]   [address] [place] [address]  Phone: [phone]  Fax: [phone]     PATIENT NAME: Gama Davila                                                                          YOB: 1940           DATE OF SERVICE: 04/29/2021  FACILITY:  [] [place]   [] [place]   [] [facility]   [x] [facility]  []  [facility]  [] Other ______________________________________________________________________     CHIEF COMPLAINT:  Chronic Medical Management      HISTORY OF PRESENT ILLNESS:   Patient was resting comfortably in his bed with no specific complaints or concerns.  Nurses share that the patient continues to have episodes of confusion at times however his behaviors are manageable.  Glucose levels have been ranging between 101 40s with a maximum glucose of 170s over the last month.    PAST MEDICAL & SURGICAL HISTORY:   Past Medical History:   Diagnosis Date   • Arthritis    • Cancer (CMS/HCC)     SKIN    • Cataract    • Coronary artery disease    • Diabetes mellitus (CMS/HCC)    • High cholesterol    • [place] (hard of hearing)     PATIENT HAS HEARING AIDS   • Hypertension    • Irregular heart beat     HISTORY OF CARDIAC ABLATION IN 2003   • Wears dentures     FULL UPPER PLATE      Past Surgical History:   Procedure Laterality Date   • BACK SURGERY  1978    THEN AGAIN IN 1982   • CARDIAC ABLATION  2003   • CARDIAC SURGERY     • CATARACT EXTRACTION W/ INTRAOCULAR LENS IMPLANT Left 5/24/2017    Procedure: CATARACT PHACO EXTRACTION WITH INTRAOCULAR LENS IMPLANT LEFT WITH TORIC LENS;  Surgeon: Alma Benavidez MD;  Location: [facility];  Service:    • CATARACT EXTRACTION W/ INTRAOCULAR LENS IMPLANT Right 6/14/2017    Procedure: CATARACT PHACO EXTRACTION WITH INTRAOCULAR LENS IMPLANT  RIGHT WITH TORIC LENS;  Surgeon: Alma Benavidez MD;  Location: [facility];  Service:    • CORONARY ARTERY BYPASS GRAFT      SPOUSE UNSURE OF HOW MANY VESSELS   • HIATAL HERNIA REPAIR     • JOINT REPLACEMENT Left     HIP - DONE BY DR DALAL   • KNEE ARTHROSCOPY Left    • NOSE SURGERY      SPOUSE REPORTS FOR A BROKEN NOSE   • OTHER SURGICAL HISTORY Left     TENDON TORN LOOSE FROM BONE, LEFT ELBOW   • OTHER SURGICAL HISTORY      RUPTURED DISC   • OTHER SURGICAL HISTORY      NECK SURGERY FOR RUPTURED DISC   • OTHER SURGICAL HISTORY      HAD SECOND NECK SURGERY   • OTHER SURGICAL HISTORY      RUPTURED DISC   • OTHER SURGICAL HISTORY      RUPTURED DISC   • OTHER SURGICAL HISTORY      HARDWARE REMOVAL FROM BACK BY DR HAY         MEDICATIONS:  I have reviewed and reconciled the patients medication list in the patients chart at the skilled nursing facility today, 2021.      ALLERGIES:  Allergies   Allergen Reactions   • Phenergan [Promethazine Hcl] Nausea And Vomiting   • Carbamazepine Unknown - Low Severity   • Hydrocodone Unknown - Low Severity   • Lisinopril Unknown - Low Severity         SOCIAL HISTORY:  Social History     Socioeconomic History   • Marital status:      Spouse name: Not on file   • Number of children: Not on file   • Years of education: Not on file   • Highest education level: Not on file   Tobacco Use   • Smoking status: Former Smoker     Types: Cigarettes     Quit date:      Years since quittin.3   • Smokeless tobacco: Never Used   Substance and Sexual Activity   • Alcohol use: No   • Drug use: No   • Sexual activity: Defer       FAMILY HISTORY:  No family history on file.    REVIEW OF SYSTEMS:  Review of Systems   Constitutional: Negative for chills, fatigue and fever.   HENT: Negative for congestion, ear pain, rhinorrhea, sinus pressure and sore throat.    Eyes: Negative for visual disturbance.   Respiratory: Negative for cough, chest  tightness, shortness of breath and wheezing.    Cardiovascular: Negative for chest pain, palpitations and leg swelling.   Gastrointestinal: Negative for abdominal pain, blood in stool, constipation, diarrhea, nausea and vomiting.   Endocrine: Negative for polydipsia and polyuria.   Genitourinary: Negative for dysuria and hematuria.   Musculoskeletal: Negative for arthralgias and back pain.   Skin: Negative for rash.   Neurological: Negative for dizziness, light-headedness, numbness and headaches.   Psychiatric/Behavioral: Negative for dysphoric mood and sleep disturbance. The patient is not nervous/anxious.           PHYSICAL EXAMINATION:     VITAL SIGNS:  /68   Pulse 87   Temp 98.2 °F (36.8 °C)   Resp 18   Wt 66.7 kg (147 lb)   SpO2 95%   BMI 22.35 kg/m²     Physical Exam  Vitals and nursing note reviewed.   Constitutional:       Appearance: Normal appearance. He is well-developed.   HENT:      Head: Normocephalic and atraumatic.   Eyes:      Extraocular Movements: Extraocular movements intact.      Conjunctiva/sclera: Conjunctivae normal.   Pulmonary:      Effort: Pulmonary effort is normal.   Musculoskeletal:      Cervical back: Normal range of motion and neck supple.   Skin:     General: Skin is warm and dry.      Findings: No rash.   Neurological:      General: No focal deficit present.      Mental Status: He is alert and oriented to person, place, and time.   Psychiatric:         Mood and Affect: Mood normal.         Behavior: Behavior normal.         RECORDS REVIEW:   2/19 CMP in normal range.    ASSESSMENT     Diagnoses and all orders for this visit:    1. Dementia due to Parkinson's disease with behavioral disturbance (CMS/[facility]) (Primary)    2. Coronary artery disease involving native heart with angina pectoris, unspecified vessel or lesion type (CMS/[facility])    3. Type 2 diabetes mellitus with diabetic polyneuropathy, without long-term current use of insulin (CMS/[facility])    4. Essential  hypertension    5. Type 2 diabetes mellitus without complication, without long-term current use of insulin (CMS/[facility])    6. Late onset Alzheimer's disease without behavioral disturbance (CMS/[facility])    7. Irregular heart beat    8. High cholesterol        PLAN    Type 2 diabetes mellitus - controlled.   - good glucose control. Continue metformin.   Patient is due for A1c and CBC.  Labs were ordered today.    Dementia due to Parkinson's disease  -Continue Sinemet.    -Continue supportive care in the nursing facility for all ADLs.  His overall functional status remains poor.      Mood disorder  -Stable on Zoloft and Depakote regimen.      Falls  -Recent fall with no injury noted.  Continue for precautions.     Coronary artery disease/irregular heartbeat  - stable on current cardiac medications.  No changes needed.       [x]  Discussed Patient in detail with nursing/staff, addressed all needs today.     [x]  Plan of Care Reviewed   []  PT/OT Reviewed   [x]  Order Changes  []  Discharge Plans Reviewed  [x]  Advance Directive on file with Nursing Home.   [x]  POA on file with Nursing Home.    [x]  Code Status listed and reviewed.     I confirm accuracy of unchanged data/findings including physical exam and plan which have been carried forward from previous visit, as well as I have updated appropriately those that have changed        Alex Burk DO.  5/3/2021

## 2021-05-20 ENCOUNTER — NURSING HOME (OUTPATIENT)
Dept: FAMILY MEDICINE CLINIC | Facility: CLINIC | Age: 81
End: 2021-05-20

## 2021-05-20 VITALS
SYSTOLIC BLOOD PRESSURE: 130 MMHG | TEMPERATURE: 97.4 F | RESPIRATION RATE: 18 BRPM | DIASTOLIC BLOOD PRESSURE: 70 MMHG | BODY MASS INDEX: 21.74 KG/M2 | HEART RATE: 76 BPM | WEIGHT: 143 LBS

## 2021-05-20 DIAGNOSIS — Z78.9 IMPAIRED MOBILITY AND ADLS: ICD-10-CM

## 2021-05-20 DIAGNOSIS — F02.818 DEMENTIA DUE TO PARKINSON'S DISEASE WITH BEHAVIORAL DISTURBANCE (HCC): Chronic | ICD-10-CM

## 2021-05-20 DIAGNOSIS — Z74.09 IMPAIRED MOBILITY AND ADLS: ICD-10-CM

## 2021-05-20 DIAGNOSIS — G20 DEMENTIA DUE TO PARKINSON'S DISEASE WITH BEHAVIORAL DISTURBANCE (HCC): Chronic | ICD-10-CM

## 2021-05-20 DIAGNOSIS — F39 MOOD DISORDER (HCC): ICD-10-CM

## 2021-05-20 DIAGNOSIS — G20 PARKINSON'S DISEASE (TREMOR, STIFFNESS, SLOW MOTION, UNSTABLE POSTURE) (HCC): ICD-10-CM

## 2021-05-20 PROCEDURE — 99308 SBSQ NF CARE LOW MDM 20: CPT | Performed by: NURSE PRACTITIONER

## 2021-05-23 PROBLEM — R41.82 AMS (ALTERED MENTAL STATUS): Status: RESOLVED | Noted: 2020-04-04 | Resolved: 2021-05-23

## 2021-05-23 NOTE — PROGRESS NOTES
Telemedicine nursing Home Progress Note        Mitul Stephen DO []  ALESSANDRA Ho [x]  [address] [place] [address]  Phone: [phone]  Fax: [phone] Inés Vega MD []  ALESSANDRA Ho [x]   [address] [place] [address]  Phone: [phone]  Fax: [phone]     PATIENT NAME: Gama Davila                                                                          YOB: 1940           DATE OF SERVICE: 5/20/2021  FACILITY:  [] [place]   [] [place]   [] [facility]   [x] [facility]  []  [facility]  [] Other     ______________________________________________________________________     CHIEF COMPLAINT:     Worsening moods and behaviors      HISTORY OF PRESENT ILLNESS:     Nursing reports that patient's behaviors continue to worsen related to his Parkinson's Dementia. He continues to be very wayne with staff and other residents, tries to leave the facility. He is wondering about the facility today in his wheelchair.     PAST MEDICAL & SURGICAL HISTORY:   Past Medical History:   Diagnosis Date   • Arthritis    • Cancer (CMS/HCC)     SKIN    • Cataract    • Coronary artery disease    • Diabetes mellitus (CMS/HCC)    • High cholesterol    • [place] (hard of hearing)     PATIENT HAS HEARING AIDS   • Hypertension    • Irregular heart beat     HISTORY OF CARDIAC ABLATION IN 2003   • Wears dentures     FULL UPPER PLATE      Past Surgical History:   Procedure Laterality Date   • BACK SURGERY  1978    THEN AGAIN IN 1982   • CARDIAC ABLATION  2003   • CARDIAC SURGERY     • CATARACT EXTRACTION W/ INTRAOCULAR LENS IMPLANT Left 5/24/2017    Procedure: CATARACT PHACO EXTRACTION WITH INTRAOCULAR LENS IMPLANT LEFT WITH TORIC LENS;  Surgeon: Alma Benavidez MD;  Location: [facility];  Service:    • CATARACT EXTRACTION W/ INTRAOCULAR LENS IMPLANT Right 6/14/2017    Procedure: CATARACT PHACO EXTRACTION WITH INTRAOCULAR LENS IMPLANT RIGHT WITH TORIC LENS;   Surgeon: Alma Benavidez MD;  Location: [facility];  Service:    • CORONARY ARTERY BYPASS GRAFT      SPOUSE UNSURE OF HOW MANY VESSELS   • HIATAL HERNIA REPAIR     • JOINT REPLACEMENT Left     HIP - DONE BY DR DALAL   • KNEE ARTHROSCOPY Left    • NOSE SURGERY      SPOUSE REPORTS FOR A BROKEN NOSE   • OTHER SURGICAL HISTORY Left     TENDON TORN LOOSE FROM BONE, LEFT ELBOW   • OTHER SURGICAL HISTORY      RUPTURED DISC   • OTHER SURGICAL HISTORY      NECK SURGERY FOR RUPTURED DISC   • OTHER SURGICAL HISTORY      HAD SECOND NECK SURGERY   • OTHER SURGICAL HISTORY      RUPTURED DISC   • OTHER SURGICAL HISTORY      RUPTURED DISC   • OTHER SURGICAL HISTORY      HARDWARE REMOVAL FROM BACK BY DR HAY         MEDICATIONS:  I have reviewed and reconciled the patients medication list in the patients chart at the skilled nursing facility today.      ALLERGIES:  Allergies   Allergen Reactions   • Phenergan [Promethazine Hcl] Nausea And Vomiting   • Carbamazepine Unknown - Low Severity   • Hydrocodone Unknown - Low Severity   • Lisinopril Unknown - Low Severity         SOCIAL HISTORY:  Social History     Socioeconomic History   • Marital status:      Spouse name: Not on file   • Number of children: Not on file   • Years of education: Not on file   • Highest education level: Not on file   Tobacco Use   • Smoking status: Former Smoker     Types: Cigarettes     Quit date:      Years since quittin.4   • Smokeless tobacco: Never Used   Substance and Sexual Activity   • Alcohol use: No   • Drug use: No   • Sexual activity: Defer       FAMILY HISTORY:  No family history on file.    REVIEW OF SYSTEMS:  Review of Systems   Unable to perform ROS: Dementia (ROS per nursing and patient)   HENT: Negative for drooling, facial swelling, mouth sores, rhinorrhea and trouble swallowing.    Eyes: Negative for discharge and redness.   Respiratory: Negative for cough, chest tightness and  shortness of breath.    Cardiovascular: Negative.  Negative for chest pain and palpitations.   Gastrointestinal: Negative for abdominal distention, anal bleeding, blood in stool, constipation, diarrhea, nausea and vomiting.   Genitourinary: Negative for difficulty urinating, discharge, frequency, penile swelling and scrotal swelling.        Incontinence   Musculoskeletal: Positive for arthralgias.        Trigger finger right 4th digit    contractures   Skin: Negative.    Neurological: Positive for weakness. Negative for speech difficulty.   Hematological: Negative for adenopathy.   Psychiatric/Behavioral: Positive for behavioral problems and confusion. Negative for suicidal ideas. The patient is hyperactive. The patient is not nervous/anxious.           PHYSICAL EXAMINATION:     VITAL SIGNS:  /70   Pulse 76   Temp 97.4 °F (36.3 °C)   Resp 18   Wt 64.9 kg (143 lb)   BMI 21.74 kg/m²     Physical Exam   Constitutional: He is oriented to person, place, and time. He is not intubated.   HENT:   Head: Normocephalic and atraumatic.   Eyes: Conjunctivae are normal.   Pulmonary/Chest: Effort normal and breath sounds normal. He is not intubated.   Abdominal: Normal appearance.   Musculoskeletal:      Right shoulder: He exhibits decreased range of motion.      Right hand: He exhibits decreased range of motion.        Hands:       Comments: Chronic NM defs, and contractures, s/t Parkinson's   Neurological: He is alert and oriented to person, place, and time.   Skin: No rash noted.   Psychiatric: His behavior is normal. Cognition and memory are impaired. He has a flat affect.   Nursing note and vitals reviewed.      RECORDS REVIEW:   Most recent labs    ASSESSMENT     Diagnoses and all orders for this visit:    1. Dementia due to Parkinson's disease with behavioral disturbance (CMS/HCC)    2. Parkinson's disease (tremor, stiffness, slow motion, unstable posture) (CMS/HCC)    3. Mood disorder (CMS/HCC)    4. Impaired  mobility and ADLs        PLAN    Parkinson's dementia/Mood disorder  -Patient on medications for his Parkinson's and dementia. He is being followed by Psych. Will have Psych manage his medications and adjust at their discretion. Will follow up and continue to monitor.     Nursing encouraged to keep me informed of any acute changes, lack of improvement, or any new concerning symptoms.    Staff to continue supportive care for all ADLs.     [x]  Discussed Patient in detail with nursing/staff, addressed all needs today.     [x]  Plan of Care Reviewed   [x]  PT/OT Reviewed   [x]  Order Changes  []  Discharge Plans Reviewed  [x]  Advance Directive on file with Nursing Home.   [x]  POA on file with Nursing Home.    [x]  Code Status listed and reviewed.         “I confirm accuracy of unchanged data/findings which have been carried forward from previous visit, as well as I have updated appropriately those that have changed.”                       Charleen Randall, APRN.  5/23/2021

## 2021-05-27 ENCOUNTER — NURSING HOME (OUTPATIENT)
Dept: INTERNAL MEDICINE | Facility: CLINIC | Age: 81
End: 2021-05-27

## 2021-05-27 VITALS
HEART RATE: 88 BPM | DIASTOLIC BLOOD PRESSURE: 74 MMHG | RESPIRATION RATE: 18 BRPM | OXYGEN SATURATION: 95 % | SYSTOLIC BLOOD PRESSURE: 128 MMHG | WEIGHT: 143 LBS | BODY MASS INDEX: 21.74 KG/M2 | TEMPERATURE: 98.2 F

## 2021-05-27 DIAGNOSIS — G20 DEMENTIA DUE TO PARKINSON'S DISEASE WITH BEHAVIORAL DISTURBANCE (HCC): Primary | ICD-10-CM

## 2021-05-27 DIAGNOSIS — I10 ESSENTIAL HYPERTENSION: ICD-10-CM

## 2021-05-27 DIAGNOSIS — G30.1 LATE ONSET ALZHEIMER'S DISEASE WITHOUT BEHAVIORAL DISTURBANCE (HCC): ICD-10-CM

## 2021-05-27 DIAGNOSIS — I25.119 CORONARY ARTERY DISEASE INVOLVING NATIVE HEART WITH ANGINA PECTORIS, UNSPECIFIED VESSEL OR LESION TYPE (HCC): ICD-10-CM

## 2021-05-27 DIAGNOSIS — F02.80 LATE ONSET ALZHEIMER'S DISEASE WITHOUT BEHAVIORAL DISTURBANCE (HCC): ICD-10-CM

## 2021-05-27 DIAGNOSIS — F02.818 DEMENTIA DUE TO PARKINSON'S DISEASE WITH BEHAVIORAL DISTURBANCE (HCC): Primary | ICD-10-CM

## 2021-05-27 DIAGNOSIS — Z78.9 IMPAIRED MOBILITY AND ADLS: ICD-10-CM

## 2021-05-27 DIAGNOSIS — I49.9 IRREGULAR HEART BEAT: ICD-10-CM

## 2021-05-27 DIAGNOSIS — Z74.09 IMPAIRED MOBILITY AND ADLS: ICD-10-CM

## 2021-05-27 DIAGNOSIS — E11.42 TYPE 2 DIABETES MELLITUS WITH DIABETIC POLYNEUROPATHY, WITHOUT LONG-TERM CURRENT USE OF INSULIN (HCC): ICD-10-CM

## 2021-05-27 PROCEDURE — 99308 SBSQ NF CARE LOW MDM 20: CPT | Performed by: INTERNAL MEDICINE

## 2021-05-31 NOTE — PROGRESS NOTES
Nursing Home Progress Note        Mitul Stephen DO []  ALESSANDRA Ho []  [address] [place] [address]  Phone: [phone]  Fax: [phone] Inés Vega MD []  Alex Burk DO [x]   [address] [place] [address]  Phone: [phone]  Fax: [phone]     PATIENT NAME: Gama Davila                                                                          YOB: 1940           DATE OF SERVICE: 05/27/2021  FACILITY:  [] [place]   [] [place]   [] [facility]   [x] [facility]  []  [facility]  [] Other ______________________________________________________________________     CHIEF COMPLAINT:  Chronic Medical Management      HISTORY OF PRESENT ILLNESS:   Patient seem to be in good spirits today as she was moving about the facility in his wheelchair.  He has no specific complaints or concerns.  Nurses shared that patient's mood and behaviors have been better recently.  He has been taking his medications appropriately.    PAST MEDICAL & SURGICAL HISTORY:   Past Medical History:   Diagnosis Date   • Arthritis    • Cancer (CMS/HCC)     SKIN    • Cataract    • Coronary artery disease    • Diabetes mellitus (CMS/HCC)    • High cholesterol    • [place] (hard of hearing)     PATIENT HAS HEARING AIDS   • Hypertension    • Irregular heart beat     HISTORY OF CARDIAC ABLATION IN 2003   • Wears dentures     FULL UPPER PLATE      Past Surgical History:   Procedure Laterality Date   • BACK SURGERY  1978    THEN AGAIN IN 1982   • CARDIAC ABLATION  2003   • CARDIAC SURGERY     • CATARACT EXTRACTION W/ INTRAOCULAR LENS IMPLANT Left 5/24/2017    Procedure: CATARACT PHACO EXTRACTION WITH INTRAOCULAR LENS IMPLANT LEFT WITH TORIC LENS;  Surgeon: Alma Benavidez MD;  Location: [facility];  Service:    • CATARACT EXTRACTION W/ INTRAOCULAR LENS IMPLANT Right 6/14/2017    Procedure: CATARACT PHACO EXTRACTION WITH INTRAOCULAR LENS IMPLANT RIGHT WITH TORIC LENS;  Surgeon:  Alma Benavidez MD;  Location: [facility] OR;  Service:    • CORONARY ARTERY BYPASS GRAFT      SPOUSE UNSURE OF HOW MANY VESSELS   • HIATAL HERNIA REPAIR     • JOINT REPLACEMENT Left     HIP - DONE BY DR DALAL   • KNEE ARTHROSCOPY Left    • NOSE SURGERY      SPOUSE REPORTS FOR A BROKEN NOSE   • OTHER SURGICAL HISTORY Left     TENDON TORN LOOSE FROM BONE, LEFT ELBOW   • OTHER SURGICAL HISTORY      RUPTURED DISC   • OTHER SURGICAL HISTORY      NECK SURGERY FOR RUPTURED DISC   • OTHER SURGICAL HISTORY      HAD SECOND NECK SURGERY   • OTHER SURGICAL HISTORY      RUPTURED DISC   • OTHER SURGICAL HISTORY      RUPTURED DISC   • OTHER SURGICAL HISTORY      HARDWARE REMOVAL FROM BACK BY DR HAY         MEDICATIONS:  I have reviewed and reconciled the patients medication list in the patients chart at the skilled nursing facility today, 2021.      ALLERGIES:  Allergies   Allergen Reactions   • Phenergan [Promethazine Hcl] Nausea And Vomiting   • Carbamazepine Unknown - Low Severity   • Hydrocodone Unknown - Low Severity   • Lisinopril Unknown - Low Severity         SOCIAL HISTORY:  Social History     Socioeconomic History   • Marital status:      Spouse name: Not on file   • Number of children: Not on file   • Years of education: Not on file   • Highest education level: Not on file   Tobacco Use   • Smoking status: Former Smoker     Types: Cigarettes     Quit date:      Years since quittin.4   • Smokeless tobacco: Never Used   Substance and Sexual Activity   • Alcohol use: No   • Drug use: No   • Sexual activity: Defer       FAMILY HISTORY:  No family history on file.    REVIEW OF SYSTEMS:  Review of Systems   Constitutional: Negative for chills, fatigue and fever.   HENT: Negative for congestion, ear pain, rhinorrhea, sinus pressure and sore throat.    Eyes: Negative for visual disturbance.   Respiratory: Negative for cough, chest tightness, shortness of breath  and wheezing.    Cardiovascular: Negative for chest pain, palpitations and leg swelling.   Gastrointestinal: Negative for abdominal pain, blood in stool, constipation, diarrhea, nausea and vomiting.   Endocrine: Negative for polydipsia and polyuria.   Genitourinary: Negative for dysuria and hematuria.   Musculoskeletal: Negative for arthralgias and back pain.   Skin: Negative for rash.   Neurological: Negative for dizziness, light-headedness, numbness and headaches.   Psychiatric/Behavioral: Negative for dysphoric mood and sleep disturbance. The patient is not nervous/anxious.           PHYSICAL EXAMINATION:     VITAL SIGNS:  /74   Pulse 88   Temp 98.2 °F (36.8 °C)   Resp 18   Wt 64.9 kg (143 lb)   SpO2 95%   BMI 21.74 kg/m²     Physical Exam  Vitals and nursing note reviewed.   Constitutional:       Appearance: Normal appearance. He is well-developed.   HENT:      Head: Normocephalic and atraumatic.   Eyes:      Extraocular Movements: Extraocular movements intact.      Conjunctiva/sclera: Conjunctivae normal.   Cardiovascular:      Rate and Rhythm: Normal rate. Rhythm irregular.      Heart sounds: No murmur heard.     Pulmonary:      Effort: Pulmonary effort is normal.      Breath sounds: No wheezing.   Musculoskeletal:      Cervical back: Normal range of motion and neck supple.   Skin:     General: Skin is warm and dry.      Findings: No rash.   Neurological:      General: No focal deficit present.      Mental Status: He is alert and oriented to person, place, and time.   Psychiatric:         Mood and Affect: Mood normal.         Behavior: Behavior normal.         RECORDS REVIEW:       ASSESSMENT     Diagnoses and all orders for this visit:    1. Dementia due to Parkinson's disease with behavioral disturbance (CMS/HCC) (Primary)    2. Coronary artery disease involving native heart with angina pectoris, unspecified vessel or lesion type (CMS/HCC)    3. Type 2 diabetes mellitus with diabetic  polyneuropathy, without long-term current use of insulin (CMS/[facility])    4. Essential hypertension    5. Late onset Alzheimer's disease without behavioral disturbance (CMS/HCC)    6. Irregular heart beat    7. Impaired mobility and ADLs        PLAN  Type 2 diabetes mellitus - controlled.   -Stable control on metformin.      Dementia due to Parkinson's disease  -Continue Sinemet.    -Good control recently.  Mood and behaviors are appropriate      Mood disorder  -Stable on Zoloft and Depakote regimen.  If he remains in good spirits, consider decreasing Zoloft.     Falls  -Recent fall with no injury noted.  Continue for precautions.     Coronary artery disease/irregular heartbeat  - stable on current cardiac medications.  No changes needed.       [x]  Discussed Patient in detail with nursing/staff, addressed all needs today.     [x]  Plan of Care Reviewed   []  PT/OT Reviewed   []  Order Changes  []  Discharge Plans Reviewed  [x]  Advance Directive on file with Nursing Home.   [x]  POA on file with Nursing Home.    [x]  Code Status listed and reviewed.     I confirm accuracy of unchanged data/findings including physical exam and plan which have been carried forward from previous visit, as well as I have updated appropriately those that have changed        Alex Burk DO.  5/31/2021

## 2021-06-24 ENCOUNTER — NURSING HOME (OUTPATIENT)
Dept: INTERNAL MEDICINE | Facility: CLINIC | Age: 81
End: 2021-06-24

## 2021-06-24 VITALS
TEMPERATURE: 98.2 F | WEIGHT: 144 LBS | BODY MASS INDEX: 21.9 KG/M2 | RESPIRATION RATE: 18 BRPM | HEART RATE: 84 BPM | DIASTOLIC BLOOD PRESSURE: 76 MMHG | SYSTOLIC BLOOD PRESSURE: 128 MMHG | OXYGEN SATURATION: 95 %

## 2021-06-24 DIAGNOSIS — G20 DEMENTIA DUE TO PARKINSON'S DISEASE WITH BEHAVIORAL DISTURBANCE (HCC): Primary | ICD-10-CM

## 2021-06-24 DIAGNOSIS — E11.42 TYPE 2 DIABETES MELLITUS WITH DIABETIC POLYNEUROPATHY, WITHOUT LONG-TERM CURRENT USE OF INSULIN (HCC): ICD-10-CM
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
## 2021-06-24 DIAGNOSIS — Z78.9 IMPAIRED MOBILITY AND ADLS: ICD-10-CM

## 2021-06-24 DIAGNOSIS — E78.00 HIGH CHOLESTEROL: ICD-10-CM

## 2021-06-24 DIAGNOSIS — I10 ESSENTIAL HYPERTENSION: ICD-10-CM

## 2021-06-24 DIAGNOSIS — Z74.09 IMPAIRED MOBILITY AND ADLS: ICD-10-CM

## 2021-06-24 DIAGNOSIS — F02.818 DEMENTIA DUE TO PARKINSON'S DISEASE WITH BEHAVIORAL DISTURBANCE (HCC): Primary | ICD-10-CM

## 2021-06-24 DIAGNOSIS — G30.1 LATE ONSET ALZHEIMER'S DISEASE WITHOUT BEHAVIORAL DISTURBANCE (HCC): ICD-10-CM

## 2021-06-24 DIAGNOSIS — I25.119 CORONARY ARTERY DISEASE INVOLVING NATIVE HEART WITH ANGINA PECTORIS, UNSPECIFIED VESSEL OR LESION TYPE (HCC): ICD-10-CM

## 2021-06-24 DIAGNOSIS — I49.9 IRREGULAR HEART BEAT: ICD-10-CM

## 2021-06-24 DIAGNOSIS — F02.80 LATE ONSET ALZHEIMER'S DISEASE WITHOUT BEHAVIORAL DISTURBANCE (HCC): ICD-10-CM

## 2021-06-24 PROCEDURE — 99308 SBSQ NF CARE LOW MDM 20: CPT | Performed by: INTERNAL MEDICINE

## 2021-06-28 NOTE — PROGRESS NOTES
Nursing Home Progress Note        Mitul Stephen DO []  ALESSANDRA Ho []  [address] [facility], [place] [address]  Phone: [phone]  Fax: [phone] Inés Vega MD []  Alex Burk DO [x]   [address] [place] [address]  Phone: [phone]  Fax: [phone]     PATIENT NAME: Gama Davila                                                                          YOB: 1940           DATE OF SERVICE: 06/24/2021  FACILITY:  [] [place]   [] [place]   [] [facility]   [] [facility]  []  [facility]  [] Other ______________________________________________________________________     CHIEF COMPLAINT:  Chronic Medical Management      HISTORY OF PRESENT ILLNESS:   Patient was resting comfortably in his bed with no specific complaints or concerns.  He has been eating and drinking well.  He has been moving about the facility in his wheelchair comfortably.  He was in good spirits today.    PAST MEDICAL & SURGICAL HISTORY:   Past Medical History:   Diagnosis Date   • Arthritis    • Cancer (CMS/HCC)     SKIN    • Cataract    • Coronary artery disease    • Diabetes mellitus (CMS/HCC)    • High cholesterol    • [place] (hard of hearing)     PATIENT HAS HEARING AIDS   • Hypertension    • Irregular heart beat     HISTORY OF CARDIAC ABLATION IN 2003   • Wears dentures     FULL UPPER PLATE      Past Surgical History:   Procedure Laterality Date   • BACK SURGERY  1978    THEN AGAIN IN 1982   • CARDIAC ABLATION  2003   • CARDIAC SURGERY     • CATARACT EXTRACTION W/ INTRAOCULAR LENS IMPLANT Left 5/24/2017    Procedure: CATARACT PHACO EXTRACTION WITH INTRAOCULAR LENS IMPLANT LEFT WITH TORIC LENS;  Surgeon: Alma Benavidez MD;  Location: [facility] OR;  Service:    • CATARACT EXTRACTION W/ INTRAOCULAR LENS IMPLANT Right 6/14/2017    Procedure: CATARACT PHACO EXTRACTION WITH INTRAOCULAR LENS IMPLANT RIGHT WITH TORIC LENS;  Surgeon: Alma Benavidez MD;  Location: [facility]  OR;  Service:    • CORONARY ARTERY BYPASS GRAFT      SPOUSE UNSURE OF HOW MANY VESSELS   • HIATAL HERNIA REPAIR     • JOINT REPLACEMENT Left     HIP - DONE BY DR DALAL   • KNEE ARTHROSCOPY Left    • NOSE SURGERY      SPOUSE REPORTS FOR A BROKEN NOSE   • OTHER SURGICAL HISTORY Left     TENDON TORN LOOSE FROM BONE, LEFT ELBOW   • OTHER SURGICAL HISTORY      RUPTURED DISC   • OTHER SURGICAL HISTORY      NECK SURGERY FOR RUPTURED DISC   • OTHER SURGICAL HISTORY      HAD SECOND NECK SURGERY   • OTHER SURGICAL HISTORY      RUPTURED DISC   • OTHER SURGICAL HISTORY      RUPTURED DISC   • OTHER SURGICAL HISTORY      HARDWARE REMOVAL FROM BACK BY DR HAY         MEDICATIONS:  I have reviewed and reconciled the patients medication list in the patients chart at the [facility] nursing [facility] today, 2021.      ALLERGIES:  Allergies   Allergen Reactions   • Phenergan [Promethazine Hcl] Nausea And Vomiting   • Carbamazepine Unknown - Low Severity   • Hydrocodone Unknown - Low Severity   • Lisinopril Unknown - Low Severity         SOCIAL HISTORY:  Social History     Socioeconomic History   • Marital status:      Spouse name: Not on file   • Number of children: Not on file   • Years of education: Not on file   • Highest education level: Not on file   Tobacco Use   • Smoking status: Former Smoker     Types: Cigarettes     Quit date:      Years since quittin.5   • Smokeless tobacco: Never Used   Substance and Sexual Activity   • Alcohol use: No   • Drug use: No   • Sexual activity: Defer       FAMILY HISTORY:  History reviewed. No pertinent family history.    REVIEW OF SYSTEMS:  Review of Systems   Constitutional: Negative for chills, fatigue and fever.   HENT: Negative for congestion, ear pain, rhinorrhea, sinus pressure and sore throat.    Eyes: Negative for visual disturbance.   Respiratory: Negative for cough, chest tightness, shortness of breath and wheezing.     Cardiovascular: Negative for chest pain, palpitations and leg swelling.   Gastrointestinal: Negative for abdominal pain, blood in stool, constipation, diarrhea, nausea and vomiting.   Endocrine: Negative for polydipsia and polyuria.   Genitourinary: Negative for dysuria and hematuria.   Musculoskeletal: Negative for arthralgias and back pain.   Skin: Negative for rash.   Neurological: Negative for dizziness, light-headedness, numbness and headaches.   Psychiatric/Behavioral: Negative for dysphoric mood and sleep disturbance. The patient is not nervous/anxious.           PHYSICAL EXAMINATION:     VITAL SIGNS:  /76   Pulse 84   Temp 98.2 °F (36.8 °C)   Resp 18   Wt 65.3 kg (144 lb)   SpO2 95%   BMI 21.90 kg/m²     Physical Exam  Vitals and nursing note reviewed.   Constitutional:       Appearance: Normal appearance. He is well-developed.   HENT:      Head: Normocephalic and atraumatic.   Eyes:      Extraocular Movements: Extraocular movements intact.      Conjunctiva/sclera: Conjunctivae normal.   Cardiovascular:      Rate and Rhythm: Normal rate. Rhythm irregular.      Heart sounds: No murmur heard.     Pulmonary:      Effort: Pulmonary effort is normal.      Breath sounds: No wheezing.   Musculoskeletal:      Cervical back: Normal range of motion and neck supple.   Skin:     General: Skin is warm and dry.      Findings: No rash.   Neurological:      General: No focal deficit present.      Mental Status: He is alert and oriented to person, place, and time.   Psychiatric:         Mood and Affect: Mood normal.         Behavior: Behavior normal.         RECORDS REVIEW:       ASSESSMENT     Diagnoses and all orders for this visit:    1. Dementia due to Parkinson's disease with behavioral disturbance (CMS/HCC) (Primary)    2. Coronary artery disease involving native heart with angina pectoris, unspecified vessel or lesion type (CMS/HCC)    3. Essential hypertension    4. Type 2 diabetes mellitus with  diabetic polyneuropathy, without long-term current use of insulin (CMS/[facility])    5. Late onset Alzheimer's disease without behavioral disturbance (CMS/[facility])    6. Irregular heart beat    7. Impaired mobility and ADLs    8. High cholesterol    9. Fall, subsequent encounter        PLAN  Type 2 diabetes mellitus - controlled.   -Stable control on metformin.      Dementia due to Parkinson's disease  -Continue Sinemet.    -Good control recently.  Mood and behaviors are appropriate      Mood disorder  -Stable on Zoloft and Depakote regimen.  Prefer not to change regimen at this time.      Falls  - no recent falls reported.  Continue for precautions.     Coronary artery disease/irregular heartbeat  - stable on current cardiac medications.  No changes needed.          [x]  Discussed Patient in detail with nursing/staff, addressed all needs today.     [x]  Plan of Care Reviewed   []  PT/OT Reviewed   []  Order Changes  []  Discharge Plans Reviewed  [x]  Advance Directive on file with Nursing Home.   [x]  POA on file with Nursing Home.    [x]  Code Status listed and reviewed.     I confirm accuracy of unchanged data/findings including physical exam and plan which have been carried forward from previous visit, as well as I have updated appropriately those that have changed        Alex Burk DO.  6/28/2021

## 2021-07-22 ENCOUNTER — NURSING HOME (OUTPATIENT)
Dept: INTERNAL MEDICINE | Facility: CLINIC | Age: 81
End: 2021-07-22

## 2021-07-22 VITALS
SYSTOLIC BLOOD PRESSURE: 124 MMHG | TEMPERATURE: 97.4 F | BODY MASS INDEX: 21.74 KG/M2 | DIASTOLIC BLOOD PRESSURE: 64 MMHG | HEART RATE: 74 BPM | WEIGHT: 143 LBS | OXYGEN SATURATION: 97 % | RESPIRATION RATE: 14 BRPM
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
## 2021-07-22 DIAGNOSIS — F02.818 DEMENTIA DUE TO PARKINSON'S DISEASE WITH BEHAVIORAL DISTURBANCE (HCC): Primary | ICD-10-CM

## 2021-07-22 DIAGNOSIS — I10 ESSENTIAL HYPERTENSION: ICD-10-CM

## 2021-07-22 DIAGNOSIS — F02.80 LATE ONSET ALZHEIMER'S DISEASE WITHOUT BEHAVIORAL DISTURBANCE (HCC): ICD-10-CM

## 2021-07-22 DIAGNOSIS — I25.119 CORONARY ARTERY DISEASE INVOLVING NATIVE HEART WITH ANGINA PECTORIS, UNSPECIFIED VESSEL OR LESION TYPE (HCC): ICD-10-CM

## 2021-07-22 DIAGNOSIS — I49.9 IRREGULAR HEART BEAT: ICD-10-CM

## 2021-07-22 DIAGNOSIS — G20 DEMENTIA DUE TO PARKINSON'S DISEASE WITH BEHAVIORAL DISTURBANCE (HCC): Primary | ICD-10-CM

## 2021-07-22 DIAGNOSIS — E11.42 TYPE 2 DIABETES MELLITUS WITH DIABETIC POLYNEUROPATHY, WITHOUT LONG-TERM CURRENT USE OF INSULIN (HCC): ICD-10-CM

## 2021-07-22 DIAGNOSIS — G30.1 LATE ONSET ALZHEIMER'S DISEASE WITHOUT BEHAVIORAL DISTURBANCE (HCC): ICD-10-CM

## 2021-07-22 PROCEDURE — 99308 SBSQ NF CARE LOW MDM 20: CPT | Performed by: INTERNAL MEDICINE

## 2021-07-26 NOTE — PROGRESS NOTES
Nursing Home Progress Note        Mitul Stephen DO []  ALESSANDRA Ho []  [address] [facility], [place] [address]  Phone: [phone]  Fax: [phone] Inés Vega MD []  Alex Burk DO [x]   [address] [place] [address]  Phone: [phone]  Fax: [phone]     PATIENT NAME: Gama Davila                                                                          YOB: 1940           DATE OF SERVICE: 07/22/2021  FACILITY:  [] [place]   [] [place]   [] [facility]   [x] [facility]  []  [facility]  [] Other ______________________________________________________________________     CHIEF COMPLAINT:  Chronic Medical Management      HISTORY OF PRESENT ILLNESS:   Patient was laying comfortably in his bed.  Sleepy on exam today but felt content with his care.  He denied any new complaints.        PAST MEDICAL & SURGICAL HISTORY:   Past Medical History:   Diagnosis Date   • Arthritis    • Cancer (CMS/HCC)     SKIN    • Cataract    • Coronary artery disease    • Diabetes mellitus (CMS/HCC)    • High cholesterol    • [place] (hard of hearing)     PATIENT HAS HEARING AIDS   • Hypertension    • Irregular heart beat     HISTORY OF CARDIAC ABLATION IN 2003   • Wears dentures     FULL UPPER PLATE      Past Surgical History:   Procedure Laterality Date   • BACK SURGERY  1978    THEN AGAIN IN 1982   • CARDIAC ABLATION  2003   • CARDIAC SURGERY     • CATARACT EXTRACTION W/ INTRAOCULAR LENS IMPLANT Left 5/24/2017    Procedure: CATARACT PHACO EXTRACTION WITH INTRAOCULAR LENS IMPLANT LEFT WITH TORIC LENS;  Surgeon: Alma Benavidez MD;  Location: [facility] OR;  Service:    • CATARACT EXTRACTION W/ INTRAOCULAR LENS IMPLANT Right 6/14/2017    Procedure: CATARACT PHACO EXTRACTION WITH INTRAOCULAR LENS IMPLANT RIGHT WITH TORIC LENS;  Surgeon: Alma Benavidez MD;  Location: [facility] OR;  Service:    • CORONARY ARTERY BYPASS GRAFT  2003    SPOUSE UNSURE OF HOW MANY VESSELS    • HIATAL HERNIA REPAIR     • JOINT REPLACEMENT Left     HIP - DONE BY DR DALAL   • KNEE ARTHROSCOPY Left    • NOSE SURGERY      SPOUSE REPORTS FOR A BROKEN NOSE   • OTHER SURGICAL HISTORY Left     TENDON TORN LOOSE FROM BONE, LEFT ELBOW   • OTHER SURGICAL HISTORY      RUPTURED DISC   • OTHER SURGICAL HISTORY      NECK SURGERY FOR RUPTURED DISC   • OTHER SURGICAL HISTORY      HAD SECOND NECK SURGERY   • OTHER SURGICAL HISTORY      RUPTURED DISC   • OTHER SURGICAL HISTORY      RUPTURED DISC   • OTHER SURGICAL HISTORY      HARDWARE REMOVAL FROM BACK BY DR HAY         MEDICATIONS:  I have reviewed and reconciled the patients medication list in the patients chart at the [facility] nursing [facility] today, 2021.      ALLERGIES:  Allergies   Allergen Reactions   • Phenergan [Promethazine Hcl] Nausea And Vomiting   • Carbamazepine Unknown - Low Severity   • Hydrocodone Unknown - Low Severity   • Lisinopril Unknown - Low Severity         SOCIAL HISTORY:  Social History     Socioeconomic History   • Marital status:      Spouse name: Not on file   • Number of children: Not on file   • Years of education: Not on file   • Highest education level: Not on file   Tobacco Use   • Smoking status: Former Smoker     Types: Cigarettes     Quit date:      Years since quittin.5   • Smokeless tobacco: Never Used   Substance and Sexual Activity   • Alcohol use: No   • Drug use: No   • Sexual activity: Defer       FAMILY HISTORY:  No family history on file.    REVIEW OF SYSTEMS:  Review of Systems   Constitutional: Negative for chills, fatigue and fever.   HENT: Negative for congestion, ear pain, rhinorrhea, sinus pressure and sore throat.    Eyes: Negative for visual disturbance.   Respiratory: Negative for cough, chest tightness, shortness of breath and wheezing.    Cardiovascular: Negative for chest pain, palpitations and leg swelling.   Gastrointestinal: Negative for abdominal pain,  blood in stool, constipation, diarrhea, nausea and vomiting.   Endocrine: Negative for polydipsia and polyuria.   Genitourinary: Negative for dysuria and hematuria.   Musculoskeletal: Negative for arthralgias and back pain.   Skin: Negative for rash.   Neurological: Negative for dizziness, light-headedness, numbness and headaches.   Psychiatric/Behavioral: Negative for dysphoric mood and sleep disturbance. The patient is not nervous/anxious.           PHYSICAL EXAMINATION:     VITAL SIGNS:  /64   Pulse 74   Temp 97.4 °F (36.3 °C)   Resp 14   Wt 64.9 kg (143 lb)   SpO2 97%   BMI 21.74 kg/m²     Physical Exam  Vitals and nursing note reviewed.   Constitutional:       Appearance: Normal appearance. He is well-developed.   HENT:      Head: Normocephalic and atraumatic.   Eyes:      Extraocular Movements: Extraocular movements intact.      Conjunctiva/sclera: Conjunctivae normal.   Pulmonary:      Effort: Pulmonary effort is normal.   Musculoskeletal:      Cervical back: Normal range of motion and neck supple.   Skin:     General: Skin is warm and dry.      Findings: No rash.   Neurological:      General: No focal deficit present.      Mental Status: He is alert and oriented to person, place, and time.   Psychiatric:         Mood and Affect: Mood normal.         Behavior: Behavior normal.         RECORDS REVIEW:   Facility glucose log, range is from 108 to 220s.  Mostly around 180s    ASSESSMENT     Diagnoses and all orders for this visit:    1. Dementia due to Parkinson's disease with behavioral disturbance (CMS/HCC) (Primary)    2. Coronary artery disease involving native heart with angina pectoris, unspecified vessel or lesion type (CMS/HCC)    3. Essential hypertension    4. Type 2 diabetes mellitus with diabetic polyneuropathy, without long-term current use of insulin (CMS/HCC)    5. Late onset Alzheimer's disease without behavioral disturbance (CMS/HCC)    6. Irregular heart beat    7. Fall, subsequent  encounter        PLAN  Type 2 diabetes mellitus - controlled.   -Stable control on metformin and basaglar     Dementia due to Parkinson's disease  -Continue Sinemet.    -Good control recently.  Mood and behaviors are appropriate      Mood disorder  -Stable on Zoloft and Depakote regimen.  Prefer not to change regimen at this time.      Falls  - no recent falls reported.  Continue for precautions.     Coronary artery disease/irregular heartbeat  - stable on current cardiac medications.  No changes needed.        [x]  Discussed Patient in detail with nursing/staff, addressed all needs today.     [x]  Plan of Care Reviewed   []  PT/OT Reviewed   []  Order Changes  []  Discharge Plans Reviewed  [x]  Advance Directive on file with Nursing Home.   [x]  POA on file with Nursing Home.    [x]  Code Status listed and reviewed.     I confirm accuracy of unchanged data/findings including physical exam and plan which have been carried forward from previous visit, as well as I have updated appropriately those that have changed        Alex Burk DO.  7/26/2021

## 2021-08-26 ENCOUNTER — NURSING HOME (OUTPATIENT)
Dept: INTERNAL MEDICINE | Facility: CLINIC | Age: 81
End: 2021-08-26

## 2021-08-26 VITALS
OXYGEN SATURATION: 97 % | WEIGHT: 146 LBS | TEMPERATURE: 97.8 F | HEART RATE: 80 BPM | DIASTOLIC BLOOD PRESSURE: 68 MMHG | RESPIRATION RATE: 18 BRPM | BODY MASS INDEX: 22.2 KG/M2 | SYSTOLIC BLOOD PRESSURE: 120 MMHG

## 2021-08-26 DIAGNOSIS — G20 DEMENTIA DUE TO PARKINSON'S DISEASE WITH BEHAVIORAL DISTURBANCE (HCC): Primary | ICD-10-CM

## 2021-08-26 DIAGNOSIS — I10 ESSENTIAL HYPERTENSION: ICD-10-CM

## 2021-08-26 DIAGNOSIS — I25.119 CORONARY ARTERY DISEASE INVOLVING NATIVE HEART WITH ANGINA PECTORIS, UNSPECIFIED VESSEL OR LESION TYPE (HCC): ICD-10-CM

## 2021-08-26 DIAGNOSIS — Z78.9 IMPAIRED MOBILITY AND ADLS: ICD-10-CM

## 2021-08-26 DIAGNOSIS — F02.818 DEMENTIA DUE TO PARKINSON'S DISEASE WITH BEHAVIORAL DISTURBANCE (HCC): Primary | ICD-10-CM

## 2021-08-26 DIAGNOSIS — Z74.09 IMPAIRED MOBILITY AND ADLS: ICD-10-CM

## 2021-08-26 DIAGNOSIS — E11.42 TYPE 2 DIABETES MELLITUS WITH DIABETIC POLYNEUROPATHY, WITHOUT LONG-TERM CURRENT USE OF INSULIN (HCC): ICD-10-CM

## 2021-08-26 DIAGNOSIS — I49.9 IRREGULAR HEART BEAT: ICD-10-CM

## 2021-08-26 PROCEDURE — 99308 SBSQ NF CARE LOW MDM 20: CPT | Performed by: INTERNAL MEDICINE

## 2021-08-27 ENCOUNTER — NURSING HOME (OUTPATIENT)
Dept: FAMILY MEDICINE CLINIC | Facility: CLINIC | Age: 81
End: 2021-08-27

## 2021-08-27 VITALS
WEIGHT: 146 LBS | RESPIRATION RATE: 16 BRPM | OXYGEN SATURATION: 98 % | HEART RATE: 68 BPM | BODY MASS INDEX: 22.2 KG/M2 | SYSTOLIC BLOOD PRESSURE: 120 MMHG | TEMPERATURE: 98.2 F | DIASTOLIC BLOOD PRESSURE: 68 MMHG

## 2021-08-27 DIAGNOSIS — G20 DEMENTIA DUE TO PARKINSON'S DISEASE WITH BEHAVIORAL DISTURBANCE (HCC): Chronic | ICD-10-CM

## 2021-08-27 DIAGNOSIS — Z79.899 ENCOUNTER FOR MEDICATION REVIEW: ICD-10-CM

## 2021-08-27 DIAGNOSIS — Z74.09 IMPAIRED MOBILITY AND ADLS: ICD-10-CM

## 2021-08-27 DIAGNOSIS — Z87.898 HISTORY OF INSOMNIA: ICD-10-CM

## 2021-08-27 DIAGNOSIS — Z78.9 IMPAIRED MOBILITY AND ADLS: ICD-10-CM

## 2021-08-27 DIAGNOSIS — F02.818 DEMENTIA DUE TO PARKINSON'S DISEASE WITH BEHAVIORAL DISTURBANCE (HCC): Chronic | ICD-10-CM

## 2021-08-27 PROCEDURE — 99308 SBSQ NF CARE LOW MDM 20: CPT | Performed by: NURSE PRACTITIONER

## 2021-08-28 PROBLEM — R33.8 ACUTE RETENTION OF URINE: Status: RESOLVED | Noted: 2020-04-05 | Resolved: 2021-08-28

## 2021-08-28 NOTE — PROGRESS NOTES
Telemedicine nursing Home Progress Note        Mitul Stephen DO []  ALESSANDRA Ho [x]  [address] [place] [address]  Phone: [phone]  Fax: [phone] Inés Vega MD []  ALESSANDRA Ho [x]   [address] [place] [address]  Phone: [phone]  Fax: [phone]     PATIENT NAME: Gama Davila                                                                          YOB: 1940           DATE OF SERVICE: 8/27/2021  FACILITY:  [] [facility]   [] [place]   [] [facility]   [x] [facility]  []  [facility]  [] Other     ______________________________________________________________________     CHIEF COMPLAINT:    Medication review      HISTORY OF PRESENT ILLNESS:     Nursing reports that an order was written by psych in late April for trazodone 12.5 mg nightly to help with insomnia.  Disorder was never started on medication review per nursing today.  Nursing inquiring about need to start medication at this time.  Patient is moving about the facility today in his wheelchair with no complaints or signs and symptoms of distress.  His dementia behaviors appear to be stable at this time.      PAST MEDICAL & SURGICAL HISTORY:   Past Medical History:   Diagnosis Date   • Arthritis    • Cancer (CMS/HCC)     SKIN    • Cataract    • Coronary artery disease    • Diabetes mellitus (CMS/HCC)    • High cholesterol    • [place] (hard of hearing)     PATIENT HAS HEARING AIDS   • Hypertension    • Irregular heart beat     HISTORY OF CARDIAC ABLATION IN 2003   • Wears dentures     FULL UPPER PLATE      Past Surgical History:   Procedure Laterality Date   • BACK SURGERY  1978    THEN AGAIN IN 1982   • CARDIAC ABLATION  2003   • CARDIAC SURGERY     • CATARACT EXTRACTION W/ INTRAOCULAR LENS IMPLANT Left 5/24/2017    Procedure: CATARACT PHACO EXTRACTION WITH INTRAOCULAR LENS IMPLANT LEFT WITH TORIC LENS;  Surgeon: Alma Benavidez MD;  Location: [facility];  Service:     • CATARACT EXTRACTION W/ INTRAOCULAR LENS IMPLANT Right 2017    Procedure: CATARACT PHACO EXTRACTION WITH INTRAOCULAR LENS IMPLANT RIGHT WITH TORIC LENS;  Surgeon: Alma Benavidez MD;  Location: [facility];  Service:    • CORONARY ARTERY BYPASS GRAFT      SPOUSE UNSURE OF HOW MANY VESSELS   • HIATAL HERNIA REPAIR     • JOINT REPLACEMENT Left     HIP - DONE BY DR DALAL   • KNEE ARTHROSCOPY Left    • NOSE SURGERY      SPOUSE REPORTS FOR A BROKEN NOSE   • OTHER SURGICAL HISTORY Left     TENDON TORN LOOSE FROM BONE, LEFT ELBOW   • OTHER SURGICAL HISTORY      RUPTURED DISC   • OTHER SURGICAL HISTORY      NECK SURGERY FOR RUPTURED DISC   • OTHER SURGICAL HISTORY      HAD SECOND NECK SURGERY   • OTHER SURGICAL HISTORY      RUPTURED DISC   • OTHER SURGICAL HISTORY      RUPTURED DISC   • OTHER SURGICAL HISTORY      HARDWARE REMOVAL FROM BACK BY DR HAY         MEDICATIONS:  I have reviewed and reconciled the patients medication list in the patients chart at the skilled nursing facility today.      ALLERGIES:  Allergies   Allergen Reactions   • Phenergan [Promethazine Hcl] Nausea And Vomiting   • Carbamazepine Unknown - Low Severity   • Hydrocodone Unknown - Low Severity   • Lisinopril Unknown - Low Severity         SOCIAL HISTORY:  Social History     Socioeconomic History   • Marital status:      Spouse name: Not on file   • Number of children: Not on file   • Years of education: Not on file   • Highest education level: Not on file   Tobacco Use   • Smoking status: Former Smoker     Types: Cigarettes     Quit date:      Years since quittin.6   • Smokeless tobacco: Never Used   Substance and Sexual Activity   • Alcohol use: No   • Drug use: No   • Sexual activity: Defer       FAMILY HISTORY:  No family history on file.    REVIEW OF SYSTEMS:  Review of Systems   Unable to perform ROS: Dementia (ROS per nursing and patient)   HENT: Negative for drooling,  facial swelling, mouth sores, rhinorrhea and trouble swallowing.    Eyes: Negative for discharge and redness.   Respiratory: Negative for cough, chest tightness and shortness of breath.    Cardiovascular: Negative.  Negative for chest pain and palpitations.   Gastrointestinal: Negative for abdominal distention, anal bleeding, blood in stool, constipation, diarrhea, nausea and vomiting.   Genitourinary: Negative for difficulty urinating, discharge, frequency, penile swelling and scrotal swelling.        Incontinence   Musculoskeletal: Positive for arthralgias.   Skin: Negative.    Neurological: Positive for weakness. Negative for speech difficulty.   Hematological: Negative for adenopathy.   Psychiatric/Behavioral: Positive for behavioral problems and confusion. Negative for dysphoric mood, sleep disturbance and suicidal ideas. The patient is not nervous/anxious and is not hyperactive.           PHYSICAL EXAMINATION:     VITAL SIGNS:  /68   Pulse 68   Temp 98.2 °F (36.8 °C)   Resp 16   Wt 66.2 kg (146 lb)   SpO2 98%   BMI 22.20 kg/m²     Physical Exam   Constitutional: He is oriented to person, place, and time. He is not intubated.   HENT:   Head: Normocephalic and atraumatic.   Eyes: Conjunctivae are normal.   Pulmonary/Chest: Effort normal and breath sounds normal. He is not intubated.   Abdominal: Normal appearance.   Musculoskeletal:      Right hand: He exhibits decreased range of motion.        Hands:       Comments: Chronic NM defs, and contractures, s/t Parkinson's   Neurological: He is alert and oriented to person, place, and time.   Skin: No rash noted.   Psychiatric: His behavior is normal. Cognition and memory are impaired. He has a flat affect.   Nursing note and vitals reviewed.      RECORDS REVIEW:   Most recent labs    ASSESSMENT     Diagnoses and all orders for this visit:    1. Encounter for medication review    2. History of insomnia    3. Dementia due to Parkinson's disease with  behavioral disturbance (CMS/[facility])    4. Impaired mobility and ADLs        PLAN    History of insomnia/Dementia  -Patient with history of insomnia in April at the time that trazodone was prescribed but has not had any issues with insomnia as of recent per nursing and documentation in PCC.  Will not write a new order for trazodone at this time as it is not needed.  Will follow-up and continue to monitor.    Nursing encouraged to keep me informed of any acute changes, lack of improvement, or any new concerning symptoms.    Staff to continue supportive care for all ADLs.     [x]  Discussed Patient in detail with nursing/staff, addressed all needs today.     [x]  Plan of Care Reviewed   [x]  PT/OT Reviewed   [x]  Order Changes  []  Discharge Plans Reviewed  [x]  Advance Directive on file with Nursing Home.   [x]  POA on file with Nursing Home.    [x]  Code Status listed and reviewed.         “I confirm accuracy of unchanged data/findings which have been carried forward from previous visit, as well as I have updated appropriately those that have changed.”                   Charleen Randall, APRN.  8/28/2021

## 2021-08-30 NOTE — PROGRESS NOTES
Nursing Home Progress Note        Mitul Stephen DO []  ALESSANDRA Ho []  [address], [place] [address]  Phone: [phone]  Fax: [phone] Inés Vega MD []  Alex Burk DO [x]   [address] [place] [address]  Phone: [phone]  Fax: [phone]     PATIENT NAME: Gama Davila                                                                          YOB: 1940           DATE OF SERVICE: 08/26/2021  FACILITY:  [] [place]   [] [place]   [] [facility]   [x] [facility]  []  [facility]  [] Other ______________________________________________________________________     CHIEF COMPLAINT:  Chronic Medical Management      HISTORY OF PRESENT ILLNESS:   Patient has been doing well.  She frequently runs about the facility in his wheelchair.  He remains in good spirits on exam today.  He denies any new or acute events.  Nurses share that the patient behavior has been stable.  He has been compliant with taking all of his medications including his insulin.  Glucose has been in fair control.    PAST MEDICAL & SURGICAL HISTORY:   Past Medical History:   Diagnosis Date   • Arthritis    • Cancer (CMS/HCC)     SKIN    • Cataract    • Coronary artery disease    • Diabetes mellitus (CMS/HCC)    • High cholesterol    • [place] (hard of hearing)     PATIENT HAS HEARING AIDS   • Hypertension    • Irregular heart beat     HISTORY OF CARDIAC ABLATION IN 2003   • Wears dentures     FULL UPPER PLATE      Past Surgical History:   Procedure Laterality Date   • BACK SURGERY  1978    THEN AGAIN IN 1982   • CARDIAC ABLATION  2003   • CARDIAC SURGERY     • CATARACT EXTRACTION W/ INTRAOCULAR LENS IMPLANT Left 5/24/2017    Procedure: CATARACT PHACO EXTRACTION WITH INTRAOCULAR LENS IMPLANT LEFT WITH TORIC LENS;  Surgeon: Alma Benavidez MD;  Location: [facility];  Service:    • CATARACT EXTRACTION W/ INTRAOCULAR LENS IMPLANT Right 6/14/2017    Procedure: CATARACT PHACO  EXTRACTION WITH INTRAOCULAR LENS IMPLANT RIGHT WITH TORIC LENS;  Surgeon: Alma Benavidez MD;  Location: [facility];  Service:    • CORONARY ARTERY BYPASS GRAFT      SPOUSE UNSURE OF HOW MANY VESSELS   • HIATAL HERNIA REPAIR     • JOINT REPLACEMENT Left     HIP - DONE BY DR DALAL   • KNEE ARTHROSCOPY Left    • NOSE SURGERY      SPOUSE REPORTS FOR A BROKEN NOSE   • OTHER SURGICAL HISTORY Left     TENDON TORN LOOSE FROM BONE, LEFT ELBOW   • OTHER SURGICAL HISTORY      RUPTURED DISC   • OTHER SURGICAL HISTORY      NECK SURGERY FOR RUPTURED DISC   • OTHER SURGICAL HISTORY      HAD SECOND NECK SURGERY   • OTHER SURGICAL HISTORY      RUPTURED DISC   • OTHER SURGICAL HISTORY      RUPTURED DISC   • OTHER SURGICAL HISTORY      HARDWARE REMOVAL FROM BACK BY DR HAY         MEDICATIONS:  I have reviewed and reconciled the patients medication list in the patients chart at the skilled nursing facility today, 2021.      ALLERGIES:  Allergies   Allergen Reactions   • Phenergan [Promethazine Hcl] Nausea And Vomiting   • Carbamazepine Unknown - Low Severity   • Hydrocodone Unknown - Low Severity   • Lisinopril Unknown - Low Severity         SOCIAL HISTORY:  Social History     Socioeconomic History   • Marital status:      Spouse name: Not on file   • Number of children: Not on file   • Years of education: Not on file   • Highest education level: Not on file   Tobacco Use   • Smoking status: Former Smoker     Types: Cigarettes     Quit date:      Years since quittin.6   • Smokeless tobacco: Never Used   Substance and Sexual Activity   • Alcohol use: No   • Drug use: No   • Sexual activity: Defer       FAMILY HISTORY:  No family history on file.    REVIEW OF SYSTEMS:  Review of Systems   Constitutional: Negative for chills, fatigue and fever.   HENT: Negative for congestion, ear pain, rhinorrhea, sinus pressure and sore throat.    Eyes: Negative for visual disturbance.    Respiratory: Negative for cough, chest tightness, shortness of breath and wheezing.    Cardiovascular: Negative for chest pain, palpitations and leg swelling.   Gastrointestinal: Negative for abdominal pain, blood in stool, constipation, diarrhea, nausea and vomiting.   Endocrine: Negative for polydipsia and polyuria.   Genitourinary: Negative for dysuria and hematuria.   Musculoskeletal: Negative for arthralgias and back pain.   Skin: Negative for rash.   Neurological: Negative for dizziness, light-headedness, numbness and headaches.   Psychiatric/Behavioral: Negative for dysphoric mood and sleep disturbance. The patient is not nervous/anxious.           PHYSICAL EXAMINATION:     VITAL SIGNS:  /68   Pulse 80   Temp 97.8 °F (36.6 °C)   Resp 18   Wt 66.2 kg (146 lb)   SpO2 97% Comment: RA  BMI 22.20 kg/m²     Physical Exam  Vitals and nursing note reviewed.   Constitutional:       General: He is not in acute distress.     Appearance: Normal appearance. He is well-developed.      Comments: Wheelchair-bound elderly male   HENT:      Head: Normocephalic and atraumatic.   Eyes:      Extraocular Movements: Extraocular movements intact.      Conjunctiva/sclera: Conjunctivae normal.   Cardiovascular:      Rate and Rhythm: Normal rate and regular rhythm.   Pulmonary:      Effort: Pulmonary effort is normal.      Breath sounds: Normal breath sounds.   Musculoskeletal:      Cervical back: Normal range of motion and neck supple.   Skin:     General: Skin is warm and dry.      Findings: No rash.   Neurological:      General: No focal deficit present.      Mental Status: He is alert and oriented to person, place, and time.   Psychiatric:         Mood and Affect: Mood normal.         Behavior: Behavior normal.         RECORDS REVIEW:        ASSESSMENT     Diagnoses and all orders for this visit:    1. Dementia due to Parkinson's disease with behavioral disturbance (CMS/HCC) (Primary)    2. Essential  hypertension    3. Irregular heart beat    4. Type 2 diabetes mellitus with diabetic polyneuropathy, without long-term current use of insulin (CMS/[facility])    5. Coronary artery disease involving native heart with angina pectoris, unspecified vessel or lesion type (CMS/[facility])    6. Impaired mobility and ADLs        PLAN  Type 2 diabetes mellitus - controlled.   -Stable control on metformin and basaglar, glucose log reviewed.      Dementia due to Parkinson's disease  -Continue Sinemet.    -Good control recently.  Mood and behaviors are appropriate      Mood disorder  -Stable on Zoloft and Depakote regimen.      Falls  - no recent falls reported.  Continue for precautions.     Coronary artery disease/irregular heartbeat  - stable on current cardiac medications.  No changes needed.         [x]  Discussed Patient in detail with nursing/staff, addressed all needs today.     [x]  Plan of Care Reviewed   []  PT/OT Reviewed   [x]  Order Changes  []  Discharge Plans Reviewed  [x]  Advance Directive on file with Nursing Home.   [x]  POA on file with Nursing Home.    [x]  Code Status listed and reviewed.     I confirm accuracy of unchanged data/findings including physical exam and plan which have been carried forward from previous visit, as well as I have updated appropriately those that have changed        Alex Burk DO.  8/30/2021

## 2021-09-23 ENCOUNTER — NURSING HOME (OUTPATIENT)
Dept: INTERNAL MEDICINE | Facility: CLINIC | Age: 81
End: 2021-09-23

## 2021-09-23 DIAGNOSIS — Z78.9 IMPAIRED MOBILITY AND ADLS: ICD-10-CM

## 2021-09-23 DIAGNOSIS — I10 ESSENTIAL HYPERTENSION: ICD-10-CM

## 2021-09-23 DIAGNOSIS — F02.818 DEMENTIA DUE TO PARKINSON'S DISEASE WITH BEHAVIORAL DISTURBANCE (HCC): Primary | ICD-10-CM

## 2021-09-23 DIAGNOSIS — I25.119 CORONARY ARTERY DISEASE INVOLVING NATIVE HEART WITH ANGINA PECTORIS, UNSPECIFIED VESSEL OR LESION TYPE (HCC): ICD-10-CM

## 2021-09-23 DIAGNOSIS — I49.9 IRREGULAR HEART BEAT: ICD-10-CM

## 2021-09-23 DIAGNOSIS — Z74.09 IMPAIRED MOBILITY AND ADLS: ICD-10-CM

## 2021-09-23 DIAGNOSIS — G20 DEMENTIA DUE TO PARKINSON'S DISEASE WITH BEHAVIORAL DISTURBANCE (HCC): Primary | ICD-10-CM

## 2021-09-23 DIAGNOSIS — E11.42 TYPE 2 DIABETES MELLITUS WITH DIABETIC POLYNEUROPATHY, WITHOUT LONG-TERM CURRENT USE OF INSULIN (HCC): ICD-10-CM

## 2021-09-23 PROCEDURE — 99308 SBSQ NF CARE LOW MDM 20: CPT | Performed by: INTERNAL MEDICINE

## 2021-09-27 ENCOUNTER — NURSING HOME (OUTPATIENT)
Dept: FAMILY MEDICINE CLINIC | Facility: CLINIC | Age: 81
End: 2021-09-27

## 2021-09-27 VITALS
BODY MASS INDEX: 22.05 KG/M2 | DIASTOLIC BLOOD PRESSURE: 68 MMHG | RESPIRATION RATE: 16 BRPM | SYSTOLIC BLOOD PRESSURE: 120 MMHG | HEART RATE: 68 BPM | WEIGHT: 145 LBS | OXYGEN SATURATION: 98 % | TEMPERATURE: 98.2 F

## 2021-09-27 VITALS
TEMPERATURE: 98.2 F | HEART RATE: 68 BPM | OXYGEN SATURATION: 98 % | RESPIRATION RATE: 16 BRPM | WEIGHT: 145 LBS | SYSTOLIC BLOOD PRESSURE: 120 MMHG | DIASTOLIC BLOOD PRESSURE: 68 MMHG | BODY MASS INDEX: 22.05 KG/M2

## 2021-09-27 DIAGNOSIS — R46.89 AGGRESSION: Primary | ICD-10-CM

## 2021-09-27 DIAGNOSIS — F39 MOOD DISORDER (HCC): ICD-10-CM

## 2021-09-27 DIAGNOSIS — Z74.09 IMPAIRED MOBILITY AND ADLS: ICD-10-CM

## 2021-09-27 DIAGNOSIS — Z78.9 IMPAIRED MOBILITY AND ADLS: ICD-10-CM

## 2021-09-27 DIAGNOSIS — G20 DEMENTIA DUE TO PARKINSON'S DISEASE WITH BEHAVIORAL DISTURBANCE (HCC): Chronic | ICD-10-CM

## 2021-09-27 DIAGNOSIS — F02.818 DEMENTIA DUE TO PARKINSON'S DISEASE WITH BEHAVIORAL DISTURBANCE (HCC): Chronic | ICD-10-CM

## 2021-09-27 PROCEDURE — 99309 SBSQ NF CARE MODERATE MDM 30: CPT | Performed by: NURSE PRACTITIONER

## 2021-09-27 NOTE — PROGRESS NOTES
Nursing Home Progress Note        Mitul Stephen DO []  ALESSANDRA Ho []  [address] [place] [address]  Phone: [phone]  Fax: [phone] Inés Vega MD []  Alex Burk DO [x]   [address] [place] [address]  Phone: [phone]  Fax: [phone]     PATIENT NAME: Gama Davila                                                                          YOB: 1940           DATE OF SERVICE: 09/23/2021  FACILITY:  [] [place]   [] [place]   [] [facility]   [x] [facility]  []  [facility]  [] Other ______________________________________________________________________     CHIEF COMPLAINT:  Chronic Medical Management      HISTORY OF PRESENT ILLNESS:   Patient was active today moving about in his wheelchair in the facility.  Nurses have noted that patient has had more exit seeking behaviors recently becoming frustrated that he cannot go outside.  At times he may become angry.  He has not had any significant behavior episodes and has been able to be redirected when needed.    PAST MEDICAL & SURGICAL HISTORY:   Past Medical History:   Diagnosis Date   • Arthritis    • Cancer (CMS/HCC)     SKIN    • Cataract    • Coronary artery disease    • Diabetes mellitus (CMS/HCC)    • High cholesterol    • [place] (hard of hearing)     PATIENT HAS HEARING AIDS   • Hypertension    • Irregular heart beat     HISTORY OF CARDIAC ABLATION IN 2003   • Wears dentures     FULL UPPER PLATE      Past Surgical History:   Procedure Laterality Date   • BACK SURGERY  1978    THEN AGAIN IN 1982   • CARDIAC ABLATION  2003   • CARDIAC SURGERY     • CATARACT EXTRACTION W/ INTRAOCULAR LENS IMPLANT Left 5/24/2017    Procedure: CATARACT PHACO EXTRACTION WITH INTRAOCULAR LENS IMPLANT LEFT WITH TORIC LENS;  Surgeon: Alma Benavidez MD;  Location: [facility];  Service:    • CATARACT EXTRACTION W/ INTRAOCULAR LENS IMPLANT Right 6/14/2017    Procedure: CATARACT PHACO EXTRACTION WITH  INTRAOCULAR LENS IMPLANT RIGHT WITH TORIC LENS;  Surgeon: Alma Benavidez MD;  Location: [facility];  Service:    • CORONARY ARTERY BYPASS GRAFT      SPOUSE UNSURE OF HOW MANY VESSELS   • HIATAL HERNIA REPAIR     • JOINT REPLACEMENT Left     HIP - DONE BY DR DALAL   • KNEE ARTHROSCOPY Left    • NOSE SURGERY      SPOUSE REPORTS FOR A BROKEN NOSE   • OTHER SURGICAL HISTORY Left     TENDON TORN LOOSE FROM BONE, LEFT ELBOW   • OTHER SURGICAL HISTORY      RUPTURED DISC   • OTHER SURGICAL HISTORY      NECK SURGERY FOR RUPTURED DISC   • OTHER SURGICAL HISTORY      HAD SECOND NECK SURGERY   • OTHER SURGICAL HISTORY      RUPTURED DISC   • OTHER SURGICAL HISTORY      RUPTURED DISC   • OTHER SURGICAL HISTORY      HARDWARE REMOVAL FROM BACK BY DR HAY         MEDICATIONS:  I have reviewed and reconciled the patients medication list in the patients chart at the skilled nursing facility today, 2021.      ALLERGIES:  Allergies   Allergen Reactions   • Phenergan [Promethazine Hcl] Nausea And Vomiting   • Carbamazepine Unknown - Low Severity   • Hydrocodone Unknown - Low Severity   • Lisinopril Unknown - Low Severity         SOCIAL HISTORY:  Social History     Socioeconomic History   • Marital status:      Spouse name: Not on file   • Number of children: Not on file   • Years of education: Not on file   • Highest education level: Not on file   Tobacco Use   • Smoking status: Former Smoker     Types: Cigarettes     Quit date:      Years since quittin.7   • Smokeless tobacco: Never Used   Substance and Sexual Activity   • Alcohol use: No   • Drug use: No   • Sexual activity: Defer       FAMILY HISTORY:  History reviewed. No pertinent family history.    REVIEW OF SYSTEMS:  Review of Systems   Constitutional: Negative for chills, fatigue and fever.   HENT: Negative for congestion, ear pain, rhinorrhea, sinus pressure and sore throat.    Eyes: Negative for visual  disturbance.   Respiratory: Negative for cough, chest tightness, shortness of breath and wheezing.    Cardiovascular: Negative for chest pain, palpitations and leg swelling.   Gastrointestinal: Negative for abdominal pain, blood in stool, constipation, diarrhea, nausea and vomiting.   Endocrine: Negative for polydipsia and polyuria.   Genitourinary: Negative for dysuria and hematuria.   Musculoskeletal: Negative for arthralgias and back pain.   Skin: Negative for rash.   Neurological: Negative for dizziness, light-headedness, numbness and headaches.   Psychiatric/Behavioral: Positive for agitation. Negative for dysphoric mood and sleep disturbance. The patient is not nervous/anxious.           PHYSICAL EXAMINATION:     VITAL SIGNS:  /68   Pulse 68   Temp 98.2 °F (36.8 °C)   Resp 16   Wt 65.8 kg (145 lb)   SpO2 98%   BMI 22.05 kg/m²     Physical Exam  Vitals and nursing note reviewed.   Constitutional:       General: He is not in acute distress.     Appearance: Normal appearance. He is well-developed.      Comments: Wheelchair-bound elderly male   HENT:      Head: Normocephalic and atraumatic.   Eyes:      Extraocular Movements: Extraocular movements intact.      Conjunctiva/sclera: Conjunctivae normal.   Cardiovascular:      Rate and Rhythm: Normal rate and regular rhythm.   Pulmonary:      Effort: Pulmonary effort is normal.      Breath sounds: Normal breath sounds.   Musculoskeletal:      Cervical back: Normal range of motion and neck supple.   Skin:     General: Skin is warm and dry.      Findings: No rash.   Neurological:      General: No focal deficit present.      Mental Status: He is alert and oriented to person, place, and time.   Psychiatric:         Mood and Affect: Mood normal.         Behavior: Behavior normal.         RECORDS REVIEW:       ASSESSMENT     Diagnoses and all orders for this visit:    1. 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  Essential hypertension    3. Irregular heart beat    4. Type 2 diabetes mellitus with diabetic polyneuropathy, without long-term current use of insulin ([facility])    5. Coronary artery disease involving native heart with angina pectoris, unspecified vessel or lesion type ([facility])    6. Impaired mobility and ADLs        PLAN   Dementia due to Parkinson's disease  -Continue Sinemet.    - monitor behaviors more, patient is becoming more agitated but redirection seems sufficient.    - cont donepazil, Carvidopa/levodopa    Mood disorder  - on celexa and depakote    Type 2 diabetes mellitus - controlled.   -Stable control on metformin and basaglar, glucose log reviewed.     Falls  - no recent falls reported.  Continue for precautions.     Coronary artery disease/irregular heartbeat  - stable on current cardiac medications.  No changes needed.    [x]  Discussed Patient in detail with nursing/staff, addressed all needs today.     [x]  Plan of Care Reviewed   []  PT/OT Reviewed   []  Order Changes  []  Discharge Plans Reviewed  [x]  Advance Directive on file with Nursing Home.   [x]  POA on file with Nursing Home.    [x]  Code Status listed and reviewed.     I confirm accuracy of unchanged data/findings including physical exam and plan which have been carried forward from previous visit, as well as I have updated appropriately those that have changed        Alex Burk DO.  9/27/2021

## 2021-09-28 NOTE — PROGRESS NOTES
Telemedicine nursing Home Progress Note        Mitul Stephen DO []  ALESSANDRA Ho [x]  [address] [place] [address]  Phone: [phone]  Fax: [phone] Inés Vega MD []  ALESS[facility]A Ho [x]   [address] [place] [address]  Phone: [phone]  Fax: [phone]     PATIENT NAME: Gama Davila                                                                          YOB: 1940           DATE OF SERVICE: 9/27/2021  FACILITY:  [] [place]   [] [place]   [] [facility]   [x] [facility]  []  [facility]  [] Other     ______________________________________________________________________     CHIEF COMPLAINT:    Increased behaviors and aggression towards staff and residents      HISTORY OF PRESENT ILLNESS:     Nursing reports that patient has had increased behaviors and aggression the past couple days.  He had an altercation with another resident yesterday and has also attempted to be aggressive with staff when they redirect him with anything.  He has been much more wayne the past couple days.  He is moving about the facility in his wheelchair today and was witnessed to be wayne with staff when they redirected him back to his unit.    PAST MEDICAL & SURGICAL HISTORY:   Past Medical History:   Diagnosis Date   • Arthritis    • Cancer (CMS/HCC)     SKIN    • Cataract    • Coronary artery disease    • Diabetes mellitus (CMS/HCC)    • High cholesterol    • [place] (hard of hearing)     PATIENT HAS HEARING AIDS   • Hypertension    • Irregular heart beat     HISTORY OF CARDIAC ABLATION IN 2003   • Wears dentures     FULL UPPER PLATE      Past Surgical History:   Procedure Laterality Date   • BACK SURGERY  1978    THEN AGAIN IN 1982   • CARDIAC ABLATION  2003   • CARDIAC SURGERY     • CATARACT EXTRACTION W/ INTRAOCULAR LENS IMPLANT Left 5/24/2017    Procedure: CATARACT PHACO EXTRACTION WITH INTRAOCULAR LENS IMPLANT LEFT WITH TORIC LENS;  Surgeon: Alma  Pam Benavidez MD;  Location: [facility];  Service:    • CATARACT EXTRACTION W/ INTRAOCULAR LENS IMPLANT Right 2017    Procedure: CATARACT PHACO EXTRACTION WITH INTRAOCULAR LENS IMPLANT RIGHT WITH TORIC LENS;  Surgeon: Alma Benavidez MD;  Location: [facility];  Service:    • CORONARY ARTERY BYPASS GRAFT      SPOUSE UNSURE OF HOW MANY VESSELS   • HIATAL HERNIA REPAIR     • JOINT REPLACEMENT Left     HIP - DONE BY DR DALAL   • KNEE ARTHROSCOPY Left    • NOSE SURGERY      SPOUSE REPORTS FOR A BROKEN NOSE   • OTHER SURGICAL HISTORY Left     TENDON TORN LOOSE FROM BONE, LEFT ELBOW   • OTHER SURGICAL HISTORY      RUPTURED DISC   • OTHER SURGICAL HISTORY      NECK SURGERY FOR RUPTURED DISC   • OTHER SURGICAL HISTORY      HAD SECOND NECK SURGERY   • OTHER SURGICAL HISTORY      RUPTURED DISC   • OTHER SURGICAL HISTORY      RUPTURED DISC   • OTHER SURGICAL HISTORY      HARDWARE REMOVAL FROM BACK BY DR HAY         MEDICATIONS:  I have reviewed and reconciled the patients medication list in the patients chart at the [facility] nursing [facility] today.      ALLERGIES:  Allergies   Allergen Reactions   • Phenergan [Promethazine Hcl] Nausea And Vomiting   • Carbamazepine Unknown - Low Severity   • Hydrocodone Unknown - Low Severity   • Lisinopril Unknown - Low Severity         SOCIAL HISTORY:  Social History     Socioeconomic History   • Marital status:      Spouse name: Not on file   • Number of children: Not on file   • Years of education: Not on file   • Highest education level: Not on file   Tobacco Use   • Smoking status: Former Smoker     Types: Cigarettes     Quit date:      Years since quittin.7   • Smokeless tobacco: Never Used   Substance and Sexual Activity   • Alcohol use: No   • Drug use: No   • Sexual activity: Defer       FAMILY HISTORY:  No family history on file.    REVIEW OF SYSTEMS:  Review of Systems   Unable to perform ROS: Dementia (ROS per  nursing and patient)   HENT: Negative for drooling, facial swelling, mouth sores, rhinorrhea and trouble swallowing.    Eyes: Negative for discharge and redness.   Respiratory: Negative for cough, chest tightness and shortness of breath.    Cardiovascular: Negative.  Negative for chest pain and palpitations.   Gastrointestinal: Negative for abdominal distention, anal bleeding, blood in stool, constipation, diarrhea, nausea and vomiting.   Genitourinary: Negative for difficulty urinating, discharge, frequency, penile swelling and scrotal swelling.        Incontinence   Musculoskeletal: Positive for arthralgias.   Skin: Negative.    Neurological: Positive for weakness. Negative for speech difficulty.   Hematological: Negative for adenopathy.   Psychiatric/Behavioral: Positive for behavioral problems and confusion. Negative for dysphoric mood, sleep disturbance and suicidal ideas. The patient is not nervous/anxious and is not hyperactive.           PHYSICAL EXAMINATION:     VITAL SIGNS:  /68   Pulse 68   Temp 98.2 °F (36.8 °C)   Resp 16   Wt 65.8 kg (145 lb)   SpO2 98%   BMI 22.05 kg/m²     Physical Exam   Constitutional: He is oriented to person, place, and time. He is not intubated.   HENT:   Head: Normocephalic and atraumatic.   Eyes: Conjunctivae are normal.   Pulmonary/Chest: Effort normal and breath sounds normal. He is not intubated.   Abdominal: Normal appearance.   Musculoskeletal:      Right hand: He exhibits decreased range of motion.        Hands:       Comments: Chronic NM defs, and contractures, s/t Parkinson's   Neurological: He is alert and oriented to person, place, and time.   Skin: No rash noted.   Psychiatric: His affect is angry. He is agitated. Cognition and memory are impaired.   Nursing note and vitals reviewed.      RECORDS REVIEW:   Most recent labs    ASSESSMENT     Diagnoses and all orders for this visit:    1. Aggression (Primary)    2. Mood disorder (CMS/HCC)    3. 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 Impaired mobility and ADLs        PLAN    Aggression/mood disorder/dementia  -Patient with increased moodiness and aggressive behaviors the past couple days.  He has been aggressive with other resident and with staff.  Will check UA with culture and increase Depakote to 250 mg 3 times daily.  Will follow-up and continue to monitor.    Nursing encouraged to keep me informed of any acute changes, lack of improvement, or any new concerning symptoms.    Staff to continue supportive care for all ADLs.     [x]  Discussed Patient in detail with nursing/staff, addressed all needs today.     [x]  Plan of Care Reviewed   [x]  PT/OT Reviewed   [x]  Order Changes  []  Discharge Plans Reviewed  [x]  Advance Directive on file with Nursing Home.   [x]  POA on file with Nursing Home.    [x]  Code Status listed and reviewed.         “I confirm accuracy of unchanged data/findings which have been carried forward from previous visit, as well as I have updated appropriately those that have changed.”                 Charleen Randall, APRN.  9/27/2021

## 2021-10-08 ENCOUNTER — NURSING HOME (OUTPATIENT)
Dept: FAMILY MEDICINE CLINIC | Facility: CLINIC | Age: 81
End: 2021-10-08

## 2021-10-08 VITALS
DIASTOLIC BLOOD PRESSURE: 68 MMHG | OXYGEN SATURATION: 98 % | BODY MASS INDEX: 21.13 KG/M2 | TEMPERATURE: 97.6 F | SYSTOLIC BLOOD PRESSURE: 120 MMHG | WEIGHT: 139 LBS | RESPIRATION RATE: 16 BRPM | HEART RATE: 68 BPM

## 2021-10-08 DIAGNOSIS — Z78.9 IMPAIRED MOBILITY AND ADLS: ICD-10-CM

## 2021-10-08 DIAGNOSIS — G20 DEMENTIA DUE TO PARKINSON'S DISEASE WITH BEHAVIORAL DISTURBANCE (HCC): Primary | Chronic | ICD-10-CM

## 2021-10-08 DIAGNOSIS — Z74.09 IMPAIRED MOBILITY AND ADLS: ICD-10-CM

## 2021-10-08 DIAGNOSIS — F03.918 AGGRESSIVE BEHAVIOR DUE TO DEMENTIA (HCC): ICD-10-CM

## 2021-10-08 DIAGNOSIS — F02.818 DEMENTIA DUE TO PARKINSON'S DISEASE WITH BEHAVIORAL DISTURBANCE (HCC): Primary | Chronic | ICD-10-CM

## 2021-10-08 PROCEDURE — 99309 SBSQ NF CARE MODERATE MDM 30: CPT | Performed by: NURSE PRACTITIONER

## 2021-10-09 NOTE — PROGRESS NOTES
Telemedicine nursing Home Progress Note        Mitul Stephen DO []  ALESS[facility]DRA Ho [x]  [address] [place] [address]  Phone: [phone]  Fax: [phone] Inés Vega MD []  ALESS[facility] Ho [x]   [address] [place] [address]  Phone: [phone]  Fax: [phone]     PATIENT NAME: Gama Davila                                                                          YOB: 1940           DATE OF SERVICE: 10/8/2021  FACILITY:  [] [place]   [] [place]   [] [facility]   [x] [facility]  []  [facility]  [] Other     ______________________________________________________________________     CHIEF COMPLAINT:    Increased behaviors and aggression towards staff and residents      HISTORY OF PRESENT ILLNESS:     Nursing reports that patient has had increased behaviors and aggression the past couple days.  He had an altercation with another resident yesterday and has also attempted to be aggressive with staff when they redirect him with anything.  He has been much more wayne the past couple days. He is going in other resident's rooms. He is moving about the facility in his wheelchair today without complaints or distress. He was witnessed to become angry when staff attempted to redirect him.     PAST MEDICAL & SURGICAL HISTORY:   Past Medical History:   Diagnosis Date   • Arthritis    • Cancer (HCC)     SKIN    • Cataract    • Coronary artery disease    • Diabetes mellitus (HCC)    • High cholesterol    • [place] (hard of hearing)     PATIENT HAS HEARING AIDS   • Hypertension    • Irregular heart beat     HISTORY OF CARDIAC ABLATION IN 2003   • Wears dentures     FULL UPPER PLATE      Past Surgical History:   Procedure Laterality Date   • BACK SURGERY  1978    THEN AGAIN IN 1982   • CARDIAC ABLATION  2003   • CARDIAC SURGERY     • CATARACT EXTRACTION W/ INTRAOCULAR LENS IMPLANT Left 5/24/2017    Procedure: CATARACT PHACO EXTRACTION WITH INTRAOCULAR LENS  IMPLANT LEFT WITH TORIC LENS;  Surgeon: Alma Benavidez MD;  Location: [facility];  Service:    • CATARACT EXTRACTION W/ INTRAOCULAR LENS IMPLANT Right 2017    Procedure: CATARACT PHACO EXTRACTION WITH INTRAOCULAR LENS IMPLANT RIGHT WITH TORIC LENS;  Surgeon: Alma Benavidez MD;  Location: [facility];  Service:    • CORONARY ARTERY BYPASS GRAFT      SPOUSE UNSURE OF HOW MANY VESSELS   • HIATAL HERNIA REPAIR     • JOINT REPLACEMENT Left     HIP - DONE BY DR DALAL   • KNEE ARTHROSCOPY Left    • NOSE SURGERY      SPOUSE REPORTS FOR A BROKEN NOSE   • OTHER SURGICAL HISTORY Left     TENDON TORN LOOSE FROM BONE, LEFT ELBOW   • OTHER SURGICAL HISTORY      RUPTURED DISC   • OTHER SURGICAL HISTORY      NECK SURGERY FOR RUPTURED DISC   • OTHER SURGICAL HISTORY      HAD SECOND NECK SURGERY   • OTHER SURGICAL HISTORY      RUPTURED DISC   • OTHER SURGICAL HISTORY      RUPTURED DISC   • OTHER SURGICAL HISTORY      HARDWARE REMOVAL FROM BACK BY DR HAY         MEDICATIONS:  I have reviewed and reconciled the patients medication list in the patients chart at the [facility] nursing [facility] today.      ALLERGIES:  Allergies   Allergen Reactions   • Phenergan [Promethazine Hcl] Nausea And Vomiting   • Carbamazepine Unknown - Low Severity   • Hydrocodone Unknown - Low Severity   • Lisinopril Unknown - Low Severity         SOCIAL HISTORY:  Social History     Socioeconomic History   • Marital status:    Tobacco Use   • Smoking status: Former Smoker     Types: Cigarettes     Quit date:      Years since quittin.7   • Smokeless tobacco: Never Used   Substance and Sexual Activity   • Alcohol use: No   • Drug use: No   • Sexual activity: Defer       FAMILY HISTORY:  No family history on file.    REVIEW OF SYSTEMS:  Review of Systems   Unable to perform ROS: Dementia (ROS per nursing and patient)   HENT: Negative for drooling, facial swelling, mouth sores, rhinorrhea and  trouble swallowing.    Eyes: Negative for discharge and redness.   Respiratory: Negative for cough, chest tightness and shortness of breath.    Cardiovascular: Negative.  Negative for chest pain and palpitations.   Gastrointestinal: Negative for abdominal distention, anal bleeding, blood in stool, constipation, diarrhea, nausea and vomiting.   Genitourinary: Negative for difficulty urinating, frequency, penile discharge, penile swelling and scrotal swelling.        Incontinence   Musculoskeletal: Positive for arthralgias.   Skin: Negative.    Neurological: Positive for weakness. Negative for speech difficulty.   Hematological: Negative for adenopathy.   Psychiatric/Behavioral: Positive for behavioral problems and confusion. Negative for dysphoric mood, sleep disturbance and suicidal ideas. The patient is not nervous/anxious and is not hyperactive.           PHYSICAL EXAMINATION:     VITAL SIGNS:  /68   Pulse 68   Temp 97.6 °F (36.4 °C)   Resp 16   Wt 63 kg (139 lb)   SpO2 98%   BMI 21.13 kg/m²     Physical Exam   Constitutional: He is not intubated.   HENT:   Head: Normocephalic and atraumatic.   Eyes: Conjunctivae are normal.   Pulmonary/Chest: Effort normal and breath sounds normal. He is not intubated.   Abdominal: Normal appearance.   Musculoskeletal:      Right hand: He exhibits decreased range of motion.        Hands:       Comments: Chronic NM defs, and contractures, s/t Parkinson's   Neurological: He is alert. He is disoriented.   Skin: No rash noted.   Psychiatric: His affect is angry. He is agitated. Cognition and memory are impaired.   Nursing note and vitals reviewed.      RECORDS REVIEW:   Most recent labs    ASSESSMENT     Diagnoses and all orders for this visit:    1. 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 Aggressive behavior due to dementia (HCC)    3. Impaired mobility and ADLs        PLAN    Aggression/mood disorder/dementia  -Patient with  increased moodiness and aggressive behaviors the past couple days.  He has been aggressive with other resident and with staff.  UA negative recently. Start Seroquel 12.5 mg qhs. Give Haldol 5 mg IM bid prn aggressive behaviors.   Will follow-up and continue to monitor.    Nursing encouraged to keep me informed of any acute changes, lack of improvement, or any new concerning symptoms.    Staff to continue supportive care for all ADLs.     [x]  Discussed Patient in detail with nursing/staff, addressed all needs today.     [x]  Plan of Care Reviewed   [x]  PT/OT Reviewed   [x]  Order Changes  []  Discharge Plans Reviewed  [x]  Advance Directive on file with Nursing Home.   [x]  POA on file with Nursing Home.    [x]  Code Status listed and reviewed.         “I confirm accuracy of unchanged data/findings which have been carried forward from previous visit, as well as I have updated appropriately those that have changed.”                   Charleen Randall, APRN.  10/9/2021

## 2021-10-27 RX ORDER — GABAPENTIN 100 MG/1
100 CAPSULE ORAL NIGHTLY
Qty: 30 CAPSULE | Refills: 5 | Status: SHIPPED | OUTPATIENT
Start: 2021-10-27 | End: 2021-11-11 | Stop reason: SDUPTHER

## 2021-11-05 ENCOUNTER — NURSING HOME (OUTPATIENT)
Dept: FAMILY MEDICINE CLINIC | Facility: CLINIC | Age: 81
End: 2021-11-05

## 2021-11-05 VITALS
WEIGHT: 151 LBS | HEART RATE: 78 BPM | SYSTOLIC BLOOD PRESSURE: 138 MMHG | OXYGEN SATURATION: 97 % | DIASTOLIC BLOOD PRESSURE: 72 MMHG | TEMPERATURE: 97.4 F | BODY MASS INDEX: 22.96 KG/M2 | RESPIRATION RATE: 20 BRPM

## 2021-11-05 DIAGNOSIS — Z09 HOSPITAL DISCHARGE FOLLOW-UP: ICD-10-CM

## 2021-11-05 DIAGNOSIS — F02.818 DEMENTIA DUE TO PARKINSON'S DISEASE WITH BEHAVIORAL DISTURBANCE (HCC): ICD-10-CM

## 2021-11-05 DIAGNOSIS — F39 MOOD DISORDER (HCC): ICD-10-CM

## 2021-11-05 DIAGNOSIS — Z74.09 IMPAIRED MOBILITY AND ADLS: ICD-10-CM

## 2021-11-05 DIAGNOSIS — G20 DEMENTIA DUE TO PARKINSON'S DISEASE WITH BEHAVIORAL DISTURBANCE (HCC): ICD-10-CM

## 2021-11-05 DIAGNOSIS — Z78.9 IMPAIRED MOBILITY AND ADLS: ICD-10-CM

## 2021-11-05 PROCEDURE — 99309 SBSQ NF CARE MODERATE MDM 30: CPT | Performed by: NURSE PRACTITIONER

## 2021-11-07 NOTE — PROGRESS NOTES
Telemedicine nursing Home Progress Note        Mitul Stephen DO []  ALESS[facility]DRA Ho [x]  [address] [place] [address]  Phone: [phone]  Fax: [phone] Inés Vega MD []  ALESSANDRA Ho [x]   [address] [place] [address]  Phone: [phone]  Fax: [phone]     PATIENT NAME: Gama Davila                                                                          YOB: 1940           DATE OF SERVICE: 11/5/2021  FACILITY:  [] [place]   [] [place]   [] [facility]   [x] [facility]  []  [facility]  [] Other     ______________________________________________________________________     CHIEF COMPLAINT:    Hospital follow-up      HISTORY OF PRESENT ILLNESS:     Patient readmitted to facility yesterday after a 2-week hospitalization at [facility].  Medication changes were made, Namenda added, Seroquel increased to 150 mg daily to be given at 1600 and Depakote decreased to twice daily.  He was continued on his Celexa.  Per discharge summary is behaviors or controlled at time of discharge.  He has not had any increased behaviors in the last 24 hours since his return to the facility.  He is moving about the facility today in his wheelchair, pleasantly confused.  He has no complaints or signs and symptoms of distress.      PAST MEDICAL & SURGICAL HISTORY:   Past Medical History:   Diagnosis Date   • Arthritis    • Cancer (HCC)     SKIN    • Cataract    • Coronary artery disease    • Diabetes mellitus (HCC)    • High cholesterol    • [place] (hard of hearing)     PATIENT HAS HEARING AIDS   • Hypertension    • Irregular heart beat     HISTORY OF CARDIAC ABLATION IN 2003   • Wears dentures     FULL UPPER PLATE      Past Surgical History:   Procedure Laterality Date   • BACK SURGERY  1978    THEN AGAIN IN 1982   • CARDIAC ABLATION  2003   • CARDIAC SURGERY     • CATARACT EXTRACTION W/ INTRAOCULAR LENS IMPLANT Left 5/24/2017     Procedure: CATARACT PHACO EXTRACTION WITH INTRAOCULAR LENS IMPLANT LEFT WITH TORIC LENS;  Surgeon: Alma Benavidez MD;  Location: [facility];  Service:    • CATARACT EXTRACTION W/ INTRAOCULAR LENS IMPLANT Right 2017    Procedure: CATARACT PHACO EXTRACTION WITH INTRAOCULAR LENS IMPLANT RIGHT WITH TORIC LENS;  Surgeon: Alma Benavidez MD;  Location: [facility];  Service:    • CORONARY ARTERY BYPASS GRAFT      SPOUSE UNSURE OF HOW MANY VESSELS   • HIATAL HERNIA REPAIR     • JOINT REPLACEMENT Left     HIP - DONE BY DR DALAL   • KNEE ARTHROSCOPY Left    • NOSE SURGERY      SPOUSE REPORTS FOR A BROKEN NOSE   • OTHER SURGICAL HISTORY Left     TENDON TORN LOOSE FROM BONE, LEFT ELBOW   • OTHER SURGICAL HISTORY      RUPTURED DISC   • OTHER SURGICAL HISTORY      NECK SURGERY FOR RUPTURED DISC   • OTHER SURGICAL HISTORY      HAD SECOND NECK SURGERY   • OTHER SURGICAL HISTORY      RUPTURED DISC   • OTHER SURGICAL HISTORY      RUPTURED DISC   • OTHER SURGICAL HISTORY      HARDWARE REMOVAL FROM BACK BY DR HAY         MEDICATIONS:  I have reviewed and reconciled the patients medication list in the patients chart at the [facility] nursing facility today.      ALLERGIES:  Allergies   Allergen Reactions   • Phenergan [Promethazine Hcl] Nausea And Vomiting   • Carbamazepine Unknown - Low Severity   • Hydrocodone Unknown - Low Severity   • Lisinopril Unknown - Low Severity         SOCIAL HISTORY:  Social History     Socioeconomic History   • Marital status:    Tobacco Use   • Smoking status: Former Smoker     Types: Cigarettes     Quit date:      Years since quittin.8   • Smokeless tobacco: Never Used   Substance and Sexual Activity   • Alcohol use: No   • Drug use: No   • Sexual activity: Defer       FAMILY HISTORY:  No family history on file.    REVIEW OF SYSTEMS:  Review of Systems   Unable to perform ROS: Dementia (ROS per nursing and patient)   HENT: Negative  for mouth sores, rhinorrhea and trouble swallowing.    Eyes: Negative for discharge and redness.   Respiratory: Negative for cough, chest tightness and shortness of breath.    Cardiovascular: Negative.  Negative for chest pain and palpitations.   Gastrointestinal: Negative for abdominal distention, anal bleeding, blood in stool, constipation and vomiting.   Genitourinary: Negative for difficulty urinating, frequency, penile discharge, penile swelling and scrotal swelling.        Incontinence   Musculoskeletal: Positive for arthralgias.   Skin: Negative.    Neurological: Positive for weakness. Negative for speech difficulty.   Hematological: Negative for adenopathy.   Psychiatric/Behavioral: Positive for confusion. Negative for behavioral problems, dysphoric mood, sleep disturbance and suicidal ideas. The patient is not nervous/anxious and is not hyperactive.           PHYSICAL EXAMINATION:     VITAL SIGNS:  /72   Pulse 78   Temp 97.4 °F (36.3 °C)   Resp 20   Wt 68.5 kg (151 lb)   SpO2 97%   BMI 22.96 kg/m²     Physical Exam   Constitutional: He is not intubated.   HENT:   Head: Normocephalic and atraumatic.   Eyes: Conjunctivae are normal.   Pulmonary/Chest: Effort normal and breath sounds normal. He is not intubated.   Abdominal: Normal appearance.   Musculoskeletal:      Right hand: He exhibits decreased range of motion.        Hands:       Comments: Chronic NM defs, and contractures, s/t Parkinson's   Neurological: He is alert. He is disoriented.   Skin: No rash noted.   Psychiatric: His behavior is normal. Mood normal. Cognition and memory are impaired.   Nursing note and vitals reviewed.      RECORDS REVIEW:   Most recent labs    ASSESSMENT     Diagnoses and all orders for this visit:    1. Hospital discharge follow-up    2. Mood disorder (HCC)    3. 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 Impaired mobility and ADLs        PLAN    Hospital follow-up/mood  disorder/dementia with behaviors  -Moods and behaviors stable with medication changes, since readmission 24 hours prior.  Nursing to continue to monitor for any changes.  Will follow-up and monitor.  Check CBC, CMP and valproic acid level on Monday and every 3 months.    Nursing encouraged to keep me informed of any acute changes, lack of improvement, or any new concerning symptoms.    Staff to continue supportive care for all ADLs.     [x]  Discussed Patient in detail with nursing/staff, addressed all needs today.     [x]  Plan of Care Reviewed   [x]  PT/OT Reviewed   [x]  Order Changes  []  Discharge Plans Reviewed  [x]  Advance Directive on file with Nursing Home.   [x]  POA on file with Nursing Home.    [x]  Code Status listed and reviewed.         “I confirm accuracy of unchanged data/findings which have been carried forward from previous visit, as well as I have updated appropriately those that have changed.”                   Charleen Randall, APRN.  11/6/2021

## 2021-11-08 ENCOUNTER — NURSING HOME (OUTPATIENT)
Dept: FAMILY MEDICINE CLINIC | Facility: CLINIC | Age: 81
End: 2021-11-08

## 2021-11-08 VITALS
OXYGEN SATURATION: 97 % | WEIGHT: 151.1 LBS | DIASTOLIC BLOOD PRESSURE: 72 MMHG | TEMPERATURE: 97.4 F | SYSTOLIC BLOOD PRESSURE: 138 MMHG | RESPIRATION RATE: 20 BRPM | HEART RATE: 78 BPM | BODY MASS INDEX: 22.97 KG/M2

## 2021-11-08 DIAGNOSIS — Z74.09 IMPAIRED MOBILITY AND ADLS: ICD-10-CM

## 2021-11-08 DIAGNOSIS — F39 MOOD DISORDER (HCC): ICD-10-CM

## 2021-11-08 DIAGNOSIS — Z78.9 IMPAIRED MOBILITY AND ADLS: ICD-10-CM

## 2021-11-08 DIAGNOSIS — R60.9 PERIPHERAL EDEMA: Primary | ICD-10-CM

## 2021-11-08 PROCEDURE — 99309 SBSQ NF CARE MODERATE MDM 30: CPT | Performed by: NURSE PRACTITIONER

## 2021-11-09 NOTE — PROGRESS NOTES
Telemedicine nursing Home Progress Note        Mitul Stephen DO []  ALESSANDRA Ho [x]  [address] [place] [address]  Phone: [phone]  Fax: [phone] Inés Vega MD []  ALESSANDRA Ho [x]   [address] [place] [address]  Phone: [phone]  Fax: [phone]     PATIENT NAME: Gama Davila                                                                          YOB: 1940           DATE OF SERVICE: 11/8/2021  FACILITY:  [] [facility]   [] [facility]   [] [facility]   [x] [facility]  []  [facility]  [] Other     ______________________________________________________________________     CHIEF COMPLAINT:    Lower extremity edema    HISTORY OF PRESENT ILLNESS:     Nursing reports edema to bilateral lower extremities this weekend.  He sits up in his wheelchair all day long while awake, only elevating feet and legs while sleeping at night.  He is still lying in bed this morning not up in his wheelchair yet, and has no lower extremity edema at this time.  Peripheral edema is a side effect of several medications he takes for his mood disorder.  He has no complaints or signs and symptoms of distress.  He is lying in bed, pleasantly confused.      PAST MEDICAL & SURGICAL HISTORY:   Past Medical History:   Diagnosis Date   • Arthritis    • Cancer (HCC)     SKIN    • Cataract    • Coronary artery disease    • Diabetes mellitus (HCC)    • High cholesterol    • [place] (hard of hearing)     PATIENT HAS HEARING AIDS   • Hypertension    • Irregular heart beat     HISTORY OF CARDIAC ABLATION IN 2003   • Wears dentures     FULL UPPER PLATE      Past Surgical History:   Procedure Laterality Date   • BACK SURGERY  1978    THEN AGAIN IN 1982   • CARDIAC ABLATION  2003   • CARDIAC SURGERY     • CATARACT EXTRACTION W/ INTRAOCULAR LENS IMPLANT Left 5/24/2017    Procedure: CATARACT PHACO EXTRACTION WITH INTRAOCULAR LENS IMPLANT LEFT WITH TORIC LENS;  Surgeon: Alma  Pam Benavidez MD;  Location: [facility];  Service:    • CATARACT EXTRACTION W/ INTRAOCULAR LENS IMPLANT Right 2017    Procedure: CATARACT PHACO EXTRACTION WITH INTRAOCULAR LENS IMPLANT RIGHT WITH TORIC LENS;  Surgeon: Alma Benavidez MD;  Location: [facility];  Service:    • CORONARY ARTERY BYPASS GRAFT      SPOUSE UNSURE OF HOW MANY VESSELS   • HIATAL HERNIA REPAIR     • JOINT REPLACEMENT Left     HIP - DONE BY DR DALAL   • KNEE ARTHROSCOPY Left    • NOSE SURGERY      SPOUSE REPORTS FOR A BROKEN NOSE   • OTHER SURGICAL HISTORY Left     TENDON TORN LOOSE FROM BONE, LEFT ELBOW   • OTHER SURGICAL HISTORY      RUPTURED DISC   • OTHER SURGICAL HISTORY      NECK SURGERY FOR RUPTURED DISC   • OTHER SURGICAL HISTORY      HAD SECOND NECK SURGERY   • OTHER SURGICAL HISTORY      RUPTURED DISC   • OTHER SURGICAL HISTORY      RUPTURED DISC   • OTHER SURGICAL HISTORY      HARDWARE REMOVAL FROM BACK BY DR HAY         MEDICATIONS:  I have reviewed and reconciled the patients medication list in the patients chart at the [facility] nursing [facility] today.      ALLERGIES:  Allergies   Allergen Reactions   • Phenergan [Promethazine Hcl] Nausea And Vomiting   • Carbamazepine Unknown - Low Severity   • Hydrocodone Unknown - Low Severity   • Lisinopril Unknown - Low Severity         SOCIAL HISTORY:  Social History     Socioeconomic History   • Marital status:    Tobacco Use   • Smoking status: Former Smoker     Types: Cigarettes     Quit date:      Years since quittin.8   • Smokeless tobacco: Never Used   Substance and Sexual Activity   • Alcohol use: No   • Drug use: No   • Sexual activity: Defer       FAMILY HISTORY:  No family history on file.    REVIEW OF SYSTEMS:  Review of Systems   Unable to perform ROS: Dementia (ROS per nursing and patient)   HENT: Negative for mouth sores, rhinorrhea and trouble swallowing.    Eyes: Negative for discharge and redness.    Respiratory: Negative for cough, chest tightness and shortness of breath.    Cardiovascular: Positive for leg swelling. Negative for chest pain and palpitations.   Gastrointestinal: Negative for abdominal distention, anal bleeding, blood in stool, constipation and vomiting.   Genitourinary: Negative for difficulty urinating, frequency, penile discharge, penile swelling and scrotal swelling.        Incontinence   Musculoskeletal: Positive for arthralgias.   Skin: Negative.    Neurological: Positive for weakness. Negative for speech difficulty.   Hematological: Negative for adenopathy.   Psychiatric/Behavioral: Positive for confusion. Negative for behavioral problems, dysphoric mood, sleep disturbance and suicidal ideas. The patient is not nervous/anxious and is not hyperactive.           PHYSICAL EXAMINATION:     VITAL SIGNS:  /72   Pulse 78   Temp 97.4 °F (36.3 °C)   Resp 20   Wt 68.5 kg (151 lb 1.6 oz)   SpO2 97%   BMI 22.97 kg/m²     Physical Exam   Constitutional: He is not intubated.   HENT:   Head: Normocephalic and atraumatic.   Eyes: Conjunctivae are normal.   Cardiovascular:   No lower extremity edema at this time   Pulmonary/Chest: Effort normal and breath sounds normal. He is not intubated.   Abdominal: Normal appearance.   Musculoskeletal:      Right hand: He exhibits decreased range of motion.        Hands:       Right lower leg: No edema.      Left lower leg: No edema.      Comments: Chronic NM defs, and contractures, s/t Parkinson's   Neurological: He is alert. He is disoriented.   Skin: No rash noted.   Psychiatric: His behavior is normal. Mood normal. Cognition and memory are impaired.   Nursing note and vitals reviewed.      RECORDS REVIEW:   Most recent labs    ASSESSMENT     Diagnoses and all orders for this visit:    1. Peripheral edema (Primary)    2. Mood disorder (HCC)    3. Impaired mobility and ADLs        PLAN    Peripheral edema/mood disorder  -Patient is on Seroquel,  Depakote and gabapentin, which I will cause peripheral edema.  Edema improves with elevation.  Will check BNP.  Apply compressions hose while awake, off at night.  Will follow-up and continue to monitor.    Nursing encouraged to keep me informed of any acute changes, lack of improvement, or any new concerning symptoms.    Staff to continue supportive care for all ADLs.     [x]  Discussed Patient in detail with nursing/staff, addressed all needs today.     [x]  Plan of Care Reviewed   [x]  PT/OT Reviewed   [x]  Order Changes  []  Discharge Plans Reviewed  [x]  Advance Directive on file with Nursing Home.   [x]  POA on file with Nursing Home.    [x]  Code Status listed and reviewed.         “I confirm accuracy of unchanged data/findings which have been carried forward from previous visit, as well as I have updated appropriately those that have changed.”                   Charleen Randall, APRN.  11/8/2021

## 2021-11-11 RX ORDER — GABAPENTIN 100 MG/1
100 CAPSULE ORAL 3 TIMES DAILY
Qty: 90 CAPSULE | Refills: 5 | Status: SHIPPED | OUTPATIENT
Start: 2021-11-11 | End: 2022-05-10 | Stop reason: SDUPTHER

## 2021-11-17 ENCOUNTER — NURSING HOME (OUTPATIENT)
Dept: FAMILY MEDICINE CLINIC | Facility: CLINIC | Age: 81
End: 2021-11-17

## 2021-11-17 VITALS
WEIGHT: 149 LBS | HEART RATE: 78 BPM | BODY MASS INDEX: 22.66 KG/M2 | DIASTOLIC BLOOD PRESSURE: 72 MMHG | SYSTOLIC BLOOD PRESSURE: 138 MMHG | TEMPERATURE: 97.4 F | RESPIRATION RATE: 18 BRPM

## 2021-11-17 DIAGNOSIS — F02.818 DEMENTIA DUE TO PARKINSON'S DISEASE WITH BEHAVIORAL DISTURBANCE (HCC): Chronic | ICD-10-CM

## 2021-11-17 DIAGNOSIS — G20 DEMENTIA DUE TO PARKINSON'S DISEASE WITH BEHAVIORAL DISTURBANCE (HCC): Chronic | ICD-10-CM

## 2021-11-17 DIAGNOSIS — Z74.09 IMPAIRED MOBILITY AND ADLS: ICD-10-CM

## 2021-11-17 DIAGNOSIS — Z78.9 IMPAIRED MOBILITY AND ADLS: ICD-10-CM

## 2021-11-17 DIAGNOSIS — Z87.898 HISTORY OF PERIPHERAL EDEMA: Primary | ICD-10-CM

## 2021-11-17 PROCEDURE — 99308 SBSQ NF CARE LOW MDM 20: CPT | Performed by: NURSE PRACTITIONER

## 2021-11-20 NOTE — PROGRESS NOTES
Telemedicine nursing Home Progress Note        Mitul Stephen DO []  ALESSANDRA Ho [x]  [address] [place] [address]  Phone: [phone]  Fax: [phone] Inés Vega MD []  ALESSANDRA Ho [x]   [address] [place] [address]  Phone: [phone]  Fax: [phone]     PATIENT NAME: Gama Davila                                                                          YOB: 1940           DATE OF SERVICE: 11/17/2021  FACILITY:  [] [facility]   [] [facility]   [] [facility]   [x] [facility]  []  [facility]  [] Other     ______________________________________________________________________     CHIEF COMPLAINT:    Follow-up lower extremity edema    Follow-up behaviors    HISTORY OF PRESENT ILLNESS:     Patient with increased peripheral edema noted several weeks ago due to medications and patient sitting up in his chair today except at night when sleeping.  Nurses advised to keep his legs elevated more often as well as apply DOMENICO hose when patient would allow.  BNP was normal at 44 when checked a few weeks ago.  Nurses report that the edema has improved with elevating legs and applying compression hose.    Nursing reports that patient's behaviors have been well controlled since his readmission to facility from [facility] facility with the exception of 1 or 2 occasions.      PAST MEDICAL & SURGICAL HISTORY:   Past Medical History:   Diagnosis Date   • Arthritis    • Cancer (HCC)     SKIN    • Cataract    • Coronary artery disease    • Diabetes mellitus (HCC)    • High cholesterol    • [place] (hard of hearing)     PATIENT HAS HEARING AIDS   • Hypertension    • Irregular heart beat     HISTORY OF CARDIAC ABLATION IN 2003   • Wears dentures     FULL UPPER PLATE      Past Surgical History:   Procedure Laterality Date   • BACK SURGERY  1978    THEN AGAIN IN 1982   • CARDIAC ABLATION  2003   • CARDIAC SURGERY     • CATARACT EXTRACTION W/ INTRAOCULAR LENS IMPLANT  Left 2017    Procedure: CATARACT PHACO EXTRACTION WITH INTRAOCULAR LENS IMPLANT LEFT WITH TORIC LENS;  Surgeon: Alma Benavidez MD;  Location: [facility];  Service:    • CATARACT EXTRACTION W/ INTRAOCULAR LENS IMPLANT Right 2017    Procedure: CATARACT PHACO EXTRACTION WITH INTRAOCULAR LENS IMPLANT RIGHT WITH TORIC LENS;  Surgeon: Alma Benavidez MD;  Location: [facility];  Service:    • CORONARY ARTERY BYPASS GRAFT      SPOUSE UNSURE OF HOW MANY VESSELS   • HIATAL HERNIA REPAIR     • JOINT REPLACEMENT Left     HIP - DONE BY DR DALAL   • KNEE ARTHROSCOPY Left    • NOSE SURGERY      SPOUSE REPORTS FOR A BROKEN NOSE   • OTHER SURGICAL HISTORY Left     TENDON TORN LOOSE FROM BONE, LEFT ELBOW   • OTHER SURGICAL HISTORY      RUPTURED DISC   • OTHER SURGICAL HISTORY      NECK SURGERY FOR RUPTURED DISC   • OTHER SURGICAL HISTORY      HAD SECOND NECK SURGERY   • OTHER SURGICAL HISTORY      RUPTURED DISC   • OTHER SURGICAL HISTORY      RUPTURED DISC   • OTHER SURGICAL HISTORY      HARDWARE REMOVAL FROM BACK BY DR HAY         MEDICATIONS:  I have reviewed and reconciled the patients medication list in the patients chart at the [facility] nursing facility today.      ALLERGIES:  Allergies   Allergen Reactions   • Phenergan [Promethazine Hcl] Nausea And Vomiting   • Carbamazepine Unknown - Low Severity   • Hydrocodone Unknown - Low Severity   • Lisinopril Unknown - Low Severity         SOCIAL HISTORY:  Social History     Socioeconomic History   • Marital status:    Tobacco Use   • Smoking status: Former Smoker     Types: Cigarettes     Quit date:      Years since quittin.9   • Smokeless tobacco: Never Used   Substance and Sexual Activity   • Alcohol use: No   • Drug use: No   • Sexual activity: Defer       FAMILY HISTORY:  No family history on file.    REVIEW OF SYSTEMS:  Review of Systems   Unable to perform ROS: Dementia (ROS per nursing and patient)    HENT: Negative for mouth sores, rhinorrhea and trouble swallowing.    Eyes: Negative for discharge and redness.   Respiratory: Negative for cough, chest tightness and shortness of breath.    Cardiovascular: Negative for chest pain, palpitations and leg swelling.   Gastrointestinal: Negative for abdominal distention, anal bleeding, blood in stool, constipation and vomiting.   Genitourinary: Negative for difficulty urinating, frequency, penile discharge, penile swelling and scrotal swelling.        Incontinence   Musculoskeletal: Positive for arthralgias.   Skin: Negative.    Neurological: Positive for weakness. Negative for speech difficulty.   Hematological: Negative for adenopathy.   Psychiatric/Behavioral: Positive for confusion. Negative for behavioral problems, dysphoric mood, sleep disturbance and suicidal ideas. The patient is not nervous/anxious and is not hyperactive.           PHYSICAL EXAMINATION:     VITAL SIGNS:  /72   Pulse 78   Temp 97.4 °F (36.3 °C)   Resp 18   Wt 67.6 kg (149 lb)   BMI 22.66 kg/m²     Physical Exam   Constitutional: He is not intubated.   HENT:   Head: Normocephalic and atraumatic.   Cardiovascular:   No lower extremity edema at this time   Pulmonary/Chest: Effort normal and breath sounds normal. He is not intubated.   Abdominal: Normal appearance.   Musculoskeletal:      Right hand: He exhibits decreased range of motion.        Hands:       Right lower leg: No edema.      Left lower leg: No edema.      Comments: Chronic NM defs, and contractures, s/t Parkinson's   Neurological: He is alert. He is disoriented.   Skin: No rash noted.   Psychiatric: His behavior is normal. Mood normal. Cognition and memory are impaired.   Nursing note and vitals reviewed.      RECORDS REVIEW:   Most recent labs    ASSESSMENT     Diagnoses and all orders for this visit:    1. History of peripheral edema (Primary)    2. Dementia due to Parkinson's disease with behavioral disturbance  ([facility])    3. Impaired mobility and ADLs        PLAN    History peripheral edema  -Edema improved with elevating legs more and using DOMENICO hose.  Will follow-up and continue to monitor.    Dementia due to Parkinson's  -Behaviors have been well controlled recently.  Appetite and weight stable.  Will follow-up and continue to monitor.   Will follow-up and continue to monitor.    Nursing encouraged to keep me informed of any acute changes, lack of improvement, or any new concerning symptoms.    Staff to continue supportive care for all ADLs.     [x]  Discussed Patient in detail with nursing/staff, addressed all needs today.     [x]  Plan of Care Reviewed   [x]  PT/OT Reviewed   [x]  Order Changes  []  Discharge Plans Reviewed  [x]  Advance Directive on file with Nursing Home.   [x]  POA on file with Nursing Home.    [x]  Code Status listed and reviewed.         “I confirm accuracy of unchanged data/findings which have been carried forward from previous visit, as well as I have updated appropriately those that have changed.”                   Charleen Randall, APRN.  11/19/2021

## 2021-11-22 ENCOUNTER — NURSING HOME (OUTPATIENT)
Dept: INTERNAL MEDICINE | Facility: CLINIC | Age: 81
End: 2021-11-22

## 2021-11-22 VITALS
TEMPERATURE: 97.4 F | DIASTOLIC BLOOD PRESSURE: 72 MMHG | HEART RATE: 78 BPM | WEIGHT: 149 LBS | RESPIRATION RATE: 20 BRPM | OXYGEN SATURATION: 97 % | BODY MASS INDEX: 22.66 KG/M2 | SYSTOLIC BLOOD PRESSURE: 138 MMHG

## 2021-11-22 DIAGNOSIS — Z74.09 IMPAIRED MOBILITY AND ADLS: ICD-10-CM

## 2021-11-22 DIAGNOSIS — G20 DEMENTIA DUE TO PARKINSON'S DISEASE WITH BEHAVIORAL DISTURBANCE (HCC): Primary | ICD-10-CM

## 2021-11-22 DIAGNOSIS — I49.9 IRREGULAR HEART BEAT: ICD-10-CM

## 2021-11-22 DIAGNOSIS — I25.119 CORONARY ARTERY DISEASE INVOLVING NATIVE HEART WITH ANGINA PECTORIS, UNSPECIFIED VESSEL OR LESION TYPE (HCC): ICD-10-CM

## 2021-11-22 DIAGNOSIS — E11.42 TYPE 2 DIABETES MELLITUS WITH DIABETIC POLYNEUROPATHY, WITHOUT LONG-TERM CURRENT USE OF INSULIN (HCC): ICD-10-CM

## 2021-11-22 DIAGNOSIS — Z78.9 IMPAIRED MOBILITY AND ADLS: ICD-10-CM

## 2021-11-22 DIAGNOSIS — F02.80 LATE ONSET ALZHEIMER'S DISEASE WITHOUT BEHAVIORAL DISTURBANCE (HCC): ICD-10-CM

## 2021-11-22 DIAGNOSIS — G30.1 LATE ONSET ALZHEIMER'S DISEASE WITHOUT BEHAVIORAL DISTURBANCE (HCC): ICD-10-CM

## 2021-11-22 DIAGNOSIS — F02.818 DEMENTIA DUE TO PARKINSON'S DISEASE WITH BEHAVIORAL DISTURBANCE (HCC): Primary | ICD-10-CM

## 2021-11-22 DIAGNOSIS — I10 ESSENTIAL HYPERTENSION: ICD-10-CM

## 2021-11-22 PROCEDURE — 99308 SBSQ NF CARE LOW MDM 20: CPT | Performed by: INTERNAL MEDICINE

## 2021-11-27 NOTE — PROGRESS NOTES
Nursing Home Progress Note        Mitul Stephen DO []  ALESSANDRA Ho []  [address] [facility], [place] [address]  Phone: [phone]  Fax: [phone] Inés Vega MD []  Alex Burk DO [x]   [address] [place] [address]  Phone: [phone]  Fax: [phone]     PATIENT NAME: Gama Davila                                                                          YOB: 1940           DATE OF SERVICE: 11/22/2021  FACILITY:  [] [facility]   [] [facility]   [] [facility]   [x] [facility]  []  [facility]  [] Other ______________________________________________________________________     CHIEF COMPLAINT:  Chronic Medical Management      HISTORY OF PRESENT ILLNESS:   Ever since patient had gone for treatment with psychiatry, mood and behaviors have been better.  He has not been violent.  He does move about the facility regularly in good spirits.     PAST MEDICAL & SURGICAL HISTORY:   Past Medical History:   Diagnosis Date   • Arthritis    • Cancer (HCC)     SKIN    • Cataract    • Coronary artery disease    • Diabetes mellitus (HCC)    • High cholesterol    • [place] (hard of hearing)     PATIENT HAS HEARING AIDS   • Hypertension    • Irregular heart beat     HISTORY OF CARDIAC ABLATION IN 2003   • Wears dentures     FULL UPPER PLATE      Past Surgical History:   Procedure Laterality Date   • BACK SURGERY  1978    THEN AGAIN IN 1982   • CARDIAC ABLATION  2003   • CARDIAC SURGERY     • CATARACT EXTRACTION W/ INTRAOCULAR LENS IMPLANT Left 5/24/2017    Procedure: CATARACT PHACO EXTRACTION WITH INTRAOCULAR LENS IMPLANT LEFT WITH TORIC LENS;  Surgeon: Alma Benavidez MD;  Location: [facility] OR;  Service:    • CATARACT EXTRACTION W/ INTRAOCULAR LENS IMPLANT Right 6/14/2017    Procedure: CATARACT PHACO EXTRACTION WITH INTRAOCULAR LENS IMPLANT RIGHT WITH TORIC LENS;  Surgeon: Alma Benavidez MD;  Location: [facility] OR;  Service:    • CORONARY ARTERY BYPASS GRAFT       SPOUSE UNSURE OF HOW MANY VESSELS   • HIATAL HERNIA REPAIR     • JOINT REPLACEMENT Left     HIP - DONE BY DR DALAL   • KNEE ARTHROSCOPY Left    • NOSE SURGERY  1970    SPOUSE REPORTS FOR A BROKEN NOSE   • OTHER SURGICAL HISTORY Left     TENDON TORN LOOSE FROM BONE, LEFT ELBOW   • OTHER SURGICAL HISTORY      RUPTURED DISC   • OTHER SURGICAL HISTORY      NECK SURGERY FOR RUPTURED DISC   • OTHER SURGICAL HISTORY      HAD SECOND NECK SURGERY   • OTHER SURGICAL HISTORY      RUPTURED DISC   • OTHER SURGICAL HISTORY      RUPTURED DISC   • OTHER SURGICAL HISTORY      HARDWARE REMOVAL FROM BACK BY DR HAY         MEDICATIONS:  I have reviewed and reconciled the patients medication list in the patients chart at the [facility] nursing facility today, 2021.      ALLERGIES:  Allergies   Allergen Reactions   • Phenergan [Promethazine Hcl] Nausea And Vomiting   • Carbamazepine Unknown - Low Severity   • Hydrocodone Unknown - Low Severity   • Lisinopril Unknown - Low Severity         SOCIAL HISTORY:  Social History     Socioeconomic History   • Marital status:    Tobacco Use   • Smoking status: Former Smoker     Types: Cigarettes     Quit date:      Years since quittin.9   • Smokeless tobacco: Never Used   Substance and Sexual Activity   • Alcohol use: No   • Drug use: No   • Sexual activity: Defer       FAMILY HISTORY:  No family history on file.    REVIEW OF SYSTEMS:  Review of Systems   Constitutional: Negative for chills, fatigue and fever.   HENT: Negative for congestion, ear pain, rhinorrhea, sinus pressure and sore throat.    Eyes: Negative for visual disturbance.   Respiratory: Negative for cough, chest tightness, shortness of breath and wheezing.    Cardiovascular: Negative for chest pain, palpitations and leg swelling.   Gastrointestinal: Negative for abdominal pain, blood in stool, constipation, diarrhea, nausea and vomiting.   Endocrine: Negative for polydipsia and  polyuria.   Genitourinary: Negative for dysuria and hematuria.   Musculoskeletal: Negative for arthralgias and back pain.   Skin: Negative for rash.   Neurological: Negative for dizziness, light-headedness, numbness and headaches.   Psychiatric/Behavioral: Negative for dysphoric mood and sleep disturbance. The patient is not nervous/anxious.           PHYSICAL EXAMINATION:     VITAL SIGNS:  /72   Pulse 78   Temp 97.4 °F (36.3 °C)   Resp 20   Wt 67.6 kg (149 lb)   SpO2 97%   BMI 22.66 kg/m²     Physical Exam  Vitals and nursing note reviewed.   Constitutional:       General: He is not in acute distress.     Appearance: Normal appearance. He is well-developed.      Comments: Wheelchair-bound elderly male   HENT:      Head: Normocephalic and atraumatic.   Eyes:      Extraocular Movements: Extraocular movements intact.      Conjunctiva/sclera: Conjunctivae normal.   Cardiovascular:      Rate and Rhythm: Normal rate and regular rhythm.   Pulmonary:      Effort: Pulmonary effort is normal.      Breath sounds: Normal breath sounds.   Musculoskeletal:      Cervical back: Normal range of motion and neck supple.   Skin:     General: Skin is warm and dry.      Findings: No rash.   Neurological:      General: No focal deficit present.      Mental Status: He is alert and oriented to person, place, and time.   Psychiatric:         Mood and Affect: Mood normal.         Behavior: Behavior normal.         RECORDS REVIEW:       ASSESSMENT     Diagnoses and all orders for this visit:    1. 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 Essential hypertension    3. Type 2 diabetes mellitus with diabetic polyneuropathy, without long-term current use of insulin (HCC)    4. Irregular heart beat    5. Coronary artery disease involving native heart with angina pectoris, unspecified vessel or lesion type (HCC)    6. Impaired mobility and ADLs    7. Late onset Alzheimer's disease without  behavioral disturbance (HCC)        PLAN    Dementia due to Parkinson's disease  -Continue Sinemet and donepazil.     Mood disorder  - meds being adjusted by psychiatry, stable at this time.      Type 2 diabetes mellitus - controlled.   -Stable control on metformin and basaglar, glucose log reviewed.      Falls  - no recent falls reported.  Continue for precautions.     Coronary artery disease/irregular heartbeat  - stable on current cardiac medications.  No changes needed.     [x]  Discussed Patient in detail with nursing/staff, addressed all needs today.     [x]  Plan of Care Reviewed   []  PT/OT Reviewed   []  Order Changes  []  Discharge Plans Reviewed  [x]  Advance Directive on file with Nursing Home.   [x]  POA on file with Nursing Home.    [x]  Code Status listed and reviewed.     I confirm accuracy of unchanged data/findings including physical exam and plan which have been carried forward from previous visit, as well as I have updated appropriately those that have changed        Alex Burk DO.  11/27/2021

## 2021-12-15 ENCOUNTER — NURSING HOME (OUTPATIENT)
Dept: FAMILY MEDICINE CLINIC | Facility: CLINIC | Age: 81
End: 2021-12-15

## 2021-12-15 VITALS
DIASTOLIC BLOOD PRESSURE: 70 MMHG | RESPIRATION RATE: 16 BRPM | HEART RATE: 76 BPM | WEIGHT: 144 LBS | SYSTOLIC BLOOD PRESSURE: 136 MMHG | BODY MASS INDEX: 21.9 KG/M2 | TEMPERATURE: 97.8 F | OXYGEN SATURATION: 96 %

## 2021-12-15 DIAGNOSIS — Z78.9 IMPAIRED MOBILITY AND ADLS: ICD-10-CM

## 2021-12-15 DIAGNOSIS — J06.9 URI WITH COUGH AND CONGESTION: Primary | ICD-10-CM

## 2021-12-15 DIAGNOSIS — Z74.09 IMPAIRED MOBILITY AND ADLS: ICD-10-CM

## 2021-12-15 PROCEDURE — 99309 SBSQ NF CARE MODERATE MDM 30: CPT | Performed by: NURSE PRACTITIONER

## 2021-12-15 NOTE — PROGRESS NOTES
Telemedicine nursing Home Progress Note        Mitul Stephen DO []  ALESSANDRA Ho [x]  [address] [place] [address]  Phone: [phone]  Fax: [phone] Inés Vega MD []  AL[facility] Ho [x]   [address] [place] [address]  Phone: [phone]  Fax: [phone]     PATIENT NAME: Gama Davila                                                                          YOB: 1940           DATE OF SERVICE: 12/15/2021  FACILITY:  [] [facility]   [] [facility]   [] [facility]   [x] [facility]  []  [facility]  [] Other     ______________________________________________________________________     CHIEF COMPLAINT:    Increased cough and congestion since this morning.    HISTORY OF PRESENT ILLNESS:     Nursing reports the patient has increased cough and congestion since this morning.  He has not really acted any different, has continued to move about the facility in his wheelchair and is doing this currently during visit as well.  He is having cough and congestion during visit as well.  He denies any chest pain or shortness of breath but is a poor historian related to his dementia.  Multiple other residents in the facility have had URI and pneumonia.      PAST MEDICAL & SURGICAL HISTORY:   Past Medical History:   Diagnosis Date   • Arthritis    • Cancer (HCC)     SKIN    • Cataract    • Coronary artery disease    • Diabetes mellitus (HCC)    • High cholesterol    • [place] (hard of hearing)     PATIENT HAS HEARING AIDS   • Hypertension    • Irregular heart beat     HISTORY OF CARDIAC ABLATION IN 2003   • Wears dentures     FULL UPPER PLATE      Past Surgical History:   Procedure Laterality Date   • BACK SURGERY  1978    THEN AGAIN IN 1982   • CARDIAC ABLATION  2003   • CARDIAC SURGERY     • CATARACT EXTRACTION W/ INTRAOCULAR LENS IMPLANT Left 5/24/2017    Procedure: CATARACT PHACO EXTRACTION WITH INTRAOCULAR LENS IMPLANT LEFT WITH TORIC LENS;  Surgeon:  Alma Benavidez MD;  Location: [facility];  Service:    • CATARACT EXTRACTION W/ INTRAOCULAR LENS IMPLANT Right 2017    Procedure: CATARACT PHACO EXTRACTION WITH INTRAOCULAR LENS IMPLANT RIGHT WITH TORIC LENS;  Surgeon: Alma Benavidez MD;  Location: [facility];  Service:    • CORONARY ARTERY BYPASS GRAFT      SPOUSE UNSURE OF HOW MANY VESSELS   • HIATAL HERNIA REPAIR     • JOINT REPLACEMENT Left     HIP - DONE BY DR DALAL   • KNEE ARTHROSCOPY Left    • NOSE SURGERY      SPOUSE REPORTS FOR A BROKEN NOSE   • OTHER SURGICAL HISTORY Left     TENDON TORN LOOSE FROM BONE, LEFT ELBOW   • OTHER SURGICAL HISTORY      RUPTURED DISC   • OTHER SURGICAL HISTORY      NECK SURGERY FOR RUPTURED DISC   • OTHER SURGICAL HISTORY      HAD SECOND NECK SURGERY   • OTHER SURGICAL HISTORY      RUPTURED DISC   • OTHER SURGICAL HISTORY      RUPTURED DISC   • OTHER SURGICAL HISTORY      HARDWARE REMOVAL FROM BACK BY DR HAY         MEDICATIONS:  I have reviewed and reconciled the patients medication list in the patients chart at the [facility] nursing [facility] today.      ALLERGIES:  Allergies   Allergen Reactions   • Phenergan [Promethazine Hcl] Nausea And Vomiting   • Carbamazepine Unknown - Low Severity   • Hydrocodone Unknown - Low Severity   • Lisinopril Unknown - Low Severity         SOCIAL HISTORY:  Social History     Socioeconomic History   • Marital status:    Tobacco Use   • Smoking status: Former Smoker     Types: Cigarettes     Quit date:      Years since quittin.9   • Smokeless tobacco: Never Used   Substance and Sexual Activity   • Alcohol use: No   • Drug use: No   • Sexual activity: Defer       FAMILY HISTORY:  No family history on file.    REVIEW OF SYSTEMS:  Review of Systems   Unable to perform ROS: Dementia (ROS per nursing and patient)   Constitutional: Negative for activity change, appetite change, chills, diaphoresis, fatigue and fever.   HENT:  Negative for mouth sores, rhinorrhea and trouble swallowing.    Eyes: Negative for discharge and redness.   Respiratory: Positive for cough. Negative for chest tightness and shortness of breath.    Cardiovascular: Negative for chest pain, palpitations and leg swelling.   Gastrointestinal: Negative for abdominal distention, anal bleeding, blood in stool, constipation and vomiting.   Genitourinary: Negative for difficulty urinating and frequency.        Incontinence   Musculoskeletal: Positive for arthralgias.   Skin: Negative.    Neurological: Positive for weakness. Negative for speech difficulty.   Hematological: Negative for adenopathy.   Psychiatric/Behavioral: Positive for confusion. Negative for behavioral problems, dysphoric mood, sleep disturbance and suicidal ideas. The patient is not nervous/anxious and is not hyperactive.           PHYSICAL EXAMINATION:     VITAL SIGNS:  /70   Pulse 76   Temp 97.8 °F (36.6 °C)   Resp 16   Wt 65.3 kg (144 lb)   SpO2 96%   BMI 21.90 kg/m²     Physical Exam   HENT:   Head: Normocephalic and atraumatic.   Cardiovascular:   No lower extremity edema at this time   Pulmonary/Chest: Effort normal. He has rhonchi (Clear some with coughing) in the right upper field and the left upper field.   Abdominal: Normal appearance.   Musculoskeletal:        Hands:       Comments: Chronic NM defs, and contractures, s/t Parkinson's   Neurological: He is alert. He is disoriented.   Skin: No rash noted.   Psychiatric: His behavior is normal. Mood normal. Cognition and memory are impaired.   Nursing note and vitals reviewed.      RECORDS REVIEW:   Most recent labs    ASSESSMENT     Diagnoses and all orders for this visit:    1. URI with cough and congestion (Primary)    2. Impaired mobility and ADLs        PLAN    URI with cough and congestion  -Chest x-ray performed and negative for any acute findings, no effusions or infiltrates.  Will treat with Mucinex ER twice daily x1 week.  Will  also administer DuoNebs every 6 hours if patient will be compliant.  Will follow-up and continue to monitor.    Nursing encouraged to keep me informed of any acute changes, lack of improvement, or any new concerning symptoms.    Staff to continue supportive care for all ADLs.     [x]  Discussed Patient in detail with nursing/staff, addressed all needs today.     [x]  Plan of Care Reviewed   [x]  PT/OT Reviewed   [x]  Order Changes  []  Discharge Plans Reviewed  [x]  Advance Directive on file with Nursing Home.   [x]  POA on file with Nursing Home.    [x]  Code Status listed and reviewed.         “I confirm accuracy of unchanged data/findings which have been carried forward from previous visit, as well as I have updated appropriately those that have changed.”                   Charleen Randall, APRN.  12/15/2021

## 2021-12-30 ENCOUNTER — NURSING HOME (OUTPATIENT)
Dept: INTERNAL MEDICINE | Facility: CLINIC | Age: 81
End: 2021-12-30

## 2021-12-30 VITALS
HEART RATE: 70 BPM | TEMPERATURE: 97.4 F | SYSTOLIC BLOOD PRESSURE: 120 MMHG | WEIGHT: 144 LBS | RESPIRATION RATE: 18 BRPM | BODY MASS INDEX: 21.9 KG/M2 | DIASTOLIC BLOOD PRESSURE: 78 MMHG | OXYGEN SATURATION: 97 %

## 2021-12-30 DIAGNOSIS — I10 ESSENTIAL HYPERTENSION: ICD-10-CM

## 2021-12-30 DIAGNOSIS — Z78.9 IMPAIRED MOBILITY AND ADLS: ICD-10-CM

## 2021-12-30 DIAGNOSIS — F02.818 DEMENTIA DUE TO PARKINSON'S DISEASE WITH BEHAVIORAL DISTURBANCE: Primary | ICD-10-CM

## 2021-12-30 DIAGNOSIS — Z74.09 IMPAIRED MOBILITY AND ADLS: ICD-10-CM

## 2021-12-30 DIAGNOSIS — I49.9 IRREGULAR HEART BEAT: ICD-10-CM

## 2021-12-30 DIAGNOSIS — F02.80 LATE ONSET ALZHEIMER'S DISEASE WITHOUT BEHAVIORAL DISTURBANCE: ICD-10-CM

## 2021-12-30 DIAGNOSIS — I25.119 CORONARY ARTERY DISEASE INVOLVING NATIVE HEART WITH ANGINA PECTORIS, UNSPECIFIED VESSEL OR LESION TYPE: ICD-10-CM

## 2021-12-30 DIAGNOSIS — E11.42 TYPE 2 DIABETES MELLITUS WITH DIABETIC POLYNEUROPATHY, WITHOUT LONG-TERM CURRENT USE OF INSULIN: ICD-10-CM

## 2021-12-30 DIAGNOSIS — G20 DEMENTIA DUE TO PARKINSON'S DISEASE WITH BEHAVIORAL DISTURBANCE: Primary | ICD-10-CM

## 2021-12-30 DIAGNOSIS — G30.1 LATE ONSET ALZHEIMER'S DISEASE WITHOUT BEHAVIORAL DISTURBANCE: ICD-10-CM

## 2021-12-30 PROCEDURE — 99308 SBSQ NF CARE LOW MDM 20: CPT | Performed by: INTERNAL MEDICINE

## 2022-01-13 ENCOUNTER — NURSING HOME (OUTPATIENT)
Dept: FAMILY MEDICINE CLINIC | Facility: CLINIC | Age: 82
End: 2022-01-13

## 2022-01-13 VITALS
WEIGHT: 146 LBS | SYSTOLIC BLOOD PRESSURE: 110 MMHG | RESPIRATION RATE: 18 BRPM | DIASTOLIC BLOOD PRESSURE: 58 MMHG | OXYGEN SATURATION: 89 % | BODY MASS INDEX: 22.2 KG/M2 | HEART RATE: 44 BPM | TEMPERATURE: 97.8 F

## 2022-01-13 DIAGNOSIS — R41.82 ACUTE ON CHRONIC ALTERATION IN MENTAL STATUS: ICD-10-CM

## 2022-01-13 DIAGNOSIS — Z78.9 IMPAIRED MOBILITY AND ADLS: ICD-10-CM

## 2022-01-13 DIAGNOSIS — Z74.09 IMPAIRED MOBILITY AND ADLS: ICD-10-CM

## 2022-01-13 DIAGNOSIS — F02.818 DEMENTIA DUE TO PARKINSON'S DISEASE WITH BEHAVIORAL DISTURBANCE: Chronic | ICD-10-CM

## 2022-01-13 DIAGNOSIS — R63.0 DECREASED APPETITE: ICD-10-CM

## 2022-01-13 DIAGNOSIS — G20 DEMENTIA DUE TO PARKINSON'S DISEASE WITH BEHAVIORAL DISTURBANCE: Chronic | ICD-10-CM

## 2022-01-13 DIAGNOSIS — R68.89 DECREASED ACTIVITY: ICD-10-CM

## 2022-01-13 PROCEDURE — 99309 SBSQ NF CARE MODERATE MDM 30: CPT | Performed by: NURSE PRACTITIONER

## 2022-01-15 NOTE — PROGRESS NOTES
"  Telemedicine nursing Home Progress Note        Mitul Stephen DO []  [facility] Ho [x]  [address] [place] [address]  Phone: [phone]  Fax: [phone] Inés Vega MD []  AL[facility] Ho [x]   [address] [place] [address]  Phone: [phone]  Fax: [phone]     PATIENT NAME: Gama Davila                                                                          YOB: 1940           DATE OF SERVICE: 1/13/2022  FACILITY:  [] [place]   [] [place]   [] [facility]   [x] [facility]  []  [facility]  [] Other     ______________________________________________________________________     CHIEF COMPLAINT:    Increased confusion, change in activity the past several days and decreased appetite yesterday.    HISTORY OF PRESENT ILLNESS:     Nursing reports that for the past several days patient has just wanted to lay in bed which is very much unlike him because he typically gets up in his wheelchair in the morning and moves about the facility all day.  They report that he did not eat anything yesterday.  When inquired about how he was feeling today he reported, \" he was just tired because he had just driven back from [place].\"  He denied any pain, discomfort or concerns but is a poor historian due to dementia.  Vital signs have been within normal limits.        PAST MEDICAL & SURGICAL HISTORY:   Past Medical History:   Diagnosis Date   • Arthritis    • Cancer (HCC)     SKIN    • Cataract    • Coronary artery disease    • Diabetes mellitus (HCC)    • High cholesterol    • [place] (hard of hearing)     PATIENT HAS HEARING AIDS   • Hypertension    • Irregular heart beat     HISTORY OF CARDIAC ABLATION IN 2003   • Wears dentures     FULL UPPER PLATE      Past Surgical History:   Procedure Laterality Date   • BACK SURGERY  1978    THEN AGAIN IN 1982   • CARDIAC ABLATION  2003   • CARDIAC SURGERY     • CATARACT EXTRACTION W/ INTRAOCULAR LENS IMPLANT " Left 2017    Procedure: CATARACT PHACO EXTRACTION WITH INTRAOCULAR LENS IMPLANT LEFT WITH TORIC LENS;  Surgeon: Alma Benavidez MD;  Location: [facility] OR;  Service:    • CATARACT EXTRACTION W/ INTRAOCULAR LENS IMPLANT Right 2017    Procedure: CATARACT PHACO EXTRACTION WITH INTRAOCULAR LENS IMPLANT RIGHT WITH TORIC LENS;  Surgeon: Alma Benavidez MD;  Location: [facility] OR;  Service:    • CORONARY ARTERY BYPASS GRAFT      SPOUSE UNSURE OF HOW MANY VESSELS   • HIATAL HERNIA REPAIR     • JOINT REPLACEMENT Left     HIP - DONE BY DR DALAL   • KNEE ARTHROSCOPY Left    • NOSE SURGERY      SPOUSE REPORTS FOR A BROKEN NOSE   • OTHER SURGICAL HISTORY Left     TENDON TORN LOOSE FROM BONE, LEFT ELBOW   • OTHER SURGICAL HISTORY      RUPTURED DISC   • OTHER SURGICAL HISTORY      NECK SURGERY FOR RUPTURED DISC   • OTHER SURGICAL HISTORY      HAD SECOND NECK SURGERY   • OTHER SURGICAL HISTORY      RUPTURED DISC   • OTHER SURGICAL HISTORY      RUPTURED DISC   • OTHER SURGICAL HISTORY      HARDWARE REMOVAL FROM BACK BY DR HAY         MEDICATIONS:  I have reviewed and reconciled the patients medication list in the patients chart at the [facility] nursing facility today.      ALLERGIES:  Allergies   Allergen Reactions   • Phenergan [Promethazine Hcl] Nausea And Vomiting   • Carbamazepine Unknown - Low Severity   • Hydrocodone Unknown - Low Severity   • Lisinopril Unknown - Low Severity         SOCIAL HISTORY:  Social History     Socioeconomic History   • Marital status:    Tobacco Use   • Smoking status: Former Smoker     Types: Cigarettes     Quit date:      Years since quittin.0   • Smokeless tobacco: Never Used   Substance and Sexual Activity   • Alcohol use: No   • Drug use: No   • Sexual activity: Defer       FAMILY HISTORY:  No family history on file.    REVIEW OF SYSTEMS:  Review of Systems   Unable to perform ROS: Dementia (ROS per nursing and patient)    Constitutional: Positive for activity change and appetite change. Negative for chills, diaphoresis, fatigue and fever.   HENT: Negative for mouth sores, rhinorrhea and trouble swallowing.    Eyes: Negative for discharge and redness.   Respiratory: Negative for cough, chest tightness and shortness of breath.    Cardiovascular: Negative for chest pain, palpitations and leg swelling.   Gastrointestinal: Negative for abdominal distention, anal bleeding, blood in stool, constipation and vomiting.   Genitourinary: Negative for difficulty urinating and frequency.        Incontinence   Musculoskeletal: Positive for arthralgias.   Skin: Negative.    Neurological: Positive for weakness. Negative for speech difficulty.   Hematological: Negative for adenopathy.   Psychiatric/Behavioral: Positive for confusion. Negative for behavioral problems, dysphoric mood, sleep disturbance and suicidal ideas. The patient is not nervous/anxious and is not hyperactive.           PHYSICAL EXAMINATION:     VITAL SIGNS:  /58   Pulse (!) 44   Temp 97.8 °F (36.6 °C)   Resp 18   Wt 66.2 kg (146 lb)   SpO2 (!) 89%   BMI 22.20 kg/m²     Physical Exam   HENT:   Head: Normocephalic and atraumatic.   Cardiovascular:   No lower extremity edema at this time   Pulmonary/Chest: Effort normal and breath sounds normal.   Abdominal: Normal appearance.   Musculoskeletal:        Hands:       Comments: Chronic NM defs, and contractures, s/t Parkinson's   Neurological: He is alert. He is disoriented.   Skin: No rash noted.   Psychiatric: His behavior is normal. Mood normal. Cognition and memory are impaired.   Nursing note and vitals reviewed.      RECORDS REVIEW:   Most recent labs    ASSESSMENT     Diagnoses and all orders for this visit:    1. Acute on chronic alteration in mental status    2. Decreased activity    3. Decreased appetite    4. 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 Impaired mobility and  ADLs        PLAN    Decreased activity/decrease appetite/mental status change/dementia  -We will check CBC, CMP and UA C&S for further evaluation of signs and symptoms. If negative, will get CXR. We will follow-up and continue to monitor.    Nursing encouraged to keep me informed of any acute changes, lack of improvement, or any new concerning symptoms.    Staff to continue supportive care for all ADLs.     [x]  Discussed Patient in detail with nursing/staff, addressed all needs today.     [x]  Plan of Care Reviewed   [x]  PT/OT Reviewed   [x]  Order Changes  []  Discharge Plans Reviewed  [x]  Advance Directive on file with Nursing Home.   [x]  POA on file with Nursing Home.    [x]  Code Status listed and reviewed.       “I confirm accuracy of unchanged data/findings which have been carried forward from previous visit, as well as I have updated appropriately those that have changed.”                   Charleen Randall, APRN.  1/14/2022

## 2022-01-21 ENCOUNTER — NURSING HOME (OUTPATIENT)
Dept: FAMILY MEDICINE CLINIC | Facility: CLINIC | Age: 82
End: 2022-01-21

## 2022-01-21 VITALS
TEMPERATURE: 97.6 F | BODY MASS INDEX: 22.2 KG/M2 | WEIGHT: 146 LBS | HEART RATE: 70 BPM | RESPIRATION RATE: 18 BRPM | SYSTOLIC BLOOD PRESSURE: 128 MMHG | OXYGEN SATURATION: 97 % | DIASTOLIC BLOOD PRESSURE: 76 MMHG

## 2022-01-21 DIAGNOSIS — Z74.09 IMPAIRED MOBILITY AND ADLS: ICD-10-CM
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
## 2022-01-21 DIAGNOSIS — R29.898 WEAKNESS OF BOTH LOWER EXTREMITIES: ICD-10-CM

## 2022-01-21 DIAGNOSIS — Z78.9 IMPAIRED MOBILITY AND ADLS: ICD-10-CM

## 2022-01-21 DIAGNOSIS — G20 PARKINSON'S DISEASE (TREMOR, STIFFNESS, SLOW MOTION, UNSTABLE POSTURE): ICD-10-CM

## 2022-01-21 PROCEDURE — 99308 SBSQ NF CARE LOW MDM 20: CPT | Performed by: NURSE PRACTITIONER

## 2022-01-24 NOTE — PROGRESS NOTES
Telemedicine nursing Home Progress Note        Mitul Stephen DO []  ALESSANDRA Ho [x]  [address] [place] [address]  Phone: [phone]  Fax: [phone] Inés Vega MD []  ALESSANDRA Ho [x]   [address] [place] [address]  Phone: [phone]  Fax: [phone]     PATIENT NAME: Gama Davila                                                                          YOB: 1940           DATE OF SERVICE: 1/21/2022  FACILITY:  [] [facility]   [] [place]   [] [facility]   [x] [facility]  []  [facility]  [] Other     ______________________________________________________________________     CHIEF COMPLAINT:    Follow up fall 1/20 night.     HISTORY OF PRESENT ILLNESS:     Patient had an unwitnessed, non injury fall on 1/20 night.  He had taken himself to the restroom and when getting up out of the wheelchair to transfer himself to the toilet he fell in the floor.  Nursing came and found him on his left side.  No injuries were noted or reported.  He is sitting up in his wheelchair during visit today, moving about his room in the facility.  He has no noted or reported injuries.  He has no complaints or signs and symptoms of any distress.      PAST MEDICAL & SURGICAL HISTORY:   Past Medical History:   Diagnosis Date   • Arthritis    • Cancer (HCC)     SKIN    • Cataract    • Coronary artery disease    • Diabetes mellitus (HCC)    • High cholesterol    • [place] (hard of hearing)     PATIENT HAS HEARING AIDS   • Hypertension    • Irregular heart beat     HISTORY OF CARDIAC ABLATION IN 2003   • Wears dentures     FULL UPPER PLATE      Past Surgical History:   Procedure Laterality Date   • BACK SURGERY  1978    THEN AGAIN IN 1982   • CARDIAC ABLATION  2003   • CARDIAC SURGERY     • CATARACT EXTRACTION W/ INTRAOCULAR LENS IMPLANT Left 5/24/2017    Procedure: CATARACT PHACO EXTRACTION WITH INTRAOCULAR LENS IMPLANT LEFT WITH TORIC LENS;  Surgeon: Alma  Pam Benavidez MD;  Location: [facility];  Service:    • CATARACT EXTRACTION W/ INTRAOCULAR LENS IMPLANT Right 2017    Procedure: CATARACT PHACO EXTRACTION WITH INTRAOCULAR LENS IMPLANT RIGHT WITH TORIC LENS;  Surgeon: Alma Benavidez MD;  Location: [facility];  Service:    • CORONARY ARTERY BYPASS GRAFT      SPOUSE UNSURE OF HOW MANY VESSELS   • HIATAL HERNIA REPAIR     • JOINT REPLACEMENT Left     HIP - DONE BY DR DALAL   • KNEE ARTHROSCOPY Left    • NOSE SURGERY      SPOUSE REPORTS FOR A BROKEN NOSE   • OTHER SURGICAL HISTORY Left     TENDON TORN LOOSE FROM BONE, LEFT ELBOW   • OTHER SURGICAL HISTORY      RUPTURED DISC   • OTHER SURGICAL HISTORY      NECK SURGERY FOR RUPTURED DISC   • OTHER SURGICAL HISTORY      HAD SECOND NECK SURGERY   • OTHER SURGICAL HISTORY      RUPTURED DISC   • OTHER SURGICAL HISTORY      RUPTURED DISC   • OTHER SURGICAL HISTORY      HARDWARE REMOVAL FROM BACK BY DR HAY         MEDICATIONS:  I have reviewed and reconciled the patients medication list in the patients chart at the [facility] nursing [facility] today.      ALLERGIES:  Allergies   Allergen Reactions   • Phenergan [Promethazine Hcl] Nausea And Vomiting   • Carbamazepine Unknown - Low Severity   • Hydrocodone Unknown - Low Severity   • Lisinopril Unknown - Low Severity         SOCIAL HISTORY:  Social History     Socioeconomic History   • Marital status:    Tobacco Use   • Smoking status: Former Smoker     Types: Cigarettes     Quit date:      Years since quittin.0   • Smokeless tobacco: Never Used   Substance and Sexual Activity   • Alcohol use: No   • Drug use: No   • Sexual activity: Defer       FAMILY HISTORY:  No family history on file.    REVIEW OF SYSTEMS:  Review of Systems   Unable to perform ROS: Dementia (ROS per nursing and patient)   Constitutional: Negative for activity change, appetite change, chills, diaphoresis, fatigue and fever.   HENT: Negative for  mouth sores, rhinorrhea and trouble swallowing.    Eyes: Negative for discharge and redness.   Respiratory: Negative for cough, chest tightness and shortness of breath.    Cardiovascular: Negative for chest pain, palpitations and leg swelling.   Gastrointestinal: Negative for abdominal distention, anal bleeding, blood in stool, constipation and vomiting.   Genitourinary: Negative for difficulty urinating and frequency.        Incontinence   Musculoskeletal: Positive for arthralgias.   Skin: Negative.    Neurological: Positive for weakness. Negative for speech difficulty.   Hematological: Negative for adenopathy.   Psychiatric/Behavioral: Positive for confusion. Negative for behavioral problems, dysphoric mood, sleep disturbance and suicidal ideas. The patient is not nervous/anxious and is not hyperactive.           PHYSICAL EXAMINATION:     VITAL SIGNS:  /76   Pulse 70   Temp 97.6 °F (36.4 °C)   Resp 18   Wt 66.2 kg (146 lb)   SpO2 97% Comment: RA  BMI 22.20 kg/m²     Physical Exam   HENT:   Head: Normocephalic and atraumatic.   Cardiovascular:   No lower extremity edema at this time   Pulmonary/Chest: Effort normal and breath sounds normal.   Abdominal: Normal appearance.   Musculoskeletal:        Hands:       Comments: Chronic NM defs, and contractures, s/t Parkinson's   Neurological: He is alert. He is disoriented.   Skin: No rash noted.   Psychiatric: His behavior is normal. Mood normal. Cognition and memory are impaired.   Nursing note and vitals reviewed.      RECORDS REVIEW:   Most recent labs    ASSESSMENT     Diagnoses and all orders for this visit:    1. Weakness of both lower extremities    2. Parkinson's disease (tremor, stiffness, slow motion, unstable posture) ([facility])    3. Fall, initial encounter    4. Impaired mobility and ADLs        PLAN    BLE weakness/Parkinson's/fall  -Unwitnessed noninjury fall last night with no reported or noted injuries.  Patient does have fall precautions in  wheelchair in bed and was reeducated on calling for assistance with toileting or other ADLs.  Will follow-up and continue to monitor.    Nursing encouraged to keep me informed of any acute changes, lack of improvement, or any new concerning symptoms.    Staff to continue supportive care for all ADLs.     [x]  Discussed Patient in detail with nursing/staff, addressed all needs today.     [x]  Plan of Care Reviewed   [x]  PT/OT Reviewed   [x]  Order Changes  []  Discharge Plans Reviewed  [x]  Advance Directive on file with Nursing Home.   [x]  POA on file with Nursing Home.    [x]  Code Status listed and reviewed.       “I confirm accuracy of unchanged data/findings which have been carried forward from previous visit, as well as I have updated appropriately those that have changed.”                   Charleen Randall, APRN.  1/23/2022

## 2022-02-17 ENCOUNTER — NURSING HOME (OUTPATIENT)
Dept: FAMILY MEDICINE CLINIC | Facility: CLINIC | Age: 82
End: 2022-02-17

## 2022-02-17 VITALS
RESPIRATION RATE: 18 BRPM | BODY MASS INDEX: 22.96 KG/M2 | TEMPERATURE: 97.8 F | SYSTOLIC BLOOD PRESSURE: 140 MMHG | DIASTOLIC BLOOD PRESSURE: 80 MMHG | OXYGEN SATURATION: 93 % | WEIGHT: 151 LBS | HEART RATE: 53 BPM

## 2022-02-17 DIAGNOSIS — G20 DEMENTIA DUE TO PARKINSON'S DISEASE WITH BEHAVIORAL DISTURBANCE: Chronic | ICD-10-CM

## 2022-02-17 DIAGNOSIS — F03.918 AGGRESSIVE BEHAVIOR DUE TO DEMENTIA: ICD-10-CM

## 2022-02-17 DIAGNOSIS — F39 MOOD DISORDER: ICD-10-CM

## 2022-02-17 DIAGNOSIS — F03.911 AGITATION DUE TO DEMENTIA: Primary | ICD-10-CM

## 2022-02-17 DIAGNOSIS — F02.818 DEMENTIA DUE TO PARKINSON'S DISEASE WITH BEHAVIORAL DISTURBANCE: Chronic | ICD-10-CM

## 2022-02-17 DIAGNOSIS — G20 PARKINSON'S DISEASE (TREMOR, STIFFNESS, SLOW MOTION, UNSTABLE POSTURE): ICD-10-CM

## 2022-02-17 PROCEDURE — 99309 SBSQ NF CARE MODERATE MDM 30: CPT | Performed by: NURSE PRACTITIONER

## 2022-02-19 NOTE — PROGRESS NOTES
Telemedicine nursing Home Progress Note        Mitul Stephen DO []  ALESSANDRA Ho [x]  [address] [place] [address]  Phone: [phone]  Fax: [phone] Inés Vega MD []  ALESSANDRA Ho [x]   [address] [place] [address]  Phone: [phone]  Fax: [phone]     PATIENT NAME: Gama Davila                                                                          YOB: 1940           DATE OF SERVICE: 2/17/2022  FACILITY:  [] [place]   [] [place]   [] [facility]   [x] [facility]  []  [facility]  [] Other     ______________________________________________________________________     CHIEF COMPLAINT:    Increased behaviors last night.     HISTORY OF PRESENT ILLNESS:     Received call last night that patient was having increase behaviors, being angry and aggressive with staff members were trying to attempt to care.  He moves about the facility in his wheelchair and goes to other resident's rooms so there were concerns he would have arguments or aggression with other residents.  He was given Haldol 5 mg IM and this did help his behaviors and he rested during the night.  He is moving about the facility in his wheelchair today with no increased behaviors, no complaints or signs and symptoms of any distress.    PAST MEDICAL & SURGICAL HISTORY:   Past Medical History:   Diagnosis Date   • Arthritis    • Cancer (HCC)     SKIN    • Cataract    • Coronary artery disease    • Diabetes mellitus (HCC)    • High cholesterol    • [place] (hard of hearing)     PATIENT HAS HEARING AIDS   • Hypertension    • Irregular heart beat     HISTORY OF CARDIAC ABLATION IN 2003   • Wears dentures     FULL UPPER PLATE      Past Surgical History:   Procedure Laterality Date   • BACK SURGERY  1978    THEN AGAIN IN 1982   • CARDIAC ABLATION  2003   • CARDIAC SURGERY     • CATARACT EXTRACTION W/ INTRAOCULAR LENS IMPLANT Left 5/24/2017    Procedure: CATARACT PHACO EXTRACTION  WITH INTRAOCULAR LENS IMPLANT LEFT WITH TORIC LENS;  Surgeon: Alma Benavidez MD;  Location: [facility] OR;  Service:    • CATARACT EXTRACTION W/ INTRAOCULAR LENS IMPLANT Right 2017    Procedure: CATARACT PHACO EXTRACTION WITH INTRAOCULAR LENS IMPLANT RIGHT WITH TORIC LENS;  Surgeon: Alma Benavidez MD;  Location: [facility];  Service:    • CORONARY ARTERY BYPASS GRAFT      SPOUSE UNSURE OF HOW MANY VESSELS   • HIATAL HERNIA REPAIR     • JOINT REPLACEMENT Left     HIP - DONE BY DR DALAL   • KNEE ARTHROSCOPY Left    • NOSE SURGERY      SPOUSE REPORTS FOR A BROKEN NOSE   • OTHER SURGICAL HISTORY Left     TENDON TORN LOOSE FROM BONE, LEFT ELBOW   • OTHER SURGICAL HISTORY      RUPTURED DISC   • OTHER SURGICAL HISTORY      NECK SURGERY FOR RUPTURED DISC   • OTHER SURGICAL HISTORY      HAD SECOND NECK SURGERY   • OTHER SURGICAL HISTORY      RUPTURED DISC   • OTHER SURGICAL HISTORY      RUPTURED DISC   • OTHER SURGICAL HISTORY      HARDWARE REMOVAL FROM BACK BY DR HAY         MEDICATIONS:  I have reviewed and reconciled the patients medication list in the patients chart at the [facility] nursing facility today.      ALLERGIES:  Allergies   Allergen Reactions   • Phenergan [Promethazine Hcl] Nausea And Vomiting   • Carbamazepine Unknown - Low Severity   • Hydrocodone Unknown - Low Severity   • Lisinopril Unknown - Low Severity         SOCIAL HISTORY:  Social History     Socioeconomic History   • Marital status:    Tobacco Use   • Smoking status: Former Smoker     Types: Cigarettes     Quit date:      Years since quittin.1   • Smokeless tobacco: Never Used   Substance and Sexual Activity   • Alcohol use: No   • Drug use: No   • Sexual activity: Defer       FAMILY HISTORY:  No family history on file.    REVIEW OF SYSTEMS:  Review of Systems   Unable to perform ROS: Dementia (ROS per nursing and patient)   Constitutional: Negative for activity change,  appetite change, chills, diaphoresis, fatigue and fever.   HENT: Negative for mouth sores, rhinorrhea and trouble swallowing.    Eyes: Negative for discharge and redness.   Respiratory: Negative for cough, chest tightness and shortness of breath.    Cardiovascular: Negative for chest pain, palpitations and leg swelling.   Gastrointestinal: Negative for abdominal distention, anal bleeding, blood in stool, constipation and vomiting.   Genitourinary: Negative for difficulty urinating and frequency.        Incontinence   Musculoskeletal: Positive for arthralgias.   Skin: Negative.    Neurological: Positive for weakness. Negative for speech difficulty.   Hematological: Negative for adenopathy.   Psychiatric/Behavioral: Positive for behavioral problems and confusion. Negative for dysphoric mood, sleep disturbance and suicidal ideas. The patient is not nervous/anxious and is not hyperactive.           PHYSICAL EXAMINATION:     VITAL SIGNS:  /80   Pulse 53   Temp 97.8 °F (36.6 °C)   Resp 18   Wt 68.5 kg (151 lb)   SpO2 93%   BMI 22.96 kg/m²     Physical Exam   HENT:   Head: Normocephalic and atraumatic.   Cardiovascular:   No lower extremity edema at this time   Pulmonary/Chest: Effort normal and breath sounds normal.   Abdominal: Normal appearance.   Musculoskeletal:        Hands:       Comments: Chronic NM defs, and contractures, s/t Parkinson's   Neurological: He is alert. He is disoriented.   Skin: No rash noted.   Psychiatric: His behavior is normal. Mood normal. Cognition and memory are impaired.   Nursing note and vitals reviewed.      RECORDS REVIEW:   Most recent labs    ASSESSMENT     Diagnoses and all orders for this visit:    1. Agitation due to dementia (HCC) (Primary)    2. Aggressive behavior due to dementia (HCC)    3. Mood disorder (HCC)    4. Parkinson's disease (tremor, stiffness, slow motion, unstable posture) (HCC)    5. Dementia due to Parkinson's disease with behavioral disturbance  ([facility])        PLAN    Agitation and aggression due to dementia/Parkinson's  -Patient does have intermittent behaviors related to his dementia and mood disorder.  He continues to progress quickly related to his Parkinson's.  Will obtain CBC, BMP and UA C&S.  Will follow up with results and continue to monitor.    Nursing encouraged to keep me informed of any acute changes, lack of improvement, or any new concerning symptoms.    Staff to continue supportive care for all ADLs.     [x]  Discussed Patient in detail with nursing/staff, addressed all needs today.     [x]  Plan of Care Reviewed   [x]  PT/OT Reviewed   [x]  Order Changes  []  Discharge Plans Reviewed  [x]  Advance Directive on file with Nursing Home.   [x]  POA on file with Nursing Home.    [x]  Code Status listed and reviewed.       “I confirm accuracy of unchanged data/findings which have been carried forward from previous visit, as well as I have updated appropriately those that have changed.”                     Charleen Randall, APRN.  2/19/2022

## 2022-03-15 ENCOUNTER — NURSING HOME (OUTPATIENT)
Dept: FAMILY MEDICINE CLINIC | Facility: CLINIC | Age: 82
End: 2022-03-15

## 2022-03-15 VITALS
OXYGEN SATURATION: 94 % | RESPIRATION RATE: 18 BRPM | SYSTOLIC BLOOD PRESSURE: 134 MMHG | WEIGHT: 152 LBS | TEMPERATURE: 98.1 F | HEART RATE: 84 BPM | BODY MASS INDEX: 23.11 KG/M2 | DIASTOLIC BLOOD PRESSURE: 70 MMHG

## 2022-03-15 DIAGNOSIS — G20 DEMENTIA DUE TO PARKINSON'S DISEASE WITH BEHAVIORAL DISTURBANCE: Chronic | ICD-10-CM

## 2022-03-15 DIAGNOSIS — Z74.09 IMPAIRED MOBILITY AND ADLS: ICD-10-CM

## 2022-03-15 DIAGNOSIS — Z78.9 IMPAIRED MOBILITY AND ADLS: ICD-10-CM

## 2022-03-15 DIAGNOSIS — F02.818 DEMENTIA DUE TO PARKINSON'S DISEASE WITH BEHAVIORAL DISTURBANCE: Chronic | ICD-10-CM

## 2022-03-15 DIAGNOSIS — G20 PARKINSON'S DISEASE (TREMOR, STIFFNESS, SLOW MOTION, UNSTABLE POSTURE): Primary | ICD-10-CM

## 2022-03-15 PROCEDURE — 99308 SBSQ NF CARE LOW MDM 20: CPT | Performed by: NURSE PRACTITIONER

## 2022-03-16 NOTE — PROGRESS NOTES
"  Telemedicine nursing Home Progress Note        Mitul Stephen DO []  ALESSANDRA Ho [x]  [address] [place] [address]  Phone: [phone]  Fax: [phone] Inés Vega MD []  ALESSANDRA Ho [x]   [address] [place] [address]  Phone: [phone]  Fax: [phone]     PATIENT NAME: Gama Davila                                                                          YOB: 1940           DATE OF SERVICE: 3/15/2022  FACILITY:  [] [place]   [] [place]   [] [facility]   [x] [facility]  []  [facility]  [] Other     ______________________________________________________________________     CHIEF COMPLAINT:    Follow-up increased behaviors.    HISTORY OF PRESENT ILLNESS:     Patient with increased behaviors 4 weeks ago.  Work-up performed and labs and UA negative.  Nursing reports that he has not had any increased behaviors since just his normal baseline dementia behaviors.  He is moving about the facility today in his wheelchair with no complaints or signs and symptoms of any distress.  He is pleasantly confused \"reporting that he is going to find his car to head home.\"    PAST MEDICAL & SURGICAL HISTORY:   Past Medical History:   Diagnosis Date   • Arthritis    • Cancer (HCC)     SKIN    • Cataract    • Coronary artery disease    • Diabetes mellitus (HCC)    • High cholesterol    • [place] (hard of hearing)     PATIENT HAS HEARING AIDS   • Hypertension    • Irregular heart beat     HISTORY OF CARDIAC ABLATION IN 2003   • Wears dentures     FULL UPPER PLATE      Past Surgical History:   Procedure Laterality Date   • BACK SURGERY  1978    THEN AGAIN IN 1982   • CARDIAC ABLATION  2003   • CARDIAC SURGERY     • CATARACT EXTRACTION W/ INTRAOCULAR LENS IMPLANT Left 5/24/2017    Procedure: CATARACT PHACO EXTRACTION WITH INTRAOCULAR LENS IMPLANT LEFT WITH TORIC LENS;  Surgeon: Alma Benavidez MD;  Location: [facility];  Service:    • CATARACT " EXTRACTION W/ INTRAOCULAR LENS IMPLANT Right 2017    Procedure: CATARACT PHACO EXTRACTION WITH INTRAOCULAR LENS IMPLANT RIGHT WITH TORIC LENS;  Surgeon: Alma Benavidez MD;  Location: [facility];  Service:    • CORONARY ARTERY BYPASS GRAFT      SPOUSE UNSURE OF HOW MANY VESSELS   • HIATAL HERNIA REPAIR     • JOINT REPLACEMENT Left     HIP - DONE BY DR DALAL   • KNEE ARTHROSCOPY Left    • NOSE SURGERY      SPOUSE REPORTS FOR A BROKEN NOSE   • OTHER SURGICAL HISTORY Left     TENDON TORN LOOSE FROM BONE, LEFT ELBOW   • OTHER SURGICAL HISTORY      RUPTURED DISC   • OTHER SURGICAL HISTORY      NECK SURGERY FOR RUPTURED DISC   • OTHER SURGICAL HISTORY      HAD SECOND NECK SURGERY   • OTHER SURGICAL HISTORY      RUPTURED DISC   • OTHER SURGICAL HISTORY      RUPTURED DISC   • OTHER SURGICAL HISTORY      HARDWARE REMOVAL FROM BACK BY DR HAY         MEDICATIONS:  I have reviewed and reconciled the patients medication list in the patients chart at the skilled nursing facility today.      ALLERGIES:  Allergies   Allergen Reactions   • Phenergan [Promethazine Hcl] Nausea And Vomiting   • Carbamazepine Unknown - Low Severity   • Hydrocodone Unknown - Low Severity   • Lisinopril Unknown - Low Severity         SOCIAL HISTORY:  Social History     Socioeconomic History   • Marital status:    Tobacco Use   • Smoking status: Former Smoker     Types: Cigarettes     Quit date:      Years since quittin.2   • Smokeless tobacco: Never Used   Substance and Sexual Activity   • Alcohol use: No   • Drug use: No   • Sexual activity: Defer       FAMILY HISTORY:  No family history on file.    REVIEW OF SYSTEMS:  Review of Systems   Unable to perform ROS: Dementia (ROS per nursing and patient)   Constitutional: Negative for activity change, appetite change, chills, diaphoresis, fatigue and fever.   HENT: Negative for mouth sores, rhinorrhea and trouble swallowing.    Eyes: Negative  for discharge and redness.   Respiratory: Negative for cough, chest tightness and shortness of breath.    Cardiovascular: Negative for chest pain, palpitations and leg swelling.   Gastrointestinal: Negative for abdominal distention, anal bleeding, blood in stool, constipation and vomiting.   Genitourinary: Negative for difficulty urinating and frequency.        Incontinence   Musculoskeletal: Positive for arthralgias (Arthritis).   Skin: Negative.    Neurological: Positive for weakness. Negative for speech difficulty.   Hematological: Negative for adenopathy.   Psychiatric/Behavioral: Positive for behavioral problems and confusion. Negative for dysphoric mood, sleep disturbance and suicidal ideas. The patient is not nervous/anxious and is not hyperactive.           PHYSICAL EXAMINATION:     VITAL SIGNS:  /70   Pulse 84   Temp 98.1 °F (36.7 °C)   Resp 18   Wt 68.9 kg (152 lb)   SpO2 94%   BMI 23.11 kg/m²     Physical Exam   HENT:   Head: Normocephalic and atraumatic.   Cardiovascular:   No lower extremity edema at this time   Pulmonary/Chest: Effort normal and breath sounds normal.   Abdominal: Normal appearance.   Musculoskeletal:        Hands:       Comments: Chronic NM defs, and contractures, s/t Parkinson's   Neurological: He is alert. He is disoriented.   Skin: No rash noted.   Psychiatric: His behavior is normal. Mood normal. Cognition and memory are impaired.   Nursing note and vitals reviewed.      RECORDS REVIEW:   Most recent labs    ASSESSMENT     Diagnoses and all orders for this visit:    1. Parkinson's disease (tremor, stiffness, slow motion, unstable posture) ([facility]) (Primary)    2. 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 Impaired mobility and ADLs        PLAN    Parkinson's dementia  -Stable at this time with no acute behaviors as of recent.  Appetite and weight stable.  Will follow-up and continue to monitor.    Nursing encouraged to keep me informed of  any acute changes, lack of improvement, or any new concerning symptoms.    Staff to continue supportive care for all ADLs.     [x]  Discussed Patient in detail with nursing/staff, addressed all needs today.     [x]  Plan of Care Reviewed   [x]  PT/OT Reviewed   [x]  Order Changes  []  Discharge Plans Reviewed  [x]  Advance Directive on file with Nursing Home.   [x]  POA on file with Nursing Home.    [x]  Code Status listed and reviewed.       “I confirm accuracy of unchanged data/findings which have been carried forward from previous visit, as well as I have updated appropriately those that have changed.”                     Charleen Randall, APRN.  3/16/2022

## 2022-03-31 ENCOUNTER — NURSING HOME (OUTPATIENT)
Dept: INTERNAL MEDICINE | Facility: CLINIC | Age: 82
End: 2022-03-31

## 2022-03-31 VITALS
WEIGHT: 152 LBS | DIASTOLIC BLOOD PRESSURE: 70 MMHG | TEMPERATURE: 97.9 F | BODY MASS INDEX: 23.11 KG/M2 | RESPIRATION RATE: 18 BRPM | SYSTOLIC BLOOD PRESSURE: 126 MMHG | HEART RATE: 80 BPM | OXYGEN SATURATION: 97 %

## 2022-03-31 DIAGNOSIS — I10 ESSENTIAL HYPERTENSION: ICD-10-CM

## 2022-03-31 DIAGNOSIS — I25.119 CORONARY ARTERY DISEASE INVOLVING NATIVE HEART WITH ANGINA PECTORIS, UNSPECIFIED VESSEL OR LESION TYPE: ICD-10-CM

## 2022-03-31 DIAGNOSIS — E11.42 TYPE 2 DIABETES MELLITUS WITH DIABETIC POLYNEUROPATHY, WITHOUT LONG-TERM CURRENT USE OF INSULIN: ICD-10-CM

## 2022-03-31 DIAGNOSIS — Z78.9 IMPAIRED MOBILITY AND ADLS: ICD-10-CM

## 2022-03-31 DIAGNOSIS — G20 DEMENTIA DUE TO PARKINSON'S DISEASE WITH BEHAVIORAL DISTURBANCE: Primary | ICD-10-CM

## 2022-03-31 DIAGNOSIS — Z74.09 IMPAIRED MOBILITY AND ADLS: ICD-10-CM

## 2022-03-31 DIAGNOSIS — F02.818 DEMENTIA DUE TO PARKINSON'S DISEASE WITH BEHAVIORAL DISTURBANCE: Primary | ICD-10-CM

## 2022-03-31 PROCEDURE — 99308 SBSQ NF CARE LOW MDM 20: CPT | Performed by: INTERNAL MEDICINE

## 2022-04-03 NOTE — PROGRESS NOTES
Nursing Home Progress Note        Mitul Stephen DO []  ALESSANDRA Ho []  [address] [place] [address]  Phone: [phone]  Fax: [phone] Inés Vega MD []  Alex Burk DO [x]   [address] [place] [address]  Phone: [phone]  Fax: [phone]     PATIENT NAME: Gmaa Davila                                                                          YOB: 1940           DATE OF SERVICE: 03/31/2022  FACILITY:  [] [place]   [] [place]   [] [facility]   [x] [facility]  []  [facility]  [] Other ______________________________________________________________________     CHIEF COMPLAINT:  Chronic Medical Management      HISTORY OF PRESENT ILLNESS:   Patient was moving about in his wheelchair roaming about from room to room in the facility.  He has been pleasantly confused and content recently.  No acute events reported by nursing staff.     PAST MEDICAL & SURGICAL HISTORY:   Past Medical History:   Diagnosis Date   • Arthritis    • Cancer (HCC)     SKIN    • Cataract    • Coronary artery disease    • Diabetes mellitus (HCC)    • High cholesterol    • [place] (hard of hearing)     PATIENT HAS HEARING AIDS   • Hypertension    • Irregular heart beat     HISTORY OF CARDIAC ABLATION IN 2003   • Wears dentures     FULL UPPER PLATE      Past Surgical History:   Procedure Laterality Date   • BACK SURGERY  1978    THEN AGAIN IN 1982   • CARDIAC ABLATION  2003   • CARDIAC SURGERY     • CATARACT EXTRACTION W/ INTRAOCULAR LENS IMPLANT Left 5/24/2017    Procedure: CATARACT PHACO EXTRACTION WITH INTRAOCULAR LENS IMPLANT LEFT WITH TORIC LENS;  Surgeon: Alma Benavidez MD;  Location: [facility] OR;  Service:    • CATARACT EXTRACTION W/ INTRAOCULAR LENS IMPLANT Right 6/14/2017    Procedure: CATARACT PHACO EXTRACTION WITH INTRAOCULAR LENS IMPLANT RIGHT WITH TORIC LENS;  Surgeon: Alma Benavidez MD;  Location: [facility] OR;  Service:    • CORONARY ARTERY  BYPASS GRAFT      SPOUSE UNSURE OF HOW MANY VESSELS   • HIATAL HERNIA REPAIR     • JOINT REPLACEMENT Left     HIP - DONE BY DR DALAL   • KNEE ARTHROSCOPY Left    • NOSE SURGERY  1970    SPOUSE REPORTS FOR A BROKEN NOSE   • OTHER SURGICAL HISTORY Left     TENDON TORN LOOSE FROM BONE, LEFT ELBOW   • OTHER SURGICAL HISTORY      RUPTURED DISC   • OTHER SURGICAL HISTORY      NECK SURGERY FOR RUPTURED DISC   • OTHER SURGICAL HISTORY      HAD SECOND NECK SURGERY   • OTHER SURGICAL HISTORY      RUPTURED DISC   • OTHER SURGICAL HISTORY      RUPTURED DISC   • OTHER SURGICAL HISTORY      HARDWARE REMOVAL FROM BACK BY DR HAY         MEDICATIONS:  I have reviewed and reconciled the patients medication list in the patients chart at the [facility] today, 2022.      ALLERGIES:  Allergies   Allergen Reactions   • Phenergan [Promethazine Hcl] Nausea And Vomiting   • Carbamazepine Unknown - Low Severity   • Hydrocodone Unknown - Low Severity   • Lisinopril Unknown - Low Severity         SOCIAL HISTORY:  Social History     Socioeconomic History   • Marital status:    Tobacco Use   • Smoking status: Former Smoker     Types: Cigarettes     Quit date:      Years since quittin.2   • Smokeless tobacco: Never Used   Substance and Sexual Activity   • Alcohol use: No   • Drug use: No   • Sexual activity: Defer       FAMILY HISTORY:  No family history on file.    REVIEW OF SYSTEMS:  Review of Systems   Constitutional: Negative for chills, fatigue and fever.   HENT: Negative for congestion, ear pain, rhinorrhea, sinus pressure and sore throat.    Eyes: Negative for visual disturbance.   Respiratory: Negative for cough, chest tightness, shortness of breath and wheezing.    Cardiovascular: Negative for chest pain, palpitations and leg swelling.   Gastrointestinal: Negative for abdominal pain, blood in stool, constipation, diarrhea, nausea and vomiting.   Endocrine: Negative for  polydipsia and polyuria.   Genitourinary: Negative for dysuria and hematuria.   Musculoskeletal: Negative for arthralgias and back pain.   Skin: Negative for rash.   Neurological: Negative for dizziness, light-headedness, numbness and headaches.   Psychiatric/Behavioral: Negative for dysphoric mood and sleep disturbance. The patient is not nervous/anxious.           PHYSICAL EXAMINATION:     VITAL SIGNS:  /70   Pulse 80   Temp 97.9 °F (36.6 °C)   Resp 18   Wt 68.9 kg (152 lb)   SpO2 97%   BMI 23.11 kg/m²     Physical Exam  Vitals and nursing note reviewed.   Constitutional:       General: He is not in acute distress.     Appearance: Normal appearance. He is well-developed.      Comments: Wheelchair-bound elderly male   HENT:      Head: Normocephalic and atraumatic.   Eyes:      Extraocular Movements: Extraocular movements intact.      Conjunctiva/sclera: Conjunctivae normal.   Cardiovascular:      Rate and Rhythm: Normal rate and regular rhythm.   Pulmonary:      Effort: Pulmonary effort is normal.      Breath sounds: Normal breath sounds.   Musculoskeletal:      Cervical back: Normal range of motion and neck supple.   Skin:     General: Skin is warm and dry.      Findings: No rash.   Neurological:      General: No focal deficit present.      Mental Status: He is alert and oriented to person, place, and time.   Psychiatric:         Mood and Affect: Mood normal.         Behavior: Behavior normal.         RECORDS REVIEW:        ASSESSMENT     Diagnoses and all orders for this visit:    1. 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 Essential hypertension    3. Type 2 diabetes mellitus with diabetic polyneuropathy, without long-term current use of insulin (HCC)    4. Impaired mobility and ADLs    5. Coronary artery disease involving native heart with angina pectoris, unspecified vessel or lesion type (HCC)        PLAN  Dementia due to Parkinson's  disease  -Continue Sinemet and donepazil.     Mood disorder  - psychiatry is following. stable at this time.      Type 2 diabetes mellitus   - controlled.   -Stable control on metformin and basaglar, glucose log reviewed.       Falls  - no recent incidents.  Doing well moving around in a wheelchair.      Coronary artery disease/irregular heartbeat  - stable on current cardiac medications.  No changes needed.     [x]  Discussed Patient in detail with nursing/staff, addressed all needs today.     [x]  Plan of Care Reviewed   []  PT/OT Reviewed   []  Order Changes  []  Discharge Plans Reviewed  [x]  Advance Directive on file with Nursing Home.   [x]  POA on file with Nursing Home.    [x]  Code Status listed and reviewed.     I confirm accuracy of unchanged data/findings including physical exam and plan which have been carried forward from previous visit, as well as I have updated appropriately those that have changed        Alex Burk DO.  4/3/2022

## 2022-04-28 ENCOUNTER — NURSING HOME (OUTPATIENT)
Dept: INTERNAL MEDICINE | Facility: CLINIC | Age: 82
End: 2022-04-28

## 2022-04-28 VITALS
DIASTOLIC BLOOD PRESSURE: 78 MMHG | BODY MASS INDEX: 23.57 KG/M2 | HEART RATE: 70 BPM | OXYGEN SATURATION: 96 % | RESPIRATION RATE: 18 BRPM | WEIGHT: 155 LBS | SYSTOLIC BLOOD PRESSURE: 128 MMHG | TEMPERATURE: 98.2 F

## 2022-04-28 DIAGNOSIS — G20 DEMENTIA DUE TO PARKINSON'S DISEASE WITH BEHAVIORAL DISTURBANCE: Primary | ICD-10-CM

## 2022-04-28 DIAGNOSIS — Z74.09 IMPAIRED MOBILITY AND ADLS: ICD-10-CM

## 2022-04-28 DIAGNOSIS — E78.00 HIGH CHOLESTEROL: ICD-10-CM

## 2022-04-28 DIAGNOSIS — Z78.9 IMPAIRED MOBILITY AND ADLS: ICD-10-CM

## 2022-04-28 DIAGNOSIS — I25.119 CORONARY ARTERY DISEASE INVOLVING NATIVE HEART WITH ANGINA PECTORIS, UNSPECIFIED VESSEL OR LESION TYPE: ICD-10-CM

## 2022-04-28 DIAGNOSIS — E11.42 TYPE 2 DIABETES MELLITUS WITH DIABETIC POLYNEUROPATHY, WITHOUT LONG-TERM CURRENT USE OF INSULIN: ICD-10-CM

## 2022-04-28 DIAGNOSIS — F02.80 LATE ONSET ALZHEIMER'S DISEASE WITHOUT BEHAVIORAL DISTURBANCE: ICD-10-CM

## 2022-04-28 DIAGNOSIS — F02.818 DEMENTIA DUE TO PARKINSON'S DISEASE WITH BEHAVIORAL DISTURBANCE: Primary | ICD-10-CM

## 2022-04-28 DIAGNOSIS — G30.1 LATE ONSET ALZHEIMER'S DISEASE WITHOUT BEHAVIORAL DISTURBANCE: ICD-10-CM

## 2022-04-28 DIAGNOSIS — I10 ESSENTIAL HYPERTENSION: ICD-10-CM

## 2022-04-28 DIAGNOSIS — I49.9 IRREGULAR HEART BEAT: ICD-10-CM

## 2022-04-28 PROCEDURE — 99308 SBSQ NF CARE LOW MDM 20: CPT | Performed by: INTERNAL MEDICINE

## 2022-05-02 NOTE — PROGRESS NOTES
Nursing Home Progress Note        Mitul Stephen DO []  ALESSANDRA Ho []  [address] [place] [address]  Phone: [phone]  Fax: [phone] Inés Vega MD []  Alex Burk DO [x]   [address] [place] [address]  Phone: [phone]  Fax: [phone]     PATIENT NAME: Gama Davila                                                                          YOB: 1940           DATE OF SERVICE: 04/28/2022  FACILITY:  [] [place]   [] [place]   [] [facility]   [x] [facility]  []  [facility]  [] Other ______________________________________________________________________     CHIEF COMPLAINT:  Chronic Medical Management      HISTORY OF PRESENT ILLNESS:   Patient remains pleasantly confused moving about the nursing facility in his wheelchair.  Patient had no specific complaints himself and seemed to be in good spirits.  Patient has been appropriate with staff in the facility.    .    PAST MEDICAL & SURGICAL HISTORY:   Past Medical History:   Diagnosis Date   • Arthritis    • Cancer (HCC)     SKIN    • Cataract    • Coronary artery disease    • Diabetes mellitus (HCC)    • High cholesterol    • [place] (hard of hearing)     PATIENT HAS HEARING AIDS   • Hypertension    • Irregular heart beat     HISTORY OF CARDIAC ABLATION IN 2003   • Wears dentures     FULL UPPER PLATE      Past Surgical History:   Procedure Laterality Date   • BACK SURGERY  1978    THEN AGAIN IN 1982   • CARDIAC ABLATION  2003   • CARDIAC SURGERY     • CATARACT EXTRACTION W/ INTRAOCULAR LENS IMPLANT Left 5/24/2017    Procedure: CATARACT PHACO EXTRACTION WITH INTRAOCULAR LENS IMPLANT LEFT WITH TORIC LENS;  Surgeon: Alma Benavidez MD;  Location: [facility] OR;  Service:    • CATARACT EXTRACTION W/ INTRAOCULAR LENS IMPLANT Right 6/14/2017    Procedure: CATARACT PHACO EXTRACTION WITH INTRAOCULAR LENS IMPLANT RIGHT WITH TORIC LENS;  Surgeon: Alma Benavidez MD;  Location: [facility]  OR;  Service:    • CORONARY ARTERY BYPASS GRAFT      SPOUSE UNSURE OF HOW MANY VESSELS   • HIATAL HERNIA REPAIR     • JOINT REPLACEMENT Left     HIP - DONE BY DR DALAL   • KNEE ARTHROSCOPY Left    • NOSE SURGERY      SPOUSE REPORTS FOR A BROKEN NOSE   • OTHER SURGICAL HISTORY Left     TENDON TORN LOOSE FROM BONE, LEFT ELBOW   • OTHER SURGICAL HISTORY      RUPTURED DISC   • OTHER SURGICAL HISTORY      NECK SURGERY FOR RUPTURED DISC   • OTHER SURGICAL HISTORY      HAD SECOND NECK SURGERY   • OTHER SURGICAL HISTORY      RUPTURED DISC   • OTHER SURGICAL HISTORY      RUPTURED DISC   • OTHER SURGICAL HISTORY      HARDWARE REMOVAL FROM BACK BY DR HAY         MEDICATIONS:  I have reviewed and reconciled the patients medication list in the patients chart at the [facility] today, 2022.      ALLERGIES:  Allergies   Allergen Reactions   • Phenergan [Promethazine Hcl] Nausea And Vomiting   • Carbamazepine Unknown - Low Severity   • Hydrocodone Unknown - Low Severity   • Lisinopril Unknown - Low Severity         SOCIAL HISTORY:  Social History     Socioeconomic History   • Marital status:    Tobacco Use   • Smoking status: Former Smoker     Types: Cigarettes     Quit date:      Years since quittin.3   • Smokeless tobacco: Never Used   Substance and Sexual Activity   • Alcohol use: No   • Drug use: No   • Sexual activity: Defer       FAMILY HISTORY:  No family history on file.    REVIEW OF SYSTEMS:  Review of Systems   Constitutional: Negative for chills, fatigue and fever.   HENT: Negative for congestion, ear pain, rhinorrhea, sinus pressure and sore throat.    Eyes: Negative for visual disturbance.   Respiratory: Negative for cough, chest tightness, shortness of breath and wheezing.    Cardiovascular: Negative for chest pain, palpitations and leg swelling.   Gastrointestinal: Negative for abdominal pain, blood in stool, constipation, diarrhea, nausea and  vomiting.   Endocrine: Negative for polydipsia and polyuria.   Genitourinary: Negative for dysuria and hematuria.   Musculoskeletal: Negative for arthralgias and back pain.   Skin: Negative for rash.   Neurological: Negative for dizziness, light-headedness, numbness and headaches.   Psychiatric/Behavioral: Negative for dysphoric mood and sleep disturbance. The patient is not nervous/anxious.           PHYSICAL EXAMINATION:     VITAL SIGNS:  /78   Pulse 70   Temp 98.2 °F (36.8 °C)   Resp 18   Wt 70.3 kg (155 lb)   SpO2 96%   BMI 23.57 kg/m²     Physical Exam  Vitals and nursing note reviewed.   Constitutional:       General: He is not in acute distress.     Appearance: Normal appearance. He is well-developed.      Comments: Wheelchair-bound elderly male   HENT:      Head: Normocephalic and atraumatic.   Eyes:      Extraocular Movements: Extraocular movements intact.      Conjunctiva/sclera: Conjunctivae normal.   Cardiovascular:      Rate and Rhythm: Normal rate and regular rhythm.   Pulmonary:      Effort: Pulmonary effort is normal.      Breath sounds: Normal breath sounds.   Musculoskeletal:      Cervical back: Normal range of motion and neck supple.   Skin:     General: Skin is warm and dry.      Findings: No rash.   Neurological:      General: No focal deficit present.      Mental Status: He is alert and oriented to person, place, and time.   Psychiatric:         Mood and Affect: Mood normal.         Behavior: Behavior normal.         RECORDS REVIEW:       ASSESSMENT     Diagnoses and all orders for this visit:    1. 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 Essential hypertension    3. Type 2 diabetes mellitus with diabetic polyneuropathy, without long-term current use of insulin (HCC)    4. Impaired mobility and ADLs    5. Late onset Alzheimer's disease without behavioral disturbance (HCC)    6. Irregular heart beat    7. Coronary artery disease involving  native heart with angina pectoris, unspecified vessel or lesion type (HCC)    8. High cholesterol        PLAN  Dementia due to Parkinson's disease  -Continue Sinemet and donepazil.  Behaviors remain stable over the last few months.     Mood disorder  - psychiatry is following. stable at this time.      Type 2 diabetes mellitus   - controlled.   -Stable control on metformin and basaglar, glucose log reviewed.       Falls  - no recent incidents.  Doing well moving around in a wheelchair.      Coronary artery disease/irregular heartbeat  - stable on current cardiac medications.  No changes needed.     [x]  Discussed Patient in detail with nursing/staff, addressed all needs today.     [x]  Plan of Care Reviewed   []  PT/OT Reviewed   []  Order Changes  []  Discharge Plans Reviewed  [x]  Advance Directive on file with Nursing Home.   [x]  POA on file with Nursing Home.    [x]  Code Status listed and reviewed.     I confirm accuracy of unchanged data/findings including physical exam and plan which have been carried forward from previous visit, as well as I have updated appropriately those that have changed        Alex Burk DO.  5/2/2022

## 2022-05-05 ENCOUNTER — NURSING HOME (OUTPATIENT)
Dept: FAMILY MEDICINE CLINIC | Facility: CLINIC | Age: 82
End: 2022-05-05

## 2022-05-05 VITALS
BODY MASS INDEX: 23.26 KG/M2 | WEIGHT: 153 LBS | DIASTOLIC BLOOD PRESSURE: 76 MMHG | OXYGEN SATURATION: 95 % | HEART RATE: 66 BPM | TEMPERATURE: 97.6 F | RESPIRATION RATE: 18 BRPM | SYSTOLIC BLOOD PRESSURE: 120 MMHG

## 2022-05-05 DIAGNOSIS — G20 DEMENTIA DUE TO PARKINSON'S DISEASE WITH BEHAVIORAL DISTURBANCE: Primary | Chronic | ICD-10-CM

## 2022-05-05 DIAGNOSIS — F39 MOOD DISORDER: ICD-10-CM

## 2022-05-05 DIAGNOSIS — Z74.09 IMPAIRED MOBILITY AND ADLS: ICD-10-CM

## 2022-05-05 DIAGNOSIS — F02.818 DEMENTIA DUE TO PARKINSON'S DISEASE WITH BEHAVIORAL DISTURBANCE: Primary | Chronic | ICD-10-CM

## 2022-05-05 DIAGNOSIS — Z78.9 IMPAIRED MOBILITY AND ADLS: ICD-10-CM

## 2022-05-05 PROCEDURE — 99309 SBSQ NF CARE MODERATE MDM 30: CPT | Performed by: NURSE PRACTITIONER
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
## 2022-05-10 RX ORDER — GABAPENTIN 100 MG/1
100 CAPSULE ORAL 3 TIMES DAILY
Qty: 90 CAPSULE | Refills: 5 | Status: SHIPPED | OUTPATIENT
Start: 2022-05-10 | End: 2022-10-20 | Stop reason: SDUPTHER

## 2022-05-10 NOTE — TELEPHONE ENCOUNTER
[facility] REQUESTING MED REFILL FOR GABAPENTIN 100 MG    DIRECTIONS: GABAPENTIN 100 MG 1 PO TID SCHEDULED.

## 2022-05-11 ENCOUNTER — NURSING HOME (OUTPATIENT)
Dept: FAMILY MEDICINE CLINIC | Facility: CLINIC | Age: 82
End: 2022-05-11

## 2022-05-11 VITALS
TEMPERATURE: 97.9 F | RESPIRATION RATE: 18 BRPM | HEART RATE: 70 BPM | WEIGHT: 153 LBS | BODY MASS INDEX: 23.26 KG/M2 | OXYGEN SATURATION: 96 % | SYSTOLIC BLOOD PRESSURE: 118 MMHG | DIASTOLIC BLOOD PRESSURE: 74 MMHG

## 2022-05-11 DIAGNOSIS — Z78.9 IMPAIRED MOBILITY AND ADLS: ICD-10-CM
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
## 2022-05-11 DIAGNOSIS — F02.818 DEMENTIA DUE TO PARKINSON'S DISEASE WITH BEHAVIORAL DISTURBANCE: Chronic | ICD-10-CM

## 2022-05-11 DIAGNOSIS — G20 DEMENTIA DUE TO PARKINSON'S DISEASE WITH BEHAVIORAL DISTURBANCE: Chronic | ICD-10-CM

## 2022-05-11 DIAGNOSIS — Z74.09 IMPAIRED MOBILITY AND ADLS: ICD-10-CM

## 2022-05-11 DIAGNOSIS — R29.898 WEAKNESS OF BOTH LOWER EXTREMITIES: Primary | ICD-10-CM

## 2022-05-11 PROCEDURE — 99309 SBSQ NF CARE MODERATE MDM 30: CPT | Performed by: NURSE PRACTITIONER

## 2022-05-12 NOTE — PROGRESS NOTES
Telemedicine nursing Home Progress Note        Mitul Stephen DO []  ALESSANDRA Ho [x]  [address] [place] [address]  Phone: [phone]  Fax: [phone] Inés Vega MD []  ALESSANDRA Ho [x]   [address] [place] [address]  Phone: [phone]  Fax: [phone]     PATIENT NAME: Gama Davila                                                                          YOB: 1940           DATE OF SERVICE: 5/11/2022  FACILITY:  [] [place]   [] [place]   [] [facility]   [x] [facility]  []  [facility]  [] Other     ______________________________________________________________________     CHIEF COMPLAINT:     Follow up fall this morning.     HISTORY OF PRESENT ILLNESS:     Patient had fall earlier this morning attempting to ambulate to restroom without assistance. Due to his LE weakness he is unable to ambulate, or transfer without assistance. He had no reported or noted injuries. He denies any injuries and no injuries noted. He is on fall precautions. He has no complaints today, no signs and symptoms of distress. He will be on frequent monitoring and neuro checks for 72 hours per facility protocol.     PAST MEDICAL & SURGICAL HISTORY:   Past Medical History:   Diagnosis Date   • Arthritis    • Cancer (HCC)     SKIN    • Cataract    • Coronary artery disease    • Diabetes mellitus (HCC)    • High cholesterol    • [place] (hard of hearing)     PATIENT HAS HEARING AIDS   • Hypertension    • Irregular heart beat     HISTORY OF CARDIAC ABLATION IN 2003   • Wears dentures     FULL UPPER PLATE      Past Surgical History:   Procedure Laterality Date   • BACK SURGERY  1978    THEN AGAIN IN 1982   • CARDIAC ABLATION  2003   • CARDIAC SURGERY     • CATARACT EXTRACTION W/ INTRAOCULAR LENS IMPLANT Left 5/24/2017    Procedure: CATARACT PHACO EXTRACTION WITH INTRAOCULAR LENS IMPLANT LEFT WITH TORIC LENS;  Surgeon: Alma Benavidez MD;  Location: [facility]  OR;  Service:    • CATARACT EXTRACTION W/ INTRAOCULAR LENS IMPLANT Right 2017    Procedure: CATARACT PHACO EXTRACTION WITH INTRAOCULAR LENS IMPLANT RIGHT WITH TORIC LENS;  Surgeon: Alma Benavidez MD;  Location: [facility] OR;  Service:    • CORONARY ARTERY BYPASS GRAFT      SPOUSE UNSURE OF HOW MANY VESSELS   • HIATAL HERNIA REPAIR     • JOINT REPLACEMENT Left     HIP - DONE BY DR DALAL   • KNEE ARTHROSCOPY Left    • NOSE SURGERY      SPOUSE REPORTS FOR A BROKEN NOSE   • OTHER SURGICAL HISTORY Left     TENDON TORN LOOSE FROM BONE, LEFT ELBOW   • OTHER SURGICAL HISTORY      RUPTURED DISC   • OTHER SURGICAL HISTORY      NECK SURGERY FOR RUPTURED DISC   • OTHER SURGICAL HISTORY      HAD SECOND NECK SURGERY   • OTHER SURGICAL HISTORY      RUPTURED DISC   • OTHER SURGICAL HISTORY      RUPTURED DISC   • OTHER SURGICAL HISTORY      HARDWARE REMOVAL FROM BACK BY DR HAY         MEDICATIONS:  I have reviewed and reconciled the patients medication list in the patients chart at the skilled nursing facility today.      ALLERGIES:  Allergies   Allergen Reactions   • Phenergan [Promethazine Hcl] Nausea And Vomiting   • Carbamazepine Unknown - Low Severity   • Hydrocodone Unknown - Low Severity   • Lisinopril Unknown - Low Severity         SOCIAL HISTORY:  Social History     Socioeconomic History   • Marital status:    Tobacco Use   • Smoking status: Former Smoker     Types: Cigarettes     Quit date:      Years since quittin.3   • Smokeless tobacco: Never Used   Substance and Sexual Activity   • Alcohol use: No   • Drug use: No   • Sexual activity: Defer       FAMILY HISTORY:  No family history on file.    REVIEW OF SYSTEMS:  Review of Systems   Unable to perform ROS: Dementia (ROS per nursing and patient)   Constitutional: Negative for activity change, appetite change, chills, diaphoresis, fatigue and fever.   HENT: Negative for mouth sores, rhinorrhea and trouble  swallowing.    Eyes: Negative for discharge and redness.   Respiratory: Negative for cough, chest tightness and shortness of breath.    Cardiovascular: Negative for chest pain, palpitations and leg swelling.   Gastrointestinal: Negative for abdominal distention, anal bleeding, blood in stool, constipation and vomiting.   Genitourinary: Negative for difficulty urinating and frequency.        Incontinence   Musculoskeletal: Positive for arthralgias (Arthritis) and gait problem.   Skin: Negative.    Neurological: Positive for weakness. Negative for speech difficulty.   Hematological: Negative for adenopathy.   Psychiatric/Behavioral: Positive for confusion. Negative for agitation, behavioral problems, dysphoric mood, sleep disturbance and suicidal ideas. The patient is not nervous/anxious and is not hyperactive.           PHYSICAL EXAMINATION:     VITAL SIGNS:  /74   Pulse 70   Temp 97.9 °F (36.6 °C)   Resp 18   Wt 69.4 kg (153 lb)   SpO2 96%   BMI 23.26 kg/m²     Physical Exam   HENT:   Head: Normocephalic and atraumatic.   Pulmonary/Chest: Effort normal and breath sounds normal.   Abdominal: Normal appearance.   Musculoskeletal:        Hands:       Comments: Chronic NM defs, and contractures, s/t Parkinson's   Neurological: He is alert. He is disoriented.   Skin: Rash (scattered on trunk and extremities) noted.   Psychiatric: His behavior is normal. Mood normal. Cognition and memory are impaired.   Normal mood today   Nursing note and vitals reviewed.      RECORDS REVIEW:   Most recent labs    ASSESSMENT     Diagnoses and all orders for this visit:    1. Weakness of both lower extremities (Primary)    2. Fall, initial encounter    3. 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 Impaired mobility and ADLs        PLAN    LE weakness/Fall/dementia  -Continue to monitor for any s/s injuries, perform neuro checks per facility policy. He is on fall precautions. Will follow up and  continue to monitor.     Nursing encouraged to keep me informed of any acute changes, lack of improvement, or any new concerning symptoms.    Staff to continue supportive care for all ADLs.     [x]  Discussed Patient in detail with nursing/staff, addressed all needs today.     [x]  Plan of Care Reviewed   [x]  PT/OT Reviewed   [x]  Order Changes  []  Discharge Plans Reviewed  [x]  Advance Directive on file with Nursing Home.   [x]  POA on file with Nursing Home.    [x]  Code Status listed and reviewed.       “I confirm accuracy of unchanged data/findings which have been carried forward from previous visit, as well as I have updated appropriately those that have changed.”                           Charleen Randall, APRN.  5/12/2022

## 2022-05-19 ENCOUNTER — NURSING HOME (OUTPATIENT)
Dept: INTERNAL MEDICINE | Facility: CLINIC | Age: 82
End: 2022-05-19

## 2022-05-19 VITALS
DIASTOLIC BLOOD PRESSURE: 70 MMHG | HEART RATE: 70 BPM | OXYGEN SATURATION: 96 % | TEMPERATURE: 97.2 F | RESPIRATION RATE: 18 BRPM | SYSTOLIC BLOOD PRESSURE: 128 MMHG | WEIGHT: 153 LBS | BODY MASS INDEX: 23.26 KG/M2

## 2022-05-19 DIAGNOSIS — Z78.9 IMPAIRED MOBILITY AND ADLS: ICD-10-CM

## 2022-05-19 DIAGNOSIS — F02.818 DEMENTIA DUE TO PARKINSON'S DISEASE WITH BEHAVIORAL DISTURBANCE: Primary | ICD-10-CM

## 2022-05-19 DIAGNOSIS — I25.119 CORONARY ARTERY DISEASE INVOLVING NATIVE HEART WITH ANGINA PECTORIS, UNSPECIFIED VESSEL OR LESION TYPE: ICD-10-CM

## 2022-05-19 DIAGNOSIS — I10 ESSENTIAL HYPERTENSION: ICD-10-CM

## 2022-05-19 DIAGNOSIS — E11.42 TYPE 2 DIABETES MELLITUS WITH DIABETIC POLYNEUROPATHY, WITHOUT LONG-TERM CURRENT USE OF INSULIN: ICD-10-CM

## 2022-05-19 DIAGNOSIS — Z74.09 IMPAIRED MOBILITY AND ADLS: ICD-10-CM

## 2022-05-19 DIAGNOSIS — G20 DEMENTIA DUE TO PARKINSON'S DISEASE WITH BEHAVIORAL DISTURBANCE: Primary | ICD-10-CM

## 2022-05-19 PROCEDURE — 99308 SBSQ NF CARE LOW MDM 20: CPT | Performed by: INTERNAL MEDICINE

## 2022-05-30 NOTE — PROGRESS NOTES
Nursing Home Progress Note        Mitul Stephen DO []  ALESSANDRA Ho []  [address] [place] [address]  Phone: [phone]  Fax: [phone] Inés Vega MD []  Alex Burk DO [x]   [address] [place] [address]  Phone: [phone]  Fax: [phone]     PATIENT NAME: Gama Davila                                                                          YOB: 1940           DATE OF SERVICE: 05/19/2022  FACILITY:  [] [facility]   [] [facility]   [] [facility]   [x] [facility]  []  [facility]  [] Other ______________________________________________________________________     CHIEF COMPLAINT:  Chronic Medical Management      HISTORY OF PRESENT ILLNESS:   Was recently treated for scabies and has had improving rash which was diffuse.  Patient on exam today was sleepy and had no new complaints or concerns.  Nurses report patient has been pleasantly confused and relatively stable    PAST MEDICAL & SURGICAL HISTORY:   Past Medical History:   Diagnosis Date   • Arthritis    • Cancer (HCC)     SKIN    • Cataract    • Coronary artery disease    • Diabetes mellitus (HCC)    • High cholesterol    • [place] (hard of hearing)     PATIENT HAS HEARING AIDS   • Hypertension    • Irregular heart beat     HISTORY OF CARDIAC ABLATION IN 2003   • Wears dentures     FULL UPPER PLATE      Past Surgical History:   Procedure Laterality Date   • BACK SURGERY  1978    THEN AGAIN IN 1982   • CARDIAC ABLATION  2003   • CARDIAC SURGERY     • CATARACT EXTRACTION W/ INTRAOCULAR LENS IMPLANT Left 5/24/2017    Procedure: CATARACT PHACO EXTRACTION WITH INTRAOCULAR LENS IMPLANT LEFT WITH TORIC LENS;  Surgeon: [facility] Benavidez MD;  Location: [facility];  Service:    • CATARACT EXTRACTION W/ INTRAOCULAR LENS IMPLANT Right 6/14/2017    Procedure: CATARACT PHACO EXTRACTION WITH INTRAOCULAR LENS IMPLANT RIGHT WITH TORIC LENS;  Surgeon: [facility] Benavidez MD;  Location: [facility] OR;   Service:    • CORONARY ARTERY BYPASS GRAFT      SPOUSE UNSURE OF HOW MANY VESSELS   • HIATAL HERNIA REPAIR     • JOINT REPLACEMENT Left     HIP - DONE BY DR DALAL   • KNEE ARTHROSCOPY Left    • NOSE SURGERY  1970    SPOUSE REPORTS FOR A BROKEN NOSE   • OTHER SURGICAL HISTORY Left     TENDON TORN LOOSE FROM BONE, LEFT ELBOW   • OTHER SURGICAL HISTORY      RUPTURED DISC   • OTHER SURGICAL HISTORY      NECK SURGERY FOR RUPTURED DISC   • OTHER SURGICAL HISTORY      HAD SECOND NECK SURGERY   • OTHER SURGICAL HISTORY      RUPTURED DISC   • OTHER SURGICAL HISTORY      RUPTURED DISC   • OTHER SURGICAL HISTORY      HARDWARE REMOVAL FROM BACK BY DR HAY         MEDICATIONS:  I have reviewed and reconciled the patients medication list in the patients chart at the [facility] nursing [facility] today, 2022.      ALLERGIES:  Allergies   Allergen Reactions   • Phenergan [Promethazine Hcl] Nausea And Vomiting   • Carbamazepine Unknown - Low Severity   • Hydrocodone Unknown - Low Severity   • Lisinopril Unknown - Low Severity         SOCIAL HISTORY:  Social History     Socioeconomic History   • Marital status:    Tobacco Use   • Smoking status: Former Smoker     Types: Cigarettes     Quit date:      Years since quittin.4   • Smokeless tobacco: Never Used   Substance and Sexual Activity   • Alcohol use: No   • Drug use: No   • Sexual activity: Defer       FAMILY HISTORY:  No family history on file.    REVIEW OF SYSTEMS:  Review of Systems   Constitutional: Negative for chills, fatigue and fever.   HENT: Negative for congestion, ear pain, rhinorrhea, sinus pressure and sore throat.    Eyes: Negative for visual disturbance.   Respiratory: Negative for cough, chest tightness, shortness of breath and wheezing.    Cardiovascular: Negative for chest pain, palpitations and leg swelling.   Gastrointestinal: Negative for abdominal pain, blood in stool, constipation, diarrhea, nausea and  vomiting.   Endocrine: Negative for polydipsia and polyuria.   Genitourinary: Negative for dysuria and hematuria.   Musculoskeletal: Negative for arthralgias and back pain.   Skin: Negative for rash.   Neurological: Negative for dizziness, light-headedness, numbness and headaches.   Psychiatric/Behavioral: Negative for dysphoric mood and sleep disturbance. The patient is not nervous/anxious.           PHYSICAL EXAMINATION:     VITAL SIGNS:  /70   Pulse 70   Temp 97.2 °F (36.2 °C)   Resp 18   Wt 69.4 kg (153 lb)   SpO2 96%   BMI 23.26 kg/m²     Physical Exam  Vitals and nursing note reviewed.   Constitutional:       General: He is not in acute distress.     Appearance: Normal appearance. He is well-developed.      Comments: Wheelchair-bound elderly male   HENT:      Head: Normocephalic and atraumatic.   Eyes:      Extraocular Movements: Extraocular movements intact.      Conjunctiva/sclera: Conjunctivae normal.   Cardiovascular:      Rate and Rhythm: Normal rate and regular rhythm.   Pulmonary:      Effort: Pulmonary effort is normal.      Breath sounds: Normal breath sounds.   Musculoskeletal:      Cervical back: Normal range of motion and neck supple.   Skin:     General: Skin is warm and dry.      Findings: No rash.   Neurological:      General: No focal deficit present.      Mental Status: He is alert and oriented to person, place, and time.   Psychiatric:         Mood and Affect: Mood normal.         Behavior: Behavior normal.         RECORDS REVIEW:       ASSESSMENT     Diagnoses and all orders for this visit:    1. 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 Essential hypertension    3. Type 2 diabetes mellitus with diabetic polyneuropathy, without long-term current use of insulin (HCC)    4. Impaired mobility and ADLs    5. Coronary artery disease involving native heart with angina pectoris, unspecified vessel or lesion type (HCC)        PLAN  Scabies  -  Rash resolving after permethrin treatment.    Dementia due to Parkinson's disease  -Continue Sinemet and donepazil.  Behaviors remain stable over the last few months.     Mood disorder  - psychiatry is following. stable at this time.      Type 2 diabetes mellitus   - controlled.   -Stable control on metformin and basaglar, glucose log reviewed.       Falls  - no recent incidents.  Doing well moving around in a wheelchair.      Coronary artery disease/irregular heartbeat  - stable on current cardiac medications.  No changes needed.        [x]  Discussed Patient in detail with nursing/staff, addressed all needs today.     [x]  Plan of Care Reviewed   []  PT/OT Reviewed   []  Order Changes  []  Discharge Plans Reviewed  [x]  Advance Directive on file with Nursing Home.   [x]  POA on file with Nursing Home.    [x]  Code Status listed and reviewed.     I confirm accuracy of unchanged data/findings including physical exam and plan which have been carried forward from previous visit, as well as I have updated appropriately those that have changed        Alex Burk DO.  5/30/2022

## 2022-06-08 ENCOUNTER — NURSING HOME (OUTPATIENT)
Dept: FAMILY MEDICINE CLINIC | Facility: CLINIC | Age: 82
End: 2022-06-08

## 2022-06-08 VITALS
BODY MASS INDEX: 23.57 KG/M2 | SYSTOLIC BLOOD PRESSURE: 122 MMHG | TEMPERATURE: 97.9 F | RESPIRATION RATE: 18 BRPM | HEART RATE: 77 BPM | DIASTOLIC BLOOD PRESSURE: 70 MMHG | WEIGHT: 155 LBS

## 2022-06-08 DIAGNOSIS — Z74.09 IMPAIRED MOBILITY AND ADLS: ICD-10-CM
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
## 2022-06-08 DIAGNOSIS — Z78.9 IMPAIRED MOBILITY AND ADLS: ICD-10-CM

## 2022-06-08 DIAGNOSIS — R29.898 WEAKNESS OF BOTH LOWER EXTREMITIES: ICD-10-CM

## 2022-06-08 DIAGNOSIS — G20 PARKINSON'S DISEASE (TREMOR, STIFFNESS, SLOW MOTION, UNSTABLE POSTURE): ICD-10-CM

## 2022-06-08 PROCEDURE — 99309 SBSQ NF CARE MODERATE MDM 30: CPT | Performed by: NURSE PRACTITIONER

## 2022-06-09 NOTE — PROGRESS NOTES
Telemedicine nursing Home Progress Note        Mitul Stephen DO []  ALESSANDRA Ho [x]  [address] [place] [address]  Phone: [phone]  Fax: [phone] Inés Vega MD []  ALESSANDRA Ho [x]   [address] [place] [address]  Phone: [phone]  Fax: [phone]     PATIENT NAME: Gama Davila                                                                          YOB: 1940           DATE OF SERVICE: 6/8/2022  FACILITY:  [] [place]   [] [place]   [] [facility]   [x] [facility]  []  [facility]  [] Other     ______________________________________________________________________     CHIEF COMPLAINT:     Follow up fall yesterday with right hip pain.    HISTORY OF PRESENT ILLNESS:     Patient had fall yesterday attempting to ambulate to restroom without assistance. Due to his LE weakness r/t Parkinson's he is unable to ambulate, or transfer without assistance. He had no reported or noted injuries at the time but later complained of right hip pain. Xray performed of bilateral hips and pelvis and reports negative for any acute findings.  He is on fall precautions. He has no complaints today, no signs and symptoms of distress. He will be on frequent monitoring and neuro checks for 72 hours per facility protocol.     PAST MEDICAL & SURGICAL HISTORY:   Past Medical History:   Diagnosis Date   • Arthritis    • Cancer (HCC)     SKIN    • Cataract    • Coronary artery disease    • Diabetes mellitus (HCC)    • High cholesterol    • [place] (hard of hearing)     PATIENT HAS HEARING AIDS   • Hypertension    • Irregular heart beat     HISTORY OF CARDIAC ABLATION IN 2003   • Wears dentures     FULL UPPER PLATE      Past Surgical History:   Procedure Laterality Date   • BACK SURGERY  1978    THEN AGAIN IN 1982   • CARDIAC ABLATION  2003   • CARDIAC SURGERY     • CATARACT EXTRACTION W/ INTRAOCULAR LENS IMPLANT Left 5/24/2017    Procedure: CATARACT PHACO  EXTRACTION WITH INTRAOCULAR LENS IMPLANT LEFT WITH TORIC LENS;  Surgeon: [facility] Benavidez MD;  Location: [facility] OR;  Service:    • CATARACT EXTRACTION W/ INTRAOCULAR LENS IMPLANT Right 2017    Procedure: CATARACT PHACO EXTRACTION WITH INTRAOCULAR LENS IMPLANT RIGHT WITH TORIC LENS;  Surgeon: [facility] Benavidez MD;  Location: [facility];  Service:    • CORONARY ARTERY BYPASS GRAFT      SPOUSE UNSURE OF HOW MANY VESSELS   • HIATAL HERNIA REPAIR     • JOINT REPLACEMENT Left     HIP - DONE BY DR DALAL   • KNEE ARTHROSCOPY Left    • NOSE SURGERY      SPOUSE REPORTS FOR A BROKEN NOSE   • OTHER SURGICAL HISTORY Left     TENDON TORN LOOSE FROM BONE, LEFT ELBOW   • OTHER SURGICAL HISTORY      RUPTURED DISC   • OTHER SURGICAL HISTORY      NECK SURGERY FOR RUPTURED DISC   • OTHER SURGICAL HISTORY      HAD SECOND NECK SURGERY   • OTHER SURGICAL HISTORY      RUPTURED DISC   • OTHER SURGICAL HISTORY      RUPTURED DISC   • OTHER SURGICAL HISTORY      HARDWARE REMOVAL FROM BACK BY DR HAY         MEDICATIONS:  I have reviewed and reconciled the patients medication list in the patients chart at the [facility] nursing [facility] today.      ALLERGIES:  Allergies   Allergen Reactions   • Phenergan [Promethazine Hcl] Nausea And Vomiting   • Carbamazepine Unknown - Low Severity   • Hydrocodone Unknown - Low Severity   • Lisinopril Unknown - Low Severity         SOCIAL HISTORY:  Social History     Socioeconomic History   • Marital status:    Tobacco Use   • Smoking status: Former Smoker     Types: Cigarettes     Quit date:      Years since quittin.4   • Smokeless tobacco: Never Used   Substance and Sexual Activity   • Alcohol use: No   • Drug use: No   • Sexual activity: Defer       FAMILY HISTORY:  No family history on file.    REVIEW OF SYSTEMS:  Review of Systems   Unable to perform ROS: Dementia (ROS per nursing and patient)   Constitutional: Negative for activity  change, appetite change, chills, diaphoresis, fatigue and fever.   HENT: Negative for mouth sores, rhinorrhea and trouble swallowing.    Eyes: Negative for discharge and redness.   Respiratory: Negative for cough, chest tightness and shortness of breath.    Cardiovascular: Negative for chest pain, palpitations and leg swelling.   Gastrointestinal: Negative for abdominal distention, anal bleeding, blood in stool, constipation and vomiting.   Genitourinary: Negative for difficulty urinating and frequency.        Incontinence   Musculoskeletal: Positive for arthralgias (Arthritis) and gait problem.   Skin: Negative.    Neurological: Positive for weakness. Negative for speech difficulty.   Hematological: Negative for adenopathy.   Psychiatric/Behavioral: Positive for confusion. Negative for agitation, behavioral problems, dysphoric mood, sleep disturbance and suicidal ideas. The patient is not nervous/anxious and is not hyperactive.           PHYSICAL EXAMINATION:     VITAL SIGNS:  /70   Pulse 77   Temp 97.9 °F (36.6 °C)   Resp 18   Wt 70.3 kg (155 lb)   BMI 23.57 kg/m²     Physical Exam   HENT:   Head: Normocephalic and atraumatic.   Pulmonary/Chest: Effort normal and breath sounds normal.   Abdominal: Normal appearance.   Musculoskeletal:        Hands:       Comments: Chronic NM defs, and contractures, s/t Parkinson's   Neurological: He is alert. He is disoriented.   Psychiatric: His behavior is normal. Mood normal. Cognition and memory are impaired.   Nursing note and vitals reviewed.      RECORDS REVIEW:   Most recent labs    ASSESSMENT     Diagnoses and all orders for this visit:    1. Weakness of both lower extremities    2. Fall, initial encounter    3. Impaired mobility and ADLs    4. Parkinson's disease (tremor, stiffness, slow motion, unstable posture) ([facility])        PLAN    LE weakness/Fall/Parkinson's  - Xray report of bilateral hip and pelvis negative. Continue to monitor for any s/s injuries,  perform neuro checks per facility policy. He is on fall precautions. Will follow up and continue to monitor.     Nursing encouraged to keep me informed of any acute changes, lack of improvement, or any new concerning symptoms.    Staff to continue supportive care for all ADLs.     [x]  Discussed Patient in detail with nursing/staff, addressed all needs today.     [x]  Plan of Care Reviewed   [x]  PT/OT Reviewed   [x]  Order Changes  []  Discharge Plans Reviewed  [x]  Advance Directive on file with Nursing Home.   [x]  POA on file with Nursing Home.    [x]  Code Status listed and reviewed.       “I confirm accuracy of unchanged data/findings which have been carried forward from previous visit, as well as I have updated appropriately those that have changed.”                             Charleen Randall, APRN.  6/9/2022

## 2022-06-14 ENCOUNTER — NURSING HOME (OUTPATIENT)
Dept: FAMILY MEDICINE CLINIC | Facility: CLINIC | Age: 82
End: 2022-06-14

## 2022-06-14 VITALS
HEART RATE: 77 BPM | DIASTOLIC BLOOD PRESSURE: 70 MMHG | TEMPERATURE: 98.1 F | WEIGHT: 155 LBS | BODY MASS INDEX: 23.57 KG/M2 | RESPIRATION RATE: 18 BRPM | OXYGEN SATURATION: 97 % | SYSTOLIC BLOOD PRESSURE: 126 MMHG
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
## 2022-06-14 DIAGNOSIS — G20 DEMENTIA DUE TO PARKINSON'S DISEASE WITH BEHAVIORAL DISTURBANCE: Chronic | ICD-10-CM

## 2022-06-14 DIAGNOSIS — G20 PARKINSON'S DISEASE (TREMOR, STIFFNESS, SLOW MOTION, UNSTABLE POSTURE): ICD-10-CM

## 2022-06-14 DIAGNOSIS — Z78.9 IMPAIRED MOBILITY AND ADLS: ICD-10-CM

## 2022-06-14 DIAGNOSIS — Z74.09 IMPAIRED MOBILITY AND ADLS: ICD-10-CM

## 2022-06-14 DIAGNOSIS — M25.512 ACUTE PAIN OF LEFT SHOULDER: Primary | ICD-10-CM

## 2022-06-14 DIAGNOSIS — F02.818 DEMENTIA DUE TO PARKINSON'S DISEASE WITH BEHAVIORAL DISTURBANCE: Chronic | ICD-10-CM

## 2022-06-14 PROCEDURE — 99309 SBSQ NF CARE MODERATE MDM 30: CPT | Performed by: NURSE PRACTITIONER

## 2022-06-16 NOTE — PROGRESS NOTES
Telemedicine nursing Home Progress Note        Mitul Stephen DO []  ALESSANDRA Ho [x]  [address] [place] [address]  Phone: [phone]  Fax: [phone] Inés Vega MD []  ALESSANDRA Ho [x]   [address] [place] [address]  Phone: [phone]  Fax: [phone]     PATIENT NAME: Gama Davila                                                                          YOB: 1940           DATE OF SERVICE: 6/14/2022  FACILITY:  [] [facility]   [] [facility]   [] [facility]   [x] [facility]  []  [facility]  [] Other     ______________________________________________________________________     CHIEF COMPLAINT:     Follow up fall yesterday with left shoulder pain.    HISTORY OF PRESENT ILLNESS:     Patient had fall yesterday attempting to ambulate in his room without assistance. Due to his LE weakness r/t Parkinson's he is unable to ambulate, or transfer without assistance. He had no reported or noted injuries at the time but today he has complaints of left shoulder pain. He has normal ROM but reports some pain with palpation. He is on fall precautions. He has no complaints today, no signs and symptoms of distress. He will be on frequent monitoring and neuro checks for 72 hours per facility protocol.     PAST MEDICAL & SURGICAL HISTORY:   Past Medical History:   Diagnosis Date   • Arthritis    • Cancer (HCC)     SKIN    • Cataract    • Coronary artery disease    • Diabetes mellitus (HCC)    • High cholesterol    • [place] (hard of hearing)     PATIENT HAS HEARING AIDS   • Hypertension    • Irregular heart beat     HISTORY OF CARDIAC ABLATION IN 2003   • Wears dentures     FULL UPPER PLATE      Past Surgical History:   Procedure Laterality Date   • BACK SURGERY  1978    THEN AGAIN IN 1982   • CARDIAC ABLATION  2003   • CARDIAC SURGERY     • CATARACT EXTRACTION W/ INTRAOCULAR LENS IMPLANT Left 5/24/2017    Procedure: CATARACT PHACO EXTRACTION WITH  INTRAOCULAR LENS IMPLANT LEFT WITH TORIC LENS;  Surgeon: [facility] Benavidez MD;  Location: [facility] OR;  Service:    • CATARACT EXTRACTION W/ INTRAOCULAR LENS IMPLANT Right 2017    Procedure: CATARACT PHACO EXTRACTION WITH INTRAOCULAR LENS IMPLANT RIGHT WITH TORIC LENS;  Surgeon: [facility] Benavidez MD;  Location: [facility];  Service:    • CORONARY ARTERY BYPASS GRAFT      SPOUSE UNSURE OF HOW MANY VESSELS   • HIATAL HERNIA REPAIR     • JOINT REPLACEMENT Left     HIP - DONE BY DR DALAL   • KNEE ARTHROSCOPY Left    • NOSE SURGERY      SPOUSE REPORTS FOR A BROKEN NOSE   • OTHER SURGICAL HISTORY Left     TENDON TORN LOOSE FROM BONE, LEFT ELBOW   • OTHER SURGICAL HISTORY      RUPTURED DISC   • OTHER SURGICAL HISTORY      NECK SURGERY FOR RUPTURED DISC   • OTHER SURGICAL HISTORY      HAD SECOND NECK SURGERY   • OTHER SURGICAL HISTORY      RUPTURED DISC   • OTHER SURGICAL HISTORY      RUPTURED DISC   • OTHER SURGICAL HISTORY      HARDWARE REMOVAL FROM BACK BY DR HAY         MEDICATIONS:  I have reviewed and reconciled the patients medication list in the patients chart at the [facility] nursing [facility] today.      ALLERGIES:  Allergies   Allergen Reactions   • Phenergan [Promethazine Hcl] Nausea And Vomiting   • Carbamazepine Unknown - Low Severity   • Hydrocodone Unknown - Low Severity   • Lisinopril Unknown - Low Severity         SOCIAL HISTORY:  Social History     Socioeconomic History   • Marital status:    Tobacco Use   • Smoking status: Former Smoker     Types: Cigarettes     Quit date:      Years since quittin.4   • Smokeless tobacco: Never Used   Substance and Sexual Activity   • Alcohol use: No   • Drug use: No   • Sexual activity: Defer       FAMILY HISTORY:  No family history on file.    REVIEW OF SYSTEMS:  Review of Systems   Unable to perform ROS: Dementia (ROS per nursing and patient)   Constitutional: Negative for activity change, appetite  change, chills, diaphoresis, fatigue and fever.   HENT: Negative for mouth sores, rhinorrhea and trouble swallowing.    Eyes: Negative for discharge and redness.   Respiratory: Negative for cough, chest tightness and shortness of breath.    Cardiovascular: Negative for chest pain, palpitations and leg swelling.   Gastrointestinal: Negative for abdominal distention, anal bleeding, blood in stool, constipation and vomiting.   Genitourinary: Negative for difficulty urinating and frequency.        Incontinence   Musculoskeletal: Positive for arthralgias (right shoulder pain) and gait problem.   Skin: Negative.    Neurological: Positive for weakness. Negative for speech difficulty.   Hematological: Negative for adenopathy.   Psychiatric/Behavioral: Positive for confusion. Negative for agitation, behavioral problems, dysphoric mood, sleep disturbance and suicidal ideas. The patient is not nervous/anxious and is not hyperactive.           PHYSICAL EXAMINATION:     VITAL SIGNS:  /70   Pulse 77   Temp 98.1 °F (36.7 °C)   Resp 18   Wt 70.3 kg (155 lb)   SpO2 97%   BMI 23.57 kg/m²     Physical Exam   HENT:   Head: Normocephalic and atraumatic.   Pulmonary/Chest: Effort normal and breath sounds normal.   Abdominal: Normal appearance.   Musculoskeletal:      Left shoulder: He exhibits tenderness and pain.        Hands:       Comments: Chronic NM defs, and contractures, s/t Parkinson's   Neurological: He is alert. He is disoriented.   Psychiatric: His behavior is normal. Mood normal. Cognition and memory are impaired.   Nursing note and vitals reviewed.      RECORDS REVIEW:   Most recent labs    ASSESSMENT     Diagnoses and all orders for this visit:    1. Acute pain of left shoulder (Primary)    2. Fall, initial encounter    3. 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 Parkinson's disease (tremor, stiffness, slow motion, unstable posture) (HCC)    5. Impaired mobility and  ADLs        PLAN    Left shoulder pain/fall/dementia/Parkinson's  -Will get Xray of left shoulder. He is on fall precautions and neuro checks. Will follow up with Xray results. Nursing to notify of any new complaints. Will follow up and continue to monitor.     Nursing encouraged to keep me informed of any acute changes, lack of improvement, or any new concerning symptoms.    Staff to continue supportive care for all ADLs.     [x]  Discussed Patient in detail with nursing/staff, addressed all needs today.     [x]  Plan of Care Reviewed   [x]  PT/OT Reviewed   [x]  Order Changes  []  Discharge Plans Reviewed  [x]  Advance Directive on file with Nursing Home.   [x]  POA on file with Nursing Home.    [x]  Code Status listed and reviewed.       “I confirm accuracy of unchanged data/findings which have been carried forward from previous visit, as well as I have updated appropriately those that have changed.”                               Charleen Randall, APRN.  6/15/2022

## 2022-06-24 ENCOUNTER — NURSING HOME (OUTPATIENT)
Dept: FAMILY MEDICINE CLINIC | Facility: CLINIC | Age: 82
End: 2022-06-24

## 2022-06-24 VITALS
TEMPERATURE: 97.9 F | HEART RATE: 74 BPM | SYSTOLIC BLOOD PRESSURE: 122 MMHG | OXYGEN SATURATION: 97 % | RESPIRATION RATE: 18 BRPM | WEIGHT: 155 LBS | DIASTOLIC BLOOD PRESSURE: 64 MMHG | BODY MASS INDEX: 23.57 KG/M2

## 2022-06-24 DIAGNOSIS — Z87.898 HISTORY OF PERSISTENT COUGH: Primary | ICD-10-CM

## 2022-06-24 DIAGNOSIS — Z74.09 IMPAIRED MOBILITY AND ADLS: ICD-10-CM

## 2022-06-24 DIAGNOSIS — Z78.9 IMPAIRED MOBILITY AND ADLS: ICD-10-CM

## 2022-06-24 DIAGNOSIS — G20 DEMENTIA DUE TO PARKINSON'S DISEASE WITH BEHAVIORAL DISTURBANCE: Chronic | ICD-10-CM

## 2022-06-24 DIAGNOSIS — F02.818 DEMENTIA DUE TO PARKINSON'S DISEASE WITH BEHAVIORAL DISTURBANCE: Chronic | ICD-10-CM

## 2022-06-24 PROCEDURE — 99308 SBSQ NF CARE LOW MDM 20: CPT | Performed by: NURSE PRACTITIONER

## 2022-06-27 NOTE — PROGRESS NOTES
Telemedicine nursing Home Progress Note        Mitul Stephen DO []  [facility] [x]  [address] [place] [address]  Phone: [phone]  Fax: [phone] Inés Vega MD []  [facility] [x]   [address] [place] [address]  Phone: [phone]  Fax: [phone]     PATIENT NAME: Gama Davila                                                                          YOB: 1940           DATE OF SERVICE: 6/24/2022  FACILITY:  [] [place]   [] [place]   [] [facility]   [x] [facility]  []  [facility]  [] Other     ______________________________________________________________________     CHIEF COMPLAINT:     Follow up cough and congestion.    HISTORY OF PRESENT ILLNESS:     Patient with cough and congestion for the past couple weeks. CXR negative for any acute findings on 6/9. He has been taking prn cough medication and nebs. Cough and congestion has resolved the past couple days. His COVID test was negative yesterday. He is resting in bed today without any signs and symptoms of distress, he has no complaints. He denies any cough or congestion, no shortness of breath. He is pleasantly confused.     PAST MEDICAL & SURGICAL HISTORY:   Past Medical History:   Diagnosis Date   • Arthritis    • Cancer (HCC)     SKIN    • Cataract    • Coronary artery disease    • Diabetes mellitus (HCC)    • High cholesterol    • [place] (hard of hearing)     PATIENT HAS HEARING AIDS   • Hypertension    • Irregular heart beat     HISTORY OF CARDIAC ABLATION IN 2003   • Wears dentures     FULL UPPER PLATE      Past Surgical History:   Procedure Laterality Date   • BACK SURGERY  1978    THEN AGAIN IN 1982   • CARDIAC ABLATION  2003   • CARDIAC SURGERY     • CATARACT EXTRACTION W/ INTRAOCULAR LENS IMPLANT Left 5/24/2017    Procedure: CATARACT PHACO EXTRACTION WITH INTRAOCULAR LENS IMPLANT LEFT WITH TORIC LENS;  Surgeon: Alam Benavidez MD;  Location: [facility] OR;   Service:    • CATARACT EXTRACTION W/ INTRAOCULAR LENS IMPLANT Right 2017    Procedure: CATARACT PHACO EXTRACTION WITH INTRAOCULAR LENS IMPLANT RIGHT WITH TORIC LENS;  Surgeon: Alma Benavidez MD;  Location: [facility];  Service:    • CORONARY ARTERY BYPASS GRAFT      SPOUSE UNSURE OF HOW MANY VESSELS   • HIATAL HERNIA REPAIR     • JOINT REPLACEMENT Left     HIP - DONE BY DR DALAL   • KNEE ARTHROSCOPY Left    • NOSE SURGERY      SPOUSE REPORTS FOR A BROKEN NOSE   • OTHER SURGICAL HISTORY Left     TENDON TORN LOOSE FROM BONE, LEFT ELBOW   • OTHER SURGICAL HISTORY      RUPTURED DISC   • OTHER SURGICAL HISTORY      NECK SURGERY FOR RUPTURED DISC   • OTHER SURGICAL HISTORY      HAD SECOND NECK SURGERY   • OTHER SURGICAL HISTORY      RUPTURED DISC   • OTHER SURGICAL HISTORY      RUPTURED DISC   • OTHER SURGICAL HISTORY      HARDWARE REMOVAL FROM BACK BY DR HAY         MEDICATIONS:  I have reviewed and reconciled the patients medication list in the patients chart at the skilled nursing facility today.      ALLERGIES:  Allergies   Allergen Reactions   • Phenergan [Promethazine Hcl] Nausea And Vomiting   • Carbamazepine Unknown - Low Severity   • Hydrocodone Unknown - Low Severity   • Lisinopril Unknown - Low Severity         SOCIAL HISTORY:  Social History     Socioeconomic History   • Marital status:    Tobacco Use   • Smoking status: Former Smoker     Types: Cigarettes     Quit date:      Years since quittin.5   • Smokeless tobacco: Never Used   Substance and Sexual Activity   • Alcohol use: No   • Drug use: No   • Sexual activity: Defer       FAMILY HISTORY:  No family history on file.    REVIEW OF SYSTEMS:  Review of Systems   Unable to perform ROS: Dementia (ROS per nursing and patient)   Constitutional: Negative for activity change, appetite change, chills, diaphoresis, fatigue and fever.   HENT: Negative for mouth sores, rhinorrhea and trouble  swallowing.    Eyes: Negative for discharge and redness.   Respiratory: Negative for cough, chest tightness and shortness of breath.    Cardiovascular: Negative for chest pain, palpitations and leg swelling.   Gastrointestinal: Negative for abdominal distention, anal bleeding, blood in stool, constipation and vomiting.   Genitourinary: Negative for difficulty urinating and frequency.        Incontinence   Musculoskeletal: Positive for arthralgias (chronic).   Skin: Negative.    Neurological: Positive for weakness. Negative for speech difficulty.   Hematological: Negative for adenopathy.   Psychiatric/Behavioral: Positive for confusion. Negative for agitation, behavioral problems, dysphoric mood, sleep disturbance and suicidal ideas. The patient is not nervous/anxious and is not hyperactive.           PHYSICAL EXAMINATION:     VITAL SIGNS:  /64   Pulse 74   Temp 97.9 °F (36.6 °C)   Resp 18   Wt 70.3 kg (155 lb)   SpO2 97%   BMI 23.57 kg/m²     Physical Exam   HENT:   Head: Normocephalic and atraumatic.   Pulmonary/Chest: Effort normal and breath sounds normal.   Abdominal: Normal appearance.   Musculoskeletal:      Left shoulder: He exhibits tenderness and pain.        Hands:    Neurological: He is alert. He is disoriented.   Psychiatric: His behavior is normal. Mood normal. Cognition and memory are impaired.   Nursing note and vitals reviewed.      RECORDS REVIEW:   Most recent labs    ASSESSMENT     Diagnoses and all orders for this visit:    1. History of persistent cough (Primary)    2. 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 Impaired mobility and ADLs        PLAN    History cough/Parkinson's Dementia  -Cough and congestion resolved at this time. Will follow up and continue to monitor.     Nursing encouraged to keep me informed of any acute changes, lack of improvement, or any new concerning symptoms.    Staff to continue supportive care for all ADLs.     [x]   Discussed Patient in detail with nursing/staff, addressed all needs today.     [x]  Plan of Care Reviewed   [x]  PT/OT Reviewed   [x]  Order Changes  []  Discharge Plans Reviewed  [x]  Advance Directive on file with Nursing Home.   [x]  POA on file with Nursing Home.    [x]  Code Status listed and reviewed.       “I confirm accuracy of unchanged data/findings which have been carried forward from previous visit, as well as I have updated appropriately those that have changed.”                               Charleen Randall, APRN.  6/26/2022

## 2022-07-06 ENCOUNTER — NURSING HOME (OUTPATIENT)
Dept: FAMILY MEDICINE CLINIC | Facility: CLINIC | Age: 82
End: 2022-07-06

## 2022-07-06 VITALS
RESPIRATION RATE: 18 BRPM | DIASTOLIC BLOOD PRESSURE: 78 MMHG | WEIGHT: 150 LBS | SYSTOLIC BLOOD PRESSURE: 122 MMHG | TEMPERATURE: 97.1 F | HEART RATE: 76 BPM | BODY MASS INDEX: 22.81 KG/M2

## 2022-07-06 DIAGNOSIS — Z78.9 IMPAIRED MOBILITY AND ADLS: ICD-10-CM

## 2022-07-06 DIAGNOSIS — Z74.09 IMPAIRED MOBILITY AND ADLS: ICD-10-CM

## 2022-07-06 DIAGNOSIS — F39 MOOD DISORDER: ICD-10-CM

## 2022-07-06 DIAGNOSIS — F03.918 AGGRESSIVE BEHAVIOR DUE TO DEMENTIA: Primary | ICD-10-CM

## 2022-07-06 PROCEDURE — 99309 SBSQ NF CARE MODERATE MDM 30: CPT | Performed by: NURSE PRACTITIONER

## 2022-07-08 NOTE — PROGRESS NOTES
Telemedicine nursing Home Progress Note        Mitul Stephen DO []  [facility] [x]  [address] [place] [address]  Phone: [phone]  Fax: [phone] Inés Vega MD []  [facility] [x]   [address] [place] [address]  Phone: [phone]  Fax: [phone]     PATIENT NAME: Gama Davila                                                                          YOB: 1940           DATE OF SERVICE: 7/6/2022  FACILITY:  [] [place]   [] [place]   [] [facility]   [x] [facility]  []  [facility]  [] Other     ______________________________________________________________________     CHIEF COMPLAINT:    Increased behaviors and aggression with staff today.    HISTORY OF PRESENT ILLNESS:     Nursing reports that patient has had increased behaviors today, cursing and striking at staff when attempting to perform personal care for patient.  He has these behaviors intermittently related to his mood disorder and dementia.  He is sitting in his room at this time during visit today, looking out the window.  He is not having any increased behaviors or aggression during visit today.  He has no signs and symptoms of any distress, and has no complaints but is a poor historian related to his dementia.    PAST MEDICAL & SURGICAL HISTORY:   Past Medical History:   Diagnosis Date   • Arthritis    • Cancer (HCC)     SKIN    • Cataract    • Coronary artery disease    • Diabetes mellitus (HCC)    • High cholesterol    • [place] (hard of hearing)     PATIENT HAS HEARING AIDS   • Hypertension    • Irregular heart beat     HISTORY OF CARDIAC ABLATION IN 2003   • Wears dentures     FULL UPPER PLATE      Past Surgical History:   Procedure Laterality Date   • BACK SURGERY  1978    THEN AGAIN IN 1982   • CARDIAC ABLATION  2003   • CARDIAC SURGERY     • CATARACT EXTRACTION W/ INTRAOCULAR LENS IMPLANT Left 5/24/2017    Procedure: CATARACT PHACO EXTRACTION WITH INTRAOCULAR  LENS IMPLANT LEFT WITH TORIC LENS;  Surgeon: Alma Benavidez MD;  Location: [facility];  Service:    • CATARACT EXTRACTION W/ INTRAOCULAR LENS IMPLANT Right 2017    Procedure: CATARACT PHACO EXTRACTION WITH INTRAOCULAR LENS IMPLANT RIGHT WITH TORIC LENS;  Surgeon: Alma Benavidez MD;  Location: [facility];  Service:    • CORONARY ARTERY BYPASS GRAFT      SPOUSE UNSURE OF HOW MANY VESSELS   • HIATAL HERNIA REPAIR     • JOINT REPLACEMENT Left     HIP - DONE BY DR DALAL   • KNEE ARTHROSCOPY Left    • NOSE SURGERY      SPOUSE REPORTS FOR A BROKEN NOSE   • OTHER SURGICAL HISTORY Left     TENDON TORN LOOSE FROM BONE, LEFT ELBOW   • OTHER SURGICAL HISTORY      RUPTURED DISC   • OTHER SURGICAL HISTORY      NECK SURGERY FOR RUPTURED DISC   • OTHER SURGICAL HISTORY      HAD SECOND NECK SURGERY   • OTHER SURGICAL HISTORY      RUPTURED DISC   • OTHER SURGICAL HISTORY      RUPTURED DISC   • OTHER SURGICAL HISTORY      HARDWARE REMOVAL FROM BACK BY DR HAY         MEDICATIONS:  I have reviewed and reconciled the patients medication list in the patients chart at the [facility] ER nursing [facility] today.      ALLERGIES:  Allergies   Allergen Reactions   • Phenergan [Promethazine Hcl] Nausea And Vomiting   • Carbamazepine Unknown - Low Severity   • Hydrocodone Unknown - Low Severity   • Lisinopril Unknown - Low Severity         SOCIAL HISTORY:  Social History     Socioeconomic History   • Marital status:    Tobacco Use   • Smoking status: Former Smoker     Types: Cigarettes     Quit date:      Years since quittin.5   • Smokeless tobacco: Never Used   Substance and Sexual Activity   • Alcohol use: No   • Drug use: No   • Sexual activity: Defer       FAMILY HISTORY:  No family history on file.    REVIEW OF SYSTEMS:  Review of Systems   Unable to perform ROS: Dementia (ROS per nursing and patient)   Constitutional: Negative for activity change, appetite change,  chills, diaphoresis, fatigue and fever.   HENT: Negative for mouth sores, rhinorrhea and trouble swallowing.    Eyes: Negative for discharge and redness.   Respiratory: Negative for cough, chest tightness and shortness of breath.    Cardiovascular: Negative for chest pain, palpitations and leg swelling.   Gastrointestinal: Negative for abdominal distention, anal bleeding, blood in stool, constipation and vomiting.   Genitourinary: Negative for difficulty urinating and frequency.        Incontinence   Musculoskeletal: Positive for arthralgias (chronic).   Skin: Negative.    Neurological: Positive for weakness. Negative for speech difficulty.   Hematological: Negative for adenopathy.   Psychiatric/Behavioral: Positive for agitation, behavioral problems and confusion. Negative for dysphoric mood, sleep disturbance and suicidal ideas. The patient is not nervous/anxious and is not hyperactive.           PHYSICAL EXAMINATION:     VITAL SIGNS:  /78   Pulse 76   Temp 97.1 °F (36.2 °C)   Resp 18   Wt 68 kg (150 lb)   BMI 22.81 kg/m²     Physical Exam   HENT:   Head: Normocephalic and atraumatic.   Pulmonary/Chest: Effort normal and breath sounds normal.   Abdominal: Normal appearance.   Musculoskeletal:        Hands:    Neurological: He is alert. He is disoriented.   Psychiatric: His behavior is normal. Mood normal. Cognition and memory are impaired. He has a flat affect.   Nursing note and vitals reviewed.      RECORDS REVIEW:   Most recent labs    ASSESSMENT     Diagnoses and all orders for this visit:    1. Aggressive behavior due to dementia (HCC) (Primary)    2. Mood disorder (HCC)    3. Impaired mobility and ADLs        PLAN    Aggressive behavior related to dementia/mood disorder  -We will increase Depakote to 250 mg p.o. twice daily and check Depakote level in 1 month.  Nursing to monitor for any oversedation.  Will follow-up and continue to monitor.    Nursing encouraged to keep me informed of any acute  changes, lack of improvement, or any new concerning symptoms.    Staff to continue supportive care for all ADLs.     [x]  Discussed Patient in detail with nursing/staff, addressed all needs today.     [x]  Plan of Care Reviewed   [x]  PT/OT Reviewed   [x]  Order Changes  []  Discharge Plans Reviewed  [x]  Advance Directive on file with Nursing Home.   [x]  POA on file with Nursing Home.    [x]  Code Status listed and reviewed.       “I confirm accuracy of unchanged data/findings which have been carried forward from previous visit, as well as I have updated appropriately those that have changed.”                               Charleen Randall, APRN.  7/7/2022

## 2022-07-15 ENCOUNTER — NURSING HOME (OUTPATIENT)
Dept: FAMILY MEDICINE CLINIC | Facility: CLINIC | Age: 82
End: 2022-07-15

## 2022-07-15 VITALS
BODY MASS INDEX: 23.26 KG/M2 | HEART RATE: 50 BPM | OXYGEN SATURATION: 97 % | SYSTOLIC BLOOD PRESSURE: 132 MMHG | RESPIRATION RATE: 18 BRPM | WEIGHT: 153 LBS | TEMPERATURE: 98.3 F | DIASTOLIC BLOOD PRESSURE: 63 MMHG

## 2022-07-15 DIAGNOSIS — G20 DEMENTIA DUE TO PARKINSON'S DISEASE WITH BEHAVIORAL DISTURBANCE: Chronic | ICD-10-CM

## 2022-07-15 DIAGNOSIS — Z74.09 IMPAIRED MOBILITY AND ADLS: ICD-10-CM

## 2022-07-15 DIAGNOSIS — F02.818 DEMENTIA DUE TO PARKINSON'S DISEASE WITH BEHAVIORAL DISTURBANCE: Chronic | ICD-10-CM

## 2022-07-15 DIAGNOSIS — J69.0 ASPIRATION PNEUMONIA OF LEFT LOWER LOBE, UNSPECIFIED ASPIRATION PNEUMONIA TYPE: Primary | ICD-10-CM

## 2022-07-15 DIAGNOSIS — Z78.9 IMPAIRED MOBILITY AND ADLS: ICD-10-CM

## 2022-07-15 PROCEDURE — 99309 SBSQ NF CARE MODERATE MDM 30: CPT | Performed by: NURSE PRACTITIONER

## 2022-07-17 NOTE — PROGRESS NOTES
Telemedicine nursing Home Progress Note        Mitul Stephen DO []  [facility] [x]  [address] [place] [address]  Phone: [phone]  Fax: [phone] Inés Vega MD []  [facility] [x]   [address] [place] [address]  Phone: [phone]  Fax: [phone]     PATIENT NAME: Gama Davila                                                                          YOB: 1940           DATE OF SERVICE: 7/15/2022  FACILITY:  [] [place]   [] [place]   [] [facility]   [x] [facility]  []  [facility]  [] Other     ______________________________________________________________________     CHIEF COMPLAINT:    Follow up LLL pneumonia.     HISTORY OF PRESENT ILLNESS:     Patient with increased cough and congestion, as well as concerns about aspiration after a vomiting episode. CXR performed yesterday and reported LLL pneumonia. He was started on Invanz 1 gm IM x 5 days and Duo Nebs q 6 hours x 7 days. He has received these and is tolerating it well. He is doing well, is moving about the facility in his wheelchair today with no s/s of any distress. He has no complaints. He has not had any fever, hypoxia or other abnormal VS.     PAST MEDICAL & SURGICAL HISTORY:   Past Medical History:   Diagnosis Date   • Arthritis    • Cancer (HCC)     SKIN    • Cataract    • Coronary artery disease    • Diabetes mellitus (HCC)    • High cholesterol    • [place] (hard of hearing)     PATIENT HAS HEARING AIDS   • Hypertension    • Irregular heart beat     HISTORY OF CARDIAC ABLATION IN 2003   • Wears dentures     FULL UPPER PLATE      Past Surgical History:   Procedure Laterality Date   • BACK SURGERY  1978    THEN AGAIN IN 1982   • CARDIAC ABLATION  2003   • CARDIAC SURGERY     • CATARACT EXTRACTION W/ INTRAOCULAR LENS IMPLANT Left 5/24/2017    Procedure: CATARACT PHACO EXTRACTION WITH INTRAOCULAR LENS IMPLANT LEFT WITH TORIC LENS;  Surgeon: Alma Benavidez,  MD;  Location: [facility];  Service:    • CATARACT EXTRACTION W/ INTRAOCULAR LENS IMPLANT Right 2017    Procedure: CATARACT PHACO EXTRACTION WITH INTRAOCULAR LENS IMPLANT RIGHT WITH TORIC LENS;  Surgeon: Alma Benavidez MD;  Location: [facility];  Service:    • CORONARY ARTERY BYPASS GRAFT      SPOUSE UNSURE OF HOW MANY VESSELS   • HIATAL HERNIA REPAIR     • JOINT REPLACEMENT Left     HIP - DONE BY DR DALAL   • KNEE ARTHROSCOPY Left    • NOSE SURGERY      SPOUSE REPORTS FOR A BROKEN NOSE   • OTHER SURGICAL HISTORY Left     TENDON TORN LOOSE FROM BONE, LEFT ELBOW   • OTHER SURGICAL HISTORY      RUPTURED DISC   • OTHER SURGICAL HISTORY      NECK SURGERY FOR RUPTURED DISC   • OTHER SURGICAL HISTORY      HAD SECOND NECK SURGERY   • OTHER SURGICAL HISTORY      RUPTURED DISC   • OTHER SURGICAL HISTORY      RUPTURED DISC   • OTHER SURGICAL HISTORY      HARDWARE REMOVAL FROM BACK BY DR HAY         MEDICATIONS:  I have reviewed and reconciled the patients medication list in the patients chart at the [facility] nursing [facility] today.      ALLERGIES:  Allergies   Allergen Reactions   • Phenergan [Promethazine Hcl] Nausea And Vomiting   • Carbamazepine Unknown - Low Severity   • Hydrocodone Unknown - Low Severity   • Lisinopril Unknown - Low Severity         SOCIAL HISTORY:  Social History     Socioeconomic History   • Marital status:    Tobacco Use   • Smoking status: Former Smoker     Types: Cigarettes     Quit date:      Years since quittin.5   • Smokeless tobacco: Never Used   Substance and Sexual Activity   • Alcohol use: No   • Drug use: No   • Sexual activity: Defer       FAMILY HISTORY:  No family history on file.    REVIEW OF SYSTEMS:  Review of Systems   Unable to perform ROS: Dementia (ROS per nursing and patient)   Constitutional: Negative for activity change, appetite change, chills, diaphoresis, fatigue and fever.   HENT: Positive for congestion.  Negative for mouth sores, rhinorrhea and trouble swallowing.    Eyes: Negative for discharge and redness.   Respiratory: Positive for cough. Negative for chest tightness and shortness of breath.    Cardiovascular: Negative for chest pain, palpitations and leg swelling.   Gastrointestinal: Negative for abdominal distention, abdominal pain, blood in stool, constipation and vomiting.   Genitourinary: Negative for difficulty urinating and frequency.        Incontinence   Musculoskeletal: Positive for arthralgias (chronic).   Skin: Negative for rash.   Neurological: Positive for weakness.   Psychiatric/Behavioral: Positive for confusion. Negative for agitation, behavioral problems, dysphoric mood, sleep disturbance and suicidal ideas. The patient is not nervous/anxious and is not hyperactive.           PHYSICAL EXAMINATION:     VITAL SIGNS:  /63   Pulse 50   Temp 98.3 °F (36.8 °C)   Resp 18   Wt 69.4 kg (153 lb)   SpO2 97%   BMI 23.26 kg/m²     Physical Exam   HENT:   Head: Normocephalic and atraumatic.   Pulmonary/Chest: Effort normal and breath sounds normal.   Abdominal: Normal appearance.   Musculoskeletal:        Hands:    Neurological: He is alert. He is disoriented.   Psychiatric: His behavior is normal. Mood normal. Cognition and memory are impaired. He has a flat affect.   Nursing note and vitals reviewed.      RECORDS REVIEW:   Most recent labs    ASSESSMENT     Diagnoses and all orders for this visit:    1. Aspiration pneumonia of left lower lobe, unspecified aspiration pneumonia type (HCC) (Primary)    2. 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 Impaired mobility and ADLs        PLAN    Aspiration pneumonia  -Continue Invanz 1 gm IM x 5 days. Give Duo Nebs q hours x 7 days. Will follow up and monitor.     Nursing encouraged to keep me informed of any acute changes, lack of improvement, or any new concerning symptoms.    Staff to continue supportive care for all  ADLs.     [x]  Discussed Patient in detail with nursing/staff, addressed all needs today.     [x]  Plan of Care Reviewed   [x]  PT/OT Reviewed   [x]  Order Changes  []  Discharge Plans Reviewed  [x]  Advance Directive on file with Nursing Home.   [x]  POA on file with Nursing Home.    [x]  Code Status listed and reviewed.       “I confirm accuracy of unchanged data/findings which have been carried forward from previous visit, as well as I have updated appropriately those that have changed.”                               Charleen Randall, APRN.  7/16/2022

## 2022-07-21 ENCOUNTER — NURSING HOME (OUTPATIENT)
Dept: INTERNAL MEDICINE | Facility: CLINIC | Age: 82
End: 2022-07-21

## 2022-07-21 VITALS
WEIGHT: 153 LBS | RESPIRATION RATE: 18 BRPM | HEART RATE: 70 BPM | SYSTOLIC BLOOD PRESSURE: 128 MMHG | DIASTOLIC BLOOD PRESSURE: 78 MMHG | BODY MASS INDEX: 23.26 KG/M2 | OXYGEN SATURATION: 92 % | TEMPERATURE: 97.4 F

## 2022-07-21 DIAGNOSIS — G30.1 LATE ONSET ALZHEIMER'S DISEASE WITHOUT BEHAVIORAL DISTURBANCE: ICD-10-CM

## 2022-07-21 DIAGNOSIS — I49.9 IRREGULAR HEART BEAT: ICD-10-CM

## 2022-07-21 DIAGNOSIS — G20 DEMENTIA DUE TO PARKINSON'S DISEASE WITH BEHAVIORAL DISTURBANCE: Primary | ICD-10-CM

## 2022-07-21 DIAGNOSIS — Z78.9 IMPAIRED MOBILITY AND ADLS: ICD-10-CM

## 2022-07-21 DIAGNOSIS — E78.00 HIGH CHOLESTEROL: ICD-10-CM

## 2022-07-21 DIAGNOSIS — Z74.09 IMPAIRED MOBILITY AND ADLS: ICD-10-CM

## 2022-07-21 DIAGNOSIS — I10 ESSENTIAL HYPERTENSION: ICD-10-CM

## 2022-07-21 DIAGNOSIS — I25.119 CORONARY ARTERY DISEASE INVOLVING NATIVE HEART WITH ANGINA PECTORIS, UNSPECIFIED VESSEL OR LESION TYPE: ICD-10-CM

## 2022-07-21 DIAGNOSIS — F02.818 DEMENTIA DUE TO PARKINSON'S DISEASE WITH BEHAVIORAL DISTURBANCE: Primary | ICD-10-CM

## 2022-07-21 DIAGNOSIS — E11.42 TYPE 2 DIABETES MELLITUS WITH DIABETIC POLYNEUROPATHY, WITHOUT LONG-TERM CURRENT USE OF INSULIN: ICD-10-CM

## 2022-07-21 DIAGNOSIS — F02.80 LATE ONSET ALZHEIMER'S DISEASE WITHOUT BEHAVIORAL DISTURBANCE: ICD-10-CM

## 2022-07-21 PROCEDURE — 99308 SBSQ NF CARE LOW MDM 20: CPT | Performed by: INTERNAL MEDICINE

## 2022-08-08 ENCOUNTER — NURSING HOME (OUTPATIENT)
Dept: FAMILY MEDICINE CLINIC | Facility: CLINIC | Age: 82
End: 2022-08-08

## 2022-08-08 VITALS
RESPIRATION RATE: 20 BRPM | BODY MASS INDEX: 23.87 KG/M2 | TEMPERATURE: 97.8 F | DIASTOLIC BLOOD PRESSURE: 76 MMHG | HEART RATE: 77 BPM | SYSTOLIC BLOOD PRESSURE: 138 MMHG | WEIGHT: 157 LBS

## 2022-08-08 DIAGNOSIS — G20 DEMENTIA DUE TO PARKINSON'S DISEASE WITH BEHAVIORAL DISTURBANCE: Chronic | ICD-10-CM

## 2022-08-08 DIAGNOSIS — I10 PRIMARY HYPERTENSION: Primary | ICD-10-CM

## 2022-08-08 DIAGNOSIS — F39 MOOD DISORDER: ICD-10-CM

## 2022-08-08 DIAGNOSIS — I25.119 CORONARY ARTERY DISEASE INVOLVING NATIVE HEART WITH ANGINA PECTORIS, UNSPECIFIED VESSEL OR LESION TYPE: ICD-10-CM

## 2022-08-08 DIAGNOSIS — Z74.09 IMPAIRED MOBILITY AND ADLS: ICD-10-CM

## 2022-08-08 DIAGNOSIS — E11.42 TYPE 2 DIABETES MELLITUS WITH DIABETIC POLYNEUROPATHY, WITH LONG-TERM CURRENT USE OF INSULIN: Chronic | ICD-10-CM

## 2022-08-08 DIAGNOSIS — Z79.4 TYPE 2 DIABETES MELLITUS WITH DIABETIC POLYNEUROPATHY, WITH LONG-TERM CURRENT USE OF INSULIN: Chronic | ICD-10-CM

## 2022-08-08 DIAGNOSIS — Z78.9 IMPAIRED MOBILITY AND ADLS: ICD-10-CM

## 2022-08-08 DIAGNOSIS — E78.00 HIGH CHOLESTEROL: ICD-10-CM

## 2022-08-08 DIAGNOSIS — G20 PARKINSON'S DISEASE (TREMOR, STIFFNESS, SLOW MOTION, UNSTABLE POSTURE): ICD-10-CM

## 2022-08-08 DIAGNOSIS — F02.818 DEMENTIA DUE TO PARKINSON'S DISEASE WITH BEHAVIORAL DISTURBANCE: Chronic | ICD-10-CM

## 2022-08-08 PROCEDURE — 99309 SBSQ NF CARE MODERATE MDM 30: CPT | Performed by: NURSE PRACTITIONER

## 2022-08-08 NOTE — PROGRESS NOTES
Telemedicine nursing Home Progress Note        Mitul Stephen DO []  [facility] Ho [x] [place] [address]  Phone: [phone]  Fax: [phone] Inés Vega MD []  [facility] [x]   [address] [place] [address]  Phone: [phone]  Fax: [phone]     PATIENT NAME: Gama Davila                                                                          YOB: 1940           DATE OF SERVICE: 8/8/2022  FACILITY:  [] [facility]   [] [facility]   [] [facility]   [x] [facility]  []  [facility]  [] Other     ______________________________________________________________________     CHIEF COMPLAINT:    Compliance visit for chronic conditions and LTC needs.     HISTORY OF PRESENT ILLNESS:     Compliance visit performed today for management of long-term care needs and chronic conditions.  He has past medical history of dementia, Parkinson's disease, mood disorder, DM 2, hypertension and hyperlipidemia.  Conditions well controlled on current medications.  Labs performed in July and no significant concerns.  He is moving about the facility in his wheelchair today with no complaints or signs and symptoms of any distress.  He voices no concerns, and nursing has no concerns today.    PAST MEDICAL & SURGICAL HISTORY:   Past Medical History:   Diagnosis Date   • Arthritis    • Cancer (HCC)     SKIN    • Cataract    • Coronary artery disease    • Diabetes mellitus (HCC)    • High cholesterol    • [place] (hard of hearing)     PATIENT HAS HEARING AIDS   • Hypertension    • Irregular heart beat     HISTORY OF CARDIAC ABLATION IN 2003   • Wears dentures     FULL UPPER PLATE      Past Surgical History:   Procedure Laterality Date   • BACK SURGERY  1978    THEN AGAIN IN 1982   • CARDIAC ABLATION  2003   • CARDIAC SURGERY     • CATARACT EXTRACTION W/ INTRAOCULAR LENS IMPLANT Left 5/24/2017    Procedure: CATARACT PHACO EXTRACTION WITH INTRAOCULAR LENS IMPLANT  LEFT WITH TORIC LENS;  Surgeon: Alma Benavidez MD;  Location: [facility];  Service:    • CATARACT EXTRACTION W/ INTRAOCULAR LENS IMPLANT Right 2017    Procedure: CATARACT PHACO EXTRACTION WITH INTRAOCULAR LENS IMPLANT RIGHT WITH TORIC LENS;  Surgeon: Alma Benavidez MD;  Location: [facility];  Service:    • CORONARY ARTERY BYPASS GRAFT      SPOUSE UNSURE OF HOW MANY VESSELS   • HIATAL HERNIA REPAIR     • JOINT REPLACEMENT Left     HIP - DONE BY DR DALAL   • KNEE ARTHROSCOPY Left    • NOSE SURGERY      SPOUSE REPORTS FOR A BROKEN NOSE   • OTHER SURGICAL HISTORY Left     TENDON TORN LOOSE FROM BONE, LEFT ELBOW   • OTHER SURGICAL HISTORY      RUPTURED DISC   • OTHER SURGICAL HISTORY      NECK SURGERY FOR RUPTURED DISC   • OTHER SURGICAL HISTORY      HAD SECOND NECK SURGERY   • OTHER SURGICAL HISTORY      RUPTURED DISC   • OTHER SURGICAL HISTORY      RUPTURED DISC   • OTHER SURGICAL HISTORY      HARDWARE REMOVAL FROM BACK BY DR HAY         MEDICATIONS:  I have reviewed and reconciled the patients medication list in the patients chart at the [facility] nursing [facility] today.      ALLERGIES:  Allergies   Allergen Reactions   • Phenergan [Promethazine Hcl] Nausea And Vomiting   • Carbamazepine Unknown - Low Severity   • Hydrocodone Unknown - Low Severity   • Lisinopril Unknown - Low Severity         SOCIAL HISTORY:  Social History     Socioeconomic History   • Marital status:    Tobacco Use   • Smoking status: Former Smoker     Types: Cigarettes     Quit date:      Years since quittin.6   • Smokeless tobacco: Never Used   Substance and Sexual Activity   • Alcohol use: No   • Drug use: No   • Sexual activity: Defer       FAMILY HISTORY:  No family history on file.    REVIEW OF SYSTEMS:  Review of Systems   Unable to perform ROS: Dementia (ROS per nursing and patient)   Constitutional: Negative for activity change, appetite change, chills,  diaphoresis, fatigue and fever.   HENT: Negative for congestion, mouth sores, rhinorrhea and trouble swallowing.    Eyes: Negative for discharge and redness.   Respiratory: Negative for cough, chest tightness and shortness of breath.    Cardiovascular: Negative for chest pain, palpitations and leg swelling.   Gastrointestinal: Negative for abdominal distention, abdominal pain, blood in stool, constipation and vomiting.   Genitourinary: Negative for difficulty urinating and frequency.        Incontinence   Musculoskeletal: Positive for arthralgias (chronic) and gait problem.   Skin: Negative for rash.   Neurological: Positive for weakness.   Psychiatric/Behavioral: Positive for confusion. Negative for agitation, behavioral problems, dysphoric mood, sleep disturbance and suicidal ideas. The patient is not nervous/anxious and is not hyperactive.           PHYSICAL EXAMINATION:     VITAL SIGNS:  /76   Pulse 77   Temp 97.8 °F (36.6 °C)   Resp 20   Wt 71.2 kg (157 lb)   BMI 23.87 kg/m²     Physical Exam   HENT:   Head: Normocephalic and atraumatic.   Pulmonary/Chest: Effort normal and breath sounds normal.   Abdominal: Normal appearance.   Musculoskeletal:        Hands:    Neurological: He is alert. He is disoriented.   Psychiatric: His behavior is normal. Mood normal. Cognition and memory are impaired. He has a flat affect.   Nursing note and vitals reviewed.      RECORDS REVIEW:   Most recent labs    ASSESSMENT     Diagnoses and all orders for this visit:    1. Primary hypertension (Primary)    2. Coronary artery disease involving native heart with angina pectoris, unspecified vessel or lesion type (HCC)    3. High cholesterol    4. Type 2 diabetes mellitus with diabetic polyneuropathy, with long-term current use of insulin ([facility])    5. Mood disorder ([facility])    6. Parkinson's disease (tremor, stiffness, slow motion, unstable posture) ([facility])    7. Dementia due to Parkinson's disease with behavioral disturbance  ([facility])    8. Impaired mobility and ADLs        PLAN    HTN  -At goal on current medications    CAD/HLD  -Stable on current medications and lipid panel performed annually.    DM2  -Stable with current medications.  A1c 7.1 on 8/2.     Mood disorder  -Stable on current medications. No recent behaviors.     Parkinson's/Dementia  -Stable at this time with no acute changes. Appetite and weight stable.     Nursing encouraged to keep me informed of any acute changes, amanda any new concerning symptoms.    Staff to continue supportive care for all ADLs.     [x]  Discussed Patient in detail with nursing/staff, addressed all needs today.     [x]  Plan of Care Reviewed   [x]  PT/OT Reviewed   [x]  Order Changes  []  Discharge Plans Reviewed  [x]  Advance Directive on file with Nursing Home.   [x]  POA on file with Nursing Home.    [x]  Code Status listed and reviewed.       “I confirm accuracy of unchanged data/findings which have been carried forward from previous visit, as well as I have updated appropriately those that have changed.”                               Charleen Randall, APRN.  8/8/2022

## 2022-08-29 ENCOUNTER — NURSING HOME (OUTPATIENT)
Dept: FAMILY MEDICINE CLINIC | Facility: CLINIC | Age: 82
End: 2022-08-29

## 2022-08-29 VITALS
WEIGHT: 157 LBS | TEMPERATURE: 97.5 F | OXYGEN SATURATION: 93 % | SYSTOLIC BLOOD PRESSURE: 112 MMHG | DIASTOLIC BLOOD PRESSURE: 63 MMHG | BODY MASS INDEX: 23.87 KG/M2 | HEART RATE: 71 BPM | RESPIRATION RATE: 18 BRPM
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
## 2022-08-29 DIAGNOSIS — F02.818 DEMENTIA DUE TO PARKINSON'S DISEASE WITH BEHAVIORAL DISTURBANCE: Chronic | ICD-10-CM

## 2022-08-29 DIAGNOSIS — Z74.09 IMPAIRED MOBILITY AND ADLS: ICD-10-CM

## 2022-08-29 DIAGNOSIS — G20 PARKINSON'S DISEASE (TREMOR, STIFFNESS, SLOW MOTION, UNSTABLE POSTURE): ICD-10-CM

## 2022-08-29 DIAGNOSIS — Z78.9 IMPAIRED MOBILITY AND ADLS: ICD-10-CM

## 2022-08-29 DIAGNOSIS — G20 DEMENTIA DUE TO PARKINSON'S DISEASE WITH BEHAVIORAL DISTURBANCE: Chronic | ICD-10-CM

## 2022-08-29 PROCEDURE — 99309 SBSQ NF CARE MODERATE MDM 30: CPT | Performed by: NURSE PRACTITIONER

## 2022-08-30 NOTE — PROGRESS NOTES
Telemedicine nursing Home Progress Note        Mitul Stephen DO []  [facility] Ho [x] [place] [address]  Phone: [phone]  Fax: [phone] Inés Vega MD []  [facility] [x]   [address] [place] [address]  Phone: [phone]  Fax: [phone]     PATIENT NAME: Gama Davila                                                                          YOB: 1940           DATE OF SERVICE: 8/29/2022  FACILITY:  [] [facility]   [] [facility]   [] [facility]   [x] [facility]  []  [facility]  [] Other     ______________________________________________________________________     CHIEF COMPLAINT:    Follow-up noninjury fall from 8/26.    HISTORY OF PRESENT ILLNESS:     On 8/26 nursing staff was called to the room by roommate to find patient lying in the floor on his back.  Pieces of candy were also noted lying on the floor under his wheelchair and it appeared patient had been reaching for candy under his wheelchair and this is what caused the fall.  He denies any injury today and does not remember the fall.  He is moving about his room in the facility in his wheelchair and is eating candy today as well.  He does have fall precautions in place in bed and chair.    PAST MEDICAL & SURGICAL HISTORY:   Past Medical History:   Diagnosis Date   • Arthritis    • Cancer (HCC)     SKIN    • Cataract    • Coronary artery disease    • Diabetes mellitus (HCC)    • High cholesterol    • [place] (hard of hearing)     PATIENT HAS HEARING AIDS   • Hypertension    • Irregular heart beat     HISTORY OF CARDIAC ABLATION IN 2003   • Wears dentures     FULL UPPER PLATE      Past Surgical History:   Procedure Laterality Date   • BACK SURGERY  1978    THEN AGAIN IN 1982   • CARDIAC ABLATION  2003   • CARDIAC SURGERY     • CATARACT EXTRACTION W/ INTRAOCULAR LENS IMPLANT Left 5/24/2017    Procedure: CATARACT PHACO EXTRACTION WITH INTRAOCULAR LENS IMPLANT LEFT WITH  TORIC LENS;  Surgeon: Alma Benavidez MD;  Location: [facility];  Service:    • CATARACT EXTRACTION W/ INTRAOCULAR LENS IMPLANT Right 2017    Procedure: CATARACT PHACO EXTRACTION WITH INTRAOCULAR LENS IMPLANT RIGHT WITH TORIC LENS;  Surgeon: Alma Benavidez MD;  Location: [facility];  Service:    • CORONARY ARTERY BYPASS GRAFT      SPOUSE UNSURE OF HOW MANY VESSELS   • HIATAL HERNIA REPAIR     • JOINT REPLACEMENT Left     HIP - DONE BY DR DALAL   • KNEE ARTHROSCOPY Left    • NOSE SURGERY      SPOUSE REPORTS FOR A BROKEN NOSE   • OTHER SURGICAL HISTORY Left     TENDON TORN LOOSE FROM BONE, LEFT ELBOW   • OTHER SURGICAL HISTORY      RUPTURED DISC   • OTHER SURGICAL HISTORY      NECK SURGERY FOR RUPTURED DISC   • OTHER SURGICAL HISTORY      HAD SECOND NECK SURGERY   • OTHER SURGICAL HISTORY      RUPTURED DISC   • OTHER SURGICAL HISTORY      RUPTURED DISC   • OTHER SURGICAL HISTORY      HARDWARE REMOVAL FROM BACK BY DR HAY         MEDICATIONS:  I have reviewed and reconciled the patients medication list in the patients chart at the [facility] nursing [facility] today.      ALLERGIES:  Allergies   Allergen Reactions   • Phenergan [Promethazine Hcl] Nausea And Vomiting   • Carbamazepine Unknown - Low Severity   • Hydrocodone Unknown - Low Severity   • Lisinopril Unknown - Low Severity         SOCIAL HISTORY:  Social History     Socioeconomic History   • Marital status:    Tobacco Use   • Smoking status: Former Smoker     Types: Cigarettes     Quit date:      Years since quittin.6   • Smokeless tobacco: Never Used   Substance and Sexual Activity   • Alcohol use: No   • Drug use: No   • Sexual activity: Defer       FAMILY HISTORY:  No family history on file.    REVIEW OF SYSTEMS:  Review of Systems   Unable to perform ROS: Dementia (ROS per nursing and patient)   Constitutional: Negative for activity change, appetite change, chills, diaphoresis, fatigue  and fever.   HENT: Negative for congestion, mouth sores, rhinorrhea and trouble swallowing.    Eyes: Negative for discharge and redness.   Respiratory: Negative for cough, chest tightness and shortness of breath.    Cardiovascular: Negative for chest pain, palpitations and leg swelling.   Gastrointestinal: Negative for abdominal distention, abdominal pain, blood in stool, constipation and vomiting.   Genitourinary: Negative for difficulty urinating and frequency.        Incontinence   Musculoskeletal: Positive for arthralgias (chronic) and gait problem.   Skin: Negative for rash.   Neurological: Positive for weakness.   Psychiatric/Behavioral: Positive for confusion. Negative for agitation, behavioral problems, dysphoric mood, sleep disturbance and suicidal ideas. The patient is not nervous/anxious and is not hyperactive.           PHYSICAL EXAMINATION:     VITAL SIGNS:  /63   Pulse 71   Temp 97.5 °F (36.4 °C)   Resp 18   Wt 71.2 kg (157 lb)   SpO2 93%   BMI 23.87 kg/m²     Physical Exam   HENT:   Head: Normocephalic and atraumatic.   Pulmonary/Chest: Effort normal and breath sounds normal.   Abdominal: Normal appearance.   Musculoskeletal:        Hands:    Neurological: He is alert. He is disoriented.   Psychiatric: His behavior is normal. Mood normal. Cognition and memory are impaired. He has a flat affect.   Nursing note and vitals reviewed.      RECORDS REVIEW:   Most recent labs    ASSESSMENT     Diagnoses and all orders for this visit:    1. Parkinson's disease (tremor, stiffness, slow motion, unstable posture) ([facility])    2. Fall, initial encounter    3. Dementia due to Parkinson's disease with behavioral disturbance ([facility])    4. Impaired mobility and ADLs        PLAN    Fall/Parkinson's/dementiaFall  -Noninjury fall on 8/26.  Fall precautions are in place and wheelchair in bed.    Nursing encouraged to keep me informed of any acute changes, amanda any new concerning symptoms.    Staff to continue  supportive care for all ADLs.     [x]  Discussed Patient in detail with nursing/staff, addressed all needs today.     [x]  Plan of Care Reviewed   [x]  PT/OT Reviewed   [x]  Order Changes  []  Discharge Plans Reviewed  [x]  Advance Directive on file with Nursing Home.   [x]  POA on file with Nursing Home.    [x]  Code Status listed and reviewed.       “I confirm accuracy of unchanged data/findings which have been carried forward from previous visit, as well as I have updated appropriately those that have changed.”                                 Charleen Randall, APRN.  8/29/2022

## 2022-09-22 ENCOUNTER — NURSING HOME (OUTPATIENT)
Dept: INTERNAL MEDICINE | Facility: CLINIC | Age: 82
End: 2022-09-22

## 2022-09-22 VITALS
DIASTOLIC BLOOD PRESSURE: 72 MMHG | TEMPERATURE: 97.8 F | OXYGEN SATURATION: 98 % | SYSTOLIC BLOOD PRESSURE: 118 MMHG | HEART RATE: 70 BPM | RESPIRATION RATE: 20 BRPM | WEIGHT: 160 LBS | BODY MASS INDEX: 24.33 KG/M2

## 2022-09-22 DIAGNOSIS — G30.1 LATE ONSET ALZHEIMER'S DISEASE WITHOUT BEHAVIORAL DISTURBANCE: ICD-10-CM

## 2022-09-22 DIAGNOSIS — F02.80 LATE ONSET ALZHEIMER'S DISEASE WITHOUT BEHAVIORAL DISTURBANCE: ICD-10-CM

## 2022-09-22 DIAGNOSIS — I25.119 CORONARY ARTERY DISEASE INVOLVING NATIVE HEART WITH ANGINA PECTORIS, UNSPECIFIED VESSEL OR LESION TYPE: ICD-10-CM

## 2022-09-22 DIAGNOSIS — I10 ESSENTIAL HYPERTENSION: ICD-10-CM

## 2022-09-22 DIAGNOSIS — G20 DEMENTIA DUE TO PARKINSON'S DISEASE WITH BEHAVIORAL DISTURBANCE: Primary | ICD-10-CM

## 2022-09-22 DIAGNOSIS — Z74.09 IMPAIRED MOBILITY AND ADLS: ICD-10-CM

## 2022-09-22 DIAGNOSIS — Z78.9 IMPAIRED MOBILITY AND ADLS: ICD-10-CM

## 2022-09-22 DIAGNOSIS — I49.9 IRREGULAR HEART BEAT: ICD-10-CM

## 2022-09-22 DIAGNOSIS — F02.818 DEMENTIA DUE TO PARKINSON'S DISEASE WITH BEHAVIORAL DISTURBANCE: Primary | ICD-10-CM

## 2022-09-22 DIAGNOSIS — E11.42 TYPE 2 DIABETES MELLITUS WITH DIABETIC POLYNEUROPATHY, WITHOUT LONG-TERM CURRENT USE OF INSULIN: ICD-10-CM

## 2022-09-22 PROCEDURE — 99308 SBSQ NF CARE LOW MDM 20: CPT | Performed by: INTERNAL MEDICINE

## 2022-09-24 ENCOUNTER — APPOINTMENT (OUTPATIENT)
Dept: CT IMAGING | Facility: HOSPITAL | Age: 82
End: 2022-09-24

## 2022-09-24 ENCOUNTER — HOSPITAL ENCOUNTER (EMERGENCY)
Facility: HOSPITAL | Age: 82
Discharge: NURSING, REHAB OR LONG-TERM CARE | End: 2022-09-24
Attending: EMERGENCY MEDICINE | Admitting: EMERGENCY MEDICINE

## 2022-09-24 VITALS
WEIGHT: 155 LBS | DIASTOLIC BLOOD PRESSURE: 81 MMHG | SYSTOLIC BLOOD PRESSURE: 153 MMHG | HEIGHT: 68 IN | BODY MASS INDEX: 23.49 KG/M2 | RESPIRATION RATE: 16 BRPM | HEART RATE: 49 BPM | OXYGEN SATURATION: 68 % | TEMPERATURE: 97.1 F
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
## 2022-09-24 DIAGNOSIS — M54.50 LOW BACK PAIN WITHOUT SCIATICA, UNSPECIFIED BACK PAIN LATERALITY, UNSPECIFIED CHRONICITY: ICD-10-CM

## 2022-09-24 PROCEDURE — 72131 CT LUMBAR SPINE W/O DYE: CPT

## 2022-09-24 PROCEDURE — 99283 EMERGENCY DEPT VISIT LOW MDM: CPT

## 2022-09-24 NOTE — ED PROVIDER NOTES
TRIAGE CHIEF COMPLAINT:     Nursing and triage notes reviewed    Chief Complaint   Patient presents with   • Fall      HPI: Gama Davila is a 82 y.o. male who presents to the emergency department complaining of tailbone pain after suffering a fall.  Patient has a history of dementia and per reports is confused at baseline.  Patient spends most of his time in a wheelchair and had a on witnessed fall.  Patient had complained of tailbone pain initially.  Patient had been placed in a cervical collar due to protocol at the nursing facility.  Patient denies any complaints to me.    REVIEW OF SYSTEMS: All other systems reviewed and are negative     PAST MEDICAL HISTORY:   Past Medical History:   Diagnosis Date   • Arthritis    • Cancer (HCC)     SKIN    • Cataract    • Coronary artery disease    • Diabetes mellitus (HCC)    • High cholesterol    • [place] (hard of hearing)     PATIENT HAS HEARING AIDS   • Hypertension    • Irregular heart beat     HISTORY OF CARDIAC ABLATION IN    • Wears dentures     FULL UPPER PLATE        FAMILY HISTORY:   History reviewed. No pertinent family history.     SOCIAL HISTORY:   Social History     Socioeconomic History   • Marital status:    Tobacco Use   • Smoking status: Former Smoker     Types: Cigarettes     Quit date:      Years since quittin.7   • Smokeless tobacco: Never Used   Substance and Sexual Activity   • Alcohol use: No   • Drug use: No   • Sexual activity: Defer        SURGICAL HISTORY:   Past Surgical History:   Procedure Laterality Date   • BACK SURGERY      THEN AGAIN IN    • CARDIAC ABLATION     • CARDIAC SURGERY     • CATARACT EXTRACTION W/ INTRAOCULAR LENS IMPLANT Left 2017    Procedure: CATARACT PHACO EXTRACTION WITH INTRAOCULAR LENS IMPLANT LEFT WITH TORIC LENS;  Surgeon: Alma Benavidez MD;  Location: [facility];  Service:    • CATARACT EXTRACTION W/ INTRAOCULAR LENS IMPLANT Right 2017    Procedure: CATARACT PHACO  EXTRACTION WITH INTRAOCULAR LENS IMPLANT RIGHT WITH TORIC LENS;  Surgeon: Alma Benavidez MD;  Location: [facility];  Service:    • CORONARY ARTERY BYPASS GRAFT  2003    SPOUSE UNSURE OF HOW MANY VESSELS   • HIATAL HERNIA REPAIR  1972   • JOINT REPLACEMENT Left     HIP - DONE BY DR DALAL   • KNEE ARTHROSCOPY Left    • NOSE SURGERY  1970    SPOUSE REPORTS FOR A BROKEN NOSE   • OTHER SURGICAL HISTORY Left     TENDON TORN LOOSE FROM BONE, LEFT ELBOW   • OTHER SURGICAL HISTORY  1986    RUPTURED DISC   • OTHER SURGICAL HISTORY  1994    NECK SURGERY FOR RUPTURED DISC   • OTHER SURGICAL HISTORY  1996    HAD SECOND NECK SURGERY   • OTHER SURGICAL HISTORY  1998    RUPTURED DISC   • OTHER SURGICAL HISTORY  2003    RUPTURED DISC   • OTHER SURGICAL HISTORY  2009    HARDWARE REMOVAL FROM BACK BY DR HAY        CURRENT MEDICATIONS:      Medication List      ASK your doctor about these medications    acetaminophen 325 MG tablet  Commonly known as: TYLENOL     aspirin 81 MG EC tablet     atorvastatin 80 MG tablet  Commonly known as: LIPITOR     carbidopa-levodopa  MG per tablet  Commonly known as: SINEMET  Take 1 tablet by mouth 3 (Three) Times a Day.     citalopram 20 MG tablet  Commonly known as: CeleXA  Take 1 tablet by mouth Daily.     docusate sodium 100 MG capsule  Commonly known as: COLACE     donepezil 10 MG tablet  Commonly known as: ARICEPT  Take 1 tablet by mouth Every Night.     gabapentin 100 MG capsule  Commonly known as: NEURONTIN  Take 1 capsule by mouth 3 (Three) Times a Day.     metFORMIN 500 MG tablet  Commonly known as: GLUCOPHAGE     NovoLOG FlexPen 100 UNIT/ML solution pen-injector sc pen  Generic drug: insulin aspart     pantoprazole 40 MG EC tablet  Commonly known as: PROTONIX             ALLERGIES: Phenergan [promethazine hcl], Carbamazepine, Hydrocodone, and Lisinopril     PHYSICAL EXAM:   VITAL SIGNS:   Vitals:    09/24/22 1132   BP: 158/77   Pulse:    Resp:    Temp:    SpO2: 96%       CONSTITUTIONAL: Awake, pleasantly confused, appears nontoxic   HENT: Atraumatic, normocephalic, oral mucosa pink and moist, airway patent. Nares patent without drainage. External ears normal.   EYES: Conjunctivae clear   NECK: Trachea midline, nontender, supple   CARDIOVASCULAR: Normal heart rate, Normal rhythm, No murmurs, rubs, gallops   PULMONARY/CHEST: Clear to auscultation, no rhonchi, wheezes, or rales. Symmetrical breath sounds.  ABDOMINAL: Nondistended, soft, nontender - no rebound or guarding.  BACK: No midline tenderness of the thoracic spine.  Mild tenderness in the lower lumbar spine and coccyx.  No overlying wound appreciated at this time.  NEUROLOGIC: Nonfocal, moving all four extremities, no gross sensory or motor deficits.   EXTREMITIES: No clubbing, cyanosis, or edema   SKIN: Warm, Dry, No erythema, No rash     ED COURSE / MEDICAL DECISION MAKING:   Gama Davila is a 82 y.o. male who presents to the emergency department for evaluation of pain following a fall.  Patient nondistressed on arrival in the emergency department.  Vital signs are stable.  Exam reveals some mild tenderness primarily over the coccyx.  Patient does arrive in a cervical collar however has no tenderness.  I was able to clear his cervical spine.  This was placed due to protocol and I suspect patient likely slid out of the wheelchair.  There is no evidence of trauma to his head so do not feel imaging of the head or neck is currently indicated unless patient develops further symptoms or signs of trauma.    CT scan of the lumbar spine including the coccyx per radiology interpretation reveals severe degenerative changes but no other obvious acute abnormality.    Of note: Patient had an O2 reading of 68%, I went in to evaluate patient and probe was not properly on his finger.  This improved to the 90s once it was replaced.    Will discharge patient with return precautions.  He continues to deny complaint at this time.    DECISION  TO DISCHARGE/ADMIT: see ED care timeline     FINAL IMPRESSION:   1 --fall  2 --low back pain  3 --     Electronically signed by: Sue Hairston MD, 9/24/2022 12:15 EDT       Sue Hairston MD  09/24/22 5442

## 2022-09-27 NOTE — PROGRESS NOTES
Nursing Home Progress Note        Mitul Stephen DO []  ALESSANDRA Ho []  [address] [place] [address]  Phone: [phone]  Fax: [phone] Inés Vega MD []  Alex Burk DO [x]   [address] [place] [address]  Phone: [phone]  Fax: [phone]     PATIENT NAME: Gama Davila                                                                          YOB: 1940           DATE OF SERVICE: 09/22/2022  FACILITY:  [] [facility]   [] [facility]   [] [facility]   [x] [facility]  []  [facility]  [] Other ______________________________________________________________________     CHIEF COMPLAINT:  Chronic Medical Management      HISTORY OF PRESENT ILLNESS:   Patient was resting comfortably in a wheelchair on exam today.  He shared that he felt well but nurses have noticed occasional behavior issues and agitation.  He has mostly been able to be redirected without any problems.    PAST MEDICAL & SURGICAL HISTORY:   Past Medical History:   Diagnosis Date   • Arthritis    • Cancer (HCC)     SKIN    • Cataract    • Coronary artery disease    • Diabetes mellitus (HCC)    • High cholesterol    • [place] (hard of hearing)     PATIENT HAS HEARING AIDS   • Hypertension    • Irregular heart beat     HISTORY OF CARDIAC ABLATION IN 2003   • Wears dentures     FULL UPPER PLATE      Past Surgical History:   Procedure Laterality Date   • BACK SURGERY  1978    THEN AGAIN IN 1982   • CARDIAC ABLATION  2003   • CARDIAC SURGERY     • CATARACT EXTRACTION W/ INTRAOCULAR LENS IMPLANT Left 5/24/2017    Procedure: CATARACT PHACO EXTRACTION WITH INTRAOCULAR LENS IMPLANT LEFT WITH TORIC LENS;  Surgeon: Alma Benavidez MD;  Location: [facility];  Service:    • CATARACT EXTRACTION W/ INTRAOCULAR LENS IMPLANT Right 6/14/2017    Procedure: CATARACT PHACO EXTRACTION WITH INTRAOCULAR LENS IMPLANT RIGHT WITH TORIC LENS;  Surgeon: Alma Benavidez MD;  Location: [facility] OR;   Service:    • CORONARY ARTERY BYPASS GRAFT      SPOUSE UNSURE OF HOW MANY VESSELS   • HIATAL HERNIA REPAIR     • JOINT REPLACEMENT Left     HIP - DONE BY DR DALAL   • KNEE ARTHROSCOPY Left    • NOSE SURGERY  1970    SPOUSE REPORTS FOR A BROKEN NOSE   • OTHER SURGICAL HISTORY Left     TENDON TORN LOOSE FROM BONE, LEFT ELBOW   • OTHER SURGICAL HISTORY      RUPTURED DISC   • OTHER SURGICAL HISTORY      NECK SURGERY FOR RUPTURED DISC   • OTHER SURGICAL HISTORY      HAD SECOND NECK SURGERY   • OTHER SURGICAL HISTORY      RUPTURED DISC   • OTHER SURGICAL HISTORY      RUPTURED DISC   • OTHER SURGICAL HISTORY      HARDWARE REMOVAL FROM BACK BY DR HAY         MEDICATIONS:  I have reviewed and reconciled the patients medication list in the patients chart at the [facility] nursing [facility] today, 2022.      ALLERGIES:  Allergies   Allergen Reactions   • Phenergan [Promethazine Hcl] Nausea And Vomiting   • Carbamazepine Unknown - Low Severity   • Hydrocodone Unknown - Low Severity   • Lisinopril Unknown - Low Severity         SOCIAL HISTORY:  Social History     Socioeconomic History   • Marital status:    Tobacco Use   • Smoking status: Former Smoker     Types: Cigarettes     Quit date:      Years since quittin.7   • Smokeless tobacco: Never Used   Substance and Sexual Activity   • Alcohol use: No   • Drug use: No   • Sexual activity: Defer       FAMILY HISTORY:  No family history on file.    REVIEW OF SYSTEMS:  Review of Systems   Constitutional: Negative for chills, fatigue and fever.   HENT: Negative for congestion, ear pain, rhinorrhea, sinus pressure and sore throat.    Eyes: Negative for visual disturbance.   Respiratory: Negative for cough, chest tightness, shortness of breath and wheezing.    Cardiovascular: Negative for chest pain, palpitations and leg swelling.   Gastrointestinal: Negative for abdominal pain, blood in stool, constipation, diarrhea, nausea and  vomiting.   Endocrine: Negative for polydipsia and polyuria.   Genitourinary: Negative for dysuria and hematuria.   Musculoskeletal: Negative for arthralgias and back pain.   Skin: Negative for rash.   Neurological: Negative for dizziness, light-headedness, numbness and headaches.   Psychiatric/Behavioral: Negative for dysphoric mood and sleep disturbance. The patient is not nervous/anxious.           PHYSICAL EXAMINATION:     VITAL SIGNS:  /72   Pulse 70   Temp 97.8 °F (36.6 °C)   Resp 20   Wt 72.6 kg (160 lb)   SpO2 98%   BMI 24.33 kg/m²     Physical Exam  Vitals and nursing note reviewed.   Constitutional:       General: He is not in acute distress.     Appearance: Normal appearance. He is well-developed.      Comments: Wheelchair-bound elderly male   HENT:      Head: Normocephalic and atraumatic.   Eyes:      Extraocular Movements: Extraocular movements intact.      Conjunctiva/sclera: Conjunctivae normal.   Cardiovascular:      Rate and Rhythm: Normal rate and regular rhythm.   Pulmonary:      Effort: Pulmonary effort is normal.      Breath sounds: Normal breath sounds.   Musculoskeletal:      Cervical back: Normal range of motion and neck supple.   Skin:     General: Skin is warm and dry.      Findings: No rash.   Neurological:      General: No focal deficit present.      Mental Status: He is alert and oriented to person, place, and time.   Psychiatric:         Mood and Affect: Mood normal.         Behavior: Behavior normal.         RECORDS REVIEW:       ASSESSMENT     Diagnoses and all orders for this visit:    1. 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 Essential hypertension    3. Type 2 diabetes mellitus with diabetic polyneuropathy, without long-term current use of insulin (HCC)    4. Impaired mobility and ADLs    5. Coronary artery disease involving native heart with angina pectoris, unspecified vessel or lesion type (HCC)    6. Late onset  Alzheimer's disease without behavioral disturbance (HCC)    7. Irregular heart beat        PLAN  Dementia due to Parkinson's disease  -  Behaviors remain stable with Sinemet and donepezil.     Mood disorder  - psychiatry is following. stable at this time.      Type 2 diabetes mellitus   - controlled.   -Stable control on metformin and basaglar, glucose log reviewed.       Falls  - no recent incidents.  Doing well moving around in wheelchair.      Coronary artery disease/irregular heartbeat  - stable on current cardiac medications.  No changes needed.           [x]  Discussed Patient in detail with nursing/staff, addressed all needs today.     [x]  Plan of Care Reviewed   []  PT/OT Reviewed   []  Order Changes  []  Discharge Plans Reviewed  [x]  Advance Directive on file with Nursing Home.   [x]  POA on file with Nursing Home.    [x]  Code Status listed and reviewed.     I confirm accuracy of unchanged data/findings including physical exam and plan which have been carried forward from previous visit, as well as I have updated appropriately those that have changed        Alex Burk DO.  9/26/2022

## 2022-10-20 RX ORDER — GABAPENTIN 100 MG/1
100 CAPSULE ORAL 3 TIMES DAILY
Qty: 90 CAPSULE | Refills: 5 | Status: SHIPPED | OUTPATIENT
Start: 2022-10-20 | End: 2023-03-22 | Stop reason: SDUPTHER

## 2022-10-31 ENCOUNTER — NURSING HOME (OUTPATIENT)
Dept: FAMILY MEDICINE CLINIC | Facility: CLINIC | Age: 82
End: 2022-10-31

## 2022-10-31 VITALS
TEMPERATURE: 97.8 F | HEART RATE: 70 BPM | SYSTOLIC BLOOD PRESSURE: 130 MMHG | DIASTOLIC BLOOD PRESSURE: 70 MMHG | WEIGHT: 160 LBS | BODY MASS INDEX: 24.33 KG/M2 | OXYGEN SATURATION: 97 % | RESPIRATION RATE: 20 BRPM

## 2022-10-31 DIAGNOSIS — Z91.14 NON COMPLIANCE W MEDICATION REGIMEN: ICD-10-CM

## 2022-10-31 DIAGNOSIS — Z78.9 IMPAIRED MOBILITY AND ADLS: ICD-10-CM

## 2022-10-31 DIAGNOSIS — G20 PARKINSON'S DISEASE (TREMOR, STIFFNESS, SLOW MOTION, UNSTABLE POSTURE): ICD-10-CM

## 2022-10-31 DIAGNOSIS — F39 MOOD DISORDER: ICD-10-CM

## 2022-10-31 DIAGNOSIS — Z79.4 TYPE 2 DIABETES MELLITUS WITH DIABETIC POLYNEUROPATHY, WITH LONG-TERM CURRENT USE OF INSULIN: Chronic | ICD-10-CM

## 2022-10-31 DIAGNOSIS — I25.119 CORONARY ARTERY DISEASE INVOLVING NATIVE HEART WITH ANGINA PECTORIS, UNSPECIFIED VESSEL OR LESION TYPE: Primary | ICD-10-CM

## 2022-10-31 DIAGNOSIS — I10 PRIMARY HYPERTENSION: ICD-10-CM

## 2022-10-31 DIAGNOSIS — Z74.09 IMPAIRED MOBILITY AND ADLS: ICD-10-CM

## 2022-10-31 DIAGNOSIS — E78.00 HIGH CHOLESTEROL: ICD-10-CM

## 2022-10-31 DIAGNOSIS — E11.42 TYPE 2 DIABETES MELLITUS WITH DIABETIC POLYNEUROPATHY, WITH LONG-TERM CURRENT USE OF INSULIN: Chronic | ICD-10-CM

## 2022-10-31 DIAGNOSIS — F02.B3 MODERATE DEMENTIA DUE TO PARKINSON'S DISEASE, WITH MOOD DISTURBANCE: Chronic | ICD-10-CM

## 2022-10-31 DIAGNOSIS — G20 MODERATE DEMENTIA DUE TO PARKINSON'S DISEASE, WITH MOOD DISTURBANCE: Chronic | ICD-10-CM

## 2022-10-31 PROCEDURE — 99309 SBSQ NF CARE MODERATE MDM 30: CPT | Performed by: NURSE PRACTITIONER

## 2022-11-01 NOTE — PROGRESS NOTES
Telemedicine nursing Home Progress Note        Mitul Stephen DO []  ALESSANDRA Ho [x] [place] [address]  Phone: [phone]  Fax: [phone] Inés Vega MD []  ALESSANDRA Ho [x]   [address] [place] [address]  Phone: [phone]  Fax: [phone]     PATIENT NAME: Gama Davila                                                                          YOB: 1940           DATE OF SERVICE: 10/31/2022  FACILITY:  [] [facility]   [] [facility]   [] [facility]   [x] [facility]  []  [facility]  [] Other     ______________________________________________________________________     CHIEF COMPLAINT:    Visit for management of chronic conditions and LTC needs.     HISTORY OF PRESENT ILLNESS:     Tele health visit performed today secondary to Covid. Visit performed at home while patient was in [facility] facility. Patient did give verbal consent.     Visit performed today for management of long-term care needs and chronic conditions.  He has past medical history of dementia, Parkinson's disease, mood disorder, DM 2, hypertension and hyperlipidemia.  He frequently refuses to take medications and has been doing this more frequently the past couple weeks.  Labs performed in August and no significant concerns. His A1c was 7.1. He is moving about the facility in his wheelchair today during visit with no complaints or signs and symptoms of any distress.     PAST MEDICAL & SURGICAL HISTORY:   Past Medical History:   Diagnosis Date   • Arthritis    • Cancer (HCC)     SKIN    • Cataract    • Coronary artery disease    • Diabetes mellitus (HCC)    • High cholesterol    • [place] (hard of hearing)     PATIENT HAS HEARING AIDS   • Hypertension    • Irregular heart beat     HISTORY OF CARDIAC ABLATION IN 2003   • Wears dentures     FULL UPPER PLATE      Past Surgical History:   Procedure Laterality Date   • BACK SURGERY  1978    THEN AGAIN IN 1982   •  CARDIAC ABLATION     • CARDIAC SURGERY     • CATARACT EXTRACTION W/ INTRAOCULAR LENS IMPLANT Left 2017    Procedure: CATARACT PHACO EXTRACTION WITH INTRAOCULAR LENS IMPLANT LEFT WITH TORIC LENS;  Surgeon: Alma Benavidez MD;  Location: [facility] OR;  Service:    • CATARACT EXTRACTION W/ INTRAOCULAR LENS IMPLANT Right 2017    Procedure: CATARACT PHACO EXTRACTION WITH INTRAOCULAR LENS IMPLANT RIGHT WITH TORIC LENS;  Surgeon: Alma Benavidez MD;  Location: [facility] OR;  Service:    • CORONARY ARTERY BYPASS GRAFT      SPOUSE UNSURE OF HOW MANY VESSELS   • HIATAL HERNIA REPAIR     • JOINT REPLACEMENT Left     HIP - DONE BY DR DALAL   • KNEE ARTHROSCOPY Left    • NOSE SURGERY      SPOUSE REPORTS FOR A BROKEN NOSE   • OTHER SURGICAL HISTORY Left     TENDON TORN LOOSE FROM BONE, LEFT ELBOW   • OTHER SURGICAL HISTORY      RUPTURED DISC   • OTHER SURGICAL HISTORY      NECK SURGERY FOR RUPTURED DISC   • OTHER SURGICAL HISTORY      HAD SECOND NECK SURGERY   • OTHER SURGICAL HISTORY      RUPTURED DISC   • OTHER SURGICAL HISTORY      RUPTURED DISC   • OTHER SURGICAL HISTORY      HARDWARE REMOVAL FROM BACK BY DR HAY         MEDICATIONS:  I have reviewed and reconciled the patients medication list in the patients chart at the [facility] nursing facility today.      ALLERGIES:  Allergies   Allergen Reactions   • Phenergan [Promethazine Hcl] Nausea And Vomiting   • Carbamazepine Unknown - Low Severity   • Hydrocodone Unknown - Low Severity   • Lisinopril Unknown - Low Severity         SOCIAL HISTORY:  Social History     Socioeconomic History   • Marital status:    Tobacco Use   • Smoking status: Former     Types: Cigarettes     Quit date:      Years since quittin.8   • Smokeless tobacco: Never   Substance and Sexual Activity   • Alcohol use: No   • Drug use: No   • Sexual activity: Defer       FAMILY HISTORY:  No family history on file.    REVIEW OF  SYSTEMS:  Review of Systems   Unable to perform ROS: Dementia (ROS per nursing and patient)   Constitutional: Negative for activity change, appetite change, chills, diaphoresis, fatigue and fever.   HENT: Negative for congestion, mouth sores, rhinorrhea and trouble swallowing.    Eyes: Negative for discharge, redness and itching.   Respiratory: Negative for cough, chest tightness and shortness of breath.    Cardiovascular: Negative for chest pain, palpitations and leg swelling.   Gastrointestinal: Negative for abdominal distention, abdominal pain, blood in stool, constipation and vomiting.   Genitourinary: Negative for difficulty urinating and frequency.        Incontinence   Musculoskeletal: Positive for arthralgias (chronic) and gait problem.   Skin: Negative for rash.   Neurological: Positive for weakness.   Psychiatric/Behavioral: Positive for confusion. Negative for agitation, behavioral problems, dysphoric mood, sleep disturbance and suicidal ideas. The patient is not nervous/anxious and is not hyperactive.           PHYSICAL EXAMINATION:     VITAL SIGNS:  /70   Pulse 70   Temp 97.8 °F (36.6 °C)   Resp 20   Wt 72.6 kg (160 lb)   SpO2 97%   BMI 24.33 kg/m²     Physical Exam   HENT:   Head: Normocephalic and atraumatic.   Nose: Nose normal.   Eyes: Conjunctivae are normal.   Cardiovascular: Normal rate.   Pulmonary/Chest: Effort normal. No respiratory distress.   Neurological: He is alert. He is disoriented.   Psychiatric: His behavior is normal. Mood normal. Cognition and memory are impaired. He has a flat affect.   Nursing note and vitals reviewed.      RECORDS REVIEW:   Most recent labs    ASSESSMENT     Diagnoses and all orders for this visit:    1. Coronary artery disease involving native heart with angina pectoris, unspecified vessel or lesion type (HCC) (Primary)    2. High cholesterol    3. Primary hypertension    4. Type 2 diabetes mellitus with diabetic polyneuropathy, with long-term  current use of insulin (HCC)    5. Mood disorder (HCC)    6. Moderate dementia due to Parkinson's disease, with mood disturbance (HCC)    7. Parkinson's disease (tremor, stiffness, slow motion, unstable posture) (HCC)    8. Non compliance w medication regimen    9. Impaired mobility and ADLs        PLAN    HTN  -At goal on current medications    CAD/HLD  -Stable on current medications and lipid panel performed annually.    DM2  -Stable with current medications.  A1c 7.1 on 8/2.     Parkinson's/Dementia/Mood disorder/Non compliance  -He continues to be non compliant with medications frequently but fairly stable. Appetite and weight stable.     Nursing encouraged to keep me informed of any acute changes, amanda any new concerning symptoms.    Staff to continue supportive care for all ADLs.     [x]  Discussed Patient in detail with nursing/staff, addressed all needs today.     [x]  Plan of Care Reviewed   [x]  PT/OT Reviewed   [x]  Order Changes  []  Discharge Plans Reviewed  [x]  Advance Directive on file with Nursing Home.   [x]  POA on file with Nursing Home.    [x]  Code Status listed and reviewed.       “I confirm accuracy of unchanged data/findings which have been carried forward from previous visit, as well as I have updated appropriately those that have changed.”                               Charleen Randall, APRN.  11/1/2022

## 2022-11-15 ENCOUNTER — NURSING HOME (OUTPATIENT)
Dept: INTERNAL MEDICINE | Facility: CLINIC | Age: 82
End: 2022-11-15

## 2022-11-15 VITALS
BODY MASS INDEX: 24.33 KG/M2 | DIASTOLIC BLOOD PRESSURE: 81 MMHG | RESPIRATION RATE: 16 BRPM | WEIGHT: 160 LBS | TEMPERATURE: 96.8 F | HEART RATE: 71 BPM | OXYGEN SATURATION: 98 % | SYSTOLIC BLOOD PRESSURE: 128 MMHG

## 2022-11-15 DIAGNOSIS — I49.9 IRREGULAR HEART BEAT: ICD-10-CM

## 2022-11-15 DIAGNOSIS — Z74.09 IMPAIRED MOBILITY AND ADLS: ICD-10-CM

## 2022-11-15 DIAGNOSIS — Z78.9 IMPAIRED MOBILITY AND ADLS: ICD-10-CM

## 2022-11-15 DIAGNOSIS — G30.1 LATE ONSET ALZHEIMER'S DISEASE WITHOUT BEHAVIORAL DISTURBANCE: ICD-10-CM

## 2022-11-15 DIAGNOSIS — E11.42 TYPE 2 DIABETES MELLITUS WITH DIABETIC POLYNEUROPATHY, WITHOUT LONG-TERM CURRENT USE OF INSULIN: ICD-10-CM

## 2022-11-15 DIAGNOSIS — E78.00 HIGH CHOLESTEROL: ICD-10-CM

## 2022-11-15 DIAGNOSIS — F02.80 LATE ONSET ALZHEIMER'S DISEASE WITHOUT BEHAVIORAL DISTURBANCE: ICD-10-CM

## 2022-11-15 DIAGNOSIS — I10 ESSENTIAL HYPERTENSION: ICD-10-CM

## 2022-11-15 DIAGNOSIS — I25.119 CORONARY ARTERY DISEASE INVOLVING NATIVE HEART WITH ANGINA PECTORIS, UNSPECIFIED VESSEL OR LESION TYPE: ICD-10-CM

## 2022-11-15 DIAGNOSIS — G20 DEMENTIA DUE TO PARKINSON'S DISEASE WITH BEHAVIORAL DISTURBANCE: Primary | ICD-10-CM

## 2022-11-15 DIAGNOSIS — F02.818 DEMENTIA DUE TO PARKINSON'S DISEASE WITH BEHAVIORAL DISTURBANCE: Primary | ICD-10-CM

## 2022-11-15 PROCEDURE — 99308 SBSQ NF CARE LOW MDM 20: CPT | Performed by: INTERNAL MEDICINE

## 2022-11-16 NOTE — PROGRESS NOTES
Nursing Home Progress Note        Alex Burk DO   [address] [place] [address] Phone: [phone]  Fax: [phone]     PATIENT NAME: Gama Davila                                                                          YOB: 1940           DATE OF SERVICE: 11/15/2022  FACILITY:  [facility]   ______________________________________________________________________     CHIEF COMPLAINT:  Chronic Medical Management      HISTORY OF PRESENT ILLNESS:   Patient was sitting up in his wheelchair comfortable with no new complaints or concerns.  He was looking forward to going out for his smoking break.  He was making jokes and seemed to be in good spirits.  Appetite has been stable.    PAST MEDICAL & SURGICAL HISTORY:   Past Medical History:   Diagnosis Date   • Arthritis    • Cancer (HCC)     SKIN    • Cataract    • Coronary artery disease    • Diabetes mellitus (HCC)    • High cholesterol    • [place] (hard of hearing)     PATIENT HAS HEARING AIDS   • Hypertension    • Irregular heart beat     HISTORY OF CARDIAC ABLATION IN 2003   • Wears dentures     FULL UPPER PLATE      Past Surgical History:   Procedure Laterality Date   • BACK SURGERY  1978    THEN AGAIN IN 1982   • CARDIAC ABLATION  2003   • CARDIAC SURGERY     • CATARACT EXTRACTION W/ INTRAOCULAR LENS IMPLANT Left 5/24/2017    Procedure: CATARACT PHACO EXTRACTION WITH INTRAOCULAR LENS IMPLANT LEFT WITH TORIC LENS;  Surgeon: Alma Benavidez MD;  Location: [facility] OR;  Service:    • CATARACT EXTRACTION W/ INTRAOCULAR LENS IMPLANT Right 6/14/2017    Procedure: CATARACT PHACO EXTRACTION WITH INTRAOCULAR LENS IMPLANT RIGHT WITH TORIC LENS;  Surgeon: Alma Benavidez MD;  Location: [facility] OR;  Service:    • CORONARY ARTERY BYPASS GRAFT  2003    SPOUSE UNSURE OF HOW MANY VESSELS   • HIATAL HERNIA REPAIR  1972   • JOINT REPLACEMENT Left     HIP - DONE BY DR DALAL   • KNEE ARTHROSCOPY Left    • NOSE SURGERY  1970     SPOUSE REPORTS FOR A BROKEN NOSE   • OTHER SURGICAL HISTORY Left     TENDON TORN LOOSE FROM BONE, LEFT ELBOW   • OTHER SURGICAL HISTORY      RUPTURED DISC   • OTHER SURGICAL HISTORY      NECK SURGERY FOR RUPTURED DISC   • OTHER SURGICAL HISTORY      HAD SECOND NECK SURGERY   • OTHER SURGICAL HISTORY      RUPTURED DISC   • OTHER SURGICAL HISTORY      RUPTURED DISC   • OTHER SURGICAL HISTORY      HARDWARE REMOVAL FROM BACK BY DR HAY         MEDICATIONS:  I have reviewed and reconciled the patients medication list in the patients chart at the [facility] nursing facility today, 11/15/2022.      ALLERGIES:  Allergies   Allergen Reactions   • Phenergan [Promethazine Hcl] Nausea And Vomiting   • Carbamazepine Unknown - Low Severity   • Hydrocodone Unknown - Low Severity   • Lisinopril Unknown - Low Severity         SOCIAL HISTORY:  Social History     Socioeconomic History   • Marital status:    Tobacco Use   • Smoking status: Former     Types: Cigarettes     Quit date:      Years since quittin.8   • Smokeless tobacco: Never   Substance and Sexual Activity   • Alcohol use: No   • Drug use: No   • Sexual activity: Defer       FAMILY HISTORY:  No family history on file.    REVIEW OF SYSTEMS:  Review of Systems   Constitutional: Negative for chills, fatigue and fever.   HENT: Negative for congestion, ear pain, rhinorrhea, sinus pressure and sore throat.    Eyes: Negative for visual disturbance.   Respiratory: Negative for cough, chest tightness, shortness of breath and wheezing.    Cardiovascular: Negative for chest pain, palpitations and leg swelling.   Gastrointestinal: Negative for abdominal pain, blood in stool, constipation, diarrhea, nausea and vomiting.   Endocrine: Negative for polydipsia and polyuria.   Genitourinary: Negative for dysuria and hematuria.   Musculoskeletal: Negative for arthralgias and back pain.   Skin: Negative for rash.   Neurological: Negative for dizziness,  light-headedness, numbness and headaches.   Psychiatric/Behavioral: Negative for dysphoric mood and sleep disturbance. The patient is not nervous/anxious.           PHYSICAL EXAMINATION:     VITAL SIGNS:  /81   Pulse 71   Temp 96.8 °F (36 °C)   Resp 16   Wt 72.6 kg (160 lb)   SpO2 98%   BMI 24.33 kg/m²     Physical Exam  Vitals and nursing note reviewed.   Constitutional:       General: He is not in acute distress.     Appearance: Normal appearance. He is well-developed.      Comments: Wheelchair-bound elderly male   HENT:      Head: Normocephalic and atraumatic.   Eyes:      Extraocular Movements: Extraocular movements intact.      Conjunctiva/sclera: Conjunctivae normal.   Cardiovascular:      Rate and Rhythm: Normal rate and regular rhythm.   Pulmonary:      Effort: Pulmonary effort is normal.      Breath sounds: Normal breath sounds.   Musculoskeletal:      Cervical back: Normal range of motion and neck supple.   Skin:     General: Skin is warm and dry.      Findings: No rash.   Neurological:      General: No focal deficit present.      Mental Status: He is alert and oriented to person, place, and time.   Psychiatric:         Mood and Affect: Mood normal.         Behavior: Behavior normal.         RECORDS REVIEW:       ASSESSMENT     Diagnoses and all orders for this visit:    1. 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 Essential hypertension    3. Type 2 diabetes mellitus with diabetic polyneuropathy, without long-term current use of insulin (HCC)    4. Late onset Alzheimer's disease without behavioral disturbance (HCC)    5. Coronary artery disease involving native heart with angina pectoris, unspecified vessel or lesion type (HCC)    6. Impaired mobility and ADLs    7. Irregular heart beat    8. High cholesterol        PLAN  Dementia due to Parkinson's disease  -  Behaviors remain stable with Sinemet and donepezil.     Mood disorder  - psychiatry is  following. stable at this time.      Type 2 diabetes mellitus   -Glucose log reviewed in facility.  Fair control at this time.  -Stable control on metformin and basaglar      Falls  - no recent incidents.  Doing well moving around in wheelchair.      Coronary artery disease/irregular heartbeat  - stable on current cardiac medications.  No changes needed.            [x]  Discussed Patient in detail with nursing/staff, addressed all needs today.     [x]  Plan of Care Reviewed   []  PT/OT Reviewed   []  Order Changes  []  Discharge Plans Reviewed  [x]  Advance Directive on file with Nursing Home.   [x]  POA on file with Nursing Home.    [x]  Code Status listed and reviewed.     I confirm accuracy of unchanged data/findings including physical exam and plan which have been carried forward from previous visit, as well as I have updated appropriately those that have changed        Alex Burk DO.  11/15/2022

## 2023-01-05 ENCOUNTER — DOCUMENTATION (OUTPATIENT)
Dept: FAMILY MEDICINE CLINIC | Facility: CLINIC | Age: 83
End: 2023-01-05
Payer: MEDICARE

## 2023-01-05 RX ORDER — MEMANTINE HYDROCHLORIDE 10 MG/1
10 TABLET ORAL 2 TIMES DAILY
COMMUNITY

## 2023-01-05 RX ORDER — METOPROLOL TARTRATE 50 MG/1
50 TABLET, FILM COATED ORAL 2 TIMES DAILY
COMMUNITY

## 2023-01-05 RX ORDER — IPRATROPIUM BROMIDE AND ALBUTEROL SULFATE 2.5; .5 MG/3ML; MG/3ML
3 SOLUTION RESPIRATORY (INHALATION) EVERY 6 HOURS PRN
COMMUNITY

## 2023-01-05 RX ORDER — INSULIN GLARGINE 100 [IU]/ML
15 INJECTION, SOLUTION SUBCUTANEOUS NIGHTLY
COMMUNITY

## 2023-01-05 RX ORDER — LANOLIN ALCOHOL/MO/W.PET/CERES
6 CREAM (GRAM) TOPICAL NIGHTLY
COMMUNITY

## 2023-01-05 RX ORDER — TAMSULOSIN HYDROCHLORIDE 0.4 MG/1
1 CAPSULE ORAL DAILY
COMMUNITY

## 2023-01-05 RX ORDER — VALPROIC ACID 250 MG/1
500 CAPSULE, LIQUID FILLED ORAL 2 TIMES DAILY
COMMUNITY

## 2023-01-05 RX ORDER — QUETIAPINE FUMARATE 100 MG/1
100 TABLET, FILM COATED ORAL NIGHTLY
COMMUNITY

## 2023-01-05 RX ORDER — FAMOTIDINE 20 MG/1
20 TABLET, FILM COATED ORAL DAILY
COMMUNITY

## 2023-01-05 RX ORDER — ONDANSETRON 4 MG/1
4 TABLET, FILM COATED ORAL EVERY 6 HOURS PRN
COMMUNITY

## 2023-01-05 RX ORDER — POLYETHYLENE GLYCOL 3350 17 G/17G
17 POWDER, FOR SOLUTION ORAL DAILY PRN
COMMUNITY

## 2023-01-05 RX ORDER — QUETIAPINE FUMARATE 25 MG/1
12.5 TABLET, FILM COATED ORAL DAILY PRN
COMMUNITY

## 2023-01-12 ENCOUNTER — NURSING HOME (OUTPATIENT)
Dept: FAMILY MEDICINE CLINIC | Facility: CLINIC | Age: 83
End: 2023-01-12
Payer: MEDICARE

## 2023-01-12 VITALS
BODY MASS INDEX: 24.94 KG/M2 | TEMPERATURE: 97.8 F | SYSTOLIC BLOOD PRESSURE: 136 MMHG | RESPIRATION RATE: 18 BRPM | DIASTOLIC BLOOD PRESSURE: 70 MMHG | OXYGEN SATURATION: 98 % | WEIGHT: 164 LBS | HEART RATE: 64 BPM

## 2023-01-12 DIAGNOSIS — F39 MOOD DISORDER: ICD-10-CM

## 2023-01-12 DIAGNOSIS — G20 PARKINSON'S DISEASE (TREMOR, STIFFNESS, SLOW MOTION, UNSTABLE POSTURE): ICD-10-CM

## 2023-01-12 DIAGNOSIS — Z74.09 IMPAIRED MOBILITY AND ADLS: ICD-10-CM

## 2023-01-12 DIAGNOSIS — Z09 HOSPITAL DISCHARGE FOLLOW-UP: Primary | ICD-10-CM

## 2023-01-12 DIAGNOSIS — Z78.9 IMPAIRED MOBILITY AND ADLS: ICD-10-CM

## 2023-01-12 DIAGNOSIS — F02.B3 MODERATE DEMENTIA DUE TO PARKINSON'S DISEASE, WITH MOOD DISTURBANCE: Chronic | ICD-10-CM

## 2023-01-12 DIAGNOSIS — G20 MODERATE DEMENTIA DUE TO PARKINSON'S DISEASE, WITH MOOD DISTURBANCE: Chronic | ICD-10-CM

## 2023-01-12 PROCEDURE — 99309 SBSQ NF CARE MODERATE MDM 30: CPT | Performed by: NURSE PRACTITIONER

## 2023-01-12 NOTE — LETTER
Telemedicine nursing Home Progress Note        Mitul Stephen DO []  ALESSANDRA Ho [x] today [facility], [place] [address]  Phone: [phone]  Fax: [phone] Inés Vega MD []  ALESSANDRA Ho [x]   [address] [place] [address]  Phone: [phone]  Fax: [phone]     PATIENT NAME: Gama Davila                                                                          YOB: 1940           DATE OF SERVICE: 1/12/2023  FACILITY:  [] [place]   [] [place]   [] [facility]   [x] [facility]  []  [facility]  [] Other     ______________________________________________________________________     CHIEF COMPLAINT:    Follow up visit status post admission to [facility].    HISTORY OF PRESENT ILLNESS:      Patient was sent to [facility] on 12/16, for increasing aggressive and sexual behaviors with other residents and staff. His medications were adjusted at [facility] and he was kept until behaviors stable on 1/3. He has not had any behaviors since returning to facility. Nursing reports he has not been sedated at all, eating well, and mental status at baseline, but no behaviors. He has not been moving about the facility in his wheelchair as much as in the past but nursing trying to prevent this to ensure behaviors are stable and due to risk of Covid. Pleasant during visit, with confused conversation.    PAST MEDICAL & SURGICAL HISTORY:   Past Medical History:   Diagnosis Date   • Arthritis    • Cancer (HCC)     SKIN    • Cataract    • Coronary artery disease    • Diabetes mellitus (HCC)    • High cholesterol    • [place] (hard of hearing)     PATIENT HAS HEARING AIDS   • Hypertension    • Irregular heart beat     HISTORY OF CARDIAC ABLATION IN 2003   • Wears dentures     FULL UPPER PLATE      Past Surgical History:   Procedure Laterality Date   • BACK SURGERY  1978    THEN AGAIN IN 1982   • CARDIAC ABLATION  2003   •  CARDIAC SURGERY     • CATARACT EXTRACTION W/ INTRAOCULAR LENS IMPLANT Left 2017    Procedure: CATARACT PHACO EXTRACTION WITH INTRAOCULAR LENS IMPLANT LEFT WITH TORIC LENS;  Surgeon: Alma Benavidez MD;  Location: [facility] OR;  Service:    • CATARACT EXTRACTION W/ INTRAOCULAR LENS IMPLANT Right 2017    Procedure: CATARACT PHACO EXTRACTION WITH INTRAOCULAR LENS IMPLANT RIGHT WITH TORIC LENS;  Surgeon: Alma Benavidez MD;  Location: [facility] OR;  Service:    • CORONARY ARTERY BYPASS GRAFT      SPOUSE UNSURE OF HOW MANY VESSELS   • HIATAL HERNIA REPAIR     • JOINT REPLACEMENT Left     HIP - DONE BY DR DALAL   • KNEE ARTHROSCOPY Left    • NOSE SURGERY      SPOUSE REPORTS FOR A BROKEN NOSE   • OTHER SURGICAL HISTORY Left     TENDON TORN LOOSE FROM BONE, LEFT ELBOW   • OTHER SURGICAL HISTORY      RUPTURED DISC   • OTHER SURGICAL HISTORY      NECK SURGERY FOR RUPTURED DISC   • OTHER SURGICAL HISTORY      HAD SECOND NECK SURGERY   • OTHER SURGICAL HISTORY      RUPTURED DISC   • OTHER SURGICAL HISTORY      RUPTURED DISC   • OTHER SURGICAL HISTORY      HARDWARE REMOVAL FROM BACK BY DR HAY         MEDICATIONS:  I have reviewed and reconciled the patients medication list in the patients chart at the skilled nursing facility today.      ALLERGIES:  Allergies   Allergen Reactions   • Phenergan [Promethazine Hcl] Nausea And Vomiting   • Carbamazepine Unknown - Low Severity   • Hydrocodone Unknown - Low Severity   • Lisinopril Unknown - Low Severity         SOCIAL HISTORY:  Social History     Socioeconomic History   • Marital status:    Tobacco Use   • Smoking status: Former     Types: Cigarettes     Quit date:      Years since quittin.0   • Smokeless tobacco: Never   Substance and Sexual Activity   • Alcohol use: No   • Drug use: No   • Sexual activity: Defer       FAMILY HISTORY:  No family history on file.    REVIEW OF SYSTEMS:  Review of Systems    Unable to perform ROS: Dementia (ROS per nursing and patient)   Constitutional: Negative for activity change, appetite change, chills, diaphoresis, fatigue and fever.   HENT: Negative for congestion, mouth sores, rhinorrhea and trouble swallowing.    Eyes: Negative for discharge, redness and itching.   Respiratory: Negative for cough, chest tightness and shortness of breath.    Cardiovascular: Negative for chest pain, palpitations and leg swelling.   Gastrointestinal: Negative for abdominal distention, abdominal pain, blood in stool, constipation, diarrhea and vomiting.   Genitourinary: Negative for difficulty urinating and frequency.        Incontinence   Musculoskeletal: Positive for arthralgias (chronic) and gait problem.   Skin: Negative for rash and wound.   Neurological: Positive for weakness.   Psychiatric/Behavioral: Positive for confusion. Negative for agitation, behavioral problems, dysphoric mood, sleep disturbance and suicidal ideas. The patient is not nervous/anxious and is not hyperactive.           PHYSICAL EXAMINATION:     VITAL SIGNS:  /70   Pulse 64   Temp 97.8 °F (36.6 °C)   Resp 18   Wt 74.4 kg (164 lb)   SpO2 98%   BMI 24.94 kg/m²     Physical Exam   HENT:   Head: Normocephalic and atraumatic.   Nose: Nose normal.   Eyes: Conjunctivae are normal.   Cardiovascular: Normal rate.   Pulmonary/Chest: Effort normal. No respiratory distress.   Neurological: He is alert. He is disoriented.   Psychiatric: His behavior is normal. Mood and affect normal. Cognition and memory are impaired.   Nursing note and vitals reviewed.      RECORDS REVIEW:   Most recent labs    ASSESSMENT     Diagnoses and all orders for this visit:    1. Hospital discharge follow-up (Primary)    2. Parkinson's disease (tremor, stiffness, slow motion, unstable posture) (HCC)    3. Moderate dementia due to Parkinson's disease, with mood disturbance (HCC)    4. Mood disorder (HCC)    5. Impaired mobility and  ADLs        PLAN    Hospital follow up/Parkinson's/Dementia/Mood disorder  - Behaviors appear stable at this time with increase in Seroquel. No evidence of increased sedation. Nursing to continue to monitor. Will continue routing lab monitoring.     Nursing encouraged to keep me informed of any acute changes, amanda any new concerning symptoms.    Staff to continue supportive care for all ADLs.     [x]  Discussed Patient in detail with nursing/staff, addressed all needs today.     [x]  Plan of Care Reviewed   [x]  PT/OT Reviewed   []  Order Changes  []  Discharge Plans Reviewed  [x]  Advance Directive on file with Nursing Home.   [x]  POA on file with Nursing Home.    [x]  Code Status listed and reviewed.       “I confirm accuracy of unchanged data/findings which have been carried forward from previous visit, as well as I have updated appropriately those that have changed.”                                 Charleen Randall, APRN.  1/14/2023

## 2023-01-13 NOTE — PROGRESS NOTES
Telemedicine nursing Home Progress Note        Mitul Stephen DO []  ALESSANDRA Ho [x] today [facility], [place] [address]  Phone: [phone]  Fax: [phone] Inés Vega MD []  ALESSANDRA Ho [x]   [address] [place] [address]  Phone: [phone]  Fax: [phone]     PATIENT NAME: Gama Davila                                                                          YOB: 1940           DATE OF SERVICE: 1/12/2023  FACILITY:  [] [facility]   [] [facility]   [] [facility]   [x] [facility]  []  [facility]  [] Other     ______________________________________________________________________     CHIEF COMPLAINT:    Follow up visit status post admission to [facility].    HISTORY OF PRESENT ILLNESS:      Patient was sent to [facility] on 12/16, for increasing aggressive and sexual behaviors with other residents and staff. His medications were adjusted at [facility] and he was kept until behaviors stable on 1/3. He has not had any behaviors since returning to facility. Nursing reports he has not been sedated at all, eating well, and mental status at baseline, but no behaviors. He has not been moving about the facility in his wheelchair as much as in the past but nursing trying to prevent this to ensure behaviors are stable and due to risk of Covid. Pleasant during visit, with confused conversation.    PAST MEDICAL & SURGICAL HISTORY:   Past Medical History:   Diagnosis Date   • Arthritis    • Cancer (HCC)     SKIN    • Cataract    • Coronary artery disease    • Diabetes mellitus (HCC)    • High cholesterol    • [place] (hard of hearing)     PATIENT HAS HEARING AIDS   • Hypertension    • Irregular heart beat     HISTORY OF CARDIAC ABLATION IN 2003   • Wears dentures     FULL UPPER PLATE      Past Surgical History:   Procedure Laterality Date   • BACK SURGERY  1978    THEN AGAIN IN 1982   • CARDIAC ABLATION  2003   •  CARDIAC SURGERY     • CATARACT EXTRACTION W/ INTRAOCULAR LENS IMPLANT Left 2017    Procedure: CATARACT PHACO EXTRACTION WITH INTRAOCULAR LENS IMPLANT LEFT WITH TORIC LENS;  Surgeon: Alma Benavidez MD;  Location: [facility] OR;  Service:    • CATARACT EXTRACTION W/ INTRAOCULAR LENS IMPLANT Right 2017    Procedure: CATARACT PHACO EXTRACTION WITH INTRAOCULAR LENS IMPLANT RIGHT WITH TORIC LENS;  Surgeon: Alma Benavidez MD;  Location: [facility] OR;  Service:    • CORONARY ARTERY BYPASS GRAFT      SPOUSE UNSURE OF HOW MANY VESSELS   • HIATAL HERNIA REPAIR     • JOINT REPLACEMENT Left     HIP - DONE BY DR DALAL   • KNEE ARTHROSCOPY Left    • NOSE SURGERY      SPOUSE REPORTS FOR A BROKEN NOSE   • OTHER SURGICAL HISTORY Left     TENDON TORN LOOSE FROM BONE, LEFT ELBOW   • OTHER SURGICAL HISTORY      RUPTURED DISC   • OTHER SURGICAL HISTORY      NECK SURGERY FOR RUPTURED DISC   • OTHER SURGICAL HISTORY      HAD SECOND NECK SURGERY   • OTHER SURGICAL HISTORY      RUPTURED DISC   • OTHER SURGICAL HISTORY      RUPTURED DISC   • OTHER SURGICAL HISTORY      HARDWARE REMOVAL FROM BACK BY DR HAY         MEDICATIONS:  I have reviewed and reconciled the patients medication list in the patients chart at the skilled nursing facility today.      ALLERGIES:  Allergies   Allergen Reactions   • Phenergan [Promethazine Hcl] Nausea And Vomiting   • Carbamazepine Unknown - Low Severity   • Hydrocodone Unknown - Low Severity   • Lisinopril Unknown - Low Severity         SOCIAL HISTORY:  Social History     Socioeconomic History   • Marital status:    Tobacco Use   • Smoking status: Former     Types: Cigarettes     Quit date:      Years since quittin.0   • Smokeless tobacco: Never   Substance and Sexual Activity   • Alcohol use: No   • Drug use: No   • Sexual activity: Defer       FAMILY HISTORY:  No family history on file.    REVIEW OF SYSTEMS:  Review of Systems    Unable to perform ROS: Dementia (ROS per nursing and patient)   Constitutional: Negative for activity change, appetite change, chills, diaphoresis, fatigue and fever.   HENT: Negative for congestion, mouth sores, rhinorrhea and trouble swallowing.    Eyes: Negative for discharge, redness and itching.   Respiratory: Negative for cough, chest tightness and shortness of breath.    Cardiovascular: Negative for chest pain, palpitations and leg swelling.   Gastrointestinal: Negative for abdominal distention, abdominal pain, blood in stool, constipation, diarrhea and vomiting.   Genitourinary: Negative for difficulty urinating and frequency.        Incontinence   Musculoskeletal: Positive for arthralgias (chronic) and gait problem.   Skin: Negative for rash and wound.   Neurological: Positive for weakness.   Psychiatric/Behavioral: Positive for confusion. Negative for agitation, behavioral problems, dysphoric mood, sleep disturbance and suicidal ideas. The patient is not nervous/anxious and is not hyperactive.           PHYSICAL EXAMINATION:     VITAL SIGNS:  /70   Pulse 64   Temp 97.8 °F (36.6 °C)   Resp 18   Wt 74.4 kg (164 lb)   SpO2 98%   BMI 24.94 kg/m²     Physical Exam   HENT:   Head: Normocephalic and atraumatic.   Nose: Nose normal.   Eyes: Conjunctivae are normal.   Cardiovascular: Normal rate.   Pulmonary/Chest: Effort normal. No respiratory distress.   Neurological: He is alert. He is disoriented.   Psychiatric: His behavior is normal. Mood and affect normal. Cognition and memory are impaired.   Nursing note and vitals reviewed.      RECORDS REVIEW:   Most recent labs    ASSESSMENT     Diagnoses and all orders for this visit:    1. Hospital discharge follow-up (Primary)    2. Parkinson's disease (tremor, stiffness, slow motion, unstable posture) (HCC)    3. Moderate dementia due to Parkinson's disease, with mood disturbance (HCC)    4. Mood disorder (HCC)    5. Impaired mobility and  ADLs        PLAN    Hospital follow up/Parkinson's/Dementia/Mood disorder  - Behaviors appear stable at this time with increase in Seroquel. No evidence of increased sedation. Nursing to continue to monitor. Will continue routing lab monitoring.     Nursing encouraged to keep me informed of any acute changes, amanda any new concerning symptoms.    Staff to continue supportive care for all ADLs.     [x]  Discussed Patient in detail with nursing/staff, addressed all needs today.     [x]  Plan of Care Reviewed   [x]  PT/OT Reviewed   []  Order Changes  []  Discharge Plans Reviewed  [x]  Advance Directive on file with Nursing Home.   [x]  POA on file with Nursing Home.    [x]  Code Status listed and reviewed.       “I confirm accuracy of unchanged data/findings which have been carried forward from previous visit, as well as I have updated appropriately those that have changed.”                                 Charleen Randlal, APRN.  1/14/2023

## 2023-01-24 ENCOUNTER — NURSING HOME (OUTPATIENT)
Dept: INTERNAL MEDICINE | Facility: CLINIC | Age: 83
End: 2023-01-24
Payer: MEDICARE

## 2023-01-24 VITALS
HEART RATE: 64 BPM | WEIGHT: 164 LBS | OXYGEN SATURATION: 98 % | RESPIRATION RATE: 16 BRPM | DIASTOLIC BLOOD PRESSURE: 70 MMHG | TEMPERATURE: 97.8 F | SYSTOLIC BLOOD PRESSURE: 136 MMHG | BODY MASS INDEX: 24.94 KG/M2

## 2023-01-24 DIAGNOSIS — G20 DEMENTIA DUE TO PARKINSON'S DISEASE WITH BEHAVIORAL DISTURBANCE: Primary | ICD-10-CM

## 2023-01-24 DIAGNOSIS — Z78.9 IMPAIRED MOBILITY AND ADLS: ICD-10-CM
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
## 2023-01-24 DIAGNOSIS — I25.119 CORONARY ARTERY DISEASE INVOLVING NATIVE HEART WITH ANGINA PECTORIS, UNSPECIFIED VESSEL OR LESION TYPE: ICD-10-CM

## 2023-01-24 DIAGNOSIS — F02.80 LATE ONSET ALZHEIMER'S DISEASE WITHOUT BEHAVIORAL DISTURBANCE: ICD-10-CM

## 2023-01-24 DIAGNOSIS — F02.818 DEMENTIA DUE TO PARKINSON'S DISEASE WITH BEHAVIORAL DISTURBANCE: Primary | ICD-10-CM

## 2023-01-24 DIAGNOSIS — G30.1 LATE ONSET ALZHEIMER'S DISEASE WITHOUT BEHAVIORAL DISTURBANCE: ICD-10-CM

## 2023-01-24 DIAGNOSIS — Z74.09 IMPAIRED MOBILITY AND ADLS: ICD-10-CM

## 2023-01-24 DIAGNOSIS — I10 ESSENTIAL HYPERTENSION: ICD-10-CM

## 2023-01-24 DIAGNOSIS — E11.42 TYPE 2 DIABETES MELLITUS WITH DIABETIC POLYNEUROPATHY, WITHOUT LONG-TERM CURRENT USE OF INSULIN: ICD-10-CM

## 2023-01-24 PROCEDURE — 99304 1ST NF CARE SF/LOW MDM 25: CPT | Performed by: INTERNAL MEDICINE

## 2023-01-24 NOTE — LETTER
Nursing Home History and Physical       Alex Burk DO  [address] [place] [address] Phone: [phone]  Fax: [phone]     PATIENT NAME: Gama Davila                                                                          YOB: 1940           DATE OF SERVICE: 01/24/2023  FACILITY: [facility]    CHIEF COMPLAINT:  Nursing facility admission      HISTORY OF PRESENT ILLNESS:   Patient was seen as a readmission to this facility after he was sent out earlier to a psychiatric facility for mood and behavior changes.  Patient was having violent tendencies and roaming behaviors which have much improved since he has returned.  Several of his medications have been changed and were reviewed today.      On exam today, patient was laying comfortably in his bed.  He has not been as motivated to move about in the wheelchair as he used to.  He appeared content and was eating breakfast on exam today.  Glucose has been in fair control with his current insulin regimen.     PAST MEDICAL & SURGICAL HISTORY:   Past Medical History:   Diagnosis Date   • Arthritis    • Cancer (HCC)     SKIN    • Cataract    • Coronary artery disease    • Diabetes mellitus (HCC)    • High cholesterol    • [place] (hard of hearing)     PATIENT HAS HEARING AIDS   • Hypertension    • Irregular heart beat     HISTORY OF CARDIAC ABLATION IN 2003   • Wears dentures     FULL UPPER PLATE      Past Surgical History:   Procedure Laterality Date   • BACK SURGERY  1978    THEN AGAIN IN 1982   • CARDIAC ABLATION  2003   • CARDIAC SURGERY     • CATARACT EXTRACTION W/ INTRAOCULAR LENS IMPLANT Left 5/24/2017    Procedure: CATARACT PHACO EXTRACTION WITH INTRAOCULAR LENS IMPLANT LEFT WITH TORIC LENS;  Surgeon: Alma Benavidez MD;  Location: [facility];  Service:    • CATARACT EXTRACTION W/ INTRAOCULAR LENS IMPLANT Right 6/14/2017    Procedure: CATARACT PHACO EXTRACTION WITH INTRAOCULAR LENS IMPLANT RIGHT WITH TORIC  LENS;  Surgeon: Alma Benavidez MD;  Location: [facility];  Service:    • CORONARY ARTERY BYPASS GRAFT      SPOUSE UNSURE OF HOW MANY VESSELS   • HIATAL HERNIA REPAIR     • JOINT REPLACEMENT Left     HIP - DONE BY DR DALAL   • KNEE ARTHROSCOPY Left    • NOSE SURGERY      SPOUSE REPORTS FOR A BROKEN NOSE   • OTHER SURGICAL HISTORY Left     TENDON TORN LOOSE FROM BONE, LEFT ELBOW   • OTHER SURGICAL HISTORY      RUPTURED DISC   • OTHER SURGICAL HISTORY      NECK SURGERY FOR RUPTURED DISC   • OTHER SURGICAL HISTORY      HAD SECOND NECK SURGERY   • OTHER SURGICAL HISTORY      RUPTURED DISC   • OTHER SURGICAL HISTORY      RUPTURED DISC   • OTHER SURGICAL HISTORY      HARDWARE REMOVAL FROM BACK BY DR HAY         MEDICATIONS:  I have reviewed and reconciled the patients medication list in the patients chart at the skilled nursing [facility] on 2023.      ALLERGIES:  Allergies   Allergen Reactions   • Phenergan [Promethazine Hcl] Nausea And Vomiting   • Carbamazepine Unknown - Low Severity   • Hydrocodone Unknown - Low Severity   • Lisinopril Unknown - Low Severity         SOCIAL HISTORY:  Social History     Socioeconomic History   • Marital status:    Tobacco Use   • Smoking status: Former     Types: Cigarettes     Quit date:      Years since quittin.1   • Smokeless tobacco: Never   Substance and Sexual Activity   • Alcohol use: No   • Drug use: No   • Sexual activity: Defer       FAMILY HISTORY:  No family history on file.     REVIEW OF SYSTEMS:  Review of Systems   Constitutional: Negative for chills, fatigue and fever.   HENT: Negative for congestion, ear pain, rhinorrhea, sinus pressure and sore throat.    Eyes: Negative for visual disturbance.   Respiratory: Negative for cough, chest tightness, shortness of breath and wheezing.    Cardiovascular: Negative for chest pain, palpitations and leg swelling.   Gastrointestinal: Negative for abdominal pain,  blood in stool, constipation, diarrhea, nausea and vomiting.   Endocrine: Negative for polydipsia and polyuria.   Genitourinary: Negative for dysuria and hematuria.   Musculoskeletal: Negative for arthralgias and back pain.   Skin: Negative for rash.   Neurological: Negative for dizziness, light-headedness, numbness and headaches.   Psychiatric/Behavioral: Negative for dysphoric mood and sleep disturbance. The patient is not nervous/anxious.          PHYSICAL EXAMINATION:   VITAL SIGNS: /70   Pulse 64   Temp 97.8 °F (36.6 °C)   Resp 16   Wt 74.4 kg (164 lb)   SpO2 98%   BMI 24.94 kg/m²     Physical Exam  Vitals and nursing note reviewed.   Constitutional:       General: He is not in acute distress.     Appearance: Normal appearance. He is well-developed.      Comments: Elderly male resting comfortably in bed.   HENT:      Head: Normocephalic and atraumatic.   Eyes:      Extraocular Movements: Extraocular movements intact.      Conjunctiva/sclera: Conjunctivae normal.   Cardiovascular:      Rate and Rhythm: Normal rate and regular rhythm.   Pulmonary:      Effort: Pulmonary effort is normal.      Breath sounds: Normal breath sounds.   Musculoskeletal:      Cervical back: Normal range of motion and neck supple.   Skin:     General: Skin is warm and dry.      Findings: No rash.   Neurological:      General: No focal deficit present.      Mental Status: He is alert and oriented to person, place, and time.   Psychiatric:         Mood and Affect: Mood normal.         Behavior: Behavior normal.         RECORDS REVIEW:   Discharge Summary from Psychiatric facility    ASSESSMENT   Diagnoses and all orders for this visit:    1. 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 Essential hypertension    3. Type 2 diabetes mellitus with diabetic polyneuropathy, without long-term current use of insulin (HCC)    4. Impaired mobility and ADLs    5. Coronary artery disease involving  native heart with angina pectoris, unspecified vessel or lesion type (HCC)    6. Late onset Alzheimer's disease without behavioral disturbance (HCC)    7. Fall, subsequent encounter        PLAN  Dementia due to Parkinson's disease  -  Behaviors remain stable with Sinemet, memantine, and donepezil.     Mood disorder  - has returned from psychiatric facility.  Behaviors are much improved with regimen of Seroquel 100QHS and 25 QAM along with depakote.    - cont celexa 20mg      Type 2 diabetes mellitus   -Glucose log reviewed in facility.  Fair control at this time.  -Stable control on metformin and basaglar      Falls  - better as he is not roaming as much.     BPH  - cont Tamsulosin    Constipation  - on daily stool softener.     Coronary artery disease/irregular heartbeat  - stable on current cardiac medications.  No changes needed.            [x]  Discussed Patient in detail with nursing/staff, addressed all needs today.     [x]  Plan of Care Reviewed   [x]  PT/OT Reviewed   []  Order Changes  []  Discharge Plans Reviewed  [x]  Advance Directive on file with Nursing Home.   [x]  POA on file with Nursing Home.    [x]  Code Status listed and reviewed.       Alex Burk DO.  1/30/2023      **Part of this note may be an electronic transcription/translation of spoken language to printed text using the Dragon Dictation System.**

## 2023-01-30 NOTE — PROGRESS NOTES
Nursing Home History and Physical       Alex Burk DO  [address] [place] [address] Phone: [phone]  Fax: [phone]     PATIENT NAME: Gama Davila                                                                          YOB: 1940           DATE OF SERVICE: 01/24/2023  FACILITY: [facility]    CHIEF COMPLAINT:  Nursing facility admission      HISTORY OF PRESENT ILLNESS:   Patient was seen as a readmission to this facility after he was sent out earlier to a psychiatric facility for mood and behavior changes.  Patient was having violent tendencies and roaming behaviors which have much improved since he has returned.  Several of his medications have been changed and were reviewed today.      On exam today, patient was laying comfortably in his bed.  He has not been as motivated to move about in the wheelchair as he used to.  He appeared content and was eating breakfast on exam today.  Glucose has been in fair control with his current insulin regimen.     PAST MEDICAL & SURGICAL HISTORY:   Past Medical History:   Diagnosis Date   • Arthritis    • Cancer (HCC)     SKIN    • Cataract    • Coronary artery disease    • Diabetes mellitus (HCC)    • High cholesterol    • [place] (hard of hearing)     PATIENT HAS HEARING AIDS   • Hypertension    • Irregular heart beat     HISTORY OF CARDIAC ABLATION IN 2003   • Wears dentures     FULL UPPER PLATE      Past Surgical History:   Procedure Laterality Date   • BACK SURGERY  1978    THEN AGAIN IN 1982   • CARDIAC ABLATION  2003   • CARDIAC SURGERY     • CATARACT EXTRACTION W/ INTRAOCULAR LENS IMPLANT Left 5/24/2017    Procedure: CATARACT PHACO EXTRACTION WITH INTRAOCULAR LENS IMPLANT LEFT WITH TORIC LENS;  Surgeon: Alma Benavidez MD;  Location: [facility];  Service:    • CATARACT EXTRACTION W/ INTRAOCULAR LENS IMPLANT Right 6/14/2017    Procedure: CATARACT PHACO EXTRACTION WITH INTRAOCULAR LENS IMPLANT RIGHT WITH TORIC  LENS;  Surgeon: Alma Benavidez MD;  Location: [facility];  Service:    • CORONARY ARTERY BYPASS GRAFT      SPOUSE UNSURE OF HOW MANY VESSELS   • HIATAL HERNIA REPAIR     • JOINT REPLACEMENT Left     HIP - DONE BY DR DALAL   • KNEE ARTHROSCOPY Left    • NOSE SURGERY      SPOUSE REPORTS FOR A BROKEN NOSE   • OTHER SURGICAL HISTORY Left     TENDON TORN LOOSE FROM BONE, LEFT ELBOW   • OTHER SURGICAL HISTORY      RUPTURED DISC   • OTHER SURGICAL HISTORY      NECK SURGERY FOR RUPTURED DISC   • OTHER SURGICAL HISTORY      HAD SECOND NECK SURGERY   • OTHER SURGICAL HISTORY      RUPTURED DISC   • OTHER SURGICAL HISTORY      RUPTURED DISC   • OTHER SURGICAL HISTORY      HARDWARE REMOVAL FROM BACK BY DR HAY         MEDICATIONS:  I have reviewed and reconciled the patients medication list in the patients chart at the skilled nursing [facility] on 2023.      ALLERGIES:  Allergies   Allergen Reactions   • Phenergan [Promethazine Hcl] Nausea And Vomiting   • Carbamazepine Unknown - Low Severity   • Hydrocodone Unknown - Low Severity   • Lisinopril Unknown - Low Severity         SOCIAL HISTORY:  Social History     Socioeconomic History   • Marital status:    Tobacco Use   • Smoking status: Former     Types: Cigarettes     Quit date:      Years since quittin.1   • Smokeless tobacco: Never   Substance and Sexual Activity   • Alcohol use: No   • Drug use: No   • Sexual activity: Defer       FAMILY HISTORY:  No family history on file.     REVIEW OF SYSTEMS:  Review of Systems   Constitutional: Negative for chills, fatigue and fever.   HENT: Negative for congestion, ear pain, rhinorrhea, sinus pressure and sore throat.    Eyes: Negative for visual disturbance.   Respiratory: Negative for cough, chest tightness, shortness of breath and wheezing.    Cardiovascular: Negative for chest pain, palpitations and leg swelling.   Gastrointestinal: Negative for abdominal pain,  blood in stool, constipation, diarrhea, nausea and vomiting.   Endocrine: Negative for polydipsia and polyuria.   Genitourinary: Negative for dysuria and hematuria.   Musculoskeletal: Negative for arthralgias and back pain.   Skin: Negative for rash.   Neurological: Negative for dizziness, light-headedness, numbness and headaches.   Psychiatric/Behavioral: Negative for dysphoric mood and sleep disturbance. The patient is not nervous/anxious.          PHYSICAL EXAMINATION:   VITAL SIGNS: /70   Pulse 64   Temp 97.8 °F (36.6 °C)   Resp 16   Wt 74.4 kg (164 lb)   SpO2 98%   BMI 24.94 kg/m²     Physical Exam  Vitals and nursing note reviewed.   Constitutional:       General: He is not in acute distress.     Appearance: Normal appearance. He is well-developed.      Comments: Elderly male resting comfortably in bed.   HENT:      Head: Normocephalic and atraumatic.   Eyes:      Extraocular Movements: Extraocular movements intact.      Conjunctiva/sclera: Conjunctivae normal.   Cardiovascular:      Rate and Rhythm: Normal rate and regular rhythm.   Pulmonary:      Effort: Pulmonary effort is normal.      Breath sounds: Normal breath sounds.   Musculoskeletal:      Cervical back: Normal range of motion and neck supple.   Skin:     General: Skin is warm and dry.      Findings: No rash.   Neurological:      General: No focal deficit present.      Mental Status: He is alert and oriented to person, place, and time.   Psychiatric:         Mood and Affect: Mood normal.         Behavior: Behavior normal.         RECORDS REVIEW:   Discharge Summary from Psychiatric facility    ASSESSMENT   Diagnoses and all orders for this visit:    1. 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 Essential hypertension    3. Type 2 diabetes mellitus with diabetic polyneuropathy, without long-term current use of insulin (HCC)    4. Impaired mobility and ADLs    5. Coronary artery disease involving  native heart with angina pectoris, unspecified vessel or lesion type (HCC)    6. Late onset Alzheimer's disease without behavioral disturbance (HCC)    7. Fall, subsequent encounter        PLAN  Dementia due to Parkinson's disease  -  Behaviors remain stable with Sinemet, memantine, and donepezil.     Mood disorder  - has returned from psychiatric facility.  Behaviors are much improved with regimen of Seroquel 100QHS and 25 QAM along with depakote.    - cont celexa 20mg      Type 2 diabetes mellitus   -Glucose log reviewed in facility.  Fair control at this time.  -Stable control on metformin and basaglar      Falls  - better as he is not roaming as much.     BPH  - cont Tamsulosin    Constipation  - on daily stool softener.     Coronary artery disease/irregular heartbeat  - stable on current cardiac medications.  No changes needed.            [x]  Discussed Patient in detail with nursing/staff, addressed all needs today.     [x]  Plan of Care Reviewed   [x]  PT/OT Reviewed   []  Order Changes  []  Discharge Plans Reviewed  [x]  Advance Directive on file with Nursing Home.   [x]  POA on file with Nursing Home.    [x]  Code Status listed and reviewed.       Alex Burk DO.  1/30/2023      **Part of this note may be an electronic transcription/translation of spoken language to printed text using the Dragon Dictation System.**

## 2023-03-21 ENCOUNTER — NURSING HOME (OUTPATIENT)
Dept: INTERNAL MEDICINE | Facility: CLINIC | Age: 83
End: 2023-03-21
Payer: MEDICARE

## 2023-03-21 VITALS
OXYGEN SATURATION: 98 % | BODY MASS INDEX: 24.33 KG/M2 | DIASTOLIC BLOOD PRESSURE: 74 MMHG | WEIGHT: 160 LBS | RESPIRATION RATE: 20 BRPM | SYSTOLIC BLOOD PRESSURE: 140 MMHG | HEART RATE: 74 BPM | TEMPERATURE: 97.9 F

## 2023-03-21 DIAGNOSIS — E11.42 TYPE 2 DIABETES MELLITUS WITH DIABETIC POLYNEUROPATHY, WITHOUT LONG-TERM CURRENT USE OF INSULIN: ICD-10-CM

## 2023-03-21 DIAGNOSIS — I10 ESSENTIAL HYPERTENSION: ICD-10-CM

## 2023-03-21 DIAGNOSIS — I25.119 CORONARY ARTERY DISEASE INVOLVING NATIVE HEART WITH ANGINA PECTORIS, UNSPECIFIED VESSEL OR LESION TYPE: ICD-10-CM

## 2023-03-21 DIAGNOSIS — I49.9 IRREGULAR HEART BEAT: ICD-10-CM

## 2023-03-21 DIAGNOSIS — F02.818 DEMENTIA DUE TO PARKINSON'S DISEASE WITH BEHAVIORAL DISTURBANCE: Primary | ICD-10-CM

## 2023-03-21 DIAGNOSIS — Z78.9 IMPAIRED MOBILITY AND ADLS: ICD-10-CM
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
## 2023-03-21 DIAGNOSIS — G30.1 LATE ONSET ALZHEIMER'S DISEASE WITHOUT BEHAVIORAL DISTURBANCE: ICD-10-CM

## 2023-03-21 DIAGNOSIS — G20 DEMENTIA DUE TO PARKINSON'S DISEASE WITH BEHAVIORAL DISTURBANCE: Primary | ICD-10-CM

## 2023-03-21 DIAGNOSIS — F02.80 LATE ONSET ALZHEIMER'S DISEASE WITHOUT BEHAVIORAL DISTURBANCE: ICD-10-CM

## 2023-03-21 DIAGNOSIS — Z74.09 IMPAIRED MOBILITY AND ADLS: ICD-10-CM

## 2023-03-22 RX ORDER — GABAPENTIN 100 MG/1
100 CAPSULE ORAL 3 TIMES DAILY
Qty: 90 CAPSULE | Refills: 5 | Status: SHIPPED | OUTPATIENT
Start: 2023-03-22

## 2023-03-22 NOTE — TELEPHONE ENCOUNTER
FRANCISCO REQUESTING MED REFILL FOR GABAPENTIN 100 MG.    DIRECTIONS: GABAPENTIN 100 MG 1 CAP PO TID.

## 2023-03-24 ENCOUNTER — NURSING HOME (OUTPATIENT)
Dept: FAMILY MEDICINE CLINIC | Facility: CLINIC | Age: 83
End: 2023-03-24
Payer: MEDICARE

## 2023-03-24 VITALS
BODY MASS INDEX: 24.33 KG/M2 | HEART RATE: 74 BPM | TEMPERATURE: 97.9 F | OXYGEN SATURATION: 98 % | RESPIRATION RATE: 18 BRPM | WEIGHT: 160 LBS | SYSTOLIC BLOOD PRESSURE: 140 MMHG | DIASTOLIC BLOOD PRESSURE: 74 MMHG

## 2023-03-24 DIAGNOSIS — Z74.09 IMPAIRED MOBILITY AND ADLS: ICD-10-CM

## 2023-03-24 DIAGNOSIS — Z78.9 IMPAIRED MOBILITY AND ADLS: ICD-10-CM

## 2023-03-24 DIAGNOSIS — F02.B3 MODERATE DEMENTIA DUE TO PARKINSON'S DISEASE, WITH MOOD DISTURBANCE: Chronic | ICD-10-CM

## 2023-03-24 DIAGNOSIS — G20 MODERATE DEMENTIA DUE TO PARKINSON'S DISEASE, WITH MOOD DISTURBANCE: Chronic | ICD-10-CM

## 2023-03-24 DIAGNOSIS — E11.649 TYPE 2 DIABETES MELLITUS WITH HYPOGLYCEMIA WITHOUT COMA, WITH LONG-TERM CURRENT USE OF INSULIN: Primary | ICD-10-CM

## 2023-03-24 DIAGNOSIS — Z79.4 TYPE 2 DIABETES MELLITUS WITH HYPOGLYCEMIA WITHOUT COMA, WITH LONG-TERM CURRENT USE OF INSULIN: Primary | ICD-10-CM

## 2023-03-24 NOTE — PROGRESS NOTES
Telemedicine nursing Home Progress Note        Mitul Stephen DO []  [address] [x] [place] [address]  Phone: [phone]  Fax: [phone] Inés Vega MD []  [address] [address] [x]   [address] [place] [address]  Phone: [phone]  Fax: [phone]     PATIENT NAME: Gama Davila                                                                          YOB: 1940           DATE OF SERVICE: 3/24/2023  FACILITY:  [] [facility]   [] [facility]   [] [facility]   [x] [facility]  []  [facility]  [] Other     ______________________________________________________________________     CHIEF COMPLAINT:    Episodes of hypoglycemia in the a.m.     HISTORY OF PRESENT ILLNESS:     Patient has been having episodes of hypoglycemia in the mornings with fasting glucose checks. He is receiving Glargine 15 units qhs. He does not receive and short acting insulin at bedtime or any other medications. His glucose was reported as 39 on fasting labs yesterday but repeat blood sugar was 169. Nursing reports he did not have any signs or symptoms of hypoglycemia. He is moving about the facility today in his wheelchair today. He has no complaints or signs and symptoms of any distress. Blood sugars have been normal today.     PAST MEDICAL & SURGICAL HISTORY:   Past Medical History:   Diagnosis Date   • Arthritis    • Cancer (HCC)     SKIN    • Cataract    • Coronary artery disease    • Diabetes mellitus (HCC)    • High cholesterol    • [place] (hard of hearing)     PATIENT HAS HEARING AIDS   • Hypertension    • Irregular heart beat     HISTORY OF CARDIAC ABLATION IN 2003   • Wears dentures     FULL UPPER PLATE      Past Surgical History:   Procedure Laterality Date   • BACK SURGERY  1978    THEN AGAIN IN 1982   • CARDIAC ABLATION  2003   • CARDIAC SURGERY     • CATARACT EXTRACTION W/ INTRAOCULAR LENS IMPLANT Left 5/24/2017    Procedure: CATARACT PHACO EXTRACTION  WITH INTRAOCULAR LENS IMPLANT LEFT WITH TORIC LENS;  Surgeon: Alma Benavidez MD;  Location: [facility];  Service:    • CATARACT EXTRACTION W/ INTRAOCULAR LENS IMPLANT Right 2017    Procedure: CATARACT PHACO EXTRACTION WITH INTRAOCULAR LENS IMPLANT RIGHT WITH TORIC LENS;  Surgeon: Alma Benavidez MD;  Location: [facility];  Service:    • CORONARY ARTERY BYPASS GRAFT      SPOUSE UNSURE OF HOW MANY VESSELS   • HIATAL HERNIA REPAIR     • JOINT REPLACEMENT Left     HIP - DONE BY DR DALAL   • KNEE ARTHROSCOPY Left    • NOSE SURGERY      SPOUSE REPORTS FOR A BROKEN NOSE   • OTHER SURGICAL HISTORY Left     TENDON TORN LOOSE FROM BONE, LEFT ELBOW   • OTHER SURGICAL HISTORY      RUPTURED DISC   • OTHER SURGICAL HISTORY      NECK SURGERY FOR RUPTURED DISC   • OTHER SURGICAL HISTORY      HAD SECOND NECK SURGERY   • OTHER SURGICAL HISTORY      RUPTURED DISC   • OTHER SURGICAL HISTORY      RUPTURED DISC   • OTHER SURGICAL HISTORY      HARDWARE REMOVAL FROM BACK BY DR HAY         MEDICATIONS:  I have reviewed and reconciled the patients medication list in the patients chart at the [facility] nursing [facility] today.      ALLERGIES:  Allergies   Allergen Reactions   • Phenergan [Promethazine Hcl] Nausea And Vomiting   • Carbamazepine Unknown - Low Severity   • Hydrocodone Unknown - Low Severity   • Lisinopril Unknown - Low Severity         SOCIAL HISTORY:  Social History     Socioeconomic History   • Marital status:    Tobacco Use   • Smoking status: Former     Types: Cigarettes     Quit date:      Years since quittin.2   • Smokeless tobacco: Never   Substance and Sexual Activity   • Alcohol use: No   • Drug use: No   • Sexual activity: Defer       FAMILY HISTORY:  No family history on file.    REVIEW OF SYSTEMS:  Review of Systems   Unable to perform ROS: Dementia (ROS per nursing and patient)   Constitutional: Negative for activity change, appetite change,  chills, diaphoresis, fatigue and fever.   HENT: Negative for congestion, mouth sores, rhinorrhea and trouble swallowing.    Eyes: Negative for discharge, redness and itching.   Respiratory: Negative for cough, chest tightness and shortness of breath.    Cardiovascular: Negative for chest pain, palpitations and leg swelling.   Gastrointestinal: Negative for abdominal distention, abdominal pain, blood in stool, constipation, diarrhea and vomiting.   Endocrine:        Intermittent Low blood sugar   Genitourinary: Negative for difficulty urinating and frequency.        Incontinence   Musculoskeletal: Positive for arthralgias (chronic) and gait problem.   Skin: Negative for rash and wound.   Neurological: Positive for weakness.   Psychiatric/Behavioral: Positive for confusion. Negative for agitation, behavioral problems, dysphoric mood, sleep disturbance and suicidal ideas. The patient is not nervous/anxious and is not hyperactive.           PHYSICAL EXAMINATION:     VITAL SIGNS:  /74   Pulse 74   Temp 97.9 °F (36.6 °C)   Resp 18   Wt 72.6 kg (160 lb)   SpO2 98%   BMI 24.33 kg/m²     Physical Exam   HENT:   Head: Normocephalic and atraumatic.   Nose: Nose normal.   Eyes: Conjunctivae are normal.   Cardiovascular: Normal rate.   Pulmonary/Chest: Effort normal. No respiratory distress.   Neurological: He is alert. He is disoriented.   Psychiatric: His behavior is normal. Mood and affect normal. Cognition and memory are impaired.   Nursing note and vitals reviewed.      RECORDS REVIEW:   Most recent labs    ASSESSMENT     Diagnoses and all orders for this visit:    1. Type 2 diabetes mellitus with hypoglycemia without coma, with long-term current use of insulin (HCC) (Primary)    2. Moderate dementia due to Parkinson's disease, with mood disturbance (HCC)    3. Impaired mobility and ADLs        PLAN    DM with hypoglycemia  -Stop Glargine qhs and start 15 units daily. Will follow up and continue to monitor.      Dementia behaviors, appetite and weight stable.    Nursing encouraged to keep me informed of any acute changes, amanda any new concerning symptoms.    Staff to continue supportive care for all ADLs.     [x]  Discussed Patient in detail with nursing/staff, addressed all needs today.     [x]  Plan of Care Reviewed   [x]  PT/OT Reviewed   []  Order Changes  []  Discharge Plans Reviewed  [x]  Advance Directive on file with Nursing Home.   [x]  POA on file with Nursing Home.    [x]  Code Status listed and reviewed.       “I confirm accuracy of unchanged data/findings which have been carried forward from previous visit, as well as I have updated appropriately those that have changed.”                                 Charleen Randall, APRN.  3/24/2023

## 2023-03-27 NOTE — PROGRESS NOTES
Nursing Home Progress Note        Alex Burk DO   [address] [place] [address] Phone: [phone]  Fax: [phone]     PATIENT NAME: Gama Davila                                                                          YOB: 1940           DATE OF SERVICE: 03/21/2023  FACILITY:  [facility]   ______________________________________________________________________     CHIEF COMPLAINT:  Chronic Medical Management      HISTORY OF PRESENT ILLNESS:   Patient was seen for routine follow-up and compliance visit.  He was lying comfortably in his bed.  Not very verbal.  Nurses report that patient continues to have stable behaviors over the last couple of months after being transferred from the psychiatric facility.  He has not been roaming about as he used to.  Baseline labs have not been obtained since his readmission to this facility.  Patient denied any pain and seemed content with his food and ability to sleep at night.     PAST MEDICAL & SURGICAL HISTORY:   Past Medical History:   Diagnosis Date   • Arthritis    • Cancer (HCC)     SKIN    • Cataract    • Coronary artery disease    • Diabetes mellitus (HCC)    • High cholesterol    • [place] (hard of hearing)     PATIENT HAS HEARING AIDS   • Hypertension    • Irregular heart beat     HISTORY OF CARDIAC ABLATION IN 2003   • Wears dentures     FULL UPPER PLATE      Past Surgical History:   Procedure Laterality Date   • BACK SURGERY  1978    THEN AGAIN IN 1982   • CARDIAC ABLATION  2003   • CARDIAC SURGERY     • CATARACT EXTRACTION W/ INTRAOCULAR LENS IMPLANT Left 5/24/2017    Procedure: CATARACT PHACO EXTRACTION WITH INTRAOCULAR LENS IMPLANT LEFT WITH TORIC LENS;  Surgeon: Alma Benavidez MD;  Location: [facility];  Service:    • CATARACT EXTRACTION W/ INTRAOCULAR LENS IMPLANT Right 6/14/2017    Procedure: CATARACT PHACO EXTRACTION WITH INTRAOCULAR LENS IMPLANT RIGHT WITH TORIC LENS;  Surgeon: Alma Benavidez  MD;  Location: [facility] OR;  Service:    • CORONARY ARTERY BYPASS GRAFT      SPOUSE UNSURE OF HOW MANY VESSELS   • HIATAL HERNIA REPAIR     • JOINT REPLACEMENT Left     HIP - DONE BY DR DALAL   • KNEE ARTHROSCOPY Left    • NOSE SURGERY      SPOUSE REPORTS FOR A BROKEN NOSE   • OTHER SURGICAL HISTORY Left     TENDON TORN LOOSE FROM BONE, LEFT ELBOW   • OTHER SURGICAL HISTORY      RUPTURED DISC   • OTHER SURGICAL HISTORY      NECK SURGERY FOR RUPTURED DISC   • OTHER SURGICAL HISTORY      HAD SECOND NECK SURGERY   • OTHER SURGICAL HISTORY      RUPTURED DISC   • OTHER SURGICAL HISTORY      RUPTURED DISC   • OTHER SURGICAL HISTORY      HARDWARE REMOVAL FROM BACK BY DR HAY         MEDICATIONS:  I have reviewed and reconciled the patients medication list in the patients chart at the skilled nursing facility today, 2023.      ALLERGIES:  Allergies   Allergen Reactions   • Phenergan [Promethazine Hcl] Nausea And Vomiting   • Carbamazepine Unknown - Low Severity   • Hydrocodone Unknown - Low Severity   • Lisinopril Unknown - Low Severity         SOCIAL HISTORY:  Social History     Socioeconomic History   • Marital status:    Tobacco Use   • Smoking status: Former     Types: Cigarettes     Quit date:      Years since quittin.2   • Smokeless tobacco: Never   Substance and Sexual Activity   • Alcohol use: No   • Drug use: No   • Sexual activity: Defer       FAMILY HISTORY:  No family history on file.    REVIEW OF SYSTEMS:  Review of Systems   Unable to perform ROS: Dementia          PHYSICAL EXAMINATION:     VITAL SIGNS:  /74   Pulse 74   Temp 97.9 °F (36.6 °C)   Resp 20   Wt 72.6 kg (160 lb)   SpO2 98%   BMI 24.33 kg/m²     Physical Exam  Vitals and nursing note reviewed.   Constitutional:       General: He is not in acute distress.     Appearance: Normal appearance. He is well-developed.      Comments: Elderly male resting comfortably in bed.   HENT:       Head: Normocephalic and atraumatic.   Eyes:      Extraocular Movements: Extraocular movements intact.      Conjunctiva/sclera: Conjunctivae normal.   Cardiovascular:      Rate and Rhythm: Normal rate and regular rhythm.      Pulses: Normal pulses.      Heart sounds: Normal heart sounds. No murmur heard.  Pulmonary:      Effort: Pulmonary effort is normal.      Breath sounds: Normal breath sounds.   Abdominal:      General: Abdomen is flat.      Palpations: Abdomen is soft.   Musculoskeletal:      Cervical back: Normal range of motion and neck supple.   Skin:     General: Skin is warm and dry.      Findings: No rash.   Neurological:      General: No focal deficit present.      Mental Status: He is alert. Mental status is at baseline. He is disoriented.   Psychiatric:      Comments: Slow cognition and ability to respond to questions.         RECORDS REVIEW:   Labs ordered 3/21/2023, collected on 3/23.  Results reviewed: CBC and CMP A1c 7.3, creatinine 1.5, glucose low at 39    ASSESSMENT     Diagnoses and all orders for this visit:    1. 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 Essential hypertension    3. Impaired mobility and ADLs    4. Late onset Alzheimer's disease without behavioral disturbance (HCC)    5. Type 2 diabetes mellitus with diabetic polyneuropathy, without long-term current use of insulin (HCC)    6. Coronary artery disease involving native heart with angina pectoris, unspecified vessel or lesion type (HCC)    7. Irregular heart beat    8. Fall, subsequent encounter        PLAN  Dementia due to Parkinson's disease  -  Behaviors remain stable with Sinemet, memantine, and donepezil.     Mood disorder  -Mood and behaviors remain controlled with Seroquel 100QHS and 25 QAM along with depakote.    - cont celexa 20mg      Type 2 diabetes mellitus   -Glucose log reviewed in facility.  Fair control at this time.  A1c 7.3 3/23/2023  - No recent labs have been  obtained since readmission from psychiatric facility.  CBC, CMP and A1c were ordered and reviewed as noted above.  -Stable control on metformin and basaglar      Falls  -No issues recently.  Restorative therapy is following.    BPH  - cont Tamsulosin    Constipation  - on daily stool softener.     Coronary artery disease/irregular heartbeat  - stable on current cardiac medications.  No changes needed.            [x]  Discussed Patient in detail with nursing/staff, addressed all needs today.     [x]  Plan of Care Reviewed   []  PT/OT Reviewed   []  Order Changes  []  Discharge Plans Reviewed  [x]  Advance Directive on file with Nursing Home.   [x]  POA on file with Nursing Home.    [x]  Code Status listed and reviewed.     I confirm accuracy of unchanged data/findings including physical exam and plan which have been carried forward from previous visit, as well as I have updated appropriately those that have changed        Alex Burk DO.  3/27/2023

## 2023-04-05 NOTE — PROGRESS NOTES
Nursing Home Progress Note        Mitul Stephen DO []  ALESSANDRA Ho []  [address] [place] [address]  Phone: [phone]  Fax: [phone] Inés Vega MD []  Alex Burk DO [x]   [address] [place] [address]  Phone: [phone]  Fax: [phone]     PATIENT NAME: Gama Daivla                                                                          YOB: 1940           DATE OF SERVICE: 12/30/2021  FACILITY:  [] [place]   [] [place]   [] [facility]   [x] [facility]  []  [facility]  [] Other ______________________________________________________________________     CHIEF COMPLAINT:  Chronic Medical Management      HISTORY OF PRESENT ILLNESS:   Patient was lying comfortably in complaints or concerns.  He was content with his current care and has been appropriate with nursing staff.    PAST MEDICAL & SURGICAL HISTORY:   Past Medical History:   Diagnosis Date   • Arthritis    • Cancer (HCC)     SKIN    • Cataract    • Coronary artery disease    • Diabetes mellitus (HCC)    • High cholesterol    • [place] (hard of hearing)     PATIENT HAS HEARING AIDS   • Hypertension    • Irregular heart beat     HISTORY OF CARDIAC ABLATION IN 2003   • Wears dentures     FULL UPPER PLATE      Past Surgical History:   Procedure Laterality Date   • BACK SURGERY  1978    THEN AGAIN IN 1982   • CARDIAC ABLATION  2003   • CARDIAC SURGERY     • CATARACT EXTRACTION W/ INTRAOCULAR LENS IMPLANT Left 5/24/2017    Procedure: CATARACT PHACO EXTRACTION WITH INTRAOCULAR LENS IMPLANT LEFT WITH TORIC LENS;  Surgeon: Alma Benavidez MD;  Location: [facility] OR;  Service:    • CATARACT EXTRACTION W/ INTRAOCULAR LENS IMPLANT Right 6/14/2017    Procedure: CATARACT PHACO EXTRACTION WITH INTRAOCULAR LENS IMPLANT RIGHT WITH TORIC LENS;  Surgeon: Alma Benavidez MD;  Location: [facility] OR;  Service:    • CORONARY ARTERY BYPASS GRAFT  2003    SPOUSE UNSURE OF HOW MANY VESSELS    • HIATAL HERNIA REPAIR     • JOINT REPLACEMENT Left     HIP - DONE BY DR DALAL   • KNEE ARTHROSCOPY Left    • NOSE SURGERY  1970    SPOUSE REPORTS FOR A BROKEN NOSE   • OTHER SURGICAL HISTORY Left     TENDON TORN LOOSE FROM BONE, LEFT ELBOW   • OTHER SURGICAL HISTORY      RUPTURED DISC   • OTHER SURGICAL HISTORY      NECK SURGERY FOR RUPTURED DISC   • OTHER SURGICAL HISTORY      HAD SECOND NECK SURGERY   • OTHER SURGICAL HISTORY      RUPTURED DISC   • OTHER SURGICAL HISTORY      RUPTURED DISC   • OTHER SURGICAL HISTORY      HARDWARE REMOVAL FROM BACK BY DR HAY         MEDICATIONS:  I have reviewed and reconciled the patients medication list in the patients chart at the [facility] nursing [facility] today, 2021.      ALLERGIES:  Allergies   Allergen Reactions   • Phenergan [Promethazine Hcl] Nausea And Vomiting   • Carbamazepine Unknown - Low Severity   • Hydrocodone Unknown - Low Severity   • Lisinopril Unknown - Low Severity         SOCIAL HISTORY:  Social History     Socioeconomic History   • Marital status:    Tobacco Use   • Smoking status: Former Smoker     Types: Cigarettes     Quit date:      Years since quittin.0   • Smokeless tobacco: Never Used   Substance and Sexual Activity   • Alcohol use: No   • Drug use: No   • Sexual activity: Defer       FAMILY HISTORY:  No family history on file.    REVIEW OF SYSTEMS:  Review of Systems   Constitutional: Negative for chills, fatigue and fever.   HENT: Negative for congestion, ear pain, rhinorrhea, sinus pressure and sore throat.    Eyes: Negative for visual disturbance.   Respiratory: Negative for cough, chest tightness, shortness of breath and wheezing.    Cardiovascular: Negative for chest pain, palpitations and leg swelling.   Gastrointestinal: Negative for abdominal pain, blood in stool, constipation, diarrhea, nausea and vomiting.   Endocrine: Negative for polydipsia and polyuria.   Genitourinary: Negative for  dysuria and hematuria.   Musculoskeletal: Negative for arthralgias and back pain.   Skin: Negative for rash.   Neurological: Negative for dizziness, light-headedness, numbness and headaches.   Psychiatric/Behavioral: Negative for dysphoric mood and sleep disturbance. The patient is not nervous/anxious.           PHYSICAL EXAMINATION:     VITAL SIGNS:  /78   Pulse 70   Temp 97.4 °F (36.3 °C)   Resp 18   Wt 65.3 kg (144 lb)   SpO2 97%   BMI 21.90 kg/m²     Physical Exam  Vitals and nursing note reviewed.   Constitutional:       General: He is not in acute distress.     Appearance: Normal appearance. He is well-developed.      Comments: Wheelchair-bound elderly male   HENT:      Head: Normocephalic and atraumatic.   Eyes:      Extraocular Movements: Extraocular movements intact.      Conjunctiva/sclera: Conjunctivae normal.   Cardiovascular:      Rate and Rhythm: Normal rate and regular rhythm.   Pulmonary:      Effort: Pulmonary effort is normal.      Breath sounds: Normal breath sounds.   Musculoskeletal:      Cervical back: Normal range of motion and neck supple.   Skin:     General: Skin is warm and dry.      Findings: No rash.   Neurological:      General: No focal deficit present.      Mental Status: He is alert and oriented to person, place, and time.   Psychiatric:         Mood and Affect: Mood normal.         Behavior: Behavior normal.         RECORDS REVIEW: *     ASSESSMENT     Diagnoses and all orders for this visit:    1. 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 Essential hypertension    3. Type 2 diabetes mellitus with diabetic polyneuropathy, without long-term current use of insulin (HCC)    4. Irregular heart beat    5. Coronary artery disease involving native heart with angina pectoris, unspecified vessel or lesion type (HCC)    6. Impaired mobility and ADLs    7. Late onset Alzheimer's disease without behavioral disturbance  ([facility])        PLAN  Dementia due to Parkinson's disease  -Continue Sinemet and donepazil.     Mood disorder  - meds being adjusted by psychiatry, stable at this time.      Type 2 diabetes mellitus - controlled.   -Stable control on metformin and basaglar, glucose log reviewed.      Falls  - no recent falls reported.  Continue for precautions.     Coronary artery disease/irregular heartbeat  - stable on current cardiac medications.  No changes needed.     [x]  Discussed Patient in detail with nursing/staff, addressed all needs today.     [x]  Plan of Care Reviewed   []  PT/OT Reviewed   []  Order Changes  []  Discharge Plans Reviewed  [x]  Advance Directive on file with Nursing Home.   [x]  POA on file with Nursing Home.    [x]  Code Status listed and reviewed.     I confirm accuracy of unchanged data/findings including physical exam and plan which have been carried forward from previous visit, as well as I have updated appropriately those that have changed        Alex Burk DO.  1/3/2022     n/a

## 2023-05-02 ENCOUNTER — NURSING HOME (OUTPATIENT)
Dept: FAMILY MEDICINE CLINIC | Facility: CLINIC | Age: 83
End: 2023-05-02
Payer: MEDICARE

## 2023-05-02 VITALS
TEMPERATURE: 97.9 F | HEART RATE: 58 BPM | SYSTOLIC BLOOD PRESSURE: 140 MMHG | WEIGHT: 166 LBS | RESPIRATION RATE: 18 BRPM | BODY MASS INDEX: 25.24 KG/M2 | DIASTOLIC BLOOD PRESSURE: 60 MMHG

## 2023-05-02 DIAGNOSIS — G20 MODERATE DEMENTIA DUE TO PARKINSON'S DISEASE, WITH MOOD DISTURBANCE: Chronic | ICD-10-CM

## 2023-05-02 DIAGNOSIS — Z74.09 IMPAIRED MOBILITY AND ADLS: ICD-10-CM

## 2023-05-02 DIAGNOSIS — F02.B3 MODERATE DEMENTIA DUE TO PARKINSON'S DISEASE, WITH MOOD DISTURBANCE: Chronic | ICD-10-CM

## 2023-05-02 DIAGNOSIS — Z78.9 IMPAIRED MOBILITY AND ADLS: ICD-10-CM

## 2023-05-02 DIAGNOSIS — R05.8 RESPIRATORY TRACT CONGESTION WITH COUGH: Primary | ICD-10-CM

## 2023-05-02 NOTE — LETTER
Telemedicine nursing Home Progress Note        Mitul Stephen DO []  ALESSANDRA Ho [x] [place] [address]  Phone: [phone]  Fax: [phone] Inés Vega MD []  ALESSANDRA Ho [x]   [address] [place] [address]  Phone: [phone]  Fax: [phone]     PATIENT NAME: Gama Davila                                                                          YOB: 1940           DATE OF SERVICE: 5/2/2023  FACILITY:  [] [place]   [] [place]   [] [facility]   [x] [facility]  []  [facility]  [] Other     ______________________________________________________________________     CHIEF COMPLAINT:    Cough and congestion for past couple days.     HISTORY OF PRESENT ILLNESS:     Nursing reports that patient has increased cough and congestion for the past couple days. He has not had any fever, hypoxia or shortness of breath but has been resting more than usual. He has not had any decreased appetite or mental status changes. He has no complaints today and no signs and symptoms of any distress but is a poor historian related to his dementia.  Nursing has no other concerns today.    PAST MEDICAL & SURGICAL HISTORY:   Past Medical History:   Diagnosis Date   • Arthritis    • Cancer     SKIN    • Cataract    • Coronary artery disease    • Diabetes mellitus    • High cholesterol    • [place] (hard of hearing)     PATIENT HAS HEARING AIDS   • Hypertension    • Irregular heart beat     HISTORY OF CARDIAC ABLATION IN 2003   • Wears dentures     FULL UPPER PLATE      Past Surgical History:   Procedure Laterality Date   • BACK SURGERY  1978    THEN AGAIN IN 1982   • CARDIAC ABLATION  2003   • CARDIAC SURGERY     • CATARACT EXTRACTION W/ INTRAOCULAR LENS IMPLANT Left 5/24/2017    Procedure: CATARACT PHACO EXTRACTION WITH INTRAOCULAR LENS IMPLANT LEFT WITH TORIC LENS;  Surgeon: Alma Benavidez MD;  Location: [facility];  Service:    • CATARACT  EXTRACTION W/ INTRAOCULAR LENS IMPLANT Right 2017    Procedure: CATARACT PHACO EXTRACTION WITH INTRAOCULAR LENS IMPLANT RIGHT WITH TORIC LENS;  Surgeon: Alma Benavidez MD;  Location: [facility];  Service:    • CORONARY ARTERY BYPASS GRAFT      SPOUSE UNSURE OF HOW MANY VESSELS   • HIATAL HERNIA REPAIR     • JOINT REPLACEMENT Left     HIP - DONE BY DR DALAL   • KNEE ARTHROSCOPY Left    • NOSE SURGERY      SPOUSE REPORTS FOR A BROKEN NOSE   • OTHER SURGICAL HISTORY Left     TENDON TORN LOOSE FROM BONE, LEFT ELBOW   • OTHER SURGICAL HISTORY      RUPTURED DISC   • OTHER SURGICAL HISTORY      NECK SURGERY FOR RUPTURED DISC   • OTHER SURGICAL HISTORY      HAD SECOND NECK SURGERY   • OTHER SURGICAL HISTORY      RUPTURED DISC   • OTHER SURGICAL HISTORY      RUPTURED DISC   • OTHER SURGICAL HISTORY      HARDWARE REMOVAL FROM BACK BY DR HAY         MEDICATIONS:  I have reviewed and reconciled the patients medication list in the patients chart at the skilled nursing facility today.      ALLERGIES:  Allergies   Allergen Reactions   • Phenergan [Promethazine Hcl] Nausea And Vomiting   • Carbamazepine Unknown - Low Severity   • Hydrocodone Unknown - Low Severity   • Lisinopril Unknown - Low Severity         SOCIAL HISTORY:  Social History     Socioeconomic History   • Marital status:    Tobacco Use   • Smoking status: Former     Types: Cigarettes     Quit date:      Years since quittin.3   • Smokeless tobacco: Never   Substance and Sexual Activity   • Alcohol use: No   • Drug use: No   • Sexual activity: Defer       FAMILY HISTORY:  No family history on file.    REVIEW OF SYSTEMS:  Review of Systems   Unable to perform ROS: Dementia (ROS per nursing and patient)   Constitutional: Negative for activity change, appetite change, chills, diaphoresis, fatigue and fever.   HENT: Positive for congestion. Negative for mouth sores, nosebleeds, rhinorrhea and trouble  swallowing.    Respiratory: Positive for cough. Negative for chest tightness and shortness of breath.    Cardiovascular: Negative for chest pain, palpitations and leg swelling.   Gastrointestinal: Negative for abdominal distention, abdominal pain, blood in stool, constipation, diarrhea and vomiting.   Genitourinary: Negative for difficulty urinating and frequency.        Incontinence   Musculoskeletal: Positive for arthralgias (chronic) and gait problem.   Skin: Negative for rash and wound.   Neurological: Positive for weakness.   Psychiatric/Behavioral: Positive for confusion. Negative for agitation, behavioral problems, dysphoric mood, sleep disturbance and suicidal ideas. The patient is not nervous/anxious and is not hyperactive.           PHYSICAL EXAMINATION:     VITAL SIGNS:  /60   Pulse 58   Temp 97.9 °F (36.6 °C)   Resp 18   Wt 75.3 kg (166 lb)   BMI 25.24 kg/m²     Physical Exam   HENT:   Head: Normocephalic and atraumatic.   Nose: Nose normal.   Eyes: Conjunctivae are normal.   Cardiovascular: Normal rate, regular rhythm and normal heart sounds.   Pulmonary/Chest: Effort normal and breath sounds normal. No respiratory distress. He has no decreased breath sounds. He has no wheezes.   Abdominal: Bowel sounds are normal. There is no abdominal tenderness.   Neurological: He is alert. He is disoriented.   Psychiatric: His behavior is normal. Mood and affect normal. Cognition and memory are impaired.   Nursing note and vitals reviewed.      RECORDS REVIEW:   Most recent labs    ASSESSMENT     Diagnoses and all orders for this visit:    1. Respiratory tract congestion with cough (Primary)    2. Moderate dementia due to Parkinson's disease, with mood disturbance    3. Impaired mobility and ADLs        PLAN    Cough and congestion  -Will obtain CXR for further evaluation. Give Robitussin 10 ml q 6 hours prn. Will follow up and continue to monitor.     Dementia behaviors, appetite and weight  stable.    Nursing encouraged to keep me informed of any acute changes, amanda any new concerning symptoms.    Staff to continue supportive care for all ADLs.     [x]  Discussed Patient in detail with nursing/staff, addressed all needs today.     [x]  Plan of Care Reviewed   [x]  PT/OT Reviewed   []  Order Changes  []  Discharge Plans Reviewed  [x]  Advance Directive on file with Nursing Home.   [x]  POA on file with Nursing Home.    [x]  Code Status listed and reviewed.       “I confirm accuracy of unchanged data/findings which have been carried forward from previous visit, as well as I have updated appropriately those that have changed.”                                 Charleen Randall, APRN.  5/2/2023

## 2023-05-03 NOTE — PROGRESS NOTES
Telemedicine nursing Home Progress Note        Mitul Stephen DO []  ALESSANDRA Ho [x] [place] [address]  Phone: [phone]  Fax: [phone] Inés Vega MD []  ALESSANDRA Ho [x]   [address] [place] [address]  Phone: [phone]  Fax: [phone]     PATIENT NAME: Gama Davila                                                                          YOB: 1940           DATE OF SERVICE: 5/2/2023  FACILITY:  [] [place]   [] [place]   [] [facility]   [x] [facility]  []  [facility]  [] Other     ______________________________________________________________________     CHIEF COMPLAINT:    Cough and congestion for past couple days.     HISTORY OF PRESENT ILLNESS:     Nursing reports that patient has increased cough and congestion for the past couple days. He has not had any fever, hypoxia or shortness of breath but has been resting more than usual. He has not had any decreased appetite or mental status changes. He has no complaints today and no signs and symptoms of any distress but is a poor historian related to his dementia.  Nursing has no other concerns today.    PAST MEDICAL & SURGICAL HISTORY:   Past Medical History:   Diagnosis Date   • Arthritis    • Cancer     SKIN    • Cataract    • Coronary artery disease    • Diabetes mellitus    • High cholesterol    • [place] (hard of hearing)     PATIENT HAS HEARING AIDS   • Hypertension    • Irregular heart beat     HISTORY OF CARDIAC ABLATION IN 2003   • Wears dentures     FULL UPPER PLATE      Past Surgical History:   Procedure Laterality Date   • BACK SURGERY  1978    THEN AGAIN IN 1982   • CARDIAC ABLATION  2003   • CARDIAC SURGERY     • CATARACT EXTRACTION W/ INTRAOCULAR LENS IMPLANT Left 5/24/2017    Procedure: CATARACT PHACO EXTRACTION WITH INTRAOCULAR LENS IMPLANT LEFT WITH TORIC LENS;  Surgeon: Alma Benavidez MD;  Location: [facility];  Service:    • CATARACT  EXTRACTION W/ INTRAOCULAR LENS IMPLANT Right 2017    Procedure: CATARACT PHACO EXTRACTION WITH INTRAOCULAR LENS IMPLANT RIGHT WITH TORIC LENS;  Surgeon: Alma Benavidez MD;  Location: [facility];  Service:    • CORONARY ARTERY BYPASS GRAFT      SPOUSE UNSURE OF HOW MANY VESSELS   • HIATAL HERNIA REPAIR     • JOINT REPLACEMENT Left     HIP - DONE BY DR DALAL   • KNEE ARTHROSCOPY Left    • NOSE SURGERY      SPOUSE REPORTS FOR A BROKEN NOSE   • OTHER SURGICAL HISTORY Left     TENDON TORN LOOSE FROM BONE, LEFT ELBOW   • OTHER SURGICAL HISTORY      RUPTURED DISC   • OTHER SURGICAL HISTORY      NECK SURGERY FOR RUPTURED DISC   • OTHER SURGICAL HISTORY      HAD SECOND NECK SURGERY   • OTHER SURGICAL HISTORY      RUPTURED DISC   • OTHER SURGICAL HISTORY      RUPTURED DISC   • OTHER SURGICAL HISTORY      HARDWARE REMOVAL FROM BACK BY DR HAY         MEDICATIONS:  I have reviewed and reconciled the patients medication list in the patients chart at the skilled nursing facility today.      ALLERGIES:  Allergies   Allergen Reactions   • Phenergan [Promethazine Hcl] Nausea And Vomiting   • Carbamazepine Unknown - Low Severity   • Hydrocodone Unknown - Low Severity   • Lisinopril Unknown - Low Severity         SOCIAL HISTORY:  Social History     Socioeconomic History   • Marital status:    Tobacco Use   • Smoking status: Former     Types: Cigarettes     Quit date:      Years since quittin.3   • Smokeless tobacco: Never   Substance and Sexual Activity   • Alcohol use: No   • Drug use: No   • Sexual activity: Defer       FAMILY HISTORY:  No family history on file.    REVIEW OF SYSTEMS:  Review of Systems   Unable to perform ROS: Dementia (ROS per nursing and patient)   Constitutional: Negative for activity change, appetite change, chills, diaphoresis, fatigue and fever.   HENT: Positive for congestion. Negative for mouth sores, nosebleeds, rhinorrhea and trouble  swallowing.    Respiratory: Positive for cough. Negative for chest tightness and shortness of breath.    Cardiovascular: Negative for chest pain, palpitations and leg swelling.   Gastrointestinal: Negative for abdominal distention, abdominal pain, blood in stool, constipation, diarrhea and vomiting.   Genitourinary: Negative for difficulty urinating and frequency.        Incontinence   Musculoskeletal: Positive for arthralgias (chronic) and gait problem.   Skin: Negative for rash and wound.   Neurological: Positive for weakness.   Psychiatric/Behavioral: Positive for confusion. Negative for agitation, behavioral problems, dysphoric mood, sleep disturbance and suicidal ideas. The patient is not nervous/anxious and is not hyperactive.           PHYSICAL EXAMINATION:     VITAL SIGNS:  /60   Pulse 58   Temp 97.9 °F (36.6 °C)   Resp 18   Wt 75.3 kg (166 lb)   BMI 25.24 kg/m²     Physical Exam   HENT:   Head: Normocephalic and atraumatic.   Nose: Nose normal.   Eyes: Conjunctivae are normal.   Cardiovascular: Normal rate, regular rhythm and normal heart sounds.   Pulmonary/Chest: Effort normal and breath sounds normal. No respiratory distress. He has no decreased breath sounds. He has no wheezes.   Abdominal: Bowel sounds are normal. There is no abdominal tenderness.   Neurological: He is alert. He is disoriented.   Psychiatric: His behavior is normal. Mood and affect normal. Cognition and memory are impaired.   Nursing note and vitals reviewed.      RECORDS REVIEW:   Most recent labs    ASSESSMENT     Diagnoses and all orders for this visit:    1. Respiratory tract congestion with cough (Primary)    2. Moderate dementia due to Parkinson's disease, with mood disturbance    3. Impaired mobility and ADLs        PLAN    Cough and congestion  -Will obtain CXR for further evaluation. Give Robitussin 10 ml q 6 hours prn. Will follow up and continue to monitor.     Dementia behaviors, appetite and weight  stable.    Nursing encouraged to keep me informed of any acute changes, amanda any new concerning symptoms.    Staff to continue supportive care for all ADLs.     [x]  Discussed Patient in detail with nursing/staff, addressed all needs today.     [x]  Plan of Care Reviewed   [x]  PT/OT Reviewed   []  Order Changes  []  Discharge Plans Reviewed  [x]  Advance Directive on file with Nursing Home.   [x]  POA on file with Nursing Home.    [x]  Code Status listed and reviewed.       “I confirm accuracy of unchanged data/findings which have been carried forward from previous visit, as well as I have updated appropriately those that have changed.”                                 Charleen Randall, APRN.  5/2/2023

## 2023-05-16 ENCOUNTER — NURSING HOME (OUTPATIENT)
Dept: INTERNAL MEDICINE | Facility: CLINIC | Age: 83
End: 2023-05-16
Payer: MEDICARE

## 2023-05-16 VITALS
OXYGEN SATURATION: 98 % | HEART RATE: 58 BPM | RESPIRATION RATE: 18 BRPM | TEMPERATURE: 97.3 F | DIASTOLIC BLOOD PRESSURE: 62 MMHG | WEIGHT: 170 LBS | SYSTOLIC BLOOD PRESSURE: 122 MMHG | BODY MASS INDEX: 25.85 KG/M2

## 2023-05-16 DIAGNOSIS — E11.42 TYPE 2 DIABETES MELLITUS WITH DIABETIC POLYNEUROPATHY, WITHOUT LONG-TERM CURRENT USE OF INSULIN: ICD-10-CM

## 2023-05-16 DIAGNOSIS — Z74.09 IMPAIRED MOBILITY AND ADLS: ICD-10-CM

## 2023-05-16 DIAGNOSIS — F02.80 LATE ONSET ALZHEIMER'S DISEASE WITHOUT BEHAVIORAL DISTURBANCE: ICD-10-CM

## 2023-05-16 DIAGNOSIS — R60.0 BILATERAL LOWER EXTREMITY EDEMA: Primary | ICD-10-CM

## 2023-05-16 DIAGNOSIS — I25.119 CORONARY ARTERY DISEASE INVOLVING NATIVE HEART WITH ANGINA PECTORIS, UNSPECIFIED VESSEL OR LESION TYPE: ICD-10-CM

## 2023-05-16 DIAGNOSIS — F02.818 DEMENTIA DUE TO PARKINSON'S DISEASE WITH BEHAVIORAL DISTURBANCE: ICD-10-CM

## 2023-05-16 DIAGNOSIS — Z78.9 IMPAIRED MOBILITY AND ADLS: ICD-10-CM

## 2023-05-16 DIAGNOSIS — G20 DEMENTIA DUE TO PARKINSON'S DISEASE WITH BEHAVIORAL DISTURBANCE: ICD-10-CM

## 2023-05-16 DIAGNOSIS — G30.1 LATE ONSET ALZHEIMER'S DISEASE WITHOUT BEHAVIORAL DISTURBANCE: ICD-10-CM
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
## 2023-05-16 DIAGNOSIS — I10 ESSENTIAL HYPERTENSION: ICD-10-CM

## 2023-05-16 NOTE — LETTER
Nursing Home Progress Note        Alex Burk DO   [address] [place] [address] Phone: [phone]  Fax: [phone]     PATIENT NAME: Gama Davila                                                                          YOB: 1940           DATE OF SERVICE: 05/16/2023  FACILITY:  [facility]   ______________________________________________________________________     CHIEF COMPLAINT:  Chronic Medical Management      HISTORY OF PRESENT ILLNESS:   Patient was seen for compliance visit.  Mood and behaviors have been stable.  He mumbled and was unable to provide any detailed history.  Nurses shared that the patient has been cooperative and appropriate over the last couple of months.  On exam today, I did notice that he seemed to have increased swelling in the legs.  Nurses shared that the patient tends to have some swelling when he sits up in a wheelchair for long periods of time as he did yesterday.  Weight gain was noted.    PAST MEDICAL & SURGICAL HISTORY:   Past Medical History:   Diagnosis Date   • Arthritis    • Cancer     SKIN    • Cataract    • Coronary artery disease    • Diabetes mellitus    • High cholesterol    • [place] (hard of hearing)     PATIENT HAS HEARING AIDS   • Hypertension    • Irregular heart beat     HISTORY OF CARDIAC ABLATION IN 2003   • Wears dentures     FULL UPPER PLATE      Past Surgical History:   Procedure Laterality Date   • BACK SURGERY  1978    THEN AGAIN IN 1982   • CARDIAC ABLATION  2003   • CARDIAC SURGERY     • CATARACT EXTRACTION W/ INTRAOCULAR LENS IMPLANT Left 5/24/2017    Procedure: CATARACT PHACO EXTRACTION WITH INTRAOCULAR LENS IMPLANT LEFT WITH TORIC LENS;  Surgeon: Alma Benavidez MD;  Location: [facility];  Service:    • CATARACT EXTRACTION W/ INTRAOCULAR LENS IMPLANT Right 6/14/2017    Procedure: CATARACT PHACO EXTRACTION WITH INTRAOCULAR LENS IMPLANT RIGHT WITH TORIC LENS;  Surgeon: Alma Benavidez MD;   Location: [facility] OR;  Service:    • CORONARY ARTERY BYPASS GRAFT      SPOUSE UNSURE OF HOW MANY VESSELS   • HIATAL HERNIA REPAIR     • JOINT REPLACEMENT Left     HIP - DONE BY DR DALAL   • KNEE ARTHROSCOPY Left    • NOSE SURGERY      SPOUSE REPORTS FOR A BROKEN NOSE   • OTHER SURGICAL HISTORY Left     TENDON TORN LOOSE FROM BONE, LEFT ELBOW   • OTHER SURGICAL HISTORY      RUPTURED DISC   • OTHER SURGICAL HISTORY      NECK SURGERY FOR RUPTURED DISC   • OTHER SURGICAL HISTORY      HAD SECOND NECK SURGERY   • OTHER SURGICAL HISTORY      RUPTURED DISC   • OTHER SURGICAL HISTORY      RUPTURED DISC   • OTHER SURGICAL HISTORY      HARDWARE REMOVAL FROM BACK BY DR HAY         MEDICATIONS:  I have reviewed and reconciled the patients medication list in the patients chart at the [facility] nursing [facility] today, 2023.      ALLERGIES:  Allergies   Allergen Reactions   • Phenergan [Promethazine Hcl] Nausea And Vomiting   • Carbamazepine Unknown - Low Severity   • Hydrocodone Unknown - Low Severity   • Lisinopril Unknown - Low Severity         SOCIAL HISTORY:  Social History     Socioeconomic History   • Marital status:    Tobacco Use   • Smoking status: Former     Types: Cigarettes     Quit date:      Years since quittin.3   • Smokeless tobacco: Never   Substance and Sexual Activity   • Alcohol use: No   • Drug use: No   • Sexual activity: Defer       FAMILY HISTORY:  No family history on file.    REVIEW OF SYSTEMS:  Review of Systems   Unable to perform ROS: Dementia          PHYSICAL EXAMINATION:     VITAL SIGNS:  /62   Pulse 58   Temp 97.3 °F (36.3 °C)   Resp 18   Wt 77.1 kg (170 lb)   SpO2 98%   BMI 25.85 kg/m²     Physical Exam  Vitals and nursing note reviewed.   Constitutional:       General: He is not in acute distress.     Appearance: Normal appearance. He is well-developed.      Comments: Elderly male resting comfortably in bed.   HENT:      Head:  Normocephalic and atraumatic.   Eyes:      Extraocular Movements: Extraocular movements intact.      Conjunctiva/sclera: Conjunctivae normal.   Cardiovascular:      Rate and Rhythm: Normal rate and regular rhythm.      Pulses: Normal pulses.      Heart sounds: Normal heart sounds. No murmur heard.  Pulmonary:      Effort: Pulmonary effort is normal.      Breath sounds: Normal breath sounds.   Abdominal:      General: Abdomen is flat.      Palpations: Abdomen is soft.   Musculoskeletal:      Cervical back: Normal range of motion and neck supple.   Skin:     General: Skin is warm and dry.      Findings: No rash.   Neurological:      General: No focal deficit present.      Mental Status: He is alert. Mental status is at baseline. He is disoriented.   Psychiatric:      Comments: Slow cognition and ability to respond to questions.         RECORDS REVIEW:       ASSESSMENT     Diagnoses and all orders for this visit:    1. Bilateral lower extremity edema (Primary)    2. Dementia due to Parkinson's disease with behavioral disturbance    3. Essential hypertension    4. Late onset Alzheimer's disease without behavioral disturbance    5. Type 2 diabetes mellitus with diabetic polyneuropathy, without long-term current use of insulin    6. Coronary artery disease involving native heart with angina pectoris, unspecified vessel or lesion type    7. Fall, subsequent encounter    8. Impaired mobility and ADLs        PLAN  Bilateral lower extremity edema  - Start trial of diuresis with Lasix 20 mg p.o. daily with potassium 10 mEq daily.  Continue for 3 days also check weights daily for the next 3 days.  If this is beneficial for the patient, check labs and consider long-term or intermittent Lasix treatments.    Dementia due to Parkinson's disease  -  Behaviors remain stable with Sinemet, memantine, and donepezil.     Mood disorder  -Mood and behaviors remain controlled with Seroquel 100QHS and 25 QAM along with depakote.    - cont  celexa 20mg      Type 2 diabetes mellitus   -Glucose log reviewed in facility.  Fair control at this time.  A1c 7.3 3/23/2023  - No recent labs have been obtained since readmission from psychiatric facility.  CBC, CMP and A1c were ordered and reviewed as noted above.  -Stable control on metformin and basaglar      Falls  -No issues recently.  Restorative therapy is following.    BPH  - cont Tamsulosin    Constipation  - on daily stool softener.     Coronary artery disease/irregular heartbeat  - stable on current cardiac medications.  No changes needed.            [x]  Discussed Patient in detail with nursing/staff, addressed all needs today.     [x]  Plan of Care Reviewed   []  PT/OT Reviewed   [x]  Order Changes  []  Discharge Plans Reviewed  [x]  Advance Directive on file with Nursing Home.   [x]  POA on file with Nursing Home.    [x]  Code Status listed and reviewed.     I confirm accuracy of unchanged data/findings including physical exam and plan which have been carried forward from previous visit, as well as I have updated appropriately those that have changed        Alex Burk DO.  5/16/2023

## 2023-05-16 NOTE — PROGRESS NOTES
Nursing Home Progress Note        Alex Burk DO   [address] [place] [address] Phone: [phone]  Fax: [phone]     PATIENT NAME: Gama Davila                                                                          YOB: 1940           DATE OF SERVICE: 05/16/2023  FACILITY:  [facility]   ______________________________________________________________________     CHIEF COMPLAINT:  Chronic Medical Management      HISTORY OF PRESENT ILLNESS:   Patient was seen for compliance visit.  Mood and behaviors have been stable.  He mumbled and was unable to provide any detailed history.  Nurses shared that the patient has been cooperative and appropriate over the last couple of months.  On exam today, I did notice that he seemed to have increased swelling in the legs.  Nurses shared that the patient tends to have some swelling when he sits up in a wheelchair for long periods of time as he did yesterday.  Weight gain was noted.    PAST MEDICAL & SURGICAL HISTORY:   Past Medical History:   Diagnosis Date   • Arthritis    • Cancer     SKIN    • Cataract    • Coronary artery disease    • Diabetes mellitus    • High cholesterol    • [place] (hard of hearing)     PATIENT HAS HEARING AIDS   • Hypertension    • Irregular heart beat     HISTORY OF CARDIAC ABLATION IN 2003   • Wears dentures     FULL UPPER PLATE      Past Surgical History:   Procedure Laterality Date   • BACK SURGERY  1978    THEN AGAIN IN 1982   • CARDIAC ABLATION  2003   • CARDIAC SURGERY     • CATARACT EXTRACTION W/ INTRAOCULAR LENS IMPLANT Left 5/24/2017    Procedure: CATARACT PHACO EXTRACTION WITH INTRAOCULAR LENS IMPLANT LEFT WITH TORIC LENS;  Surgeon: Alma Benavidez MD;  Location: [facility];  Service:    • CATARACT EXTRACTION W/ INTRAOCULAR LENS IMPLANT Right 6/14/2017    Procedure: CATARACT PHACO EXTRACTION WITH INTRAOCULAR LENS IMPLANT RIGHT WITH TORIC LENS;  Surgeon: Alma Benavidez MD;   Location: [facility] OR;  Service:    • CORONARY ARTERY BYPASS GRAFT      SPOUSE UNSURE OF HOW MANY VESSELS   • HIATAL HERNIA REPAIR     • JOINT REPLACEMENT Left     HIP - DONE BY DR DALAL   • KNEE ARTHROSCOPY Left    • NOSE SURGERY      SPOUSE REPORTS FOR A BROKEN NOSE   • OTHER SURGICAL HISTORY Left     TENDON TORN LOOSE FROM BONE, LEFT ELBOW   • OTHER SURGICAL HISTORY      RUPTURED DISC   • OTHER SURGICAL HISTORY      NECK SURGERY FOR RUPTURED DISC   • OTHER SURGICAL HISTORY      HAD SECOND NECK SURGERY   • OTHER SURGICAL HISTORY      RUPTURED DISC   • OTHER SURGICAL HISTORY      RUPTURED DISC   • OTHER SURGICAL HISTORY      HARDWARE REMOVAL FROM BACK BY DR HAY         MEDICATIONS:  I have reviewed and reconciled the patients medication list in the patients chart at the [facility] nursing [facility] today, 2023.      ALLERGIES:  Allergies   Allergen Reactions   • Phenergan [Promethazine Hcl] Nausea And Vomiting   • Carbamazepine Unknown - Low Severity   • Hydrocodone Unknown - Low Severity   • Lisinopril Unknown - Low Severity         SOCIAL HISTORY:  Social History     Socioeconomic History   • Marital status:    Tobacco Use   • Smoking status: Former     Types: Cigarettes     Quit date:      Years since quittin.3   • Smokeless tobacco: Never   Substance and Sexual Activity   • Alcohol use: No   • Drug use: No   • Sexual activity: Defer       FAMILY HISTORY:  No family history on file.    REVIEW OF SYSTEMS:  Review of Systems   Unable to perform ROS: Dementia          PHYSICAL EXAMINATION:     VITAL SIGNS:  /62   Pulse 58   Temp 97.3 °F (36.3 °C)   Resp 18   Wt 77.1 kg (170 lb)   SpO2 98%   BMI 25.85 kg/m²     Physical Exam  Vitals and nursing note reviewed.   Constitutional:       General: He is not in acute distress.     Appearance: Normal appearance. He is well-developed.      Comments: Elderly male resting comfortably in bed.   HENT:      Head:  Normocephalic and atraumatic.   Eyes:      Extraocular Movements: Extraocular movements intact.      Conjunctiva/sclera: Conjunctivae normal.   Cardiovascular:      Rate and Rhythm: Normal rate and regular rhythm.      Pulses: Normal pulses.      Heart sounds: Normal heart sounds. No murmur heard.  Pulmonary:      Effort: Pulmonary effort is normal.      Breath sounds: Normal breath sounds.   Abdominal:      General: Abdomen is flat.      Palpations: Abdomen is soft.   Musculoskeletal:      Cervical back: Normal range of motion and neck supple.   Skin:     General: Skin is warm and dry.      Findings: No rash.   Neurological:      General: No focal deficit present.      Mental Status: He is alert. Mental status is at baseline. He is disoriented.   Psychiatric:      Comments: Slow cognition and ability to respond to questions.         RECORDS REVIEW:       ASSESSMENT     Diagnoses and all orders for this visit:    1. Bilateral lower extremity edema (Primary)    2. Dementia due to Parkinson's disease with behavioral disturbance    3. Essential hypertension    4. Late onset Alzheimer's disease without behavioral disturbance    5. Type 2 diabetes mellitus with diabetic polyneuropathy, without long-term current use of insulin    6. Coronary artery disease involving native heart with angina pectoris, unspecified vessel or lesion type    7. Fall, subsequent encounter    8. Impaired mobility and ADLs        PLAN  Bilateral lower extremity edema  - Start trial of diuresis with Lasix 20 mg p.o. daily with potassium 10 mEq daily.  Continue for 3 days also check weights daily for the next 3 days.  If this is beneficial for the patient, check labs and consider long-term or intermittent Lasix treatments.    Dementia due to Parkinson's disease  -  Behaviors remain stable with Sinemet, memantine, and donepezil.     Mood disorder  -Mood and behaviors remain controlled with Seroquel 100QHS and 25 QAM along with depakote.    - cont  celexa 20mg      Type 2 diabetes mellitus   -Glucose log reviewed in facility.  Fair control at this time.  A1c 7.3 3/23/2023  - No recent labs have been obtained since readmission from psychiatric facility.  CBC, CMP and A1c were ordered and reviewed as noted above.  -Stable control on metformin and basaglar      Falls  -No issues recently.  Restorative therapy is following.    BPH  - cont Tamsulosin    Constipation  - on daily stool softener.     Coronary artery disease/irregular heartbeat  - stable on current cardiac medications.  No changes needed.            [x]  Discussed Patient in detail with nursing/staff, addressed all needs today.     [x]  Plan of Care Reviewed   []  PT/OT Reviewed   [x]  Order Changes  []  Discharge Plans Reviewed  [x]  Advance Directive on file with Nursing Home.   [x]  POA on file with Nursing Home.    [x]  Code Status listed and reviewed.     I confirm accuracy of unchanged data/findings including physical exam and plan which have been carried forward from previous visit, as well as I have updated appropriately those that have changed        Alex Burk DO.  5/16/2023

## 2023-06-06 RX ORDER — GABAPENTIN 100 MG/1
100 CAPSULE ORAL 3 TIMES DAILY
Qty: 90 CAPSULE | Refills: 5 | Status: SHIPPED | OUTPATIENT
Start: 2023-06-06

## 2023-06-06 NOTE — TELEPHONE ENCOUNTER
FRANCISCO REQUESTING MED REFILL FOR GABAPENTIN 100 MG.    DIRECTIONS:GABAPENTIN 100 MG 1 CAP PO TID SCHEDULED.

## 2023-08-09 ENCOUNTER — NURSING HOME (OUTPATIENT)
Dept: FAMILY MEDICINE CLINIC | Facility: CLINIC | Age: 83
End: 2023-08-09
Payer: MEDICARE

## 2023-08-09 VITALS
SYSTOLIC BLOOD PRESSURE: 122 MMHG | TEMPERATURE: 98.4 F | BODY MASS INDEX: 25.7 KG/M2 | WEIGHT: 169 LBS | DIASTOLIC BLOOD PRESSURE: 59 MMHG | HEART RATE: 60 BPM | OXYGEN SATURATION: 97 % | RESPIRATION RATE: 18 BRPM

## 2023-08-09 DIAGNOSIS — Z74.09 IMPAIRED MOBILITY AND ADLS: ICD-10-CM

## 2023-08-09 DIAGNOSIS — F02.B3 MODERATE DEMENTIA DUE TO PARKINSON'S DISEASE, WITH MOOD DISTURBANCE: Chronic | ICD-10-CM

## 2023-08-09 DIAGNOSIS — Z78.9 IMPAIRED MOBILITY AND ADLS: ICD-10-CM

## 2023-08-09 DIAGNOSIS — G20 MODERATE DEMENTIA DUE TO PARKINSON'S DISEASE, WITH MOOD DISTURBANCE: Chronic | ICD-10-CM

## 2023-08-09 DIAGNOSIS — R63.5 WEIGHT GAIN: ICD-10-CM

## 2023-08-09 DIAGNOSIS — R60.0 BILATERAL LOWER EXTREMITY EDEMA: Primary | ICD-10-CM

## 2023-08-09 NOTE — LETTER
Telemedicine nursing Home Progress Note        Mitul Stephen DO []  [address] [place] [address]  Phone: [phone]  Fax: [phone] Inés Vega MD []  [address] [x]   [address] [place] [address]  Phone: [phone]  Fax: [phone]     PATIENT NAME: Gama Davila                                                                          YOB: 1940           DATE OF SERVICE: 8/9/2023  FACILITY:  [] [place]   [] [facility]   [] [facility]   [x] [facility]  []  [facility]  [] Other     ______________________________________________________________________   CHIEF COMPLAINT:    Bilateral lower extremity edema and weight gain.     HISTORY OF PRESENT ILLNESS:     Nursing reports that patient  had a 6 pound weight gain in the past month. He was re-weighed and still 4 pound weight gain. He has not had any increase in intake but does have some increased lower extremity edema. He does not have edema elsewhere. He does not have any shortness of breath or increased work of breathing. He does sit up often in his wheelchair and has dependent edema. He does have history of this, often has dependent edema but edema has increased the past few days. He has no complaints today but is a poor historian related to his dementia.  He has no signs and symptoms of any distress however.       PAST MEDICAL & SURGICAL HISTORY:   Past Medical History:   Diagnosis Date    Arthritis     Cancer     SKIN     Cataract     Coronary artery disease     Diabetes mellitus     High cholesterol     [place] (hard of hearing)     PATIENT HAS HEARING AIDS    Hypertension     Irregular heart beat     HISTORY OF CARDIAC ABLATION IN 2003    Wears dentures     FULL UPPER PLATE      Past Surgical History:   Procedure Laterality Date    BACK SURGERY  1978    THEN AGAIN IN 1982    CARDIAC ABLATION  2003    CARDIAC SURGERY      CATARACT EXTRACTION W/ INTRAOCULAR LENS  IMPLANT Left 2017    Procedure: CATARACT PHACO EXTRACTION WITH INTRAOCULAR LENS IMPLANT LEFT WITH TORIC LENS;  Surgeon: Alma Benavidez MD;  Location: [facility];  Service:     CATARACT EXTRACTION W/ INTRAOCULAR LENS IMPLANT Right 2017    Procedure: CATARACT PHACO EXTRACTION WITH INTRAOCULAR LENS IMPLANT RIGHT WITH TORIC LENS;  Surgeon: Alma Benavidez MD;  Location: [facility];  Service:     CORONARY ARTERY BYPASS GRAFT      SPOUSE UNSURE OF HOW MANY VESSELS    HIATAL HERNIA REPAIR  1972    JOINT REPLACEMENT Left     HIP - DONE BY DR DALAL    KNEE ARTHROSCOPY Left     NOSE SURGERY  1970    SPOUSE REPORTS FOR A BROKEN NOSE    OTHER SURGICAL HISTORY Left     TENDON TORN LOOSE FROM BONE, LEFT ELBOW    OTHER SURGICAL HISTORY      RUPTURED DISC    OTHER SURGICAL HISTORY      NECK SURGERY FOR RUPTURED DISC    OTHER SURGICAL HISTORY      HAD SECOND NECK SURGERY    OTHER SURGICAL HISTORY      RUPTURED DISC    OTHER SURGICAL HISTORY      RUPTURED DISC    OTHER SURGICAL HISTORY      HARDWARE REMOVAL FROM BACK BY DR HAY         MEDICATIONS:  I have reviewed and reconciled the patients medication list in the patients chart at the skilled nursing facility today.      ALLERGIES:  Allergies   Allergen Reactions    Phenergan [Promethazine Hcl] Nausea And Vomiting    Carbamazepine Unknown - Low Severity    Hydrocodone Unknown - Low Severity    Lisinopril Unknown - Low Severity         SOCIAL HISTORY:  Social History     Socioeconomic History    Marital status:    Tobacco Use    Smoking status: Former     Types: Cigarettes     Quit date:      Years since quittin.6    Smokeless tobacco: Never   Substance and Sexual Activity    Alcohol use: No    Drug use: No    Sexual activity: Defer       FAMILY HISTORY:  No family history on file.    REVIEW OF SYSTEMS:  Review of Systems   Unable to perform ROS: Dementia (ROS per nursing and patient)   Constitutional:   Negative for activity change, appetite change, chills, diaphoresis, fatigue and fever.   HENT:  Negative for congestion, mouth sores, nosebleeds, rhinorrhea and trouble swallowing.    Respiratory:  Negative for cough, chest tightness and shortness of breath.    Cardiovascular:  Positive for leg swelling. Negative for chest pain and palpitations.   Gastrointestinal:  Negative for abdominal distention, abdominal pain, blood in stool, constipation, diarrhea and vomiting.   Genitourinary:  Negative for difficulty urinating and frequency.        Incontinence   Musculoskeletal:  Positive for arthralgias (chronic) and gait problem.   Skin:  Negative for rash and wound.   Neurological:  Positive for weakness.   Psychiatric/Behavioral:  Positive for confusion. Negative for agitation, behavioral problems, dysphoric mood, sleep disturbance and suicidal ideas. The patient is not nervous/anxious and is not hyperactive.         PHYSICAL EXAMINATION:     VITAL SIGNS:  /59   Pulse 60   Temp 98.4 øF (36.9 øC)   Resp 18   Wt 76.7 kg (169 lb)   SpO2 97%   BMI 25.70 kg/mý     Physical Exam   HENT:   Head: Normocephalic and atraumatic.   Nose: Nose normal.   Eyes: Conjunctivae are normal.   Cardiovascular: Normal rate, regular rhythm and normal heart sounds.   Pulmonary/Chest: Effort normal and breath sounds normal. No respiratory distress. He has no decreased breath sounds. He has no wheezes.   Abdominal: Bowel sounds are normal. There is no abdominal tenderness.   Musculoskeletal:      Right lower le+ Edema present.      Left lower le+ Edema present.   Neurological: He is alert. He is disoriented.   Psychiatric: His behavior is normal. Mood normal. Cognition and memory are impaired. He has a flat affect.   Nursing note and vitals reviewed.    RECORDS REVIEW:   Most recent labs    ASSESSMENT     Diagnoses and all orders for this visit:    1. Bilateral lower extremity edema (Primary)    2. Weight gain    3. 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 Impaired mobility and ADLs        PLAN    BLE edema/weight gain  -Bumex 1 mg po bid x 3 days at 0800 and 1600. Apply DOMENICO hose while awake, off at night. Keep legs elevated when possible. Get BMP Monday. Nursing to re-weigh next week.  Will follow up and continue to monitor.     Dementia behaviors, appetite stable.     Nursing encouraged to keep me informed of any acute changes, amanda any new concerning symptoms.    Staff to continue supportive care for all ADLs.     [x]  Discussed Patient in detail with nursing/staff, addressed all needs today.     [x]  Plan of Care Reviewed   [x]  PT/OT Reviewed   []  Order Changes  []  Discharge Plans Reviewed  [x]  Advance Directive on file with Nursing Home.   [x]  POA on file with Nursing Home.    [x]  Code Status listed and reviewed.     I spent 30 minutes caring for Gama on this date of service. This time includes time spent by me in the following activities:preparing for the visit, reviewing tests, obtaining and/or reviewing a separately obtained history, performing a medically appropriate examination and/or evaluation , counseling and educating the patient/family/caregiver, ordering medications, tests, or procedures, referring and communicating with other health care professionals , documenting information in the medical record, independently interpreting results and communicating that information with the patient/family/caregiver, and care coordination     "I confirm accuracy of unchanged data/findings which have been carried forward from previous visit, as well as I have updated appropriately those that have changed."                                  Charleen Randall, APRN.  8/11/2023      "

## 2023-08-11 NOTE — PROGRESS NOTES
Telemedicine nursing Home Progress Note        Mitul Stephen DO []  ALESSANDRA Ho [x] [place] [address]  Phone: [phone]  Fax: [phone] Inés eVga MD []  ALESSANDRA Ho [x]   [address] [place] [address]  Phone: [phone]  Fax: [phone]     PATIENT NAME: Gama Davila                                                                          YOB: 1940           DATE OF SERVICE: 8/9/2023  FACILITY:  [] [place]   [] [place]   [] [facility]   [x] [facility]  []  [facility]  [] Other     ______________________________________________________________________   CHIEF COMPLAINT:    Bilateral lower extremity edema and weight gain.     HISTORY OF PRESENT ILLNESS:     Nursing reports that patient  had a 6 pound weight gain in the past month. He was re-weighed and still 4 pound weight gain. He has not had any increase in intake but does have some increased lower extremity edema. He does not have edema elsewhere. He does not have any shortness of breath or increased work of breathing. He does sit up often in his wheelchair and has dependent edema. He does have history of this, often has dependent edema but edema has increased the past few days. He has no complaints today but is a poor historian related to his dementia.  He has no signs and symptoms of any distress however.       PAST MEDICAL & SURGICAL HISTORY:   Past Medical History:   Diagnosis Date    Arthritis     Cancer     SKIN     Cataract     Coronary artery disease     Diabetes mellitus     High cholesterol     [place] (hard of hearing)     PATIENT HAS HEARING AIDS    Hypertension     Irregular heart beat     HISTORY OF CARDIAC ABLATION IN 2003    Wears dentures     FULL UPPER PLATE      Past Surgical History:   Procedure Laterality Date    BACK SURGERY  1978    THEN AGAIN IN 1982    CARDIAC ABLATION  2003    CARDIAC SURGERY      CATARACT EXTRACTION W/ INTRAOCULAR LENS  IMPLANT Left 2017    Procedure: CATARACT PHACO EXTRACTION WITH INTRAOCULAR LENS IMPLANT LEFT WITH TORIC LENS;  Surgeon: Alma Benavidez MD;  Location: [facility];  Service:     CATARACT EXTRACTION W/ INTRAOCULAR LENS IMPLANT Right 2017    Procedure: CATARACT PHACO EXTRACTION WITH INTRAOCULAR LENS IMPLANT RIGHT WITH TORIC LENS;  Surgeon: Alma Benavidez MD;  Location: [facility];  Service:     CORONARY ARTERY BYPASS GRAFT      SPOUSE UNSURE OF HOW MANY VESSELS    HIATAL HERNIA REPAIR  1972    JOINT REPLACEMENT Left     HIP - DONE BY DR DALAL    KNEE ARTHROSCOPY Left     NOSE SURGERY  1970    SPOUSE REPORTS FOR A BROKEN NOSE    OTHER SURGICAL HISTORY Left     TENDON TORN LOOSE FROM BONE, LEFT ELBOW    OTHER SURGICAL HISTORY      RUPTURED DISC    OTHER SURGICAL HISTORY      NECK SURGERY FOR RUPTURED DISC    OTHER SURGICAL HISTORY      HAD SECOND NECK SURGERY    OTHER SURGICAL HISTORY      RUPTURED DISC    OTHER SURGICAL HISTORY      RUPTURED DISC    OTHER SURGICAL HISTORY      HARDWARE REMOVAL FROM BACK BY DR HAY         MEDICATIONS:  I have reviewed and reconciled the patients medication list in the patients chart at the skilled nursing facility today.      ALLERGIES:  Allergies   Allergen Reactions    Phenergan [Promethazine Hcl] Nausea And Vomiting    Carbamazepine Unknown - Low Severity    Hydrocodone Unknown - Low Severity    Lisinopril Unknown - Low Severity         SOCIAL HISTORY:  Social History     Socioeconomic History    Marital status:    Tobacco Use    Smoking status: Former     Types: Cigarettes     Quit date:      Years since quittin.6    Smokeless tobacco: Never   Substance and Sexual Activity    Alcohol use: No    Drug use: No    Sexual activity: Defer       FAMILY HISTORY:  No family history on file.    REVIEW OF SYSTEMS:  Review of Systems   Unable to perform ROS: Dementia (ROS per nursing and patient)   Constitutional:   Negative for activity change, appetite change, chills, diaphoresis, fatigue and fever.   HENT:  Negative for congestion, mouth sores, nosebleeds, rhinorrhea and trouble swallowing.    Respiratory:  Negative for cough, chest tightness and shortness of breath.    Cardiovascular:  Positive for leg swelling. Negative for chest pain and palpitations.   Gastrointestinal:  Negative for abdominal distention, abdominal pain, blood in stool, constipation, diarrhea and vomiting.   Genitourinary:  Negative for difficulty urinating and frequency.        Incontinence   Musculoskeletal:  Positive for arthralgias (chronic) and gait problem.   Skin:  Negative for rash and wound.   Neurological:  Positive for weakness.   Psychiatric/Behavioral:  Positive for confusion. Negative for agitation, behavioral problems, dysphoric mood, sleep disturbance and suicidal ideas. The patient is not nervous/anxious and is not hyperactive.         PHYSICAL EXAMINATION:     VITAL SIGNS:  /59   Pulse 60   Temp 98.4 øF (36.9 øC)   Resp 18   Wt 76.7 kg (169 lb)   SpO2 97%   BMI 25.70 kg/mý     Physical Exam   HENT:   Head: Normocephalic and atraumatic.   Nose: Nose normal.   Eyes: Conjunctivae are normal.   Cardiovascular: Normal rate, regular rhythm and normal heart sounds.   Pulmonary/Chest: Effort normal and breath sounds normal. No respiratory distress. He has no decreased breath sounds. He has no wheezes.   Abdominal: Bowel sounds are normal. There is no abdominal tenderness.   Musculoskeletal:      Right lower le+ Edema present.      Left lower le+ Edema present.   Neurological: He is alert. He is disoriented.   Psychiatric: His behavior is normal. Mood normal. Cognition and memory are impaired. He has a flat affect.   Nursing note and vitals reviewed.    RECORDS REVIEW:   Most recent labs    ASSESSMENT     Diagnoses and all orders for this visit:    1. Bilateral lower extremity edema (Primary)    2. Weight gain    3. 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 Impaired mobility and ADLs        PLAN    BLE edema/weight gain  -Bumex 1 mg po bid x 3 days at 0800 and 1600. Apply DOMENICO hose while awake, off at night. Keep legs elevated when possible. Get BMP Monday. Nursing to re-weigh next week.  Will follow up and continue to monitor.     Dementia behaviors, appetite stable.     Nursing encouraged to keep me informed of any acute changes, amanda any new concerning symptoms.    Staff to continue supportive care for all ADLs.     [x]  Discussed Patient in detail with nursing/staff, addressed all needs today.     [x]  Plan of Care Reviewed   [x]  PT/OT Reviewed   []  Order Changes  []  Discharge Plans Reviewed  [x]  Advance Directive on file with Nursing Home.   [x]  POA on file with Nursing Home.    [x]  Code Status listed and reviewed.     I spent 30 minutes caring for Gama on this date of service. This time includes time spent by me in the following activities:preparing for the visit, reviewing tests, obtaining and/or reviewing a separately obtained history, performing a medically appropriate examination and/or evaluation , counseling and educating the patient/family/caregiver, ordering medications, tests, or procedures, referring and communicating with other health care professionals , documenting information in the medical record, independently interpreting results and communicating that information with the patient/family/caregiver, and care coordination     "I confirm accuracy of unchanged data/findings which have been carried forward from previous visit, as well as I have updated appropriately those that have changed."                                  Charleen Randall, APRN.  8/11/2023    "

## 2023-09-25 NOTE — PROGRESS NOTES
Nursing Home Progress Note        Alex Burk DO   [address] [place] [address] Phone: [phone]  Fax: [phone]     PATIENT NAME: Gama Davila                                                                          YOB: 1940           DATE OF SERVICE: 09/26/2023  FACILITY:  [facility]   ______________________________________________________________________     CHIEF COMPLAINT:  Chronic Medical Management      HISTORY OF PRESENT ILLNESS:   Patient has been doing well over the last few months.  Mood and behaviors have been very stable per nursing staff.  No outbursts.  Patient was resting comfortably in his bed and was unable to provide any clear history due to his dementia.    PAST MEDICAL & SURGICAL HISTORY:   Past Medical History:   Diagnosis Date    Arthritis     Cancer     SKIN     Cataract     Coronary artery disease     Diabetes mellitus     High cholesterol     [place] (hard of hearing)     PATIENT HAS HEARING AIDS    Hypertension     Irregular heart beat     HISTORY OF CARDIAC ABLATION IN 2003    Wears dentures     FULL UPPER PLATE      Past Surgical History:   Procedure Laterality Date    BACK SURGERY  1978    THEN AGAIN IN 1982    CARDIAC ABLATION  2003    CARDIAC SURGERY      CATARACT EXTRACTION W/ INTRAOCULAR LENS IMPLANT Left 5/24/2017    Procedure: CATARACT PHACO EXTRACTION WITH INTRAOCULAR LENS IMPLANT LEFT WITH TORIC LENS;  Surgeon: Alma Benavidez MD;  Location: [facility] OR;  Service:     CATARACT EXTRACTION W/ INTRAOCULAR LENS IMPLANT Right 6/14/2017    Procedure: CATARACT PHACO EXTRACTION WITH INTRAOCULAR LENS IMPLANT RIGHT WITH TORIC LENS;  Surgeon: Alma Benavidez MD;  Location: [facility] OR;  Service:     CORONARY ARTERY BYPASS GRAFT  2003    SPOUSE UNSURE OF HOW MANY VESSELS    HIATAL HERNIA REPAIR  1972    JOINT REPLACEMENT Left     HIP - DONE BY DR DALAL    KNEE ARTHROSCOPY Left     NOSE SURGERY  1970    SPOUSE REPORTS FOR A  BROKEN NOSE    OTHER SURGICAL HISTORY Left     TENDON TORN LOOSE FROM BONE, LEFT ELBOW    OTHER SURGICAL HISTORY      RUPTURED DISC    OTHER SURGICAL HISTORY      NECK SURGERY FOR RUPTURED DISC    OTHER SURGICAL HISTORY      HAD SECOND NECK SURGERY    OTHER SURGICAL HISTORY      RUPTURED DISC    OTHER SURGICAL HISTORY      RUPTURED DISC    OTHER SURGICAL HISTORY      HARDWARE REMOVAL FROM BACK BY DR HAY         MEDICATIONS:  I have reviewed and reconciled the patients medication list in the patients chart at the skilled nursing facility today, 2023.      ALLERGIES:  Allergies   Allergen Reactions    Phenergan [Promethazine Hcl] Nausea And Vomiting    Carbamazepine Unknown - Low Severity    Hydrocodone Unknown - Low Severity    Lisinopril Unknown - Low Severity         SOCIAL HISTORY:  Social History     Socioeconomic History    Marital status:    Tobacco Use    Smoking status: Former     Types: Cigarettes     Quit date:      Years since quittin.7    Smokeless tobacco: Never   Substance and Sexual Activity    Alcohol use: No    Drug use: No    Sexual activity: Defer       FAMILY HISTORY:  History reviewed. No pertinent family history.    REVIEW OF SYSTEMS:  Review of Systems   Unable to perform ROS: Dementia        PHYSICAL EXAMINATION:     VITAL SIGNS:  /74   Pulse 90   Temp 96.1 °F (35.6 °C)   Resp 18   Wt 75.8 kg (167 lb)   SpO2 97%   BMI 25.39 kg/m²     Physical Exam  Vitals and nursing note reviewed.   Constitutional:       General: He is not in acute distress.     Appearance: Normal appearance. He is well-developed.      Comments: Elderly male resting comfortably in bed.   HENT:      Head: Normocephalic and atraumatic.   Eyes:      Extraocular Movements: Extraocular movements intact.      Conjunctiva/sclera: Conjunctivae normal.   Cardiovascular:      Rate and Rhythm: Normal rate and regular rhythm.      Pulses: Normal pulses.      Heart sounds: Normal  heart sounds. No murmur heard.  Pulmonary:      Effort: Pulmonary effort is normal.      Breath sounds: Normal breath sounds.   Abdominal:      General: Abdomen is flat.      Palpations: Abdomen is soft.   Musculoskeletal:      Cervical back: Normal range of motion and neck supple.   Skin:     General: Skin is warm and dry.      Findings: No rash.   Neurological:      General: No focal deficit present.      Mental Status: He is alert. Mental status is at baseline. He is disoriented.   Psychiatric:      Comments: Slow cognition and ability to respond to questions.     RECORDS REVIEW:        ASSESSMENT     Diagnoses and all orders for this visit:    1. Polypharmacy (Primary)    2. Dementia due to Parkinson's disease with behavioral disturbance    3. Essential hypertension    4. Fall, subsequent encounter    5. Coronary artery disease involving native heart with angina pectoris, unspecified vessel or lesion type    6. Late onset Alzheimer's disease without behavioral disturbance    7. Impaired mobility and ADLs    8. Type 2 diabetes mellitus with diabetic polyneuropathy, without long-term current use of insulin    9. Bilateral lower extremity edema        PLAN  Mood disorder  - Due to stable moods over the last few months.  It is reasonable to try dose reduction of Seroquel.  Reduce Seroquel nightly dose to 75 mg p.o. and continue 25 mg daily.  - Continue Celexa    Hyperlipidemia  - Stop atorvastatin for concerns of polypharmacy in setting of progressive dementia.    Dementia due to Parkinson's disease  -  Behaviors remain stable with Sinemet, memantine, and donepezil.     Mood disorder  -Mood and behaviors remain controlled with Seroquel 100QHS and 25 QAM along with depakote.    - cont celexa 20mg      Type 2 diabetes mellitus   -Glucose log reviewed in facility.  Fair control at this time.  A1c 7.3 3/23/2023  -Obtain labs every 3 months: CBC, CMP and A1c  -Stable control on metformin and basaglar      Falls  -No  issues recently.  Restorative therapy is following.    BPH  - cont Tamsulosin    Constipation  - on daily stool softener.     Coronary artery disease/irregular heartbeat/CHF  - Euvolemic at this time  - stable on current cardiac medications.           [x]  Discussed Patient in detail with nursing/staff, addressed all needs today.     [x]  Plan of Care Reviewed   []  PT/OT Reviewed   [x]  Order Changes  []  Discharge Plans Reviewed  [x]  Advance Directive on file with Nursing Home.   [x]  POA on file with Nursing Home.    [x]  Code Status listed and reviewed.     I confirm accuracy of unchanged data/findings including physical exam and plan which have been carried forward from previous visit, as well as I have updated appropriately those that have changed        Alex Burk DO.  10/2/2023

## 2023-09-26 ENCOUNTER — NURSING HOME (OUTPATIENT)
Dept: INTERNAL MEDICINE | Facility: CLINIC | Age: 83
End: 2023-09-26
Payer: MEDICARE

## 2023-09-26 VITALS
BODY MASS INDEX: 25.39 KG/M2 | HEART RATE: 90 BPM | WEIGHT: 167 LBS | DIASTOLIC BLOOD PRESSURE: 74 MMHG | OXYGEN SATURATION: 97 % | SYSTOLIC BLOOD PRESSURE: 138 MMHG | TEMPERATURE: 96.1 F | RESPIRATION RATE: 18 BRPM

## 2023-09-26 DIAGNOSIS — I10 ESSENTIAL HYPERTENSION: ICD-10-CM

## 2023-09-26 DIAGNOSIS — F02.818 DEMENTIA DUE TO PARKINSON'S DISEASE WITH BEHAVIORAL DISTURBANCE: ICD-10-CM

## 2023-09-26 DIAGNOSIS — G20.A1 DEMENTIA DUE TO PARKINSON'S DISEASE WITH BEHAVIORAL DISTURBANCE: ICD-10-CM
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
## 2023-09-26 DIAGNOSIS — I25.119 CORONARY ARTERY DISEASE INVOLVING NATIVE HEART WITH ANGINA PECTORIS, UNSPECIFIED VESSEL OR LESION TYPE: ICD-10-CM

## 2023-09-26 DIAGNOSIS — F02.80 LATE ONSET ALZHEIMER'S DISEASE WITHOUT BEHAVIORAL DISTURBANCE: ICD-10-CM

## 2023-09-26 DIAGNOSIS — Z79.899 POLYPHARMACY: Primary | ICD-10-CM

## 2023-09-26 DIAGNOSIS — Z78.9 IMPAIRED MOBILITY AND ADLS: ICD-10-CM

## 2023-09-26 DIAGNOSIS — R60.0 BILATERAL LOWER EXTREMITY EDEMA: ICD-10-CM

## 2023-09-26 DIAGNOSIS — Z74.09 IMPAIRED MOBILITY AND ADLS: ICD-10-CM

## 2023-09-26 DIAGNOSIS — G30.1 LATE ONSET ALZHEIMER'S DISEASE WITHOUT BEHAVIORAL DISTURBANCE: ICD-10-CM

## 2023-09-26 DIAGNOSIS — E11.42 TYPE 2 DIABETES MELLITUS WITH DIABETIC POLYNEUROPATHY, WITHOUT LONG-TERM CURRENT USE OF INSULIN: ICD-10-CM

## 2023-09-26 NOTE — LETTER
Nursing Home Progress Note        Alex Burk DO   [address] [place] [address] Phone: [phone]  Fax: [phone]     PATIENT NAME: Gama Davila                                                                          YOB: 1940           DATE OF SERVICE: 09/26/2023  FACILITY:  [facility]   ______________________________________________________________________     CHIEF COMPLAINT:  Chronic Medical Management      HISTORY OF PRESENT ILLNESS:   Patient has been doing well over the last few months.  Mood and behaviors have been very stable per nursing staff.  No outbursts.  Patient was resting comfortably in his bed and was unable to provide any clear history due to his dementia.    PAST MEDICAL & SURGICAL HISTORY:   Past Medical History:   Diagnosis Date    Arthritis     Cancer     SKIN     Cataract     Coronary artery disease     Diabetes mellitus     High cholesterol     [place] (hard of hearing)     PATIENT HAS HEARING AIDS    Hypertension     Irregular heart beat     HISTORY OF CARDIAC ABLATION IN 2003    Wears dentures     FULL UPPER PLATE      Past Surgical History:   Procedure Laterality Date    BACK SURGERY  1978    THEN AGAIN IN 1982    CARDIAC ABLATION  2003    CARDIAC SURGERY      CATARACT EXTRACTION W/ INTRAOCULAR LENS IMPLANT Left 5/24/2017    Procedure: CATARACT PHACO EXTRACTION WITH INTRAOCULAR LENS IMPLANT LEFT WITH TORIC LENS;  Surgeon: Alma Benavidez MD;  Location: [facility] OR;  Service:     CATARACT EXTRACTION W/ INTRAOCULAR LENS IMPLANT Right 6/14/2017    Procedure: CATARACT PHACO EXTRACTION WITH INTRAOCULAR LENS IMPLANT RIGHT WITH TORIC LENS;  Surgeon: Alma Benavidez MD;  Location: [facility] OR;  Service:     CORONARY ARTERY BYPASS GRAFT  2003    SPOUSE UNSURE OF HOW MANY VESSELS    HIATAL HERNIA REPAIR  1972    JOINT REPLACEMENT Left     HIP - DONE BY DR DALAL    KNEE ARTHROSCOPY Left     NOSE SURGERY  1970    SPOUSE REPORTS FOR A  BROKEN NOSE    OTHER SURGICAL HISTORY Left     TENDON TORN LOOSE FROM BONE, LEFT ELBOW    OTHER SURGICAL HISTORY      RUPTURED DISC    OTHER SURGICAL HISTORY      NECK SURGERY FOR RUPTURED DISC    OTHER SURGICAL HISTORY      HAD SECOND NECK SURGERY    OTHER SURGICAL HISTORY      RUPTURED DISC    OTHER SURGICAL HISTORY      RUPTURED DISC    OTHER SURGICAL HISTORY      HARDWARE REMOVAL FROM BACK BY DR HAY         MEDICATIONS:  I have reviewed and reconciled the patients medication list in the patients chart at the skilled nursing facility today, 2023.      ALLERGIES:  Allergies   Allergen Reactions    Phenergan [Promethazine Hcl] Nausea And Vomiting    Carbamazepine Unknown - Low Severity    Hydrocodone Unknown - Low Severity    Lisinopril Unknown - Low Severity         SOCIAL HISTORY:  Social History     Socioeconomic History    Marital status:    Tobacco Use    Smoking status: Former     Types: Cigarettes     Quit date:      Years since quittin.7    Smokeless tobacco: Never   Substance and Sexual Activity    Alcohol use: No    Drug use: No    Sexual activity: Defer       FAMILY HISTORY:  History reviewed. No pertinent family history.    REVIEW OF SYSTEMS:  Review of Systems   Unable to perform ROS: Dementia        PHYSICAL EXAMINATION:     VITAL SIGNS:  /74   Pulse 90   Temp 96.1 °F (35.6 °C)   Resp 18   Wt 75.8 kg (167 lb)   SpO2 97%   BMI 25.39 kg/m²     Physical Exam  Vitals and nursing note reviewed.   Constitutional:       General: He is not in acute distress.     Appearance: Normal appearance. He is well-developed.      Comments: Elderly male resting comfortably in bed.   HENT:      Head: Normocephalic and atraumatic.   Eyes:      Extraocular Movements: Extraocular movements intact.      Conjunctiva/sclera: Conjunctivae normal.   Cardiovascular:      Rate and Rhythm: Normal rate and regular rhythm.      Pulses: Normal pulses.      Heart sounds: Normal  heart sounds. No murmur heard.  Pulmonary:      Effort: Pulmonary effort is normal.      Breath sounds: Normal breath sounds.   Abdominal:      General: Abdomen is flat.      Palpations: Abdomen is soft.   Musculoskeletal:      Cervical back: Normal range of motion and neck supple.   Skin:     General: Skin is warm and dry.      Findings: No rash.   Neurological:      General: No focal deficit present.      Mental Status: He is alert. Mental status is at baseline. He is disoriented.   Psychiatric:      Comments: Slow cognition and ability to respond to questions.     RECORDS REVIEW:        ASSESSMENT     Diagnoses and all orders for this visit:    1. Polypharmacy (Primary)    2. Dementia due to Parkinson's disease with behavioral disturbance    3. Essential hypertension    4. Fall, subsequent encounter    5. Coronary artery disease involving native heart with angina pectoris, unspecified vessel or lesion type    6. Late onset Alzheimer's disease without behavioral disturbance    7. Impaired mobility and ADLs    8. Type 2 diabetes mellitus with diabetic polyneuropathy, without long-term current use of insulin    9. Bilateral lower extremity edema        PLAN  Mood disorder  - Due to stable moods over the last few months.  It is reasonable to try dose reduction of Seroquel.  Reduce Seroquel nightly dose to 75 mg p.o. and continue 25 mg daily.  - Continue Celexa    Hyperlipidemia  - Stop atorvastatin for concerns of polypharmacy in setting of progressive dementia.    Dementia due to Parkinson's disease  -  Behaviors remain stable with Sinemet, memantine, and donepezil.     Mood disorder  -Mood and behaviors remain controlled with Seroquel 100QHS and 25 QAM along with depakote.    - cont celexa 20mg      Type 2 diabetes mellitus   -Glucose log reviewed in facility.  Fair control at this time.  A1c 7.3 3/23/2023  -Obtain labs every 3 months: CBC, CMP and A1c  -Stable control on metformin and basaglar      Falls  -No  issues recently.  Restorative therapy is following.    BPH  - cont Tamsulosin    Constipation  - on daily stool softener.     Coronary artery disease/irregular heartbeat/CHF  - Euvolemic at this time  - stable on current cardiac medications.           [x]  Discussed Patient in detail with nursing/staff, addressed all needs today.     [x]  Plan of Care Reviewed   []  PT/OT Reviewed   [x]  Order Changes  []  Discharge Plans Reviewed  [x]  Advance Directive on file with Nursing Home.   [x]  POA on file with Nursing Home.    [x]  Code Status listed and reviewed.     I confirm accuracy of unchanged data/findings including physical exam and plan which have been carried forward from previous visit, as well as I have updated appropriately those that have changed        Alex Burk DO.  10/2/2023

## 2023-10-04 ENCOUNTER — NURSING HOME (OUTPATIENT)
Dept: FAMILY MEDICINE CLINIC | Facility: CLINIC | Age: 83
End: 2023-10-04
Payer: MEDICARE

## 2023-10-04 VITALS
OXYGEN SATURATION: 96 % | BODY MASS INDEX: 24.94 KG/M2 | DIASTOLIC BLOOD PRESSURE: 74 MMHG | TEMPERATURE: 98.2 F | RESPIRATION RATE: 18 BRPM | WEIGHT: 164 LBS | HEART RATE: 88 BPM | SYSTOLIC BLOOD PRESSURE: 138 MMHG

## 2023-10-04 DIAGNOSIS — F02.B3 MODERATE DEMENTIA DUE TO PARKINSON'S DISEASE, WITH MOOD DISTURBANCE: Chronic | ICD-10-CM

## 2023-10-04 DIAGNOSIS — G20.A1 MODERATE DEMENTIA DUE TO PARKINSON'S DISEASE, WITH MOOD DISTURBANCE: Chronic | ICD-10-CM

## 2023-10-04 DIAGNOSIS — J06.9 URI WITH COUGH AND CONGESTION: Primary | ICD-10-CM

## 2023-10-04 DIAGNOSIS — L28.2 PRURITIC RASH: ICD-10-CM

## 2023-10-04 DIAGNOSIS — Z74.09 IMPAIRED MOBILITY AND ADLS: ICD-10-CM

## 2023-10-04 DIAGNOSIS — Z78.9 IMPAIRED MOBILITY AND ADLS: ICD-10-CM

## 2023-10-04 NOTE — LETTER
Telemedicine nursing Home Progress Note        Mitul Stephen DO []  ALESSANDRA Ho [x] [place] [address]  Phone: [phone]  Fax: [phone] Inés Vega MD []  ALESSANDRA Ho [x]   [address] [place] [address]  Phone: [phone]  Fax: [phone]     PATIENT NAME: Gama Davila                                                                          YOB: 1940           DATE OF SERVICE: 10/4/2023  FACILITY:  [] [place]   [] [place]   [] [facility]   [x] [facility]  []  [facility]  [] Other     ______________________________________________________________________     CHIEF COMPLAINT:    Cough and congestion for the past couple days.     Rash to RUE noted today.    HISTORY OF PRESENT ILLNESS:     Nursing reports that patient has had cough and congestion for the past couple days.  He has had this in the past and had bronchitis.  He has not had any fever, hypoxia or shortness of breath.  He does not have any complaints or concerns but is a poor historian related to his dementia.  No signs and symptoms of any distress.    Patient noted to be scratching at right upper arm during visit.  A rash was noted to the upper arm.  Patient did report that the rash itches.  Rash not noted on any other areas of the body.    PAST MEDICAL & SURGICAL HISTORY:   Past Medical History:   Diagnosis Date    Arthritis     Cancer     SKIN     Cataract     Coronary artery disease     Diabetes mellitus     High cholesterol     [place] (hard of hearing)     PATIENT HAS HEARING AIDS    Hypertension     Irregular heart beat     HISTORY OF CARDIAC ABLATION IN 2003    Wears dentures     FULL UPPER PLATE      Past Surgical History:   Procedure Laterality Date    BACK SURGERY  1978    THEN AGAIN IN 1982    CARDIAC ABLATION  2003    CARDIAC SURGERY      CATARACT EXTRACTION W/ INTRAOCULAR LENS IMPLANT Left 5/24/2017    Procedure: CATARACT PHACO EXTRACTION WITH  INTRAOCULAR LENS IMPLANT LEFT WITH TORIC LENS;  Surgeon: Alma Benavidez MD;  Location: [facility] OR;  Service:     CATARACT EXTRACTION W/ INTRAOCULAR LENS IMPLANT Right 2017    Procedure: CATARACT PHACO EXTRACTION WITH INTRAOCULAR LENS IMPLANT RIGHT WITH TORIC LENS;  Surgeon: Alma Benavidez MD;  Location: [facility];  Service:     CORONARY ARTERY BYPASS GRAFT      SPOUSE UNSURE OF HOW MANY VESSELS    HIATAL HERNIA REPAIR  1972    JOINT REPLACEMENT Left     HIP - DONE BY DR DALAL    KNEE ARTHROSCOPY Left     NOSE SURGERY  1970    SPOUSE REPORTS FOR A BROKEN NOSE    OTHER SURGICAL HISTORY Left     TENDON TORN LOOSE FROM BONE, LEFT ELBOW    OTHER SURGICAL HISTORY      RUPTURED DISC    OTHER SURGICAL HISTORY      NECK SURGERY FOR RUPTURED DISC    OTHER SURGICAL HISTORY      HAD SECOND NECK SURGERY    OTHER SURGICAL HISTORY      RUPTURED DISC    OTHER SURGICAL HISTORY      RUPTURED DISC    OTHER SURGICAL HISTORY      HARDWARE REMOVAL FROM BACK BY DR HAY         MEDICATIONS:  I have reviewed and reconciled the patients medication list in the patients chart at the [facility] nursing [facility] today.      ALLERGIES:  Allergies   Allergen Reactions    Phenergan [Promethazine Hcl] Nausea And Vomiting    Carbamazepine Unknown - Low Severity    Hydrocodone Unknown - Low Severity    Lisinopril Unknown - Low Severity         SOCIAL HISTORY:  Social History     Socioeconomic History    Marital status:    Tobacco Use    Smoking status: Former     Types: Cigarettes     Quit date:      Years since quittin.7    Smokeless tobacco: Never   Substance and Sexual Activity    Alcohol use: No    Drug use: No    Sexual activity: Defer       FAMILY HISTORY:  No family history on file.    REVIEW OF SYSTEMS:  Review of Systems   Unable to perform ROS: Dementia (ROS per nursing and patient)   Constitutional:  Negative for activity change, appetite change, chills, diaphoresis, fatigue  and fever.   HENT:  Positive for congestion. Negative for mouth sores, nosebleeds, rhinorrhea and trouble swallowing.    Respiratory:  Positive for cough. Negative for chest tightness and shortness of breath.    Cardiovascular:  Negative for chest pain, palpitations and leg swelling.   Gastrointestinal:  Negative for abdominal distention, abdominal pain, blood in stool, constipation, diarrhea and vomiting.   Genitourinary:  Negative for difficulty urinating and frequency.        Incontinence   Musculoskeletal:  Positive for arthralgias (chronic) and gait problem.   Skin:  Positive for rash (right arm). Negative for wound.   Neurological:  Positive for weakness.   Psychiatric/Behavioral:  Positive for confusion. Negative for agitation, behavioral problems, dysphoric mood, sleep disturbance and suicidal ideas. The patient is not nervous/anxious and is not hyperactive.         PHYSICAL EXAMINATION:     VITAL SIGNS:  /74   Pulse 88   Temp 98.2 °F (36.8 °C)   Resp 18   Wt 74.4 kg (164 lb)   SpO2 96%   BMI 24.94 kg/m²     Physical Exam   HENT:   Head: Normocephalic and atraumatic.   Nose: Nose normal.   Eyes: Conjunctivae are normal.   Cardiovascular: Normal rate, regular rhythm and normal heart sounds.   Pulmonary/Chest: Effort normal. No respiratory distress. He has no decreased breath sounds. He has no wheezes. He has rhonchi in the right upper field and the left upper field.   Abdominal: Bowel sounds are normal. There is no abdominal tenderness.   Neurological: He is alert. He is disoriented.   Skin: Rash noted. Rash is papular.   Scattered rash right upper extremity   Psychiatric: His behavior is normal. Mood normal. His affect is labile. Cognition and memory are impaired.   Nursing note and vitals reviewed.    RECORDS REVIEW:   Most recent labs    ASSESSMENT     Diagnoses and all orders for this visit:    1. URI with cough and congestion (Primary)    2. Pruritic rash    3. Moderate dementia due to  Parkinson's disease, with mood disturbance    4. Impaired mobility and ADLs        PLAN    Cough and congestion  -Will obtain CXR for further evaluation. Will follow up with results. Give Guaifenesin 400 mg po tid x 7 day. Solu Medrol 125 mg IM x 1.      Rash  -Triamcinolone cream bid x 7 days. Solu Medrol injection will help with this as well.     Dementia behaviors, appetite and weight stable.    Nursing encouraged to keep me informed of any acute changes, amanda any new concerning symptoms.    Staff to continue supportive care for all ADLs.     [x]  Discussed Patient in detail with nursing/staff, addressed all needs today.     [x]  Plan of Care Reviewed   [x]  PT/OT Reviewed   []  Order Changes  []  Discharge Plans Reviewed  [x]  Advance Directive on file with Nursing Home.   [x]  POA on file with Nursing Home.    [x]  Code Status listed and reviewed.       “I confirm accuracy of unchanged data/findings which have been carried forward from previous visit, as well as I have updated appropriately those that have changed.”     I spent 30 minutes caring for Gama on this date of service. This time includes time spent by me in the following activities:preparing for the visit, obtaining and/or reviewing a separately obtained history, performing a medically appropriate examination and/or evaluation , counseling and educating the patient/family/caregiver, ordering medications, tests, or procedures, referring and communicating with other health care professionals , documenting information in the medical record, independently interpreting results and communicating that information with the patient/family/caregiver, and care coordination                                  Charleen Randall, APRN.  10/5/2023

## 2023-10-06 NOTE — PROGRESS NOTES
Telemedicine nursing Home Progress Note        Mitul Stephen DO []  ALESSANDRA Ho [x] [place] [address]  Phone: [phone]  Fax: [phone] Inés Vega MD []  ALESSANDRA Ho [x]   [address] [place] [address]  Phone: [phone]  Fax: [phone]     PATIENT NAME: Gama Davila                                                                          YOB: 1940           DATE OF SERVICE: 10/4/2023  FACILITY:  [] [place]   [] [place]   [] [facility]   [x] [facility]  []  [facility]  [] Other     ______________________________________________________________________     CHIEF COMPLAINT:    Cough and congestion for the past couple days.     Rash to RUE noted today.    HISTORY OF PRESENT ILLNESS:     Nursing reports that patient has had cough and congestion for the past couple days.  He has had this in the past and had bronchitis.  He has not had any fever, hypoxia or shortness of breath.  He does not have any complaints or concerns but is a poor historian related to his dementia.  No signs and symptoms of any distress.    Patient noted to be scratching at right upper arm during visit.  A rash was noted to the upper arm.  Patient did report that the rash itches.  Rash not noted on any other areas of the body.    PAST MEDICAL & SURGICAL HISTORY:   Past Medical History:   Diagnosis Date    Arthritis     Cancer     SKIN     Cataract     Coronary artery disease     Diabetes mellitus     High cholesterol     [place] (hard of hearing)     PATIENT HAS HEARING AIDS    Hypertension     Irregular heart beat     HISTORY OF CARDIAC ABLATION IN 2003    Wears dentures     FULL UPPER PLATE      Past Surgical History:   Procedure Laterality Date    BACK SURGERY  1978    THEN AGAIN IN 1982    CARDIAC ABLATION  2003    CARDIAC SURGERY      CATARACT EXTRACTION W/ INTRAOCULAR LENS IMPLANT Left 5/24/2017    Procedure: CATARACT PHACO EXTRACTION WITH  INTRAOCULAR LENS IMPLANT LEFT WITH TORIC LENS;  Surgeon: Alma Benavidez MD;  Location: [facility] OR;  Service:     CATARACT EXTRACTION W/ INTRAOCULAR LENS IMPLANT Right 2017    Procedure: CATARACT PHACO EXTRACTION WITH INTRAOCULAR LENS IMPLANT RIGHT WITH TORIC LENS;  Surgeon: Alma Benavidez MD;  Location: [facility];  Service:     CORONARY ARTERY BYPASS GRAFT      SPOUSE UNSURE OF HOW MANY VESSELS    HIATAL HERNIA REPAIR  1972    JOINT REPLACEMENT Left     HIP - DONE BY DR DALAL    KNEE ARTHROSCOPY Left     NOSE SURGERY  1970    SPOUSE REPORTS FOR A BROKEN NOSE    OTHER SURGICAL HISTORY Left     TENDON TORN LOOSE FROM BONE, LEFT ELBOW    OTHER SURGICAL HISTORY      RUPTURED DISC    OTHER SURGICAL HISTORY      NECK SURGERY FOR RUPTURED DISC    OTHER SURGICAL HISTORY      HAD SECOND NECK SURGERY    OTHER SURGICAL HISTORY      RUPTURED DISC    OTHER SURGICAL HISTORY      RUPTURED DISC    OTHER SURGICAL HISTORY      HARDWARE REMOVAL FROM BACK BY DR HAY         MEDICATIONS:  I have reviewed and reconciled the patients medication list in the patients chart at the [facility] nursing [facility] today.      ALLERGIES:  Allergies   Allergen Reactions    Phenergan [Promethazine Hcl] Nausea And Vomiting    Carbamazepine Unknown - Low Severity    Hydrocodone Unknown - Low Severity    Lisinopril Unknown - Low Severity         SOCIAL HISTORY:  Social History     Socioeconomic History    Marital status:    Tobacco Use    Smoking status: Former     Types: Cigarettes     Quit date:      Years since quittin.7    Smokeless tobacco: Never   Substance and Sexual Activity    Alcohol use: No    Drug use: No    Sexual activity: Defer       FAMILY HISTORY:  No family history on file.    REVIEW OF SYSTEMS:  Review of Systems   Unable to perform ROS: Dementia (ROS per nursing and patient)   Constitutional:  Negative for activity change, appetite change, chills, diaphoresis, fatigue  and fever.   HENT:  Positive for congestion. Negative for mouth sores, nosebleeds, rhinorrhea and trouble swallowing.    Respiratory:  Positive for cough. Negative for chest tightness and shortness of breath.    Cardiovascular:  Negative for chest pain, palpitations and leg swelling.   Gastrointestinal:  Negative for abdominal distention, abdominal pain, blood in stool, constipation, diarrhea and vomiting.   Genitourinary:  Negative for difficulty urinating and frequency.        Incontinence   Musculoskeletal:  Positive for arthralgias (chronic) and gait problem.   Skin:  Positive for rash (right arm). Negative for wound.   Neurological:  Positive for weakness.   Psychiatric/Behavioral:  Positive for confusion. Negative for agitation, behavioral problems, dysphoric mood, sleep disturbance and suicidal ideas. The patient is not nervous/anxious and is not hyperactive.         PHYSICAL EXAMINATION:     VITAL SIGNS:  /74   Pulse 88   Temp 98.2 °F (36.8 °C)   Resp 18   Wt 74.4 kg (164 lb)   SpO2 96%   BMI 24.94 kg/m²     Physical Exam   HENT:   Head: Normocephalic and atraumatic.   Nose: Nose normal.   Eyes: Conjunctivae are normal.   Cardiovascular: Normal rate, regular rhythm and normal heart sounds.   Pulmonary/Chest: Effort normal. No respiratory distress. He has no decreased breath sounds. He has no wheezes. He has rhonchi in the right upper field and the left upper field.   Abdominal: Bowel sounds are normal. There is no abdominal tenderness.   Neurological: He is alert. He is disoriented.   Skin: Rash noted. Rash is papular.   Scattered rash right upper extremity   Psychiatric: His behavior is normal. Mood normal. His affect is labile. Cognition and memory are impaired.   Nursing note and vitals reviewed.    RECORDS REVIEW:   Most recent labs    ASSESSMENT     Diagnoses and all orders for this visit:    1. URI with cough and congestion (Primary)    2. Pruritic rash    3. Moderate dementia due to  Parkinson's disease, with mood disturbance    4. Impaired mobility and ADLs        PLAN    Cough and congestion  -Will obtain CXR for further evaluation. Will follow up with results. Give Guaifenesin 400 mg po tid x 7 day. Solu Medrol 125 mg IM x 1.      Rash  -Triamcinolone cream bid x 7 days. Solu Medrol injection will help with this as well.     Dementia behaviors, appetite and weight stable.    Nursing encouraged to keep me informed of any acute changes, amanda any new concerning symptoms.    Staff to continue supportive care for all ADLs.     [x]  Discussed Patient in detail with nursing/staff, addressed all needs today.     [x]  Plan of Care Reviewed   [x]  PT/OT Reviewed   []  Order Changes  []  Discharge Plans Reviewed  [x]  Advance Directive on file with Nursing Home.   [x]  POA on file with Nursing Home.    [x]  Code Status listed and reviewed.       “I confirm accuracy of unchanged data/findings which have been carried forward from previous visit, as well as I have updated appropriately those that have changed.”     I spent 30 minutes caring for Gama on this date of service. This time includes time spent by me in the following activities:preparing for the visit, obtaining and/or reviewing a separately obtained history, performing a medically appropriate examination and/or evaluation , counseling and educating the patient/family/caregiver, ordering medications, tests, or procedures, referring and communicating with other health care professionals , documenting information in the medical record, independently interpreting results and communicating that information with the patient/family/caregiver, and care coordination                                  Charleen Randall, APRN.  10/5/2023

## 2023-10-10 RX ORDER — GABAPENTIN 100 MG/1
100 CAPSULE ORAL 3 TIMES DAILY
Qty: 90 CAPSULE | Refills: 5 | Status: SHIPPED | OUTPATIENT
Start: 2023-10-10

## 2023-11-06 NOTE — PROGRESS NOTES
Nursing Home Progress Note        Alex Bhargavi,    [address] [place] [address] Phone: [phone]  Fax: [phone]     PATIENT NAME: Gama Davila                                                                          YOB: 1940           DATE OF SERVICE: 11/07/2023  FACILITY:  [facility]   ______________________________________________________________________     CHIEF COMPLAINT:  Chronic Medical Management      HISTORY OF PRESENT ILLNESS:   Patient was seen for routine compliance visit.  On exam today, patient was laying comfortably in his bed smiling and content with his care overall.  There was concerns of a possible rash mentioned by nurse last night.  On my exam today, I did not see any significant lesions.  He had some subtle redness from laying on his back and warm sheets.  As far as his mood and behaviors, nurses report appropriate behaviors.  Patient shared that he felt well and was happy.  He was open to stopping Celexa.    PAST MEDICAL & SURGICAL HISTORY:   Past Medical History:   Diagnosis Date    Arthritis     Cancer     SKIN     Cataract     Coronary artery disease     Diabetes mellitus     High cholesterol     [place] (hard of hearing)     PATIENT HAS HEARING AIDS    Hypertension     Irregular heart beat     HISTORY OF CARDIAC ABLATION IN 2003    Wears dentures     FULL UPPER PLATE      Past Surgical History:   Procedure Laterality Date    BACK SURGERY  1978    THEN AGAIN IN 1982    CARDIAC ABLATION  2003    CARDIAC SURGERY      CATARACT EXTRACTION W/ INTRAOCULAR LENS IMPLANT Left 5/24/2017    Procedure: CATARACT PHACO EXTRACTION WITH INTRAOCULAR LENS IMPLANT LEFT WITH TORIC LENS;  Surgeon: Alma Benavidez MD;  [facility];  Service:     CATARACT EXTRACTION W/ INTRAOCULAR LENS IMPLANT Right 6/14/2017    Procedure: CATARACT PHACO EXTRACTION WITH INTRAOCULAR LENS IMPLANT RIGHT WITH TORIC LENS;  Surgeon: Alma Benavidez MD;   Location: [facility] OR;  Service:     CORONARY ARTERY BYPASS GRAFT      SPOUSE UNSURE OF HOW MANY VESSELS    HIATAL HERNIA REPAIR  1972    JOINT REPLACEMENT Left     HIP - DONE BY DR DALAL    KNEE ARTHROSCOPY Left     NOSE SURGERY  1970    SPOUSE REPORTS FOR A BROKEN NOSE    OTHER SURGICAL HISTORY Left     TENDON TORN LOOSE FROM BONE, LEFT ELBOW    OTHER SURGICAL HISTORY      RUPTURED DISC    OTHER SURGICAL HISTORY      NECK SURGERY FOR RUPTURED DISC    OTHER SURGICAL HISTORY      HAD SECOND NECK SURGERY    OTHER SURGICAL HISTORY      RUPTURED DISC    OTHER SURGICAL HISTORY      RUPTURED DISC    OTHER SURGICAL HISTORY      HARDWARE REMOVAL FROM BACK BY DR HAY         MEDICATIONS:  I have reviewed and reconciled the patients medication list in the patients chart at the skilled nursing facility today, 2023.      ALLERGIES:  Allergies   Allergen Reactions    Phenergan [Promethazine Hcl] Nausea And Vomiting    Carbamazepine Unknown - Low Severity    Hydrocodone Unknown - Low Severity    Lisinopril Unknown - Low Severity         SOCIAL HISTORY:  Social History     Socioeconomic History    Marital status:    Tobacco Use    Smoking status: Former     Types: Cigarettes     Quit date:      Years since quittin.8    Smokeless tobacco: Never   Substance and Sexual Activity    Alcohol use: No    Drug use: No    Sexual activity: Defer       FAMILY HISTORY:  No family history on file.    REVIEW OF SYSTEMS:  Review of Systems   Unable to perform ROS: Dementia          PHYSICAL EXAMINATION:     VITAL SIGNS:  /65   Pulse (!) 48 Comment: VITALS EMAILED TO THIS FACILITY  Temp 97 °F (36.1 °C)   Resp 14   Wt 74.4 kg (164 lb)   SpO2 94%   BMI 24.94 kg/m²     Physical Exam  Vitals and nursing note reviewed.   Constitutional:       General: He is not in acute distress.     Appearance: Normal appearance. He is well-developed.      Comments: Elderly male resting comfortably in bed.    HENT:      Head: Normocephalic and atraumatic.   Eyes:      Extraocular Movements: Extraocular movements intact.      Conjunctiva/sclera: Conjunctivae normal.   Cardiovascular:      Rate and Rhythm: Normal rate and regular rhythm.      Pulses: Normal pulses.      Heart sounds: Normal heart sounds. No murmur heard.  Pulmonary:      Effort: Pulmonary effort is normal.      Breath sounds: Normal breath sounds.   Abdominal:      General: Abdomen is flat.      Palpations: Abdomen is soft.   Musculoskeletal:      Cervical back: Normal range of motion and neck supple.   Skin:     General: Skin is warm and dry.      Findings: No rash.   Neurological:      General: No focal deficit present.      Mental Status: He is alert. Mental status is at baseline. He is disoriented.   Psychiatric:      Comments: Slow cognition and ability to respond to questions.       RECORDS REVIEW:        ASSESSMENT     Diagnoses and all orders for this visit:    1. 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 Essential hypertension    3. Fall, subsequent encounter    4. Coronary artery disease involving native heart with angina pectoris, unspecified vessel or lesion type    5. Late onset Alzheimer's disease without behavioral disturbance    6. Impaired mobility and ADLs    7. Type 2 diabetes mellitus with diabetic polyneuropathy, without long-term current use of insulin    8. Bilateral lower extremity edema        PLAN  Mood disorder  - Due to stable moods over the last few months, Seroquel doses have been reduced.  -As the patient is quite stable, I would like to discontinue his Celexa.    Hyperlipidemia  -Remains off of statin due to age and lack of long-term benefit    Dementia due to Parkinson's disease  -  Behaviors remain stable with Sinemet, memantine, and donepezil.     Type 2 diabetes mellitus   -Obtain labs every 3 months: CBC, CMP and A1c  -Stable control on metformin and basaglar      Falls  -No  issues recently.  Restorative therapy is following.    BPH  - cont Tamsulosin    Constipation  - on daily stool softener.     Coronary artery disease/irregular heartbeat/CHF  - Euvolemic at this time  - stable on current cardiac medications.             [x]  Discussed Patient in detail with nursing/staff, addressed all needs today.     [x]  Plan of Care Reviewed   []  PT/OT Reviewed   [x]  Order Changes  []  Discharge Plans Reviewed  [x]  Advance Directive on file with Nursing Home.   [x]  POA on file with Nursing Home.    [x]  Code Status listed and reviewed.     I confirm accuracy of unchanged data/findings including physical exam and plan which have been carried forward from previous visit, as well as I have updated appropriately those that have changed        Alex Burk DO.  11/15/2023

## 2023-11-07 ENCOUNTER — NURSING HOME (OUTPATIENT)
Dept: INTERNAL MEDICINE | Facility: CLINIC | Age: 83
End: 2023-11-07
Payer: MEDICARE

## 2023-11-07 VITALS
DIASTOLIC BLOOD PRESSURE: 65 MMHG | BODY MASS INDEX: 24.94 KG/M2 | WEIGHT: 164 LBS | SYSTOLIC BLOOD PRESSURE: 139 MMHG | RESPIRATION RATE: 14 BRPM | HEART RATE: 48 BPM | TEMPERATURE: 97 F | OXYGEN SATURATION: 94 %
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
## 2023-11-07 DIAGNOSIS — I10 ESSENTIAL HYPERTENSION: ICD-10-CM

## 2023-11-07 DIAGNOSIS — Z74.09 IMPAIRED MOBILITY AND ADLS: ICD-10-CM

## 2023-11-07 DIAGNOSIS — G20.A1 DEMENTIA DUE TO PARKINSON'S DISEASE WITH BEHAVIORAL DISTURBANCE: Primary | ICD-10-CM

## 2023-11-07 DIAGNOSIS — F02.818 DEMENTIA DUE TO PARKINSON'S DISEASE WITH BEHAVIORAL DISTURBANCE: Primary | ICD-10-CM

## 2023-11-07 DIAGNOSIS — Z78.9 IMPAIRED MOBILITY AND ADLS: ICD-10-CM

## 2023-11-07 DIAGNOSIS — G30.1 LATE ONSET ALZHEIMER'S DISEASE WITHOUT BEHAVIORAL DISTURBANCE: ICD-10-CM

## 2023-11-07 DIAGNOSIS — E11.42 TYPE 2 DIABETES MELLITUS WITH DIABETIC POLYNEUROPATHY, WITHOUT LONG-TERM CURRENT USE OF INSULIN: ICD-10-CM

## 2023-11-07 DIAGNOSIS — F02.80 LATE ONSET ALZHEIMER'S DISEASE WITHOUT BEHAVIORAL DISTURBANCE: ICD-10-CM

## 2023-11-07 DIAGNOSIS — I25.119 CORONARY ARTERY DISEASE INVOLVING NATIVE HEART WITH ANGINA PECTORIS, UNSPECIFIED VESSEL OR LESION TYPE: ICD-10-CM

## 2023-11-07 DIAGNOSIS — R60.0 BILATERAL LOWER EXTREMITY EDEMA: ICD-10-CM

## 2023-11-07 NOTE — LETTER
Nursing Home Progress Note        Alex Bhargavi,    [address] [place] [address] Phone: [phone]  Fax: [phone]     PATIENT NAME: Gama Davila                                                                          YOB: 1940           DATE OF SERVICE: 11/07/2023  FACILITY:  [facility]   ______________________________________________________________________     CHIEF COMPLAINT:  Chronic Medical Management      HISTORY OF PRESENT ILLNESS:   Patient was seen for routine compliance visit.  On exam today, patient was laying comfortably in his bed smiling and content with his care overall.  There was concerns of a possible rash mentioned by nurse last night.  On my exam today, I did not see any significant lesions.  He had some subtle redness from laying on his back and warm sheets.  As far as his mood and behaviors, nurses report appropriate behaviors.  Patient shared that he felt well and was happy.  He was open to stopping Celexa.    PAST MEDICAL & SURGICAL HISTORY:   Past Medical History:   Diagnosis Date    Arthritis     Cancer     SKIN     Cataract     Coronary artery disease     Diabetes mellitus     High cholesterol     [place] (hard of hearing)     PATIENT HAS HEARING AIDS    Hypertension     Irregular heart beat     HISTORY OF CARDIAC ABLATION IN 2003    Wears dentures     FULL UPPER PLATE      Past Surgical History:   Procedure Laterality Date    BACK SURGERY  1978    THEN AGAIN IN 1982    CARDIAC ABLATION  2003    CARDIAC SURGERY      CATARACT EXTRACTION W/ INTRAOCULAR LENS IMPLANT Left 5/24/2017    Procedure: CATARACT PHACO EXTRACTION WITH INTRAOCULAR LENS IMPLANT LEFT WITH TORIC LENS;  Surgeon: Alma Benavidez MD;  Location: [facility];  Service:     CATARACT EXTRACTION W/ INTRAOCULAR LENS IMPLANT Right 6/14/2017    Procedure: CATARACT PHACO EXTRACTION WITH INTRAOCULAR LENS IMPLANT RIGHT WITH TORIC LENS;  Surgeon: Alma Benavidez MD;   Location: [facility] OR;  Service:     CORONARY ARTERY BYPASS GRAFT      SPOUSE UNSURE OF HOW MANY VESSELS    HIATAL HERNIA REPAIR  1972    JOINT REPLACEMENT Left     HIP - DONE BY DR DALAL    KNEE ARTHROSCOPY Left     NOSE SURGERY  1970    SPOUSE REPORTS FOR A BROKEN NOSE    OTHER SURGICAL HISTORY Left     TENDON TORN LOOSE FROM BONE, LEFT ELBOW    OTHER SURGICAL HISTORY      RUPTURED DISC    OTHER SURGICAL HISTORY      NECK SURGERY FOR RUPTURED DISC    OTHER SURGICAL HISTORY      HAD SECOND NECK SURGERY    OTHER SURGICAL HISTORY      RUPTURED DISC    OTHER SURGICAL HISTORY      RUPTURED DISC    OTHER SURGICAL HISTORY      HARDWARE REMOVAL FROM BACK BY DR HAY         MEDICATIONS:  I have reviewed and reconciled the patients medication list in the patients chart at the skilled nursing facility today, 2023.      ALLERGIES:  Allergies   Allergen Reactions    Phenergan [Promethazine Hcl] Nausea And Vomiting    Carbamazepine Unknown - Low Severity    Hydrocodone Unknown - Low Severity    Lisinopril Unknown - Low Severity         SOCIAL HISTORY:  Social History     Socioeconomic History    Marital status:    Tobacco Use    Smoking status: Former     Types: Cigarettes     Quit date:      Years since quittin.8    Smokeless tobacco: Never   Substance and Sexual Activity    Alcohol use: No    Drug use: No    Sexual activity: Defer       FAMILY HISTORY:  No family history on file.    REVIEW OF SYSTEMS:  Review of Systems   Unable to perform ROS: Dementia          PHYSICAL EXAMINATION:     VITAL SIGNS:  /65   Pulse (!) 48 Comment: VITALS EMAILED TO THIS FACILITY  Temp 97 °F (36.1 °C)   Resp 14   Wt 74.4 kg (164 lb)   SpO2 94%   BMI 24.94 kg/m²     Physical Exam  Vitals and nursing note reviewed.   Constitutional:       General: He is not in acute distress.     Appearance: Normal appearance. He is well-developed.      Comments: Elderly male resting comfortably in bed.    HENT:      Head: Normocephalic and atraumatic.   Eyes:      Extraocular Movements: Extraocular movements intact.      Conjunctiva/sclera: Conjunctivae normal.   Cardiovascular:      Rate and Rhythm: Normal rate and regular rhythm.      Pulses: Normal pulses.      Heart sounds: Normal heart sounds. No murmur heard.  Pulmonary:      Effort: Pulmonary effort is normal.      Breath sounds: Normal breath sounds.   Abdominal:      General: Abdomen is flat.      Palpations: Abdomen is soft.   Musculoskeletal:      Cervical back: Normal range of motion and neck supple.   Skin:     General: Skin is warm and dry.      Findings: No rash.   Neurological:      General: No focal deficit present.      Mental Status: He is alert. Mental status is at baseline. He is disoriented.   Psychiatric:      Comments: Slow cognition and ability to respond to questions.       RECORDS REVIEW:        ASSESSMENT     Diagnoses and all orders for this visit:    1. 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 Essential hypertension    3. Fall, subsequent encounter    4. Coronary artery disease involving native heart with angina pectoris, unspecified vessel or lesion type    5. Late onset Alzheimer's disease without behavioral disturbance    6. Impaired mobility and ADLs    7. Type 2 diabetes mellitus with diabetic polyneuropathy, without long-term current use of insulin    8. Bilateral lower extremity edema        PLAN  Mood disorder  - Due to stable moods over the last few months, Seroquel doses have been reduced.  -As the patient is quite stable, I would like to discontinue his Celexa.    Hyperlipidemia  -Remains off of statin due to age and lack of long-term benefit    Dementia due to Parkinson's disease  -  Behaviors remain stable with Sinemet, memantine, and donepezil.     Type 2 diabetes mellitus   -Obtain labs every 3 months: CBC, CMP and A1c  -Stable control on metformin and basaglar      Falls  -No  issues recently.  Restorative therapy is following.    BPH  - cont Tamsulosin    Constipation  - on daily stool softener.     Coronary artery disease/irregular heartbeat/CHF  - Euvolemic at this time  - stable on current cardiac medications.             [x]  Discussed Patient in detail with nursing/staff, addressed all needs today.     [x]  Plan of Care Reviewed   []  PT/OT Reviewed   [x]  Order Changes  []  Discharge Plans Reviewed  [x]  Advance Directive on file with Nursing Home.   [x]  POA on file with Nursing Home.    [x]  Code Status listed and reviewed.     I confirm accuracy of unchanged data/findings including physical exam and plan which have been carried forward from previous visit, as well as I have updated appropriately those that have changed        Alex Burk DO.  11/15/2023

## 2023-11-27 ENCOUNTER — NURSING HOME (OUTPATIENT)
Dept: FAMILY MEDICINE CLINIC | Facility: CLINIC | Age: 83
End: 2023-11-27
Payer: MEDICARE

## 2023-11-27 DIAGNOSIS — R21 RASH AND NONSPECIFIC SKIN ERUPTION: Primary | ICD-10-CM

## 2023-11-27 DIAGNOSIS — F02.B3 MODERATE DEMENTIA DUE TO PARKINSON'S DISEASE, WITH MOOD DISTURBANCE: Chronic | ICD-10-CM

## 2023-11-27 DIAGNOSIS — Z78.9 IMPAIRED MOBILITY AND ADLS: ICD-10-CM

## 2023-11-27 DIAGNOSIS — G20.A1 MODERATE DEMENTIA DUE TO PARKINSON'S DISEASE, WITH MOOD DISTURBANCE: Chronic | ICD-10-CM

## 2023-11-27 DIAGNOSIS — Z74.09 IMPAIRED MOBILITY AND ADLS: ICD-10-CM

## 2023-11-27 NOTE — LETTER
Telemedicine nursing Home Progress Note        Mitul Stephen DO []  [address] [[address]] [place] [address]  Phone: [phone]  Fax: [phone] Inés Vega MD []  [address] [x]   [address] [place] [address]  Phone: [phone]  Fax: [phone]     PATIENT NAME: Gama Davila                                                                          YOB: 1940           DATE OF SERVICE: 11/27/2023  FACILITY:  [] [place]   [] [place]   [] [facility]   [x] [facility]  []  [facility]  [] Other     ______________________________________________________________________     CHIEF COMPLAINT:    Rash to RUE and upper back for the past couple days.    HISTORY OF PRESENT ILLNESS:     Nursing reports that patient noted scratching at right upper arm yesterday.  A rash was noted to the upper arm and back.  Patient did report that the rash itches.  Rash not noted on any other areas of the body. Resting in bed today with no signs and symptoms of any distress. He has no complaints or concerns today.    PAST MEDICAL & SURGICAL HISTORY:   Past Medical History:   Diagnosis Date    Arthritis     Cancer     SKIN     Cataract     Coronary artery disease     Diabetes mellitus     High cholesterol     [place] (hard of hearing)     PATIENT HAS HEARING AIDS    Hypertension     Irregular heart beat     HISTORY OF CARDIAC ABLATION IN 2003    Wears dentures     FULL UPPER PLATE      Past Surgical History:   Procedure Laterality Date    BACK SURGERY  1978    THEN AGAIN IN 1982    CARDIAC ABLATION  2003    CARDIAC SURGERY      CATARACT EXTRACTION W/ INTRAOCULAR LENS IMPLANT Left 5/24/2017    Procedure: CATARACT PHACO EXTRACTION WITH INTRAOCULAR LENS IMPLANT LEFT WITH TORIC LENS;  Surgeon: Alma Benavidez MD;  Location: [facility];  Service:     CATARACT EXTRACTION W/ INTRAOCULAR LENS IMPLANT Right 6/14/2017    Procedure: CATARACT PHACO EXTRACTION  WITH INTRAOCULAR LENS IMPLANT RIGHT WITH TORIC LENS;  Surgeon: Alma Benavidez MD;  Location: [facility];  Service:     CORONARY ARTERY BYPASS GRAFT      SPOUSE UNSURE OF HOW MANY VESSELS    HIATAL HERNIA REPAIR  1972    JOINT REPLACEMENT Left     HIP - DONE BY DR DALAL    KNEE ARTHROSCOPY Left     NOSE SURGERY  1970    SPOUSE REPORTS FOR A BROKEN NOSE    OTHER SURGICAL HISTORY Left     TENDON TORN LOOSE FROM BONE, LEFT ELBOW    OTHER SURGICAL HISTORY      RUPTURED DISC    OTHER SURGICAL HISTORY      NECK SURGERY FOR RUPTURED DISC    OTHER SURGICAL HISTORY      HAD SECOND NECK SURGERY    OTHER SURGICAL HISTORY      RUPTURED DISC    OTHER SURGICAL HISTORY      RUPTURED DISC    OTHER SURGICAL HISTORY      HARDWARE REMOVAL FROM BACK BY DR HAY         MEDICATIONS:  I have reviewed and reconciled the patients medication list in the patients chart at the skilled nursing facility today.      ALLERGIES:  Allergies   Allergen Reactions    Phenergan [Promethazine Hcl] Nausea And Vomiting    Carbamazepine Unknown - Low Severity    Hydrocodone Unknown - Low Severity    Lisinopril Unknown - Low Severity         SOCIAL HISTORY:  Social History     Socioeconomic History    Marital status:    Tobacco Use    Smoking status: Former     Types: Cigarettes     Quit date:      Years since quittin.9    Smokeless tobacco: Never   Substance and Sexual Activity    Alcohol use: No    Drug use: No    Sexual activity: Defer       FAMILY HISTORY:  No family history on file.    REVIEW OF SYSTEMS:  Review of Systems   Unable to perform ROS: Dementia (ROS per nursing and patient)   Constitutional:  Negative for activity change, appetite change, chills, diaphoresis, fatigue and fever.   HENT:  Negative for congestion, mouth sores, nosebleeds, rhinorrhea and trouble swallowing.    Respiratory:  Negative for cough, chest tightness and shortness of breath.    Cardiovascular:  Negative for chest  pain, palpitations and leg swelling.   Gastrointestinal:  Negative for abdominal distention, abdominal pain, blood in stool, constipation, diarrhea and vomiting.   Genitourinary:  Negative for difficulty urinating and frequency.        Incontinence   Musculoskeletal:  Positive for arthralgias (chronic) and gait problem.   Skin:  Positive for rash (right arm and back). Negative for wound.   Neurological:  Positive for weakness.   Psychiatric/Behavioral:  Positive for confusion. Negative for agitation, behavioral problems, dysphoric mood, sleep disturbance and suicidal ideas. The patient is not nervous/anxious and is not hyperactive.           PHYSICAL EXAMINATION:     VITAL SIGNS:  /64   Pulse 60   Temp 98.2 °F (36.8 °C)   Resp 18   Wt 72.1 kg (159 lb)   SpO2 94%   BMI 24.18 kg/m²     Physical Exam   HENT:   Head: Normocephalic and atraumatic.   Nose: Nose normal.   Eyes: Conjunctivae are normal.   Cardiovascular: Normal rate, regular rhythm and normal heart sounds.   Pulmonary/Chest: Effort normal. No respiratory distress. He has no decreased breath sounds. He has no wheezes. He has rhonchi in the right upper field and the left upper field.   Abdominal: Bowel sounds are normal. There is no abdominal tenderness.   Neurological: He is alert. He is disoriented.   Skin: Rash noted. Rash is papular.   Scattered rash right upper extremity and back   Psychiatric: His behavior is normal. Mood normal. His affect is labile. Cognition and memory are impaired.   Nursing note and vitals reviewed.      RECORDS REVIEW:   Most recent labs    ASSESSMENT     Diagnoses and all orders for this visit:    1. Rash and nonspecific skin eruption (Primary)    2. Moderate dementia due to Parkinson's disease, with mood disturbance    3. Impaired mobility and ADLs          PLAN    Rash  -Prednisone 20 mg po bid at 0800 and 1600 x 7 days. Vistaril 25 mg qhs x 7 days. Will follow up and continue to monitor.     Dementia behaviors,  appetite and weight stable.    Nursing encouraged to keep me informed of any acute changes, amanda any new concerning symptoms.    Staff to continue supportive care for all ADLs.     [x]  Discussed Patient in detail with nursing/staff, addressed all needs today.     [x]  Plan of Care Reviewed   [x]  PT/OT Reviewed   []  Order Changes  []  Discharge Plans Reviewed  [x]  Advance Directive on file with Nursing Home.   [x]  POA on file with Nursing Home.    [x]  Code Status listed and reviewed.       “I confirm accuracy of unchanged data/findings which have been carried forward from previous visit, as well as I have updated appropriately those that have changed.”                                    Charleen Randall, APRN.  11/29/2023

## 2023-11-29 VITALS
OXYGEN SATURATION: 94 % | BODY MASS INDEX: 24.18 KG/M2 | WEIGHT: 159 LBS | DIASTOLIC BLOOD PRESSURE: 64 MMHG | SYSTOLIC BLOOD PRESSURE: 118 MMHG | HEART RATE: 60 BPM | TEMPERATURE: 98.2 F | RESPIRATION RATE: 18 BRPM

## 2023-11-29 NOTE — PROGRESS NOTES
Telemedicine nursing Home Progress Note        Mitul Stephen DO []  [address] [[address]] [place] [address]  Phone: [phone]  Fax: [phone] Inés Vega MD []  [facility] [x]   [address] [place] [address]  Phone: [phone]  Fax: [phone]     PATIENT NAME: Gama Davila                                                                          YOB: 1940           DATE OF SERVICE: 11/27/2023  FACILITY:  [] [place]   [] [place]   [] [facility]   [x] [facility]  []  [facility]  [] Other     ______________________________________________________________________     CHIEF COMPLAINT:    Rash to RUE and upper back for the past couple days.    HISTORY OF PRESENT ILLNESS:     Nursing reports that patient noted scratching at right upper arm yesterday.  A rash was noted to the upper arm and back.  Patient did report that the rash itches.  Rash not noted on any other areas of the body. Resting in bed today with no signs and symptoms of any distress. He has no complaints or concerns today.    PAST MEDICAL & SURGICAL HISTORY:   Past Medical History:   Diagnosis Date    Arthritis     Cancer     SKIN     Cataract     Coronary artery disease     Diabetes mellitus     High cholesterol     [place] (hard of hearing)     PATIENT HAS HEARING AIDS    Hypertension     Irregular heart beat     HISTORY OF CARDIAC ABLATION IN 2003    Wears dentures     FULL UPPER PLATE      Past Surgical History:   Procedure Laterality Date    BACK SURGERY  1978    THEN AGAIN IN 1982    CARDIAC ABLATION  2003    CARDIAC SURGERY      CATARACT EXTRACTION W/ INTRAOCULAR LENS IMPLANT Left 5/24/2017    Procedure: CATARACT PHACO EXTRACTION WITH INTRAOCULAR LENS IMPLANT LEFT WITH TORIC LENS;  Surgeon: Alma Benavidez MD;  Location: [facility];  Service:     CATARACT EXTRACTION W/ INTRAOCULAR LENS IMPLANT Right 6/14/2017    Procedure: CATARACT PHACO EXTRACTION  WITH INTRAOCULAR LENS IMPLANT RIGHT WITH TORIC LENS;  Surgeon: Alma Benavidez MD;  Location: [facility];  Service:     CORONARY ARTERY BYPASS GRAFT      SPOUSE UNSURE OF HOW MANY VESSELS    HIATAL HERNIA REPAIR  1972    JOINT REPLACEMENT Left     HIP - DONE BY DR DALAL    KNEE ARTHROSCOPY Left     NOSE SURGERY  1970    SPOUSE REPORTS FOR A BROKEN NOSE    OTHER SURGICAL HISTORY Left     TENDON TORN LOOSE FROM BONE, LEFT ELBOW    OTHER SURGICAL HISTORY      RUPTURED DISC    OTHER SURGICAL HISTORY      NECK SURGERY FOR RUPTURED DISC    OTHER SURGICAL HISTORY      HAD SECOND NECK SURGERY    OTHER SURGICAL HISTORY      RUPTURED DISC    OTHER SURGICAL HISTORY      RUPTURED DISC    OTHER SURGICAL HISTORY      HARDWARE REMOVAL FROM BACK BY DR HAY         MEDICATIONS:  I have reviewed and reconciled the patients medication list in the patients chart at the skilled nursing facility today.      ALLERGIES:  Allergies   Allergen Reactions    Phenergan [Promethazine Hcl] Nausea And Vomiting    Carbamazepine Unknown - Low Severity    Hydrocodone Unknown - Low Severity    Lisinopril Unknown - Low Severity         SOCIAL HISTORY:  Social History     Socioeconomic History    Marital status:    Tobacco Use    Smoking status: Former     Types: Cigarettes     Quit date:      Years since quittin.9    Smokeless tobacco: Never   Substance and Sexual Activity    Alcohol use: No    Drug use: No    Sexual activity: Defer       FAMILY HISTORY:  No family history on file.    REVIEW OF SYSTEMS:  Review of Systems   Unable to perform ROS: Dementia (ROS per nursing and patient)   Constitutional:  Negative for activity change, appetite change, chills, diaphoresis, fatigue and fever.   HENT:  Negative for congestion, mouth sores, nosebleeds, rhinorrhea and trouble swallowing.    Respiratory:  Negative for cough, chest tightness and shortness of breath.    Cardiovascular:  Negative for chest  pain, palpitations and leg swelling.   Gastrointestinal:  Negative for abdominal distention, abdominal pain, blood in stool, constipation, diarrhea and vomiting.   Genitourinary:  Negative for difficulty urinating and frequency.        Incontinence   Musculoskeletal:  Positive for arthralgias (chronic) and gait problem.   Skin:  Positive for rash (right arm and back). Negative for wound.   Neurological:  Positive for weakness.   Psychiatric/Behavioral:  Positive for confusion. Negative for agitation, behavioral problems, dysphoric mood, sleep disturbance and suicidal ideas. The patient is not nervous/anxious and is not hyperactive.           PHYSICAL EXAMINATION:     VITAL SIGNS:  /64   Pulse 60   Temp 98.2 °F (36.8 °C)   Resp 18   Wt 72.1 kg (159 lb)   SpO2 94%   BMI 24.18 kg/m²     Physical Exam   HENT:   Head: Normocephalic and atraumatic.   Nose: Nose normal.   Eyes: Conjunctivae are normal.   Cardiovascular: Normal rate, regular rhythm and normal heart sounds.   Pulmonary/Chest: Effort normal. No respiratory distress. He has no decreased breath sounds. He has no wheezes. He has rhonchi in the right upper field and the left upper field.   Abdominal: Bowel sounds are normal. There is no abdominal tenderness.   Neurological: He is alert. He is disoriented.   Skin: Rash noted. Rash is papular.   Scattered rash right upper extremity and back   Psychiatric: His behavior is normal. Mood normal. His affect is labile. Cognition and memory are impaired.   Nursing note and vitals reviewed.      RECORDS REVIEW:   Most recent labs    ASSESSMENT     Diagnoses and all orders for this visit:    1. Rash and nonspecific skin eruption (Primary)    2. Moderate dementia due to Parkinson's disease, with mood disturbance    3. Impaired mobility and ADLs          PLAN    Rash  -Prednisone 20 mg po bid at 0800 and 1600 x 7 days. Vistaril 25 mg qhs x 7 days. Will follow up and continue to monitor.     Dementia behaviors,  appetite and weight stable.    Nursing encouraged to keep me informed of any acute changes, amanda any new concerning symptoms.    Staff to continue supportive care for all ADLs.     [x]  Discussed Patient in detail with nursing/staff, addressed all needs today.     [x]  Plan of Care Reviewed   [x]  PT/OT Reviewed   []  Order Changes  []  Discharge Plans Reviewed  [x]  Advance Directive on file with Nursing Home.   [x]  POA on file with Nursing Home.    [x]  Code Status listed and reviewed.       “I confirm accuracy of unchanged data/findings which have been carried forward from previous visit, as well as I have updated appropriately those that have changed.”                                    Charleen Randall, APRN.  11/29/2023

## 2024-01-01 ENCOUNTER — NURSING HOME (OUTPATIENT)
Dept: FAMILY MEDICINE CLINIC | Facility: CLINIC | Age: 84
End: 2024-01-01
Payer: MEDICARE

## 2024-01-01 VITALS
SYSTOLIC BLOOD PRESSURE: 139 MMHG | DIASTOLIC BLOOD PRESSURE: 88 MMHG | BODY MASS INDEX: 26.31 KG/M2 | WEIGHT: 168 LBS | HEART RATE: 74 BPM | TEMPERATURE: 98 F | RESPIRATION RATE: 16 BRPM

## 2024-01-01 VITALS
SYSTOLIC BLOOD PRESSURE: 134 MMHG | RESPIRATION RATE: 16 BRPM | WEIGHT: 168 LBS | BODY MASS INDEX: 26.31 KG/M2 | TEMPERATURE: 98 F | OXYGEN SATURATION: 87 % | DIASTOLIC BLOOD PRESSURE: 82 MMHG | HEART RATE: 42 BPM

## 2024-01-01 DIAGNOSIS — Z79.899 MEDICATION MANAGEMENT: ICD-10-CM

## 2024-01-01 DIAGNOSIS — R53.81 DECLINING FUNCTIONAL STATUS: ICD-10-CM

## 2024-01-01 DIAGNOSIS — Z74.09 IMPAIRED MOBILITY AND ADLS: ICD-10-CM

## 2024-01-01 DIAGNOSIS — F02.B3 MODERATE DEMENTIA DUE TO PARKINSON'S DISEASE, WITH MOOD DISTURBANCE: Chronic | ICD-10-CM

## 2024-01-01 DIAGNOSIS — G20.A1 MODERATE DEMENTIA DUE TO PARKINSON'S DISEASE, WITH MOOD DISTURBANCE: Chronic | ICD-10-CM

## 2024-01-01 DIAGNOSIS — Z51.5 PALLIATIVE CARE PATIENT: ICD-10-CM

## 2024-01-01 DIAGNOSIS — Z71.89 ENCOUNTER FOR HOSPICE CARE DISCUSSION: ICD-10-CM

## 2024-01-01 DIAGNOSIS — Z87.01 HISTORY OF PNEUMONIA: ICD-10-CM

## 2024-01-01 DIAGNOSIS — J18.9 PNEUMONIA SYMPTOMS: ICD-10-CM

## 2024-01-01 DIAGNOSIS — Z78.9 IMPAIRED MOBILITY AND ADLS: ICD-10-CM

## 2024-01-01 DIAGNOSIS — J69.0 ASPIRATION PNEUMONIA OF LEFT LOWER LOBE, UNSPECIFIED ASPIRATION PNEUMONIA TYPE: ICD-10-CM

## 2024-01-01 PROCEDURE — 99309 SBSQ NF CARE MODERATE MDM 30: CPT | Performed by: NURSE PRACTITIONER

## 2024-01-01 RX ORDER — LORAZEPAM 2 MG/ML
CONCENTRATE ORAL
Qty: 30 ML | Refills: 3 | Status: SHIPPED | OUTPATIENT
Start: 2024-01-01

## 2024-01-01 RX ORDER — MORPHINE SULFATE 100 MG/5ML
5 SOLUTION ORAL
Qty: 30 ML | Refills: 0 | Status: SHIPPED | OUTPATIENT
Start: 2024-01-01

## 2024-01-15 NOTE — PROGRESS NOTES
Nursing Home Progress Note        Alex Burk DO   [address] [place] [address] Phone: [phone]  Fax: [phone]     PATIENT NAME: Gama Davila                                                                          YOB: 1940           DATE OF SERVICE: 01/16/2024  FACILITY:  [facility]   ______________________________________________________________________     CHIEF COMPLAINT:  Chronic Medical Management      HISTORY OF PRESENT ILLNESS:   Patient was seen for routine compliance visit.  Behaviors have been stable over the last couple of months.  Patient has been occasionally refusing labs.  He primarily remains pleasantly confused.  Eating and drinking appropriately.  Pt was minimally verbal with me on exam.     PAST MEDICAL & SURGICAL HISTORY:   Past Medical History:   Diagnosis Date    Arthritis     Cancer     SKIN     Cataract     Coronary artery disease     Diabetes mellitus     High cholesterol     [place] (hard of hearing)     PATIENT HAS HEARING AIDS    Hypertension     Irregular heart beat     HISTORY OF CARDIAC ABLATION IN 2003    Wears dentures     FULL UPPER PLATE      Past Surgical History:   Procedure Laterality Date    BACK SURGERY  1978    THEN AGAIN IN 1982    CARDIAC ABLATION  2003    CARDIAC SURGERY      CATARACT EXTRACTION W/ INTRAOCULAR LENS IMPLANT Left 5/24/2017    Procedure: CATARACT PHACO EXTRACTION WITH INTRAOCULAR LENS IMPLANT LEFT WITH TORIC LENS;  Surgeon: Alma Benavidez MD;  Location: [facility] OR;  Service:     CATARACT EXTRACTION W/ INTRAOCULAR LENS IMPLANT Right 6/14/2017    Procedure: CATARACT PHACO EXTRACTION WITH INTRAOCULAR LENS IMPLANT RIGHT WITH TORIC LENS;  Surgeon: Alma Benavidez MD;  Location: [facility] OR;  Service:     CORONARY ARTERY BYPASS GRAFT  2003    SPOUSE UNSURE OF HOW MANY VESSELS    HIATAL HERNIA REPAIR  1972    JOINT REPLACEMENT Left     HIP - DONE BY DR DALAL    KNEE ARTHROSCOPY Left     NOSE  SURGERY  1970    SPOUSE REPORTS FOR A BROKEN NOSE    OTHER SURGICAL HISTORY Left     TENDON TORN LOOSE FROM BONE, LEFT ELBOW    OTHER SURGICAL HISTORY      RUPTURED DISC    OTHER SURGICAL HISTORY      NECK SURGERY FOR RUPTURED DISC    OTHER SURGICAL HISTORY      HAD SECOND NECK SURGERY    OTHER SURGICAL HISTORY      RUPTURED DISC    OTHER SURGICAL HISTORY      RUPTURED DISC    OTHER SURGICAL HISTORY      HARDWARE REMOVAL FROM BACK BY DR HAY         MEDICATIONS:  I have reviewed and reconciled the patients medication list in the patients chart at the skilled nursing facility today, 2024.      ALLERGIES:  Allergies   Allergen Reactions    Phenergan [Promethazine Hcl] Nausea And Vomiting    Carbamazepine Unknown - Low Severity    Hydrocodone Unknown - Low Severity    Lisinopril Unknown - Low Severity         SOCIAL HISTORY:  Social History     Socioeconomic History    Marital status:    Tobacco Use    Smoking status: Former     Types: Cigarettes     Quit date:      Years since quittin.0    Smokeless tobacco: Never   Substance and Sexual Activity    Alcohol use: No    Drug use: No    Sexual activity: Defer       FAMILY HISTORY:  No family history on file.    REVIEW OF SYSTEMS:  Review of Systems   Unable to perform ROS: Dementia          PHYSICAL EXAMINATION:     VITAL SIGNS:  /64   Pulse (!) 45 Comment: VALUE EMAILED PER FACILITY  Temp 97.9 °F (36.6 °C)   Resp 16   Wt 73 kg (161 lb)   SpO2 95%   BMI 24.48 kg/m²     Physical Exam  Vitals and nursing note reviewed.   Constitutional:       General: He is not in acute distress.     Appearance: Normal appearance. He is well-developed.      Comments: Elderly male resting comfortably in bed.   HENT:      Head: Normocephalic and atraumatic.   Eyes:      Extraocular Movements: Extraocular movements intact.      Conjunctiva/sclera: Conjunctivae normal.   Cardiovascular:      Rate and Rhythm: Normal rate and regular  rhythm.      Pulses: Normal pulses.      Heart sounds: Normal heart sounds. No murmur heard.  Pulmonary:      Effort: Pulmonary effort is normal.      Breath sounds: Normal breath sounds.   Abdominal:      General: Abdomen is flat.      Palpations: Abdomen is soft.   Musculoskeletal:      Cervical back: Normal range of motion and neck supple.   Skin:     General: Skin is warm and dry.      Findings: No rash.   Neurological:      General: No focal deficit present.      Mental Status: He is alert. Mental status is at baseline. He is disoriented.   Psychiatric:      Comments: Slow cognition and ability to respond to questions.       RECORDS REVIEW:        ASSESSMENT     Diagnoses and all orders for this visit:    1. Moderate dementia due to Parkinson's disease, with mood disturbance (Primary)    2. Parkinson's disease without dyskinesia or fluctuating manifestations    3. Coronary artery disease involving native heart with angina pectoris, unspecified vessel or lesion type    4. Primary hypertension    5. Type 2 diabetes mellitus with hypoglycemia without coma, with long-term current use of insulin    6. High cholesterol        PLAN  Mood disorder  -Remains stable after having reduced several psychiatric medications.    Type 2 diabetes mellitus   -Obtain labs every 3 months: CBC, CMP and A1c.  Labs were reordered today.   -Stable control on metformin and basaglar      Hyperlipidemia  -Remains off of statin due to age and lack of long-term benefit    Dementia due to Parkinson's disease  -  Stable with Sinemet, memantine, and donepezil.     Falls  -No issues recently.  Restorative therapy is following.    BPH  - cont Tamsulosin    Constipation  - on daily stool softener.     Coronary artery disease/irregular heartbeat/CHF  - Euvolemic at this time  - stable on current cardiac medications.             [x]  Discussed Patient in detail with nursing/staff, addressed all needs today.     [x]  Plan of Care Reviewed   []  PT/OT  Reviewed   [x]  Order Changes  []  Discharge Plans Reviewed  [x]  Advance Directive on file with Nursing Home.   [x]  POA on file with Nursing Home.    [x]  Code Status listed and reviewed.     I confirm accuracy of unchanged data/findings including physical exam and plan which have been carried forward from previous visit, as well as I have updated appropriately those that have changed        Alex Burk DO.  1/17/2024

## 2024-01-16 ENCOUNTER — NURSING HOME (OUTPATIENT)
Dept: INTERNAL MEDICINE | Facility: CLINIC | Age: 84
End: 2024-01-16
Payer: MEDICARE

## 2024-01-16 VITALS
BODY MASS INDEX: 24.48 KG/M2 | SYSTOLIC BLOOD PRESSURE: 128 MMHG | WEIGHT: 161 LBS | HEART RATE: 45 BPM | TEMPERATURE: 97.9 F | DIASTOLIC BLOOD PRESSURE: 64 MMHG | RESPIRATION RATE: 16 BRPM | OXYGEN SATURATION: 95 %

## 2024-01-16 DIAGNOSIS — F02.B3 MODERATE DEMENTIA DUE TO PARKINSON'S DISEASE, WITH MOOD DISTURBANCE: Primary | Chronic | ICD-10-CM

## 2024-01-16 DIAGNOSIS — E78.00 HIGH CHOLESTEROL: ICD-10-CM

## 2024-01-16 DIAGNOSIS — I10 PRIMARY HYPERTENSION: ICD-10-CM

## 2024-01-16 DIAGNOSIS — I25.119 CORONARY ARTERY DISEASE INVOLVING NATIVE HEART WITH ANGINA PECTORIS, UNSPECIFIED VESSEL OR LESION TYPE: ICD-10-CM

## 2024-01-16 DIAGNOSIS — E11.649 TYPE 2 DIABETES MELLITUS WITH HYPOGLYCEMIA WITHOUT COMA, WITH LONG-TERM CURRENT USE OF INSULIN: Chronic | ICD-10-CM

## 2024-01-16 DIAGNOSIS — Z79.4 TYPE 2 DIABETES MELLITUS WITH HYPOGLYCEMIA WITHOUT COMA, WITH LONG-TERM CURRENT USE OF INSULIN: Chronic | ICD-10-CM

## 2024-01-16 DIAGNOSIS — G20.A1 PARKINSON'S DISEASE WITHOUT DYSKINESIA OR FLUCTUATING MANIFESTATIONS: ICD-10-CM

## 2024-01-16 DIAGNOSIS — G20.A1 MODERATE DEMENTIA DUE TO PARKINSON'S DISEASE, WITH MOOD DISTURBANCE: Primary | Chronic | ICD-10-CM

## 2024-01-16 PROCEDURE — 99308 SBSQ NF CARE LOW MDM 20: CPT | Performed by: INTERNAL MEDICINE

## 2024-01-16 NOTE — LETTER
Nursing Home Progress Note        Alex Burk DO   [address] [place] [address] Phone: [phone]  Fax: [phone]     PATIENT NAME: Gama Davila                                                                          YOB: 1940           DATE OF SERVICE: 01/16/2024  FACILITY:  [facility]   ______________________________________________________________________     CHIEF COMPLAINT:  Chronic Medical Management      HISTORY OF PRESENT ILLNESS:   Patient was seen for routine compliance visit.  Behaviors have been stable over the last couple of months.  Patient has been occasionally refusing labs.  He primarily remains pleasantly confused.  Eating and drinking appropriately.  Pt was minimally verbal with me on exam.     PAST MEDICAL & SURGICAL HISTORY:   Past Medical History:   Diagnosis Date    Arthritis     Cancer     SKIN     Cataract     Coronary artery disease     Diabetes mellitus     High cholesterol     [place] (hard of hearing)     PATIENT HAS HEARING AIDS    Hypertension     Irregular heart beat     HISTORY OF CARDIAC ABLATION IN 2003    Wears dentures     FULL UPPER PLATE      Past Surgical History:   Procedure Laterality Date    BACK SURGERY  1978    THEN AGAIN IN 1982    CARDIAC ABLATION  2003    CARDIAC SURGERY      CATARACT EXTRACTION W/ INTRAOCULAR LENS IMPLANT Left 5/24/2017    Procedure: CATARACT PHACO EXTRACTION WITH INTRAOCULAR LENS IMPLANT LEFT WITH TORIC LENS;  Surgeon: Alma Benavidez MD;  Location: [facility] OR;  Service:     CATARACT EXTRACTION W/ INTRAOCULAR LENS IMPLANT Right 6/14/2017    Procedure: CATARACT PHACO EXTRACTION WITH INTRAOCULAR LENS IMPLANT RIGHT WITH TORIC LENS;  Surgeon: Alma Benavidez MD;  Location: [facility] OR;  Service:     CORONARY ARTERY BYPASS GRAFT  2003    SPOUSE UNSURE OF HOW MANY VESSELS    HIATAL HERNIA REPAIR  1972    JOINT REPLACEMENT Left     HIP - DONE BY DR DALAL    KNEE ARTHROSCOPY Left     NOSE  SURGERY  1970    SPOUSE REPORTS FOR A BROKEN NOSE    OTHER SURGICAL HISTORY Left     TENDON TORN LOOSE FROM BONE, LEFT ELBOW    OTHER SURGICAL HISTORY      RUPTURED DISC    OTHER SURGICAL HISTORY      NECK SURGERY FOR RUPTURED DISC    OTHER SURGICAL HISTORY      HAD SECOND NECK SURGERY    OTHER SURGICAL HISTORY      RUPTURED DISC    OTHER SURGICAL HISTORY      RUPTURED DISC    OTHER SURGICAL HISTORY      HARDWARE REMOVAL FROM BACK BY DR HAY         MEDICATIONS:  I have reviewed and reconciled the patients medication list in the patients chart at the skilled nursing facility today, 2024.      ALLERGIES:  Allergies   Allergen Reactions    Phenergan [Promethazine Hcl] Nausea And Vomiting    Carbamazepine Unknown - Low Severity    Hydrocodone Unknown - Low Severity    Lisinopril Unknown - Low Severity         SOCIAL HISTORY:  Social History     Socioeconomic History    Marital status:    Tobacco Use    Smoking status: Former     Types: Cigarettes     Quit date:      Years since quittin.0    Smokeless tobacco: Never   Substance and Sexual Activity    Alcohol use: No    Drug use: No    Sexual activity: Defer       FAMILY HISTORY:  No family history on file.    REVIEW OF SYSTEMS:  Review of Systems   Unable to perform ROS: Dementia          PHYSICAL EXAMINATION:     VITAL SIGNS:  /64   Pulse (!) 45 Comment: VALUE EMAILED PER FACILITY  Temp 97.9 °F (36.6 °C)   Resp 16   Wt 73 kg (161 lb)   SpO2 95%   BMI 24.48 kg/m²     Physical Exam  Vitals and nursing note reviewed.   Constitutional:       General: He is not in acute distress.     Appearance: Normal appearance. He is well-developed.      Comments: Elderly male resting comfortably in bed.   HENT:      Head: Normocephalic and atraumatic.   Eyes:      Extraocular Movements: Extraocular movements intact.      Conjunctiva/sclera: Conjunctivae normal.   Cardiovascular:      Rate and Rhythm: Normal rate and regular  rhythm.      Pulses: Normal pulses.      Heart sounds: Normal heart sounds. No murmur heard.  Pulmonary:      Effort: Pulmonary effort is normal.      Breath sounds: Normal breath sounds.   Abdominal:      General: Abdomen is flat.      Palpations: Abdomen is soft.   Musculoskeletal:      Cervical back: Normal range of motion and neck supple.   Skin:     General: Skin is warm and dry.      Findings: No rash.   Neurological:      General: No focal deficit present.      Mental Status: He is alert. Mental status is at baseline. He is disoriented.   Psychiatric:      Comments: Slow cognition and ability to respond to questions.       RECORDS REVIEW:        ASSESSMENT     Diagnoses and all orders for this visit:    1. Moderate dementia due to Parkinson's disease, with mood disturbance (Primary)    2. Parkinson's disease without dyskinesia or fluctuating manifestations    3. Coronary artery disease involving native heart with angina pectoris, unspecified vessel or lesion type    4. Primary hypertension    5. Type 2 diabetes mellitus with hypoglycemia without coma, with long-term current use of insulin    6. High cholesterol        PLAN  Mood disorder  -Remains stable after having reduced several psychiatric medications.    Type 2 diabetes mellitus   -Obtain labs every 3 months: CBC, CMP and A1c.  Labs were reordered today.   -Stable control on metformin and basaglar      Hyperlipidemia  -Remains off of statin due to age and lack of long-term benefit    Dementia due to Parkinson's disease  -  Stable with Sinemet, memantine, and donepezil.     Falls  -No issues recently.  Restorative therapy is following.    BPH  - cont Tamsulosin    Constipation  - on daily stool softener.     Coronary artery disease/irregular heartbeat/CHF  - Euvolemic at this time  - stable on current cardiac medications.             [x]  Discussed Patient in detail with nursing/staff, addressed all needs today.     [x]  Plan of Care Reviewed   []  PT/OT  Reviewed   [x]  Order Changes  []  Discharge Plans Reviewed  [x]  Advance Directive on file with Nursing Home.   [x]  POA on file with Nursing Home.    [x]  Code Status listed and reviewed.     I confirm accuracy of unchanged data/findings including physical exam and plan which have been carried forward from previous visit, as well as I have updated appropriately those that have changed        Alex Burk DO.  1/17/2024

## 2024-03-04 ENCOUNTER — NURSING HOME (OUTPATIENT)
Dept: INTERNAL MEDICINE | Facility: CLINIC | Age: 84
End: 2024-03-04
Payer: MEDICARE

## 2024-03-04 VITALS
DIASTOLIC BLOOD PRESSURE: 65 MMHG | WEIGHT: 165 LBS | SYSTOLIC BLOOD PRESSURE: 133 MMHG | TEMPERATURE: 98.1 F | BODY MASS INDEX: 25.09 KG/M2 | OXYGEN SATURATION: 94 % | HEART RATE: 57 BPM | RESPIRATION RATE: 18 BRPM

## 2024-03-04 DIAGNOSIS — I25.119 CORONARY ARTERY DISEASE INVOLVING NATIVE HEART WITH ANGINA PECTORIS, UNSPECIFIED VESSEL OR LESION TYPE: ICD-10-CM

## 2024-03-04 DIAGNOSIS — G20.A1 MODERATE DEMENTIA DUE TO PARKINSON'S DISEASE, WITH MOOD DISTURBANCE: Primary | ICD-10-CM

## 2024-03-04 DIAGNOSIS — F02.B3 MODERATE DEMENTIA DUE TO PARKINSON'S DISEASE, WITH MOOD DISTURBANCE: Primary | ICD-10-CM

## 2024-03-04 DIAGNOSIS — I10 PRIMARY HYPERTENSION: ICD-10-CM

## 2024-03-04 DIAGNOSIS — G20.A1 PARKINSON'S DISEASE WITHOUT DYSKINESIA OR FLUCTUATING MANIFESTATIONS: ICD-10-CM

## 2024-03-04 DIAGNOSIS — G20.A1 DEMENTIA DUE TO PARKINSON'S DISEASE WITH BEHAVIORAL DISTURBANCE: ICD-10-CM

## 2024-03-04 DIAGNOSIS — Z79.4 TYPE 2 DIABETES MELLITUS WITH HYPOGLYCEMIA WITHOUT COMA, WITH LONG-TERM CURRENT USE OF INSULIN: ICD-10-CM

## 2024-03-04 DIAGNOSIS — I10 ESSENTIAL HYPERTENSION: ICD-10-CM

## 2024-03-04 DIAGNOSIS — F02.818 DEMENTIA DUE TO PARKINSON'S DISEASE WITH BEHAVIORAL DISTURBANCE: ICD-10-CM

## 2024-03-04 DIAGNOSIS — E11.649 TYPE 2 DIABETES MELLITUS WITH HYPOGLYCEMIA WITHOUT COMA, WITH LONG-TERM CURRENT USE OF INSULIN: ICD-10-CM

## 2024-03-04 NOTE — LETTER
Nursing Home Progress Note        Alex Burk DO   [address] [place] [address] Phone: [phone]  Fax: [phone]     PATIENT NAME: Gama Davila                                                                          YOB: 1940           DATE OF SERVICE: 03/04/2024  FACILITY:  [facility]   ______________________________________________________________________     CHIEF COMPLAINT:  Chronic Medical Management      HISTORY OF PRESENT ILLNESS:   The patient is an 83-year-old male who is here for a follow-up visit at the nursing facility.    Over the last couple of months, the patient has been fairly stable at the facility. Nurses did not report any acute events or concerns.   Patient remains content.   Medications were reviewed and with stable mood, reducing medications was discussed with nursing staff.       PAST MEDICAL & SURGICAL HISTORY:   Past Medical History:   Diagnosis Date    Arthritis     Cancer     SKIN     Cataract     Coronary artery disease     Diabetes mellitus     High cholesterol     [place] (hard of hearing)     PATIENT HAS HEARING AIDS    Hypertension     Irregular heart beat     HISTORY OF CARDIAC ABLATION IN 2003    Wears dentures     FULL UPPER PLATE      Past Surgical History:   Procedure Laterality Date    BACK SURGERY  1978    THEN AGAIN IN 1982    CARDIAC ABLATION  2003    CARDIAC SURGERY      CATARACT EXTRACTION W/ INTRAOCULAR LENS IMPLANT Left 5/24/2017    Procedure: CATARACT PHACO EXTRACTION WITH INTRAOCULAR LENS IMPLANT LEFT WITH TORIC LENS;  Surgeon: Alma Benavidez MD;  Location: [facility] OR;  Service:     CATARACT EXTRACTION W/ INTRAOCULAR LENS IMPLANT Right 6/14/2017    Procedure: CATARACT PHACO EXTRACTION WITH INTRAOCULAR LENS IMPLANT RIGHT WITH TORIC LENS;  Surgeon: Alma Benavidez MD;  Location: [facility] OR;  Service:     CORONARY ARTERY BYPASS GRAFT  2003    SPOUSE UNSURE OF HOW MANY VESSELS    HIATAL HERNIA REPAIR   1972    JOINT REPLACEMENT Left     HIP - DONE BY DR DALAL    KNEE ARTHROSCOPY Left     NOSE SURGERY  1970    SPOUSE REPORTS FOR A BROKEN NOSE    OTHER SURGICAL HISTORY Left     TENDON TORN LOOSE FROM BONE, LEFT ELBOW    OTHER SURGICAL HISTORY      RUPTURED DISC    OTHER SURGICAL HISTORY      NECK SURGERY FOR RUPTURED DISC    OTHER SURGICAL HISTORY      HAD SECOND NECK SURGERY    OTHER SURGICAL HISTORY      RUPTURED DISC    OTHER SURGICAL HISTORY      RUPTURED DISC    OTHER SURGICAL HISTORY      HARDWARE REMOVAL FROM BACK BY DR HAY         MEDICATIONS:  I have reviewed and reconciled the patients medication list in the patients chart at the skilled nursing facility today, 2024.      ALLERGIES:  Allergies   Allergen Reactions    Phenergan [Promethazine Hcl] Nausea And Vomiting    Carbamazepine Unknown - Low Severity    Hydrocodone Unknown - Low Severity    Lisinopril Unknown - Low Severity         SOCIAL HISTORY:  Social History     Socioeconomic History    Marital status:    Tobacco Use    Smoking status: Former     Current packs/day: 0.00     Types: Cigarettes     Quit date:      Years since quittin.2    Smokeless tobacco: Never   Substance and Sexual Activity    Alcohol use: No    Drug use: No    Sexual activity: Defer       FAMILY HISTORY:  No family history on file.    REVIEW OF SYSTEMS:  Review of Systems   Unable to perform ROS: Dementia          PHYSICAL EXAMINATION:     VITAL SIGNS:  /65   Pulse 57   Temp 98.1 °F (36.7 °C)   Resp 18   Wt 74.8 kg (165 lb)   SpO2 94%   BMI 25.09 kg/m²     Physical Exam  Vitals and nursing note reviewed.   Constitutional:       General: He is not in acute distress.     Appearance: Normal appearance. He is well-developed.      Comments: Elderly male resting comfortably in bed.   HENT:      Head: Normocephalic and atraumatic.   Eyes:      Extraocular Movements: Extraocular movements intact.      Conjunctiva/sclera:  Conjunctivae normal.   Cardiovascular:      Rate and Rhythm: Normal rate and regular rhythm.      Pulses: Normal pulses.      Heart sounds: Normal heart sounds. No murmur heard.  Pulmonary:      Effort: Pulmonary effort is normal.      Breath sounds: Normal breath sounds.   Abdominal:      General: Abdomen is flat.      Palpations: Abdomen is soft.   Musculoskeletal:      Cervical back: Normal range of motion and neck supple.   Skin:     General: Skin is warm and dry.      Findings: No rash.   Neurological:      General: No focal deficit present.      Mental Status: He is alert. Mental status is at baseline. He is disoriented.   Psychiatric:      Comments: Slow cognition and ability to respond to questions.         RECORDS REVIEW:        ASSESSMENT       Diagnoses and all orders for this visit:    1. Moderate dementia due to Parkinson's disease, with mood disturbance (Primary)    2. Parkinson's disease without dyskinesia or fluctuating manifestations    3. Coronary artery disease involving native heart with angina pectoris, unspecified vessel or lesion type    4. Primary hypertension    5. Essential hypertension    6. Dementia due to Parkinson's disease with behavioral disturbance    7. Type 2 diabetes mellitus with hypoglycemia without coma, with long-term current use of insulin        PLAN  Mood disorder.  His medication list was reviewed and due to stable mood and behaviors, we will stop his Seroquel, which he gets at nighttime. He can continue taking Depakote.    Type 2 diabetes mellitus   -Obtain labs every 3 months: CBC, CMP and A1c.  Labs were reordered today.   -Stable control on metformin and basaglar      Hyperlipidemia  -Remains off of statin due to age and lack of long-term benefit    Dementia due to Parkinson's disease  -  Stable with Sinemet, memantine, and donepezil.     Falls  -No issues recently.  Restorative therapy is following.    BPH  - cont Tamsulosin    Constipation  - on daily stool  softener.     Coronary artery disease/irregular heartbeat/CHF  - Euvolemic at this time  - stable on current cardiac medications.             [x]  Discussed Patient in detail with nursing/staff, addressed all needs today.     [x]  Plan of Care Reviewed   []  PT/OT Reviewed   [x]  Order Changes  []  Discharge Plans Reviewed  [x]  Advance Directive on file with Nursing Home.   [x]  POA on file with Nursing Home.    [x]  Code Status listed and reviewed.     I confirm accuracy of unchanged data/findings including physical exam and plan which have been carried forward from previous visit, as well as I have updated appropriately those that have changed        Alex Burk DO.  3/4/2024      Transcribed from ambient dictation for Alex Burk DO by Keira Rios.  03/04/24   09:41 EST    Patient or patient representative verbalized consent to the visit recording.  I have personally performed the services described in this document as transcribed by the above individual, and it is both accurate and complete.

## 2024-03-04 NOTE — PROGRESS NOTES
Nursing Home Progress Note        Alex Burk DO   [address] [place] [address] Phone: [phone]  Fax: [phone]     PATIENT NAME: Gama Davila                                                                          YOB: 1940           DATE OF SERVICE: 03/04/2024  FACILITY:  [facility]   ______________________________________________________________________     CHIEF COMPLAINT:  Chronic Medical Management      HISTORY OF PRESENT ILLNESS:   The patient is an 83-year-old male who is here for a follow-up visit at the nursing facility.    Over the last couple of months, the patient has been fairly stable at the facility. Nurses did not report any acute events or concerns.   Patient remains content.   Medications were reviewed and with stable mood, reducing medications was discussed with nursing staff.       PAST MEDICAL & SURGICAL HISTORY:   Past Medical History:   Diagnosis Date    Arthritis     Cancer     SKIN     Cataract     Coronary artery disease     Diabetes mellitus     High cholesterol     [place] (hard of hearing)     PATIENT HAS HEARING AIDS    Hypertension     Irregular heart beat     HISTORY OF CARDIAC ABLATION IN 2003    Wears dentures     FULL UPPER PLATE      Past Surgical History:   Procedure Laterality Date    BACK SURGERY  1978    THEN AGAIN IN 1982    CARDIAC ABLATION  2003    CARDIAC SURGERY      CATARACT EXTRACTION W/ INTRAOCULAR LENS IMPLANT Left 5/24/2017    Procedure: CATARACT PHACO EXTRACTION WITH INTRAOCULAR LENS IMPLANT LEFT WITH TORIC LENS;  Surgeon: Alma Benavidez MD;  Location: [facility] OR;  Service:     CATARACT EXTRACTION W/ INTRAOCULAR LENS IMPLANT Right 6/14/2017    Procedure: CATARACT PHACO EXTRACTION WITH INTRAOCULAR LENS IMPLANT RIGHT WITH TORIC LENS;  Surgeon: Alma Benavidez MD;  Location: [facility] OR;  Service:     CORONARY ARTERY BYPASS GRAFT  2003    SPOUSE UNSURE OF HOW MANY VESSELS    HIATAL HERNIA REPAIR   1972    JOINT REPLACEMENT Left     HIP - DONE BY DR DALAL    KNEE ARTHROSCOPY Left     NOSE SURGERY  1970    SPOUSE REPORTS FOR A BROKEN NOSE    OTHER SURGICAL HISTORY Left     TENDON TORN LOOSE FROM BONE, LEFT ELBOW    OTHER SURGICAL HISTORY      RUPTURED DISC    OTHER SURGICAL HISTORY      NECK SURGERY FOR RUPTURED DISC    OTHER SURGICAL HISTORY      HAD SECOND NECK SURGERY    OTHER SURGICAL HISTORY      RUPTURED DISC    OTHER SURGICAL HISTORY      RUPTURED DISC    OTHER SURGICAL HISTORY      HARDWARE REMOVAL FROM BACK BY DR HAY         MEDICATIONS:  I have reviewed and reconciled the patients medication list in the patients chart at the skilled nursing facility today, 2024.      ALLERGIES:  Allergies   Allergen Reactions    Phenergan [Promethazine Hcl] Nausea And Vomiting    Carbamazepine Unknown - Low Severity    Hydrocodone Unknown - Low Severity    Lisinopril Unknown - Low Severity         SOCIAL HISTORY:  Social History     Socioeconomic History    Marital status:    Tobacco Use    Smoking status: Former     Current packs/day: 0.00     Types: Cigarettes     Quit date:      Years since quittin.2    Smokeless tobacco: Never   Substance and Sexual Activity    Alcohol use: No    Drug use: No    Sexual activity: Defer       FAMILY HISTORY:  No family history on file.    REVIEW OF SYSTEMS:  Review of Systems   Unable to perform ROS: Dementia          PHYSICAL EXAMINATION:     VITAL SIGNS:  /65   Pulse 57   Temp 98.1 °F (36.7 °C)   Resp 18   Wt 74.8 kg (165 lb)   SpO2 94%   BMI 25.09 kg/m²     Physical Exam  Vitals and nursing note reviewed.   Constitutional:       General: He is not in acute distress.     Appearance: Normal appearance. He is well-developed.      Comments: Elderly male resting comfortably in bed.   HENT:      Head: Normocephalic and atraumatic.   Eyes:      Extraocular Movements: Extraocular movements intact.      Conjunctiva/sclera:  Conjunctivae normal.   Cardiovascular:      Rate and Rhythm: Normal rate and regular rhythm.      Pulses: Normal pulses.      Heart sounds: Normal heart sounds. No murmur heard.  Pulmonary:      Effort: Pulmonary effort is normal.      Breath sounds: Normal breath sounds.   Abdominal:      General: Abdomen is flat.      Palpations: Abdomen is soft.   Musculoskeletal:      Cervical back: Normal range of motion and neck supple.   Skin:     General: Skin is warm and dry.      Findings: No rash.   Neurological:      General: No focal deficit present.      Mental Status: He is alert. Mental status is at baseline. He is disoriented.   Psychiatric:      Comments: Slow cognition and ability to respond to questions.         RECORDS REVIEW:        ASSESSMENT       Diagnoses and all orders for this visit:    1. Moderate dementia due to Parkinson's disease, with mood disturbance (Primary)    2. Parkinson's disease without dyskinesia or fluctuating manifestations    3. Coronary artery disease involving native heart with angina pectoris, unspecified vessel or lesion type    4. Primary hypertension    5. Essential hypertension    6. Dementia due to Parkinson's disease with behavioral disturbance    7. Type 2 diabetes mellitus with hypoglycemia without coma, with long-term current use of insulin        PLAN  Mood disorder.  His medication list was reviewed and due to stable mood and behaviors, we will stop his Seroquel, which he gets at nighttime. He can continue taking Depakote.    Type 2 diabetes mellitus   -Obtain labs every 3 months: CBC, CMP and A1c.  Labs were reordered today.   -Stable control on metformin and basaglar      Hyperlipidemia  -Remains off of statin due to age and lack of long-term benefit    Dementia due to Parkinson's disease  -  Stable with Sinemet, memantine, and donepezil.     Falls  -No issues recently.  Restorative therapy is following.    BPH  - cont Tamsulosin    Constipation  - on daily stool  softener.     Coronary artery disease/irregular heartbeat/CHF  - Euvolemic at this time  - stable on current cardiac medications.             [x]  Discussed Patient in detail with nursing/staff, addressed all needs today.     [x]  Plan of Care Reviewed   []  PT/OT Reviewed   [x]  Order Changes  []  Discharge Plans Reviewed  [x]  Advance Directive on file with Nursing Home.   [x]  POA on file with Nursing Home.    [x]  Code Status listed and reviewed.     I confirm accuracy of unchanged data/findings including physical exam and plan which have been carried forward from previous visit, as well as I have updated appropriately those that have changed        Alex Burk DO.  3/4/2024      Transcribed from ambient dictation for Alex Burk DO by Keira Rios.  03/04/24   09:41 EST    Patient or patient representative verbalized consent to the visit recording.  I have personally performed the services described in this document as transcribed by the above individual, and it is both accurate and complete.

## 2024-04-06 ENCOUNTER — APPOINTMENT (OUTPATIENT)
Dept: GENERAL RADIOLOGY | Facility: HOSPITAL | Age: 84
End: 2024-04-06
Payer: MEDICARE

## 2024-04-06 ENCOUNTER — HOSPITAL ENCOUNTER (EMERGENCY)
Facility: HOSPITAL | Age: 84
Discharge: NURSING, REHAB OR LONG-TERM CARE | End: 2024-04-06
Attending: EMERGENCY MEDICINE
Payer: MEDICARE

## 2024-04-06 VITALS
DIASTOLIC BLOOD PRESSURE: 85 MMHG | WEIGHT: 165.34 LBS | HEART RATE: 74 BPM | RESPIRATION RATE: 18 BRPM | HEIGHT: 68 IN | TEMPERATURE: 98.4 F | SYSTOLIC BLOOD PRESSURE: 142 MMHG | OXYGEN SATURATION: 95 % | BODY MASS INDEX: 25.06 KG/M2

## 2024-04-06 DIAGNOSIS — B34.2 CORONAVIRUS INFECTION: Primary | ICD-10-CM

## 2024-04-06 DIAGNOSIS — J40 BRONCHITIS: ICD-10-CM

## 2024-04-06 LAB
ALBUMIN SERPL-MCNC: 3.3 G/DL (ref 3.5–5.2)
ALBUMIN/GLOB SERPL: 1 G/DL
ALP SERPL-CCNC: 103 U/L (ref 39–117)
ALT SERPL W P-5'-P-CCNC: <5 U/L (ref 1–41)
ANION GAP SERPL CALCULATED.3IONS-SCNC: 10.4 MMOL/L (ref 5–15)
AST SERPL-CCNC: 22 U/L (ref 1–40)
B PARAPERT DNA SPEC QL NAA+PROBE: NOT DETECTED
B PERT DNA SPEC QL NAA+PROBE: NOT DETECTED
BACTERIA UR QL AUTO: ABNORMAL /HPF
BASOPHILS # BLD AUTO: 0.02 10*3/MM3 (ref 0–0.2)
BASOPHILS NFR BLD AUTO: 0.2 % (ref 0–1.5)
BILIRUB SERPL-MCNC: 0.3 MG/DL (ref 0–1.2)
BILIRUB UR QL STRIP: NEGATIVE
BUN SERPL-MCNC: 22 MG/DL (ref 8–23)
BUN/CREAT SERPL: 19.1 (ref 7–25)
C PNEUM DNA NPH QL NAA+NON-PROBE: NOT DETECTED
CALCIUM SPEC-SCNC: 8.1 MG/DL (ref 8.6–10.5)
CHLORIDE SERPL-SCNC: 100 MMOL/L (ref 98–107)
CLARITY UR: ABNORMAL
CO2 SERPL-SCNC: 27.6 MMOL/L (ref 22–29)
COLOR UR: ABNORMAL
CREAT SERPL-MCNC: 1.15 MG/DL (ref 0.76–1.27)
D-LACTATE SERPL-SCNC: 1.9 MMOL/L (ref 0.5–2)
DEPRECATED RDW RBC AUTO: 46.9 FL (ref 37–54)
EGFRCR SERPLBLD CKD-EPI 2021: 63.1 ML/MIN/1.73
EOSINOPHIL # BLD AUTO: 0.03 10*3/MM3 (ref 0–0.4)
EOSINOPHIL NFR BLD AUTO: 0.4 % (ref 0.3–6.2)
ERYTHROCYTE [DISTWIDTH] IN BLOOD BY AUTOMATED COUNT: 13.8 % (ref 12.3–15.4)
FLUAV SUBTYP SPEC NAA+PROBE: NOT DETECTED
FLUBV RNA ISLT QL NAA+PROBE: NOT DETECTED
GLOBULIN UR ELPH-MCNC: 3.4 GM/DL
GLUCOSE SERPL-MCNC: 142 MG/DL (ref 65–99)
GLUCOSE UR STRIP-MCNC: NEGATIVE MG/DL
HADV DNA SPEC NAA+PROBE: NOT DETECTED
HCOV 229E RNA SPEC QL NAA+PROBE: DETECTED
HCOV HKU1 RNA SPEC QL NAA+PROBE: NOT DETECTED
HCOV NL63 RNA SPEC QL NAA+PROBE: NOT DETECTED
HCOV OC43 RNA SPEC QL NAA+PROBE: NOT DETECTED
HCT VFR BLD AUTO: 40.8 % (ref 37.5–51)
HGB BLD-MCNC: 13.4 G/DL (ref 13–17.7)
HGB UR QL STRIP.AUTO: ABNORMAL
HMPV RNA NPH QL NAA+NON-PROBE: NOT DETECTED
HPIV1 RNA ISLT QL NAA+PROBE: NOT DETECTED
HPIV2 RNA SPEC QL NAA+PROBE: NOT DETECTED
HPIV3 RNA NPH QL NAA+PROBE: NOT DETECTED
HPIV4 P GENE NPH QL NAA+PROBE: NOT DETECTED
HYALINE CASTS UR QL AUTO: ABNORMAL /LPF
IMM GRANULOCYTES # BLD AUTO: 0.05 10*3/MM3 (ref 0–0.05)
IMM GRANULOCYTES NFR BLD AUTO: 0.6 % (ref 0–0.5)
KETONES UR QL STRIP: NEGATIVE
LEUKOCYTE ESTERASE UR QL STRIP.AUTO: ABNORMAL
LYMPHOCYTES # BLD AUTO: 2.34 10*3/MM3 (ref 0.7–3.1)
LYMPHOCYTES NFR BLD AUTO: 27.4 % (ref 19.6–45.3)
M PNEUMO IGG SER IA-ACNC: NOT DETECTED
MCH RBC QN AUTO: 30.3 PG (ref 26.6–33)
MCHC RBC AUTO-ENTMCNC: 32.8 G/DL (ref 31.5–35.7)
MCV RBC AUTO: 92.3 FL (ref 79–97)
MONOCYTES # BLD AUTO: 1.67 10*3/MM3 (ref 0.1–0.9)
MONOCYTES NFR BLD AUTO: 19.5 % (ref 5–12)
NEUTROPHILS NFR BLD AUTO: 4.44 10*3/MM3 (ref 1.7–7)
NEUTROPHILS NFR BLD AUTO: 51.9 % (ref 42.7–76)
NITRITE UR QL STRIP: NEGATIVE
NRBC BLD AUTO-RTO: 0 /100 WBC (ref 0–0.2)
NT-PROBNP SERPL-MCNC: 1722 PG/ML (ref 0–1800)
PH UR STRIP.AUTO: 5.5 [PH] (ref 5–8)
PLATELET # BLD AUTO: 162 10*3/MM3 (ref 140–450)
PMV BLD AUTO: 10.2 FL (ref 6–12)
POTASSIUM SERPL-SCNC: 5 MMOL/L (ref 3.5–5.2)
PROCALCITONIN SERPL-MCNC: 0.05 NG/ML (ref 0–0.25)
PROT SERPL-MCNC: 6.7 G/DL (ref 6–8.5)
PROT UR QL STRIP: NEGATIVE
RBC # BLD AUTO: 4.42 10*6/MM3 (ref 4.14–5.8)
RBC # UR STRIP: ABNORMAL /HPF
REF LAB TEST METHOD: ABNORMAL
RHINOVIRUS RNA SPEC NAA+PROBE: NOT DETECTED
RSV RNA NPH QL NAA+NON-PROBE: NOT DETECTED
SARS-COV-2 RNA NPH QL NAA+NON-PROBE: NOT DETECTED
SODIUM SERPL-SCNC: 138 MMOL/L (ref 136–145)
SP GR UR STRIP: 1.02 (ref 1–1.03)
SQUAMOUS #/AREA URNS HPF: ABNORMAL /HPF
TROPONIN T SERPL HS-MCNC: 25 NG/L
UROBILINOGEN UR QL STRIP: ABNORMAL
WBC # UR STRIP: ABNORMAL /HPF
WBC NRBC COR # BLD AUTO: 8.55 10*3/MM3 (ref 3.4–10.8)

## 2024-04-06 PROCEDURE — 83605 ASSAY OF LACTIC ACID: CPT | Performed by: EMERGENCY MEDICINE

## 2024-04-06 PROCEDURE — 80053 COMPREHEN METABOLIC PANEL: CPT | Performed by: EMERGENCY MEDICINE

## 2024-04-06 PROCEDURE — 81001 URINALYSIS AUTO W/SCOPE: CPT | Performed by: EMERGENCY MEDICINE

## 2024-04-06 PROCEDURE — 87086 URINE CULTURE/COLONY COUNT: CPT | Performed by: EMERGENCY MEDICINE

## 2024-04-06 PROCEDURE — 96375 TX/PRO/DX INJ NEW DRUG ADDON: CPT

## 2024-04-06 PROCEDURE — 84145 PROCALCITONIN (PCT): CPT | Performed by: EMERGENCY MEDICINE

## 2024-04-06 PROCEDURE — P9612 CATHETERIZE FOR URINE SPEC: HCPCS

## 2024-04-06 PROCEDURE — 25010000002 METHYLPREDNISOLONE PER 40 MG: Performed by: EMERGENCY MEDICINE

## 2024-04-06 PROCEDURE — 71045 X-RAY EXAM CHEST 1 VIEW: CPT

## 2024-04-06 PROCEDURE — 83880 ASSAY OF NATRIURETIC PEPTIDE: CPT | Performed by: EMERGENCY MEDICINE

## 2024-04-06 PROCEDURE — 0202U NFCT DS 22 TRGT SARS-COV-2: CPT | Performed by: EMERGENCY MEDICINE

## 2024-04-06 PROCEDURE — 94799 UNLISTED PULMONARY SVC/PX: CPT

## 2024-04-06 PROCEDURE — 93005 ELECTROCARDIOGRAM TRACING: CPT | Performed by: EMERGENCY MEDICINE

## 2024-04-06 PROCEDURE — 94640 AIRWAY INHALATION TREATMENT: CPT

## 2024-04-06 PROCEDURE — 36415 COLL VENOUS BLD VENIPUNCTURE: CPT

## 2024-04-06 PROCEDURE — 25810000003 SODIUM CHLORIDE 0.9 % SOLUTION: Performed by: EMERGENCY MEDICINE

## 2024-04-06 PROCEDURE — 85025 COMPLETE CBC W/AUTO DIFF WBC: CPT | Performed by: EMERGENCY MEDICINE

## 2024-04-06 PROCEDURE — 25010000002 FUROSEMIDE PER 20 MG: Performed by: EMERGENCY MEDICINE

## 2024-04-06 PROCEDURE — 99284 EMERGENCY DEPT VISIT MOD MDM: CPT

## 2024-04-06 PROCEDURE — 96374 THER/PROPH/DIAG INJ IV PUSH: CPT

## 2024-04-06 PROCEDURE — 84484 ASSAY OF TROPONIN QUANT: CPT | Performed by: EMERGENCY MEDICINE

## 2024-04-06 PROCEDURE — 87040 BLOOD CULTURE FOR BACTERIA: CPT | Performed by: EMERGENCY MEDICINE

## 2024-04-06 PROCEDURE — 94761 N-INVAS EAR/PLS OXIMETRY MLT: CPT

## 2024-04-06 RX ORDER — METHYLPREDNISOLONE SODIUM SUCCINATE 40 MG/ML
40 INJECTION, POWDER, LYOPHILIZED, FOR SOLUTION INTRAMUSCULAR; INTRAVENOUS ONCE
Status: COMPLETED | OUTPATIENT
Start: 2024-04-06 | End: 2024-04-06

## 2024-04-06 RX ORDER — FUROSEMIDE 10 MG/ML
40 INJECTION INTRAMUSCULAR; INTRAVENOUS ONCE
Status: COMPLETED | OUTPATIENT
Start: 2024-04-06 | End: 2024-04-06

## 2024-04-06 RX ORDER — IPRATROPIUM BROMIDE AND ALBUTEROL SULFATE 2.5; .5 MG/3ML; MG/3ML
3 SOLUTION RESPIRATORY (INHALATION) ONCE
Status: COMPLETED | OUTPATIENT
Start: 2024-04-06 | End: 2024-04-06

## 2024-04-06 RX ADMIN — METHYLPREDNISOLONE SODIUM SUCCINATE 40 MG: 40 INJECTION, POWDER, FOR SOLUTION INTRAMUSCULAR; INTRAVENOUS at 16:46

## 2024-04-06 RX ADMIN — FUROSEMIDE 40 MG: 10 INJECTION, SOLUTION INTRAMUSCULAR; INTRAVENOUS at 17:55

## 2024-04-06 RX ADMIN — SODIUM CHLORIDE 1000 ML: 9 INJECTION, SOLUTION INTRAVENOUS at 15:20

## 2024-04-06 RX ADMIN — IPRATROPIUM BROMIDE AND ALBUTEROL SULFATE 3 ML: .5; 3 SOLUTION RESPIRATORY (INHALATION) at 17:01

## 2024-04-06 NOTE — ED PROVIDER NOTES
EMERGENCY DEPARTMENT ENCOUNTER    Pt Name: Gama Davila  MRN: [id number]  Pt :   1940  Room Number:    Date of encounter:  2024  PCP: Alex Burk DO  ED Provider: Jorge Bentley MD    Historian: EMS      HPI:  Chief Complaint   Patient presents with    Shortness of Breath          Context: Gama Davila is a 83 y.o. male who presents to the ED c/o shortness of breath, present for several days, getting progressively worse.  Wife reports cough, nonproductive, patient was apparently hypoxic on his normal oxygen, here on 4 L his oxygen is normal.  The patient has severe dementia and is not able to provide any history.      PAST MEDICAL HISTORY  Past Medical History:   Diagnosis Date    Arthritis     Cancer     SKIN     Cataract     Coronary artery disease     Diabetes mellitus     High cholesterol     [place] (hard of hearing)     PATIENT HAS HEARING AIDS    Hypertension     Irregular heart beat     HISTORY OF CARDIAC ABLATION IN     Wears dentures     FULL UPPER PLATE         PAST SURGICAL HISTORY  Past Surgical History:   Procedure Laterality Date    BACK SURGERY      THEN AGAIN IN     CARDIAC ABLATION      CARDIAC SURGERY      CATARACT EXTRACTION W/ INTRAOCULAR LENS IMPLANT Left 2017    Procedure: CATARACT PHACO EXTRACTION WITH INTRAOCULAR LENS IMPLANT LEFT WITH TORIC LENS;  Surgeon: Alma Benavidez MD;  Location: [facility] OR;  Service:     CATARACT EXTRACTION W/ INTRAOCULAR LENS IMPLANT Right 2017    Procedure: CATARACT PHACO EXTRACTION WITH INTRAOCULAR LENS IMPLANT RIGHT WITH TORIC LENS;  Surgeon: Alma Benavidez MD;  Location: [facility] OR;  Service:     CORONARY ARTERY BYPASS GRAFT      SPOUSE UNSURE OF HOW MANY VESSELS    HIATAL HERNIA REPAIR      JOINT REPLACEMENT Left     HIP - DONE BY DR DALAL    KNEE ARTHROSCOPY Left     NOSE SURGERY  1970    SPOUSE REPORTS FOR A BROKEN NOSE    OTHER SURGICAL HISTORY Left     TENDON TORN LOOSE  FROM BONE, LEFT ELBOW    OTHER SURGICAL HISTORY      RUPTURED DISC    OTHER SURGICAL HISTORY      NECK SURGERY FOR RUPTURED DISC    OTHER SURGICAL HISTORY      HAD SECOND NECK SURGERY    OTHER SURGICAL HISTORY      RUPTURED DISC    OTHER SURGICAL HISTORY      RUPTURED DISC    OTHER SURGICAL HISTORY      HARDWARE REMOVAL FROM BACK BY DR HAY         FAMILY HISTORY  History reviewed. No pertinent family history.      SOCIAL HISTORY  Social History     Socioeconomic History    Marital status:    Tobacco Use    Smoking status: Former     Current packs/day: 0.00     Types: Cigarettes     Quit date:      Years since quittin.2    Smokeless tobacco: Never   Substance and Sexual Activity    Alcohol use: No    Drug use: No    Sexual activity: Defer         ALLERGIES  Phenergan [promethazine hcl], Carbamazepine, Hydrocodone, and Lisinopril        REVIEW OF SYSTEMS  Review of Systems   Constitutional:  Negative for chills and fever.   HENT:  Negative for sore throat and trouble swallowing.    Eyes:  Negative for pain and redness.   Respiratory:  Positive for cough and shortness of breath.    Cardiovascular:  Negative for chest pain and leg swelling.   Gastrointestinal:  Negative for abdominal pain, nausea and vomiting.   Genitourinary:  Negative for dysuria and urgency.   Musculoskeletal:  Negative for back pain and neck pain.   Skin:  Negative for rash and wound.   Neurological:  Negative for dizziness and weakness.        All systems reviewed and negative except for those discussed in HPI.       PHYSICAL EXAM    I have reviewed the triage vital signs and nursing notes.    ED Triage Vitals [24 1513]   Temp Heart Rate Resp BP SpO2   98.4 °F (36.9 °C) 57 26 98/80 100 %      Temp src Heart Rate Source Patient Position BP Location FiO2 (%)   Oral Monitor Lying Left arm --       Physical Exam  Constitutional:       Appearance: Normal appearance. He is not ill-appearing.   HENT:       Head: Normocephalic and atraumatic.      Right Ear: External ear normal.      Left Ear: External ear normal.      Nose: Nose normal.      Mouth/Throat:      Mouth: Mucous membranes are moist.      Pharynx: Oropharynx is clear.   Eyes:      Extraocular Movements: Extraocular movements intact.      Conjunctiva/sclera: Conjunctivae normal.      Pupils: Pupils are equal, round, and reactive to light.   Cardiovascular:      Rate and Rhythm: Normal rate and regular rhythm.      Pulses:           Radial pulses are 2+ on the right side and 2+ on the left side.      Heart sounds: No murmur heard.  Pulmonary:      Effort: Tachypnea present.      Breath sounds: Examination of the right-upper field reveals wheezing. Examination of the left-upper field reveals wheezing. Examination of the right-middle field reveals wheezing. Examination of the left-middle field reveals wheezing. Examination of the right-lower field reveals wheezing and rhonchi. Examination of the left-lower field reveals wheezing and rhonchi. Wheezing and rhonchi present.   Abdominal:      General: There is no distension.      Tenderness: There is no abdominal tenderness. There is no guarding.   Musculoskeletal:         General: No swelling or deformity.      Cervical back: Normal range of motion and neck supple.   Skin:     General: Skin is warm and dry.      Capillary Refill: Capillary refill takes less than 2 seconds.      Findings: No rash.   Neurological:      General: No focal deficit present.      Mental Status: He is alert and oriented to person, place, and time.            LAB RESULTS  Recent Results (from the past 24 hour(s))   Comprehensive Metabolic Panel    Collection Time: 04/06/24  3:20 PM    Specimen: Blood   Result Value Ref Range    Glucose 142 (H) 65 - 99 mg/dL    BUN 22 8 - 23 mg/dL    Creatinine 1.15 0.76 - 1.27 mg/dL    Sodium 138 136 - 145 mmol/L    Potassium 5.0 3.5 - 5.2 mmol/L    Chloride 100 98 - 107 mmol/L    CO2 27.6 22.0 - 29.0  mmol/L    Calcium 8.1 (L) 8.6 - 10.5 mg/dL    Total Protein 6.7 6.0 - 8.5 g/dL    Albumin 3.3 (L) 3.5 - 5.2 g/dL    ALT (SGPT) <5 1 - 41 U/L    AST (SGOT) 22 1 - 40 U/L    Alkaline Phosphatase 103 39 - 117 U/L    Total Bilirubin 0.3 0.0 - 1.2 mg/dL    Globulin 3.4 gm/dL    A/G Ratio 1.0 g/dL    BUN/Creatinine Ratio 19.1 7.0 - 25.0    Anion Gap 10.4 5.0 - 15.0 mmol/L    eGFR 63.1 >60.0 mL/min/1.73   BNP    Collection Time: 04/06/24  3:20 PM    Specimen: Blood   Result Value Ref Range    proBNP 1,722.0 0.0 - 1,800.0 pg/mL   High Sensitivity Troponin T    Collection Time: 04/06/24  3:20 PM    Specimen: Blood   Result Value Ref Range    HS Troponin T 25 (H) <22 ng/L   Lactic Acid, Plasma    Collection Time: 04/06/24  3:20 PM    Specimen: Blood   Result Value Ref Range    Lactate 1.9 0.5 - 2.0 mmol/L   Procalcitonin    Collection Time: 04/06/24  3:20 PM    Specimen: Blood   Result Value Ref Range    Procalcitonin 0.05 0.00 - 0.25 ng/mL   CBC Auto Differential    Collection Time: 04/06/24  3:20 PM    Specimen: Blood   Result Value Ref Range    WBC 8.55 3.40 - 10.80 10*3/mm3    RBC 4.42 4.14 - 5.80 10*6/mm3    Hemoglobin 13.4 13.0 - 17.7 g/dL    Hematocrit 40.8 37.5 - 51.0 %    MCV 92.3 79.0 - 97.0 fL    MCH 30.3 26.6 - 33.0 pg    MCHC 32.8 31.5 - 35.7 g/dL    RDW 13.8 12.3 - 15.4 %    RDW-SD 46.9 37.0 - 54.0 fl    MPV 10.2 6.0 - 12.0 fL    Platelets 162 140 - 450 10*3/mm3    Neutrophil % 51.9 42.7 - 76.0 %    Lymphocyte % 27.4 19.6 - 45.3 %    Monocyte % 19.5 (H) 5.0 - 12.0 %    Eosinophil % 0.4 0.3 - 6.2 %    Basophil % 0.2 0.0 - 1.5 %    Immature Grans % 0.6 (H) 0.0 - 0.5 %    Neutrophils, Absolute 4.44 1.70 - 7.00 10*3/mm3    Lymphocytes, Absolute 2.34 0.70 - 3.10 10*3/mm3    Monocytes, Absolute 1.67 (H) 0.10 - 0.90 10*3/mm3    Eosinophils, Absolute 0.03 0.00 - 0.40 10*3/mm3    Basophils, Absolute 0.02 0.00 - 0.20 10*3/mm3    Immature Grans, Absolute 0.05 0.00 - 0.05 10*3/mm3    nRBC 0.0 0.0 - 0.2 /100 WBC    Respiratory Panel PCR w/COVID-19(SARS-CoV-2) [facility]-House, NP Swab in UTM/VTM, 2 HR TAT - Swab, Nasopharynx    Collection Time: 04/06/24  3:23 PM    Specimen: Nasopharynx; Swab   Result Value Ref Range    ADENOVIRUS, PCR Not Detected Not Detected    Coronavirus 229E Detected (A) Not Detected    Coronavirus HKU1 Not Detected Not Detected    Coronavirus NL63 Not Detected Not Detected    Coronavirus OC43 Not Detected Not Detected    COVID19 Not Detected Not Detected - Ref. Range    Human Metapneumovirus Not Detected Not Detected    Human Rhinovirus/Enterovirus Not Detected Not Detected    Influenza A PCR Not Detected Not Detected    Influenza B PCR Not Detected Not Detected    Parainfluenza Virus 1 Not Detected Not Detected    Parainfluenza Virus 2 Not Detected Not Detected    Parainfluenza Virus 3 Not Detected Not Detected    Parainfluenza Virus 4 Not Detected Not Detected    RSV, PCR Not Detected Not Detected    Bordetella pertussis pcr Not Detected Not Detected    Bordetella parapertussis PCR Not Detected Not Detected    Chlamydophila pneumoniae PCR Not Detected Not Detected    Mycoplasma pneumo by PCR Not Detected Not Detected   Urinalysis With Culture If Indicated - Urine, Catheter    Collection Time: 04/06/24  4:46 PM    Specimen: Urine, Catheter   Result Value Ref Range    Color, UA Red (A) Yellow, Straw    Appearance, UA Cloudy (A) Clear    pH, UA 5.5 5.0 - 8.0    Specific Gravity, UA 1.018 1.005 - 1.030    Glucose, UA Negative Negative    Ketones, UA Negative Negative    Bilirubin, UA Negative Negative    Blood, UA Large (3+) (A) Negative    Protein, UA Negative Negative    Leuk Esterase, UA Trace (A) Negative    Nitrite, UA Negative Negative    Urobilinogen, UA 1.0 E.U./dL 0.2 - 1.0 E.U./dL   Urinalysis, Microscopic Only - Urine, Catheter    Collection Time: 04/06/24  4:46 PM    Specimen: Urine, Catheter   Result Value Ref Range    RBC, UA Too Numerous to Count (A) None Seen, 0-2 /HPF     WBC, UA Unable to determine due to loaded field (A) None Seen, 0-2 /HPF    Bacteria, UA None Seen None Seen /HPF    Squamous Epithelial Cells, UA None Seen None Seen, 0-2 /HPF    Hyaline Casts, UA None Seen None Seen /LPF    Methodology Manual Light Microscopy        If labs were ordered, I independently reviewed the results and considered them in treating the patient.        RADIOLOGY  XR Chest 1 View    Result Date: 4/6/2024  PROCEDURE: XR CHEST 1 VW-  HISTORY: SOB, wheezing  COMPARISON: May 8, 2020..  FINDINGS: The heart is enlarged, stable. Status post median sternotomy.. Mild interstitial disease is mildly worsened from prior, consider edema versus pneumonia.. The mediastinum is unremarkable. There is no pneumothorax.  There are no acute osseous abnormalities. Apical lordotic positioning noted.      Cardiomegaly is stable but mildly worsened interstitial disease bilaterally, consider edema versus pneumonia..     This report was signed and finalized on 4/6/2024 3:50 PM by Daniella Nieves MD.           PROCEDURES    Procedures    Interpretations    O2 Sat: The patients oxygen saturation was 95% on  4 L Nasal Cannula.  This was independently interpreted by me as Normal    EKG: I reviewed and independently interpreted the EKG as sinus rhythm at 60 bpm, normal axis, normal intervals, no ST elevation, no T wave inversions    Cardiac Monitoring: I reviewed and independently interpreted the Rhythm Strip as Normal Sinus rhythm rate of 74    Radiology: I ordered and independently reviewed the above noted radiographic studies.  I viewed images of Chest Xray which showed  no pneumonia  per my independent interpretation. See radiologist's dictation for official interpretation.         MEDICATIONS GIVEN IN ER    Medications   sodium chloride 0.9 % bolus 1,000 mL (0 mL Intravenous Stopped 4/6/24 2303)   ipratropium-albuterol (DUO-NEB) nebulizer solution 3 mL (3 mL Nebulization Given 4/6/24 1701)   methylPREDNISolone sodium  succinate (SOLU-Medrol) injection 40 mg (40 mg Intravenous Given 4/6/24 1646)   furosemide (LASIX) injection 40 mg (40 mg Intravenous Given 4/6/24 1755)         MEDICAL DECISION MAKING, PROGRESS, and CONSULTS    All labs, if obtained, have been independently reviewed by me.  All radiology studies, if obtained, have been reviewed by me and the radiologist dictating the report.  All EKG's, if obtained, have been independently viewed and interpreted by me      Discussion below represents my analysis of pertinent findings related to patient's condition, differential diagnosis, treatment plan and final disposition.      Differential diagnosis:    83-year-old male presented to ED with complaint of shortness of breath, history garnered from the wife and EMS as the patient has severe dementia, he is tachypneic, he has rhonchorous breath sounds, but really concern for infectious process such as pneumonia, COVID, flu, will give breathing treatment, steroids, obtain chest x-ray, obtain laboratory workup including cultures lactic acid.    Additional Sources:  Discussed/ obtained information from independent historians:  Wife  External records reviewed: Nursing home records    Orders placed during this visit:  Orders Placed This Encounter   Procedures    Blood Culture - Blood,    Blood Culture - Blood,    Respiratory Panel PCR w/COVID-19(SARS-CoV-2) [facility] In-House, NP Swab in UTM/VTM, 2 HR TAT - Swab, Nasopharynx    Urine Culture - Urine,    XR Chest 1 View    Comprehensive Metabolic Panel    Urinalysis With Culture If Indicated - Urine, Catheter    BNP    High Sensitivity Troponin T    Lactic Acid, Plasma    Procalcitonin    CBC Auto Differential    Urinalysis, Microscopic Only - Urine, Clean Catch    ECG 12 Lead Chest Pain    CBC & Differential         Additional orders considered but not ordered:  None    ED Course:    Consultants:  None    ED Course as of 04/06/24 1836   Sat Apr 06, 2024   1721 proBNP:  1,722.0 [CS]   1721 proBNP mildly elevated, not in a critical range, will give small dose of Lasix given findings on chest x-ray with possible interstitial edema [CS]   1751 Coronavirus 229E(!): Detected  Respiratory panel shows coronavirus, likely explains the patient's symptoms, he is back on his normal oxygen level at home, will watch him on this to make sure he does not drop [CS]   1751 HS Troponin T(!): 25  Troponin is very mildly elevated the patient never had any chest pain, doubt that this is ACS, think all due to his respiratory status [CS]   1835 Patient remains on normal home oxygen, likely bronchitis secondary to coronavirus infection. Will DC patient back to nursing facility [CS]      ED Course User Index  [CS] Jorge Bentley MD           After my consideration of clinical presentation and any laboratory/radiology studies obtained, I discussed the findings with the patient/patient representative who is in agreement with the treatment plan and the final disposition. Risks and benefits of discharge were discussed.     AS OF 18:36 EDT VITALS:    BP - 147/76  HR - 57  TEMP - 98.4 °F (36.9 °C) (Oral)  O2 SATS - 97%    I reviewed the patients prescription monitoring report if available prior to discharge    DIAGNOSIS  Final diagnoses:   Coronavirus infection   Bronchitis         DISPOSITION  ED Disposition       ED Disposition   Discharge    Condition   Stable    Comment   --                   Please note that portions of this document were completed with voice recognition software.        Jorge Bentley MD  04/06/24 1734

## 2024-04-08 LAB — BACTERIA SPEC AEROBE CULT: NO GROWTH

## 2024-04-09 ENCOUNTER — NURSING HOME (OUTPATIENT)
Dept: FAMILY MEDICINE CLINIC | Facility: CLINIC | Age: 84
End: 2024-04-09
Payer: MEDICARE

## 2024-04-09 VITALS
TEMPERATURE: 97.9 F | RESPIRATION RATE: 16 BRPM | OXYGEN SATURATION: 95 % | HEART RATE: 78 BPM | WEIGHT: 166 LBS | SYSTOLIC BLOOD PRESSURE: 142 MMHG | BODY MASS INDEX: 25.24 KG/M2 | DIASTOLIC BLOOD PRESSURE: 80 MMHG

## 2024-04-09 DIAGNOSIS — Z86.16 HISTORY OF COVID-19: Primary | ICD-10-CM

## 2024-04-09 DIAGNOSIS — Z78.9 IMPAIRED MOBILITY AND ADLS: ICD-10-CM

## 2024-04-09 DIAGNOSIS — F02.B3 MODERATE DEMENTIA DUE TO PARKINSON'S DISEASE, WITH MOOD DISTURBANCE: Chronic | ICD-10-CM

## 2024-04-09 DIAGNOSIS — Z74.09 IMPAIRED MOBILITY AND ADLS: ICD-10-CM

## 2024-04-09 DIAGNOSIS — G20.A1 MODERATE DEMENTIA DUE TO PARKINSON'S DISEASE, WITH MOOD DISTURBANCE: Chronic | ICD-10-CM

## 2024-04-09 PROCEDURE — 99309 SBSQ NF CARE MODERATE MDM 30: CPT | Performed by: NURSE PRACTITIONER

## 2024-04-10 NOTE — PROGRESS NOTES
Telemedicine nursing Home Progress Note        Mitul Stephen DO []  ALESS[facility]DRA [place] [x] [place] [address]  Phone: [phone]  Fax: [phone] Inés Vega MD []  ALESSANDRA Ho [x]   [address] [place] [address]  Phone: [phone]  Fax: [phone]     PATIENT NAME: Gama Davila                                                                          YOB: 1940           DATE OF SERVICE: 4/9/2024  FACILITY:  [] [facility]   [] [facility]   [] [facility]   [x] [facility]  []  [facility]  [] Other     ______________________________________________________________________     CHIEF COMPLAINT:    Follow up visit to ED for respiratory distress.     HISTORY OF PRESENT ILLNESS:     Patient was sent to the ED on 4/6 for respiratory distress. He had had some upper respiratory symptoms for a couple days prior that worsened.  Workup was performed in the emergency department and he was diagnosed with coronavirus 229E.  Other workup was negative. He has been stable with no respiratory symptoms or distress since return to facility. Resting in bed today with no signs and symptoms of distress. Nursing has been following guidelines. He is now testing negative for Covid here in facility.     PAST MEDICAL & SURGICAL HISTORY:   Past Medical History:   Diagnosis Date    Arthritis     Cancer     SKIN     Cataract     Coronary artery disease     Diabetes mellitus     High cholesterol     [place] (hard of hearing)     PATIENT HAS HEARING AIDS    Hypertension     Irregular heart beat     HISTORY OF CARDIAC ABLATION IN 2003    Wears dentures     FULL UPPER PLATE      Past Surgical History:   Procedure Laterality Date    BACK SURGERY  1978    THEN AGAIN IN 1982    CARDIAC ABLATION  2003    CARDIAC SURGERY      CATARACT EXTRACTION W/ INTRAOCULAR LENS IMPLANT Left 5/24/2017    Procedure: CATARACT PHACO EXTRACTION WITH INTRAOCULAR LENS IMPLANT LEFT WITH TORIC  LENS;  Surgeon: Alma Benavidez MD;  Location: [facility];  Service:     CATARACT EXTRACTION W/ INTRAOCULAR LENS IMPLANT Right 2017    Procedure: CATARACT PHACO EXTRACTION WITH INTRAOCULAR LENS IMPLANT RIGHT WITH TORIC LENS;  Surgeon: Alma Benavidez MD;  Location: [facility];  Service:     CORONARY ARTERY BYPASS GRAFT      SPOUSE UNSURE OF HOW MANY VESSELS    HIATAL HERNIA REPAIR  1972    JOINT REPLACEMENT Left     HIP - DONE BY DR DALAL    KNEE ARTHROSCOPY Left     NOSE SURGERY  1970    SPOUSE REPORTS FOR A BROKEN NOSE    OTHER SURGICAL HISTORY Left     TENDON TORN LOOSE FROM BONE, LEFT ELBOW    OTHER SURGICAL HISTORY      RUPTURED DISC    OTHER SURGICAL HISTORY      NECK SURGERY FOR RUPTURED DISC    OTHER SURGICAL HISTORY      HAD SECOND NECK SURGERY    OTHER SURGICAL HISTORY      RUPTURED DISC    OTHER SURGICAL HISTORY      RUPTURED DISC    OTHER SURGICAL HISTORY      HARDWARE REMOVAL FROM BACK BY DR HAY         MEDICATIONS:  I have reviewed and reconciled the patients medication list in the patients chart at the [facility] nursing [facility] today.      ALLERGIES:  Allergies   Allergen Reactions    Phenergan [Promethazine Hcl] Nausea And Vomiting    Carbamazepine Unknown - Low Severity    Hydrocodone Unknown - Low Severity    Lisinopril Unknown - Low Severity         SOCIAL HISTORY:  Social History     Socioeconomic History    Marital status:    Tobacco Use    Smoking status: Former     Current packs/day: 0.00     Types: Cigarettes     Quit date:      Years since quittin.2    Smokeless tobacco: Never   Substance and Sexual Activity    Alcohol use: No    Drug use: No    Sexual activity: Defer       FAMILY HISTORY:  No family history on file.    REVIEW OF SYSTEMS:  Review of Systems   Unable to perform ROS: Dementia (ROS per nursing and patient)   Constitutional:  Negative for activity change, appetite change, chills, diaphoresis, fatigue and fever.    HENT:  Positive for congestion. Negative for mouth sores, nosebleeds, rhinorrhea and trouble swallowing.    Respiratory:  Positive for cough. Negative for chest tightness and shortness of breath.    Cardiovascular:  Negative for chest pain, palpitations and leg swelling.   Gastrointestinal:  Negative for abdominal distention, abdominal pain, blood in stool, constipation, diarrhea and vomiting.   Genitourinary:  Negative for difficulty urinating and frequency.        Incontinence   Musculoskeletal:  Positive for arthralgias (chronic) and gait problem.   Neurological:  Positive for weakness.   Psychiatric/Behavioral:  Positive for confusion. Negative for agitation, behavioral problems, dysphoric mood, sleep disturbance and suicidal ideas. The patient is not nervous/anxious and is not hyperactive.           PHYSICAL EXAMINATION:     VITAL SIGNS:  /80   Pulse 78   Temp 97.9 °F (36.6 °C)   Resp 16   Wt 75.3 kg (166 lb)   SpO2 95%   BMI 25.24 kg/m²     Physical Exam   HENT:   Head: Normocephalic and atraumatic.   Nose: Nose normal.   Eyes: Conjunctivae are normal.   Cardiovascular: Normal rate, regular rhythm and normal heart sounds.   Pulmonary/Chest: Effort normal. No respiratory distress. He has no decreased breath sounds. He has no wheezes. He has no rhonchi.   Abdominal: Bowel sounds are normal. There is no abdominal tenderness.   Neurological: He is alert. He is disoriented.   Psychiatric: His behavior is normal. Mood and affect normal. Cognition and memory are impaired.   Nursing note and vitals reviewed.      RECORDS REVIEW:   Most recent labs    ASSESSMENT     Diagnoses and all orders for this visit:    1. History of COVID (Primary)    2. Moderate dementia due to Parkinson's disease, with mood disturbance    3. Impaired mobility and ADLs          PLAN    History COVID  -Positive for COVID during ER visit on 4/16.  He has recently tested negative twice here in the facility.  He was also positive for  COVID less than 3 months ago. No signs and symptoms of COVID.     Dementia behaviors, appetite and weight stable.    Nursing encouraged to keep me informed of any acute changes, amanda any new concerning symptoms.    Staff to continue supportive care for all ADLs.     [x]  Discussed Patient in detail with nursing/staff, addressed all needs today.     [x]  Plan of Care Reviewed   [x]  PT/OT Reviewed   []  Order Changes  []  Discharge Plans Reviewed  [x]  Advance Directive on file with Nursing Home.   [x]  POA on file with Nursing Home.    [x]  Code Status listed and reviewed.       “I confirm accuracy of unchanged data/findings which have been carried forward from previous visit, as well as I have updated appropriately those that have changed.”     I spent 35 minutes caring for Gama on this date of service. This time includes time spent by me in the following activities:preparing for the visit, reviewing tests, obtaining and/or reviewing a separately obtained history, performing a medically appropriate examination and/or evaluation , counseling and educating the patient/family/caregiver, ordering medications, tests, or procedures, referring and communicating with other health care professionals , documenting information in the medical record, independently interpreting results and communicating that information with the patient/family/caregiver, and care coordination                                  Charleen Randall, APRN.  4/9/2024

## 2024-04-11 LAB
BACTERIA SPEC AEROBE CULT: NORMAL
BACTERIA SPEC AEROBE CULT: NORMAL

## 2024-04-23 NOTE — TELEPHONE ENCOUNTER
FRANCISCO REQUESTING MED REFILL FOR GABAPENTIN 100 MG.    DIRECTIONS: GABAPENTIN 100 MG 1 CAP PO TID SCHEDULED.

## 2024-04-24 RX ORDER — GABAPENTIN 100 MG/1
100 CAPSULE ORAL 3 TIMES DAILY
Qty: 90 CAPSULE | Refills: 5 | Status: SHIPPED | OUTPATIENT
Start: 2024-04-24

## 2024-05-15 ENCOUNTER — NURSING HOME (OUTPATIENT)
Dept: FAMILY MEDICINE CLINIC | Facility: CLINIC | Age: 84
End: 2024-05-15
Payer: MEDICARE

## 2024-05-15 VITALS
BODY MASS INDEX: 26.46 KG/M2 | SYSTOLIC BLOOD PRESSURE: 139 MMHG | RESPIRATION RATE: 18 BRPM | HEART RATE: 88 BPM | WEIGHT: 174 LBS | DIASTOLIC BLOOD PRESSURE: 68 MMHG | OXYGEN SATURATION: 96 % | TEMPERATURE: 98 F

## 2024-05-15 DIAGNOSIS — Z78.9 IMPAIRED MOBILITY AND ADLS: ICD-10-CM

## 2024-05-15 DIAGNOSIS — Z74.09 IMPAIRED MOBILITY AND ADLS: ICD-10-CM

## 2024-05-15 DIAGNOSIS — G20.A1 MODERATE DEMENTIA DUE TO PARKINSON'S DISEASE, WITH MOOD DISTURBANCE: Chronic | ICD-10-CM

## 2024-05-15 DIAGNOSIS — F02.B3 MODERATE DEMENTIA DUE TO PARKINSON'S DISEASE, WITH MOOD DISTURBANCE: Chronic | ICD-10-CM

## 2024-05-15 DIAGNOSIS — L03.114 CELLULITIS OF LEFT UPPER EXTREMITY: Primary | ICD-10-CM

## 2024-05-15 PROCEDURE — 99308 SBSQ NF CARE LOW MDM 20: CPT | Performed by: NURSE PRACTITIONER

## 2024-05-15 NOTE — PROGRESS NOTES
Nursing Home Follow Up Note      Mitul Stephen DO []   ALESSANDRA Ho [x]  [address] [place] [address]  Phone: [phone]  Fax: [phone] Inés Vega MD []    Alex Burk DO []   [address] [place] [address]  Phone: [phone]  Fax: [phone]     PATIENT NAME: Gama Davila                                                                          YOB: 1940           DATE OF SERVICE: 05/15/2024  FACILITY:  [][facility]   [] [facility]   [] [facility]   [x] [facility]   [] Other ______________________________________________________________________      CHIEF COMPLAINT:    Nursing states left upper extremity is red and swollen      HISTORY OF PRESENT ILLNESS:     Gama is a 83 year old male that presents with left upper extremity redness and swelling. Per nursing, redness and swelling began a couple of days ago.  Nursing denies recent injury or fall to the extremity. Gama denies pain.  He is unable to answer other questions due to dementia, will nod. No signs and symptoms of any distress.     PAST MEDICAL & SURGICAL HISTORY:   Past Medical History:   Diagnosis Date    Arthritis     Cancer     SKIN     Cataract     Coronary artery disease     Diabetes mellitus     High cholesterol     [place] (hard of hearing)     PATIENT HAS HEARING AIDS    Hypertension     Irregular heart beat     HISTORY OF CARDIAC ABLATION IN 2003    Wears dentures     FULL UPPER PLATE      Past Surgical History:   Procedure Laterality Date    BACK SURGERY  1978    THEN AGAIN IN 1982    CARDIAC ABLATION  2003    CARDIAC SURGERY      CATARACT EXTRACTION W/ INTRAOCULAR LENS IMPLANT Left 5/24/2017    Procedure: CATARACT PHACO EXTRACTION WITH INTRAOCULAR LENS IMPLANT LEFT WITH TORIC LENS;  Surgeon: Alma Benavidez MD;  Location: [facility];  Service:     CATARACT EXTRACTION W/ INTRAOCULAR LENS IMPLANT Right 6/14/2017    Procedure: CATARACT PHACO EXTRACTION WITH INTRAOCULAR LENS IMPLANT  RIGHT WITH TORIC LENS;  Surgeon: Alma Benavidez MD;  Location: [facility];  Service:     CORONARY ARTERY BYPASS GRAFT      SPOUSE UNSURE OF HOW MANY VESSELS    HIATAL HERNIA REPAIR      JOINT REPLACEMENT Left     HIP - DONE BY DR DALAL    KNEE ARTHROSCOPY Left     NOSE SURGERY  1970    SPOUSE REPORTS FOR A BROKEN NOSE    OTHER SURGICAL HISTORY Left     TENDON TORN LOOSE FROM BONE, LEFT ELBOW    OTHER SURGICAL HISTORY      RUPTURED DISC    OTHER SURGICAL HISTORY      NECK SURGERY FOR RUPTURED DISC    OTHER SURGICAL HISTORY      HAD SECOND NECK SURGERY    OTHER SURGICAL HISTORY      RUPTURED DISC    OTHER SURGICAL HISTORY      RUPTURED DISC    OTHER SURGICAL HISTORY      HARDWARE REMOVAL FROM BACK BY DR HAY         MEDICATIONS:  I have reviewed and reconciled the patients medication list in the patients chart at the skilled nursing facility today.      ALLERGIES:    Allergies   Allergen Reactions    Phenergan [Promethazine Hcl] Nausea And Vomiting    Carbamazepine Unknown - Low Severity    Hydrocodone Unknown - Low Severity    Lisinopril Unknown - Low Severity         SOCIAL HISTORY:      Social History     Socioeconomic History    Marital status:    Tobacco Use    Smoking status: Former     Current packs/day: 0.00     Types: Cigarettes     Quit date:      Years since quittin.3    Smokeless tobacco: Never   Substance and Sexual Activity    Alcohol use: No    Drug use: No    Sexual activity: Defer       FAMILY HISTORY:    No family history on file.    REVIEW OF SYSTEMS:    Review of Systems   Reason unable to perform ROS: per  patient and nursing.   HENT:  Negative for mouth sores and nosebleeds.    Respiratory:  Negative for cough and shortness of breath.    Cardiovascular:  Negative for leg swelling.   Gastrointestinal:  Negative for constipation, diarrhea and vomiting.   Genitourinary:  Positive for urinary incontinence.   Musculoskeletal:  Negative for  arthralgias.   Skin:  Positive for color change (redness and swelling LUE).   Neurological:  Positive for speech difficulty, weakness, memory problem and confusion.   Psychiatric/Behavioral:  Negative for behavioral problems. The patient is not nervous/anxious.          PHYSICAL EXAMINATION:   VITAL SIGNS:   Vitals:    05/15/24 1040   BP: 139/68   Pulse: 88   Resp: 18   Temp: 98 °F (36.7 °C)   SpO2: 96%   Weight: 78.9 kg (174 lb)        Physical Exam  Constitutional:       General: He is not in acute distress.     Appearance: Normal appearance.   HENT:      Head: Normocephalic.   Cardiovascular:      Rate and Rhythm: Normal rate and regular rhythm.      Pulses: Normal pulses.      Heart sounds: Normal heart sounds.   Pulmonary:      Effort: Pulmonary effort is normal.      Breath sounds: Normal breath sounds.   Abdominal:      General: Bowel sounds are normal.      Tenderness: There is no abdominal tenderness.   Musculoskeletal:         General: Swelling present.      Right upper arm: Normal.      Left upper arm: Swelling and edema present.   Skin:     General: Skin is warm.      Capillary Refill: Capillary refill takes less than 2 seconds.      Findings: Erythema (LUE redness and warmth) present.   Neurological:      Mental Status: Mental status is at baseline.   Psychiatric:         Mood and Affect: Mood normal. Affect is flat.         Cognition and Memory: Cognition is impaired. Memory is impaired.         RECORDS REVIEW:   I have reviewed [facility] in [facility]    ASSESSMENT     Diagnoses and all orders for this visit:    1. Cellulitis of left upper extremity (Primary)    2. Moderate dementia due to Parkinson's disease, with mood disturbance    3. Impaired mobility and ADLs        PLAN    Cellulitis  -Elevate LUE at all times    -Doxycyline 100 mg tablet PO bid for 7 days     Will follow up and continue to monitor.    Nursing encouraged to keep me informed of any acute changes, lack of improvement, or any new concerning  symptoms.     Continue supportive care for all ADLs.      [x]  Discussed Patient in detail with nursing/staff, addressed all needs today.     [x]  Plan of Care Reviewed   []  PT/OT Reviewed   [x]  Order Changes  []  Discharge Plans Reviewed  [x]  Advance Directive on file with Nursing Home.   [x]  POA on file with Nursing Home.   [x]  Code Status listed: []  Full Code   [x]  DNR         ALESSANDRA Tim.

## 2024-05-20 NOTE — PROGRESS NOTES
Nursing Home Progress Note        Alex Burk DO   [address] [place] [address] Phone: [phone]  Fax: [phone]     PATIENT NAME: Gama Davila                                                                          YOB: 1940           DATE OF SERVICE: 05/21/2024  FACILITY:  [facility]   ______________________________________________________________________     CHIEF COMPLAINT:  Chronic Medical Management      HISTORY OF PRESENT ILLNESS:   Patient was seen today for routine medical management.  On exam today, patient was laying comfortably in his bed.  He remains nonverbal but pleasantly confused.  Nurses do not report any acute events.  Mood and behaviors.  Patient did recently have left arm swelling diagnosed to be cellulitis for which he was started on doxycycline.  Right hand remains in a soft brace.     PAST MEDICAL & SURGICAL HISTORY:   Past Medical History:   Diagnosis Date    Arthritis     Cancer     SKIN     Cataract     Coronary artery disease     Diabetes mellitus     High cholesterol     [place] (hard of hearing)     PATIENT HAS HEARING AIDS    Hypertension     Irregular heart beat     HISTORY OF CARDIAC ABLATION IN 2003    Wears dentures     FULL UPPER PLATE      Past Surgical History:   Procedure Laterality Date    BACK SURGERY  1978    THEN AGAIN IN 1982    CARDIAC ABLATION  2003    CARDIAC SURGERY      CATARACT EXTRACTION W/ INTRAOCULAR LENS IMPLANT Left 5/24/2017    Procedure: CATARACT PHACO EXTRACTION WITH INTRAOCULAR LENS IMPLANT LEFT WITH TORIC LENS;  Surgeon: Alma Benavidez MD;  Location: [facility];  Service:     CATARACT EXTRACTION W/ INTRAOCULAR LENS IMPLANT Right 6/14/2017    Procedure: CATARACT PHACO EXTRACTION WITH INTRAOCULAR LENS IMPLANT RIGHT WITH TORIC LENS;  Surgeon: Alma Benavidez MD;  Location: [facility] OR;  Service:     CORONARY ARTERY BYPASS GRAFT  2003    SPOUSE UNSURE OF HOW MANY VESSELS    HIATAL HERNIA  REPAIR  1972    JOINT REPLACEMENT Left     HIP - DONE BY DR DALAL    KNEE ARTHROSCOPY Left     NOSE SURGERY  1970    SPOUSE REPORTS FOR A BROKEN NOSE    OTHER SURGICAL HISTORY Left     TENDON TORN LOOSE FROM BONE, LEFT ELBOW    OTHER SURGICAL HISTORY      RUPTURED DISC    OTHER SURGICAL HISTORY      NECK SURGERY FOR RUPTURED DISC    OTHER SURGICAL HISTORY      HAD SECOND NECK SURGERY    OTHER SURGICAL HISTORY      RUPTURED DISC    OTHER SURGICAL HISTORY      RUPTURED DISC    OTHER SURGICAL HISTORY      HARDWARE REMOVAL FROM BACK BY DR HAY         MEDICATIONS:  I have reviewed and reconciled the patients medication list in the patients chart at the skilled nursing facility today, 2024.      ALLERGIES:  Allergies   Allergen Reactions    Phenergan [Promethazine Hcl] Nausea And Vomiting    Carbamazepine Unknown - Low Severity    Hydrocodone Unknown - Low Severity    Lisinopril Unknown - Low Severity         SOCIAL HISTORY:  Social History     Socioeconomic History    Marital status:    Tobacco Use    Smoking status: Former     Current packs/day: 0.00     Types: Cigarettes     Quit date:      Years since quittin.4    Smokeless tobacco: Never   Substance and Sexual Activity    Alcohol use: No    Drug use: No    Sexual activity: Defer       FAMILY HISTORY:  No family history on file.    REVIEW OF SYSTEMS:  Review of Systems   Unable to perform ROS: Dementia          PHYSICAL EXAMINATION:     VITAL SIGNS:  /59   Pulse 60   Temp 97.2 °F (36.2 °C)   Resp 20   Wt 78.9 kg (174 lb)   SpO2 97%   BMI 26.46 kg/m²     Physical Exam  Vitals and nursing note reviewed.   Constitutional:       General: He is not in acute distress.     Appearance: Normal appearance. He is well-developed.      Comments: Elderly male resting comfortably in bed.   HENT:      Head: Normocephalic and atraumatic.   Eyes:      Extraocular Movements: Extraocular movements intact.      Conjunctiva/sclera:  Conjunctivae normal.   Cardiovascular:      Rate and Rhythm: Normal rate and regular rhythm.      Pulses: Normal pulses.      Heart sounds: Normal heart sounds. No murmur heard.  Pulmonary:      Effort: Pulmonary effort is normal.      Breath sounds: Normal breath sounds.   Abdominal:      General: Abdomen is flat.      Palpations: Abdomen is soft.   Musculoskeletal:      Cervical back: Normal range of motion and neck supple.   Skin:     General: Skin is warm and dry.      Findings: No rash.   Neurological:      General: No focal deficit present.      Mental Status: He is alert. Mental status is at baseline. He is disoriented.   Psychiatric:      Comments: Slow cognition and ability to respond to questions.       RECORDS REVIEW:       ASSESSMENT     Diagnoses and all orders for this visit:    1. Moderate dementia due to Parkinson's disease, with mood disturbance (Primary)    2. Parkinson's disease without dyskinesia or fluctuating manifestations    3. Coronary artery disease involving native heart with angina pectoris, unspecified vessel or lesion type    4. Primary hypertension    5. Dementia due to Parkinson's disease with behavioral disturbance    6. Essential hypertension    7. Type 2 diabetes mellitus with hypoglycemia without coma, with long-term current use of insulin    8. Late onset Alzheimer's disease without behavioral disturbance        PLAN    Mood disorder.  Remains well-controlled with current medication regimen.    Type 2 diabetes mellitus   -Obtain labs every 3 months: CBC, CMP and A1c.  Labs were reordered today.   -Stable control on metformin and basaglar      Hyperlipidemia  -Remains off of statin due to age and lack of long-term benefit    Dementia due to Parkinson's disease  -  Stable with Sinemet, memantine, and donepezil.     Falls  -No issues recently.  Restorative therapy is following.    BPH  - cont Tamsulosin    Constipation  - on daily stool softener.     Coronary artery  disease/irregular heartbeat/CHF  - Euvolemic at this time  - stable on current cardiac medications.             [x]  Discussed Patient in detail with nursing/staff, addressed all needs today.     [x]  Plan of Care Reviewed   []  PT/OT Reviewed   []  Order Changes  []  Discharge Plans Reviewed  [x]  Advance Directive on file with Nursing Home.   [x]  POA on file with Nursing Home.    [x]  Code Status listed and reviewed.     I confirm accuracy of unchanged data/findings including physical exam and plan which have been carried forward from previous visit, as well as I have updated appropriately those that have changed        Alex Burk DO.  5/27/2024

## 2024-05-21 ENCOUNTER — NURSING HOME (OUTPATIENT)
Dept: INTERNAL MEDICINE | Facility: CLINIC | Age: 84
End: 2024-05-21
Payer: MEDICARE

## 2024-05-21 VITALS
WEIGHT: 174 LBS | DIASTOLIC BLOOD PRESSURE: 59 MMHG | RESPIRATION RATE: 20 BRPM | BODY MASS INDEX: 26.46 KG/M2 | TEMPERATURE: 97.2 F | HEART RATE: 60 BPM | SYSTOLIC BLOOD PRESSURE: 132 MMHG | OXYGEN SATURATION: 97 %

## 2024-05-21 DIAGNOSIS — G20.A1 PARKINSON'S DISEASE WITHOUT DYSKINESIA OR FLUCTUATING MANIFESTATIONS: ICD-10-CM

## 2024-05-21 DIAGNOSIS — Z79.4 TYPE 2 DIABETES MELLITUS WITH HYPOGLYCEMIA WITHOUT COMA, WITH LONG-TERM CURRENT USE OF INSULIN: ICD-10-CM

## 2024-05-21 DIAGNOSIS — G30.1 LATE ONSET ALZHEIMER'S DISEASE WITHOUT BEHAVIORAL DISTURBANCE: ICD-10-CM

## 2024-05-21 DIAGNOSIS — G20.A1 MODERATE DEMENTIA DUE TO PARKINSON'S DISEASE, WITH MOOD DISTURBANCE: Primary | ICD-10-CM

## 2024-05-21 DIAGNOSIS — F02.80 LATE ONSET ALZHEIMER'S DISEASE WITHOUT BEHAVIORAL DISTURBANCE: ICD-10-CM

## 2024-05-21 DIAGNOSIS — E11.649 TYPE 2 DIABETES MELLITUS WITH HYPOGLYCEMIA WITHOUT COMA, WITH LONG-TERM CURRENT USE OF INSULIN: ICD-10-CM

## 2024-05-21 DIAGNOSIS — F02.818 DEMENTIA DUE TO PARKINSON'S DISEASE WITH BEHAVIORAL DISTURBANCE: ICD-10-CM

## 2024-05-21 DIAGNOSIS — I25.119 CORONARY ARTERY DISEASE INVOLVING NATIVE HEART WITH ANGINA PECTORIS, UNSPECIFIED VESSEL OR LESION TYPE: ICD-10-CM

## 2024-05-21 DIAGNOSIS — I10 ESSENTIAL HYPERTENSION: ICD-10-CM

## 2024-05-21 DIAGNOSIS — G20.A1 DEMENTIA DUE TO PARKINSON'S DISEASE WITH BEHAVIORAL DISTURBANCE: ICD-10-CM

## 2024-05-21 DIAGNOSIS — F02.B3 MODERATE DEMENTIA DUE TO PARKINSON'S DISEASE, WITH MOOD DISTURBANCE: Primary | ICD-10-CM

## 2024-05-21 DIAGNOSIS — I10 PRIMARY HYPERTENSION: ICD-10-CM

## 2024-05-21 PROCEDURE — 99308 SBSQ NF CARE LOW MDM 20: CPT | Performed by: INTERNAL MEDICINE

## 2024-05-21 NOTE — LETTER
Nursing Home Progress Note        Alex Burk DO   [address] [place] [address] Phone: [phone]  Fax: [phone]     PATIENT NAME: Gama Davila                                                                          YOB: 1940           DATE OF SERVICE: 05/21/2024  FACILITY:  [facility]   ______________________________________________________________________     CHIEF COMPLAINT:  Chronic Medical Management      HISTORY OF PRESENT ILLNESS:   Patient was seen today for routine medical management.  On exam today, patient was laying comfortably in his bed.  He remains nonverbal but pleasantly confused.  Nurses do not report any acute events.  Mood and behaviors.  Patient did recently have left arm swelling diagnosed to be cellulitis for which he was started on doxycycline.  Right hand remains in a soft brace.     PAST MEDICAL & SURGICAL HISTORY:   Past Medical History:   Diagnosis Date    Arthritis     Cancer     SKIN     Cataract     Coronary artery disease     Diabetes mellitus     High cholesterol     [place] (hard of hearing)     PATIENT HAS HEARING AIDS    Hypertension     Irregular heart beat     HISTORY OF CARDIAC ABLATION IN 2003    Wears dentures     FULL UPPER PLATE      Past Surgical History:   Procedure Laterality Date    BACK SURGERY  1978    THEN AGAIN IN 1982    CARDIAC ABLATION  2003    CARDIAC SURGERY      CATARACT EXTRACTION W/ INTRAOCULAR LENS IMPLANT Left 5/24/2017    Procedure: CATARACT PHACO EXTRACTION WITH INTRAOCULAR LENS IMPLANT LEFT WITH TORIC LENS;  Surgeon: Alma Benavidez MD;  Location: [facility] OR;  Service:     CATARACT EXTRACTION W/ INTRAOCULAR LENS IMPLANT Right 6/14/2017    Procedure: CATARACT PHACO EXTRACTION WITH INTRAOCULAR LENS IMPLANT RIGHT WITH TORIC LENS;  Surgeon: Alma Benavidez MD;  Location: [facility] OR;  Service:     CORONARY ARTERY BYPASS GRAFT  2003    SPOUSE UNSURE OF HOW MANY VESSELS    HIATAL HERNIA  REPAIR  1972    JOINT REPLACEMENT Left     HIP - DONE BY DR DALAL    KNEE ARTHROSCOPY Left     NOSE SURGERY  1970    SPOUSE REPORTS FOR A BROKEN NOSE    OTHER SURGICAL HISTORY Left     TENDON TORN LOOSE FROM BONE, LEFT ELBOW    OTHER SURGICAL HISTORY      RUPTURED DISC    OTHER SURGICAL HISTORY      NECK SURGERY FOR RUPTURED DISC    OTHER SURGICAL HISTORY      HAD SECOND NECK SURGERY    OTHER SURGICAL HISTORY      RUPTURED DISC    OTHER SURGICAL HISTORY      RUPTURED DISC    OTHER SURGICAL HISTORY      HARDWARE REMOVAL FROM BACK BY DR HAY         MEDICATIONS:  I have reviewed and reconciled the patients medication list in the patients chart at the skilled nursing facility today, 2024.      ALLERGIES:  Allergies   Allergen Reactions    Phenergan [Promethazine Hcl] Nausea And Vomiting    Carbamazepine Unknown - Low Severity    Hydrocodone Unknown - Low Severity    Lisinopril Unknown - Low Severity         SOCIAL HISTORY:  Social History     Socioeconomic History    Marital status:    Tobacco Use    Smoking status: Former     Current packs/day: 0.00     Types: Cigarettes     Quit date:      Years since quittin.4    Smokeless tobacco: Never   Substance and Sexual Activity    Alcohol use: No    Drug use: No    Sexual activity: Defer       FAMILY HISTORY:  No family history on file.    REVIEW OF SYSTEMS:  Review of Systems   Unable to perform ROS: Dementia          PHYSICAL EXAMINATION:     VITAL SIGNS:  /59   Pulse 60   Temp 97.2 °F (36.2 °C)   Resp 20   Wt 78.9 kg (174 lb)   SpO2 97%   BMI 26.46 kg/m²     Physical Exam  Vitals and nursing note reviewed.   Constitutional:       General: He is not in acute distress.     Appearance: Normal appearance. He is well-developed.      Comments: Elderly male resting comfortably in bed.   HENT:      Head: Normocephalic and atraumatic.   Eyes:      Extraocular Movements: Extraocular movements intact.      Conjunctiva/sclera:  Conjunctivae normal.   Cardiovascular:      Rate and Rhythm: Normal rate and regular rhythm.      Pulses: Normal pulses.      Heart sounds: Normal heart sounds. No murmur heard.  Pulmonary:      Effort: Pulmonary effort is normal.      Breath sounds: Normal breath sounds.   Abdominal:      General: Abdomen is flat.      Palpations: Abdomen is soft.   Musculoskeletal:      Cervical back: Normal range of motion and neck supple.   Skin:     General: Skin is warm and dry.      Findings: No rash.   Neurological:      General: No focal deficit present.      Mental Status: He is alert. Mental status is at baseline. He is disoriented.   Psychiatric:      Comments: Slow cognition and ability to respond to questions.       RECORDS REVIEW:       ASSESSMENT     Diagnoses and all orders for this visit:    1. Moderate dementia due to Parkinson's disease, with mood disturbance (Primary)    2. Parkinson's disease without dyskinesia or fluctuating manifestations    3. Coronary artery disease involving native heart with angina pectoris, unspecified vessel or lesion type    4. Primary hypertension    5. Dementia due to Parkinson's disease with behavioral disturbance    6. Essential hypertension    7. Type 2 diabetes mellitus with hypoglycemia without coma, with long-term current use of insulin    8. Late onset Alzheimer's disease without behavioral disturbance        PLAN    Mood disorder.  Remains well-controlled with current medication regimen.    Type 2 diabetes mellitus   -Obtain labs every 3 months: CBC, CMP and A1c.  Labs were reordered today.   -Stable control on metformin and basaglar      Hyperlipidemia  -Remains off of statin due to age and lack of long-term benefit    Dementia due to Parkinson's disease  -  Stable with Sinemet, memantine, and donepezil.     Falls  -No issues recently.  Restorative therapy is following.    BPH  - cont Tamsulosin    Constipation  - on daily stool softener.     Coronary artery  disease/irregular heartbeat/CHF  - Euvolemic at this time  - stable on current cardiac medications.             [x]  Discussed Patient in detail with nursing/staff, addressed all needs today.     [x]  Plan of Care Reviewed   []  PT/OT Reviewed   []  Order Changes  []  Discharge Plans Reviewed  [x]  Advance Directive on file with Nursing Home.   [x]  POA on file with Nursing Home.    [x]  Code Status listed and reviewed.     I confirm accuracy of unchanged data/findings including physical exam and plan which have been carried forward from previous visit, as well as I have updated appropriately those that have changed        Alex Burk DO.  5/27/2024

## 2024-05-26 ENCOUNTER — APPOINTMENT (OUTPATIENT)
Dept: GENERAL RADIOLOGY | Facility: HOSPITAL | Age: 84
End: 2024-05-26
Payer: MEDICARE

## 2024-05-26 ENCOUNTER — HOSPITAL ENCOUNTER (EMERGENCY)
Facility: HOSPITAL | Age: 84
Discharge: NURSING, REHAB OR LONG-TERM CARE | End: 2024-05-26
Attending: EMERGENCY MEDICINE | Admitting: EMERGENCY MEDICINE
Payer: MEDICARE

## 2024-05-26 ENCOUNTER — APPOINTMENT (OUTPATIENT)
Dept: CT IMAGING | Facility: HOSPITAL | Age: 84
End: 2024-05-26
Payer: MEDICARE

## 2024-05-26 VITALS
OXYGEN SATURATION: 98 % | HEIGHT: 68 IN | SYSTOLIC BLOOD PRESSURE: 133 MMHG | HEART RATE: 84 BPM | TEMPERATURE: 98.8 F | BODY MASS INDEX: 25.76 KG/M2 | WEIGHT: 170 LBS | RESPIRATION RATE: 22 BRPM | DIASTOLIC BLOOD PRESSURE: 78 MMHG

## 2024-05-26 DIAGNOSIS — R05.1 ACUTE COUGH: Primary | ICD-10-CM

## 2024-05-26 DIAGNOSIS — R13.10 DYSPHAGIA, UNSPECIFIED TYPE: ICD-10-CM

## 2024-05-26 LAB
A-A DO2: 16.4 MMHG
ALBUMIN SERPL-MCNC: 3 G/DL (ref 3.5–5.2)
ALBUMIN/GLOB SERPL: 1 G/DL
ALP SERPL-CCNC: 111 U/L (ref 39–117)
ALT SERPL W P-5'-P-CCNC: 10 U/L (ref 1–41)
ANION GAP SERPL CALCULATED.3IONS-SCNC: 9.3 MMOL/L (ref 5–15)
ARTERIAL PATENCY WRIST A: POSITIVE
AST SERPL-CCNC: 13 U/L (ref 1–40)
ATMOSPHERIC PRESS: 727 MMHG
BASE EXCESS BLDA CALC-SCNC: 4.3 MMOL/L (ref 0–2)
BASOPHILS # BLD AUTO: 0.02 10*3/MM3 (ref 0–0.2)
BASOPHILS NFR BLD AUTO: 0.3 % (ref 0–1.5)
BDY SITE: ABNORMAL
BILIRUB SERPL-MCNC: 0.2 MG/DL (ref 0–1.2)
BUN SERPL-MCNC: 19 MG/DL (ref 8–23)
BUN/CREAT SERPL: 15.7 (ref 7–25)
CALCIUM SPEC-SCNC: 8.2 MG/DL (ref 8.6–10.5)
CHLORIDE SERPL-SCNC: 99 MMOL/L (ref 98–107)
CO2 SERPL-SCNC: 28.7 MMOL/L (ref 22–29)
COHGB MFR BLD: 1.2 % (ref 0–2)
CREAT SERPL-MCNC: 1.21 MG/DL (ref 0.76–1.27)
DEPRECATED RDW RBC AUTO: 44.9 FL (ref 37–54)
EGFRCR SERPLBLD CKD-EPI 2021: 59.4 ML/MIN/1.73
EOSINOPHIL # BLD AUTO: 0.07 10*3/MM3 (ref 0–0.4)
EOSINOPHIL NFR BLD AUTO: 1 % (ref 0.3–6.2)
ERYTHROCYTE [DISTWIDTH] IN BLOOD BY AUTOMATED COUNT: 13.3 % (ref 12.3–15.4)
GAS FLOW AIRWAY: 2 LPM
GLOBULIN UR ELPH-MCNC: 3.1 GM/DL
GLUCOSE SERPL-MCNC: 307 MG/DL (ref 65–99)
HCO3 BLDA-SCNC: 28.8 MMOL/L (ref 22–28)
HCT VFR BLD AUTO: 39.3 % (ref 37.5–51)
HCT VFR BLD CALC: 37.9 %
HGB BLD-MCNC: 12.9 G/DL (ref 13–17.7)
HOLD SPECIMEN: NORMAL
HOLD SPECIMEN: NORMAL
IMM GRANULOCYTES # BLD AUTO: 0.05 10*3/MM3 (ref 0–0.05)
IMM GRANULOCYTES NFR BLD AUTO: 0.7 % (ref 0–0.5)
LYMPHOCYTES # BLD AUTO: 2.57 10*3/MM3 (ref 0.7–3.1)
LYMPHOCYTES NFR BLD AUTO: 38.1 % (ref 19.6–45.3)
Lab: ABNORMAL
MCH RBC QN AUTO: 29.9 PG (ref 26.6–33)
MCHC RBC AUTO-ENTMCNC: 32.8 G/DL (ref 31.5–35.7)
MCV RBC AUTO: 91.2 FL (ref 79–97)
METHGB BLD QL: 0.7 % (ref 0–1.5)
MODALITY: ABNORMAL
MONOCYTES # BLD AUTO: 0.86 10*3/MM3 (ref 0.1–0.9)
MONOCYTES NFR BLD AUTO: 12.7 % (ref 5–12)
NEUTROPHILS NFR BLD AUTO: 3.18 10*3/MM3 (ref 1.7–7)
NEUTROPHILS NFR BLD AUTO: 47.2 % (ref 42.7–76)
NRBC BLD AUTO-RTO: 0 /100 WBC (ref 0–0.2)
NT-PROBNP SERPL-MCNC: 1234 PG/ML (ref 0–1800)
OXYHGB MFR BLDV: 94.9 % (ref 94–99)
PCO2 BLDA: 41.7 MM HG (ref 35–45)
PCO2 TEMP ADJ BLD: ABNORMAL MM[HG]
PH BLDA: 7.45 PH UNITS (ref 7.35–7.45)
PH, TEMP CORRECTED: ABNORMAL
PLATELET # BLD AUTO: 194 10*3/MM3 (ref 140–450)
PMV BLD AUTO: 10.3 FL (ref 6–12)
PO2 BLDA: 79.3 MM HG (ref 75–100)
PO2 TEMP ADJ BLD: ABNORMAL MM[HG]
POTASSIUM SERPL-SCNC: 4.2 MMOL/L (ref 3.5–5.2)
PROT SERPL-MCNC: 6.1 G/DL (ref 6–8.5)
RBC # BLD AUTO: 4.31 10*6/MM3 (ref 4.14–5.8)
SAO2 % BLDCOA: 96.8 % (ref 94–100)
SODIUM SERPL-SCNC: 137 MMOL/L (ref 136–145)
TROPONIN T SERPL HS-MCNC: 25 NG/L
VENTILATOR MODE: ABNORMAL
WBC NRBC COR # BLD AUTO: 6.75 10*3/MM3 (ref 3.4–10.8)
WHOLE BLOOD HOLD COAG: NORMAL
WHOLE BLOOD HOLD SPECIMEN: NORMAL

## 2024-05-26 PROCEDURE — 94640 AIRWAY INHALATION TREATMENT: CPT

## 2024-05-26 PROCEDURE — 71275 CT ANGIOGRAPHY CHEST: CPT

## 2024-05-26 PROCEDURE — 93005 ELECTROCARDIOGRAM TRACING: CPT | Performed by: PHYSICIAN ASSISTANT

## 2024-05-26 PROCEDURE — 71045 X-RAY EXAM CHEST 1 VIEW: CPT

## 2024-05-26 PROCEDURE — 83050 HGB METHEMOGLOBIN QUAN: CPT

## 2024-05-26 PROCEDURE — 82805 BLOOD GASES W/O2 SATURATION: CPT

## 2024-05-26 PROCEDURE — 83880 ASSAY OF NATRIURETIC PEPTIDE: CPT | Performed by: PHYSICIAN ASSISTANT

## 2024-05-26 PROCEDURE — 99285 EMERGENCY DEPT VISIT HI MDM: CPT

## 2024-05-26 PROCEDURE — 84484 ASSAY OF TROPONIN QUANT: CPT | Performed by: PHYSICIAN ASSISTANT

## 2024-05-26 PROCEDURE — 25510000001 IOPAMIDOL 61 % SOLUTION: Performed by: EMERGENCY MEDICINE

## 2024-05-26 PROCEDURE — 36600 WITHDRAWAL OF ARTERIAL BLOOD: CPT

## 2024-05-26 PROCEDURE — 82375 ASSAY CARBOXYHB QUANT: CPT

## 2024-05-26 PROCEDURE — 85025 COMPLETE CBC W/AUTO DIFF WBC: CPT | Performed by: PHYSICIAN ASSISTANT

## 2024-05-26 PROCEDURE — 80053 COMPREHEN METABOLIC PANEL: CPT | Performed by: PHYSICIAN ASSISTANT

## 2024-05-26 RX ORDER — DIVALPROEX SODIUM 125 MG/1
125 CAPSULE, COATED PELLETS ORAL 2 TIMES DAILY
COMMUNITY

## 2024-05-26 RX ORDER — IPRATROPIUM BROMIDE AND ALBUTEROL SULFATE 2.5; .5 MG/3ML; MG/3ML
3 SOLUTION RESPIRATORY (INHALATION) ONCE
Status: COMPLETED | OUTPATIENT
Start: 2024-05-26 | End: 2024-05-26

## 2024-05-26 RX ORDER — SODIUM CHLORIDE 0.9 % (FLUSH) 0.9 %
10 SYRINGE (ML) INJECTION AS NEEDED
Status: DISCONTINUED | OUTPATIENT
Start: 2024-05-26 | End: 2024-05-27 | Stop reason: HOSPADM

## 2024-05-26 RX ADMIN — IOPAMIDOL 100 ML: 612 INJECTION, SOLUTION INTRAVENOUS at 21:55

## 2024-05-26 RX ADMIN — IPRATROPIUM BROMIDE AND ALBUTEROL SULFATE 3 ML: .5; 3 SOLUTION RESPIRATORY (INHALATION) at 21:02

## 2024-05-26 NOTE — ED PROVIDER NOTES
Subjective  History of Present Illness:    Chief Complaint: Possible aspiration  History of Present Illness: Patient brought in via EMS from a nursing home, patient is none verbal, has dementia, unable to provide history.  It was reported that he aspirated while eating at nursing home earlier today.  He is on 2 L of nasal cannula oxygen at all times, staff states that he appears to have more coarse breath sounds and wheezing since the possible aspiration.  Onset: Sudden onset  Duration: Just prior to arrival  Exacerbating / Alleviating factors: Possible aspiration, eating  Associated symptoms: Increased wheezing and coarse breath sounds      Nurses Notes reviewed and agree, including vitals, allergies, social history and prior medical history.     REVIEW OF SYSTEMS: All systems reviewed and not pertinent unless noted.    Review of Systems   Unable to perform ROS: Patient nonverbal       Past Medical History:   Diagnosis Date    Arthritis     Cancer     SKIN     Cataract     Coronary artery disease     Diabetes mellitus     High cholesterol     [place] (hard of hearing)     PATIENT HAS HEARING AIDS    Hypertension     Irregular heart beat     HISTORY OF CARDIAC ABLATION IN 2003    Wears dentures     FULL UPPER PLATE       Allergies:    Phenergan [promethazine hcl], Carbamazepine, Hydrocodone, and Lisinopril      Past Surgical History:   Procedure Laterality Date    BACK SURGERY  1978    THEN AGAIN IN 1982    CARDIAC ABLATION  2003    CARDIAC SURGERY      CATARACT EXTRACTION W/ INTRAOCULAR LENS IMPLANT Left 5/24/2017    Procedure: CATARACT PHACO EXTRACTION WITH INTRAOCULAR LENS IMPLANT LEFT WITH TORIC LENS;  Surgeon: Alma Benavidez MD;  Location: [facility] OR;  Service:     CATARACT EXTRACTION W/ INTRAOCULAR LENS IMPLANT Right 6/14/2017    Procedure: CATARACT PHACO EXTRACTION WITH INTRAOCULAR LENS IMPLANT RIGHT WITH TORIC LENS;  Surgeon: Alma Benavidez MD;  Location: [facility] OR;  Service:     CORONARY  "ARTERY BYPASS GRAFT      SPOUSE UNSURE OF HOW MANY VESSELS    HIATAL HERNIA REPAIR  1972    JOINT REPLACEMENT Left     HIP - DONE BY DR DALAL    KNEE ARTHROSCOPY Left     NOSE SURGERY  1970    SPOUSE REPORTS FOR A BROKEN NOSE    OTHER SURGICAL HISTORY Left     TENDON TORN LOOSE FROM BONE, LEFT ELBOW    OTHER SURGICAL HISTORY      RUPTURED DISC    OTHER SURGICAL HISTORY      NECK SURGERY FOR RUPTURED DISC    OTHER SURGICAL HISTORY      HAD SECOND NECK SURGERY    OTHER SURGICAL HISTORY      RUPTURED DISC    OTHER SURGICAL HISTORY      RUPTURED DISC    OTHER SURGICAL HISTORY      HARDWARE REMOVAL FROM BACK BY DR HAY         Social History     Socioeconomic History    Marital status:    Tobacco Use    Smoking status: Former     Current packs/day: 0.00     Types: Cigarettes     Quit date:      Years since quittin.4    Smokeless tobacco: Never   Substance and Sexual Activity    Alcohol use: No    Drug use: No    Sexual activity: Defer         History reviewed. No pertinent family history.    Objective  Physical Exam:  /63   Pulse 80   Temp 98.8 °F (37.1 °C)   Resp 18   Ht 172.7 cm (68\")   Wt 77.1 kg (170 lb)   SpO2 100%   BMI 25.85 kg/m²      Physical Exam  Vitals and nursing note reviewed.   Constitutional:       Appearance: He is well-developed. He is ill-appearing.   HENT:      Head: Normocephalic and atraumatic.      Mouth/Throat:      Mouth: Mucous membranes are moist.   Eyes:      Extraocular Movements: Extraocular movements intact.   Cardiovascular:      Rate and Rhythm: Normal rate and regular rhythm.   Pulmonary:      Breath sounds: Wheezing and rhonchi present.   Abdominal:      Palpations: Abdomen is soft.   Musculoskeletal:         General: Normal range of motion.      Cervical back: Normal range of motion.   Skin:     General: Skin is warm and dry.   Neurological:      Mental Status: He is oriented to person, place, and time.   Psychiatric:         " Behavior: Behavior normal.         Thought Content: Thought content normal.         Judgment: Judgment normal.           Procedures    ED Course:    ED Course as of 05/26/24 2309   Sun May 26, 2024   1934 Review of previous  non ED visits, prior labs, prior imaging, available notes from prior evaluations or visits with specialists, medication list, allergies, past medical history, past surgical history     [CS]   2006 EKG: I reviewed and independently interpreted the EKG as:  Sinus rhythm at 88 beats minute, normal axis, normal intervals, no ST elevation, no T wave inversions [CS-2]   2110 Chest x-ray interpretation by me: No acute cardiopulmonary abnormality [CS]   2133 I Personally reviewed all laboratory studies performed in the emergency department  [CS]      ED Course User Index  [CS] Jose Juan Christie Jr., INÉS  [CS-2] Jorge Bentley MD       Lab Results (last 24 hours)       Procedure Component Value Units Date/Time    CBC & Differential [[id number]]  (Abnormal) Collected: 05/26/24 1929    Specimen: Blood Updated: 05/26/24 1935    Narrative:      The following orders were created for panel order CBC & Differential.  Procedure                               Abnormality         Status                     ---------                               -----------         ------                     CBC Auto Differential[[id number]]        Abnormal            Final result                 Please view results for these tests on the individual orders.    Comprehensive Metabolic Panel [[id number]]  (Abnormal) Collected: 05/26/24 1929    Specimen: Blood Updated: 05/26/24 1952     Glucose 307 mg/dL      Comment: Glucose >180, Hemoglobin A1C recommended.        BUN 19 mg/dL      Creatinine 1.21 mg/dL      Sodium 137 mmol/L      Potassium 4.2 mmol/L      Comment: Slight hemolysis detected by analyzer. Result may be falsely elevated.        Chloride 99 mmol/L      CO2 28.7 mmol/L      Calcium 8.2 mg/dL      Total Protein  6.1 g/dL      Albumin 3.0 g/dL      ALT (SGPT) 10 U/L      AST (SGOT) 13 U/L      Alkaline Phosphatase 111 U/L      Total Bilirubin 0.2 mg/dL      Globulin 3.1 gm/dL      A/G Ratio 1.0 g/dL      BUN/Creatinine Ratio 15.7     Anion Gap 9.3 mmol/L      eGFR 59.4 mL/min/1.73     Narrative:      GFR Normal >60  Chronic Kidney Disease <60  Kidney Failure <15    The GFR formula is only valid for adults with stable renal function between ages 18 and 70.    BNP [[id number]]  (Normal) Collected: 05/26/24 1929    Specimen: Blood Updated: 05/26/24 1955     proBNP 1,234.0 pg/mL     Narrative:      This assay is used as an aid in the diagnosis of individuals suspected of having heart failure. It can be used as an aid in the diagnosis of acute decompensated heart failure (ADHF) in patients presenting with signs and symptoms of ADHF to the emergency department (ED). In addition, NT-proBNP of <300 pg/mL indicates ADHF is not likely.    Age Range Result Interpretation  NT-proBNP Concentration (pg/mL:      <50             Positive            >450                   Gray                 300-450                    Negative             <300    50-75           Positive            >900                  Gray                300-900                  Negative            <300      >75             Positive            >1800                  Gray                300-1800                  Negative            <300    Single High Sensitivity Troponin T [[id number]]  (Abnormal) Collected: 05/26/24 1929    Specimen: Blood Updated: 05/26/24 1955     HS Troponin T 25 ng/L     Narrative:      High Sensitive Troponin T Reference Range:  <14.0 ng/L- Negative Female for AMI  <22.0 ng/L- Negative Male for AMI  >=14 - Abnormal Female indicating possible myocardial injury.  >=22 - Abnormal Male indicating possible myocardial injury.   Clinicians would have to utilize clinical acumen, EKG, Troponin, and serial changes to determine if it is an Acute Myocardial  Infarction or myocardial injury due to an underlying chronic condition.         CBC Auto Differential [[id number]]  (Abnormal) Collected: 05/26/24 1929    Specimen: Blood Updated: 05/26/24 1935     WBC 6.75 10*3/mm3      RBC 4.31 10*6/mm3      Hemoglobin 12.9 g/dL      Hematocrit 39.3 %      MCV 91.2 fL      MCH 29.9 pg      MCHC 32.8 g/dL      RDW 13.3 %      RDW-SD 44.9 fl      MPV 10.3 fL      Platelets 194 10*3/mm3      Neutrophil % 47.2 %      Lymphocyte % 38.1 %      Monocyte % 12.7 %      Eosinophil % 1.0 %      Basophil % 0.3 %      Immature Grans % 0.7 %      Neutrophils, Absolute 3.18 10*3/mm3      Lymphocytes, Absolute 2.57 10*3/mm3      Monocytes, Absolute 0.86 10*3/mm3      Eosinophils, Absolute 0.07 10*3/mm3      Basophils, Absolute 0.02 10*3/mm3      Immature Grans, Absolute 0.05 10*3/mm3      nRBC 0.0 /100 WBC     Blood Gas, Arterial With Co-Ox [[id number]]  (Abnormal) Collected: 05/26/24 2059    Specimen: Arterial Blood Updated: 05/26/24 2100     Site Right Radial     Ryan's Test Positive     pH, Arterial 7.447 pH units      pCO2, Arterial 41.7 mm Hg      pO2, Arterial 79.3 mm Hg      Comment: 84 Value below reference range        HCO3, Arterial 28.8 mmol/L      Comment: 83 Value above reference range        Base Excess, Arterial 4.3 mmol/L      Comment: 83 Value above reference range        O2 Saturation, Arterial 96.8 %      Hematocrit, Blood Gas 37.9 %      Comment: 84 Value below reference range        Oxyhemoglobin 94.9 %      Methemoglobin 0.70 %      Carboxyhemoglobin 1.2 %      A-a DO2 16.4 mmHg      Barometric Pressure for Blood Gas 727 mmHg      Modality Nasal Cannula     Flow Rate 2.0 lpm      Ventilator Mode NA     Collected by [id number]     Comment: Meter: [id number]     :  [id number]        pH, Temp Corrected --     pCO2, Temperature Corrected --     pO2, Temperature Corrected --             CT Angiogram Chest Pulmonary Embolism    Result Date: 5/26/2024  PRELIMINARY REPORT  TECHNIQUE: Multiple axial CT images were obtained through the chest following IV contrast using a CTA/PE protocol.  3D/MIP reconstruction images were also performed. This study was performed with techniques to keep radiation doses as low as reasonably achievable (ALARA). Individualized dose reduction techniques using automated exposure control or adjustment of mA and/or kV according to the patient's size were employed. CLINICAL HISTORY: possible aspiration FINDINGS: PAs and aorta: No pulmonary embolus.  Thoracic aorta is unremarkable. There is coronary artery disease. Heart/mediastinum: Patient is status post CABG.  No evidence for right heart strain.  No pericardial effusion.    There is cardiomegaly.  Lungs: There is diffuse bronchial wall thickening with areas of mucous plugging.  Lymph nodes: No pathologically enlarged thoracic lymph nodes.  Pleura: No pneumothorax or pleural effusion.  Chest Wall: No chest wall contusion.  Bones: No acute fracture.  Upper abdomen: No acute findings in the upper abdomen.     Impression: No pulmonary embolus.   Lung findings are more compatible with bronchitis than aspiration, but aspiration would be difficult to exclude. This is a preliminary report.        Medical Decision Making  Patient Presentation 83-year-old male sent from a nursing home for concern of aspiration on my initial assessment he did have coarse breath sounds    DDX pneumonia, bronchitis, aspiration pneumonia, dehydration, electrolyte abnormality    Data Review/ Non ED Records /Analysis/Ordering unique tests  Review of previous  non ED visits, prior labs, prior imaging, available notes from prior evaluations or visits with specialists, medication list, allergies, past medical history, past surgical history        Independent Review Studies  I Personally reviewed all laboratory studies performed in the emergency department     Intervention/Re-evaluation intervention included Treatments, reevaluation his symptoms  did improve    Independent Clinician no consultation    Risk Stratification tools/clinical decision rules patient presented with concern for aspiration, possible aspiration pneumonia, he did have coarse breath sounds, this improved with a neb treatment, he is on oxygen at 2 L and remained on his home dose, labs and CT scan were unremarkable for pneumonia, and he was safely discharged back to nursing home    Shared Decision Making    Disposition patient stable for discharge    Problems Addressed:  Acute cough: complicated acute illness or injury  Dysphagia, unspecified type: complicated acute illness or injury    Amount and/or Complexity of Data Reviewed  External Data Reviewed: labs, radiology and notes.  Labs: ordered. Decision-making details documented in ED Course.  Radiology: ordered and independent interpretation performed.  ECG/medicine tests: ordered.    Risk  Prescription drug management.          Final diagnoses:   Acute cough   Dysphagia, unspecified type           Disposition discharged       Jose Juan Christie Jr., PA-C  05/26/24 2703

## 2024-05-27 NOTE — ED NOTES
Wife updated we are awaiting CT results, requested call when we have an update. Pt resting comfortably, tv on, call light in reach.

## 2024-05-27 NOTE — ED NOTES
Report [facility] to [facility], spoke with JANET Conway.  Called and spoke with wife Taryn pt to be transported back to skilled nursing facility.

## 2024-06-06 ENCOUNTER — NURSING HOME (OUTPATIENT)
Dept: FAMILY MEDICINE CLINIC | Facility: CLINIC | Age: 84
End: 2024-06-06
Payer: MEDICARE

## 2024-06-06 VITALS
HEART RATE: 60 BPM | DIASTOLIC BLOOD PRESSURE: 60 MMHG | SYSTOLIC BLOOD PRESSURE: 116 MMHG | OXYGEN SATURATION: 96 % | RESPIRATION RATE: 18 BRPM | TEMPERATURE: 98.6 F | BODY MASS INDEX: 27.06 KG/M2 | WEIGHT: 178 LBS

## 2024-06-06 DIAGNOSIS — Z74.09 IMPAIRED MOBILITY AND ADLS: ICD-10-CM

## 2024-06-06 DIAGNOSIS — F02.B3 MODERATE DEMENTIA DUE TO PARKINSON'S DISEASE, WITH MOOD DISTURBANCE: Chronic | ICD-10-CM

## 2024-06-06 DIAGNOSIS — Z78.9 IMPAIRED MOBILITY AND ADLS: ICD-10-CM

## 2024-06-06 DIAGNOSIS — R60.1 GENERALIZED EDEMA: Primary | ICD-10-CM

## 2024-06-06 DIAGNOSIS — G20.A1 MODERATE DEMENTIA DUE TO PARKINSON'S DISEASE, WITH MOOD DISTURBANCE: Chronic | ICD-10-CM

## 2024-06-08 NOTE — PROGRESS NOTES
Nursing Home Follow Up Note      Mitul Stephen DO []   ALESSANDRA Ho [x]  [address] [place] [address]  Phone: [phone]  Fax: [phone] Inés Vega MD []    Alex Burk DO []   [address] [place] [address]  Phone: [phone]  Fax: [phone]     PATIENT NAME: Gama Davila                                                                          YOB: 1940           DATE OF SERVICE: 06/6/2024  FACILITY:  [][facility]   [] [facility]   [] [facility]   [x] [facility]   [] Other ______________________________________________________________________      CHIEF COMPLAINT:    Nursing states left upper extremity is red and swollen      HISTORY OF PRESENT ILLNESS:     Nursing reports that patient has had increased edema for the past couple days. He always has some trade edema to upper and lower extremities but it has increased the past couple days, especially to lower extremities. He also has some facial edema, and does not usually have edema to his face. He denies shortness of breath but not verbal otherwise today. No increased work of breathing or 02 demand.     PAST MEDICAL & SURGICAL HISTORY:   Past Medical History:   Diagnosis Date    Arthritis     Cancer     SKIN     Cataract     Coronary artery disease     Diabetes mellitus     High cholesterol     [place] (hard of hearing)     PATIENT HAS HEARING AIDS    Hypertension     Irregular heart beat     HISTORY OF CARDIAC ABLATION IN 2003    Wears dentures     FULL UPPER PLATE      Past Surgical History:   Procedure Laterality Date    BACK SURGERY  1978    THEN AGAIN IN 1982    CARDIAC ABLATION  2003    CARDIAC SURGERY      CATARACT EXTRACTION W/ INTRAOCULAR LENS IMPLANT Left 5/24/2017    Procedure: CATARACT PHACO EXTRACTION WITH INTRAOCULAR LENS IMPLANT LEFT WITH TORIC LENS;  Surgeon: Alma Benavidez MD;  Location: [facility];  Service:     CATARACT EXTRACTION W/ INTRAOCULAR LENS IMPLANT Right 6/14/2017     Procedure: CATARACT PHACO EXTRACTION WITH INTRAOCULAR LENS IMPLANT RIGHT WITH TORIC LENS;  Surgeon: Alma Benavidez MD;  Location: [facility] OR;  Service:     CORONARY ARTERY BYPASS GRAFT      SPOUSE UNSURE OF HOW MANY VESSELS    HIATAL HERNIA REPAIR  1972    JOINT REPLACEMENT Left     HIP - DONE BY DR DALAL    KNEE ARTHROSCOPY Left     NOSE SURGERY  1970    SPOUSE REPORTS FOR A BROKEN NOSE    OTHER SURGICAL HISTORY Left     TENDON TORN LOOSE FROM BONE, LEFT ELBOW    OTHER SURGICAL HISTORY      RUPTURED DISC    OTHER SURGICAL HISTORY      NECK SURGERY FOR RUPTURED DISC    OTHER SURGICAL HISTORY      HAD SECOND NECK SURGERY    OTHER SURGICAL HISTORY      RUPTURED DISC    OTHER SURGICAL HISTORY      RUPTURED DISC    OTHER SURGICAL HISTORY      HARDWARE REMOVAL FROM BACK BY DR HAY         MEDICATIONS:  I have reviewed and reconciled the patients medication list in the patients chart at the skilled nursing facility today.      ALLERGIES:    Allergies   Allergen Reactions    Phenergan [Promethazine Hcl] Nausea And Vomiting    Carbamazepine Unknown - Low Severity    Hydrocodone Unknown - Low Severity    Lisinopril Unknown - Low Severity         SOCIAL HISTORY:      Social History     Socioeconomic History    Marital status:    Tobacco Use    Smoking status: Former     Current packs/day: 0.00     Types: Cigarettes     Quit date:      Years since quittin.4    Smokeless tobacco: Never   Substance and Sexual Activity    Alcohol use: No    Drug use: No    Sexual activity: Defer       FAMILY HISTORY:    No family history on file.    REVIEW OF SYSTEMS:    Review of Systems   Reason unable to perform ROS: per  patient and nursing.   HENT:  Negative for mouth sores and nosebleeds.         Facial edema   Respiratory:  Negative for cough and shortness of breath.    Cardiovascular:  Positive for leg swelling (BLE).        Edema BUE   Gastrointestinal:  Negative for constipation,  diarrhea and vomiting.   Genitourinary:  Positive for urinary incontinence.   Musculoskeletal:  Negative for arthralgias.   Neurological:  Positive for speech difficulty, weakness, memory problem and confusion.   Psychiatric/Behavioral:  Negative for behavioral problems. The patient is not nervous/anxious.          PHYSICAL EXAMINATION:   VITAL SIGNS:   Vitals:    24 1132   BP: 116/60   Pulse: 60   Resp: 18   Temp: 98.6 °F (37 °C)   SpO2: 96%   Weight: 80.7 kg (178 lb)        Physical Exam  Constitutional:       General: He is not in acute distress.     Appearance: Normal appearance.   HENT:      Head: Normocephalic.      Comments: Facial edema  Cardiovascular:      Rate and Rhythm: Normal rate and regular rhythm.      Pulses: Normal pulses.      Heart sounds: Normal heart sounds.      Comments: Generalized edema  Pulmonary:      Effort: Pulmonary effort is normal.      Breath sounds: Normal breath sounds.   Abdominal:      General: Bowel sounds are normal.      Tenderness: There is no abdominal tenderness.   Musculoskeletal:         General: Swelling present.      Right upper arm: Normal.      Left upper arm: Swelling and edema present.      Right lower le+ Edema present.      Left lower le+ Edema present.   Skin:     General: Skin is warm.      Capillary Refill: Capillary refill takes less than 2 seconds.   Neurological:      Mental Status: Mental status is at baseline.   Psychiatric:         Mood and Affect: Mood normal. Affect is flat.         Cognition and Memory: Cognition is impaired. Memory is impaired.         RECORDS REVIEW:   I have reviewed records in [facility]    ASSESSMENT     Diagnoses and all orders for this visit:    1. Generalized edema (Primary)    2. Moderate dementia due to Parkinson's disease, with mood disturbance    3. Impaired mobility and ADLs        PLAN    Generalized edema  -Lasix 40 mg daily x 3 days then 20 mg daily. Will check BMP 1 week. Will follow up and continue to  monitor.     Nursing encouraged to keep me informed of any acute changes, lack of improvement, or any new concerning symptoms.     Continue supportive care for all ADLs.      [x]  Discussed Patient in detail with nursing/staff, addressed all needs today.     [x]  Plan of Care Reviewed   []  PT/OT Reviewed   [x]  Order Changes  []  Discharge Plans Reviewed  [x]  Advance Directive on file with Nursing Home.   [x]  POA on file with Nursing Home.   [x]  Code Status listed: []  Full Code   [x]  DNR     “I confirm accuracy of unchanged data/findings which have been carried forward from previous visit, as well as I have updated appropriately those that have changed.”     I spent 30 minutes caring for Gama on this date of service. This time includes time spent by me in the following activities:preparing for the visit, reviewing tests, obtaining and/or reviewing a separately obtained history, performing a medically appropriate examination and/or evaluation , counseling and educating the patient/family/caregiver, ordering medications, tests, or procedures, referring and communicating with other health care professionals , documenting information in the medical record, independently interpreting results and communicating that information with the patient/family/caregiver, and care coordination     Charleen Randall, APRN.

## 2024-07-15 ENCOUNTER — NURSING HOME (OUTPATIENT)
Dept: FAMILY MEDICINE CLINIC | Facility: CLINIC | Age: 84
End: 2024-07-15
Payer: MEDICARE

## 2024-07-15 VITALS
RESPIRATION RATE: 20 BRPM | TEMPERATURE: 97.9 F | SYSTOLIC BLOOD PRESSURE: 128 MMHG | DIASTOLIC BLOOD PRESSURE: 69 MMHG | HEART RATE: 75 BPM | OXYGEN SATURATION: 96 %

## 2024-07-15 DIAGNOSIS — L89.616 PRESSURE INJURY OF DEEP TISSUE OF RIGHT HEEL: ICD-10-CM

## 2024-07-15 DIAGNOSIS — Z74.09 IMPAIRED MOBILITY AND ADLS: ICD-10-CM

## 2024-07-15 DIAGNOSIS — R60.0 BILATERAL EDEMA OF LOWER EXTREMITY: ICD-10-CM

## 2024-07-15 DIAGNOSIS — G20.A1 MODERATE DEMENTIA DUE TO PARKINSON'S DISEASE, WITH MOOD DISTURBANCE: Chronic | ICD-10-CM

## 2024-07-15 DIAGNOSIS — F02.B3 MODERATE DEMENTIA DUE TO PARKINSON'S DISEASE, WITH MOOD DISTURBANCE: Chronic | ICD-10-CM

## 2024-07-15 DIAGNOSIS — Z78.9 IMPAIRED MOBILITY AND ADLS: ICD-10-CM

## 2024-07-15 DIAGNOSIS — Z79.899 MEDICATION MANAGEMENT: Primary | ICD-10-CM

## 2024-07-15 PROCEDURE — 99309 SBSQ NF CARE MODERATE MDM 30: CPT | Performed by: NURSE PRACTITIONER

## 2024-07-16 ENCOUNTER — NURSING HOME (OUTPATIENT)
Dept: INTERNAL MEDICINE | Facility: CLINIC | Age: 84
End: 2024-07-16
Payer: MEDICARE

## 2024-07-16 VITALS
SYSTOLIC BLOOD PRESSURE: 128 MMHG | HEART RATE: 75 BPM | TEMPERATURE: 97.9 F | WEIGHT: 181 LBS | RESPIRATION RATE: 20 BRPM | DIASTOLIC BLOOD PRESSURE: 69 MMHG | BODY MASS INDEX: 27.52 KG/M2 | OXYGEN SATURATION: 96 %

## 2024-07-16 DIAGNOSIS — I25.119 CORONARY ARTERY DISEASE INVOLVING NATIVE HEART WITH ANGINA PECTORIS, UNSPECIFIED VESSEL OR LESION TYPE: ICD-10-CM

## 2024-07-16 DIAGNOSIS — G20.A1 PARKINSON'S DISEASE WITHOUT DYSKINESIA OR FLUCTUATING MANIFESTATIONS: Primary | ICD-10-CM

## 2024-07-16 DIAGNOSIS — I10 PRIMARY HYPERTENSION: ICD-10-CM

## 2024-07-16 DIAGNOSIS — Z78.9 IMPAIRED MOBILITY AND ADLS: ICD-10-CM

## 2024-07-16 DIAGNOSIS — F02.80 LATE ONSET ALZHEIMER'S DISEASE WITHOUT BEHAVIORAL DISTURBANCE: ICD-10-CM

## 2024-07-16 DIAGNOSIS — E11.649 TYPE 2 DIABETES MELLITUS WITH HYPOGLYCEMIA WITHOUT COMA, WITH LONG-TERM CURRENT USE OF INSULIN: ICD-10-CM

## 2024-07-16 DIAGNOSIS — G30.1 LATE ONSET ALZHEIMER'S DISEASE WITHOUT BEHAVIORAL DISTURBANCE: ICD-10-CM

## 2024-07-16 DIAGNOSIS — Z79.4 TYPE 2 DIABETES MELLITUS WITH HYPOGLYCEMIA WITHOUT COMA, WITH LONG-TERM CURRENT USE OF INSULIN: ICD-10-CM

## 2024-07-16 DIAGNOSIS — Z74.09 IMPAIRED MOBILITY AND ADLS: ICD-10-CM

## 2024-07-16 PROCEDURE — 99308 SBSQ NF CARE LOW MDM 20: CPT | Performed by: INTERNAL MEDICINE

## 2024-07-16 NOTE — PROGRESS NOTES
Nursing Home Progress Note        Alex Burk DO   [address] [place] [address] Phone: [phone]  Fax: [phone]     PATIENT NAME: Gama Davila                                                                          YOB: 1940           DATE OF SERVICE: 07/16/2024  FACILITY:  [facility]   ______________________________________________________________________     CHIEF COMPLAINT:  Chronic Medical Management      History of Present Illness  The patient is an 83-year-old male being seen for a routine compliance visit in the nursing facility.    Upon examination today, he was lying comfortably in his bed. He was excessively sleepy and unable to provide any specific information or express any concerns. The nursing facility reports no other acute events, apart from edema, which has improved after a few days on Lasix, which was recently started. He occasionally gets up to a chair but has not been moving around the facility as he used to.       PAST MEDICAL & SURGICAL HISTORY:   Past Medical History:   Diagnosis Date    Arthritis     Cancer     SKIN     Cataract     Coronary artery disease     Diabetes mellitus     High cholesterol     [place] (hard of hearing)     PATIENT HAS HEARING AIDS    Hypertension     Irregular heart beat     HISTORY OF CARDIAC ABLATION IN 2003    Wears dentures     FULL UPPER PLATE      Past Surgical History:   Procedure Laterality Date    BACK SURGERY  1978    THEN AGAIN IN 1982    CARDIAC ABLATION  2003    CARDIAC SURGERY      CATARACT EXTRACTION W/ INTRAOCULAR LENS IMPLANT Left 5/24/2017    Procedure: CATARACT PHACO EXTRACTION WITH INTRAOCULAR LENS IMPLANT LEFT WITH TORIC LENS;  Surgeon: Alma Benavidez MD;  Location: [facility];  Service:     CATARACT EXTRACTION W/ INTRAOCULAR LENS IMPLANT Right 6/14/2017    Procedure: CATARACT PHACO EXTRACTION WITH INTRAOCULAR LENS IMPLANT RIGHT WITH TORIC LENS;  Surgeon: Alma Benavidez MD;   Location: [facility] OR;  Service:     CORONARY ARTERY BYPASS GRAFT      SPOUSE UNSURE OF HOW MANY VESSELS    HIATAL HERNIA REPAIR  1972    JOINT REPLACEMENT Left     HIP - DONE BY DR DALAL    KNEE ARTHROSCOPY Left     NOSE SURGERY  1970    SPOUSE REPORTS FOR A BROKEN NOSE    OTHER SURGICAL HISTORY Left     TENDON TORN LOOSE FROM BONE, LEFT ELBOW    OTHER SURGICAL HISTORY      RUPTURED DISC    OTHER SURGICAL HISTORY      NECK SURGERY FOR RUPTURED DISC    OTHER SURGICAL HISTORY      HAD SECOND NECK SURGERY    OTHER SURGICAL HISTORY      RUPTURED DISC    OTHER SURGICAL HISTORY      RUPTURED DISC    OTHER SURGICAL HISTORY      HARDWARE REMOVAL FROM BACK BY DR HAY         MEDICATIONS:  I have reviewed and reconciled the patients medication list in the patients chart at the skilled nursing facility today, 2024.      ALLERGIES:  Allergies   Allergen Reactions    Phenergan [Promethazine Hcl] Nausea And Vomiting    Carbamazepine Unknown - Low Severity    Hydrocodone Unknown - Low Severity    Lisinopril Unknown - Low Severity         SOCIAL HISTORY:  Social History     Socioeconomic History    Marital status:    Tobacco Use    Smoking status: Former     Current packs/day: 0.00     Types: Cigarettes     Quit date:      Years since quittin.5    Smokeless tobacco: Never   Substance and Sexual Activity    Alcohol use: No    Drug use: No    Sexual activity: Defer       FAMILY HISTORY:  No family history on file.    REVIEW OF SYSTEMS:  Review of Systems   Unable to perform ROS: Dementia          PHYSICAL EXAMINATION:     VITAL SIGNS:  /69   Pulse 75   Temp 97.9 °F (36.6 °C)   Resp 20   Wt 82.1 kg (181 lb)   SpO2 96%   BMI 27.52 kg/m²     Physical Exam  Vitals and nursing note reviewed.   Constitutional:       General: He is not in acute distress.     Appearance: Normal appearance. He is well-developed.      Comments: Elderly male resting comfortably in bed.   HENT:       Head: Normocephalic and atraumatic.   Eyes:      Extraocular Movements: Extraocular movements intact.      Conjunctiva/sclera: Conjunctivae normal.   Cardiovascular:      Rate and Rhythm: Normal rate and regular rhythm.      Pulses: Normal pulses.      Heart sounds: Normal heart sounds. No murmur heard.  Pulmonary:      Effort: Pulmonary effort is normal.      Breath sounds: Normal breath sounds.   Abdominal:      General: Abdomen is flat.      Palpations: Abdomen is soft.   Musculoskeletal:      Cervical back: Normal range of motion and neck supple.   Skin:     General: Skin is warm and dry.      Findings: No rash.   Neurological:      General: No focal deficit present.      Mental Status: He is alert. Mental status is at baseline. He is disoriented.   Psychiatric:      Comments: Slow cognition and ability to respond to questions.       RECORDS REVIEW:       ASSESSMENT     Diagnoses and all orders for this visit:    1. Parkinson's disease without dyskinesia or fluctuating manifestations (Primary)    2. Late onset Alzheimer's disease without behavioral disturbance    3. Coronary artery disease involving native heart with angina pectoris, unspecified vessel or lesion type    4. Primary hypertension    5. Type 2 diabetes mellitus with hypoglycemia without coma, with long-term current use of insulin    6. Impaired mobility and ADLs        PLAN      Dementia due to Parkinson's disease  -  Stable with Sinemet, memantine, and donepezil.  -Functional status remains very poor.  Patient is needing close attention for all ADLs.    Mood disorder.  Remains well-controlled with current medication regimen.    Type 2 diabetes mellitus   -Obtain labs every 3 months: CBC, CMP and A1c.  Labs were reordered today.   -Stable control on metformin and basaglar      Hyperlipidemia  -Remains off of statin due to age and lack of long-term benefit     Falls  -No issues recently.  Restorative therapy is following.    BPH  - cont  Tamsulosin    Constipation  - on daily stool softener.     Coronary artery disease/irregular heartbeat/CHF  - Euvolemic at this time  - stable on current cardiac medications.             [x]  Discussed Patient in detail with nursing/staff, addressed all needs today.     [x]  Plan of Care Reviewed   []  PT/OT Reviewed   []  Order Changes  []  Discharge Plans Reviewed  [x]  Advance Directive on file with Nursing Home.   [x]  POA on file with Nursing Home.    [x]  Code Status listed and reviewed.     I confirm accuracy of unchanged data/findings including physical exam and plan which have been carried forward from previous visit, as well as I have updated appropriately those that have changed        Alex Burk DO.  7/16/2024      Patient or patient representative verbalized consent for the use of Ambient Listening during the visit with  Alex Burk DO for chart documentation. 7/16/2024  15:08 EDT

## 2024-07-16 NOTE — PROGRESS NOTES
Telemedicine nursing Home Progress Note        Mitul Stephen DO []  [facility] [x] [place] [address]  Phone: [phone]  Fax: [phone] Inés Vega MD []  ALESSANDRA Ho [x]   [address] [place] [address]  Phone: [phone]  Fax: [phone]     PATIENT NAME: Gama Davila                                                                          YOB: 1940           DATE OF SERVICE: 7/15/2024  FACILITY:  [] [place]   [] [place]   [] [facility]   [x] [facility]  []  [facility]  [] Other     ______________________________________________________________________   CHIEF COMPLAINT:    Increased Bilateral lower extremity edema.    Right heel deep tissue injury.     HISTORY OF PRESENT ILLNESS:     Nursing reports that patient has some increased lower extremity edema over the past couple days. Edema has improved some with elevating on pillow. He does not have edema elsewhere. He does not have any shortness of breath or increased work of breathing. He does sit up often in his wheelchair and has dependent edema. He does have history of this, often has dependent edema but edema has increased the past few days. He has no complaints today but is a poor historian related to his dementia.  He has no signs and symptoms of any distress however.     Patient has DTI to right heel, noted yesterday. Nursing floating heels and wound nurse following.       PAST MEDICAL & SURGICAL HISTORY:   Past Medical History:   Diagnosis Date    Arthritis     Cancer     SKIN     Cataract     Coronary artery disease     Diabetes mellitus     High cholesterol     [place] (hard of hearing)     PATIENT HAS HEARING AIDS    Hypertension     Irregular heart beat     HISTORY OF CARDIAC ABLATION IN 2003    Wears dentures     FULL UPPER PLATE      Past Surgical History:   Procedure Laterality Date    BACK SURGERY  1978    THEN AGAIN IN 1982    CARDIAC ABLATION  2003     CARDIAC SURGERY      CATARACT EXTRACTION W/ INTRAOCULAR LENS IMPLANT Left 2017    Procedure: CATARACT PHACO EXTRACTION WITH INTRAOCULAR LENS IMPLANT LEFT WITH TORIC LENS;  Surgeon: Alma Benavidez MD;  Location: [facility] OR;  Service:     CATARACT EXTRACTION W/ INTRAOCULAR LENS IMPLANT Right 2017    Procedure: CATARACT PHACO EXTRACTION WITH INTRAOCULAR LENS IMPLANT RIGHT WITH TORIC LENS;  Surgeon: Alma Benavidez MD;  Location: [facility] OR;  Service:     CORONARY ARTERY BYPASS GRAFT      SPOUSE UNSURE OF HOW MANY VESSELS    HIATAL HERNIA REPAIR  1972    JOINT REPLACEMENT Left     HIP - DONE BY DR DALAL    KNEE ARTHROSCOPY Left     NOSE SURGERY  1970    SPOUSE REPORTS FOR A BROKEN NOSE    OTHER SURGICAL HISTORY Left     TENDON TORN LOOSE FROM BONE, LEFT ELBOW    OTHER SURGICAL HISTORY      RUPTURED DISC    OTHER SURGICAL HISTORY      NECK SURGERY FOR RUPTURED DISC    OTHER SURGICAL HISTORY      HAD SECOND NECK SURGERY    OTHER SURGICAL HISTORY      RUPTURED DISC    OTHER SURGICAL HISTORY      RUPTURED DISC    OTHER SURGICAL HISTORY      HARDWARE REMOVAL FROM BACK BY DR HAY         MEDICATIONS:  I have reviewed and reconciled the patients medication list in the patients chart at the skilled nursing facility today.      ALLERGIES:  Allergies   Allergen Reactions    Phenergan [Promethazine Hcl] Nausea And Vomiting    Carbamazepine Unknown - Low Severity    Hydrocodone Unknown - Low Severity    Lisinopril Unknown - Low Severity         SOCIAL HISTORY:  Social History     Socioeconomic History    Marital status:    Tobacco Use    Smoking status: Former     Current packs/day: 0.00     Types: Cigarettes     Quit date:      Years since quittin.5    Smokeless tobacco: Never   Substance and Sexual Activity    Alcohol use: No    Drug use: No    Sexual activity: Defer       FAMILY HISTORY:  No family history on file.    REVIEW OF SYSTEMS:  Review of  Systems   Unable to perform ROS: Dementia (ROS per nursing and patient)   Constitutional:  Negative for activity change, appetite change, chills, diaphoresis, fatigue and fever.   HENT:  Negative for congestion, mouth sores, nosebleeds, rhinorrhea and trouble swallowing.    Respiratory:  Negative for cough, chest tightness and shortness of breath.    Cardiovascular:  Positive for leg swelling (increased). Negative for chest pain and palpitations.   Gastrointestinal:  Negative for abdominal distention, abdominal pain, blood in stool, constipation, diarrhea and vomiting.   Genitourinary:  Negative for difficulty urinating and frequency.        Incontinence   Musculoskeletal:  Positive for arthralgias (chronic) and gait problem.   Skin:  Positive for color change (pressure injury right heel). Negative for rash and wound.   Neurological:  Positive for weakness.   Psychiatric/Behavioral:  Positive for confusion. Negative for agitation, behavioral problems, dysphoric mood, sleep disturbance and suicidal ideas. The patient is not nervous/anxious and is not hyperactive.           PHYSICAL EXAMINATION:     VITAL SIGNS:  /69   Pulse 75   Temp 97.9 °F (36.6 °C)   Resp 20   SpO2 96%     Physical Exam   Cardiovascular: Normal rate, regular rhythm and normal heart sounds.   Pulmonary/Chest: Effort normal and breath sounds normal. No respiratory distress. He has no decreased breath sounds. He has no wheezes.   Abdominal: Bowel sounds are normal. There is no abdominal tenderness.   Musculoskeletal:      Right lower le+ Edema present.      Left lower le+ Edema present.        Neurological: He is alert. He is disoriented.   Psychiatric: His behavior is normal. Mood normal. Cognition and memory are impaired. He has a flat affect.   Nursing note and vitals reviewed.      RECORDS REVIEW:   Most recent labs    ASSESSMENT     Diagnoses and all orders for this visit:    1. Medication management (Primary)    2. Bilateral  edema of lower extremity    3. Pressure injury of deep tissue of right heel    4. Moderate dementia due to Parkinson's disease, with mood disturbance    5. Impaired mobility and ADLs          PLAN    BLE edema  -Lasix 40 mg po bid x 3 days then back to 20 mg daily. Keep legs elevated on 2 pillows.     DTI right heel  -Nursing to keep heels floated off bed. Wound nurse to manage and keep updated.     Will follow up and continue to monitor.     Dementia behaviors, appetite stable.     Nursing encouraged to keep me informed of any acute changes, amanda any new concerning symptoms.    Staff to continue supportive care for all ADLs.     [x]  Discussed Patient in detail with nursing/staff, addressed all needs today.     [x]  Plan of Care Reviewed   [x]  PT/OT Reviewed   []  Order Changes  []  Discharge Plans Reviewed  [x]  Advance Directive on file with Nursing Home.   [x]  POA on file with Nursing Home.    [x]  Code Status listed and reviewed.     “I confirm accuracy of unchanged data/findings which have been carried forward from previous visit, as well as I have updated appropriately those that have changed.”     I spent 30 minutes caring for Gama on this date of service. This time includes time spent by me in the following activities:preparing for the visit, obtaining and/or reviewing a separately obtained history, performing a medically appropriate examination and/or evaluation , counseling and educating the patient/family/caregiver, ordering medications, tests, or procedures, referring and communicating with other health care professionals , documenting information in the medical record, independently interpreting results and communicating that information with the patient/family/caregiver, and care coordination                                  Charleen Randall, APRN.  7/15/2024

## 2024-07-16 NOTE — LETTER
Nursing Home Progress Note        Alex Burk DO   [address] [place] [address] Phone: [phone]  Fax: [phone]     PATIENT NAME: Gama Davila                                                                          YOB: 1940           DATE OF SERVICE: 07/16/2024  FACILITY:  [facility]   ______________________________________________________________________     CHIEF COMPLAINT:  Chronic Medical Management      History of Present Illness  The patient is an 83-year-old male being seen for a routine compliance visit in the nursing facility.    Upon examination today, he was lying comfortably in his bed. He was excessively sleepy and unable to provide any specific information or express any concerns. The nursing facility reports no other acute events, apart from edema, which has improved after a few days on Lasix, which was recently started. He occasionally gets up to a chair but has not been moving around the facility as he used to.       PAST MEDICAL & SURGICAL HISTORY:   Past Medical History:   Diagnosis Date    Arthritis     Cancer     SKIN     Cataract     Coronary artery disease     Diabetes mellitus     High cholesterol     [place] ([place] of hearing)     PATIENT HAS HEARING AIDS    Hypertension     Irregular heart beat     HISTORY OF CARDIAC ABLATION IN 2003    Wears dentures     FULL UPPER PLATE      Past Surgical History:   Procedure Laterality Date    BACK SURGERY  1978    THEN AGAIN IN 1982    CARDIAC ABLATION  2003    CARDIAC SURGERY      CATARACT EXTRACTION W/ INTRAOCULAR LENS IMPLANT Left 5/24/2017    Procedure: CATARACT PHACO EXTRACTION WITH INTRAOCULAR LENS IMPLANT LEFT WITH TORIC LENS;  Surgeon: Alma Benavidez MD;  Location: [facility];  Service:     CATARACT EXTRACTION W/ INTRAOCULAR LENS IMPLANT Right 6/14/2017    Procedure: CATARACT PHACO EXTRACTION WITH INTRAOCULAR LENS IMPLANT RIGHT WITH TORIC LENS;  Surgeon: Alma Benavidez MD;   Location: [facility] OR;  Service:     CORONARY ARTERY BYPASS GRAFT      SPOUSE UNSURE OF HOW MANY VESSELS    HIATAL HERNIA REPAIR  1972    JOINT REPLACEMENT Left     HIP - DONE BY DR DALAL    KNEE ARTHROSCOPY Left     NOSE SURGERY  1970    SPOUSE REPORTS FOR A BROKEN NOSE    OTHER SURGICAL HISTORY Left     TENDON TORN LOOSE FROM BONE, LEFT ELBOW    OTHER SURGICAL HISTORY      RUPTURED DISC    OTHER SURGICAL HISTORY      NECK SURGERY FOR RUPTURED DISC    OTHER SURGICAL HISTORY      HAD SECOND NECK SURGERY    OTHER SURGICAL HISTORY      RUPTURED DISC    OTHER SURGICAL HISTORY      RUPTURED DISC    OTHER SURGICAL HISTORY      HARDWARE REMOVAL FROM BACK BY DR HAY         MEDICATIONS:  I have reviewed and reconciled the patients medication list in the patients chart at the skilled nursing facility today, 2024.      ALLERGIES:  Allergies   Allergen Reactions    Phenergan [Promethazine Hcl] Nausea And Vomiting    Carbamazepine Unknown - Low Severity    Hydrocodone Unknown - Low Severity    Lisinopril Unknown - Low Severity         SOCIAL HISTORY:  Social History     Socioeconomic History    Marital status:    Tobacco Use    Smoking status: Former     Current packs/day: 0.00     Types: Cigarettes     Quit date:      Years since quittin.5    Smokeless tobacco: Never   Substance and Sexual Activity    Alcohol use: No    Drug use: No    Sexual activity: Defer       FAMILY HISTORY:  No family history on file.    REVIEW OF SYSTEMS:  Review of Systems   Unable to perform ROS: Dementia          PHYSICAL EXAMINATION:     VITAL SIGNS:  /69   Pulse 75   Temp 97.9 °F (36.6 °C)   Resp 20   Wt 82.1 kg (181 lb)   SpO2 96%   BMI 27.52 kg/m²     Physical Exam  Vitals and nursing note reviewed.   Constitutional:       General: He is not in acute distress.     Appearance: Normal appearance. He is well-developed.      Comments: Elderly male resting comfortably in bed.   HENT:       Head: Normocephalic and atraumatic.   Eyes:      Extraocular Movements: Extraocular movements intact.      Conjunctiva/sclera: Conjunctivae normal.   Cardiovascular:      Rate and Rhythm: Normal rate and regular rhythm.      Pulses: Normal pulses.      Heart sounds: Normal heart sounds. No murmur heard.  Pulmonary:      Effort: Pulmonary effort is normal.      Breath sounds: Normal breath sounds.   Abdominal:      General: Abdomen is flat.      Palpations: Abdomen is soft.   Musculoskeletal:      Cervical back: Normal range of motion and neck supple.   Skin:     General: Skin is warm and dry.      Findings: No rash.   Neurological:      General: No focal deficit present.      Mental Status: He is alert. Mental status is at baseline. He is disoriented.   Psychiatric:      Comments: Slow cognition and ability to respond to questions.       RECORDS REVIEW:       ASSESSMENT     Diagnoses and all orders for this visit:    1. Parkinson's disease without dyskinesia or fluctuating manifestations (Primary)    2. Late onset Alzheimer's disease without behavioral disturbance    3. Coronary artery disease involving native heart with angina pectoris, unspecified vessel or lesion type    4. Primary hypertension    5. Type 2 diabetes mellitus with hypoglycemia without coma, with long-term current use of insulin    6. Impaired mobility and ADLs        PLAN      Dementia due to Parkinson's disease  -  Stable with Sinemet, memantine, and donepezil.  -Functional status remains very poor.  Patient is needing close attention for all ADLs.    Mood disorder.  Remains well-controlled with current medication regimen.    Type 2 diabetes mellitus   -Obtain labs every 3 months: CBC, CMP and A1c.  Labs were reordered today.   -Stable control on metformin and basaglar      Hyperlipidemia  -Remains off of statin due to age and lack of long-term benefit     Falls  -No issues recently.  Restorative therapy is following.    BPH  - cont  Tamsulosin    Constipation  - on daily stool softener.     Coronary artery disease/irregular heartbeat/CHF  - Euvolemic at this time  - stable on current cardiac medications.             [x]  Discussed Patient in detail with nursing/staff, addressed all needs today.     [x]  Plan of Care Reviewed   []  PT/OT Reviewed   []  Order Changes  []  Discharge Plans Reviewed  [x]  Advance Directive on file with Nursing Home.   [x]  POA on file with Nursing Home.    [x]  Code Status listed and reviewed.     I confirm accuracy of unchanged data/findings including physical exam and plan which have been carried forward from previous visit, as well as I have updated appropriately those that have changed        Alex Burk DO.  7/16/2024      Patient or patient representative verbalized consent for the use of Ambient Listening during the visit with  Alex Burk DO for chart documentation. 7/16/2024  15:08 EDT

## 2024-08-13 NOTE — PROGRESS NOTES
Nursing Home Progress Note        Mitul Stephen DO []  ALESSANDRA Ho []  [address] [place] [address]  Phone: [phone]  Fax: [phone] Inés Vega MD []  Alex Burk DO [x]   [address] [place] [address]  Phone: [phone]  Fax: [phone]     PATIENT NAME: Gama Davila                                                                          YOB: 1940           DATE OF SERVICE: 07/21/2022  FACILITY:  [] [place]   [] [place]   [] [facility]   [x] [facility]  []  [facility]  [] Other ______________________________________________________________________     CHIEF COMPLAINT:  Chronic Medical Management      HISTORY OF PRESENT ILLNESS:   Patient was comfortably moving about the facility in his wheelchair.  His mood and behaviors have been fairly well controlled.  His glucose control has been fair as well.  Patient had no specific complaints on exam today.  Nurses report no new acute issues.    PAST MEDICAL & SURGICAL HISTORY:   Past Medical History:   Diagnosis Date   • Arthritis    • Cancer (HCC)     SKIN    • Cataract    • Coronary artery disease    • Diabetes mellitus (HCC)    • High cholesterol    • [place] (hard of hearing)     PATIENT HAS HEARING AIDS   • Hypertension    • Irregular heart beat     HISTORY OF CARDIAC ABLATION IN 2003   • Wears dentures     FULL UPPER PLATE      Past Surgical History:   Procedure Laterality Date   • BACK SURGERY  1978    THEN AGAIN IN 1982   • CARDIAC ABLATION  2003   • CARDIAC SURGERY     • CATARACT EXTRACTION W/ INTRAOCULAR LENS IMPLANT Left 5/24/2017    Procedure: CATARACT PHACO EXTRACTION WITH INTRAOCULAR LENS IMPLANT LEFT WITH TORIC LENS;  Surgeon: Alma Benavidez MD;  Location: [facility];  Service:    • CATARACT EXTRACTION W/ INTRAOCULAR LENS IMPLANT Right 6/14/2017    Procedure: CATARACT PHACO EXTRACTION WITH INTRAOCULAR LENS IMPLANT RIGHT WITH TORIC LENS;  Surgeon: Alma Orozco  MD Reema;  [facility];  Service:    • CORONARY ARTERY BYPASS GRAFT      SPOUSE UNSURE OF HOW MANY VESSELS   • HIATAL HERNIA REPAIR     • JOINT REPLACEMENT Left     HIP - DONE BY DR DALAL   • KNEE ARTHROSCOPY Left    • NOSE SURGERY      SPOUSE REPORTS FOR A BROKEN NOSE   • OTHER SURGICAL HISTORY Left     TENDON TORN LOOSE FROM BONE, LEFT ELBOW   • OTHER SURGICAL HISTORY      RUPTURED DISC   • OTHER SURGICAL HISTORY      NECK SURGERY FOR RUPTURED DISC   • OTHER SURGICAL HISTORY      HAD SECOND NECK SURGERY   • OTHER SURGICAL HISTORY      RUPTURED DISC   • OTHER SURGICAL HISTORY      RUPTURED DISC   • OTHER SURGICAL HISTORY      HARDWARE REMOVAL FROM BACK BY DR HAY         MEDICATIONS:  I have reviewed and reconciled the patients medication list in the patients chart at the skilled nursing facility today, 2022.      ALLERGIES:  Allergies   Allergen Reactions   • Phenergan [Promethazine Hcl] Nausea And Vomiting   • Carbamazepine Unknown - Low Severity   • Hydrocodone Unknown - Low Severity   • Lisinopril Unknown - Low Severity         SOCIAL HISTORY:  Social History     Socioeconomic History   • Marital status:    Tobacco Use   • Smoking status: Former Smoker     Types: Cigarettes     Quit date:      Years since quittin.6   • Smokeless tobacco: Never Used   Substance and Sexual Activity   • Alcohol use: No   • Drug use: No   • Sexual activity: Defer       FAMILY HISTORY:  No family history on file.    REVIEW OF SYSTEMS:  Review of Systems   Constitutional: Negative for chills, fatigue and fever.   HENT: Negative for congestion, ear pain, rhinorrhea, sinus pressure and sore throat.    Eyes: Negative for visual disturbance.   Respiratory: Negative for cough, chest tightness, shortness of breath and wheezing.    Cardiovascular: Negative for chest pain, palpitations and leg swelling.   Gastrointestinal: Negative for abdominal pain, blood in stool,  constipation, diarrhea, nausea and vomiting.   Endocrine: Negative for polydipsia and polyuria.   Genitourinary: Negative for dysuria and hematuria.   Musculoskeletal: Negative for arthralgias and back pain.   Skin: Negative for rash.   Neurological: Negative for dizziness, light-headedness, numbness and headaches.   Psychiatric/Behavioral: Negative for dysphoric mood and sleep disturbance. The patient is not nervous/anxious.           PHYSICAL EXAMINATION:     VITAL SIGNS:  /78   Pulse 70   Temp 97.4 °F (36.3 °C)   Resp 18   Wt 69.4 kg (153 lb)   SpO2 92%   BMI 23.26 kg/m²     Physical Exam  Vitals and nursing note reviewed.   Constitutional:       General: He is not in acute distress.     Appearance: Normal appearance. He is well-developed.      Comments: Wheelchair-bound elderly male   HENT:      Head: Normocephalic and atraumatic.   Eyes:      Extraocular Movements: Extraocular movements intact.      Conjunctiva/sclera: Conjunctivae normal.   Cardiovascular:      Rate and Rhythm: Normal rate and regular rhythm.   Pulmonary:      Effort: Pulmonary effort is normal.      Breath sounds: Normal breath sounds.   Musculoskeletal:      Cervical back: Normal range of motion and neck supple.   Skin:     General: Skin is warm and dry.      Findings: No rash.   Neurological:      General: No focal deficit present.      Mental Status: He is alert and oriented to person, place, and time.   Psychiatric:         Mood and Affect: Mood normal.         Behavior: Behavior normal.         RECORDS REVIEW:       ASSESSMENT     Diagnoses and all orders for this visit:    1. 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 Essential hypertension    3. Type 2 diabetes mellitus with diabetic polyneuropathy, without long-term current use of insulin (HCC)    4. Impaired mobility and ADLs    5. Coronary artery disease involving native heart with angina pectoris, unspecified vessel or lesion  type (HCC)    6. Late onset Alzheimer's disease without behavioral disturbance (HCC)    7. Irregular heart beat    8. High cholesterol        PLAN       Dementia due to Parkinson's disease  -  Behaviors remain stable with Sinemet and donepezil.     Mood disorder  - psychiatry is following. stable at this time.      Type 2 diabetes mellitus   - controlled.   -Stable control on metformin and basaglar, glucose log reviewed.       Falls  - no recent incidents.  Doing well moving around in wheelchair.      Coronary artery disease/irregular heartbeat  - stable on current cardiac medications.  No changes needed.           [x]  Discussed Patient in detail with nursing/staff, addressed all needs today.     [x]  Plan of Care Reviewed   []  PT/OT Reviewed   []  Order Changes  []  Discharge Plans Reviewed  [x]  Advance Directive on file with Nursing Home.   [x]  POA on file with Nursing Home.    [x]  Code Status listed and reviewed.     I confirm accuracy of unchanged data/findings including physical exam and plan which have been carried forward from previous visit, as well as I have updated appropriately those that have changed        Alex Burk DO.  8/8/2022     [[facility]] : [facility]  [Motivational Interviewing] : Motivational Interviewing  [Psychoeducation] : Psychoeducation  [Skills training (all types)] : Skills training (all types)  [Supportive Therapy] : Supportive Therapy [FreeTextEntry2] : Treatment Goal (effective as of 5/13/24): Maggie will gain x3 coping skills to regulate emotions adequately to maintain her desired weekly routine.  Objective A. Pt will use x1-3 coping skills daily to regulate thoughts and feelings to complete the desired tasks. Objective B. Pt will review mood-altering events in session every 1-3 weeks to process triggers and coping skill applications to gain self-awareness and insights.  Objective C. Pt will maintain medication management as prescribed daily and attend all scheduled appointments (Medication Management: Every 1-3 months & Individual Therapy: Every 1-3 weeks).  [de-identified] : Maggie was having ongoing conversations with the previous psychiatrist (Dr. Ha) regarding medication treatment but did not start with any medications. Maggie confirmed receiving a voicemail from Dr. Joseph and would call him back. The patient reported "feeling better today" after slightly improving her eating habits. However, she reported having an ongoing struggle with quickly feeling emotionally overwhelmed and having difficulty completing her desired tasks most of the time, such as calling back Dr. Joseph and her  to discuss areas of her needs. Thus, we invited the  during today's session, and the therapist supported the patient in openly discussing her needs. We will have a follow-up discussion during the next session to process today's encounter.     Action Plan & Practice Task(s) in Progress: - STOP Skills: Stop, Take a step back, Observe, and Proceed mindfully with intention (x1-5, Daily). - Self-compassionate self-talk: Using gentle tone and words (x1-5, Daily). - Grounding movements (x1-5, Daily).  Skills Training: - Paced breathing exercises (x1-3, daily)  Recommended services & referrals made: - 4/29/24, Housing support: [facility] and other community resources - 5/20/24:  Social Security Adm for additional resources for financial support - 6/3/24, [facility] Case Management Services: Pt declined and reported having a case management services, Eli Lee (female, [phone]), [facility].  Support Network (established contact consent/PHI Release): - Adrienne López, Friend, [phone] (verbal consent 6/3/24) - Eli Lee,  from [facility], [phone]   Follow-up: - Return in 1 week(s)

## 2024-08-28 ENCOUNTER — HOSPITAL ENCOUNTER (INPATIENT)
Facility: HOSPITAL | Age: 84
LOS: 2 days | Discharge: NURSING, REHAB OR LONG-TERM CARE | DRG: 871 | End: 2024-08-30
Attending: STUDENT IN AN ORGANIZED HEALTH CARE EDUCATION/TRAINING PROGRAM | Admitting: INTERNAL MEDICINE
Payer: MEDICARE

## 2024-08-28 ENCOUNTER — APPOINTMENT (OUTPATIENT)
Dept: CT IMAGING | Facility: HOSPITAL | Age: 84
DRG: 871 | End: 2024-08-28
Payer: MEDICARE

## 2024-08-28 ENCOUNTER — APPOINTMENT (OUTPATIENT)
Dept: GENERAL RADIOLOGY | Facility: HOSPITAL | Age: 84
DRG: 871 | End: 2024-08-28
Payer: MEDICARE

## 2024-08-28 DIAGNOSIS — J18.9 MULTIFOCAL PNEUMONIA: Primary | ICD-10-CM

## 2024-08-28 DIAGNOSIS — A41.9 SEPSIS, DUE TO UNSPECIFIED ORGANISM, UNSPECIFIED WHETHER ACUTE ORGAN DYSFUNCTION PRESENT: ICD-10-CM

## 2024-08-28 LAB
A-A DO2: 83.6 MMHG
ALBUMIN SERPL-MCNC: 3.6 G/DL (ref 3.5–5.2)
ALBUMIN/GLOB SERPL: 0.9 G/DL
ALP SERPL-CCNC: 114 U/L (ref 39–117)
ALT SERPL W P-5'-P-CCNC: 8 U/L (ref 1–41)
ANION GAP SERPL CALCULATED.3IONS-SCNC: 13.4 MMOL/L (ref 5–15)
ARTERIAL PATENCY WRIST A: POSITIVE
AST SERPL-CCNC: 13 U/L (ref 1–40)
ATMOSPHERIC PRESS: 735 MMHG
BASE EXCESS BLDA CALC-SCNC: 2.2 MMOL/L (ref 0–2)
BASOPHILS # BLD AUTO: 0.02 10*3/MM3 (ref 0–0.2)
BASOPHILS NFR BLD AUTO: 0.3 % (ref 0–1.5)
BDY SITE: ABNORMAL
BILIRUB SERPL-MCNC: 0.2 MG/DL (ref 0–1.2)
BUN SERPL-MCNC: 26 MG/DL (ref 8–23)
BUN/CREAT SERPL: 18.4 (ref 7–25)
CALCIUM SPEC-SCNC: 8.7 MG/DL (ref 8.6–10.5)
CHLORIDE SERPL-SCNC: 100 MMOL/L (ref 98–107)
CO2 SERPL-SCNC: 30.6 MMOL/L (ref 22–29)
COHGB MFR BLD: 1 % (ref 0–2)
CREAT SERPL-MCNC: 1.41 MG/DL (ref 0.76–1.27)
D-LACTATE SERPL-SCNC: 2.4 MMOL/L (ref 0.5–2)
D-LACTATE SERPL-SCNC: 5.9 MMOL/L (ref 0.5–2)
DEPRECATED RDW RBC AUTO: 44.9 FL (ref 37–54)
EGFRCR SERPLBLD CKD-EPI 2021: 49.1 ML/MIN/1.73
EOSINOPHIL # BLD AUTO: 0.08 10*3/MM3 (ref 0–0.4)
EOSINOPHIL NFR BLD AUTO: 1 % (ref 0.3–6.2)
ERYTHROCYTE [DISTWIDTH] IN BLOOD BY AUTOMATED COUNT: 13.3 % (ref 12.3–15.4)
FLUAV SUBTYP SPEC NAA+PROBE: NOT DETECTED
FLUBV RNA ISLT QL NAA+PROBE: NOT DETECTED
GAS FLOW AIRWAY: 2 LPM
GLOBULIN UR ELPH-MCNC: 3.8 GM/DL
GLUCOSE SERPL-MCNC: 214 MG/DL (ref 65–99)
HBA1C MFR BLD: 8.2 % (ref 4.8–5.6)
HCO3 BLDA-SCNC: 26.7 MMOL/L (ref 22–28)
HCT VFR BLD AUTO: 44.1 % (ref 37.5–51)
HCT VFR BLD CALC: 46.1 %
HGB BLD-MCNC: 14.6 G/DL (ref 13–17.7)
HOLD SPECIMEN: NORMAL
HOLD SPECIMEN: NORMAL
IMM GRANULOCYTES # BLD AUTO: 0.03 10*3/MM3 (ref 0–0.05)
IMM GRANULOCYTES NFR BLD AUTO: 0.4 % (ref 0–0.5)
INHALED O2 CONCENTRATION: 28 %
LYMPHOCYTES # BLD AUTO: 1.61 10*3/MM3 (ref 0.7–3.1)
LYMPHOCYTES NFR BLD AUTO: 21 % (ref 19.6–45.3)
Lab: ABNORMAL
MCH RBC QN AUTO: 30.2 PG (ref 26.6–33)
MCHC RBC AUTO-ENTMCNC: 33.1 G/DL (ref 31.5–35.7)
MCV RBC AUTO: 91.3 FL (ref 79–97)
METHGB BLD QL: 0.5 % (ref 0–1.5)
MODALITY: ABNORMAL
MONOCYTES # BLD AUTO: 0.4 10*3/MM3 (ref 0.1–0.9)
MONOCYTES NFR BLD AUTO: 5.2 % (ref 5–12)
NEUTROPHILS NFR BLD AUTO: 5.52 10*3/MM3 (ref 1.7–7)
NEUTROPHILS NFR BLD AUTO: 72.1 % (ref 42.7–76)
NRBC BLD AUTO-RTO: 0 /100 WBC (ref 0–0.2)
NT-PROBNP SERPL-MCNC: 594.3 PG/ML (ref 0–1800)
OXYHGB MFR BLDV: 91.6 % (ref 94–99)
PCO2 BLDA: 40 MM HG (ref 35–45)
PCO2 TEMP ADJ BLD: ABNORMAL MM[HG]
PH BLDA: 7.43 PH UNITS (ref 7.3–7.5)
PH, TEMP CORRECTED: ABNORMAL
PLATELET # BLD AUTO: 247 10*3/MM3 (ref 140–450)
PMV BLD AUTO: 9.7 FL (ref 6–12)
PO2 BLDA: 64.4 MM HG (ref 75–100)
PO2 TEMP ADJ BLD: ABNORMAL MM[HG]
POTASSIUM SERPL-SCNC: 4.7 MMOL/L (ref 3.5–5.2)
PROCALCITONIN SERPL-MCNC: 0.06 NG/ML (ref 0–0.25)
PROT SERPL-MCNC: 7.4 G/DL (ref 6–8.5)
RBC # BLD AUTO: 4.83 10*6/MM3 (ref 4.14–5.8)
SAO2 % BLDCOA: 92.9 % (ref 94–100)
SARS-COV-2 RNA RESP QL NAA+PROBE: NOT DETECTED
SODIUM SERPL-SCNC: 144 MMOL/L (ref 136–145)
TROPONIN T SERPL HS-MCNC: 28 NG/L
VENTILATOR MODE: ABNORMAL
WBC NRBC COR # BLD AUTO: 7.66 10*3/MM3 (ref 3.4–10.8)
WHOLE BLOOD HOLD COAG: NORMAL
WHOLE BLOOD HOLD SPECIMEN: NORMAL

## 2024-08-28 PROCEDURE — 71275 CT ANGIOGRAPHY CHEST: CPT

## 2024-08-28 PROCEDURE — 36600 WITHDRAWAL OF ARTERIAL BLOOD: CPT

## 2024-08-28 PROCEDURE — 87636 SARSCOV2 & INF A&B AMP PRB: CPT | Performed by: STUDENT IN AN ORGANIZED HEALTH CARE EDUCATION/TRAINING PROGRAM

## 2024-08-28 PROCEDURE — 87081 CULTURE SCREEN ONLY: CPT | Performed by: INTERNAL MEDICINE

## 2024-08-28 PROCEDURE — 82805 BLOOD GASES W/O2 SATURATION: CPT

## 2024-08-28 PROCEDURE — 74174 CTA ABD&PLVS W/CONTRAST: CPT

## 2024-08-28 PROCEDURE — 84145 PROCALCITONIN (PCT): CPT | Performed by: STUDENT IN AN ORGANIZED HEALTH CARE EDUCATION/TRAINING PROGRAM

## 2024-08-28 PROCEDURE — 83880 ASSAY OF NATRIURETIC PEPTIDE: CPT | Performed by: STUDENT IN AN ORGANIZED HEALTH CARE EDUCATION/TRAINING PROGRAM

## 2024-08-28 PROCEDURE — 82948 REAGENT STRIP/BLOOD GLUCOSE: CPT

## 2024-08-28 PROCEDURE — 25010000002 AMPICILLIN-SULBACTAM PER 1.5 G: Performed by: STUDENT IN AN ORGANIZED HEALTH CARE EDUCATION/TRAINING PROGRAM

## 2024-08-28 PROCEDURE — 99285 EMERGENCY DEPT VISIT HI MDM: CPT

## 2024-08-28 PROCEDURE — 83605 ASSAY OF LACTIC ACID: CPT | Performed by: STUDENT IN AN ORGANIZED HEALTH CARE EDUCATION/TRAINING PROGRAM

## 2024-08-28 PROCEDURE — 36415 COLL VENOUS BLD VENIPUNCTURE: CPT

## 2024-08-28 PROCEDURE — 87641 MR-STAPH DNA AMP PROBE: CPT | Performed by: INTERNAL MEDICINE

## 2024-08-28 PROCEDURE — 25810000003 SODIUM CHLORIDE 0.9 % SOLUTION: Performed by: STUDENT IN AN ORGANIZED HEALTH CARE EDUCATION/TRAINING PROGRAM

## 2024-08-28 PROCEDURE — 83050 HGB METHEMOGLOBIN QUAN: CPT

## 2024-08-28 PROCEDURE — 84484 ASSAY OF TROPONIN QUANT: CPT | Performed by: STUDENT IN AN ORGANIZED HEALTH CARE EDUCATION/TRAINING PROGRAM

## 2024-08-28 PROCEDURE — 25510000001 IOPAMIDOL 61 % SOLUTION: Performed by: STUDENT IN AN ORGANIZED HEALTH CARE EDUCATION/TRAINING PROGRAM

## 2024-08-28 PROCEDURE — 25810000003 SODIUM CHLORIDE 0.9 % SOLUTION 250 ML FLEX CONT: Performed by: STUDENT IN AN ORGANIZED HEALTH CARE EDUCATION/TRAINING PROGRAM

## 2024-08-28 PROCEDURE — 87040 BLOOD CULTURE FOR BACTERIA: CPT | Performed by: STUDENT IN AN ORGANIZED HEALTH CARE EDUCATION/TRAINING PROGRAM

## 2024-08-28 PROCEDURE — 82375 ASSAY CARBOXYHB QUANT: CPT

## 2024-08-28 PROCEDURE — 99223 1ST HOSP IP/OBS HIGH 75: CPT | Performed by: INTERNAL MEDICINE

## 2024-08-28 PROCEDURE — 80053 COMPREHEN METABOLIC PANEL: CPT | Performed by: STUDENT IN AN ORGANIZED HEALTH CARE EDUCATION/TRAINING PROGRAM

## 2024-08-28 PROCEDURE — 93005 ELECTROCARDIOGRAM TRACING: CPT | Performed by: STUDENT IN AN ORGANIZED HEALTH CARE EDUCATION/TRAINING PROGRAM

## 2024-08-28 PROCEDURE — 25010000002 AZITHROMYCIN PER 500 MG: Performed by: STUDENT IN AN ORGANIZED HEALTH CARE EDUCATION/TRAINING PROGRAM

## 2024-08-28 PROCEDURE — 83036 HEMOGLOBIN GLYCOSYLATED A1C: CPT | Performed by: INTERNAL MEDICINE

## 2024-08-28 PROCEDURE — 71045 X-RAY EXAM CHEST 1 VIEW: CPT

## 2024-08-28 PROCEDURE — 85025 COMPLETE CBC W/AUTO DIFF WBC: CPT | Performed by: STUDENT IN AN ORGANIZED HEALTH CARE EDUCATION/TRAINING PROGRAM

## 2024-08-28 RX ORDER — ASPIRIN 81 MG/1
81 TABLET ORAL DAILY
Status: DISCONTINUED | OUTPATIENT
Start: 2024-08-29 | End: 2024-08-30 | Stop reason: HOSPADM

## 2024-08-28 RX ORDER — BISACODYL 5 MG/1
5 TABLET, DELAYED RELEASE ORAL DAILY PRN
Status: DISCONTINUED | OUTPATIENT
Start: 2024-08-28 | End: 2024-08-30 | Stop reason: HOSPADM

## 2024-08-28 RX ORDER — POLYETHYLENE GLYCOL 3350 17 G/17G
17 POWDER, FOR SOLUTION ORAL DAILY PRN
Status: DISCONTINUED | OUTPATIENT
Start: 2024-08-28 | End: 2024-08-30 | Stop reason: HOSPADM

## 2024-08-28 RX ORDER — DEXTROSE MONOHYDRATE 25 G/50ML
25 INJECTION, SOLUTION INTRAVENOUS
Status: DISCONTINUED | OUTPATIENT
Start: 2024-08-28 | End: 2024-08-30 | Stop reason: HOSPADM

## 2024-08-28 RX ORDER — DIVALPROEX SODIUM 125 MG/1
125 CAPSULE, COATED PELLETS ORAL 2 TIMES DAILY
Status: DISCONTINUED | OUTPATIENT
Start: 2024-08-29 | End: 2024-08-30 | Stop reason: HOSPADM

## 2024-08-28 RX ORDER — SODIUM CHLORIDE 0.9 % (FLUSH) 0.9 %
10 SYRINGE (ML) INJECTION AS NEEDED
Status: DISCONTINUED | OUTPATIENT
Start: 2024-08-28 | End: 2024-08-30 | Stop reason: HOSPADM

## 2024-08-28 RX ORDER — ACETAMINOPHEN 325 MG/1
650 TABLET ORAL EVERY 4 HOURS PRN
Status: DISCONTINUED | OUTPATIENT
Start: 2024-08-28 | End: 2024-08-30 | Stop reason: HOSPADM

## 2024-08-28 RX ORDER — BISACODYL 10 MG
10 SUPPOSITORY, RECTAL RECTAL DAILY PRN
Status: DISCONTINUED | OUTPATIENT
Start: 2024-08-28 | End: 2024-08-30 | Stop reason: HOSPADM

## 2024-08-28 RX ORDER — ONDANSETRON 2 MG/ML
4 INJECTION INTRAMUSCULAR; INTRAVENOUS EVERY 6 HOURS PRN
Status: DISCONTINUED | OUTPATIENT
Start: 2024-08-28 | End: 2024-08-30 | Stop reason: HOSPADM

## 2024-08-28 RX ORDER — CARBIDOPA AND LEVODOPA 25; 100 MG/1; MG/1
1 TABLET ORAL 3 TIMES DAILY
Status: DISCONTINUED | OUTPATIENT
Start: 2024-08-29 | End: 2024-08-30 | Stop reason: HOSPADM

## 2024-08-28 RX ORDER — ACETAMINOPHEN 160 MG/5ML
650 SOLUTION ORAL EVERY 4 HOURS PRN
Status: DISCONTINUED | OUTPATIENT
Start: 2024-08-28 | End: 2024-08-30 | Stop reason: HOSPADM

## 2024-08-28 RX ORDER — SODIUM CHLORIDE 9 MG/ML
100 INJECTION, SOLUTION INTRAVENOUS CONTINUOUS
Status: DISCONTINUED | OUTPATIENT
Start: 2024-08-29 | End: 2024-08-29

## 2024-08-28 RX ORDER — NICOTINE POLACRILEX 4 MG
15 LOZENGE BUCCAL
Status: DISCONTINUED | OUTPATIENT
Start: 2024-08-28 | End: 2024-08-30 | Stop reason: HOSPADM

## 2024-08-28 RX ORDER — SODIUM CHLORIDE 9 MG/ML
40 INJECTION, SOLUTION INTRAVENOUS AS NEEDED
Status: DISCONTINUED | OUTPATIENT
Start: 2024-08-28 | End: 2024-08-30 | Stop reason: HOSPADM

## 2024-08-28 RX ORDER — SODIUM CHLORIDE 0.9 % (FLUSH) 0.9 %
10 SYRINGE (ML) INJECTION EVERY 12 HOURS SCHEDULED
Status: DISCONTINUED | OUTPATIENT
Start: 2024-08-29 | End: 2024-08-30 | Stop reason: HOSPADM

## 2024-08-28 RX ORDER — IOPAMIDOL 612 MG/ML
100 INJECTION, SOLUTION INTRAVASCULAR
Status: COMPLETED | OUTPATIENT
Start: 2024-08-28 | End: 2024-08-28

## 2024-08-28 RX ORDER — AMOXICILLIN 250 MG
2 CAPSULE ORAL 2 TIMES DAILY PRN
Status: DISCONTINUED | OUTPATIENT
Start: 2024-08-28 | End: 2024-08-30 | Stop reason: HOSPADM

## 2024-08-28 RX ORDER — METOPROLOL TARTRATE 50 MG
50 TABLET ORAL 2 TIMES DAILY
Status: DISCONTINUED | OUTPATIENT
Start: 2024-08-29 | End: 2024-08-30 | Stop reason: HOSPADM

## 2024-08-28 RX ORDER — ATORVASTATIN CALCIUM 80 MG/1
80 TABLET, FILM COATED ORAL DAILY
Status: DISCONTINUED | OUTPATIENT
Start: 2024-08-29 | End: 2024-08-30 | Stop reason: HOSPADM

## 2024-08-28 RX ORDER — ENOXAPARIN SODIUM 100 MG/ML
40 INJECTION SUBCUTANEOUS NIGHTLY
Status: DISCONTINUED | OUTPATIENT
Start: 2024-08-29 | End: 2024-08-30 | Stop reason: HOSPADM

## 2024-08-28 RX ORDER — ACETAMINOPHEN 650 MG/1
650 SUPPOSITORY RECTAL ONCE
Status: COMPLETED | OUTPATIENT
Start: 2024-08-28 | End: 2024-08-28

## 2024-08-28 RX ORDER — ACETAMINOPHEN 650 MG/1
650 SUPPOSITORY RECTAL EVERY 4 HOURS PRN
Status: DISCONTINUED | OUTPATIENT
Start: 2024-08-28 | End: 2024-08-30 | Stop reason: HOSPADM

## 2024-08-28 RX ADMIN — ACETAMINOPHEN 650 MG: 650 SUPPOSITORY RECTAL at 19:01

## 2024-08-28 RX ADMIN — SODIUM CHLORIDE 500 ML: 9 INJECTION, SOLUTION INTRAVENOUS at 18:04

## 2024-08-28 RX ADMIN — IOPAMIDOL 100 ML: 612 INJECTION, SOLUTION INTRAVENOUS at 19:56

## 2024-08-28 RX ADMIN — AZITHROMYCIN DIHYDRATE 500 MG: 500 INJECTION, POWDER, LYOPHILIZED, FOR SOLUTION INTRAVENOUS at 20:01

## 2024-08-28 RX ADMIN — AMPICILLIN SODIUM AND SULBACTAM SODIUM 3 G: 2; 1 INJECTION, POWDER, FOR SOLUTION INTRAMUSCULAR; INTRAVENOUS at 18:53

## 2024-08-28 NOTE — ED PROVIDER NOTES
[facility] INTENSIVE CARE  Emergency Department Encounter  Emergency Medicine Physician Note       Pt Name: Gama Davila  MRN: [id number]  Pt :   1940  Room Number:  I08/1  Date of encounter:  2024  PCP: Alex Burk DO  ED Physician: Steven Waller DO    HPI:  Gama Davila is a 84 y.o. male who presents to the ED with chief complaint of respiratory distress.  Patient presents via EMS from skilled nursing facility.  Reportedly 20 minutes prior to arrival patient developed symptoms.  Noted to have increased work of breathing.  EMS was called.  Unable to get a good pulse oximetry.  They applied 15 L nonrebreather.  Patient noted to be wheezing.  Administered DuoNeb x 2 without any improvement in symptoms.    Patient has past medical history of dementia, chronic respiratory failure on baseline 2 L of cannula, CAD, CVA with previous contractures.  Patient also reportedly has DNR order in place.    HPI limited secondary to dementia.    PAST MEDICAL HISTORY  Past Medical History:   Diagnosis Date    Arthritis     Cancer     SKIN     Cataract     Coronary artery disease     Diabetes mellitus     High cholesterol     [place] (hard of hearing)     PATIENT HAS HEARING AIDS    Hypertension     Irregular heart beat     HISTORY OF CARDIAC ABLATION IN     Wears dentures     FULL UPPER PLATE     Current Outpatient Medications   Medication Instructions    acetaminophen (TYLENOL) 650 mg, Oral, Every 6 Hours PRN    aspirin 81 mg, Oral, Daily    atorvastatin (LIPITOR) 80 mg, Daily    BASAGLAR KWIKPEN 15 Units, Subcutaneous, Nightly    carbidopa-levodopa (SINEMET)  MG per tablet 1 tablet, Oral, 3 Times Daily    Chloroxylenol-Zinc Oxide (LAURA EX) Daily PRN    citalopram (CELEXA) 20 mg, Oral, Daily    Divalproex Sodium (DEPAKOTE SPRINKLE) 500 mg, Oral, 2 Times Daily    docusate sodium (COLACE) 200 mg, Oral, Daily PRN    donepezil (ARICEPT) 10 mg, Oral, Nightly    famotidine (PEPCID) 20 mg,  Oral, Daily    furosemide (LASIX) 20 mg, Oral, Daily    gabapentin (NEURONTIN) 100 mg, Oral, 3 Times Daily    insulin aspart (novoLOG FLEXPEN) 100 UNIT/ML solution pen-injector sc pen 3 Times Daily With Meals    ipratropium-albuterol (DUO-NEB) 0.5-2.5 mg/3 ml nebulizer 3 mL, Nebulization, Every 6 Hours PRN    melatonin 6 mg, Oral, Nightly    memantine (NAMENDA) 10 mg, Oral, 2 Times Daily    metFORMIN (GLUCOPHAGE) 500 mg, Daily With Breakfast    metoprolol tartrate (LOPRESSOR) 50 mg, Oral, Daily    ondansetron (ZOFRAN) 4 mg, Oral, Every 6 Hours PRN    pantoprazole (PROTONIX) 40 mg, Daily    polyethylene glycol (MIRALAX) 17 g, Oral, Daily PRN    QUEtiapine (SEROQUEL) 12.5 mg, Daily PRN    QUEtiapine (SEROQUEL) 100 mg, Nightly    tamsulosin (FLOMAX) 0.4 MG capsule 24 hr capsule 1 capsule, Oral, Daily    valproic acid (DEPAKENE) 500 mg, 2 Times Daily      PAST SURGICAL HISTORY  Past Surgical History:   Procedure Laterality Date    BACK SURGERY  1978    THEN AGAIN IN 1982    CARDIAC ABLATION  2003    CARDIAC SURGERY      CATARACT EXTRACTION W/ INTRAOCULAR LENS IMPLANT Left 5/24/2017    Procedure: CATARACT PHACO EXTRACTION WITH INTRAOCULAR LENS IMPLANT LEFT WITH TORIC LENS;  Surgeon: Alma Benavidez MD;  Location: [facility] OR;  Service:     CATARACT EXTRACTION W/ INTRAOCULAR LENS IMPLANT Right 6/14/2017    Procedure: CATARACT PHACO EXTRACTION WITH INTRAOCULAR LENS IMPLANT RIGHT WITH TORIC LENS;  Surgeon: Alma Benavidez MD;  Location: [facility] OR;  Service:     CORONARY ARTERY BYPASS GRAFT  2003    SPOUSE UNSURE OF HOW MANY VESSELS    HIATAL HERNIA REPAIR  1972    JOINT REPLACEMENT Left     HIP - DONE BY DR DALAL    KNEE ARTHROSCOPY Left     NOSE SURGERY  1970    SPOUSE REPORTS FOR A BROKEN NOSE    OTHER SURGICAL HISTORY Left     TENDON TORN LOOSE FROM BONE, LEFT ELBOW    OTHER SURGICAL HISTORY  1986    RUPTURED DISC    OTHER SURGICAL HISTORY  1994    NECK SURGERY FOR RUPTURED DISC    OTHER SURGICAL HISTORY       HAD SECOND NECK SURGERY    OTHER SURGICAL HISTORY      RUPTURED DISC    OTHER SURGICAL HISTORY      RUPTURED DISC    OTHER SURGICAL HISTORY      HARDWARE REMOVAL FROM BACK BY DR HAY       FAMILY HISTORY  History reviewed. No pertinent family history.    SOCIAL HISTORY  Social History     Socioeconomic History    Marital status:    Tobacco Use    Smoking status: Former     Current packs/day: 0.00     Types: Cigarettes     Quit date:      Years since quittin.6    Smokeless tobacco: Never   Vaping Use    Vaping status: Never Used   Substance and Sexual Activity    Alcohol use: No    Drug use: No    Sexual activity: Defer     ALLERGIES  Phenergan [promethazine hcl], Carbamazepine, Hydrocodone, and Lisinopril    REVIEW OF SYSTEMS  All systems reviewed and negative except for those discussed in HPI.     PHYSICAL EXAM  ED Triage Vitals   Temp Pulse Resp BP SpO2   -- -- -- -- --      Temp src Heart Rate Source Patient Position BP Location FiO2 (%)   -- -- -- -- --     I have reviewed the triage vital signs and nursing notes.    General: Awake.  Ill-appearing.  Mild distress secondary to symptoms.  Head: Normocephalic.  Atraumatic.  Eyes: Pupils 2 mm.  No scleral icterus.  ENT: Tolerating oral secretions appropriately.  Airway is patent.  Cardiovascular: Regular rate and rhythm.  No murmurs.  No rubs.  2+ distal pulses bilaterally.  Respiratory: Rhonchorous breath sounds throughout.  No rales. No wheezing.  GI: Abdomen is soft.  Nondistended.  Nontender to palpation.  No rebound.  No guarding.  No CVA tenderness.  MSK: Contractures present.  Neurologic: Disoriented.   Skin: Sternotomy scar present.  Midline surgical scar present on the abdomen.  No erythema.  No edema. No pallor. No cyanosis.    LAB RESULTS  Recent Results (from the past 24 hour(s))   STAT Lactic Acid, Reflex    Collection Time: 24  2:53 AM    Specimen: Blood   Result Value Ref Range    Lactate 2.9 (C) 0.5 - 2.0  mmol/L   Basic Metabolic Panel    Collection Time: 08/29/24  5:08 AM    Specimen: Blood   Result Value Ref Range    Glucose 160 (H) 65 - 99 mg/dL    BUN 29 (H) 8 - 23 mg/dL    Creatinine 1.53 (H) 0.76 - 1.27 mg/dL    Sodium 139 136 - 145 mmol/L    Potassium 4.4 3.5 - 5.2 mmol/L    Chloride 103 98 - 107 mmol/L    CO2 27.3 22.0 - 29.0 mmol/L    Calcium 8.4 (L) 8.6 - 10.5 mg/dL    BUN/Creatinine Ratio 19.0 7.0 - 25.0    Anion Gap 8.7 5.0 - 15.0 mmol/L    eGFR 44.6 (L) >60.0 mL/min/1.73   CBC (No Diff)    Collection Time: 08/29/24  5:08 AM    Specimen: Blood   Result Value Ref Range    WBC 18.49 (H) 3.40 - 10.80 10*3/mm3    RBC 4.08 (L) 4.14 - 5.80 10*6/mm3    Hemoglobin 12.1 (L) 13.0 - 17.7 g/dL    Hematocrit 37.0 (L) 37.5 - 51.0 %    MCV 90.7 79.0 - 97.0 fL    MCH 29.7 26.6 - 33.0 pg    MCHC 32.7 31.5 - 35.7 g/dL    RDW 13.7 12.3 - 15.4 %    RDW-SD 45.8 37.0 - 54.0 fl    MPV 10.2 6.0 - 12.0 fL    Platelets 203 140 - 450 10*3/mm3   POC Glucose Q6H    Collection Time: 08/29/24  6:36 AM    Specimen: Blood   Result Value Ref Range    Glucose 158 (H) 70 - 130 mg/dL   STAT Lactic Acid, Reflex    Collection Time: 08/29/24  9:54 AM    Specimen: Blood   Result Value Ref Range    Lactate 1.8 0.5 - 2.0 mmol/L   POC Glucose Once    Collection Time: 08/29/24 11:54 AM    Specimen: Blood   Result Value Ref Range    Glucose 118 70 - 130 mg/dL   POC Glucose Once    Collection Time: 08/29/24  5:29 PM    Specimen: Blood   Result Value Ref Range    Glucose 95 70 - 130 mg/dL   POC Glucose Once    Collection Time: 08/29/24  6:17 PM    Specimen: Blood   Result Value Ref Range    Glucose 134 (H) 70 - 130 mg/dL   POC Glucose Once    Collection Time: 08/29/24  8:02 PM    Specimen: Blood   Result Value Ref Range    Glucose 132 (H) 70 - 130 mg/dL       RADIOLOGY  No Radiology Exams Resulted Within Past 24 Hours    PROCEDURES  Critical Care    Performed by: Steven Waller DO  Authorized by: Steven Waller DO    Comments:      CRITICAL CARE  PROCEDURE NOTE  Authorized and performed by: Dr. Waller  Total critical care time: Approximately 60 minutes.  Patient was critically ill due to: Pneumonia, sepsis  Interventions: IV antibiotics, IV fluids, close airway/mental status/hemodynamic monitoring    Due to a high probability of clinically significant, life threatening deterioration, the patient required my highest level of preparedness to intervene emergently and I personally spent this critical care time directly and personally managing the patient.  This critical care time included obtaining a history; examining the patient; pulse oximetry; ordering and review of studies; arranging urgent treatment with development of a management plan; evaluation of patient's response to treatment; frequent reassessment; and, discussions with other providers.    This critical care time was performed to assess and manage the high probability of imminent, life-threatening deterioration that could result in multiorgan failure.  It was exclusive of separately billable procedures and treating other patients and teaching time.    Please see MDM section and the rest of the note for further information on patient assessment and interventions.      RISK STRATIFICATION    MEDICAL DECISION MAKING  84 y.o. male with past medical history listed above who presents with respiratory distress.    Patient arrives via EMS.  I have reviewed the EMS documentation/notes and included that information in my HPI.    Due to patient's emergent condition, they were taken to the medical resuscitation bay #12.  Patient was placed on cardiac telemetry and pulse oximetry. Vital signs remarkable for tachypnea, fever, pulse ox mid 90s on baseline oxygen.  Peripheral IV access was established by nursing staff.    Based on clinical presentation and physical exam, differential diagnosis includes, but is not limited to, aspiration pneumonia, commune acquired pneumonia, pneumothorax, pulmonary embolism,  acute coronary syndrome, intra-abdominal process.    External notes reviewed.   Patient has appropriate signed DO NOT RESUSCITATE form.  This was completed on 5/12/2020 by patient's daughter and wife.    At least 3 different tests have been ordered on this patient.    Please see ED course below for my interpretation of the ED workup.  ED Course as of 08/30/24 0042   Wed Aug 28, 2024   1659 I reviewed the labs listed above. Notable findings are highlighted below.    Old laboratory data was reviewed from the medical records and compared to today's results.   [JS]   1659 Blood Gas, Arterial With Co-Ox(!) [JS]   1659 pH, Arterial: 7.432 [JS]   1659 pCO2, Arterial: 40.0 [JS]   1659 O2 Saturation, Arterial(!): 92.9 [JS]   1707 Patient has 2/4 SIRS criteria (fever, tachypnea), therefore ED sepsis bundle was initiated.     I ordered IV fluid resuscitation and empiric IV antibiotics.   [JS]   2207 I reviewed the labs listed above. Notable findings are highlighted below.    Old laboratory data was reviewed from the medical records and compared to today's results.   [JS]   2207 CBC & Differential [JS]   2207 Comprehensive Metabolic Panel(!) [JS]   2207 Creatinine(!): 1.41 [JS]   2207 Glucose(!): 214 [JS]   2207 Anion Gap: 13.4 [JS]   2207 COVID19: Not Detected [JS]   2207 Influenza A PCR: Not Detected [JS]   2207 Influenza B PCR: Not Detected [JS]   2207 proBNP: 594.3 [JS]   2207 Procalcitonin: 0.06 [JS]   2207 HS Troponin T(!): 28 [JS]   2207 Lactate(!!): 5.9 [JS]   2207 Lactate(!!): 2.4 [JS]   2207 CT Angiogram Chest Pulmonary Embolism  I have independently reviewed and interpreted the CTA chest.  My interpretation is negative for saddle PE.     Radiologist notes possible single nonocclusive emboli right upper lobe pulmonary artery versus artifact.  Bilateral lower lobe airspace opacities concerning for bibasilar pneumonia. [JS]   2208 Sepsis reevaluation performed. [JS]   2209 CT Angiogram Abdomen Pelvis  I have  independently reviewed and interpreted the CTA abdomen pelvis.  My interpretation is negative for AAA.   [JS]      ED Course User Index  [JS] Steven Waller DO     Medications administered in ED:  Medications   sodium chloride 0.9 % flush 10 mL (has no administration in time range)   sodium chloride 0.9 % flush 10 mL (10 mL Intravenous Given 8/29/24 2024)   sodium chloride 0.9 % flush 10 mL (has no administration in time range)   sodium chloride 0.9 % infusion 40 mL (has no administration in time range)   acetaminophen (TYLENOL) tablet 650 mg (has no administration in time range)     Or   acetaminophen (TYLENOL) 160 MG/5ML oral solution 650 mg (has no administration in time range)     Or   acetaminophen (TYLENOL) suppository 650 mg (has no administration in time range)   ondansetron (ZOFRAN) injection 4 mg (has no administration in time range)   sennosides-docusate (PERICOLACE) 8.6-50 MG per tablet 2 tablet ( Oral Return to Cabinet 8/29/24 2028)     And   polyethylene glycol (MIRALAX) packet 17 g (has no administration in time range)     And   bisacodyl (DULCOLAX) EC tablet 5 mg (has no administration in time range)     And   bisacodyl (DULCOLAX) suppository 10 mg (has no administration in time range)   Pharmacy to Dose enoxaparin (LOVENOX) (has no administration in time range)   Pharmacy Consult - Pharmacy to dose Ampicillin-Sulbactam (has no administration in time range)   azithromycin (ZITHROMAX) 500 mg in sodium chloride 0.9 % 250 mL IVPB-VTB (500 mg Intravenous New Bag 8/29/24 1403)   dextrose (GLUTOSE) oral gel 15 g (has no administration in time range)   dextrose (D50W) (25 g/50 mL) IV injection 25 g (has no administration in time range)   glucagon (GLUCAGEN) injection 1 mg (has no administration in time range)   Enoxaparin Sodium (LOVENOX) syringe 40 mg (40 mg Subcutaneous Given 8/29/24 2028)   aspirin EC tablet 81 mg (81 mg Oral Given 8/29/24 0851)   atorvastatin (LIPITOR) tablet 80 mg (80 mg Oral Given  8/29/24 0852)   carbidopa-levodopa (SINEMET)  MG per tablet 1 tablet (1 tablet Oral Given 8/29/24 2023)   Divalproex Sodium (DEPAKOTE SPRINKLE) capsule 125 mg (125 mg Oral Given 8/29/24 2023)   metoprolol tartrate (LOPRESSOR) tablet 50 mg (50 mg Oral Given 8/29/24 2023)   ampicillin-sulbactam (UNASYN) 3 g in sodium chloride 0.9 % 100 mL IVPB-VTB (3 g Intravenous New Bag 8/29/24 2024)   insulin regular (humuLIN R,novoLIN R) injection 2-7 Units ( Subcutaneous Not Given 8/29/24 2024)   ampicillin-sulbactam (UNASYN) 3 g in sodium chloride 0.9 % 100 mL IVPB-VTB (0 g Intravenous Stopped 8/28/24 1923)   azithromycin (ZITHROMAX) 500 mg in sodium chloride 0.9 % 250 mL IVPB-VTB (0 mg Intravenous Stopped 8/28/24 2101)   sodium chloride 0.9 % bolus 500 mL (0 mL Intravenous Stopped 8/28/24 1834)   acetaminophen (TYLENOL) suppository 650 mg (650 mg Rectal Given 8/28/24 1901)   iopamidol (ISOVUE-300) 61 % injection 100 mL (100 mL Intravenous Given 8/28/24 1956)     Patient will require medical admission for further workup and management.     Case discussed with Dr. Muhammad (hospitalist) who agrees to evaluate and admit the patient.  We discussed the HPI, pertinent PMHx, ED course and workup.     REPEAT VITAL SIGNS  AS OF 00:42 EDT VITALS:  BP - 139/76  HR - 62  TEMP - 99.2 °F (37.3 °C) (Oral)  O2 SATS - 99%    DIAGNOSIS  Final diagnoses:   Multifocal pneumonia   Sepsis, due to unspecified organism, unspecified whether acute organ dysfunction present     DISPOSITION  ED Disposition       ED Disposition   Decision to Admit    Condition   --    Comment   Level of Care: Telemetry [5]   Diagnosis: Bilateral pneumonia [547129]   Admitting Physician: RACHNA MUHAMMAD [[id number]]   Certification: I Certify That Inpatient Hospital Services Are Medically Necessary For Greater Than 2 Midnights                 Please note that portions of this document were completed with voice recognition software.        Steven Waller DO  08/30/24 0043

## 2024-08-28 NOTE — ED NOTES
Contacted lab at this time for blood cultures. First person attempted but was unsuccessful. Second person has not been to collect.

## 2024-08-29 LAB
ANION GAP SERPL CALCULATED.3IONS-SCNC: 8.7 MMOL/L (ref 5–15)
BUN SERPL-MCNC: 29 MG/DL (ref 8–23)
BUN/CREAT SERPL: 19 (ref 7–25)
CALCIUM SPEC-SCNC: 8.4 MG/DL (ref 8.6–10.5)
CHLORIDE SERPL-SCNC: 103 MMOL/L (ref 98–107)
CO2 SERPL-SCNC: 27.3 MMOL/L (ref 22–29)
CREAT SERPL-MCNC: 1.53 MG/DL (ref 0.76–1.27)
D-LACTATE SERPL-SCNC: 1.8 MMOL/L (ref 0.5–2)
D-LACTATE SERPL-SCNC: 2.9 MMOL/L (ref 0.5–2)
D-LACTATE SERPL-SCNC: 3.2 MMOL/L (ref 0.5–2)
DEPRECATED RDW RBC AUTO: 45.8 FL (ref 37–54)
EGFRCR SERPLBLD CKD-EPI 2021: 44.6 ML/MIN/1.73
ERYTHROCYTE [DISTWIDTH] IN BLOOD BY AUTOMATED COUNT: 13.7 % (ref 12.3–15.4)
GLUCOSE BLDC GLUCOMTR-MCNC: 118 MG/DL (ref 70–130)
GLUCOSE BLDC GLUCOMTR-MCNC: 126 MG/DL (ref 70–130)
GLUCOSE BLDC GLUCOMTR-MCNC: 132 MG/DL (ref 70–130)
GLUCOSE BLDC GLUCOMTR-MCNC: 134 MG/DL (ref 70–130)
GLUCOSE BLDC GLUCOMTR-MCNC: 158 MG/DL (ref 70–130)
GLUCOSE BLDC GLUCOMTR-MCNC: 95 MG/DL (ref 70–130)
GLUCOSE SERPL-MCNC: 160 MG/DL (ref 65–99)
HCT VFR BLD AUTO: 37 % (ref 37.5–51)
HGB BLD-MCNC: 12.1 G/DL (ref 13–17.7)
MCH RBC QN AUTO: 29.7 PG (ref 26.6–33)
MCHC RBC AUTO-ENTMCNC: 32.7 G/DL (ref 31.5–35.7)
MCV RBC AUTO: 90.7 FL (ref 79–97)
MRSA DNA SPEC QL NAA+PROBE: ABNORMAL
PLATELET # BLD AUTO: 203 10*3/MM3 (ref 140–450)
PMV BLD AUTO: 10.2 FL (ref 6–12)
POTASSIUM SERPL-SCNC: 4.4 MMOL/L (ref 3.5–5.2)
RBC # BLD AUTO: 4.08 10*6/MM3 (ref 4.14–5.8)
SODIUM SERPL-SCNC: 139 MMOL/L (ref 136–145)
WBC NRBC COR # BLD AUTO: 18.49 10*3/MM3 (ref 3.4–10.8)

## 2024-08-29 PROCEDURE — 99497 ADVNCD CARE PLAN 30 MIN: CPT | Performed by: PHYSICIAN ASSISTANT

## 2024-08-29 PROCEDURE — 25810000003 SODIUM CHLORIDE 0.9 % SOLUTION 250 ML FLEX CONT: Performed by: INTERNAL MEDICINE

## 2024-08-29 PROCEDURE — 25010000002 AMPICILLIN-SULBACTAM PER 1.5 G: Performed by: INTERNAL MEDICINE

## 2024-08-29 PROCEDURE — 25810000003 SODIUM CHLORIDE 0.9 % SOLUTION: Performed by: INTERNAL MEDICINE

## 2024-08-29 PROCEDURE — 82948 REAGENT STRIP/BLOOD GLUCOSE: CPT | Performed by: INTERNAL MEDICINE

## 2024-08-29 PROCEDURE — 99222 1ST HOSP IP/OBS MODERATE 55: CPT | Performed by: PHYSICIAN ASSISTANT

## 2024-08-29 PROCEDURE — 85027 COMPLETE CBC AUTOMATED: CPT | Performed by: INTERNAL MEDICINE

## 2024-08-29 PROCEDURE — 92610 EVALUATE SWALLOWING FUNCTION: CPT

## 2024-08-29 PROCEDURE — 25010000002 ENOXAPARIN PER 10 MG: Performed by: INTERNAL MEDICINE

## 2024-08-29 PROCEDURE — 83605 ASSAY OF LACTIC ACID: CPT | Performed by: STUDENT IN AN ORGANIZED HEALTH CARE EDUCATION/TRAINING PROGRAM

## 2024-08-29 PROCEDURE — 80048 BASIC METABOLIC PNL TOTAL CA: CPT | Performed by: INTERNAL MEDICINE

## 2024-08-29 PROCEDURE — 99232 SBSQ HOSP IP/OBS MODERATE 35: CPT | Performed by: INTERNAL MEDICINE

## 2024-08-29 PROCEDURE — 82948 REAGENT STRIP/BLOOD GLUCOSE: CPT

## 2024-08-29 PROCEDURE — 25010000002 AZITHROMYCIN PER 500 MG: Performed by: INTERNAL MEDICINE

## 2024-08-29 PROCEDURE — 63710000001 INSULIN REGULAR HUMAN PER 5 UNITS: Performed by: INTERNAL MEDICINE

## 2024-08-29 RX ORDER — FUROSEMIDE 20 MG
20 TABLET ORAL DAILY
COMMUNITY

## 2024-08-29 RX ADMIN — METOPROLOL TARTRATE 50 MG: 50 TABLET, FILM COATED ORAL at 08:52

## 2024-08-29 RX ADMIN — DIVALPROEX SODIUM 125 MG: 125 CAPSULE, COATED PELLETS ORAL at 08:52

## 2024-08-29 RX ADMIN — Medication 10 ML: at 08:52

## 2024-08-29 RX ADMIN — AMPICILLIN SODIUM AND SULBACTAM SODIUM 3 G: 2; 1 INJECTION, POWDER, FOR SOLUTION INTRAMUSCULAR; INTRAVENOUS at 20:24

## 2024-08-29 RX ADMIN — CARBIDOPA AND LEVODOPA 1 TABLET: 25; 100 TABLET ORAL at 20:23

## 2024-08-29 RX ADMIN — INSULIN HUMAN 2 UNITS: 100 INJECTION, SOLUTION PARENTERAL at 07:29

## 2024-08-29 RX ADMIN — Medication 10 ML: at 02:12

## 2024-08-29 RX ADMIN — ENOXAPARIN SODIUM 40 MG: 100 INJECTION SUBCUTANEOUS at 20:28

## 2024-08-29 RX ADMIN — ATORVASTATIN CALCIUM 80 MG: 80 TABLET, FILM COATED ORAL at 08:52

## 2024-08-29 RX ADMIN — ENOXAPARIN SODIUM 40 MG: 100 INJECTION SUBCUTANEOUS at 02:12

## 2024-08-29 RX ADMIN — METOPROLOL TARTRATE 50 MG: 50 TABLET, FILM COATED ORAL at 20:23

## 2024-08-29 RX ADMIN — DIVALPROEX SODIUM 125 MG: 125 CAPSULE, COATED PELLETS ORAL at 20:23

## 2024-08-29 RX ADMIN — ASPIRIN 81 MG: 81 TABLET, COATED ORAL at 08:51

## 2024-08-29 RX ADMIN — AMPICILLIN SODIUM AND SULBACTAM SODIUM 3 G: 2; 1 INJECTION, POWDER, FOR SOLUTION INTRAMUSCULAR; INTRAVENOUS at 07:28

## 2024-08-29 RX ADMIN — SODIUM CHLORIDE 100 ML/HR: 9 INJECTION, SOLUTION INTRAVENOUS at 02:11

## 2024-08-29 RX ADMIN — AMPICILLIN SODIUM AND SULBACTAM SODIUM 3 G: 2; 1 INJECTION, POWDER, FOR SOLUTION INTRAMUSCULAR; INTRAVENOUS at 14:04

## 2024-08-29 RX ADMIN — CARBIDOPA AND LEVODOPA 1 TABLET: 25; 100 TABLET ORAL at 17:52

## 2024-08-29 RX ADMIN — CARBIDOPA AND LEVODOPA 1 TABLET: 25; 100 TABLET ORAL at 08:52

## 2024-08-29 RX ADMIN — AZITHROMYCIN DIHYDRATE 500 MG: 500 INJECTION, POWDER, LYOPHILIZED, FOR SOLUTION INTRAVENOUS at 14:03

## 2024-08-29 RX ADMIN — AMPICILLIN SODIUM AND SULBACTAM SODIUM 3 G: 2; 1 INJECTION, POWDER, FOR SOLUTION INTRAMUSCULAR; INTRAVENOUS at 02:12

## 2024-08-29 RX ADMIN — Medication 10 ML: at 20:24

## 2024-08-29 NOTE — THERAPY EVALUATION
Acute Care - Speech Language Pathology   Swallow Initial Evaluation  Dennis     Patient Name: Gama Davila  : 1940  MRN: [id number]  Today's Date: 2024               Admit Date: 2024    Visit Dx:     ICD-10-CM ICD-9-CM   1. Multifocal pneumonia  J18.9 486   2. Sepsis, due to unspecified organism, unspecified whether acute organ dysfunction present  A41.9 038.9     995.91     Patient Active Problem List   Diagnosis    [place] (hard of hearing)    High cholesterol    Coronary artery disease    Hypertension    Irregular heart beat    Diabetes mellitus    Anemia    Dementia    Acute respiratory failure with hypoxia    Impaired mobility and ADLs    Parkinson's disease (tremor, stiffness, slow motion, unstable posture)    Mood disorder    Bilateral pneumonia    Sepsis due to pneumonia     Past Medical History:   Diagnosis Date    Arthritis     Cancer     SKIN     Cataract     Coronary artery disease     Diabetes mellitus     High cholesterol     [place] (hard of hearing)     PATIENT HAS HEARING AIDS    Hypertension     Irregular heart beat     HISTORY OF CARDIAC ABLATION IN     Wears dentures     FULL UPPER PLATE     Past Surgical History:   Procedure Laterality Date    BACK SURGERY      THEN AGAIN IN     CARDIAC ABLATION      CARDIAC SURGERY      CATARACT EXTRACTION W/ INTRAOCULAR LENS IMPLANT Left 2017    Procedure: CATARACT PHACO EXTRACTION WITH INTRAOCULAR LENS IMPLANT LEFT WITH TORIC LENS;  Surgeon: Alma Benavidez MD;  Location: [facility] OR;  Service:     CATARACT EXTRACTION W/ INTRAOCULAR LENS IMPLANT Right 2017    Procedure: CATARACT PHACO EXTRACTION WITH INTRAOCULAR LENS IMPLANT RIGHT WITH TORIC LENS;  Surgeon: Alma Benavidez MD;  Location: [facility] OR;  Service:     CORONARY ARTERY BYPASS GRAFT      SPOUSE UNSURE OF HOW MANY VESSELS    HIATAL HERNIA REPAIR  1972    JOINT REPLACEMENT Left     HIP - DONE BY DR DALAL    KNEE ARTHROSCOPY Left     NOSE  SURGERY  1970    SPOUSE REPORTS FOR A BROKEN NOSE    OTHER SURGICAL HISTORY Left     TENDON TORN LOOSE FROM BONE, LEFT ELBOW    OTHER SURGICAL HISTORY  1986    RUPTURED DISC    OTHER SURGICAL HISTORY  1994    NECK SURGERY FOR RUPTURED DISC    OTHER SURGICAL HISTORY  1996    HAD SECOND NECK SURGERY    OTHER SURGICAL HISTORY  1998    RUPTURED DISC    OTHER SURGICAL HISTORY  2003    RUPTURED DISC    OTHER SURGICAL HISTORY  2009    HARDWARE REMOVAL FROM BACK BY DR HAY       SLP Recommendation and Plan  SLP Swallowing Diagnosis: oral dysphagia, suspected pharyngeal dysphagia, esophageal dysphagia (08/29/24 1011)  SLP Diet Recommendation: puree, nectar thick liquids (08/29/24 1011)  Recommended Precautions and Strategies: upright posture during/after eating, small bites of food and sips of liquid, general aspiration precautions, reflux precautions (08/29/24 1011)  SLP Rec. for Method of Medication Administration: meds crushed, with puree, as tolerated (08/29/24 1011)     Monitor for Signs of Aspiration: notify SLP if any concerns, yes, cough, gurgly voice, throat clearing (08/29/24 1011)  Recommended Diagnostics: other (see comments) (pending medical course) (08/29/24 1011)  Swallow Criteria for Skilled Therapeutic Interventions Met: baseline status (08/29/24 1011)  Anticipated Discharge Disposition (SLP): skilled nursing facility (08/29/24 1011)     Therapy Frequency (Swallow): PRN (08/29/24 1011)  Predicted Duration Therapy Intervention (Days): until discharge (08/29/24 1011)  Oral Care Recommendations: Oral Care BID/PRN, Toothbrush, Suction toothbrush, Denture Care (08/29/24 1011)     Swallowing Considerations per Physician Discretion: medical management of suspected esophageal dysphagia, as indicated (08/29/24 1011)                                  Plan of Care Reviewed With: patient  Progress: no change  Outcome Evaluation: Bedside eval of swallow completed with pt. seated upright in bed for po trials. Pt. was  cooperative but nonverbal and extended time needed to complete some directions due to cognitive deficits. He was given trials of mechanical soft, puree, nectar-thick, and thin liquids. Oral phase was remarkable for extended prep time with mech soft and oral retention/posterior oral hold intermittently with various consistencies. Cognitive deficits are likely greatest factor affecting oral phase. Suspect pharyngeal phase dysphagia with pt. exhibiting delayed initiation of pharyngeal swallow and weak pharyngeal swallow per palpation. No overt s/s aspiration with any consistency, but pt. did exhibit an increase in palpated pharyngeal rattles as trials progressed as likely silent aspiration sign. His risk of aspiration, and silent aspiration, therefore is high. Also, pt. belched often during the eval post po trials. He has dx of GERD and a HH which increase his risk of reflux and reflux aspiration if not well managed. Recommend: 1. pureed diet with nectar-thick liq as tim, 2. meds crushed in pudding/applesauce as appropriate, 3. small bites/sips, 4. monitor closely with pharyngeal palpation for increase in wet sounds and pharyngeal rattles for safety with po, discontinuing po if increase, 5. aspiration precautions, 6. reflux precautions. D/W RN following eval with verbalized understanding. SLP to follow up for diet tim and potential need of MBS as status improves.      SWALLOW EVALUATION (Last 72 Hours)       SLP Adult Swallow Evaluation       Row Name 08/29/24 1011                   Rehab Evaluation    Document Type evaluation  -TM        Subjective Information no complaints  nonverbal  -TM        Patient Observations alert;cooperative  -TM        Patient/Family/Caregiver Comments/Observations no family present  -TM        Patient Effort good  -TM           General Information    Patient Profile Reviewed yes  -TM        Pertinent History Of Current Problem DM, cardiac, dementia, HH, GERD, pneu  -TM        Current  Method of Nutrition NPO  -TM        Precautions/Limitations, Vision WFL;for purposes of eval  -TM        Precautions/Limitations, Hearing hearing impairment, bilaterally;WFL;for purposes of eval  -TM        Prior Level of Function-Communication cognitive-linguistic impairment  dementia and nonverbal  -TM        Prior Level of Function-Swallowing unknown  -TM        Plans/Goals Discussed with other (see comments)  RN  -TM        Barriers to Rehab medically complex  -TM        Patient's Goals for Discharge patient could not state  -TM           Pain    Additional Documentation Pain Scale: FACES Pre/Post-Treatment (Group)  -TM           Pain Scale: FACES Pre/Post-Treatment    Pain: FACES Scale, Pretreatment 0-->no hurt  -TM        Posttreatment Pain Rating 0-->no hurt  -TM           Oral Motor Structure and Function    Oral Lesions or Structural Abnormalities and/or variants none identified during cursory exam  -TM        Dentition Assessment natural, present and adequate;other (see comments)  upper dentures  -TM        Secretion Management WNL/WFL  -TM        Mucosal Quality dry  -TM           Oral Musculature and Cranial Nerve Assessment    Oral Motor General Assessment WFL  -TM           General Eating/Swallowing Observations    Respiratory Support Currently in Use nasal cannula  -TM        O2 Liters 2L  -TM        Eating/Swallowing Skills fed by SLP;unaware of safety concerns  -TM        Positioning During Eating upright in bed  -TM        Utensils Used spoon;straw  -TM        Consistencies Trialed mechanical ground textures;pureed;nectar/syrup-thick liquids;thin liquids  -TM        Pre SpO2 (%) 99  -TM        Post SpO2 (%) 99  -TM           Respiratory    Respiratory Status WFL;during swallowing/eating  -TM           Clinical Swallow Eval    Oral Prep Phase impaired  -TM        Oral Transit impaired  -TM        Oral Residue WFL  -TM        Pharyngeal Phase suspected pharyngeal impairment  -TM        Esophageal  Phase suspected esophageal impairment  -TM        Clinical Swallow Evaluation Summary Bedside eval of swallow completed with pt. seated upright in bed for po trials.  Pt. was cooperative but nonverbal and extended time needed to complete some directions due to cognitive deficits.  He was given trials of mechanical soft, puree, nectar-thick, and thin liquids.  Oral phase was remarkable for extended prep time with mech soft and oral retention/posterior oral hold intermittently with various consistencies. Cognitive deficits are likely greatest factor affecting oral phase.  Suspect pharyngeal phase dysphagia with pt. exhibiting delayed initiation of pharyngeal swallow and weak pharyngeal swallow per palpation.  No overt s/s aspiration with any consistency, but pt. did exhibit an increase in palpated pharyngeal rattles as trials progressed as likely silent aspiration sign.  His risk of aspiration, and silent aspiration, therefore is high.  Also, pt. belched often during the eval post po trials. He has dx of GERD and a HH which increase his risk of reflux and reflux aspiration if not well managed.  Recommend:  1. pureed diet with nectar-thick liq as tim, 2. meds crushed in pudding/applesauce as appropriate, 3. small bites/sips, 4. monitor closely with pharyngeal palpation for increase in wet sounds and pharyngeal rattles for safety with po, discontinuing po if increase, 5. aspiration precautions, 6. reflux precautions.  D/W RN following eval with verbalized understanding.  SLP to follow up for diet tim and potential need of MBS as status improves.  -TM           Oral Prep Concerns    Oral Prep Concerns increased prep time  -TM           Oral Transit Concerns    Oral Transit Concerns increased oral transit time;other (see comments)  oral retention/bolus hold in posterior oral cavity  -TM           Pharyngeal Phase Concerns    Pharyngeal Phase Concerns other (see comments);wet vocal quality  delay, decreased elevation per  palpation  -TM           Esophageal Phase Concerns    Esophageal Phase Concerns other (see comments)  dx of GERD and HH  -TM           SLP Evaluation Clinical Impression    SLP Swallowing Diagnosis oral dysphagia;suspected pharyngeal dysphagia;esophageal dysphagia  -TM        Functional Impact risk of aspiration/pneumonia  -TM        Swallow Criteria for Skilled Therapeutic Interventions Met baseline status  -TM           Recommendations    Therapy Frequency (Swallow) PRN  -TM        Predicted Duration Therapy Intervention (Days) until discharge  -        SLP Diet Recommendation puree;nectar thick liquids  -TM        Recommended Diagnostics other (see comments)  pending medical course  -TM        Recommended Precautions and Strategies upright posture during/after eating;small bites of food and sips of liquid;general aspiration precautions;reflux precautions  -TM        Oral Care Recommendations Oral Care BID/PRN;Toothbrush;Suction toothbrush;Denture Care  -TM        SLP Rec. for Method of Medication Administration meds crushed;with puree;as tolerated  -TM        Monitor for Signs of Aspiration notify SLP if any concerns;yes;cough;gurgly voice;throat clearing  -        Anticipated Discharge Disposition (SLP) skilled nursing facility  -        Swallowing Considerations per Physician Discretion medical management of suspected esophageal dysphagia, as indicated  -                  User Key  (r) = Recorded By, (t) = Taken By, (c) = Cosigned By      Initials Name Effective Dates     Nanette Pack 06/16/21 -                     EDUCATION  The patient has been educated in the following areas:   Dysphagia (Swallowing Impairment) Oral Care/Hydration Modified Diet Instruction.                Time Calculation:    Time Calculation- SLP       Row Name 08/29/24 1148             Time Calculation- SLP    SLP Start Time 1011  -TM      SLP Received On 08/29/24  -         Untimed Charges    SLP Eval/Re-eval  ST Eval Oral  Pharyng Swallow - [id number]  -TM                User Key  (r) = Recorded By, (t) = Taken By, (c) = Cosigned By      Initials Name Provider Type    Nanette Rodriguez Speech and Language Pathologist                    Therapy Charges for Today       Code Description Service Date Service Provider Modifiers Qty    [id number]  ST EVAL ORAL PHARYNG SWALLOW 4 8/29/2024 Nanette Pack  1                 Nanette Pack  8/29/2024

## 2024-08-29 NOTE — PAYOR COMM NOTE
"TO:MK  FROM:SANDRA LOMELI, RN PHONE [phone]  CLINICALS REF# [id number]    Gama Davila (84 y.o. Male)       Date of Birth   1940    Social Security Number       Address   [address] [place] [address]    Home Phone   [phone]    MRN   [id number]       Mosque   Orthodoxy    Marital Status                               Admission Date   8/28/24    Admission Type   Emergency    Admitting Provider   Frantz Muhammad MD    Attending Provider   Alton Sood DO    Department, Room/Bed   [facility] INTENSIVE CARE, I08/1       Discharge Date       Discharge Disposition       Discharge Destination                                 Attending Provider: Alton Sood DO    Allergies: Phenergan [Promethazine Hcl], Carbamazepine, Hydrocodone, Lisinopril    Isolation: None   Infection: None   Code Status: No CPR    Ht: 170.2 cm (67\")   Wt: 81.4 kg (179 lb 7.3 oz)    Admission Cmt: None   Principal Problem: Bilateral pneumonia [J18.9]                   Active Insurance as of 8/28/2024       Primary Coverage       Payor Plan Insurance Group Employer/Plan Group    MEDICARE MEDICARE A & B        Payor Plan Address Payor Plan Phone Number Payor Plan Fax Number Effective Dates    [address] [phone]  5/1/2001 - None Entered    [facility] [address]         Subscriber Name Subscriber Birth Date Member ID       GAMA DAVILA 1940 [id number]               Secondary Coverage       Payor Plan Insurance Group Employer/Plan Group    [facility] [id number]       Payor Plan Address Payor Plan Phone Number Payor Plan Fax Number Effective Dates    [address] [address]   2/1/2020 - None Entered    [facility] [id number]         Subscriber Name Subscriber Birth Date Member ID       GAMA DAVILA SOFIA 1940 [id number]               Tertiary Coverage       Payor Plan Insurance Group Employer/Plan Group    [place]   "      Payor Plan Address Payor Plan Phone Number Payor Plan Fax Number Effective Dates    [address] [phone]  10/8/2021 - None Entered    [place] [address]         Subscriber Name Subscriber Birth Date Member ID       GAMA WAGONER 1940 [id number]                     Emergency Contacts        (Rel.) Home Phone Work Phone Mobile Phone    [place] (Power of ) [phone] -- [phone]    Taryn Wagoner (Spouse) [phone] -- [phone]                 History & Physical        Frantz Muhammad MD at 24 [phone]            [facility]   HISTORY AND PHYSICAL      Name:  Gama Wagoner   Age:  84 y.o.  Sex:  male  :  1940  MRN:  [id number]   Visit Number:  [id number]  Admission Date:  2024  Date Of Service:  24  Primary Care Physician:  Alex Burk DO    Chief Complaint:     Shortness of breath.    History Of Presenting Illness:      Gama Wagoner is an 84-year-old male, nursing home resident, with history of diabetes mellitus type 2 on insulin, coronary artery disease status post CABG, dementia was sent from the facility by EMS with symptoms of shortness of breath and respiratory distress.  When EMS arrived they were unable to get a good pulse oximetry and applied 15 L of nonrebreather mask.  Patient was also noted to be wheezing and administered DuoNebs x 2 en route.  Unable to obtain any history from the patient as he is nonverbal at baseline.    In the emergency room, he was febrile at 101 with pulse 85 and blood pressure 138/85 and pulse oxygen saturation of 96% on 3 L of nasal cannula oxygen.  ABG showed a pH of 7.43, pCO2 40, pO2 64 and bicarb of 27 on 2 L of nasal cannula oxygen.  Initial troponin 28 but proBNP within normal range.  CMP was remarkable for a BUN of 26, creatinine 1.4 (around baseline), glucose 214.  LFT was unremarkable.  Lactic acid was elevated at 5.9.  Procalcitonin and CBC was unremarkable.  Blood  cultures were drawn.  COVID and flu test were negative.  Chest x-ray showed left lower zone opacity.  CT angiogram of the chest was limited by motion but showed no central pulmonary emboli.  Possible single nonocclusive embolism in the segment of the right upper lobe versus artifact.  Bilateral lower lobe airspace disease concerning for bibasilar pneumonia noted.  CT angiogram of the abdomen and pelvis showed bilateral pneumonia, hiatal hernia, coronary artery disease but no acute process involving the arterial tree of the abdomen and pelvis.  Patient was given Tylenol, Unasyn and azithromycin in the emergency room.  He also received 500 mL of normal saline bolus and was subsequently admitted to the medical floor with telemetry for management of respiratory failure, bilateral pneumonia.    Review Of Systems:    All systems were reviewed and negative except as mentioned in history of presenting illness, assessment and plan.    Past Medical History: Patient  has a past medical history of Arthritis, Cancer, Cataract, Coronary artery disease, Diabetes mellitus, High cholesterol, [place] (hard of hearing), Hypertension, Irregular heart beat, and Wears dentures.    Past Surgical History: Patient  has a past surgical history that includes Cardiac Ablation (2003); Coronary artery bypass graft (2003); Nose surgery (1970); Hiatal hernia repair (1972); Back surgery (1978); Other surgical history (Left); Other surgical history (1986); Knee Arthroscopy (Left); Other surgical history (1994); Other surgical history (1996); Other surgical history (1998); Other surgical history (2003); Cardiac surgery; Other surgical history (2009); Joint replacement (Left); Cataract extraction w/ intraocular lens implant (Left, 5/24/2017); and Cataract extraction w/ intraocular lens implant (Right, 6/14/2017).    Social History: Patient  reports that he quit smoking about 41 years ago. His smoking use included cigarettes. He has never used smokeless  tobacco. He reports that he does not drink alcohol and does not use drugs.    Family History:  Nothing significant to the current illness.    Allergies:      Phenergan [promethazine hcl], Carbamazepine, Hydrocodone, and Lisinopril    Home Medications:    Prior to Admission Medications       Prescriptions Last Dose Informant Patient Reported? Taking?    acetaminophen (TYLENOL) 325 MG tablet   Yes No    Take 2 tablets by mouth Every 6 (Six) Hours As Needed for Mild Pain or Fever.    aspirin 81 MG EC tablet  Spouse/Significant Other Yes No    Take 1 tablet by mouth Daily.    atorvastatin (LIPITOR) 80 MG tablet  Spouse/Significant Other Yes No    Take 1 tablet by mouth Daily.    carbidopa-levodopa (SINEMET)  MG per tablet   No No    Take 1 tablet by mouth 3 (Three) Times a Day.    Chloroxylenol-Zinc Oxide (LAURA EX)   Yes No    Apply 1 application topically Daily As Needed.    citalopram (CeleXA) 20 MG tablet   No No    Take 1 tablet by mouth Daily.    Divalproex Sodium (DEPAKOTE SPRINKLE) 125 MG capsule   Yes No    Take 1 capsule by mouth 2 (Two) Times a Day.    docusate sodium (COLACE) 100 MG capsule   Yes No    Take 2 capsules by mouth Daily As Needed for Constipation.    donepezil (ARICEPT) 10 MG tablet   No No    Take 1 tablet by mouth Every Night.    famotidine (PEPCID) 20 MG tablet   Yes No    Take 1 tablet by mouth Daily.    gabapentin (NEURONTIN) 100 MG capsule   No No    Take 1 capsule by mouth 3 (Three) Times a Day.    insulin aspart (novoLOG FLEXPEN) 100 UNIT/ML solution pen-injector sc pen   Yes No    Inject  under the skin into the appropriate area as directed 3 (Three) Times a Day With Meals. Sliding scale    Insulin Glargine (BASAGLAR KWIKPEN) 100 UNIT/ML injection pen   Yes No    Inject 15 Units under the skin into the appropriate area as directed Every Night.    ipratropium-albuterol (DUO-NEB) 0.5-2.5 mg/3 ml nebulizer   Yes No    Take 3 mL by nebulization Every 6 (Six) Hours As Needed for  Wheezing.    melatonin 3 MG tablet   Yes No    Take 2 tablets by mouth Every Night.    memantine (NAMENDA) 10 MG tablet   Yes No    Take 1 tablet by mouth 2 (Two) Times a Day.    metFORMIN (GLUCOPHAGE) 500 MG tablet  Spouse/Significant Other Yes No    Take 1 tablet by mouth Daily With Breakfast.    metoprolol tartrate (LOPRESSOR) 50 MG tablet   Yes No    Take 1 tablet by mouth 2 (Two) Times a Day.    ondansetron (ZOFRAN) 4 MG tablet   Yes No    Take 1 tablet by mouth Every 6 (Six) Hours As Needed for Nausea or Vomiting.    pantoprazole (PROTONIX) 40 MG EC tablet   Yes No    Take 1 tablet by mouth Daily.    polyethylene glycol (MiraLax) 17 GM/SCOOP powder   Yes No    Take 17 g by mouth Daily As Needed.    QUEtiapine (SEROquel) 100 MG tablet   Yes No    Take 1 tablet by mouth Every Night.    QUEtiapine (SEROquel) 25 MG tablet   Yes No    Take 0.5 tablets by mouth Daily As Needed.    tamsulosin (FLOMAX) 0.4 MG capsule 24 hr capsule   Yes No    Take 1 capsule by mouth Daily.    valproic acid (DEPAKENE) 250 MG capsule   Yes No    Take 2 capsules by mouth 2 (Two) Times a Day.     ED Medications:    Medications   sodium chloride 0.9 % flush 10 mL (has no administration in time range)   ampicillin-sulbactam (UNASYN) 3 g in sodium chloride 0.9 % 100 mL IVPB-VTB (0 g Intravenous Stopped 8/28/24 1923)   azithromycin (ZITHROMAX) 500 mg in sodium chloride 0.9 % 250 mL IVPB-VTB (0 mg Intravenous Stopped 8/28/24 2101)   sodium chloride 0.9 % bolus 500 mL (0 mL Intravenous Stopped 8/28/24 1834)   acetaminophen (TYLENOL) suppository 650 mg (650 mg Rectal Given 8/28/24 1901)   iopamidol (ISOVUE-300) 61 % injection 100 mL (100 mL Intravenous Given 8/28/24 1956)     Vital Signs:  Temp:  [101.1 °F (38.4 °C)-103.3 °F (39.6 °C)] 101.9 °F (38.8 °C)  Heart Rate:  [77-85] 77  Resp:  [24] 24  BP: (112-138)/(63-85) 112/64        08/28/24  0896   Weight: 82.1 kg (181 lb)     Body mass index is 27.52 kg/m².    Physical Exam:     Most recent  "vital Signs: /64   Pulse 77   Temp (!) 101.9 °F (38.8 °C) (Axillary)   Resp 24   Ht 172.7 cm (68\")   Wt 82.1 kg (181 lb)   SpO2 96%   BMI 27.52 kg/m²     Physical Exam  Constitutional:       General: He is not in acute distress.     Appearance: He is ill-appearing.      Comments: Nonverbal and noncooperative.   HENT:      Head: Normocephalic and atraumatic.      Right Ear: External ear normal.      Left Ear: External ear normal.      Nose: Nose normal.      Mouth/Throat:      Mouth: Mucous membranes are moist.   Eyes:      Conjunctiva/sclera: Conjunctivae normal.   Cardiovascular:      Rate and Rhythm: Normal rate and regular rhythm.      Pulses: Normal pulses.      Heart sounds: Normal heart sounds. No murmur heard.  Pulmonary:      Effort: Pulmonary effort is normal.      Breath sounds: No stridor. Wheezing and rales present.      Comments: Bilateral extensive coarse crackles and wheezing heard.  Abdominal:      Palpations: Abdomen is soft.      Tenderness: There is no abdominal tenderness. There is no guarding or rebound.      Comments: Obese abdomen.   Musculoskeletal:      Cervical back: Neck supple.   Skin:     General: Skin is warm.      Findings: No erythema or rash.      Comments: Unstageable right heel pressure ulcer noted.   Neurological:      Mental Status: He is alert. Mental status is at baseline.      Comments: Alert but nonverbal.  Confused.  Severe rigidity noted in both upper and lower extremities.  Resting tremor noted.   Psychiatric:      Comments: Confused       Laboratory data:    I have reviewed the labs done in the emergency room.    Results from last 7 days   Lab Units 08/28/24  1701   SODIUM mmol/L 144   POTASSIUM mmol/L 4.7   CHLORIDE mmol/L 100   CO2 mmol/L 30.6*   BUN mg/dL 26*   CREATININE mg/dL 1.41*   CALCIUM mg/dL 8.7   BILIRUBIN mg/dL 0.2   ALK PHOS U/L 114   ALT (SGPT) U/L 8   AST (SGOT) U/L 13   GLUCOSE mg/dL 214*     Results from last 7 days   Lab Units " 08/28/24  1701   WBC 10*3/mm3 7.66   HEMOGLOBIN g/dL 14.6   HEMATOCRIT % 44.1   PLATELETS 10*3/mm3 247         Results from last 7 days   Lab Units 08/28/24  1701   HSTROP T ng/L 28*     Results from last 7 days   Lab Units 08/28/24  1701   PROBNP pg/mL 594.3       Results from last 7 days   Lab Units 08/28/24  1651   PH, ARTERIAL pH units 7.432   PO2 ART mm Hg 64.4*   PCO2, ARTERIAL mm Hg 40.0   HCO3 ART mmol/L 26.7     EKG:      EKG done in the emergency room was reviewed by me.  It shows sinus rhythm at 85 bpm.  Normal axis.  Poor R wave progression noted on the chest leads.  No abnormal Q waves noted.  No significant ST-T changes were noted.    Radiology:    CT Angiogram Abdomen Pelvis    Result Date: 8/28/2024  FINAL REPORT TECHNIQUE: null CLINICAL HISTORY: sepsis, elevated lactic acid COMPARISON: null FINDINGS: CT angiography abdomen and pelvis with contrast. 3-D post processing. Comparison: CT/SR - CT ANGIOGRAM CHEST PULMONARY EMBOLISM - 08/28/2024 07:30 PM EDT CT/SR - CT ANGIOGRAM CHEST PULMONARY EMBOLISM - 05/26/2024 09:50 PM EDT Findings: There is moderate to severe bibasilar airspace opacity. Calcification of the coronary vasculature is present. Gallbladder is within normal limits. Fatty atrophy of the pancreas is present. Remaining solid organs are within normal limits. Moderate hiatal hernia. No bowel obstruction, pneumoperitoneum, or pneumatosis. Pelvic contents unremarkable. Normal appendix. There is mild diffuse plaque causing mild diffuse stenosis of the abdominal aorta, without evidence of aneurysm or dissection. The celiac and superior mesenteric arteries are patent. Inferior mesenteric artery is patent. There are single bilateral renal arteries without significant stenosis. The bilateral common, internal, and external iliac arteries demonstrate mild diffuse calcific stenosis. Common, profunda, and superficial femoral arteries demonstrate mild calcific stenoses. The bones are intact. Lumbosacral  fusion hardware is present. Multilevel disc space narrowing and endplate osteophyte formation, as well as facet hypertrophy. Median sternotomy.     IMPRESSION: 1. Bibasilar pneumonia. 2. Hiatal hernia. 3. Coronary artery disease. 4. No acute process involving the arterial tree of the abdomen and pelvis. Authenticated and Electronically Signed by Caitlin Wade on 08/28/2024 08:42:24 PM    CT Angiogram Chest Pulmonary Embolism    Result Date: 8/28/2024  FINAL REPORT TECHNIQUE: null CLINICAL HISTORY: hypoxic resp failure COMPARISON: null FINDINGS: Exam: CTA Chest with IV contrast. Procedure: Coronal and sagittal MIP reformats were performed Comparison: 05/26/2024 Clinical history: Hypoxic respiratory failure Findings: Limited due to patient motion artifact. No central pulmonary emboli. Possible single nonocclusive embolism in a segmental right upper lobe pulmonary artery on axial series 5, image 83 versus artifact. No other areas concerning for PE. No thoracic aortic aneurysm or dissection. Atherosclerotic calcifications are seen at the aorta and coronary arteries. No hilar or mediastinal adenopathy. No significant pericardial fluid collection. No pneumothorax. No pleural fluid collection. Bilateral lower lobe airspace opacities concerning for bibasilar pneumonia. Small to moderate-sized hiatal hernia. Visualized liver and spleen do not demonstrate any acute process Fatty infiltration of the pancreas. No gallstones. Right renal stones versus atherosclerotic calcification     Impression: 1. Evaluation limited by motion. No central pulmonary emboli. Possible single nonocclusive embolism in a segmental right upper lobe pulmonary artery versus artifact 2. No thoracic aortic aneurysm or dissection 3. Bilateral lower lobe airspace opacities concerning for bibasilar pneumonia. Authenticated and Electronically Signed by Lorrie Bullard MD on 08/28/2024 08:26:25 PM     Assessment:    Acute hypoxic respiratory failure secondary  to #2, POA.  Bilateral lower lobe aspiration pneumonia, unable to classify further, POA.  Sepsis secondary to #2, POA.  Chronic kidney disease stage II.  Diabetes mellitus type 2 with nephropathy.  Coronary artery disease status post CABG.  Dementia with Parkinson's features.  Right heel pressure ulcer, POA.    Plan:    Respiratory failure/bilateral pneumonia.  - Continue IV antibiotics therapy with Unasyn and azithromycin.  - Blood cultures have been sent.  - Continue nasal cannula oxygen to maintain saturations above 90%.  - Bronchodilators as needed.  - Nasal swab screen for MRSA.  - Speech therapy consult for evaluation of dysphagia.  - Keep n.p.o. for now with IV fluids.    Diabetes mellitus type 2.  - Subcutaneous insulin protocol for coverage.  - Obtain hemoglobin A1c levels.    Coronary artery disease.  - Continue home medications (aspirin, atorvastatin, metoprolol) once able to take orally.    As per nursing home records, patient's CODE STATUS is DNR/DNI.  Discussed with nursing staff at the bedside.    Risk Assessment: High  DVT Prophylaxis: Enoxaparin  Code Status: DNR/DNI  Diet: N.p.o.      Frantz Muhammad MD  08/28/24  22:42 EDT    Dictated utilizing Dragon dictation.    Electronically signed by Frantz Muhammad MD at 08/28/24 2332          Emergency Department Notes        Sanjuana Ceja RN at 08/28/24 1835          Lab at bedside to attempt blood cultures again    Electronically signed by Sanjuana Ceja RN at 08/28/24 1854       Sanjuana Ceja RN at 08/28/24 1803          Contacted lab at this time for blood cultures. First person attempted but was unsuccessful. Second person has not been to collect.     Electronically signed by Sanjuana Ceja RN at 08/28/24 1823       Vital Signs (last day)       Date/Time Temp Temp src Pulse Resp BP Patient Position SpO2    08/29/24 1200 -- -- 64 19 141/88 Lying 98    08/29/24 1100 98.7 (37.1) Axillary -- -- -- -- --    08/29/24 0942 -- -- 57 -- 148/71 -- 98     08/29/24 0800 -- -- 67 17 128/56 Lying 99    08/29/24 0700 99.5 (37.5) Axillary 67 -- 133/71 -- 99    08/29/24 0600 -- -- 69 -- 133/59 -- 98    08/29/24 0500 -- -- 67 -- 134/67 -- 98    08/29/24 0400 -- -- 71 19 132/79 Lying 98    08/29/24 0330 98.5 (36.9) Oral -- -- -- -- --    08/29/24 0300 -- -- 71 -- 133/65 -- 99    08/29/24 0200 -- -- 68 -- 135/72 -- 99    08/29/24 0100 -- -- 69 -- 96/53 -- 99    08/29/24 0000 -- -- 77 18 115/83 Lying 98    08/28/24 2320 98.4 (36.9) Oral 77 -- -- -- 97    08/28/24 2240 -- -- 70 -- 108/55 -- 97    08/28/24 2220 -- -- 71 -- 106/50 -- 96    08/28/24 2200 -- -- 72 -- 95/57 -- 96    08/28/24 2140 -- -- 72 -- 109/56 -- 97    08/28/24 2120 -- -- 79 -- 101/60 -- 97    08/28/24 2100 -- -- 78 -- 100/65 -- 97    08/28/24 2040 -- -- 78 -- 115/59 -- 95    08/28/24 2020 101.9 (38.8) Axillary 77 24 112/64 -- 96    08/28/24 1959 -- -- 80 -- 116/63 -- 96    08/28/24 1854 -- -- 80 -- 134/75 -- 95    08/28/24 1801 103.3 (39.6) Axillary -- -- -- -- --    08/28/24 1701 -- -- 83 -- 138/85 -- 95    08/28/24 1700 -- -- -- -- 138/85 Lying --    08/28/24 1656 101.1 (38.4) Axillary 85 24 -- Sitting 98          Current Facility-Administered Medications   Medication Dose Route Frequency Provider Last Rate Last Admin    acetaminophen (TYLENOL) tablet 650 mg  650 mg Oral Q4H PRN Frantz Muhammad MD        Or    acetaminophen (TYLENOL) 160 MG/5ML oral solution 650 mg  650 mg Oral Q4H PRN Frantz Muhammad MD        Or    acetaminophen (TYLENOL) suppository 650 mg  650 mg Rectal Q4H PRN Frantz Muhammad MD        ampicillin-sulbactam (UNASYN) 3 g in sodium chloride 0.9 % 100 mL IVPB-VTB  3 g Intravenous Q6H Frantz Muhammad MD   3 g at 08/29/24 0728    aspirin EC tablet 81 mg  81 mg Oral Daily Frantz Muhammad MD   81 mg at 08/29/24 0851    atorvastatin (LIPITOR) tablet 80 mg  80 mg Oral Daily Frantz Muhammad MD   80 mg at 08/29/24 0852    azithromycin (ZITHROMAX) 500 mg in sodium chloride 0.9 % 250 mL IVPB-VTB  500 mg  Intravenous Q24H Frantz Muhammad MD        sennosides-docusate (PERICOLACE) 8.6-50 MG per tablet 2 tablet  2 tablet Oral BID PRN Frantz Muhammad MD        And    polyethylene glycol (MIRALAX) packet 17 g  17 g Oral Daily PRN Frantz Muhammad MD        And    bisacodyl (DULCOLAX) EC tablet 5 mg  5 mg Oral Daily PRN Frantz Muhammad MD        And    bisacodyl (DULCOLAX) suppository 10 mg  10 mg Rectal Daily PRN Frantz Muhammad MD        carbidopa-levodopa (SINEMET)  MG per tablet 1 tablet  1 tablet Oral TID Frantz Muhammad MD   1 tablet at 08/29/24 0852    dextrose (D50W) (25 g/50 mL) IV injection 25 g  25 g Intravenous Q15 Min PRN Frantz Muhammad MD        dextrose (GLUTOSE) oral gel 15 g  15 g Oral Q15 Min PRN Frantz Muhammad MD        Divalproex Sodium (DEPAKOTE SPRINKLE) capsule 125 mg  125 mg Oral BID Frantz Muhammad MD   125 mg at 08/29/24 0852    Enoxaparin Sodium (LOVENOX) syringe 40 mg  40 mg Subcutaneous Nightly Frantz Muhammad MD   40 mg at 08/29/24 0212    glucagon (GLUCAGEN) injection 1 mg  1 mg Intramuscular Q15 Min PRN Frantz Muhammad MD        insulin regular (humuLIN R,novoLIN R) injection 2-7 Units  2-7 Units Subcutaneous Q6H Frantz Muhammad MD   2 Units at 08/29/24 0729    metoprolol tartrate (LOPRESSOR) tablet 50 mg  50 mg Oral BID Frantz Muhammad MD   50 mg at 08/29/24 0852    ondansetron (ZOFRAN) injection 4 mg  4 mg Intravenous Q6H PRN Frantz Muhammad MD        Pharmacy Consult - Pharmacy to dose Ampicillin-Sulbactam   Does not apply Continuous PRN Frantz Muhammad MD        Pharmacy to Dose enoxaparin (LOVENOX)   Does not apply Continuous PRN Frantz Muhammad MD        sodium chloride 0.9 % flush 10 mL  10 mL Intravenous PRN Steven Waller DO        sodium chloride 0.9 % flush 10 mL  10 mL Intravenous Q12H Frantz Muhammad MD   10 mL at 08/29/24 0852    sodium chloride 0.9 % flush 10 mL  10 mL Intravenous PRN Frantz Muhammad MD        sodium chloride 0.9 % infusion 40 mL  40 mL Intravenous PRN Frantz Muhammad MD         Lab  Results (last 24 hours)       Procedure Component Value Units Date/Time    STAT Lactic Acid, Reflex [[id number]]  (Normal) Collected: 08/29/24 0954    Specimen: Blood Updated: 08/29/24 1108     Lactate 1.8 mmol/L     POC Glucose Q6H [[id number]]  (Abnormal) Collected: 08/29/24 0636    Specimen: Blood Updated: 08/29/24 0637     Glucose 158 mg/dL      Comment: Serial Number: [id number]:  [id number]       Basic Metabolic Panel [[id number]]  (Abnormal) Collected: 08/29/24 0508    Specimen: Blood Updated: 08/29/24 0537     Glucose 160 mg/dL      BUN 29 mg/dL      Creatinine 1.53 mg/dL      Sodium 139 mmol/L      Potassium 4.4 mmol/L      Chloride 103 mmol/L      CO2 27.3 mmol/L      Calcium 8.4 mg/dL      BUN/Creatinine Ratio 19.0     Anion Gap 8.7 mmol/L      eGFR 44.6 mL/min/1.73     Narrative:      GFR Normal >60  Chronic Kidney Disease <60  Kidney Failure <15    The GFR formula is only valid for adults with stable renal function between ages 18 and 70.    CBC (No Diff) [[id number]]  (Abnormal) Collected: 08/29/24 0508    Specimen: Blood Updated: 08/29/24 0537     WBC 18.49 10*3/mm3      RBC 4.08 10*6/mm3      Hemoglobin 12.1 g/dL      Hematocrit 37.0 %      MCV 90.7 fL      MCH 29.7 pg      MCHC 32.7 g/dL      RDW 13.7 %      RDW-SD 45.8 fl      MPV 10.2 fL      Platelets 203 10*3/mm3     STAT Lactic Acid, Reflex [[id number]]  (Abnormal) Collected: 08/29/24 0253    Specimen: Blood Updated: 08/29/24 0328     Lactate 2.9 mmol/L     STAT Lactic Acid, Reflex [[id number]]  (Abnormal) Collected: 08/28/24 2339    Specimen: Blood Updated: 08/29/24 0007     Lactate 3.2 mmol/L     POC Glucose Once [[id number]]  (Normal) Collected: 08/28/24 2358    Specimen: Blood Updated: 08/29/24 0000     Glucose 126 mg/dL      Comment: Serial Number: [id number]:  [id number]       Hemoglobin A1c [[id number]]  (Abnormal) Collected: 08/28/24 1701    Specimen: Blood Updated: 08/28/24 2350     Hemoglobin A1C 8.20 %     Narrative:      Hemoglobin  "A1C Ranges:    Increased Risk for Diabetes  5.7% to 6.4%  Diabetes                     >= 6.5%  Diabetic Goal                < 7.0%    VRE Culture - Swab, Per Rectum [[id number]] Collected: 08/28/24 2322    Specimen: Swab from Per Rectum Updated: 08/28/24 2342    MRSA Screen, PCR (Inpatient) - Swab, Nares [[id number]] Collected: 08/28/24 2322    Specimen: Swab from Nares Updated: 08/28/24 2342    Acinetobacter Screen - Swab, Axilla, Right [[id number]] Collected: 08/28/24 2322    Specimen: Swab from Axilla, Right Updated: 08/28/24 2342    STAT Lactic Acid, Reflex [[id number]]  (Abnormal) Collected: 08/28/24 2001    Specimen: Blood Updated: 08/28/24 2040     Lactate 2.4 mmol/L     Blood Culture - Blood, Arm, Right [[id number]] Collected: 08/28/24 1838    Specimen: Blood from Arm, Right Updated: 08/28/24 1846    Blood Culture - Blood, Wrist, Right [[id number]] Collected: 08/28/24 1840    Specimen: Blood from Wrist, Right Updated: 08/28/24 1846    Procalcitonin [[id number]]  (Normal) Collected: 08/28/24 1701    Specimen: Blood Updated: 08/28/24 1841     Procalcitonin 0.06 ng/mL     Narrative:      As a Marker for Sepsis (Non-Neonates):    1. <0.5 ng/mL represents a low risk of severe sepsis and/or septic shock.  2. >2 ng/mL represents a high risk of severe sepsis and/or septic shock.    As a Marker for Lower Respiratory Tract Infections that require antibiotic therapy:    PCT on Admission    Antibiotic Therapy       6-12 Hrs later    >0.5                Strongly Recommended  >0.25 - <0.5        Recommended   0.1 - 0.25          Discouraged              Remeasure/reassess PCT  <0.1                Strongly Discouraged     Remeasure/reassess PCT    As 28 day mortality risk marker: \"Change in Procalcitonin Result\" (>80% or <=80%) if Day 0 (or Day 1) and Day 4 values are available. Refer to http://www.brahms-pct-calculator.com    Change in PCT <=80%  A decrease of PCT levels below or equal to 80% defines a positive change in PCT " test result representing a higher risk for 28-day all-cause mortality of patients diagnosed with severe sepsis for septic shock.    Change in PCT >80%  A decrease of PCT levels of more than 80% defines a negative change in PCT result representing a lower risk for 28-day all-cause mortality of patients diagnosed with severe sepsis or septic shock.       Lactic Acid, Plasma [[id number]]  (Abnormal) Collected: 08/28/24 1708    Specimen: Blood Updated: 08/28/24 1742     Lactate 5.9 mmol/L     BNP [[id number]]  (Normal) Collected: 08/28/24 1701    Specimen: Blood Updated: 08/28/24 1733     proBNP 594.3 pg/mL     Narrative:      This assay is used as an aid in the diagnosis of individuals suspected of having heart failure. It can be used as an aid in the diagnosis of acute decompensated heart failure (ADHF) in patients presenting with signs and symptoms of ADHF to the emergency department (ED). In addition, NT-proBNP of <300 pg/mL indicates ADHF is not likely.    Age Range Result Interpretation  NT-proBNP Concentration (pg/mL:      <50             Positive            >450                   Gray                 300-450                    Negative             <300    50-75           Positive            >900                  Gray                300-900                  Negative            <300      >75             Positive            >1800                  Gray                300-1800                  Negative            <300    Single High Sensitivity Troponin T [[id number]]  (Abnormal) Collected: 08/28/24 1701    Specimen: Blood Updated: 08/28/24 1733     HS Troponin T 28 ng/L     Narrative:      High Sensitive Troponin T Reference Range:  <14.0 ng/L- Negative Female for AMI  <22.0 ng/L- Negative Male for AMI  >=14 - Abnormal Female indicating possible myocardial injury.  >=22 - Abnormal Male indicating possible myocardial injury.   Clinicians would have to utilize clinical acumen, EKG, Troponin, and serial changes to  determine if it is an Acute Myocardial Infarction or myocardial injury due to an underlying chronic condition.         Comprehensive Metabolic Panel [[id number]]  (Abnormal) Collected: 08/28/24 1701    Specimen: Blood Updated: 08/28/24 1731     Glucose 214 mg/dL      Comment: Glucose >180, Hemoglobin A1C recommended.        BUN 26 mg/dL      Creatinine 1.41 mg/dL      Sodium 144 mmol/L      Potassium 4.7 mmol/L      Chloride 100 mmol/L      CO2 30.6 mmol/L      Calcium 8.7 mg/dL      Total Protein 7.4 g/dL      Albumin 3.6 g/dL      ALT (SGPT) 8 U/L      AST (SGOT) 13 U/L      Alkaline Phosphatase 114 U/L      Total Bilirubin 0.2 mg/dL      Globulin 3.8 gm/dL      A/G Ratio 0.9 g/dL      BUN/Creatinine Ratio 18.4     Anion Gap 13.4 mmol/L      eGFR 49.1 mL/min/1.73     Narrative:      GFR Normal >60  Chronic Kidney Disease <60  Kidney Failure <15    The GFR formula is only valid for adults with stable renal function between ages 18 and 70.    COVID-19 and FLU A/B PCR, 1 HR TAT - Swab, Nasopharynx [[id number]]  (Normal) Collected: 08/28/24 1701    Specimen: Swab from Nasopharynx Updated: 08/28/24 1727     COVID19 Not Detected     Influenza A PCR Not Detected     Influenza B PCR Not Detected    Narrative:      Fact sheet for providers: https://www.fda.gov/media/296837/download    Fact sheet for patients: https://www.fda.gov/media/798350/download    Test performed by PCR.    [place] Draw [[id number]] Collected: 08/28/24 1701    Specimen: Blood Updated: 08/28/24 1716    Narrative:      The following orders were created for panel order [place] Draw.  Procedure                               Abnormality         Status                     ---------                               -----------         ------                     Green Top (Gel)[[id number]]                                  Final result               Lavender Top[[id number]]                                     Final result               Gold Top - SST[[id number]]                                    Final result               Light Blue Top[[id number]]                                   Final result                 Please view results for these tests on the individual orders.    Green Top (Gel) [[id number]] Collected: 08/28/24 1701    Specimen: Blood Updated: 08/28/24 1716     Extra Tube Hold for add-ons.     Comment: Auto resulted.       Lavender Top [[id number]] Collected: 08/28/24 1701    Specimen: Blood Updated: 08/28/24 1716     Extra Tube hold for add-on     Comment: Auto resulted       Gold Top - SST [[id number]] Collected: 08/28/24 1701    Specimen: Blood Updated: 08/28/24 1716     Extra Tube Hold for add-ons.     Comment: Auto resulted.       Light Blue Top [[id number]] Collected: 08/28/24 1701    Specimen: Blood Updated: 08/28/24 1716     Extra Tube Hold for add-ons.     Comment: Auto resulted       CBC & Differential [[id number]]  (Normal) Collected: 08/28/24 1701    Specimen: Blood Updated: 08/28/24 1708    Narrative:      The following orders were created for panel order CBC & Differential.  Procedure                               Abnormality         Status                     ---------                               -----------         ------                     CBC Auto Differential[[id number]]        Normal              Final result                 Please view results for these tests on the individual orders.    CBC Auto Differential [[id number]]  (Normal) Collected: 08/28/24 1701    Specimen: Blood Updated: 08/28/24 1708     WBC 7.66 10*3/mm3      RBC 4.83 10*6/mm3      Hemoglobin 14.6 g/dL      Hematocrit 44.1 %      MCV 91.3 fL      MCH 30.2 pg      MCHC 33.1 g/dL      RDW 13.3 %      RDW-SD 44.9 fl      MPV 9.7 fL      Platelets 247 10*3/mm3      Neutrophil % 72.1 %      Lymphocyte % 21.0 %      Monocyte % 5.2 %      Eosinophil % 1.0 %      Basophil % 0.3 %      Immature Grans % 0.4 %      Neutrophils, Absolute 5.52 10*3/mm3      Lymphocytes, Absolute 1.61 10*3/mm3       Monocytes, Absolute 0.40 10*3/mm3      Eosinophils, Absolute 0.08 10*3/mm3      Basophils, Absolute 0.02 10*3/mm3      Immature Grans, Absolute 0.03 10*3/mm3      nRBC 0.0 /100 WBC     Blood Gas, Arterial With Co-Ox [[id number]]  (Abnormal) Collected: 08/28/24 1651    Specimen: Arterial Blood Updated: 08/28/24 1651     Site Left Radial     Ryan's Test Positive     pH, Arterial 7.432 pH units      pCO2, Arterial 40.0 mm Hg      pO2, Arterial 64.4 mm Hg      Comment: 84 Value below reference range        HCO3, Arterial 26.7 mmol/L      Comment: 83 Value above reference range        Base Excess, Arterial 2.2 mmol/L      Comment: 83 Value above reference range        O2 Saturation, Arterial 92.9 %      Comment: 84 Value below reference range        Hematocrit, Blood Gas 46.1 %      Oxyhemoglobin 91.6 %      Comment: 84 Value below reference range        Methemoglobin 0.50 %      Carboxyhemoglobin 1.0 %      A-a DO2 83.6 mmHg      Barometric Pressure for Blood Gas 735 mmHg      Modality Nasal Cannula     FIO2 28 %      Flow Rate 2.0 lpm      Ventilator Mode NA     Collected by RS     Comment: Meter: [id number]     :  [id number]        pH, Temp Corrected --     pCO2, Temperature Corrected --     pO2, Temperature Corrected --          Imaging Results (Last 24 Hours)       Procedure Component Value Units Date/Time    XR Chest 1 View [[id number]] Collected: 08/29/24 0815     Updated: 08/29/24 0818    Narrative:      PROCEDURE: XR CHEST 1 VW-     HISTORY: SOA Triage Protocol, abnormal cardiovascular exam     COMPARISON: 5/26/2024     FINDINGS: No acute pulmonary opacity is present. There is no evidence of  effusion or pneumothorax.        There is evidence of prior median sternotomy, presumably from CABG.   Otherwise, mediastinum is unremarkable.        Heart size is normal.       Impression:      Unremarkable chest, status post CABG.           This report was signed and finalized on 8/29/2024 8:16 AM by  Thierry Medel MD.       CT Angiogram Abdomen Pelvis [[id number]] Collected: 08/28/24 2042     Updated: 08/28/24 2044    Narrative:      FINAL REPORT    TECHNIQUE:  null    CLINICAL HISTORY:  sepsis, elevated lactic acid    COMPARISON:  null    FINDINGS:  CT angiography abdomen and pelvis with contrast. 3-D post processing.    Comparison: CT/SR - CT ANGIOGRAM CHEST PULMONARY EMBOLISM - 08/28/2024 07:30 PM EDT    CT/SR - CT ANGIOGRAM CHEST PULMONARY EMBOLISM - 05/26/2024 09:50 PM EDT    Findings:    There is moderate to severe bibasilar airspace opacity. Calcification of the coronary vasculature is present.    Gallbladder is within normal limits. Fatty atrophy of the pancreas is present. Remaining solid organs are within normal limits.    Moderate hiatal hernia. No bowel obstruction, pneumoperitoneum, or pneumatosis.    Pelvic contents unremarkable. Normal appendix.    There is mild diffuse plaque causing mild diffuse stenosis of the abdominal aorta, without evidence of aneurysm or dissection. The celiac and superior mesenteric arteries are patent. Inferior mesenteric artery is patent. There are single bilateral renal   arteries without significant stenosis. The bilateral common, internal, and external iliac arteries demonstrate mild diffuse calcific stenosis. Common, profunda, and superficial femoral arteries demonstrate mild calcific stenoses.    The bones are intact. Lumbosacral fusion hardware is present. Multilevel disc space narrowing and endplate osteophyte formation, as well as facet hypertrophy. Median sternotomy.      Impression:      IMPRESSION:    1. Bibasilar pneumonia.    2. Hiatal hernia.    3. Coronary artery disease.    4. No acute process involving the arterial tree of the abdomen and pelvis.    Authenticated and Electronically Signed by Caitlin Wade on  08/28/2024 08:42:24 PM    CT Angiogram Chest Pulmonary Embolism [[id number]] Collected: 08/28/24 2026     Updated: 08/28/24 2028    Narrative:       FINAL REPORT    TECHNIQUE:  null    CLINICAL HISTORY:  hypoxic resp failure    COMPARISON:  null    FINDINGS:  Exam: CTA Chest with IV contrast.    Procedure: Coronal and sagittal MIP reformats were performed    Comparison: 2024    Clinical history: Hypoxic respiratory failure    Findings:    Limited due to patient motion artifact.    No central pulmonary emboli.    Possible single nonocclusive embolism in a segmental right upper lobe pulmonary artery on axial series 5, image 83 versus artifact.    No other areas concerning for PE.    No thoracic aortic aneurysm or dissection.    Atherosclerotic calcifications are seen at the aorta and coronary arteries.    No hilar or mediastinal adenopathy.    No significant pericardial fluid collection.    No pneumothorax.    No pleural fluid collection.    Bilateral lower lobe airspace opacities concerning for bibasilar pneumonia.    Small to moderate-sized hiatal hernia.    Visualized liver and spleen do not demonstrate any acute process    Fatty infiltration of the pancreas.    No gallstones.    Right renal stones versus atherosclerotic calcification      Impression:      Impression:    1. Evaluation limited by motion. No central pulmonary emboli. Possible single nonocclusive embolism in a segmental right upper lobe pulmonary artery versus artifact    2. No thoracic aortic aneurysm or dissection    3. Bilateral lower lobe airspace opacities concerning for bibasilar pneumonia.    Authenticated and Electronically Signed by Lorrie Bullard MD  on 2024 08:26:25 PM             Physician Progress Notes (last 24 hours)        Alton Sood DO [facility]              [facility]    Name:  Gama Davila   Age:  84 y.o.  Sex:  male  :  1940  MRN:  [id number]   Visit Number:  [id number]  Admission Date:  2024  Date Of Service:  24  Primary Care Physician:  Alex Burk DO     LOS: 1 day  :    Chief Complaint:      dyspnea    Subjective:    8/29/24: nonverbal. Palliative to see. Speech made recommendations. Breathing stable. 2 Liters.    History Of Presenting Illness:       Gama Davila is an 84-year-old male, nursing home resident, with history of diabetes mellitus type 2 on insulin, coronary artery disease status post CABG, dementia was sent from the facility by EMS with symptoms of shortness of breath and respiratory distress.  When EMS arrived they were unable to get a good pulse oximetry and applied 15 L of nonrebreather mask.  Patient was also noted to be wheezing and administered DuoNebs x 2 en route.  Unable to obtain any history from the patient as he is nonverbal at baseline.     In the emergency room, he was febrile at 101 with pulse 85 and blood pressure 138/85 and pulse oxygen saturation of 96% on 3 L of nasal cannula oxygen.  ABG showed a pH of 7.43, pCO2 40, pO2 64 and bicarb of 27 on 2 L of nasal cannula oxygen.  Initial troponin 28 but proBNP within normal range.  CMP was remarkable for a BUN of 26, creatinine 1.4 (around baseline), glucose 214.  LFT was unremarkable.  Lactic acid was elevated at 5.9.  Procalcitonin and CBC was unremarkable.  Blood cultures were drawn.  COVID and flu test were negative.  Chest x-ray showed left lower zone opacity.  CT angiogram of the chest was limited by motion but showed no central pulmonary emboli.  Possible single nonocclusive embolism in the segment of the right upper lobe versus artifact.  Bilateral lower lobe airspace disease concerning for bibasilar pneumonia noted.  CT angiogram of the abdomen and pelvis showed bilateral pneumonia, hiatal hernia, coronary artery disease but no acute process involving the arterial tree of the abdomen and pelvis.  Patient was given Tylenol, Unasyn and azithromycin in the emergency room.  He also received 500 mL of normal saline bolus and was subsequently admitted to the medical floor with telemetry for  management of respiratory failure, bilateral pneumonia.     Edited by: Alton Sood DO at 8/29/2024 1215     Review of Systems:     All systems were reviewed and negative except as mentioned in subjective, assessment and plan.    Vital Signs:    Temp:  [98.4 °F (36.9 °C)-103.3 °F (39.6 °C)] 98.7 °F (37.1 °C)  Heart Rate:  [57-85] 57  Resp:  [17-24] 17  BP: ()/(50-85) 148/71    Intake and output:    I/O last 3 completed shifts:  In: 850 [IV Piggyback:850]  Out: -   I/O this shift:  In: 675 [I.V.:675]  Out: -     Physical Examination:    Constitutional: No acute distress, awake, alert  HENT: NCAT, mucous membranes moist  Respiratory: Coarse bilaterally, respiratory effort normal   Cardiovascular: RRR, no murmurs, rubs, or gallops  Gastrointestinal: Positive bowel sounds, soft, nontender, nondistended  Musculoskeletal: 2+ bilateral ankle edema  Psychiatric: Appropriate affect, cooperative  Neurologic: disoriented, speech nonverbal  Skin: No rashes  Edited by: Alton Sood DO at 8/29/2024 1217     Laboratory results:    Results from last 7 days   Lab Units 08/29/24  0508 08/28/24  1701   SODIUM mmol/L 139 144   POTASSIUM mmol/L 4.4 4.7   CHLORIDE mmol/L 103 100   CO2 mmol/L 27.3 30.6*   BUN mg/dL 29* 26*   CREATININE mg/dL 1.53* 1.41*   CALCIUM mg/dL 8.4* 8.7   BILIRUBIN mg/dL  --  0.2   ALK PHOS U/L  --  114   ALT (SGPT) U/L  --  8   AST (SGOT) U/L  --  13   GLUCOSE mg/dL 160* 214*     Results from last 7 days   Lab Units 08/29/24  0508 08/28/24  1701   WBC 10*3/mm3 18.49* 7.66   HEMOGLOBIN g/dL 12.1* 14.6   HEMATOCRIT % 37.0* 44.1   PLATELETS 10*3/mm3 203 247         Results from last 7 days   Lab Units 08/28/24  1701   HSTROP T ng/L 28*         Recent Labs     05/26/24 2059 08/28/24  1651   PHART 7.447 7.432   STK5AIT 41.7 40.0   PO2ART 79.3 64.4*   TWS5TSZ 28.8* 26.7   BASEEXCESS 4.3* 2.2*      I have reviewed the patient's laboratory results.    Radiology results:    XR Chest 1 View    Result  Date: 8/29/2024  PROCEDURE: XR CHEST 1 VW-  HISTORY: SOA Triage Protocol, abnormal cardiovascular exam  COMPARISON: 5/26/2024  FINDINGS: No acute pulmonary opacity is present. There is no evidence of effusion or pneumothorax.    There is evidence of prior median sternotomy, presumably from CABG. Otherwise, mediastinum is unremarkable.   Heart size is normal.      Impression: Unremarkable chest, status post CABG.    This report was signed and finalized on 8/29/2024 8:16 AM by Thierry Medel MD.      CT Angiogram Abdomen Pelvis    Result Date: 8/28/2024  FINAL REPORT TECHNIQUE: null CLINICAL HISTORY: sepsis, elevated lactic acid COMPARISON: null FINDINGS: CT angiography abdomen and pelvis with contrast. 3-D post processing. Comparison: CT/SR - CT ANGIOGRAM CHEST PULMONARY EMBOLISM - 08/28/2024 07:30 PM EDT CT/SR - CT ANGIOGRAM CHEST PULMONARY EMBOLISM - 05/26/2024 09:50 PM EDT Findings: There is moderate to severe bibasilar airspace opacity. Calcification of the coronary vasculature is present. Gallbladder is within normal limits. Fatty atrophy of the pancreas is present. Remaining solid organs are within normal limits. Moderate hiatal hernia. No bowel obstruction, pneumoperitoneum, or pneumatosis. Pelvic contents unremarkable. Normal appendix. There is mild diffuse plaque causing mild diffuse stenosis of the abdominal aorta, without evidence of aneurysm or dissection. The celiac and superior mesenteric arteries are patent. Inferior mesenteric artery is patent. There are single bilateral renal arteries without significant stenosis. The bilateral common, internal, and external iliac arteries demonstrate mild diffuse calcific stenosis. Common, profunda, and superficial femoral arteries demonstrate mild calcific stenoses. The bones are intact. Lumbosacral fusion hardware is present. Multilevel disc space narrowing and endplate osteophyte formation, as well as facet hypertrophy. Median sternotomy.     Impression:  IMPRESSION: 1. Bibasilar pneumonia. 2. Hiatal hernia. 3. Coronary artery disease. 4. No acute process involving the arterial tree of the abdomen and pelvis. Authenticated and Electronically Signed by Caitlin Wade on 08/28/2024 08:42:24 PM    CT Angiogram Chest Pulmonary Embolism    Result Date: 8/28/2024  FINAL REPORT TECHNIQUE: null CLINICAL HISTORY: hypoxic resp failure COMPARISON: null FINDINGS: Exam: CTA Chest with IV contrast. Procedure: Coronal and sagittal MIP reformats were performed Comparison: 05/26/2024 Clinical history: Hypoxic respiratory failure Findings: Limited due to patient motion artifact. No central pulmonary emboli. Possible single nonocclusive embolism in a segmental right upper lobe pulmonary artery on axial series 5, image 83 versus artifact. No other areas concerning for PE. No thoracic aortic aneurysm or dissection. Atherosclerotic calcifications are seen at the aorta and coronary arteries. No hilar or mediastinal adenopathy. No significant pericardial fluid collection. No pneumothorax. No pleural fluid collection. Bilateral lower lobe airspace opacities concerning for bibasilar pneumonia. Small to moderate-sized hiatal hernia. Visualized liver and spleen do not demonstrate any acute process Fatty infiltration of the pancreas. No gallstones. Right renal stones versus atherosclerotic calcification     Impression: Impression: 1. Evaluation limited by motion. No central pulmonary emboli. Possible single nonocclusive embolism in a segmental right upper lobe pulmonary artery versus artifact 2. No thoracic aortic aneurysm or dissection 3. Bilateral lower lobe airspace opacities concerning for bibasilar pneumonia. Authenticated and Electronically Signed by Lorrie Bullard MD on 08/28/2024 08:26:25 PM   I have reviewed the patient's radiology reports.    Medication Review:     I have reviewed the patient's active and prn medications.     Problem List:      Bilateral pneumonia    Coronary artery  disease    Dementia    Acute respiratory failure with hypoxia    Sepsis due to pneumonia      Assessment/Plan:    Acute hypoxic respiratory failure secondary to #2, POA.  Bilateral lower lobe aspiration pneumonia, unable to classify further, POA.  Sepsis secondary to #2, POA.  Chronic kidney disease stage II.  Diabetes mellitus type 2 with nephropathy.  Coronary artery disease status post CABG.  Dementia with Parkinson's features.  Right heel pressure ulcer, POA.     Plan:     Respiratory failure/bilateral pneumonia.  - Continue IV antibiotics therapy with Unasyn and azithromycin.  - Blood cultures have been sent.  - Continue nasal cannula oxygen to maintain saturations above 90%.  - Bronchodilators as needed.  - Nasal swab screen for MRSA.  - Speech therapy consult for evaluation of dysphagia. Pureed diet. with IV fluids.     Diabetes mellitus type 2.  - Subcutaneous insulin protocol for coverage.       Coronary artery disease.  - Continue home medications (aspirin, atorvastatin, metoprolol) once able to take orally.         DVT Prophylaxis: Enoxaparin  Code Status: DNR/DNI  Diet: N.p.o.  Disposition: anticipate discharge possibly tomorrow, await palliative recs.     Edited by: Alton Sood DO at 8/29/2024 1215           Alton Sood DO  08/29/24  12:17 EDT    Dictated utilizing Dragon dictation.        Electronically signed by Alton Sood DO at 08/29/24 1217       Consult Notes (last 24 hours)  Notes from 08/28/24 1324 through 08/29/24 1324   No notes of this type exist for this encounter.

## 2024-08-29 NOTE — CASE MANAGEMENT/SOCIAL WORK
Pt is a LTC resident at [facility]. Attempted SDOH and DCP. Pt is nonverbal. Message left with pts SADIE Waldrop, awaiting call back.

## 2024-08-29 NOTE — PROGRESS NOTES
"Pharmacy Consult - Enoxaparin Dosing    Gama Davila is a 84 y.o. male who has been consulted to dose enoxaparin for VTE prophylaxis.     Allergies    Phenergan [promethazine hcl], Carbamazepine, Hydrocodone, and Lisinopril    Relevant clinical data and objective history reviewed:     [Ht: 170.2 cm (67\"); Wt: 81.4 kg (179 lb 7.3 oz)]  Body mass index is 28.11 kg/m².    Estimated Creatinine Clearance: 39.8 mL/min (A) (by C-G formula based on SCr of 1.41 mg/dL (H)).    Results from last 7 days   Lab Units 08/28/24  1701   HEMOGLOBIN g/dL 14.6   HEMATOCRIT % 44.1   PLATELETS 10*3/mm3 247   CREATININE mg/dL 1.41*       Asessment/Plan    Initiate Enoxaparin 40 mg SQ every 24 hours  Pharmacy will monitor Mr. Davila's renal function and clinical status and adjust the enoxaparin dose and/or frequency as needed.    Thank you,  Ashley Thapa RP,PharmD  8/29/2024  01:47 EDT      "

## 2024-08-29 NOTE — CASE MANAGEMENT/SOCIAL WORK
Discharge Planning Assessment  [facility]     Patient Name: Gama Davila  MRN: [id number]  Today's Date: 8/29/2024    Admit Date: 8/28/2024    Plan: DCP is to return to LTC at [facility] with palliative services. Pt is nonverbal, I spoke with pt's POA Palma Jones over the phone.. She states all his needs are met at [facility]. PCP is Dr. Burk. Pharmacy is [facility]. Agreed to meds to bed. No new DME needs anticipated at this time. Pt is totaly dependent with all ADL's and is mostly bedbound, occasionally getting up to the chair. He will need EMS transport when stable for discharge.   Discharge Needs Assessment       Row Name 08/29/24 4576       Living Environment    People in Home facility resident    Name(s) of People in Home LTC from [facility]    Current Living Arrangements extended care facility    Potentially Unsafe Housing Conditions none    In the past 12 months has the electric, gas, oil, or water company threatened to shut off services in your home? No    Primary Care Provided by other (see comments)  facility provides    Provides Primary Care For no one, unable/limited ability to care for self    Family Caregiver if Needed other (see comments)  LTC resident of [facility]    Family Caregiver Names Daughter Virginia is POA, wife Taryn    Quality of Family Relationships helpful;involved;supportive    Able to Return to Prior Arrangements yes       Resource/Environmental Concerns    Resource/Environmental Concerns none    Transportation Concerns none       Transportation Needs    In the past 12 months, has lack of transportation kept you from medical appointments or from getting medications? no    In the past 12 months, has lack of transportation kept you from meetings, work, or from getting things needed for daily living? No       Food Insecurity    Within the past 12 months, you worried that your food would run out before you got the money to buy more. Never true    Within the past 12 months, the  food you bought just didn't last and you didn't have money to get more. Never true       Transition Planning    Patient/Family Anticipates Transition to long-term care facility    Patient/Family Anticipated Services at Transition none    Transportation Anticipated other (see comments)  EMS       Discharge Needs Assessment    Readmission Within the Last 30 Days no previous admission in last 30 days    Equipment Currently Used at Home glucometer;hospital bed;oxygen    Concerns to be Addressed no discharge needs identified    Anticipated Changes Related to Illness none    Equipment Needed After Discharge none    Discharge Facility/Level of Care Needs nursing facility, intermediate                   Discharge Plan       Row Name 08/29/24 2130       Plan    Plan DCP is to return to LTC at [facility] with palliative services. Pt is nonverbal, I spoke with pt's POA Palma Goldsteinbren over the phone.. She states all his needs are met at [facility]. PCP is Dr. Burk. Pharmacy is [facility]. Agreed to meds to bed. No new DME needs anticipated at this time. Pt is totaly dependent with all ADL's and is mostly bedbound, occasionally getting up to the chair. He will need EMS transport when stable for discharge.                  Continued Care and Services - Admitted Since 8/28/2024       Destination       Service Provider Request Status Selected Services Address Phone Fax Patient Preferred    [facility] [place] Accepted N/A [address] [place] [address] [phone] --                     Demographic Summary       Row Name 08/29/24 1733       General Information    Admission Type inpatient    Arrived From emergency department    Required Notices Provided Important Message from Medicare  POA gave verbal consent    Referral Source admission list    Reason for Consult discharge planning    Preferred Language English       Contact Information    Permission Granted to Share Info With case  manager;family/designee                   Functional Status       Row Name 08/29/24 1731       Functional Status    Usual Activity Tolerance poor    Current Activity Tolerance poor       Physical Activity    On average, how many days per week do you engage in moderate to strenuous exercise (like a brisk walk)? 0 days    On average, how many minutes do you engage in exercise at this level? 0 min    Number of minutes of exercise per week 0       Assessment of Health Literacy    How often do you have someone help you read hospital materials? Always    How often do you have problems learning about your medical condition because of difficulty understanding written information? Always    How often do you have a problem understanding what is told to you about your medical condition? Always    How confident are you filling out medical forms by yourself? Not at all    Health Literacy Low       Functional Status, IADL    Medications completely dependent    Meal Preparation completely dependent    Housekeeping completely dependent    Laundry completely dependent    Shopping completely dependent       Mental Status    General Appearance WDL WDL       Mental Status Summary    Recent Changes in Mental Status/Cognitive Functioning no changes    Mental Status Comments POA states his baseline is nonverbal but responsive and will follow commands       Employment/    Employment Status disabled                   Psychosocial    No documentation.                  Abuse/Neglect    No documentation.                  Legal    No documentation.                  Substance Abuse    No documentation.                  Patient Forms    No documentation.                     Bola Reyes RN

## 2024-08-29 NOTE — PROGRESS NOTES
Pharmacokinetic Consult - Ampicillin-Sulbactam Dosing    Gama Davila is a 84 y.o. male who has been consulted to dose ampicillin-sulbactam for  aspiration pneumonia .    Current Antimicrobial Therapy    Anti-Infectives (From admission, onward)      Ordered     Dose/Rate Route Frequency Start Stop    08/28/24 2335  azithromycin (ZITHROMAX) 500 mg in sodium chloride 0.9 % 250 mL IVPB-VTB        Ordering Provider: Frantz Muhammad MD    500 mg  over 60 Minutes Intravenous Every 24 Hours 08/29/24 1400 08/31/24 1359    08/29/24 0015  ampicillin-sulbactam (UNASYN) 3 g in sodium chloride 0.9 % 100 mL IVPB-VTB        Ordering Provider: Frantz Muhammad MD    3 g  over 30 Minutes Intravenous Every 6 Hours 08/29/24 0115 09/03/24 0059    08/28/24 1706  ampicillin-sulbactam (UNASYN) 3 g in sodium chloride 0.9 % 100 mL IVPB-VTB        Ordering Provider: Steven Waller DO    3 g  over 30 Minutes Intravenous Once 08/28/24 1722 08/28/24 1923    08/28/24 1706  azithromycin (ZITHROMAX) 500 mg in sodium chloride 0.9 % 250 mL IVPB-VTB        Ordering Provider: Steven Waller DO    500 mg  over 60 Minutes Intravenous Once 08/28/24 1722 08/28/24 2101            Microbiology Results (last 10 days)       Procedure Component Value - Date/Time    COVID-19 and FLU A/B PCR, 1 HR TAT - Swab, Nasopharynx [[id number]]  (Normal) Collected: 08/28/24 1701    Lab Status: Final result Specimen: Swab from Nasopharynx Updated: 08/28/24 1727     COVID19 Not Detected     Influenza A PCR Not Detected     Influenza B PCR Not Detected    Narrative:      Fact sheet for providers: https://www.fda.gov/media/095919/download    Fact sheet for patients: https://www.fda.gov/media/242518/download    Test performed by PCR.             Allergies  Phenergan [promethazine hcl], Carbamazepine, Hydrocodone, and Lisinopril    Relevant clinical data and objective history reviewed:  Creatinine   Date Value Ref Range Status   08/28/2024 1.41 (H) 0.76 - 1.27 mg/dL Final      Estimated Creatinine Clearance: 39.8 mL/min (A) (by C-G formula based on SCr of 1.41 mg/dL (H)).  I/O last 3 completed shifts:  In: 500 [IV Piggyback:500]  Out: -   Patient weight: 81.4 kg (179 lb 7.3 oz)    Asessment/Plan    Initiate ampicillin-sulbactam 3 g IV every 6 hours  Pharmacy will monitor Mr. Davila's renal function and clinical status and adjust the ampicillin-sulbactam dose and/or frequency as needed.    Thank you,  Ashley Thapa [facility],PharmD  8/29/2024  01:36 EDT

## 2024-08-29 NOTE — NURSING NOTE
Seen for wound consult. Cooperative with assessment. Meeta GONZALES assisted.   Right heel unstageable pressure injury. Was most likely a blister at some point. The peripheral area is a dry eschar with center being pink loose skin consistent with blister. Orders to paint with betadine daily. Heel lift boots in use.   Standing orders for care include a turning schedule, barrier cream twice daily and prn after cleansing, float heels off bed with pillows or heel lift boots, encourage increase mobility as appropriate and tolerated to reduce pressure, and a nutrition consult for dietary needs. Staff to contact provider and re-consult wound nurse for new skin issues or lack of improvement with current orders. Thank you for the consult. If you have questions or concerns do not hesitate to contact me.

## 2024-08-29 NOTE — CONSULTS
[facility]    INPATIENT PALLIATIVE CARE CONSULT      Name:  Gama Davila   Age:  84 y.o.  Sex:  male  :  1940  MRN:  [id number]   Visit Number:  [id number]  Date of Service:  24  Date of Admission:  2024  Primary Care Physician:  Alex Burk DO    Consulting Physician:  Dr. Sood    Reason For Consult:  Introduction to Palliative services, Goals of care discussions , and Support during serious illness    History of Present Illness:  Gama Davila is a 84 y.o. male LTC resident of [facility] with past medical history of dementia, parkinson's disease, type 2 diabetes on insulin, CAD status post CABG, impaired mobility and ADLs.  Patient presented to [facility] via EMS on 2024 secondary to dyspnea with associated hypoxia.  Per chart review upon EMS arrival patient with poor pulse oximetry for which he was placed on 15 L NRBM, given DuoNeb x 2 enroute.  Upon arrival to the ED patient was febrile, Tmax 101, oxygen saturation 96% on 3 L nasal cannula.  ABG noted pH 7.43, pCO2 40, pO2 64, bicarb 27.  proBNP within normal limits.  CMP noted BUN 26, creatinine 1.4, glucose 214.  Lactic acid elevated at 5.9.  Procalcitonin and CBC reassuring.  COVID and flu negative.  Chest x-ray showed left lower zone opacity.  CTA of the chest noted no central pulmonary emboli.  There was a possible single nonocclusive embolism in the segment of the right upper lobe versus artifact.  However noting bilateral lower lobe airspace disease concerning for bibasilar pneumonia.  Patient was initiated on antibiotics, given IV fluid bolus, and admitted to the primary medicine service for continued evaluation and management of bilateral pneumonia.  SLP consulted, concern for signs suggestive of silent aspiration and dysphagia.  Diet modified to puréed with nectar thick.  Palliative care consulted to further review goals of care in the setting of complex medical decision  making.    Palliative care team met with patient and spouse at bedside.  Patient, who is largely non-verbal at baseline unable to supplement HPI.  Patient able to shake his head y/n to simple questions, however at times will not give responses.  He appears without acute distress, somewhat anxious.  Spouse at bedside reports that prior to this acute hospitalization, patient living at LTC facility [facility] for the past 4 years.  He resided in a memory care unit in [place] 2020.  Of note, patient has not required hospitalization since 2020, when he required admission secondary to septic shock.  However has required ED evaluation x2 this year, including most recently in May for aspiration event.  Fortunately he did not require admission and was discharged back to SNF.  He has previously been able to be up in a wheelchair, however recently unable and in the bed more often.  He is dependent upon staff for most all ADLs.  He is incontinent at baseline.  Family agreeable to continued palliative care follow up and discussions regarding goals of care and advance care planning.     Review of Systems   Unable to perform ROS: Dementia        Medical History:  Past Medical History:   Diagnosis Date    Arthritis     Cancer     SKIN     Cataract     Coronary artery disease     Diabetes mellitus     High cholesterol     [place] (hard of hearing)     PATIENT HAS HEARING AIDS    Hypertension     Irregular heart beat     HISTORY OF CARDIAC ABLATION IN 2003    Wears dentures     FULL UPPER PLATE      Past Surgical History:   Procedure Laterality Date    BACK SURGERY  1978    THEN AGAIN IN 1982    CARDIAC ABLATION  2003    CARDIAC SURGERY      CATARACT EXTRACTION W/ INTRAOCULAR LENS IMPLANT Left 5/24/2017    Procedure: CATARACT PHACO EXTRACTION WITH INTRAOCULAR LENS IMPLANT LEFT WITH TORIC LENS;  Surgeon: Alma Benavidez MD;  Location: [facility];  Service:     CATARACT EXTRACTION W/ INTRAOCULAR LENS IMPLANT Right 6/14/2017     Procedure: CATARACT PHACO EXTRACTION WITH INTRAOCULAR LENS IMPLANT RIGHT WITH TORIC LENS;  Surgeon: Alma Benavidez MD;  Location: [facility];  Service:     CORONARY ARTERY BYPASS GRAFT  2003    SPOUSE UNSURE OF HOW MANY VESSELS    HIATAL HERNIA REPAIR  1972    JOINT REPLACEMENT Left     HIP - DONE BY DR DALAL    KNEE ARTHROSCOPY Left     NOSE SURGERY  1970    SPOUSE REPORTS FOR A BROKEN NOSE    OTHER SURGICAL HISTORY Left     TENDON TORN LOOSE FROM BONE, LEFT ELBOW    OTHER SURGICAL HISTORY  1986    RUPTURED DISC    OTHER SURGICAL HISTORY  1994    NECK SURGERY FOR RUPTURED DISC    OTHER SURGICAL HISTORY  1996    HAD SECOND NECK SURGERY    OTHER SURGICAL HISTORY  1998    RUPTURED DISC    OTHER SURGICAL HISTORY  2003    RUPTURED DISC    OTHER SURGICAL HISTORY  2009    HARDWARE REMOVAL FROM BACK BY DR HAY     History reviewed. No pertinent family history.  Allergies: Phenergan [promethazine hcl], Carbamazepine, Hydrocodone, and Lisinopril    Hospital Scheduled Medications:    Current Facility-Administered Medications:     acetaminophen (TYLENOL) tablet 650 mg, 650 mg, Oral, Q4H PRN **OR** acetaminophen (TYLENOL) 160 MG/5ML oral solution 650 mg, 650 mg, Oral, Q4H PRN **OR** acetaminophen (TYLENOL) suppository 650 mg, 650 mg, Rectal, Q4H PRN, Frantz Muhammad MD    ampicillin-sulbactam (UNASYN) 3 g in sodium chloride 0.9 % 100 mL IVPB-VTB, 3 g, Intravenous, Q6H, Frantz Muhammad MD, 3 g at 08/29/24 1404    aspirin EC tablet 81 mg, 81 mg, Oral, Daily, Frantz Muhammad MD, 81 mg at 08/29/24 0851    atorvastatin (LIPITOR) tablet 80 mg, 80 mg, Oral, Daily, Frantz Muhammad MD, 80 mg at 08/29/24 0852    azithromycin (ZITHROMAX) 500 mg in sodium chloride 0.9 % 250 mL IVPB-VTB, 500 mg, Intravenous, Q24H, Frantz Muhammad MD, 500 mg at 08/29/24 1403    sennosides-docusate (PERICOLACE) 8.6-50 MG per tablet 2 tablet, 2 tablet, Oral, BID PRN **AND** polyethylene glycol (MIRALAX) packet 17 g, 17 g, Oral, Daily PRN **AND** bisacodyl  (DULCOLAX) EC tablet 5 mg, 5 mg, Oral, Daily PRN **AND** bisacodyl (DULCOLAX) suppository 10 mg, 10 mg, Rectal, Daily PRN, Frantz Muhammad MD    carbidopa-levodopa (SINEMET)  MG per tablet 1 tablet, 1 tablet, Oral, TID, Frantz Muhammad MD, 1 tablet at 08/29/24 0852    dextrose (D50W) (25 g/50 mL) IV injection 25 g, 25 g, Intravenous, Q15 Min PRN, Frantz Muhammad MD    dextrose (GLUTOSE) oral gel 15 g, 15 g, Oral, Q15 Min PRN, Frantz Muhammad MD    Divalproex Sodium (DEPAKOTE SPRINKLE) capsule 125 mg, 125 mg, Oral, BID, Frantz Muhammad MD, 125 mg at 08/29/24 0852    Enoxaparin Sodium (LOVENOX) syringe 40 mg, 40 mg, Subcutaneous, Nightly, Frantz Muhammad MD, 40 mg at 08/29/24 0212    glucagon (GLUCAGEN) injection 1 mg, 1 mg, Intramuscular, Q15 Min PRN, Frantz Muhammad MD    insulin regular (humuLIN R,novoLIN R) injection 2-7 Units, 2-7 Units, Subcutaneous, Q6H, Frantz Muhammad MD, 2 Units at 08/29/24 0729    metoprolol tartrate (LOPRESSOR) tablet 50 mg, 50 mg, Oral, BID, Frantz Muhammad MD, 50 mg at 08/29/24 0852    ondansetron (ZOFRAN) injection 4 mg, 4 mg, Intravenous, Q6H PRN, Frantz Muhammad MD    Pharmacy Consult - Pharmacy to dose Ampicillin-Sulbactam, , Does not apply, Continuous PRN, Frantz Muhammad MD    Pharmacy to Dose enoxaparin (LOVENOX), , Does not apply, Continuous PRN, Frantz Muhammad MD    sodium chloride 0.9 % flush 10 mL, 10 mL, Intravenous, PRN, Steven Waller DO    sodium chloride 0.9 % flush 10 mL, 10 mL, Intravenous, Q12H, Frantz Muhammad MD, 10 mL at 08/29/24 0852    sodium chloride 0.9 % flush 10 mL, 10 mL, Intravenous, Jb BARRAZA Roshan, MD    sodium chloride 0.9 % infusion 40 mL, 40 mL, Intravenous, Jb BARRAZA Roshan, MD  I have utilized all immediate resources to obtain, update, or review the patient's current medications (including all prescription, over-the-counter products, herbals, and/or nutritional supplements).      Vitals: /88 (BP Location: Left arm, Patient Position: Lying)   Pulse 64   Temp  "98.7 °F (37.1 °C) (Axillary)   Resp 19   Ht 170.2 cm (67\")   Wt 81.4 kg (179 lb 7.3 oz)   SpO2 98%   BMI 28.11 kg/m²     Intake/Output Summary (Last 24 hours) at 8/29/2024 1443  Last data filed at 8/29/2024 0740  Gross per 24 hour   Intake 1525 ml   Output --   Net 1525 ml       Physical Exam  Vitals and nursing note reviewed.   Constitutional:       General: He is not in acute distress.     Appearance: He is well-developed. He is not diaphoretic.      Comments: Appears chronically ill, NAD   HENT:      Head: Normocephalic and atraumatic.      Mouth/Throat:      Mouth: Mucous membranes are dry.   Eyes:      Conjunctiva/sclera: Conjunctivae normal.      Pupils: Pupils are equal, round, and reactive to light.   Cardiovascular:      Rate and Rhythm: Normal rate and regular rhythm.   Pulmonary:      Effort: No respiratory distress.      Breath sounds: Normal breath sounds.      Comments: Increased effort without acute distress  Abdominal:      General: Bowel sounds are normal. There is no distension.      Palpations: Abdomen is soft.      Tenderness: There is no abdominal tenderness.   Musculoskeletal:         General: No swelling. Normal range of motion.      Cervical back: Normal range of motion and neck supple.   Skin:     General: Skin is warm and dry.      Capillary Refill: Capillary refill takes less than 2 seconds.   Neurological:      Mental Status: He is alert.      Comments: Unable to fully assess, largely non-verbal.  Does not consistently follow commands   Psychiatric:      Comments: Appears somewhat anxious          Diagnostics Review:  Laboratory Data:  Results from last 7 days   Lab Units 08/29/24  0508 08/28/24  1701   SODIUM mmol/L 139 144   POTASSIUM mmol/L 4.4 4.7   CHLORIDE mmol/L 103 100   CO2 mmol/L 27.3 30.6*   BUN mg/dL 29* 26*   CREATININE mg/dL 1.53* 1.41*   CALCIUM mg/dL 8.4* 8.7   ALBUMIN g/dL  --  3.6   BILIRUBIN mg/dL  --  0.2   ALK PHOS U/L  --  114   ALT (SGPT) U/L  --  8   AST " "(SGOT) U/L  --  13   GLUCOSE mg/dL 160* 214*     Results from last 7 days   Lab Units 08/29/24  0508 08/28/24  1701   WBC 10*3/mm3 18.49* 7.66   HEMOGLOBIN g/dL 12.1* 14.6   HEMATOCRIT % 37.0* 44.1   PLATELETS 10*3/mm3 203 247         Results from last 7 days   Lab Units 08/28/24  1651   PH, ARTERIAL pH units 7.432   PO2 ART mm Hg 64.4*   PCO2, ARTERIAL mm Hg 40.0   HCO3 ART mmol/L 26.7           Invalid input(s): \"USDES\", \"NITRITITE\", \"BACT\", \"EP\"  Pain Management Panel           No data to display                  Nutritional Status:   Results from last 7 days   Lab Units 08/28/24  1701   ALBUMIN g/dL 3.6     Body mass index is 28.11 kg/m².  Documented weights    08/28/24 1656 08/28/24 2320   Weight: 82.1 kg (181 lb) 81.4 kg (179 lb 7.3 oz)        Radiology:  XR Chest 1 View    Result Date: 8/29/2024  PROCEDURE: XR CHEST 1 VW-  HISTORY: SOA Triage Protocol, abnormal cardiovascular exam  COMPARISON: 5/26/2024  FINDINGS: No acute pulmonary opacity is present. There is no evidence of effusion or pneumothorax.    There is evidence of prior median sternotomy, presumably from CABG. Otherwise, mediastinum is unremarkable.   Heart size is normal.      Unremarkable chest, status post CABG.    This report was signed and finalized on 8/29/2024 8:16 AM by Thierry Medel MD.      CT Angiogram Abdomen Pelvis    Result Date: 8/28/2024  FINAL REPORT TECHNIQUE: null CLINICAL HISTORY: sepsis, elevated lactic acid COMPARISON: null FINDINGS: CT angiography abdomen and pelvis with contrast. 3-D post processing. Comparison: CT/SR - CT ANGIOGRAM CHEST PULMONARY EMBOLISM - 08/28/2024 07:30 PM EDT CT/SR - CT ANGIOGRAM CHEST PULMONARY EMBOLISM - 05/26/2024 09:50 PM EDT Findings: There is moderate to severe bibasilar airspace opacity. Calcification of the coronary vasculature is present. Gallbladder is within normal limits. Fatty atrophy of the pancreas is present. Remaining solid organs are within normal limits. Moderate hiatal hernia. No " bowel obstruction, pneumoperitoneum, or pneumatosis. Pelvic contents unremarkable. Normal appendix. There is mild diffuse plaque causing mild diffuse stenosis of the abdominal aorta, without evidence of aneurysm or dissection. The celiac and superior mesenteric arteries are patent. Inferior mesenteric artery is patent. There are single bilateral renal arteries without significant stenosis. The bilateral common, internal, and external iliac arteries demonstrate mild diffuse calcific stenosis. Common, profunda, and superficial femoral arteries demonstrate mild calcific stenoses. The bones are intact. Lumbosacral fusion hardware is present. Multilevel disc space narrowing and endplate osteophyte formation, as well as facet hypertrophy. Median sternotomy.     IMPRESSION: 1. Bibasilar pneumonia. 2. Hiatal hernia. 3. Coronary artery disease. 4. No acute process involving the arterial tree of the abdomen and pelvis. Authenticated and Electronically Signed by Caitlin Wade on 08/28/2024 08:42:24 PM    CT Angiogram Chest Pulmonary Embolism    Result Date: 8/28/2024  FINAL REPORT TECHNIQUE: null CLINICAL HISTORY: hypoxic resp failure COMPARISON: null FINDINGS: Exam: CTA Chest with IV contrast. Procedure: Coronal and sagittal MIP reformats were performed Comparison: 05/26/2024 Clinical history: Hypoxic respiratory failure Findings: Limited due to patient motion artifact. No central pulmonary emboli. Possible single nonocclusive embolism in a segmental right upper lobe pulmonary artery on axial series 5, image 83 versus artifact. No other areas concerning for PE. No thoracic aortic aneurysm or dissection. Atherosclerotic calcifications are seen at the aorta and coronary arteries. No hilar or mediastinal adenopathy. No significant pericardial fluid collection. No pneumothorax. No pleural fluid collection. Bilateral lower lobe airspace opacities concerning for bibasilar pneumonia. Small to moderate-sized hiatal hernia. Visualized  liver and spleen do not demonstrate any acute process Fatty infiltration of the pancreas. No gallstones. Right renal stones versus atherosclerotic calcification     Impression: 1. Evaluation limited by motion. No central pulmonary emboli. Possible single nonocclusive embolism in a segmental right upper lobe pulmonary artery versus artifact 2. No thoracic aortic aneurysm or dissection 3. Bilateral lower lobe airspace opacities concerning for bibasilar pneumonia. Authenticated and Electronically Signed by Lorrie Bullard MD on 08/28/2024 08:26:25 PM       Goals of Care/Advance Care Planning:  Advance Care Planning     1. Determination of Decisional Capacity:  Decisional capacity: NO At the time of palliative assessment, patient does not display capacity regarding complex medical decision making.  If no, name healthcare surrogate/legal decision maker: Taryn Davila and Palma Jones  Relationship to individual: Taryn (spouse) and Virginia (dtr)  Surrogate/decision maker understands role and will honor individual's decisions: YES    2. Advanced Directives:  I have personally reviewed Advance Directives on file including POA with HCS decision making   Healthcare surrogate/legal decision maker: see above    3. Patient & Family Meeting:  Members present at meeting Virginia, Virginie Madrigal, Leelee Mcbride, and Taryn by phone  Meeting took place at ICU waiting space     4. Summary of the discussion:  After generalized introductions, introduced the role of palliative care in assisting with complex medical decision making, goals of care discussions, and symptom management/supportive care.  Patient .  He remarried to his spouse Taryn, of 15 years.  He has 2 adult children and several grandchildren who live in [place].  Taryn shares that patient retired several years prior from [employer].  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  He enjoyed working, having a hard work ethic most all of his life.    I  reviewed patient's acute and chronic illness, noting concerns for aspiration/dysphagia.  I further reviewed generalized trajectory associated with chronic illness.  Patient has had fairly stable course over the past few years, has not required hospitalization since 2020.  Family reflecting on his progressive decline over the past few years.  Daughter reflects on previous conversations with patient prior to advanced illness, for which he adamantly expressed desire for DO NOT RESUSCITATE.  Reflecting that ultimately he would not desire to prolong life artificially in any way.  They reflect on quality of life versus quantity.  I reviewed additional LST topics including artifical nutrition and dialysis.  Both daughter and spouse confirming patient would not desire either, will update record to reflect the same.  Daughter shares that their mother passed away from ESRD, noting that patient care for her while she was undergoing dialysis for 17 years.  He explicitly stated he would not want to pursue dialysis.  I reviewed palliative versus hospice services and the goals associated with the same, ultimately family agreeable to palliative referral.  I reviewed aspiration/dysphagia concerns, all agreeing that if this is persistent and does not improve, patient would not desire to pursue hospital admissions, rather focus on his comfort.  They would be open to hospice referral at that time.   CODE STATUS reviewed/confirmed: DNR / DNI   Will update LST; NO desire for artifical nutrition or dialysis   Baseline Functional Status: Palliative Performance Scale Score: Performance 40% based on the following measures: Ambulation: Mainly in bed, Activity and Evidence of Disease: Unable to do any work, extensive evidence of disease, Self-Care: Mainly assistance required,  Intake: Normal or reduced, LOC: Full, drowsy or confusion           Palliative Care Assessment:  Dementia  Parkinson's disease  Acute hypoxic respiratory  failure  Bilateral lower lobe pneumonia 2/2 aspiration   Sepsis  Dysphagia  CKD stg II  Right heel unstageable pressue injury    Recommendations/Plan:  - CODE STATUS reviewed/confirmed: DNR / DNI   - Have addended LST to reflect no desire for artificial nutrition or dialysis  - Goal at the present is to continue with plan in place as outlined per the primary medicine service, agreeable to palliative referral once medically stable for discharge back to LTC facility  - Family note that if patient condition is declining and dysphagia is persistent, they are open to hospice referral, as patient would not desire to pursue ongoing/frequent hospitalizations  - They do wish for him to continue therapy services if recommended  - At the present, patient does not meet criteria for hospice, however if dysphagia symptoms are persistent with persistent silent aspiration, likely will meet criteria in the future if goals are in alignment   - No acute symptoms present   - Current Functional Status: Palliative Performance Scale Score: Performance 40% based on the following measures: Ambulation: Mainly in bed, Activity and Evidence of Disease: Unable to do any work, extensive evidence of disease, Self-Care: Mainly assistance required,  Intake: Normal or reduced, LOC: Full, drowsy or confusion  - Palliative care will continue to follow/support patient and family        I appreciate the opportunity to participate in the care of Mr. Gama Davila.  Please do not hesitate to contact me with any questions or concerns.      I spent 60 total minutes conducting chart review for relevant information, face to face assessment, counseling patient and/or family, and coordination of care.  10 minutes spent discussing advanced care planning.  Part of this note may be an electronic transcription/translation of spoken language to printed text using the Dragon Dictation System.       Leelee Mcbride PA-C  08/29/24  14:43 EDT

## 2024-08-29 NOTE — PLAN OF CARE
"Goal Outcome Evaluation:         Palliative Care Team Consult received for    Goals of Care Discussion/ACP    At time of consult pt was listed as CODE STATUS:  No CPR, Limited Support--No Intubation.   Pt has a POA on file in the EMR    Admission Assessment:  Acute Hypoxic Respiratory Failure , Bilateral LL Aspiration PN, Sepsis, CKD-2, DM-2 with Neuropathy, CAD s/p CABG, Dementia with Parkinson's features, Right heel pressure ulcer    Pt admitted from [facility] facility.      1520  Initially met with spouse to get to know the pt.  Pt had been in [facility] until admission to [facility].  He has been at [facility] x 4 years.  Pt's spouse reported they have been  x 15 yrs.  Pt has a daughter, son and grandchildren.  He is originally from [place] and worked at \"[employer] until retiring.  Leelee explained Palliative Care to spouse but also wanted to meet with spouse and daughter via telephone later in the day.     1655  PC team visited with pt and spouse.  Pt would answer some simple questions with nodding or shaking of the head.  He did attempt one time to speak which was audible but unable to understand.      We then met with pt's spouse and daughter privately to discuss his condition, Goals of Care and future medical treatment.    During the conversation, Leelee explained what Palliative Care is and the criteria as well as Hospice.  Daughter and spouse confirmed pt's CODE STATUS as DNR/DNI.  Leelee discussed other life prolonging measures such as feeding tube and Hemodialysis.  Both spouse and daughter reported pt would not want either.      After a long conversation, family was in agreement with editing pt's Code Status to reflect his wishes in more detail.  Family agreed at this point they did not think pt was ready for Hospice services which Leelee agreed with.  They were in agreement with recommendation for Palliative Care Services following pt at the [facility] facility either the " facility's PC program or PC in the Community program.      An EMS/DNR was explained and spouse signed the form.  It was witnessed and placed on the chart.  Pt's primary nurse, Meeta GONZALES was updated on conversation.     PC will continue to follow.

## 2024-08-29 NOTE — H&P
[facility]   HISTORY AND PHYSICAL      Name:  Gama Davila   Age:  84 y.o.  Sex:  male  :  1940  MRN:  [id number]   Visit Number:  [id number]  Admission Date:  2024  Date Of Service:  24  Primary Care Physician:  Alex Burk DO    Chief Complaint:     Shortness of breath.    History Of Presenting Illness:      Gama Davila is an 84-year-old male, nursing home resident, with history of diabetes mellitus type 2 on insulin, coronary artery disease status post CABG, dementia was sent from the facility by EMS with symptoms of shortness of breath and respiratory distress.  When EMS arrived they were unable to get a good pulse oximetry and applied 15 L of nonrebreather mask.  Patient was also noted to be wheezing and administered DuoNebs x 2 en route.  Unable to obtain any history from the patient as he is nonverbal at baseline.    In the emergency room, he was febrile at 101 with pulse 85 and blood pressure 138/85 and pulse oxygen saturation of 96% on 3 L of nasal cannula oxygen.  ABG showed a pH of 7.43, pCO2 40, pO2 64 and bicarb of 27 on 2 L of nasal cannula oxygen.  Initial troponin 28 but proBNP within normal range.  CMP was remarkable for a BUN of 26, creatinine 1.4 (around baseline), glucose 214.  LFT was unremarkable.  Lactic acid was elevated at 5.9.  Procalcitonin and CBC was unremarkable.  Blood cultures were drawn.  COVID and flu test were negative.  Chest x-ray showed left lower zone opacity.  CT angiogram of the chest was limited by motion but showed no central pulmonary emboli.  Possible single nonocclusive embolism in the segment of the right upper lobe versus artifact.  Bilateral lower lobe airspace disease concerning for bibasilar pneumonia noted.  CT angiogram of the abdomen and pelvis showed bilateral pneumonia, hiatal hernia, coronary artery disease but no acute process involving the arterial tree of the abdomen and pelvis.  Patient was given  Tylenol, Unasyn and azithromycin in the emergency room.  He also received 500 mL of normal saline bolus and was subsequently admitted to the medical floor with telemetry for management of respiratory failure, bilateral pneumonia.    Review Of Systems:    All systems were reviewed and negative except as mentioned in history of presenting illness, assessment and plan.    Past Medical History: Patient  has a past medical history of Arthritis, Cancer, Cataract, Coronary artery disease, Diabetes mellitus, High cholesterol, [place] (hard of hearing), Hypertension, Irregular heart beat, and Wears dentures.    Past Surgical History: Patient  has a past surgical history that includes Cardiac Ablation (2003); Coronary artery bypass graft (2003); Nose surgery (1970); Hiatal hernia repair (1972); Back surgery (1978); Other surgical history (Left); Other surgical history (1986); Knee Arthroscopy (Left); Other surgical history (1994); Other surgical history (1996); Other surgical history (1998); Other surgical history (2003); Cardiac surgery; Other surgical history (2009); Joint replacement (Left); Cataract extraction w/ intraocular lens implant (Left, 5/24/2017); and Cataract extraction w/ intraocular lens implant (Right, 6/14/2017).    Social History: Patient  reports that he quit smoking about 41 years ago. His smoking use included cigarettes. He has never used smokeless tobacco. He reports that he does not drink alcohol and does not use drugs.    Family History:  Nothing significant to the current illness.    Allergies:      Phenergan [promethazine hcl], Carbamazepine, Hydrocodone, and Lisinopril    Home Medications:    Prior to Admission Medications       Prescriptions Last Dose Informant Patient Reported? Taking?    acetaminophen (TYLENOL) 325 MG tablet   Yes No    Take 2 tablets by mouth Every 6 (Six) Hours As Needed for Mild Pain or Fever.    aspirin 81 MG EC tablet  Spouse/Significant Other Yes No    Take 1 tablet by mouth  Daily.    atorvastatin (LIPITOR) 80 MG tablet  Spouse/Significant Other Yes No    Take 1 tablet by mouth Daily.    carbidopa-levodopa (SINEMET)  MG per tablet   No No    Take 1 tablet by mouth 3 (Three) Times a Day.    Chloroxylenol-Zinc Oxide (LAURA EX)   Yes No    Apply 1 application topically Daily As Needed.    citalopram (CeleXA) 20 MG tablet   No No    Take 1 tablet by mouth Daily.    Divalproex Sodium (DEPAKOTE SPRINKLE) 125 MG capsule   Yes No    Take 1 capsule by mouth 2 (Two) Times a Day.    docusate sodium (COLACE) 100 MG capsule   Yes No    Take 2 capsules by mouth Daily As Needed for Constipation.    donepezil (ARICEPT) 10 MG tablet   No No    Take 1 tablet by mouth Every Night.    famotidine (PEPCID) 20 MG tablet   Yes No    Take 1 tablet by mouth Daily.    gabapentin (NEURONTIN) 100 MG capsule   No No    Take 1 capsule by mouth 3 (Three) Times a Day.    insulin aspart (novoLOG FLEXPEN) 100 UNIT/ML solution pen-injector sc pen   Yes No    Inject  under the skin into the appropriate area as directed 3 (Three) Times a Day With Meals. Sliding scale    Insulin Glargine (BASAGLAR KWIKPEN) 100 UNIT/ML injection pen   Yes No    Inject 15 Units under the skin into the appropriate area as directed Every Night.    ipratropium-albuterol (DUO-NEB) 0.5-2.5 mg/3 ml nebulizer   Yes No    Take 3 mL by nebulization Every 6 (Six) Hours As Needed for Wheezing.    melatonin 3 MG tablet   Yes No    Take 2 tablets by mouth Every Night.    memantine (NAMENDA) 10 MG tablet   Yes No    Take 1 tablet by mouth 2 (Two) Times a Day.    metFORMIN (GLUCOPHAGE) 500 MG tablet  Spouse/Significant Other Yes No    Take 1 tablet by mouth Daily With Breakfast.    metoprolol tartrate (LOPRESSOR) 50 MG tablet   Yes No    Take 1 tablet by mouth 2 (Two) Times a Day.    ondansetron (ZOFRAN) 4 MG tablet   Yes No    Take 1 tablet by mouth Every 6 (Six) Hours As Needed for Nausea or Vomiting.    pantoprazole (PROTONIX) 40 MG EC tablet   Yes  "No    Take 1 tablet by mouth Daily.    polyethylene glycol (MiraLax) 17 GM/SCOOP powder   Yes No    Take 17 g by mouth Daily As Needed.    QUEtiapine (SEROquel) 100 MG tablet   Yes No    Take 1 tablet by mouth Every Night.    QUEtiapine (SEROquel) 25 MG tablet   Yes No    Take 0.5 tablets by mouth Daily As Needed.    tamsulosin (FLOMAX) 0.4 MG capsule 24 hr capsule   Yes No    Take 1 capsule by mouth Daily.    valproic acid (DEPAKENE) 250 MG capsule   Yes No    Take 2 capsules by mouth 2 (Two) Times a Day.     ED Medications:    Medications   sodium chloride 0.9 % flush 10 mL (has no administration in time range)   ampicillin-sulbactam (UNASYN) 3 g in sodium chloride 0.9 % 100 mL IVPB-VTB (0 g Intravenous Stopped 8/28/24 1923)   azithromycin (ZITHROMAX) 500 mg in sodium chloride 0.9 % 250 mL IVPB-VTB (0 mg Intravenous Stopped 8/28/24 2101)   sodium chloride 0.9 % bolus 500 mL (0 mL Intravenous Stopped 8/28/24 1834)   acetaminophen (TYLENOL) suppository 650 mg (650 mg Rectal Given 8/28/24 1901)   iopamidol (ISOVUE-300) 61 % injection 100 mL (100 mL Intravenous Given 8/28/24 1956)     Vital Signs:  Temp:  [101.1 °F (38.4 °C)-103.3 °F (39.6 °C)] 101.9 °F (38.8 °C)  Heart Rate:  [77-85] 77  Resp:  [24] 24  BP: (112-138)/(63-85) 112/64        08/28/24  1656   Weight: 82.1 kg (181 lb)     Body mass index is 27.52 kg/m².    Physical Exam:     Most recent vital Signs: /64   Pulse 77   Temp (!) 101.9 °F (38.8 °C) (Axillary)   Resp 24   Ht 172.7 cm (68\")   Wt 82.1 kg (181 lb)   SpO2 96%   BMI 27.52 kg/m²     Physical Exam  Constitutional:       General: He is not in acute distress.     Appearance: He is ill-appearing.      Comments: Nonverbal and noncooperative.   HENT:      Head: Normocephalic and atraumatic.      Right Ear: External ear normal.      Left Ear: External ear normal.      Nose: Nose normal.      Mouth/Throat:      Mouth: Mucous membranes are moist.   Eyes:      Conjunctiva/sclera: Conjunctivae " normal.   Cardiovascular:      Rate and Rhythm: Normal rate and regular rhythm.      Pulses: Normal pulses.      Heart sounds: Normal heart sounds. No murmur heard.  Pulmonary:      Effort: Pulmonary effort is normal.      Breath sounds: No stridor. Wheezing and rales present.      Comments: Bilateral extensive coarse crackles and wheezing heard.  Abdominal:      Palpations: Abdomen is soft.      Tenderness: There is no abdominal tenderness. There is no guarding or rebound.      Comments: Obese abdomen.   Musculoskeletal:      Cervical back: Neck supple.   Skin:     General: Skin is warm.      Findings: No erythema or rash.      Comments: Unstageable right heel pressure ulcer noted.   Neurological:      Mental Status: He is alert. Mental status is at baseline.      Comments: Alert but nonverbal.  Confused.  Severe rigidity noted in both upper and lower extremities.  Resting tremor noted.   Psychiatric:      Comments: Confused       Laboratory data:    I have reviewed the labs done in the emergency room.    Results from last 7 days   Lab Units 08/28/24  1701   SODIUM mmol/L 144   POTASSIUM mmol/L 4.7   CHLORIDE mmol/L 100   CO2 mmol/L 30.6*   BUN mg/dL 26*   CREATININE mg/dL 1.41*   CALCIUM mg/dL 8.7   BILIRUBIN mg/dL 0.2   ALK PHOS U/L 114   ALT (SGPT) U/L 8   AST (SGOT) U/L 13   GLUCOSE mg/dL 214*     Results from last 7 days   Lab Units 08/28/24  1701   WBC 10*3/mm3 7.66   HEMOGLOBIN g/dL 14.6   HEMATOCRIT % 44.1   PLATELETS 10*3/mm3 247         Results from last 7 days   Lab Units 08/28/24  1701   HSTROP T ng/L 28*     Results from last 7 days   Lab Units 08/28/24  1701   PROBNP pg/mL 594.3       Results from last 7 days   Lab Units 08/28/24  1651   PH, ARTERIAL pH units 7.432   PO2 ART mm Hg 64.4*   PCO2, ARTERIAL mm Hg 40.0   HCO3 ART mmol/L 26.7     EKG:      EKG done in the emergency room was reviewed by me.  It shows sinus rhythm at 85 bpm.  Normal axis.  Poor R wave progression noted on the chest leads.  No  abnormal Q waves noted.  No significant ST-T changes were noted.    Radiology:    CT Angiogram Abdomen Pelvis    Result Date: 8/28/2024  FINAL REPORT TECHNIQUE: null CLINICAL HISTORY: sepsis, elevated lactic acid COMPARISON: null FINDINGS: CT angiography abdomen and pelvis with contrast. 3-D post processing. Comparison: CT/SR - CT ANGIOGRAM CHEST PULMONARY EMBOLISM - 08/28/2024 07:30 PM EDT CT/SR - CT ANGIOGRAM CHEST PULMONARY EMBOLISM - 05/26/2024 09:50 PM EDT Findings: There is moderate to severe bibasilar airspace opacity. Calcification of the coronary vasculature is present. Gallbladder is within normal limits. Fatty atrophy of the pancreas is present. Remaining solid organs are within normal limits. Moderate hiatal hernia. No bowel obstruction, pneumoperitoneum, or pneumatosis. Pelvic contents unremarkable. Normal appendix. There is mild diffuse plaque causing mild diffuse stenosis of the abdominal aorta, without evidence of aneurysm or dissection. The celiac and superior mesenteric arteries are patent. Inferior mesenteric artery is patent. There are single bilateral renal arteries without significant stenosis. The bilateral common, internal, and external iliac arteries demonstrate mild diffuse calcific stenosis. Common, profunda, and superficial femoral arteries demonstrate mild calcific stenoses. The bones are intact. Lumbosacral fusion hardware is present. Multilevel disc space narrowing and endplate osteophyte formation, as well as facet hypertrophy. Median sternotomy.     IMPRESSION: 1. Bibasilar pneumonia. 2. Hiatal hernia. 3. Coronary artery disease. 4. No acute process involving the arterial tree of the abdomen and pelvis. Authenticated and Electronically Signed by Caitlin Wade on 08/28/2024 08:42:24 PM    CT Angiogram Chest Pulmonary Embolism    Result Date: 8/28/2024  FINAL REPORT TECHNIQUE: null CLINICAL HISTORY: hypoxic resp failure COMPARISON: null FINDINGS: Exam: CTA Chest with IV contrast.  Procedure: Coronal and sagittal MIP reformats were performed Comparison: 05/26/2024 Clinical history: Hypoxic respiratory failure Findings: Limited due to patient motion artifact. No central pulmonary emboli. Possible single nonocclusive embolism in a segmental right upper lobe pulmonary artery on axial series 5, image 83 versus artifact. No other areas concerning for PE. No thoracic aortic aneurysm or dissection. Atherosclerotic calcifications are seen at the aorta and coronary arteries. No hilar or mediastinal adenopathy. No significant pericardial fluid collection. No pneumothorax. No pleural fluid collection. Bilateral lower lobe airspace opacities concerning for bibasilar pneumonia. Small to moderate-sized hiatal hernia. Visualized liver and spleen do not demonstrate any acute process Fatty infiltration of the pancreas. No gallstones. Right renal stones versus atherosclerotic calcification     Impression: 1. Evaluation limited by motion. No central pulmonary emboli. Possible single nonocclusive embolism in a segmental right upper lobe pulmonary artery versus artifact 2. No thoracic aortic aneurysm or dissection 3. Bilateral lower lobe airspace opacities concerning for bibasilar pneumonia. Authenticated and Electronically Signed by Lorrie Bullard MD on 08/28/2024 08:26:25 PM     Assessment:    Acute hypoxic respiratory failure secondary to #2, POA.  Bilateral lower lobe aspiration pneumonia, unable to classify further, POA.  Sepsis secondary to #2, POA.  Chronic kidney disease stage II.  Diabetes mellitus type 2 with nephropathy.  Coronary artery disease status post CABG.  Dementia with Parkinson's features.  Right heel pressure ulcer, POA.    Plan:    Respiratory failure/bilateral pneumonia.  - Continue IV antibiotics therapy with Unasyn and azithromycin.  - Blood cultures have been sent.  - Continue nasal cannula oxygen to maintain saturations above 90%.  - Bronchodilators as needed.  - Nasal swab screen for  MRSA.  - Speech therapy consult for evaluation of dysphagia.  - Keep n.p.o. for now with IV fluids.    Diabetes mellitus type 2.  - Subcutaneous insulin protocol for coverage.  - Obtain hemoglobin A1c levels.    Coronary artery disease.  - Continue home medications (aspirin, atorvastatin, metoprolol) once able to take orally.    As per nursing home records, patient's CODE STATUS is DNR/DNI.  Discussed with nursing staff at the bedside.    Risk Assessment: High  DVT Prophylaxis: Enoxaparin  Code Status: DNR/DNI  Diet: N.p.o.      Frantz Muhammad MD  08/28/24  22:42 EDT    Dictated utilizing Dragon dictation.

## 2024-08-29 NOTE — PLAN OF CARE
Goal Outcome Evaluation:  Plan of Care Reviewed With: patient        Progress: no change  Outcome Evaluation: Bedside eval of swallow completed with pt. seated upright in bed for po trials. Pt. was cooperative but nonverbal and extended time needed to complete some directions due to cognitive deficits. He was given trials of mechanical soft, puree, nectar-thick, and thin liquids. Oral phase was remarkable for extended prep time with mech soft and oral retention/posterior oral hold intermittently with various consistencies. Cognitive deficits are likely greatest factor affecting oral phase. Suspect pharyngeal phase dysphagia with pt. exhibiting delayed initiation of pharyngeal swallow and weak pharyngeal swallow per palpation. No overt s/s aspiration with any consistency, but pt. did exhibit an increase in palpated pharyngeal rattles as trials progressed as likely silent aspiration sign. His risk of aspiration, and silent aspiration, therefore is high. Also, pt. belched often during the eval post po trials. He has dx of GERD and a HH which increase his risk of reflux and reflux aspiration if not well managed. Recommend: 1. pureed diet with nectar-thick liq as tim, 2. meds crushed in pudding/applesauce as appropriate, 3. small bites/sips, 4. monitor closely with pharyngeal palpation for increase in wet sounds and pharyngeal rattles for safety with po, discontinuing po if increase, 5. aspiration precautions, 6. reflux precautions. D/W RN following eval with verbalized understanding. SLP to follow up for diet tim and potential need of MBS as status improves.      Anticipated Discharge Disposition (SLP): skilled nursing facility          SLP Swallowing Diagnosis: oral dysphagia, suspected pharyngeal dysphagia, esophageal dysphagia (08/29/24 1011)

## 2024-08-30 VITALS
WEIGHT: 181 LBS | HEIGHT: 67 IN | TEMPERATURE: 97.9 F | HEART RATE: 56 BPM | RESPIRATION RATE: 20 BRPM | SYSTOLIC BLOOD PRESSURE: 130 MMHG | BODY MASS INDEX: 28.41 KG/M2 | DIASTOLIC BLOOD PRESSURE: 65 MMHG | OXYGEN SATURATION: 95 %

## 2024-08-30 LAB
ANION GAP SERPL CALCULATED.3IONS-SCNC: 6.9 MMOL/L (ref 5–15)
BUN SERPL-MCNC: 27 MG/DL (ref 8–23)
BUN/CREAT SERPL: 18.1 (ref 7–25)
CALCIUM SPEC-SCNC: 8.3 MG/DL (ref 8.6–10.5)
CHLORIDE SERPL-SCNC: 106 MMOL/L (ref 98–107)
CO2 SERPL-SCNC: 29.1 MMOL/L (ref 22–29)
CREAT SERPL-MCNC: 1.49 MG/DL (ref 0.76–1.27)
DEPRECATED RDW RBC AUTO: 46.5 FL (ref 37–54)
EGFRCR SERPLBLD CKD-EPI 2021: 46 ML/MIN/1.73
ERYTHROCYTE [DISTWIDTH] IN BLOOD BY AUTOMATED COUNT: 13.8 % (ref 12.3–15.4)
GLUCOSE BLDC GLUCOMTR-MCNC: 119 MG/DL (ref 70–130)
GLUCOSE BLDC GLUCOMTR-MCNC: 92 MG/DL (ref 70–130)
GLUCOSE SERPL-MCNC: 96 MG/DL (ref 65–99)
HCT VFR BLD AUTO: 34.9 % (ref 37.5–51)
HGB BLD-MCNC: 11.3 G/DL (ref 13–17.7)
MCH RBC QN AUTO: 29.6 PG (ref 26.6–33)
MCHC RBC AUTO-ENTMCNC: 32.4 G/DL (ref 31.5–35.7)
MCV RBC AUTO: 91.4 FL (ref 79–97)
PLATELET # BLD AUTO: 202 10*3/MM3 (ref 140–450)
PMV BLD AUTO: 10.1 FL (ref 6–12)
POTASSIUM SERPL-SCNC: 3.9 MMOL/L (ref 3.5–5.2)
RBC # BLD AUTO: 3.82 10*6/MM3 (ref 4.14–5.8)
SODIUM SERPL-SCNC: 142 MMOL/L (ref 136–145)
WBC NRBC COR # BLD AUTO: 9.62 10*3/MM3 (ref 3.4–10.8)

## 2024-08-30 PROCEDURE — 82948 REAGENT STRIP/BLOOD GLUCOSE: CPT | Performed by: INTERNAL MEDICINE

## 2024-08-30 PROCEDURE — 80048 BASIC METABOLIC PNL TOTAL CA: CPT | Performed by: INTERNAL MEDICINE

## 2024-08-30 PROCEDURE — 99231 SBSQ HOSP IP/OBS SF/LOW 25: CPT | Performed by: PHYSICIAN ASSISTANT

## 2024-08-30 PROCEDURE — 85027 COMPLETE CBC AUTOMATED: CPT | Performed by: INTERNAL MEDICINE

## 2024-08-30 PROCEDURE — 99239 HOSP IP/OBS DSCHRG MGMT >30: CPT | Performed by: INTERNAL MEDICINE

## 2024-08-30 PROCEDURE — 25010000002 AMPICILLIN-SULBACTAM PER 1.5 G: Performed by: INTERNAL MEDICINE

## 2024-08-30 PROCEDURE — 25010000002 FUROSEMIDE PER 20 MG: Performed by: INTERNAL MEDICINE

## 2024-08-30 RX ORDER — AZITHROMYCIN 500 MG/1
500 TABLET, FILM COATED ORAL DAILY
Qty: 1 TABLET | Refills: 0 | Status: SHIPPED | OUTPATIENT
Start: 2024-08-30 | End: 2024-08-31

## 2024-08-30 RX ORDER — FUROSEMIDE 10 MG/ML
40 INJECTION INTRAMUSCULAR; INTRAVENOUS ONCE
Status: COMPLETED | OUTPATIENT
Start: 2024-08-30 | End: 2024-08-30

## 2024-08-30 RX ADMIN — AMPICILLIN SODIUM AND SULBACTAM SODIUM 3 G: 2; 1 INJECTION, POWDER, FOR SOLUTION INTRAMUSCULAR; INTRAVENOUS at 09:57

## 2024-08-30 RX ADMIN — AMPICILLIN SODIUM AND SULBACTAM SODIUM 3 G: 2; 1 INJECTION, POWDER, FOR SOLUTION INTRAMUSCULAR; INTRAVENOUS at 01:35

## 2024-08-30 RX ADMIN — FUROSEMIDE 40 MG: 10 INJECTION, SOLUTION INTRAMUSCULAR; INTRAVENOUS at 10:00

## 2024-08-30 RX ADMIN — ASPIRIN 81 MG: 81 TABLET, COATED ORAL at 09:57

## 2024-08-30 RX ADMIN — Medication 10 ML: at 09:57

## 2024-08-30 RX ADMIN — CARBIDOPA AND LEVODOPA 1 TABLET: 25; 100 TABLET ORAL at 09:58

## 2024-08-30 RX ADMIN — DIVALPROEX SODIUM 125 MG: 125 CAPSULE, COATED PELLETS ORAL at 10:00

## 2024-08-30 RX ADMIN — ATORVASTATIN CALCIUM 80 MG: 80 TABLET, FILM COATED ORAL at 09:57

## 2024-08-30 NOTE — CASE MANAGEMENT/SOCIAL WORK
Case Management Discharge Note      Final Note: Pt discharged back to LTC at [facility] via [facility].         Selected Continued Care - Discharged on 8/30/2024 Admission date: 8/28/2024 - Discharge disposition: Long Term Care (DC - External)      Destination Coordination complete.      Service Provider Selected Services Address Phone Fax Patient Preferred    [facility] [address] [place] [address] [phone] --              Durable Medical Equipment    No services have been selected for the patient.                Dialysis/Infusion    No services have been selected for the patient.                Home Medical Care    No services have been selected for the patient.                Therapy    No services have been selected for the patient.                Community Resources    No services have been selected for the patient.                Community & DME    No services have been selected for the patient.                    Transportation Services  Ambulance: [facility]    Final Discharge Disposition Code: 04 - intermediate care facility

## 2024-08-30 NOTE — PROGRESS NOTES
"    [facility]     PALLIATIVE CARE FOLLOW UP NOTE    Name:  Gama Davila   Age:  84 y.o.  Sex:  male  :  1940  MRN:  [id number]   Visit Number:  [id number]  Date Of Service:  24  Primary Care Physician:  Alex Burk DO    Chief Complaint: Generalized weakness/dysphagia    Interval History:  Patient seen today during palliative care team rounds.  Record reviewed and case discussed with staff.  BM .  PO intake 50% at breakfast.  No family at bedside.  He appears comfortable upon exam, actually tells the Palliative RN \"good morning\", however communication is still very limited secondary to his dementia.  Nursing staff note no acute change in condition overnight, potential for discharge back to SNF today.       Review of Systems   Unable to perform ROS: Dementia          Pain Assessment  PAINAD Breathin-->normal  PAINAD Negative Vocalization: 0-->none  PAINAD Facial Expression: 0-->smiling or inexpressive  PAINAD Body Language: 0-->relaxed  PAINAD Consolability: 0-->no need to console  PAINAD Score: 0  Vitals: /65 (BP Location: Left arm)   Pulse 56   Temp 98.3 °F (36.8 °C) (Oral)   Resp 20   Ht 170.2 cm (67\")   Wt 82.1 kg (181 lb)   SpO2 95%   BMI 28.35 kg/m²     Physical Exam  Vitals and nursing note reviewed.   Constitutional:       General: He is not in acute distress.     Appearance: He is well-developed. He is not diaphoretic.      Comments: Appears chronically ill, NAD   HENT:      Head: Normocephalic and atraumatic.      Mouth/Throat:      Mouth: Mucous membranes are moist.      Pharynx: Oropharynx is clear.   Eyes:      Conjunctiva/sclera: Conjunctivae normal.      Pupils: Pupils are equal, round, and reactive to light.   Cardiovascular:      Rate and Rhythm: Normal rate.      Comments: Appears well perfused   Pulmonary:      Effort: Pulmonary effort is normal. No respiratory distress.      Breath sounds: Normal breath sounds.   Musculoskeletal:         " General: No swelling.      Cervical back: Neck supple.   Skin:     General: Skin is warm and dry.      Capillary Refill: Capillary refill takes less than 2 seconds.   Neurological:      Mental Status: He is alert.      Comments: Oriented to self   Psychiatric:         Mood and Affect: Mood normal.         Behavior: Behavior normal.          Results Reviewed:    Intake/Output Summary (Last 24 hours) at 8/30/2024 0833  Last data filed at 8/30/2024 0804  Gross per 24 hour   Intake 320 ml   Output --   Net 320 ml     Results from last 7 days   Lab Units 08/30/24  0428 08/29/24  0508 08/28/24  1701   SODIUM mmol/L 142   < > 144   POTASSIUM mmol/L 3.9   < > 4.7   CHLORIDE mmol/L 106   < > 100   CO2 mmol/L 29.1*   < > 30.6*   BUN mg/dL 27*   < > 26*   CREATININE mg/dL 1.49*   < > 1.41*   CALCIUM mg/dL 8.3*   < > 8.7   BILIRUBIN mg/dL  --   --  0.2   ALK PHOS U/L  --   --  114   ALT (SGPT) U/L  --   --  8   AST (SGOT) U/L  --   --  13   GLUCOSE mg/dL 96   < > 214*    < > = values in this interval not displayed.     Results from last 7 days   Lab Units 08/30/24  0427   WBC 10*3/mm3 9.62   HEMOGLOBIN g/dL 11.3*   HEMATOCRIT % 34.9*   PLATELETS 10*3/mm3 202       Medication Review:   I have reviewed the patients active and prn medications.     Palliative Care Assessment:  Dementia  Parkinson's disease  Acute hypoxic respiratory failure  Bilateral lower lobe pneumonia 2/2 aspiration   Sepsis  Dysphagia  CKD stg II  Right heel unstageable pressue injury    Recommendations/Plan:  - Goal at the present is to continue with plan in place as outlined per the primary medicine service, agreeable to palliative referral once medically stable for discharge back to LTC facility; likely dc today  - Family note that if patient condition is declining and dysphagia is persistent, they are open to hospice referral, as patient would not desire to pursue ongoing/frequent hospitalizations  - They do wish for him to continue therapy services if  recommended  - No acute symptoms present  - PC RN has notified [facility] of potential referral   - Palliative care will continue to follow/support patient and family    CODE STATUS:   Code Status and Medical Interventions: No CPR (Do Not Attempt to Resuscitate); Limited Support; No intubation (DNI), No cardioversion, No artificial nutrition, No dialysis   Ordered at: 08/29/24 1720     Medical Intervention Limits:    No intubation (DNI)    No cardioversion    No artificial nutrition    No dialysis     Level Of Support Discussed With:    Health Care Surrogate     Code Status (Patient has no pulse and is not breathing):    No CPR (Do Not Attempt to Resuscitate)     Medical Interventions (Patient has pulse or is breathing):    Limited Support       I spent 25 total minutes, including face to face assessment, record review, coordination of care with staff, and counseling patient and/or family  Part of this note may be an electronic transcription/translation of spoken language to printed text using the Dragon Dictation System.    Leelee Mcbride PA-C  08/30/24  08:33 EDT

## 2024-08-30 NOTE — PROGRESS NOTES
Dietitian Assessment    Patient Name: Gama Davila  YOB: 1940  MRN: [id number]  Admission date: 8/28/2024    Comment:    Clinical Nutrition Assessment      Reason for Assessment Wound    H&P  Past Medical History:   Diagnosis Date    Arthritis     Cancer     SKIN     Cataract     Coronary artery disease     Diabetes mellitus     High cholesterol     [place]     PATIENT HAS HEARING AIDS    Hypertension     Irregular heart beat     HISTORY OF CARDIAC ABLATION IN 2003    Wears dentures     FULL UPPER PLATE       Past Surgical History:   Procedure Laterality Date    BACK SURGERY  1978    THEN AGAIN IN 1982    CARDIAC ABLATION  2003    CARDIAC SURGERY      CATARACT EXTRACTION W/ INTRAOCULAR LENS IMPLANT Left 5/24/2017    Procedure: CATARACT PHACO EXTRACTION WITH INTRAOCULAR LENS IMPLANT LEFT WITH TORIC LENS;  Surgeon: Alma Benavidez MD;  Location: [facility] OR;  Service:     CATARACT EXTRACTION W/ INTRAOCULAR LENS IMPLANT Right 6/14/2017    Procedure: CATARACT PHACO EXTRACTION WITH INTRAOCULAR LENS IMPLANT RIGHT WITH TORIC LENS;  Surgeon: Alma Benavidez MD;  Location: [facility] OR;  Service:     CORONARY ARTERY BYPASS GRAFT  2003    SPOUSE UNSURE OF HOW MANY VESSELS    HIATAL HERNIA REPAIR  1972    JOINT REPLACEMENT Left     HIP - DONE BY DR DALAL    KNEE ARTHROSCOPY Left     NOSE SURGERY  1970    SPOUSE REPORTS FOR A BROKEN NOSE    OTHER SURGICAL HISTORY Left     TENDON TORN LOOSE FROM BONE, LEFT ELBOW    OTHER SURGICAL HISTORY  1986    RUPTURED DISC    OTHER SURGICAL HISTORY  1994    NECK SURGERY FOR RUPTURED DISC    OTHER SURGICAL HISTORY  1996    HAD SECOND NECK SURGERY    OTHER SURGICAL HISTORY  1998    RUPTURED DISC    OTHER SURGICAL HISTORY  2003    RUPTURED DISC    OTHER SURGICAL HISTORY  2009    HARDWARE REMOVAL FROM BACK BY DR HAY            Current Problems   Acute hypoxic respiratory failure   B/L lobe aspiration pneumonia  CKD stage II  T2DM  CAD s/p CABG  "    Encounter Information        Trending Narrative     8/30: Pt w/ unstageable R heel pressure injury and need for mighty shake BID to support wound healing. PO intake averaging 50% x 1 meal.      Anthropometrics        Current Height, Weight Height: 170.2 cm (67\")  Weight: 82.1 kg (181 lb) (08/30/24 0400)   Trending Weight Hx     This admission:              PTA:     Wt Readings from Last 30 Encounters:   08/30/24 0400 82.1 kg (181 lb)   08/28/24 2320 81.4 kg (179 lb 7.3 oz)   08/28/24 1656 82.1 kg (181 lb)   07/16/24 0843 82.1 kg (181 lb)   06/06/24 1132 80.7 kg (178 lb)   05/26/24 1916 77.1 kg (170 lb)   05/21/24 0842 78.9 kg (174 lb)   05/15/24 1040 78.9 kg (174 lb)   04/09/24 1103 75.3 kg (166 lb)   04/06/24 1513 75 kg (165 lb 5.5 oz)   03/04/24 0941 74.8 kg (165 lb)   01/16/24 0919 73 kg (161 lb)   11/27/23 0816 72.1 kg (159 lb)   11/07/23 0933 74.4 kg (164 lb)   10/04/23 1201 74.4 kg (164 lb)   09/26/23 0819 75.8 kg (167 lb)   08/09/23 1101 76.7 kg (169 lb)   07/11/23 0912 73.9 kg (163 lb)   05/16/23 0917 77.1 kg (170 lb)   05/02/23 1520 75.3 kg (166 lb)   03/24/23 1534 72.6 kg (160 lb)   03/21/23 0926 72.6 kg (160 lb)   01/24/23 0915 74.4 kg (164 lb)   01/12/23 1138 74.4 kg (164 lb)   11/15/22 1026 72.6 kg (160 lb)   10/31/22 1518 72.6 kg (160 lb)   09/24/22 1135 70.3 kg (155 lb)   09/22/22 1215 72.6 kg (160 lb)   08/29/22 1146 71.2 kg (157 lb)   08/08/22 1544 71.2 kg (157 lb)   07/21/22 1119 69.4 kg (153 lb)   07/15/22 1116 69.4 kg (153 lb)      BMI kg/m2 Body mass index is 28.35 kg/m².     Labs        Pertinent Labs     Results from last 7 days   Lab Units 08/30/24  0428 08/29/24  0508 08/28/24  1701   SODIUM mmol/L 142 139 144   POTASSIUM mmol/L 3.9 4.4 4.7   CHLORIDE mmol/L 106 103 100   CO2 mmol/L 29.1* 27.3 30.6*   BUN mg/dL 27* 29* 26*   CREATININE mg/dL 1.49* 1.53* 1.41*   CALCIUM mg/dL 8.3* 8.4* 8.7   BILIRUBIN mg/dL  --   --  0.2   ALK PHOS U/L  --   --  114   ALT (SGPT) U/L  --   --  8   AST " (SGOT) U/L  --   --  13   GLUCOSE mg/dL 96 160* 214*       Results from last 7 days   Lab Units 08/30/24  0427   HEMOGLOBIN g/dL 11.3*   HEMATOCRIT % 34.9*       Lab Results   Component Value Date    HGBA1C 8.20 (H) 08/28/2024            Medications       Scheduled Medications ampicillin-sulbactam, 3 g, Intravenous, Q6H  aspirin, 81 mg, Oral, Daily  atorvastatin, 80 mg, Oral, Daily  azithromycin, 500 mg, Intravenous, Q24H  carbidopa-levodopa, 1 tablet, Oral, TID  Divalproex Sodium, 125 mg, Oral, BID  enoxaparin, 40 mg, Subcutaneous, Nightly  insulin regular, 2-7 Units, Subcutaneous, 4x Daily AC & at Bedtime  metoprolol tartrate, 50 mg, Oral, BID  sodium chloride, 10 mL, Intravenous, Q12H        Infusions Pharmacy Consult,   Pharmacy to Dose enoxaparin (LOVENOX),          PRN Medications   acetaminophen **OR** acetaminophen **OR** acetaminophen    senna-docusate sodium **AND** polyethylene glycol **AND** bisacodyl **AND** bisacodyl    dextrose    dextrose    glucagon (human recombinant)    ondansetron    Pharmacy Consult    Pharmacy to Dose enoxaparin (LOVENOX)    sodium chloride    sodium chloride    sodium chloride     Physical Findings        Trending Physical   Appearance, NFPE    --  Edema  Edema noted   Bowel Function 8/30   Tubes Peripheral IV    Chewing/Swallowing Pureed w/ nectar thickened liquids    Skin Unstageable R heel PI     Estimated/Assessed Needs       Energy Requirements    EST Needs, Method, Wt used 2050-2460kcals/day using 25-30kcals/kg        Protein Requirements    EST Needs, Method, Wt used 82g protein per day using 1g/kg       Fluid Requirements     Estimated Needs (mL/day) 2050mL per day        Current Nutrition Orders & Evaluation of Intake       Oral Nutrition     Food Allergies    Current PO Diet Diet: Cardiac, Diabetic; Healthy Heart (2-3 Na+); Consistent Carbohydrate; Texture: Pureed (NDD 1); Fluid Consistency: Nectar Thick   Supplement    PO Evaluation     Trending % PO Intake 8/30:  50% x 1 meal      Enteral Nutrition    Enteral Route    Order, Modulars, Flushes    Residual/Tolerance    TF Observation         Parenteral Nutrition     TPN Route    Total # Days on TPN    TPN Order, Lipid Details    MVI & Trace Element Freq    TPN Observation       Nutrition Diagnosis         Nutrition Dx Problem 1 Increased estimated needs r/t pressure injury as evidenced by need for oral nutrition supplement to support wound healing     Nutrition Dx Problem 2        Intervention Goal         Intervention Goal(s) PO intake to meet >50% of estimated needs  Adhere to supplement ordered  Maintain current body weight      Nutrition Intervention        RD Action Will order mighty shake BID      Nutrition Prescription          Diet Prescription HH/CCD/Pureed/nectar thickened liquids   Supplement Prescription Mighty shake BID      Enteral Prescription        TPN Prescription      Monitor/Evaluation        Monitor Per protocol, PO intake, Supplement intake, Pertinent labs, Weight, Skin status, GI status, Symptoms, Swallow function, Hemodynamic stability     RD to follow-up.     Electronically signed by:  Jacqueline Monahan RD  08/30/24 09:07 EDT

## 2024-08-30 NOTE — PLAN OF CARE
Goal Outcome Evaluation:         VSS. Pt received IV unasyn this shift. Pt being discharge back to [facility] today.

## 2024-08-30 NOTE — DISCHARGE SUMMARY
[facility]   DISCHARGE SUMMARY      Name:  Gama Davila   Age:  84 y.o.  Sex:  male  :  1940  MRN:  [id number]   Visit Number:  [id number]    Admission Date:  2024  Date of Discharge:  2024  Primary Care Physician:  Alex Burk DO    Important issues to note:    Start: augmentin, azithromycin  Stop: lantus  Follow up: PCP and palliative to follow at facility  Brief Summary: Presented with dyspnea due to pneumonia. Initially had improved with IV antibiotics Was stable for DC 24.    Discharge Diagnoses:       Bilateral pneumonia    Coronary artery disease    Dementia    Acute respiratory failure with hypoxia    Sepsis due to pneumonia        Problem List:     Active Hospital Problems    Diagnosis  POA    **Bilateral pneumonia [J18.9]  Yes    Sepsis due to pneumonia [J18.9, A41.9]  Yes    Acute respiratory failure with hypoxia [J96.01]  Yes    Dementia [F03.90]  Yes    Coronary artery disease [I25.10]  Yes      Resolved Hospital Problems   No resolved problems to display.     Presenting Problem:    Chief Complaint   Patient presents with    Respiratory Distress      Consults:     Consulting Physician(s)                     None                  History Of Presenting Illness:       Gama Davila is an 84-year-old male, nursing home resident, with history of diabetes mellitus type 2 on insulin, coronary artery disease status post CABG, dementia was sent from the facility by EMS with symptoms of shortness of breath and respiratory distress.  When EMS arrived they were unable to get a good pulse oximetry and applied 15 L of nonrebreather mask.  Patient was also noted to be wheezing and administered DuoNebs x 2 en route.  Unable to obtain any history from the patient as he is nonverbal at baseline.     In the emergency room, he was febrile at 101 with pulse 85 and blood pressure 138/85 and pulse oxygen saturation of 96% on 3 L of nasal cannula oxygen.  ABG  showed a pH of 7.43, pCO2 40, pO2 64 and bicarb of 27 on 2 L of nasal cannula oxygen.  Initial troponin 28 but proBNP within normal range.  CMP was remarkable for a BUN of 26, creatinine 1.4 (around baseline), glucose 214.  LFT was unremarkable.  Lactic acid was elevated at 5.9.  Procalcitonin and CBC was unremarkable.  Blood cultures were drawn.  COVID and flu test were negative.  Chest x-ray showed left lower zone opacity.  CT angiogram of the chest was limited by motion but showed no central pulmonary emboli.  Possible single nonocclusive embolism in the segment of the right upper lobe versus artifact.  Bilateral lower lobe airspace disease concerning for bibasilar pneumonia noted.  CT angiogram of the abdomen and pelvis showed bilateral pneumonia, hiatal hernia, coronary artery disease but no acute process involving the arterial tree of the abdomen and pelvis.  Patient was given Tylenol, Unasyn and azithromycin in the emergency room.  He also received 500 mL of normal saline bolus and was subsequently admitted to the medical floor with telemetry for management of respiratory failure, bilateral pneumonia.        Hospital Course:         Acute hypoxic respiratory failure secondary to #2, POA.  Bilateral lower lobe aspiration pneumonia, unable to classify further, POA.  Sepsis secondary to #2, POA.  Chronic kidney disease stage II.  Diabetes mellitus type 2 with nephropathy.  Coronary artery disease status post CABG.  Dementia with Parkinson's features.  Right heel pressure ulcer, POA.     Plan:     Respiratory failure/bilateral pneumonia.  - Continue IV antibiotics therapy with Unasyn and azithromycin. Transition to augmentin and azithromycin.  - Blood cultures have been sent.  - Continue nasal cannula oxygen to maintain saturations above 90%.  - Bronchodilators as needed.  - although Nares + MRSA doubt MRSA pneumonia as is improving on current regimen.  - Speech therapy consult for evaluation of dysphagia. Marci  diet.      Diabetes mellitus type 2.  - Subcutaneous insulin protocol for coverage.       Coronary artery disease.  - Continue home medications (aspirin, atorvastatin, metoprolol) once able to take orally.       Disposition: back to facility continue palliative.     Edited by: Alton Sood DO at 8/29/2024 1215      Vital Signs:    Temp:  [98.1 °F (36.7 °C)-99.2 °F (37.3 °C)] 98.3 °F (36.8 °C)  Heart Rate:  [52-74] 56  Resp:  [18-22] 20  BP: (130-151)/(65-88) 130/65    Physical Exam:    Constitutional: No acute distress, awake, alert  HENT: NCAT, mucous membranes moist  Respiratory: Coarse bilaterally, respiratory effort normal   Cardiovascular: RRR, no murmurs, rubs, or gallops  Gastrointestinal: Positive bowel sounds, soft, nontender, nondistended  Musculoskeletal: 2+ bilateral ankle edema  Psychiatric: Appropriate affect, cooperative  Neurologic: disoriented, speech nonverbal  Skin: No rashes  Exam stable 8/30/24    Pertinent Lab Results:     Results from last 7 days   Lab Units 08/30/24 0428 08/29/24  0508 08/28/24  1701   SODIUM mmol/L 142 139 144   POTASSIUM mmol/L 3.9 4.4 4.7   CHLORIDE mmol/L 106 103 100   CO2 mmol/L 29.1* 27.3 30.6*   BUN mg/dL 27* 29* 26*   CREATININE mg/dL 1.49* 1.53* 1.41*   CALCIUM mg/dL 8.3* 8.4* 8.7   BILIRUBIN mg/dL  --   --  0.2   ALK PHOS U/L  --   --  114   ALT (SGPT) U/L  --   --  8   AST (SGOT) U/L  --   --  13   GLUCOSE mg/dL 96 160* 214*     Results from last 7 days   Lab Units 08/30/24  0427 08/29/24  0508 08/28/24  1701   WBC 10*3/mm3 9.62 18.49* 7.66   HEMOGLOBIN g/dL 11.3* 12.1* 14.6   HEMATOCRIT % 34.9* 37.0* 44.1   PLATELETS 10*3/mm3 202 203 247         Results from last 7 days   Lab Units 08/28/24  1701   HSTROP T ng/L 28*     Results from last 7 days   Lab Units 08/28/24  1701   PROBNP pg/mL 594.3             Results from last 7 days   Lab Units 08/28/24  1651   PH, ARTERIAL pH units 7.432   PO2 ART mm Hg 64.4*   PCO2, ARTERIAL mm Hg 40.0   HCO3 ART mmol/L 26.7      Results from last 7 days   Lab Units 08/28/24  1840 08/28/24  1838   BLOODCX  No growth at 24 hours No growth at 24 hours       Pertinent Radiology Results:    Imaging Results (All)       Procedure Component Value Units Date/Time    XR Chest 1 View [[id number]] Collected: 08/29/24 0815     Updated: 08/29/24 0818    Narrative:      PROCEDURE: XR CHEST 1 VW-     HISTORY: SOA Triage Protocol, abnormal cardiovascular exam     COMPARISON: 5/26/2024     FINDINGS: No acute pulmonary opacity is present. There is no evidence of  effusion or pneumothorax.        There is evidence of prior median sternotomy, presumably from CABG.   Otherwise, mediastinum is unremarkable.        Heart size is normal.       Impression:      Unremarkable chest, status post CABG.           This report was signed and finalized on 8/29/2024 8:16 AM by Thierry Medel MD.       CT Angiogram Abdomen Pelvis [[id number]] Collected: 08/28/24 2042     Updated: 08/28/24 2044    Narrative:      FINAL REPORT    TECHNIQUE:  null    CLINICAL HISTORY:  sepsis, elevated lactic acid    COMPARISON:  null    FINDINGS:  CT angiography abdomen and pelvis with contrast. 3-D post processing.    Comparison: CT/SR - CT ANGIOGRAM CHEST PULMONARY EMBOLISM - 08/28/2024 07:30 PM EDT    CT/SR - CT ANGIOGRAM CHEST PULMONARY EMBOLISM - 05/26/2024 09:50 PM EDT    Findings:    There is moderate to severe bibasilar airspace opacity. Calcification of the coronary vasculature is present.    Gallbladder is within normal limits. Fatty atrophy of the pancreas is present. Remaining solid organs are within normal limits.    Moderate hiatal hernia. No bowel obstruction, pneumoperitoneum, or pneumatosis.    Pelvic contents unremarkable. Normal appendix.    There is mild diffuse plaque causing mild diffuse stenosis of the abdominal aorta, without evidence of aneurysm or dissection. The celiac and superior mesenteric arteries are patent. Inferior mesenteric artery is patent. There are  single bilateral renal   arteries without significant stenosis. The bilateral common, internal, and external iliac arteries demonstrate mild diffuse calcific stenosis. Common, profunda, and superficial femoral arteries demonstrate mild calcific stenoses.    The bones are intact. Lumbosacral fusion hardware is present. Multilevel disc space narrowing and endplate osteophyte formation, as well as facet hypertrophy. Median sternotomy.      Impression:      IMPRESSION:    1. Bibasilar pneumonia.    2. Hiatal hernia.    3. Coronary artery disease.    4. No acute process involving the arterial tree of the abdomen and pelvis.    Authenticated and Electronically Signed by Caitlin Wade on  08/28/2024 08:42:24 PM    CT Angiogram Chest Pulmonary Embolism [[id number]] Collected: 08/28/24 2026     Updated: 08/28/24 2028    Narrative:      FINAL REPORT    TECHNIQUE:  null    CLINICAL HISTORY:  hypoxic resp failure    COMPARISON:  null    FINDINGS:  Exam: CTA Chest with IV contrast.    Procedure: Coronal and sagittal MIP reformats were performed    Comparison: 05/26/2024    Clinical history: Hypoxic respiratory failure    Findings:    Limited due to patient motion artifact.    No central pulmonary emboli.    Possible single nonocclusive embolism in a segmental right upper lobe pulmonary artery on axial series 5, image 83 versus artifact.    No other areas concerning for PE.    No thoracic aortic aneurysm or dissection.    Atherosclerotic calcifications are seen at the aorta and coronary arteries.    No hilar or mediastinal adenopathy.    No significant pericardial fluid collection.    No pneumothorax.    No pleural fluid collection.    Bilateral lower lobe airspace opacities concerning for bibasilar pneumonia.    Small to moderate-sized hiatal hernia.    Visualized liver and spleen do not demonstrate any acute process    Fatty infiltration of the pancreas.    No gallstones.    Right renal stones versus atherosclerotic  calcification      Impression:      Impression:    1. Evaluation limited by motion. No central pulmonary emboli. Possible single nonocclusive embolism in a segmental right upper lobe pulmonary artery versus artifact    2. No thoracic aortic aneurysm or dissection    3. Bilateral lower lobe airspace opacities concerning for bibasilar pneumonia.    Authenticated and Electronically Signed by Lorrie Bullard MD  on 08/28/2024 08:26:25 PM            Echo:      Condition on Discharge:      Stable.    Code status during the hospital stay:    Code Status and Medical Interventions: No CPR (Do Not Attempt to Resuscitate); Limited Support; No intubation (DNI), No cardioversion, No artificial nutrition, No dialysis   Ordered at: 08/29/24 1720     Medical Intervention Limits:    No intubation (DNI)    No cardioversion    No artificial nutrition    No dialysis     Level Of Support Discussed With:    Health Care Surrogate     Code Status (Patient has no pulse and is not breathing):    No CPR (Do Not Attempt to Resuscitate)     Medical Interventions (Patient has pulse or is breathing):    Limited Support     Discharge Disposition:    Long Term Care (DC - External)    Discharge Medications:       Discharge Medications        New Medications        Instructions Start Date   amoxicillin-clavulanate 875-125 MG per tablet  Commonly known as: AUGMENTIN   1 tablet, Oral, 2 Times Daily      azithromycin 500 MG tablet  Commonly known as: Zithromax   500 mg, Oral, Daily             Continue These Medications        Instructions Start Date   acetaminophen 325 MG tablet  Commonly known as: TYLENOL   650 mg, Oral, Every 6 Hours PRN      aspirin 81 MG EC tablet   81 mg, Oral, Daily      carbidopa-levodopa  MG per tablet  Commonly known as: SINEMET   1 tablet, Oral, 3 Times Daily      Divalproex Sodium 125 MG capsule  Commonly known as: DEPAKOTE SPRINKLE   500 mg, Oral, 2 Times Daily      docusate sodium 100 MG capsule  Commonly known as:  COLACE   200 mg, Oral, Daily PRN      donepezil 10 MG tablet  Commonly known as: ARICEPT   10 mg, Oral, Nightly      famotidine 20 MG tablet  Commonly known as: PEPCID   20 mg, Oral, Daily      furosemide 20 MG tablet  Commonly known as: LASIX   20 mg, Oral, Daily      gabapentin 100 MG capsule  Commonly known as: NEURONTIN   100 mg, Oral, 3 Times Daily      ipratropium-albuterol 0.5-2.5 mg/3 ml nebulizer  Commonly known as: DUO-NEB   3 mL, Nebulization, Every 6 Hours PRN      melatonin 3 MG tablet   6 mg, Oral, Nightly      memantine 10 MG tablet  Commonly known as: NAMENDA   10 mg, Oral, 2 Times Daily      metoprolol tartrate 50 MG tablet  Commonly known as: LOPRESSOR   50 mg, Oral, Daily      MiraLax 17 GM/SCOOP powder  Generic drug: polyethylene glycol   17 g, Oral, Daily PRN      ondansetron 4 MG tablet  Commonly known as: ZOFRAN   4 mg, Oral, Every 6 Hours PRN      tamsulosin 0.4 MG capsule 24 hr capsule  Commonly known as: FLOMAX   1 capsule, Oral, Daily             Stop These Medications      atorvastatin 80 MG tablet  Commonly known as: LIPITOR     BASAGLAR KWIKPEN 100 UNIT/ML injection pen     LAURA EX     citalopram 20 MG tablet  Commonly known as: CeleXA     insulin aspart 100 UNIT/ML solution pen-injector sc pen  Commonly known as: novoLOG FLEXPEN     metFORMIN 500 MG tablet  Commonly known as: GLUCOPHAGE     pantoprazole 40 MG EC tablet  Commonly known as: PROTONIX     QUEtiapine 100 MG tablet  Commonly known as: SEROquel     QUEtiapine 25 MG tablet  Commonly known as: SEROquel     valproic acid 250 MG capsule  Commonly known as: DEPAKENE            Discharge Diet:     Diet Instructions       Advance Diet As Tolerated -Target Diet: pureed nectar thick liquids      Target Diet: pureed nectar thick liquids          Activity at Discharge:       Follow-up Appointments:    Additional Instructions for the Follow-ups that You Need to Schedule       Discharge Follow-up with PCP   As directed       Currently  Documented PCP:    Alex Burk DO    PCP Phone Number:    [phone]     Follow Up Details: 1 week               Follow-up Information       Alex Burk DO .    Specialty: Internal Medicine  Why: 1 week  Contact information:  [address]  [address]  [facility] [address] [phone]               Charleen Randall APRN .    Specialties: Nurse Practitioner, Family Medicine  Why: 1 week  Contact information:  [address] [place] [address] [phone]                           No future appointments.  Test Results Pending at Discharge:    Pending Labs       Order Current Status    Acinetobacter Screen - Swab, Axilla, Right In process    VRE Culture - Swab, Per Rectum In process    Blood Culture - Blood, Arm, Right Preliminary result    Blood Culture - Blood, Wrist, Right Preliminary result               Alton Sood DO  08/30/24  09:45 EDT    Time: I spent 45 minutes on this discharge activity which included: face-to-face encounter with the patient, reviewing the data in the system, coordination of the care with the nursing staff as well as consultants, documentation, and entering orders.     Dictated utilizing Dragon dictation.

## 2024-08-30 NOTE — PLAN OF CARE
Goal Outcome Evaluation:    Palliative care met with patient at bedside this morning. Patient was sitting up in bed, alert, and appeared comfortable. Patient will likely d/c back to LTC facility today, PC in the community referral has been placed. Palliative continuing to follow until time of d/c.

## 2024-08-30 NOTE — PROGRESS NOTES
"Enter Query Response Below      Query Response: Acute respiratory failure was not supported/Hypoxia/respiratory distress only              If applicable, please update the problem list.     Patient: Gama Davila        : 1940  Account: [id number]           Admit Date:         How to Respond to this query:       a. Click New Note     b. Answer query within the yellow box.                c. Update the Problem List, if applicable.      ,    84 year old male with Chronic respiratory failure (home 2L NC) with increased WOB admitted  for Sepsis, Acute hypoxic respiratory failure and Aspiration Pneumonia.  ED MD notes \"mild respiratory distress. H&P notes \"Pulmonary effort normal\"  Admit to ED VS Resp rate 24. O2 saturation on 2L NC was 98%. NC increased to 3L briefly then decreased back to 2L Patient wore 2L NC thereafter with one O2 desaturation to 85% on 2L otherwise O2 saturations were %. He was on RA approx 23 hrs before 2L NC was place back this day of discharge  H&P \"EMS was called at the nursing home. They were unable to get a good pulse oximetry. They applied 15 L nonrebreather.  Patient noted to be wheezing. Administered DuoNeb x 2 without any improvement in symptoms. ABG showed a pH of 7.43, pCO2 40, pO2 64 and bicarb of 27 on 2 L of nasal cannula oxygen. \"  Patient was treated with NS bolus (500mls on ), Lasix IV (), Azithromycin IV (-), Unasyn (-)  Patient is being discharged on Augmentin for an additional 3 days and Zithromax for 1 more dose.  Discharge summary \" sent from the facility by EMS with symptoms of shortness of breath and respiratory distress. \"      After study, was the Acute Respiratory Failure clinically supported during this admission?    - Acute respiratory failure was supported with additional clinical indicators:____________  - Acute respiratory failure was not supported/Hypoxia/respiratory distress only  - Other- specify_____________  - " Unable to determine    By submitting this query, we are merely seeking further clarification of documentation to accurately reflect all conditions that you are monitoring, evaluating, treating or that extend the hospitalization or utilize additional resources of care. Please utilize your independent clinical judgment when addressing the question(s) above.     This query and your response, once completed, will be entered into the legal medical record.    Sincerely,  Van Pederson RN CDIS  Clinical Documentation Integrity Program   [email or web address]

## 2024-08-31 LAB — VRE SPEC QL CULT: NORMAL

## 2024-09-01 LAB — ACINETOBACTER SCREEN CX: NORMAL

## 2024-09-02 LAB
BACTERIA SPEC AEROBE CULT: NORMAL
BACTERIA SPEC AEROBE CULT: NORMAL

## 2024-09-05 ENCOUNTER — NURSING HOME (OUTPATIENT)
Dept: FAMILY MEDICINE CLINIC | Facility: CLINIC | Age: 84
End: 2024-09-05
Payer: MEDICARE

## 2024-09-05 VITALS
DIASTOLIC BLOOD PRESSURE: 49 MMHG | RESPIRATION RATE: 18 BRPM | WEIGHT: 181 LBS | HEART RATE: 49 BPM | BODY MASS INDEX: 28.35 KG/M2 | SYSTOLIC BLOOD PRESSURE: 112 MMHG | TEMPERATURE: 98.2 F | OXYGEN SATURATION: 96 %

## 2024-09-05 DIAGNOSIS — G20.A1 MODERATE DEMENTIA DUE TO PARKINSON'S DISEASE, WITH MOOD DISTURBANCE: Chronic | ICD-10-CM

## 2024-09-05 DIAGNOSIS — G20.A1 PARKINSON'S DISEASE WITHOUT DYSKINESIA OR FLUCTUATING MANIFESTATIONS: ICD-10-CM

## 2024-09-05 DIAGNOSIS — F02.B3 MODERATE DEMENTIA DUE TO PARKINSON'S DISEASE, WITH MOOD DISTURBANCE: Chronic | ICD-10-CM

## 2024-09-05 DIAGNOSIS — Z86.19 HISTORY OF SEPSIS: ICD-10-CM

## 2024-09-05 DIAGNOSIS — Z09 HOSPITAL DISCHARGE FOLLOW-UP: Primary | ICD-10-CM

## 2024-09-05 DIAGNOSIS — Z74.09 IMPAIRED MOBILITY AND ADLS: ICD-10-CM

## 2024-09-05 DIAGNOSIS — Z78.9 IMPAIRED MOBILITY AND ADLS: ICD-10-CM

## 2024-09-05 DIAGNOSIS — J18.9 PNEUMONIA OF BOTH LOWER LOBES DUE TO INFECTIOUS ORGANISM: ICD-10-CM

## 2024-09-05 DIAGNOSIS — Z87.09 HISTORY OF ACUTE RESPIRATORY FAILURE: ICD-10-CM

## 2024-09-05 PROCEDURE — 99309 SBSQ NF CARE MODERATE MDM 30: CPT | Performed by: NURSE PRACTITIONER

## 2024-09-06 NOTE — PROGRESS NOTES
Telemedicine nursing Home Progress Note        Mitul Stephen DO []  [facility] [place] [x] [place] [address]  Phone: [phone]  Fax: [phone] Inés Vega MD []  [facility] [x]   [address] [place] [address]  Phone: [phone]  Fax: [phone]     PATIENT NAME: Gama Davila                                                                          YOB: 1940           DATE OF SERVICE: 9/5/2024  FACILITY:  [] [facility]   [] [facility]   [] [facility]   [x] [facility]  []  [facility]  [] Other     ______________________________________________________________________   CHIEF COMPLAINT:    Hospital follow up visit.    HISTORY OF PRESENT ILLNESS:     Mr Davila was sent to hospital on 8/28,  with symptoms of shortness of breath and respiratory distress.  When EMS arrived they were unable to get a good pulse oximetry and applied 15 L of nonrebreather mask.  Patient was also noted to be wheezing and administered DuoNebs x 2 en route.  In the emergency room, he was febrile at 101,  pulse 85, blood pressure 138/85 and oxygen saturation of 96% on 3 L of nasal cannula oxygen.  ABG showed a pH of 7.43, pCO2 40, pO2 64 and bicarb of 27 on 2 L of nasal cannula oxygen.  Initial troponin 28 but proBNP within normal range.  CMP was remarkable for a BUN of 26, creatinine 1.4 (around baseline), glucose 214.   Lactic acid was elevated at 5.9.  Procalcitonin and CBC unremarkable.  COVID and flu test were negative.  Chest x-ray showed left lower zone opacity.  CT angiogram negative for PE.  Possible single nonocclusive embolism in the segment of the right upper lobe versus artifact.  Bilateral lower lobe airspace disease concerning for bibasilar pneumonia noted.  CT angiogram of the abdomen and pelvis negative.  He was admitted to hospital for pneumonia with sepsis and respiratory failure. He was treated with IV antibiotics. He was transitioned  to Augmentin and Zithromax and discharged back to facility on 8/30.    Resting in bed during visit today, with ST feeding him lunch. He is doing well. When asked if he was ok, he nodded yes but was non verbal. No signs and symptoms of any distress.     PAST MEDICAL & SURGICAL HISTORY:   Past Medical History:   Diagnosis Date    Arthritis     Cancer     SKIN     Cataract     Coronary artery disease     Diabetes mellitus     High cholesterol     [place] (hard of hearing)     PATIENT HAS HEARING AIDS    Hypertension     Irregular heart beat     HISTORY OF CARDIAC ABLATION IN 2003    Wears dentures     FULL UPPER PLATE      Past Surgical History:   Procedure Laterality Date    BACK SURGERY  1978    THEN AGAIN IN 1982    CARDIAC ABLATION  2003    CARDIAC SURGERY      CATARACT EXTRACTION W/ INTRAOCULAR LENS IMPLANT Left 5/24/2017    Procedure: CATARACT PHACO EXTRACTION WITH INTRAOCULAR LENS IMPLANT LEFT WITH TORIC LENS;  Surgeon: Alma Benavidez MD;  Location: [facility] OR;  Service:     CATARACT EXTRACTION W/ INTRAOCULAR LENS IMPLANT Right 6/14/2017    Procedure: CATARACT PHACO EXTRACTION WITH INTRAOCULAR LENS IMPLANT RIGHT WITH TORIC LENS;  Surgeon: Alma Benavidez MD;  Location: [facility] OR;  Service:     CORONARY ARTERY BYPASS GRAFT  2003    SPOUSE UNSURE OF HOW MANY VESSELS    HIATAL HERNIA REPAIR  1972    JOINT REPLACEMENT Left     HIP - DONE BY DR DALAL    KNEE ARTHROSCOPY Left     NOSE SURGERY  1970    SPOUSE REPORTS FOR A BROKEN NOSE    OTHER SURGICAL HISTORY Left     TENDON TORN LOOSE FROM BONE, LEFT ELBOW    OTHER SURGICAL HISTORY  1986    RUPTURED DISC    OTHER SURGICAL HISTORY  1994    NECK SURGERY FOR RUPTURED DISC    OTHER SURGICAL HISTORY  1996    HAD SECOND NECK SURGERY    OTHER SURGICAL HISTORY  1998    RUPTURED DISC    OTHER SURGICAL HISTORY  2003    RUPTURED DISC    OTHER SURGICAL HISTORY  2009    HARDWARE REMOVAL FROM BACK BY DR HAY         MEDICATIONS:  I have reviewed and reconciled the  patients medication list in the patients chart at the skilled nursing facility today.      ALLERGIES:  Allergies   Allergen Reactions    Phenergan [Promethazine Hcl] Nausea And Vomiting    Carbamazepine Unknown - Low Severity    Hydrocodone Unknown - Low Severity    Lisinopril Unknown - Low Severity         SOCIAL HISTORY:  Social History     Socioeconomic History    Marital status:    Tobacco Use    Smoking status: Former     Current packs/day: 0.00     Types: Cigarettes     Quit date:      Years since quittin.7    Smokeless tobacco: Never   Vaping Use    Vaping status: Never Used   Substance and Sexual Activity    Alcohol use: No    Drug use: No    Sexual activity: Defer       FAMILY HISTORY:  No family history on file.    REVIEW OF SYSTEMS:  Review of Systems   Unable to perform ROS: Dementia (ROS per nursing and patient)   Constitutional:  Negative for activity change, appetite change, chills, diaphoresis, fatigue and fever.   HENT:  Negative for congestion, mouth sores, nosebleeds and trouble swallowing.    Respiratory:  Negative for cough, chest tightness and shortness of breath.    Cardiovascular:  Negative for chest pain, palpitations and leg swelling.   Gastrointestinal:  Negative for abdominal pain, blood in stool, constipation, diarrhea and vomiting.   Genitourinary:  Negative for difficulty urinating and frequency.        Incontinence   Musculoskeletal:  Positive for arthralgias (chronic).   Skin:  Positive for color change (pressure injury right heel). Negative for rash and wound.   Neurological:  Positive for weakness.   Psychiatric/Behavioral:  Positive for confusion. Negative for agitation, behavioral problems, dysphoric mood and sleep disturbance. The patient is not nervous/anxious and is not hyperactive.           PHYSICAL EXAMINATION:     VITAL SIGNS:  /49   Pulse (!) 49 Comment: VALUE EMAILED PER PROVIDERGARY.  Temp 98.2 °F (36.8 °C)   Resp 18   Wt 82.1 kg  (181 lb)   SpO2 96%   BMI 28.35 kg/m²     Physical Exam   Cardiovascular: Normal rate, regular rhythm and normal heart sounds.   Pulmonary/Chest: Effort normal and breath sounds normal. No respiratory distress. He has no decreased breath sounds. He has no wheezes.   Abdominal: Bowel sounds are normal. There is no abdominal tenderness.        Neurological: He is alert. He is disoriented.   Psychiatric: His behavior is normal. Mood normal. Cognition and memory are impaired. He has a flat affect.   Nursing note and vitals reviewed.      RECORDS REVIEW:   Most recent labs    ASSESSMENT     Diagnoses and all orders for this visit:    1. Hospital discharge follow-up (Primary)    2. Pneumonia of both lower lobes due to infectious organism    3. History of sepsis    4. History of acute respiratory failure    5. Parkinson's disease without dyskinesia or fluctuating manifestations    6. Moderate dementia due to Parkinson's disease, with mood disturbance    7. Impaired mobility and ADLs            PLAN    Pneumonia/history sepsis/history respiratory failure  -Stable at this time with no signs and symptoms of respiratory distress. He will complete course of Augmentin and Zithromax. No hypoxia or fever. Will follow up and monitor. He will receive readmission labs.     Nursing encouraged to keep me informed of any acute changes, amanda any new concerning symptoms.    Staff to continue supportive care for all ADLs.     [x]  Discussed Patient in detail with nursing/staff, addressed all needs today.     [x]  Plan of Care Reviewed   [x]  PT/OT Reviewed   []  Order Changes  []  Discharge Plans Reviewed  [x]  Advance Directive on file with Nursing Home.   [x]  POA on file with Nursing Home.    [x]  Code Status listed and reviewed.     I spent 35 minutes caring for Gama on this date of service. This time includes time spent by me in the following activities:preparing for the visit, reviewing tests, obtaining and/or reviewing a  separately obtained history, performing a medically appropriate examination and/or evaluation , counseling and educating the patient/family/caregiver, ordering medications, tests, or procedures, referring and communicating with other health care professionals , documenting information in the medical record, independently interpreting results and communicating that information with the patient/family/caregiver, and care coordination    I confirm accuracy of unchanged data/findings which have been carried forward from previous visit, as well as I have updated appropriately those that have changed.                                  Charleen Randall, APRN.  9/5/2024

## 2024-09-16 ENCOUNTER — NURSING HOME (OUTPATIENT)
Dept: INTERNAL MEDICINE | Facility: CLINIC | Age: 84
End: 2024-09-16
Payer: MEDICARE

## 2024-09-16 VITALS
RESPIRATION RATE: 18 BRPM | OXYGEN SATURATION: 92 % | SYSTOLIC BLOOD PRESSURE: 154 MMHG | DIASTOLIC BLOOD PRESSURE: 74 MMHG | BODY MASS INDEX: 28.51 KG/M2 | HEART RATE: 50 BPM | TEMPERATURE: 97.2 F | WEIGHT: 182 LBS

## 2024-09-16 DIAGNOSIS — E11.649 TYPE 2 DIABETES MELLITUS WITH HYPOGLYCEMIA WITHOUT COMA, WITH LONG-TERM CURRENT USE OF INSULIN: ICD-10-CM

## 2024-09-16 DIAGNOSIS — F02.818 DEMENTIA DUE TO PARKINSON'S DISEASE WITH BEHAVIORAL DISTURBANCE: ICD-10-CM

## 2024-09-16 DIAGNOSIS — I10 PRIMARY HYPERTENSION: ICD-10-CM

## 2024-09-16 DIAGNOSIS — Z79.4 TYPE 2 DIABETES MELLITUS WITH HYPOGLYCEMIA WITHOUT COMA, WITH LONG-TERM CURRENT USE OF INSULIN: ICD-10-CM

## 2024-09-16 DIAGNOSIS — G30.1 LATE ONSET ALZHEIMER'S DISEASE WITHOUT BEHAVIORAL DISTURBANCE: Primary | ICD-10-CM

## 2024-09-16 DIAGNOSIS — I25.119 CORONARY ARTERY DISEASE INVOLVING NATIVE HEART WITH ANGINA PECTORIS, UNSPECIFIED VESSEL OR LESION TYPE: ICD-10-CM

## 2024-09-16 DIAGNOSIS — F02.80 LATE ONSET ALZHEIMER'S DISEASE WITHOUT BEHAVIORAL DISTURBANCE: Primary | ICD-10-CM

## 2024-09-16 DIAGNOSIS — I10 ESSENTIAL HYPERTENSION: ICD-10-CM

## 2024-09-16 DIAGNOSIS — Z78.9 IMPAIRED MOBILITY AND ADLS: ICD-10-CM

## 2024-09-16 DIAGNOSIS — G20.A1 DEMENTIA DUE TO PARKINSON'S DISEASE WITH BEHAVIORAL DISTURBANCE: ICD-10-CM

## 2024-09-16 DIAGNOSIS — Z74.09 IMPAIRED MOBILITY AND ADLS: ICD-10-CM

## 2024-09-16 PROCEDURE — 99309 SBSQ NF CARE MODERATE MDM 30: CPT | Performed by: INTERNAL MEDICINE

## 2024-10-04 RX ORDER — GABAPENTIN 100 MG/1
100 CAPSULE ORAL 3 TIMES DAILY
Qty: 90 CAPSULE | Refills: 5 | Status: SHIPPED | OUTPATIENT
Start: 2024-10-04

## 2024-10-13 ENCOUNTER — APPOINTMENT (OUTPATIENT)
Dept: CT IMAGING | Facility: HOSPITAL | Age: 84
End: 2024-10-13
Payer: MEDICARE

## 2024-10-13 ENCOUNTER — APPOINTMENT (OUTPATIENT)
Dept: GENERAL RADIOLOGY | Facility: HOSPITAL | Age: 84
End: 2024-10-13
Payer: MEDICARE

## 2024-10-13 ENCOUNTER — HOSPITAL ENCOUNTER (INPATIENT)
Facility: HOSPITAL | Age: 84
LOS: 3 days | Discharge: NURSING, REHAB OR LONG-TERM CARE | End: 2024-10-16
Attending: STUDENT IN AN ORGANIZED HEALTH CARE EDUCATION/TRAINING PROGRAM | Admitting: FAMILY MEDICINE
Payer: MEDICARE

## 2024-10-13 DIAGNOSIS — E11.00 HYPEROSMOLAR HYPERGLYCEMIC STATE (HHS): Primary | ICD-10-CM

## 2024-10-13 DIAGNOSIS — R41.82 ALTERED MENTAL STATUS, UNSPECIFIED ALTERED MENTAL STATUS TYPE: ICD-10-CM

## 2024-10-13 LAB
ALBUMIN SERPL-MCNC: 3.6 G/DL (ref 3.5–5.2)
ALBUMIN/GLOB SERPL: 0.9 G/DL
ALP SERPL-CCNC: 130 U/L (ref 39–117)
ALT SERPL W P-5'-P-CCNC: <5 U/L (ref 1–41)
ANION GAP SERPL CALCULATED.3IONS-SCNC: 12.5 MMOL/L (ref 5–15)
ANION GAP SERPL CALCULATED.3IONS-SCNC: 16.1 MMOL/L (ref 5–15)
ANION GAP SERPL CALCULATED.3IONS-SCNC: 16.8 MMOL/L (ref 5–15)
AST SERPL-CCNC: 19 U/L (ref 1–40)
ATMOSPHERIC PRESS: 730 MMHG
BACTERIA UR QL AUTO: NORMAL /HPF
BASE EXCESS BLDV CALC-SCNC: 2.1 MMOL/L (ref 0–2)
BASOPHILS # BLD AUTO: 0.03 10*3/MM3 (ref 0–0.2)
BASOPHILS NFR BLD AUTO: 0.3 % (ref 0–1.5)
BDY SITE: ABNORMAL
BILIRUB SERPL-MCNC: 0.4 MG/DL (ref 0–1.2)
BILIRUB UR QL STRIP: NEGATIVE
BUN SERPL-MCNC: 40 MG/DL (ref 8–23)
BUN SERPL-MCNC: 42 MG/DL (ref 8–23)
BUN SERPL-MCNC: 46 MG/DL (ref 8–23)
BUN/CREAT SERPL: 18.8 (ref 7–25)
BUN/CREAT SERPL: 19 (ref 7–25)
BUN/CREAT SERPL: 20 (ref 7–25)
CALCIUM SPEC-SCNC: 8.2 MG/DL (ref 8.6–10.5)
CALCIUM SPEC-SCNC: 8.3 MG/DL (ref 8.6–10.5)
CALCIUM SPEC-SCNC: 8.6 MG/DL (ref 8.6–10.5)
CHLORIDE SERPL-SCNC: 112 MMOL/L (ref 98–107)
CHLORIDE SERPL-SCNC: 115 MMOL/L (ref 98–107)
CHLORIDE SERPL-SCNC: 119 MMOL/L (ref 98–107)
CK SERPL-CCNC: 248 U/L (ref 20–200)
CLARITY UR: CLEAR
CO2 SERPL-SCNC: 18.2 MMOL/L (ref 22–29)
CO2 SERPL-SCNC: 23.9 MMOL/L (ref 22–29)
CO2 SERPL-SCNC: 24.5 MMOL/L (ref 22–29)
COHGB MFR BLD: 1.3 % (ref 0–5)
COLOR UR: YELLOW
CREAT SERPL-MCNC: 2.13 MG/DL (ref 0.76–1.27)
CREAT SERPL-MCNC: 2.21 MG/DL (ref 0.76–1.27)
CREAT SERPL-MCNC: 2.3 MG/DL (ref 0.76–1.27)
CRP SERPL-MCNC: 0.33 MG/DL (ref 0–0.5)
D-LACTATE SERPL-SCNC: 3.7 MMOL/L (ref 0.5–2)
D-LACTATE SERPL-SCNC: 3.9 MMOL/L (ref 0.5–2)
D-LACTATE SERPL-SCNC: 7.2 MMOL/L (ref 0.5–2)
DEPRECATED RDW RBC AUTO: 50.9 FL (ref 37–54)
EGFRCR SERPLBLD CKD-EPI 2021: 27.3 ML/MIN/1.73
EGFRCR SERPLBLD CKD-EPI 2021: 28.7 ML/MIN/1.73
EGFRCR SERPLBLD CKD-EPI 2021: 30 ML/MIN/1.73
EOSINOPHIL # BLD AUTO: 0 10*3/MM3 (ref 0–0.4)
EOSINOPHIL NFR BLD AUTO: 0 % (ref 0.3–6.2)
ERYTHROCYTE [DISTWIDTH] IN BLOOD BY AUTOMATED COUNT: 14.7 % (ref 12.3–15.4)
FLUAV SUBTYP SPEC NAA+PROBE: NOT DETECTED
FLUBV RNA ISLT QL NAA+PROBE: NOT DETECTED
GEN 5 2HR TROPONIN T REFLEX: 95 NG/L
GLOBULIN UR ELPH-MCNC: 3.9 GM/DL
GLUCOSE BLDC GLUCOMTR-MCNC: 181 MG/DL (ref 70–130)
GLUCOSE BLDC GLUCOMTR-MCNC: 204 MG/DL (ref 70–130)
GLUCOSE BLDC GLUCOMTR-MCNC: 209 MG/DL (ref 70–130)
GLUCOSE BLDC GLUCOMTR-MCNC: 236 MG/DL (ref 70–130)
GLUCOSE BLDC GLUCOMTR-MCNC: 327 MG/DL (ref 70–130)
GLUCOSE BLDC GLUCOMTR-MCNC: 378 MG/DL (ref 70–130)
GLUCOSE BLDC GLUCOMTR-MCNC: 488 MG/DL (ref 70–130)
GLUCOSE BLDC GLUCOMTR-MCNC: 577 MG/DL (ref 70–130)
GLUCOSE BLDC GLUCOMTR-MCNC: 581 MG/DL (ref 70–130)
GLUCOSE BLDC GLUCOMTR-MCNC: >599 MG/DL (ref 70–130)
GLUCOSE SERPL-MCNC: 394 MG/DL (ref 65–99)
GLUCOSE SERPL-MCNC: 669 MG/DL (ref 65–99)
GLUCOSE SERPL-MCNC: 799 MG/DL (ref 65–99)
GLUCOSE UR STRIP-MCNC: ABNORMAL MG/DL
HBA1C MFR BLD: 10.1 % (ref 4.8–5.6)
HCO3 BLDV-SCNC: 26.7 MMOL/L (ref 22–28)
HCT VFR BLD AUTO: 49.5 % (ref 37.5–51)
HGB BLD-MCNC: 16 G/DL (ref 13–17.7)
HGB UR QL STRIP.AUTO: ABNORMAL
HYALINE CASTS UR QL AUTO: NORMAL /LPF
IMM GRANULOCYTES # BLD AUTO: 0.07 10*3/MM3 (ref 0–0.05)
IMM GRANULOCYTES NFR BLD AUTO: 0.6 % (ref 0–0.5)
KETONES UR QL STRIP: NEGATIVE
LEUKOCYTE ESTERASE UR QL STRIP.AUTO: NEGATIVE
LYMPHOCYTES # BLD AUTO: 1.55 10*3/MM3 (ref 0.7–3.1)
LYMPHOCYTES NFR BLD AUTO: 13.9 % (ref 19.6–45.3)
Lab: ABNORMAL
MAGNESIUM SERPL-MCNC: 1.8 MG/DL (ref 1.6–2.4)
MAGNESIUM SERPL-MCNC: 1.9 MG/DL (ref 1.6–2.4)
MAGNESIUM SERPL-MCNC: 1.9 MG/DL (ref 1.6–2.4)
MCH RBC QN AUTO: 30.1 PG (ref 26.6–33)
MCHC RBC AUTO-ENTMCNC: 32.3 G/DL (ref 31.5–35.7)
MCV RBC AUTO: 93.2 FL (ref 79–97)
METHGB BLD QL: 0.3 % (ref 0–3)
MODALITY: ABNORMAL
MONOCYTES # BLD AUTO: 1.43 10*3/MM3 (ref 0.1–0.9)
MONOCYTES NFR BLD AUTO: 12.8 % (ref 5–12)
NEUTROPHILS NFR BLD AUTO: 72.4 % (ref 42.7–76)
NEUTROPHILS NFR BLD AUTO: 8.1 10*3/MM3 (ref 1.7–7)
NITRITE UR QL STRIP: NEGATIVE
NRBC BLD AUTO-RTO: 0 /100 WBC (ref 0–0.2)
NT-PROBNP SERPL-MCNC: 1017 PG/ML (ref 0–1800)
OXYHGB MFR BLDV: 77.3 % (ref 40–70)
PCO2 BLDV: 40.4 MM HG (ref 40–50)
PH BLDV: 7.43 PH UNITS (ref 7.32–7.42)
PH UR STRIP.AUTO: <=5 [PH] (ref 5–8)
PHOSPHATE SERPL-MCNC: 2.7 MG/DL (ref 2.5–4.5)
PHOSPHATE SERPL-MCNC: 3.7 MG/DL (ref 2.5–4.5)
PHOSPHATE SERPL-MCNC: 4 MG/DL (ref 2.5–4.5)
PLATELET # BLD AUTO: 225 10*3/MM3 (ref 140–450)
PMV BLD AUTO: 10.8 FL (ref 6–12)
PO2 BLDV: 43.3 MM HG (ref 30–50)
POTASSIUM SERPL-SCNC: 3.3 MMOL/L (ref 3.5–5.2)
POTASSIUM SERPL-SCNC: 5 MMOL/L (ref 3.5–5.2)
POTASSIUM SERPL-SCNC: 5.2 MMOL/L (ref 3.5–5.2)
PROCALCITONIN SERPL-MCNC: 0.07 NG/ML (ref 0–0.25)
PROT SERPL-MCNC: 7.5 G/DL (ref 6–8.5)
PROT UR QL STRIP: NEGATIVE
RBC # BLD AUTO: 5.31 10*6/MM3 (ref 4.14–5.8)
RBC # UR STRIP: NORMAL /HPF
REF LAB TEST METHOD: NORMAL
SAO2 % BLDCOV: 78.6 % (ref 45–75)
SARS-COV-2 RNA RESP QL NAA+PROBE: NOT DETECTED
SODIUM SERPL-SCNC: 152 MMOL/L (ref 136–145)
SODIUM SERPL-SCNC: 152 MMOL/L (ref 136–145)
SODIUM SERPL-SCNC: 154 MMOL/L (ref 136–145)
SP GR UR STRIP: 1.03 (ref 1–1.03)
SQUAMOUS #/AREA URNS HPF: NORMAL /HPF
TROPONIN T DELTA: -16 NG/L
TROPONIN T SERPL HS-MCNC: 111 NG/L
UROBILINOGEN UR QL STRIP: ABNORMAL
VALPROATE SERPL-MCNC: 30.2 MCG/ML (ref 50–125)
VENTILATOR MODE: ABNORMAL
WBC # UR STRIP: NORMAL /HPF
WBC NRBC COR # BLD AUTO: 11.18 10*3/MM3 (ref 3.4–10.8)

## 2024-10-13 PROCEDURE — 82550 ASSAY OF CK (CPK): CPT | Performed by: STUDENT IN AN ORGANIZED HEALTH CARE EDUCATION/TRAINING PROGRAM

## 2024-10-13 PROCEDURE — 94640 AIRWAY INHALATION TREATMENT: CPT

## 2024-10-13 PROCEDURE — 87040 BLOOD CULTURE FOR BACTERIA: CPT | Performed by: FAMILY MEDICINE

## 2024-10-13 PROCEDURE — 81001 URINALYSIS AUTO W/SCOPE: CPT | Performed by: STUDENT IN AN ORGANIZED HEALTH CARE EDUCATION/TRAINING PROGRAM

## 2024-10-13 PROCEDURE — 99223 1ST HOSP IP/OBS HIGH 75: CPT | Performed by: FAMILY MEDICINE

## 2024-10-13 PROCEDURE — 70450 CT HEAD/BRAIN W/O DYE: CPT

## 2024-10-13 PROCEDURE — 83735 ASSAY OF MAGNESIUM: CPT | Performed by: FAMILY MEDICINE

## 2024-10-13 PROCEDURE — 93005 ELECTROCARDIOGRAM TRACING: CPT | Performed by: STUDENT IN AN ORGANIZED HEALTH CARE EDUCATION/TRAINING PROGRAM

## 2024-10-13 PROCEDURE — 86140 C-REACTIVE PROTEIN: CPT | Performed by: STUDENT IN AN ORGANIZED HEALTH CARE EDUCATION/TRAINING PROGRAM

## 2024-10-13 PROCEDURE — 74018 RADEX ABDOMEN 1 VIEW: CPT

## 2024-10-13 PROCEDURE — 99291 CRITICAL CARE FIRST HOUR: CPT

## 2024-10-13 PROCEDURE — 84100 ASSAY OF PHOSPHORUS: CPT | Performed by: FAMILY MEDICINE

## 2024-10-13 PROCEDURE — 85025 COMPLETE CBC W/AUTO DIFF WBC: CPT | Performed by: STUDENT IN AN ORGANIZED HEALTH CARE EDUCATION/TRAINING PROGRAM

## 2024-10-13 PROCEDURE — 83735 ASSAY OF MAGNESIUM: CPT | Performed by: STUDENT IN AN ORGANIZED HEALTH CARE EDUCATION/TRAINING PROGRAM

## 2024-10-13 PROCEDURE — 87636 SARSCOV2 & INF A&B AMP PRB: CPT | Performed by: STUDENT IN AN ORGANIZED HEALTH CARE EDUCATION/TRAINING PROGRAM

## 2024-10-13 PROCEDURE — 25010000002 HEPARIN (PORCINE) PER 1000 UNITS: Performed by: FAMILY MEDICINE

## 2024-10-13 PROCEDURE — 87070 CULTURE OTHR SPECIMN AEROBIC: CPT | Performed by: FAMILY MEDICINE

## 2024-10-13 PROCEDURE — 80053 COMPREHEN METABOLIC PANEL: CPT | Performed by: STUDENT IN AN ORGANIZED HEALTH CARE EDUCATION/TRAINING PROGRAM

## 2024-10-13 PROCEDURE — 82948 REAGENT STRIP/BLOOD GLUCOSE: CPT

## 2024-10-13 PROCEDURE — 94799 UNLISTED PULMONARY SVC/PX: CPT

## 2024-10-13 PROCEDURE — 71045 X-RAY EXAM CHEST 1 VIEW: CPT

## 2024-10-13 PROCEDURE — 25010000002 AMPICILLIN-SULBACTAM PER 1.5 G: Performed by: FAMILY MEDICINE

## 2024-10-13 PROCEDURE — 25810000003 SODIUM CHLORIDE 0.9 % SOLUTION: Performed by: STUDENT IN AN ORGANIZED HEALTH CARE EDUCATION/TRAINING PROGRAM

## 2024-10-13 PROCEDURE — 84145 PROCALCITONIN (PCT): CPT | Performed by: STUDENT IN AN ORGANIZED HEALTH CARE EDUCATION/TRAINING PROGRAM

## 2024-10-13 PROCEDURE — P9612 CATHETERIZE FOR URINE SPEC: HCPCS

## 2024-10-13 PROCEDURE — 87186 SC STD MICRODIL/AGAR DIL: CPT | Performed by: FAMILY MEDICINE

## 2024-10-13 PROCEDURE — 84100 ASSAY OF PHOSPHORUS: CPT | Performed by: STUDENT IN AN ORGANIZED HEALTH CARE EDUCATION/TRAINING PROGRAM

## 2024-10-13 PROCEDURE — 82820 HEMOGLOBIN-OXYGEN AFFINITY: CPT

## 2024-10-13 PROCEDURE — 82805 BLOOD GASES W/O2 SATURATION: CPT

## 2024-10-13 PROCEDURE — 80164 ASSAY DIPROPYLACETIC ACD TOT: CPT | Performed by: INTERNAL MEDICINE

## 2024-10-13 PROCEDURE — 87076 CULTURE ANAEROBE IDENT EACH: CPT | Performed by: FAMILY MEDICINE

## 2024-10-13 PROCEDURE — 25010000002 POTASSIUM CHLORIDE PER 2 MEQ: Performed by: STUDENT IN AN ORGANIZED HEALTH CARE EDUCATION/TRAINING PROGRAM

## 2024-10-13 PROCEDURE — 25810000003 LACTATED RINGERS SOLUTION: Performed by: STUDENT IN AN ORGANIZED HEALTH CARE EDUCATION/TRAINING PROGRAM

## 2024-10-13 PROCEDURE — 87205 SMEAR GRAM STAIN: CPT | Performed by: FAMILY MEDICINE

## 2024-10-13 PROCEDURE — 87077 CULTURE AEROBIC IDENTIFY: CPT | Performed by: FAMILY MEDICINE

## 2024-10-13 PROCEDURE — 36415 COLL VENOUS BLD VENIPUNCTURE: CPT

## 2024-10-13 PROCEDURE — 84484 ASSAY OF TROPONIN QUANT: CPT | Performed by: STUDENT IN AN ORGANIZED HEALTH CARE EDUCATION/TRAINING PROGRAM

## 2024-10-13 PROCEDURE — 83036 HEMOGLOBIN GLYCOSYLATED A1C: CPT | Performed by: STUDENT IN AN ORGANIZED HEALTH CARE EDUCATION/TRAINING PROGRAM

## 2024-10-13 PROCEDURE — 83880 ASSAY OF NATRIURETIC PEPTIDE: CPT | Performed by: STUDENT IN AN ORGANIZED HEALTH CARE EDUCATION/TRAINING PROGRAM

## 2024-10-13 PROCEDURE — 83605 ASSAY OF LACTIC ACID: CPT | Performed by: STUDENT IN AN ORGANIZED HEALTH CARE EDUCATION/TRAINING PROGRAM

## 2024-10-13 RX ORDER — ASPIRIN 81 MG/1
81 TABLET ORAL DAILY
Status: DISCONTINUED | OUTPATIENT
Start: 2024-10-13 | End: 2024-10-13

## 2024-10-13 RX ORDER — NICOTINE POLACRILEX 4 MG
15 LOZENGE BUCCAL
Status: DISCONTINUED | OUTPATIENT
Start: 2024-10-13 | End: 2024-10-14

## 2024-10-13 RX ORDER — DOCUSATE SODIUM 100 MG/1
200 CAPSULE, LIQUID FILLED ORAL DAILY PRN
Status: DISCONTINUED | OUTPATIENT
Start: 2024-10-13 | End: 2024-10-14

## 2024-10-13 RX ORDER — FAMOTIDINE 20 MG/1
20 TABLET, FILM COATED ORAL DAILY
Status: DISCONTINUED | OUTPATIENT
Start: 2024-10-13 | End: 2024-10-16 | Stop reason: HOSPADM

## 2024-10-13 RX ORDER — DEXTROSE MONOHYDRATE AND SODIUM CHLORIDE 5; .9 G/100ML; G/100ML
125 INJECTION, SOLUTION INTRAVENOUS CONTINUOUS PRN
Status: DISCONTINUED | OUTPATIENT
Start: 2024-10-13 | End: 2024-10-13

## 2024-10-13 RX ORDER — NITROGLYCERIN 0.4 MG/1
0.4 TABLET SUBLINGUAL
Status: DISCONTINUED | OUTPATIENT
Start: 2024-10-13 | End: 2024-10-16 | Stop reason: HOSPADM

## 2024-10-13 RX ORDER — DEXTROSE MONOHYDRATE, SODIUM CHLORIDE, AND POTASSIUM CHLORIDE 50; 2.98; 9 G/1000ML; G/1000ML; G/1000ML
125 INJECTION, SOLUTION INTRAVENOUS CONTINUOUS PRN
Status: DISCONTINUED | OUTPATIENT
Start: 2024-10-13 | End: 2024-10-13

## 2024-10-13 RX ORDER — MEMANTINE HYDROCHLORIDE 5 MG/1
10 TABLET ORAL EVERY 12 HOURS SCHEDULED
Status: DISCONTINUED | OUTPATIENT
Start: 2024-10-13 | End: 2024-10-13

## 2024-10-13 RX ORDER — SODIUM CHLORIDE 0.9 % (FLUSH) 0.9 %
10 SYRINGE (ML) INJECTION AS NEEDED
Status: DISCONTINUED | OUTPATIENT
Start: 2024-10-13 | End: 2024-10-16 | Stop reason: HOSPADM

## 2024-10-13 RX ORDER — ACETAMINOPHEN 650 MG/1
650 SUPPOSITORY RECTAL EVERY 4 HOURS PRN
Status: DISCONTINUED | OUTPATIENT
Start: 2024-10-13 | End: 2024-10-16 | Stop reason: HOSPADM

## 2024-10-13 RX ORDER — DEXTROSE MONOHYDRATE, SODIUM CHLORIDE, AND POTASSIUM CHLORIDE 50; 1.49; 9 G/1000ML; G/1000ML; G/1000ML
125 INJECTION, SOLUTION INTRAVENOUS CONTINUOUS PRN
Status: DISCONTINUED | OUTPATIENT
Start: 2024-10-13 | End: 2024-10-13

## 2024-10-13 RX ORDER — GUAIFENESIN/DEXTROMETHORPHAN 100-10MG/5
10 SYRUP ORAL EVERY 4 HOURS PRN
Status: DISCONTINUED | OUTPATIENT
Start: 2024-10-13 | End: 2024-10-16 | Stop reason: HOSPADM

## 2024-10-13 RX ORDER — SODIUM CHLORIDE 9 MG/ML
200 INJECTION, SOLUTION INTRAVENOUS CONTINUOUS PRN
Status: DISCONTINUED | OUTPATIENT
Start: 2024-10-13 | End: 2024-10-13

## 2024-10-13 RX ORDER — SODIUM CHLORIDE 0.9 % (FLUSH) 0.9 %
10 SYRINGE (ML) INJECTION AS NEEDED
Status: DISCONTINUED | OUTPATIENT
Start: 2024-10-13 | End: 2024-10-14

## 2024-10-13 RX ORDER — DEXTROSE MONOHYDRATE, SODIUM CHLORIDE, AND POTASSIUM CHLORIDE 50; 1.49; 4.5 G/1000ML; G/1000ML; G/1000ML
125 INJECTION, SOLUTION INTRAVENOUS CONTINUOUS PRN
Status: DISCONTINUED | OUTPATIENT
Start: 2024-10-13 | End: 2024-10-14

## 2024-10-13 RX ORDER — GABAPENTIN 100 MG/1
100 CAPSULE ORAL 3 TIMES DAILY
Status: DISCONTINUED | OUTPATIENT
Start: 2024-10-13 | End: 2024-10-16 | Stop reason: HOSPADM

## 2024-10-13 RX ORDER — SODIUM CHLORIDE 450 MG/100ML
200 INJECTION, SOLUTION INTRAVENOUS CONTINUOUS PRN
Status: DISCONTINUED | OUTPATIENT
Start: 2024-10-13 | End: 2024-10-14

## 2024-10-13 RX ORDER — SODIUM CHLORIDE 0.9 % (FLUSH) 0.9 %
10 SYRINGE (ML) INJECTION EVERY 12 HOURS SCHEDULED
Status: DISCONTINUED | OUTPATIENT
Start: 2024-10-13 | End: 2024-10-14

## 2024-10-13 RX ORDER — CARBIDOPA AND LEVODOPA 25; 100 MG/1; MG/1
1 TABLET ORAL 3 TIMES DAILY
Status: DISCONTINUED | OUTPATIENT
Start: 2024-10-13 | End: 2024-10-16 | Stop reason: HOSPADM

## 2024-10-13 RX ORDER — ACETAMINOPHEN 160 MG/5ML
650 SOLUTION ORAL EVERY 4 HOURS PRN
Status: DISCONTINUED | OUTPATIENT
Start: 2024-10-13 | End: 2024-10-16 | Stop reason: HOSPADM

## 2024-10-13 RX ORDER — DEXTROSE MONOHYDRATE AND SODIUM CHLORIDE 5; .45 G/100ML; G/100ML
125 INJECTION, SOLUTION INTRAVENOUS CONTINUOUS PRN
Status: DISCONTINUED | OUTPATIENT
Start: 2024-10-13 | End: 2024-10-14

## 2024-10-13 RX ORDER — DIVALPROEX SODIUM 125 MG/1
500 CAPSULE, COATED PELLETS ORAL 2 TIMES DAILY
Status: DISCONTINUED | OUTPATIENT
Start: 2024-10-13 | End: 2024-10-13

## 2024-10-13 RX ORDER — GUAIFENESIN 600 MG/1
600 TABLET, EXTENDED RELEASE ORAL EVERY 12 HOURS SCHEDULED
Status: DISCONTINUED | OUTPATIENT
Start: 2024-10-13 | End: 2024-10-14

## 2024-10-13 RX ORDER — DOXYCYCLINE 100 MG/1
100 CAPSULE ORAL EVERY 12 HOURS SCHEDULED
Status: DISCONTINUED | OUTPATIENT
Start: 2024-10-13 | End: 2024-10-16 | Stop reason: HOSPADM

## 2024-10-13 RX ORDER — SODIUM CHLORIDE 9 MG/ML
40 INJECTION, SOLUTION INTRAVENOUS AS NEEDED
Status: DISCONTINUED | OUTPATIENT
Start: 2024-10-13 | End: 2024-10-13

## 2024-10-13 RX ORDER — ONDANSETRON 2 MG/ML
4 INJECTION INTRAMUSCULAR; INTRAVENOUS EVERY 6 HOURS PRN
Status: DISCONTINUED | OUTPATIENT
Start: 2024-10-13 | End: 2024-10-16 | Stop reason: HOSPADM

## 2024-10-13 RX ORDER — IPRATROPIUM BROMIDE AND ALBUTEROL SULFATE 2.5; .5 MG/3ML; MG/3ML
3 SOLUTION RESPIRATORY (INHALATION)
Status: DISCONTINUED | OUTPATIENT
Start: 2024-10-13 | End: 2024-10-16

## 2024-10-13 RX ORDER — POLYETHYLENE GLYCOL 3350 17 G/17G
17 POWDER, FOR SOLUTION ORAL DAILY PRN
Status: DISCONTINUED | OUTPATIENT
Start: 2024-10-13 | End: 2024-10-16 | Stop reason: HOSPADM

## 2024-10-13 RX ORDER — DEXTROSE MONOHYDRATE, SODIUM CHLORIDE, AND POTASSIUM CHLORIDE 50; 2.98; 4.5 G/1000ML; G/1000ML; G/1000ML
125 INJECTION, SOLUTION INTRAVENOUS CONTINUOUS PRN
Status: DISCONTINUED | OUTPATIENT
Start: 2024-10-13 | End: 2024-10-14

## 2024-10-13 RX ORDER — BISACODYL 10 MG
10 SUPPOSITORY, RECTAL RECTAL DAILY PRN
Status: DISCONTINUED | OUTPATIENT
Start: 2024-10-13 | End: 2024-10-16 | Stop reason: HOSPADM

## 2024-10-13 RX ORDER — DIVALPROEX SODIUM 125 MG/1
500 CAPSULE, COATED PELLETS ORAL 2 TIMES DAILY
Status: DISCONTINUED | OUTPATIENT
Start: 2024-10-16 | End: 2024-10-13

## 2024-10-13 RX ORDER — MEMANTINE HYDROCHLORIDE 5 MG/1
5 TABLET ORAL EVERY 12 HOURS SCHEDULED
Status: DISCONTINUED | OUTPATIENT
Start: 2024-10-13 | End: 2024-10-16 | Stop reason: HOSPADM

## 2024-10-13 RX ORDER — BISACODYL 5 MG/1
5 TABLET, DELAYED RELEASE ORAL DAILY PRN
Status: DISCONTINUED | OUTPATIENT
Start: 2024-10-13 | End: 2024-10-16 | Stop reason: HOSPADM

## 2024-10-13 RX ORDER — POTASSIUM CHLORIDE 1.5 G/1.58G
20 POWDER, FOR SOLUTION ORAL ONCE
Status: COMPLETED | OUTPATIENT
Start: 2024-10-13 | End: 2024-10-13

## 2024-10-13 RX ORDER — METOPROLOL TARTRATE 50 MG
50 TABLET ORAL DAILY
Status: DISCONTINUED | OUTPATIENT
Start: 2024-10-13 | End: 2024-10-16

## 2024-10-13 RX ORDER — HEPARIN SODIUM 5000 [USP'U]/ML
5000 INJECTION, SOLUTION INTRAVENOUS; SUBCUTANEOUS EVERY 12 HOURS SCHEDULED
Status: DISCONTINUED | OUTPATIENT
Start: 2024-10-13 | End: 2024-10-16 | Stop reason: HOSPADM

## 2024-10-13 RX ORDER — SODIUM CHLORIDE 9 MG/ML
40 INJECTION, SOLUTION INTRAVENOUS AS NEEDED
Status: DISCONTINUED | OUTPATIENT
Start: 2024-10-13 | End: 2024-10-16 | Stop reason: HOSPADM

## 2024-10-13 RX ORDER — AMOXICILLIN 250 MG
2 CAPSULE ORAL 2 TIMES DAILY PRN
Status: DISCONTINUED | OUTPATIENT
Start: 2024-10-13 | End: 2024-10-14

## 2024-10-13 RX ORDER — SODIUM CHLORIDE AND POTASSIUM CHLORIDE 300; 900 MG/100ML; MG/100ML
200 INJECTION, SOLUTION INTRAVENOUS CONTINUOUS PRN
Status: DISCONTINUED | OUTPATIENT
Start: 2024-10-13 | End: 2024-10-13

## 2024-10-13 RX ORDER — ACETAMINOPHEN 325 MG/1
650 TABLET ORAL EVERY 4 HOURS PRN
Status: DISCONTINUED | OUTPATIENT
Start: 2024-10-13 | End: 2024-10-16 | Stop reason: HOSPADM

## 2024-10-13 RX ORDER — DEXTROSE MONOHYDRATE 25 G/50ML
10-50 INJECTION, SOLUTION INTRAVENOUS
Status: DISCONTINUED | OUTPATIENT
Start: 2024-10-13 | End: 2024-10-14

## 2024-10-13 RX ORDER — IPRATROPIUM BROMIDE AND ALBUTEROL SULFATE 2.5; .5 MG/3ML; MG/3ML
3 SOLUTION RESPIRATORY (INHALATION) EVERY 6 HOURS PRN
Status: DISCONTINUED | OUTPATIENT
Start: 2024-10-13 | End: 2024-10-16 | Stop reason: HOSPADM

## 2024-10-13 RX ORDER — SODIUM CHLORIDE AND POTASSIUM CHLORIDE 150; 450 MG/100ML; MG/100ML
200 INJECTION, SOLUTION INTRAVENOUS CONTINUOUS PRN
Status: DISCONTINUED | OUTPATIENT
Start: 2024-10-13 | End: 2024-10-14

## 2024-10-13 RX ORDER — SODIUM CHLORIDE 0.9 % (FLUSH) 0.9 %
10 SYRINGE (ML) INJECTION EVERY 12 HOURS SCHEDULED
Status: DISCONTINUED | OUTPATIENT
Start: 2024-10-13 | End: 2024-10-16 | Stop reason: HOSPADM

## 2024-10-13 RX ORDER — DONEPEZIL HYDROCHLORIDE 5 MG/1
10 TABLET, FILM COATED ORAL NIGHTLY
Status: DISCONTINUED | OUTPATIENT
Start: 2024-10-13 | End: 2024-10-13

## 2024-10-13 RX ORDER — SODIUM CHLORIDE AND POTASSIUM CHLORIDE 150; 900 MG/100ML; MG/100ML
200 INJECTION, SOLUTION INTRAVENOUS CONTINUOUS PRN
Status: DISCONTINUED | OUTPATIENT
Start: 2024-10-13 | End: 2024-10-13

## 2024-10-13 RX ADMIN — POTASSIUM CHLORIDE 20 MEQ: 1.5 POWDER, FOR SOLUTION ORAL at 21:29

## 2024-10-13 RX ADMIN — CARBIDOPA AND LEVODOPA 1 TABLET: 25; 100 TABLET ORAL at 21:29

## 2024-10-13 RX ADMIN — MEMANTINE 5 MG: 5 TABLET ORAL at 21:34

## 2024-10-13 RX ADMIN — Medication 10 ML: at 21:30

## 2024-10-13 RX ADMIN — SODIUM CHLORIDE, POTASSIUM CHLORIDE, SODIUM LACTATE AND CALCIUM CHLORIDE 1000 ML: 600; 310; 30; 20 INJECTION, SOLUTION INTRAVENOUS at 14:50

## 2024-10-13 RX ADMIN — AMPICILLIN SODIUM AND SULBACTAM SODIUM 3 G: 2; 1 INJECTION, POWDER, FOR SOLUTION INTRAMUSCULAR; INTRAVENOUS at 21:29

## 2024-10-13 RX ADMIN — POTASSIUM CHLORIDE AND SODIUM CHLORIDE 200 ML/HR: 450; 150 INJECTION, SOLUTION INTRAVENOUS at 16:53

## 2024-10-13 RX ADMIN — POTASSIUM CHLORIDE, DEXTROSE MONOHYDRATE AND SODIUM CHLORIDE 125 ML/HR: 150; 5; 450 INJECTION, SOLUTION INTRAVENOUS at 21:34

## 2024-10-13 RX ADMIN — GABAPENTIN 100 MG: 100 CAPSULE ORAL at 21:29

## 2024-10-13 RX ADMIN — INSULIN HUMAN 10.3 UNITS/HR: 1 INJECTION, SOLUTION INTRAVENOUS at 16:10

## 2024-10-13 RX ADMIN — HEPARIN SODIUM 5000 UNITS: 5000 INJECTION INTRAVENOUS; SUBCUTANEOUS at 21:29

## 2024-10-13 RX ADMIN — DOXYCYCLINE 100 MG: 100 CAPSULE ORAL at 21:29

## 2024-10-13 RX ADMIN — METOPROLOL TARTRATE 50 MG: 50 TABLET, FILM COATED ORAL at 21:30

## 2024-10-13 RX ADMIN — SODIUM CHLORIDE 1000 ML/HR: 9 INJECTION, SOLUTION INTRAVENOUS at 15:50

## 2024-10-13 RX ADMIN — FAMOTIDINE 20 MG: 20 TABLET, FILM COATED ORAL at 21:30

## 2024-10-13 RX ADMIN — IPRATROPIUM BROMIDE AND ALBUTEROL SULFATE 3 ML: 2.5; .5 SOLUTION RESPIRATORY (INHALATION) at 19:24

## 2024-10-13 NOTE — PROGRESS NOTES
"    Nephrology progress note     Mr Davila is a 84 long term nursing home  resident ,  CAD, DM   , HTN , dyslipidemia , non verbal admitted for AMS and HSS with larger water deficit     Past Medical History:   Diagnosis Date    Arthritis     Cancer     SKIN     Cataract     Coronary artery disease     Diabetes mellitus     High cholesterol     [place] (hard of hearing)     PATIENT HAS HEARING AIDS    Hypertension     Irregular heart beat     HISTORY OF CARDIAC ABLATION IN 2003    Wears dentures     FULL UPPER PLATE         /84   Pulse 96   Temp 99.7 °F (37.6 °C) (Axillary)   Resp 20   Ht 170.2 cm (67\")   Wt 74 kg (163 lb 2.3 oz)   SpO2 93%   BMI 25.55 kg/m²     Results from last 7 days   Lab Units 10/13/24  1625 10/13/24  1349   SODIUM mmol/L 152* 152*   POTASSIUM mmol/L 5.2 5.0   CHLORIDE mmol/L 115* 112*   CO2 mmol/L 24.5 23.9   BUN mg/dL 42* 46*   CREATININE mg/dL 2.21* 2.30*   CALCIUM mg/dL 8.2* 8.6   BILIRUBIN mg/dL  --  0.4   ALK PHOS U/L  --  130*   ALT (SGPT) U/L  --  <5   AST (SGOT) U/L  --  19   GLUCOSE mg/dL 669* 799*     Patient is currently on HHS  He will need 1/2 nss for resuscitation , D5  limited due to hyperglycemia   Will place NG tube to add water flushes 100 ml/hr to correct his large water deficit   Will d/c depakote due to NANCI and obtain levels  prior to re-star .   Full consultation will follow     Discussed with Dr Concepcion     Call ICU and discussed  nursing staff   "

## 2024-10-13 NOTE — ED PROVIDER NOTES
Subjective:  History of Present Illness:    Patient is an 84-year-old male with history of diabetes mellitus, CAD, hyperlipidemia, hypertension who presents today with altered mental status.  [facility] reports patient was found to be altered this morning.  Also was hypoxic at facility.  Patient is normally nonverbal but nursing home staff states that he is normally responsive to painful and verbal stimuli.  They believe he does have understanding of words.  Today was not responsive to their verbal stimulus.  Appears to be maintaining his own airway.  They deny fever.  He has had no cough or congestion prior to arrival but they were concerned he was hypoxic.  No concerns for diabetes management prior to arrival but does have undetectably high glucose on arrival.      Nurses Notes reviewed and agree, including vitals, allergies, social history and prior medical history.     REVIEW OF SYSTEMS: All systems reviewed and not pertinent unless noted.  Review of Systems   Unable to perform ROS: Patient nonverbal       Past Medical History:   Diagnosis Date    Arthritis     Cancer     SKIN     Cataract     Coronary artery disease     Diabetes mellitus     High cholesterol     [place] hearing)     PATIENT HAS HEARING AIDS    Hypertension     Irregular heart beat     HISTORY OF CARDIAC ABLATION IN 2003    Wears dentures     FULL UPPER PLATE       Allergies:    Phenergan [promethazine hcl], Carbamazepine, Hydrocodone, and Lisinopril      Past Surgical History:   Procedure Laterality Date    BACK SURGERY  1978    THEN AGAIN IN 1982    CARDIAC ABLATION  2003    CARDIAC SURGERY      CATARACT EXTRACTION W/ INTRAOCULAR LENS IMPLANT Left 5/24/2017    Procedure: CATARACT PHACO EXTRACTION WITH INTRAOCULAR LENS IMPLANT LEFT WITH TORIC LENS;  Surgeon: Alma Benavidez MD;  Location: [facility];  Service:     CATARACT EXTRACTION W/ INTRAOCULAR LENS IMPLANT Right 6/14/2017    Procedure: CATARACT PHACO EXTRACTION WITH INTRAOCULAR  "LENS IMPLANT RIGHT WITH TORIC LENS;  Surgeon: Alma Benavidez MD;  Location: [facility];  Service:     CORONARY ARTERY BYPASS GRAFT      SPOUSE UNSURE OF HOW MANY VESSELS    HIATAL HERNIA REPAIR  1972    JOINT REPLACEMENT Left     HIP - DONE BY DR DALAL    KNEE ARTHROSCOPY Left     NOSE SURGERY  1970    SPOUSE REPORTS FOR A BROKEN NOSE    OTHER SURGICAL HISTORY Left     TENDON TORN LOOSE FROM BONE, LEFT ELBOW    OTHER SURGICAL HISTORY      RUPTURED DISC    OTHER SURGICAL HISTORY      NECK SURGERY FOR RUPTURED DISC    OTHER SURGICAL HISTORY      HAD SECOND NECK SURGERY    OTHER SURGICAL HISTORY      RUPTURED DISC    OTHER SURGICAL HISTORY      RUPTURED DISC    OTHER SURGICAL HISTORY      HARDWARE REMOVAL FROM BACK BY DR HAY         Social History     Socioeconomic History    Marital status:    Tobacco Use    Smoking status: Former     Current packs/day: 0.00     Types: Cigarettes     Quit date:      Years since quittin.8    Smokeless tobacco: Never   Vaping Use    Vaping status: Never Used   Substance and Sexual Activity    Alcohol use: No    Drug use: No    Sexual activity: Defer         History reviewed. No pertinent family history.    Objective  Physical Exam:  /74   Pulse 104   Temp 98.7 °F (37.1 °C) (Axillary)   Resp 20   Ht 170.2 cm (67\")   Wt 74 kg (163 lb 2.3 oz)   SpO2 95%   BMI 25.55 kg/m²      Physical Exam  Constitutional:       General: He is not in acute distress.     Appearance: Normal appearance. He is normal weight. He is ill-appearing. He is not toxic-appearing.   HENT:      Head: Normocephalic and atraumatic.      Nose: Nose normal. No congestion or rhinorrhea.      Mouth/Throat:      Mouth: Mucous membranes are moist.      Pharynx: Oropharynx is clear.   Eyes:      Extraocular Movements: Extraocular movements intact.      Conjunctiva/sclera: Conjunctivae normal.      Pupils: Pupils are equal, round, and reactive to light. "   Cardiovascular:      Rate and Rhythm: Normal rate and regular rhythm.      Pulses: Normal pulses.   Pulmonary:      Effort: Pulmonary effort is normal. No respiratory distress.      Breath sounds: Normal breath sounds.   Abdominal:      General: Abdomen is flat. Bowel sounds are normal. There is no distension.      Palpations: Abdomen is soft.      Tenderness: There is no abdominal tenderness.   Musculoskeletal:         General: No swelling or tenderness. Normal range of motion.      Cervical back: Normal range of motion and neck supple. No rigidity or tenderness.   Skin:     General: Skin is warm and dry.      Capillary Refill: Capillary refill takes less than 2 seconds.   Neurological:      General: No focal deficit present.      Mental Status: He is confused.      GCS: GCS eye subscore is 4. GCS verbal subscore is 1. GCS motor subscore is 5.   Psychiatric:         Mood and Affect: Mood normal.         Behavior: Behavior normal.         Thought Content: Thought content normal.         Judgment: Judgment normal.         Critical Care    Performed by: Robbie Olvera MD  Authorized by: Jamey Hadley MD    Critical care provider statement:     Critical care time (minutes):  30    Critical care time was exclusive of:  Separately billable procedures and treating other patients    Critical care was necessary to treat or prevent imminent or life-threatening deterioration of the following conditions:  Endocrine crisis (Hyperosmolar hyperglycemic state)    Critical care was time spent personally by me on the following activities:  Blood draw for specimens, development of treatment plan with patient or surrogate, discussions with primary provider, evaluation of patient's response to treatment, examination of patient, obtaining history from patient or surrogate, ordering and performing treatments and interventions, ordering and review of laboratory studies, ordering and review of radiographic studies, pulse  oximetry, re-evaluation of patient's condition and review of old charts    Care discussed with: admitting provider        ED Course:    ED Course as of 10/13/24 2306   Sun Oct 13, 2024   1325 EKG interpreted by me, normal sinus rhythm with T wave inversions in leads I to V4, V5, V6 with no concerning ST changes noted, rate of 97, abnormal EKG [JE]   1326 Chest x-ray independently interpreted by me prior to radiology overread showing no acute process [JE]      ED Course User Index  [JE] Robbie Olvera MD       Lab Results (last 24 hours)       Procedure Component Value Units Date/Time    COVID-19 and FLU A/B PCR, 1 HR TAT - Swab, Nasopharynx [[id number]]  (Normal) Collected: 10/13/24 1319    Specimen: Swab from Nasopharynx Updated: 10/13/24 1342     COVID19 Not Detected     Influenza A PCR Not Detected     Influenza B PCR Not Detected    Narrative:      Fact sheet for providers: https://www.fda.gov/media/186609/download    Fact sheet for patients: https://www.fda.gov/media/401129/download    Test performed by PCR.    POC Glucose Once [[id number]]  (Abnormal) Collected: 10/13/24 1319    Specimen: Blood Updated: 10/13/24 1321     Glucose >599 mg/dL      Comment: Serial Number: [id number]:  [id number]       CBC & Differential [[id number]]  (Abnormal) Collected: 10/13/24 1325    Specimen: Blood Updated: 10/13/24 1333    Narrative:      The following orders were created for panel order CBC & Differential.  Procedure                               Abnormality         Status                     ---------                               -----------         ------                     CBC Auto Differential[[id number]]        Abnormal            Final result                 Please view results for these tests on the individual orders.    Lactic Acid, Plasma [[id number]]  (Abnormal) Collected: 10/13/24 1325    Specimen: Blood Updated: 10/13/24 1358     Lactate 3.7 mmol/L     CBC Auto Differential [[id number]]  (Abnormal)  Collected: 10/13/24 1325    Specimen: Blood Updated: 10/13/24 1333     WBC 11.18 10*3/mm3      RBC 5.31 10*6/mm3      Hemoglobin 16.0 g/dL      Hematocrit 49.5 %      MCV 93.2 fL      MCH 30.1 pg      MCHC 32.3 g/dL      RDW 14.7 %      RDW-SD 50.9 fl      MPV 10.8 fL      Platelets 225 10*3/mm3      Neutrophil % 72.4 %      Lymphocyte % 13.9 %      Monocyte % 12.8 %      Eosinophil % 0.0 %      Basophil % 0.3 %      Immature Grans % 0.6 %      Neutrophils, Absolute 8.10 10*3/mm3      Lymphocytes, Absolute 1.55 10*3/mm3      Monocytes, Absolute 1.43 10*3/mm3      Eosinophils, Absolute 0.00 10*3/mm3      Basophils, Absolute 0.03 10*3/mm3      Immature Grans, Absolute 0.07 10*3/mm3      nRBC 0.0 /100 WBC     Hemoglobin A1c [[id number]]  (Abnormal) Collected: 10/13/24 1325    Specimen: Blood Updated: 10/13/24 1548     Hemoglobin A1C 10.10 %     Narrative:      Hemoglobin A1C Ranges:    Increased Risk for Diabetes  5.7% to 6.4%  Diabetes                     >= 6.5%  Diabetic Goal                < 7.0%    Blood Gas, Venous With Co-Ox [[id number]]  (Abnormal) Collected: 10/13/24 1331    Specimen: Venous Blood Updated: 10/13/24 1331     Site IVC     pH, Venous 7.428 pH Units      pCO2, Venous 40.4 mm Hg      Comment: 84 Value below reference range        pO2, Venous 43.3 mm Hg      HCO3, Venous 26.7 mmol/L      Base Excess, Venous 2.1 mmol/L      Comment: 83 Value above reference range        O2 Saturation, Venous 78.6 %      Comment: 83 Value above reference range        Oxyhemoglobin Venous 77.3 %      Methemoglobin Venous 0.3 %      Carboxyhemoglobin Venous 1.3 %      Barometric Pressure for Blood Gas 730 mmHg      Modality Room Air     Ventilator Mode NA     Collected by RN     Comment: Meter: [id number]     :  [id number]       Comprehensive Metabolic Panel [[id number]]  (Abnormal) Collected: 10/13/24 1349    Specimen: Blood Updated: 10/13/24 1435     Glucose 799 mg/dL      Comment: Glucose >180, Hemoglobin  A1C recommended.        BUN 46 mg/dL      Creatinine 2.30 mg/dL      Sodium 152 mmol/L      Potassium 5.0 mmol/L      Comment: Specimen hemolyzed.  Result may be falsely elevated.        Chloride 112 mmol/L      CO2 23.9 mmol/L      Calcium 8.6 mg/dL      Total Protein 7.5 g/dL      Albumin 3.6 g/dL      ALT (SGPT) <5 U/L      Comment: Specimen hemolyzed.  Result may  be falsely elevated.        AST (SGOT) 19 U/L      Comment: Specimen hemolyzed.  Result may be falsely elevated.        Alkaline Phosphatase 130 U/L      Total Bilirubin 0.4 mg/dL      Globulin 3.9 gm/dL      A/G Ratio 0.9 g/dL      BUN/Creatinine Ratio 20.0     Anion Gap 16.1 mmol/L      eGFR 27.3 mL/min/1.73     Narrative:      GFR Normal >60  Chronic Kidney Disease <60  Kidney Failure <15    The GFR formula is only valid for adults with stable renal function between ages 18 and 70.    High Sensitivity Troponin T [[id number]]  (Abnormal) Collected: 10/13/24 1349    Specimen: Blood Updated: 10/13/24 1443     HS Troponin T 111 ng/L     Narrative:      High Sensitive Troponin T Reference Range:  <14.0 ng/L- Negative Female for AMI  <22.0 ng/L- Negative Male for AMI  >=14 - Abnormal Female indicating possible myocardial injury.  >=22 - Abnormal Male indicating possible myocardial injury.   Clinicians would have to utilize clinical acumen, EKG, Troponin, and serial changes to determine if it is an Acute Myocardial Infarction or myocardial injury due to an underlying chronic condition.         BNP [[id number]]  (Normal) Collected: 10/13/24 1349    Specimen: Blood Updated: 10/13/24 1423     proBNP 1,017.0 pg/mL     Narrative:      This assay is used as an aid in the diagnosis of individuals suspected of having heart failure. It can be used as an aid in the diagnosis of acute decompensated heart failure (ADHF) in patients presenting with signs and symptoms of ADHF to the emergency department (ED). In addition, NT-proBNP of <300 pg/mL indicates ADHF is not  "likely.    Age Range Result Interpretation  NT-proBNP Concentration (pg/mL:      <50             Positive            >450                   Gray                 300-450                    Negative             <300    50-75           Positive            >900                  Gray                300-900                  Negative            <300      >75             Positive            >1800                  Gray                300-1800                  Negative            <300    C-reactive Protein [[id number]]  (Normal) Collected: 10/13/24 1349    Specimen: Blood Updated: 10/13/24 1419     C-Reactive Protein 0.33 mg/dL     Procalcitonin [[id number]]  (Normal) Collected: 10/13/24 1349    Specimen: Blood Updated: 10/13/24 1429     Procalcitonin 0.07 ng/mL     Narrative:      As a Marker for Sepsis (Non-Neonates):    1. <0.5 ng/mL represents a low risk of severe sepsis and/or septic shock.  2. >2 ng/mL represents a high risk of severe sepsis and/or septic shock.    As a Marker for Lower Respiratory Tract Infections that require antibiotic therapy:    PCT on Admission    Antibiotic Therapy       6-12 Hrs later    >0.5                Strongly Recommended  >0.25 - <0.5        Recommended   0.1 - 0.25          Discouraged              Remeasure/reassess PCT  <0.1                Strongly Discouraged     Remeasure/reassess PCT    As 28 day mortality risk marker: \"Change in Procalcitonin Result\" (>80% or <=80%) if Day 0 (or Day 1) and Day 4 values are available. Refer to http://[email or web address] [email or web address] [email or web address]-pct-calculator.com    Change in PCT <=80%  A decrease of PCT levels below or equal to 80% defines a positive change in PCT test result representing a higher risk for 28-day all-cause mortality of patients diagnosed with severe sepsis for septic shock.    Change in PCT >80%  A decrease of PCT levels of more than 80% defines a negative change in PCT result representing a lower risk for 28-day all-cause mortality of patients diagnosed with severe " sepsis or septic shock.       Magnesium [[id number]]  (Normal) Collected: 10/13/24 1349    Specimen: Blood Updated: 10/13/24 1435     Magnesium 1.9 mg/dL     CK [[id number]]  (Abnormal) Collected: 10/13/24 1349    Specimen: Blood Updated: 10/13/24 1419     Creatine Kinase 248 U/L     Phosphorus [[id number]]  (Normal) Collected: 10/13/24 1349    Specimen: Blood Updated: 10/13/24 1544     Phosphorus 4.0 mg/dL     Urinalysis With Culture If Indicated - Urine, Catheter [[id number]]  (Abnormal) Collected: 10/13/24 1450    Specimen: Urine, Catheter Updated: 10/13/24 1458     Color, UA Yellow     Appearance, UA Clear     pH, UA <=5.0     Specific Gravity, UA 1.028     Glucose, UA >=1000 mg/dL (3+)     Ketones, UA Negative     Bilirubin, UA Negative     Blood, UA Trace     Protein, UA Negative     Leuk Esterase, UA Negative     Nitrite, UA Negative     Urobilinogen, UA 0.2 E.U./dL    Narrative:      In absence of clinical symptoms, the presence of pyuria, bacteria, and/or nitrites on the urinalysis result does not correlate with infection.    Urinalysis, Microscopic Only - Urine, Catheter [[id number]] Collected: 10/13/24 1450    Specimen: Urine, Catheter Updated: 10/13/24 1509     RBC, UA 0-2 /HPF      WBC, UA 0-2 /HPF      Comment: Urine culture not indicated.        Bacteria, UA None Seen /HPF      Squamous Epithelial Cells, UA None Seen /HPF      Hyaline Casts, UA None Seen /LPF      Methodology Manual Light Microscopy    Beta Hydroxybutyrate Quantitative [[id number]] Collected: 10/13/24 1600    Specimen: Blood Updated: 10/13/24 1903    POC Glucose Once [[id number]]  (Abnormal) Collected: 10/13/24 1604    Specimen: Blood Updated: 10/13/24 1606     Glucose 577 mg/dL      Comment: Serial Number: [id number]:  [id number]       STAT Lactic Acid, Reflex [[id number]]  (Abnormal) Collected: 10/13/24 1625    Specimen: Blood Updated: 10/13/24 1658     Lactate 3.9 mmol/L     High Sensitivity Troponin T 2Hr [[id number]]  (Abnormal)  Collected: 10/13/24 1625    Specimen: Blood Updated: 10/13/24 1706     HS Troponin T 95 ng/L      Comment: Specimen hemolyzed.  Results may be falsely decreased.        Troponin T Delta -16 ng/L     Narrative:      High Sensitive Troponin T Reference Range:  <14.0 ng/L- Negative Female for AMI  <22.0 ng/L- Negative Male for AMI  >=14 - Abnormal Female indicating possible myocardial injury.  >=22 - Abnormal Male indicating possible myocardial injury.   Clinicians would have to utilize clinical acumen, EKG, Troponin, and serial changes to determine if it is an Acute Myocardial Infarction or myocardial injury due to an underlying chronic condition.         Osmolality, Serum [[id number]] Collected: 10/13/24 1625    Specimen: Blood Updated: 10/13/24 1629    Basic Metabolic Panel [[id number]]  (Abnormal) Collected: 10/13/24 1625    Specimen: Blood Updated: 10/13/24 1702     Glucose 669 mg/dL      BUN 42 mg/dL      Creatinine 2.21 mg/dL      Sodium 152 mmol/L      Potassium 5.2 mmol/L      Comment: Specimen hemolyzed.  Result may be falsely elevated.        Chloride 115 mmol/L      CO2 24.5 mmol/L      Calcium 8.2 mg/dL      BUN/Creatinine Ratio 19.0     Anion Gap 12.5 mmol/L      eGFR 28.7 mL/min/1.73     Narrative:      GFR Normal >60  Chronic Kidney Disease <60  Kidney Failure <15    The GFR formula is only valid for adults with stable renal function between ages 18 and 70.    Magnesium [[id number]]  (Normal) Collected: 10/13/24 1625    Specimen: Blood Updated: 10/13/24 1706     Magnesium 1.8 mg/dL     Phosphorus [[id number]]  (Normal) Collected: 10/13/24 1625    Specimen: Blood Updated: 10/13/24 1706     Phosphorus 3.7 mg/dL     POC Glucose Once [[id number]]  (Abnormal) Collected: 10/13/24 1707    Specimen: Blood Updated: 10/13/24 1710     Glucose 581 mg/dL      Comment: Serial Number: [id number]:  [id number]       Blood Culture With ADRIANE - Blood, Hand, Digit Right [[id number]] Collected: 10/13/24 1750    Specimen: Blood  from Hand, Digit Right Updated: 10/13/24 1804    Blood Culture With ADRIANE - Blood, Hand, Right [[id number]] Collected: 10/13/24 1759    Specimen: Blood from Hand, Right Updated: 10/13/24 1804    POC Glucose Once [[id number]]  (Abnormal) Collected: 10/13/24 1816    Specimen: Blood Updated: 10/13/24 1820     Glucose 488 mg/dL      Comment: Serial Number: [id number]:  [id number]       POC Glucose Once [[id number]]  (Abnormal) Collected: 10/13/24 1901    Specimen: Blood Updated: 10/13/24 1903     Glucose 378 mg/dL      Comment: Serial Number: [id number]:  [id number]       POC Glucose Once [[id number]]  (Abnormal) Collected: 10/13/24 1929    Specimen: Blood Updated: 10/13/24 1931     Glucose 327 mg/dL      Comment: Serial Number: [id number]:  [id number]       Basic Metabolic Panel [[id number]]  (Abnormal) Collected: 10/13/24 1943    Specimen: Blood Updated: 10/13/24 2016     Glucose 394 mg/dL      BUN 40 mg/dL      Creatinine 2.13 mg/dL      Sodium 154 mmol/L      Potassium 3.3 mmol/L      Comment: Slight hemolysis detected by analyzer. Result may be falsely elevated.        Chloride 119 mmol/L      CO2 18.2 mmol/L      Calcium 8.3 mg/dL      BUN/Creatinine Ratio 18.8     Anion Gap 16.8 mmol/L      eGFR 30.0 mL/min/1.73     Narrative:      GFR Normal >60  Chronic Kidney Disease <60  Kidney Failure <15    The GFR formula is only valid for adults with stable renal function between ages 18 and 70.    Magnesium [[id number]]  (Normal) Collected: 10/13/24 1943    Specimen: Blood Updated: 10/13/24 2016     Magnesium 1.9 mg/dL     Phosphorus [[id number]]  (Normal) Collected: 10/13/24 1943    Specimen: Blood Updated: 10/13/24 2016     Phosphorus 2.7 mg/dL     Valproic Acid Level, Total [[id number]]  (Abnormal) Collected: 10/13/24 1943    Specimen: Blood Updated: 10/13/24 2010     Valproic Acid 30.2 mcg/mL     Narrative:      Therapeutic Ranges for Valproic Acid    Epilepsy:       mcg/ml  Bipolar/Zulma  up to 125  mcg/ml      STAT Lactic Acid, Reflex [[id number]]  (Abnormal) Collected: 10/13/24 1944    Specimen: Blood Updated: 10/13/24 2015     Lactate 7.2 mmol/L     POC Glucose Once [[id number]]  (Abnormal) Collected: 10/13/24 2034    Specimen: Blood Updated: 10/13/24 2036     Glucose 236 mg/dL      Comment: Serial Number: [id number]:  [id number]       Respiratory Culture - Sputum, Cough [[id number]] Collected: 10/13/24 2113    Specimen: Sputum from Cough Updated: 10/13/24 2123    POC Glucose Once [[id number]]  (Abnormal) Collected: 10/13/24 2133    Specimen: Blood Updated: 10/13/24 2136     Glucose 209 mg/dL      Comment: Serial Number: [id number]:  [id number]       POC Glucose Once [[id number]]  (Abnormal) Collected: 10/13/24 2237    Specimen: Blood Updated: 10/13/24 2238     Glucose 204 mg/dL      Comment: Serial Number: [id number]:  [id number]                XR Abdomen 1 View    Addendum Date: 10/13/2024    ADDENDUM REPORT ADDENDUM: This report was discussed with Charisse SILVA RN on Oct 13, 2024 20:41:00 EDT. Authenticated and Electronically Signed by Thierry Feldman DO on 10/13/2024 08:41:52 PM    Result Date: 10/13/2024  FINAL REPORT TECHNIQUE: null CLINICAL HISTORY: NG placement COMPARISON: null FINDINGS: 1 view abdomen Comparison: CT/SR - CT ANGIOGRAM ABDOMEN PELVIS - 08/28/2024 07:30 PM EDT Findings: The distal tip of the enteric tube overlies the expected stomach lumen. The gastric side port is just distal to the GE junction, recommend advancement by up to 3 cm. Minimal densities within the lung bases. No pneumoperitoneum or pneumatosis. No abnormal calcifications. Median sternotomy wires. Partially visualized posterior fixation of the lumbar spine.     Impression: IMPRESSION: 1. The distal tip of the enteric tube overlies the expected stomach lumen. The gastric side port is just distal to the GE junction, recommend advancement by up to 3 cm. Authenticated and Electronically Signed by Thierry Feldman DO on 10/13/2024  08:31:31 PM    CT Head Without Contrast    Result Date: 10/13/2024  PROCEDURE: CT HEAD WO CONTRAST-  INDICATION: Altered mental status; E11.00-Type 2 diabetes mellitus with hyperosmolarity without nonketotic hyperglycemic-hyperosmolar coma ([facility])  TECHNIQUE: Multiple axial CT images were performed from the foramen magnum to the vertex without contrast.   Coronal reconstruction images were obtained from the axial data.  COMPARISON: 8/30/2020.  FINDINGS: There is no mass effect or midline shift. Global atrophy is noted. The ventricles are enlarged, similar to the prior exam. There is no intracranial hemorrhage. No acute large cortical infarct. The posterior fossa is without acute abnormality. The basilar cisterns are preserved. No acute soft tissue abnormality. No acute osseous abnormalities are present.      Impression: 1. No acute intracranial hemorrhage or evidence of acute large cortical infarct. Consider MRI if clinical concern persists. 2. Atrophy and ventricular enlargement.        CTDI: 32.43 mGy DLP:575.47 mGy.cm    This study was performed with techniques to keep radiation doses as low as reasonably achievable (ALARA). Individualized dose reduction techniques using automated exposure control or adjustment of mA and/or kV according to the patient size were employed.   This report was signed and finalized on 10/13/2024 4:43 PM by Michelle Arriaza MD.      XR Chest 1 View    Result Date: 10/13/2024  PROCEDURE: XR CHEST 1 VW-  INDICATION:  Altered mental status, eval pneumonia  FINDINGS:  A portable view of the chest was obtained.  Comparison is made to a prior exam dated 8/28/2024.   Patient is again noted to be status post median sternotomy. Heart size is normal. There are low lung volumes with bibasilar opacity. This could represent atelectasis or pneumonia. No pneumothorax.      Impression: Low lung volumes with bibasilar opacity which could represent atelectasis or pneumonia. Recommend radiographic  follow-up.   This report was signed and finalized on 10/13/2024 1:46 PM by Michelle Arriaza MD.          MDM      Initial impression of presenting illness: Altered mental status    DDX: includes but is not limited to: Intracranial hemorrhage, DKA, sepsis, urinary tract infection, bacterial pneumonia    Patient arrives guarded with vitals interpreted by myself.     Pertinent features from physical exam: Patient not responding to verbal stimuli which is not the patient's baseline.  Nonverbal at baseline however.  Clear to auscultation, regular rate and rhythm, no murmur, nontender to abdominal palpation, stable oxygen in emergency department on his home 2 L.    Initial diagnostic plan: CBC, CMP, troponin, BNP, EKG, chest x-ray, CRP, Pro-Kei, lactic acid, magnesium    Results from initial plan were reviewed and interpreted by me revealing concern for HHS given patient without ketones on urine, severely elevated glucose with altered mental status    Diagnostic information from other sources: Discussed EMS on arrival and reviewed past medical records    Interventions / Re-evaluation: Given IV fluids due to concerns for hyponatremia given high sodium count to even with significantly elevated glucose.  No concerns for ketones in the urine and no acidosis on VBG thus ordered HHS protocol for Glucomander    Results/clinical rationale were discussed with hospitalist    Consultations/Discussion of results with other physicians: Given my concern for HHS, discussed with hospitalist, Dr. Hadley for admission.  He request CT head prior to admission.  He also discussed antibiotics.  No obvious pneumonia on chest x-ray per my depend interpretation.  Radiology read of low lung volumes with possible pneumonia however, given his negative Pro-Kei, I had opted to defer antibiotics at this time.  CT scan of the head is negative and the patient was admitted to the hospitalist service for further management    Disposition plan:  Bit  -----  ABDIRAHMAN reviewed by Jamey Hadley MD, Robbie Olvera MD     Final diagnoses:   Hyperosmolar hyperglycemic state (HHS)          Robbie Olvera MD  10/13/24 9523

## 2024-10-13 NOTE — PHARMACY RECOMMENDATION
Pharmacokinetic Consult - Unasyn and Doxycycline Dosing  Gama Davila is a 84 y.o. male who has been consulted to dose ampicillin/sulbactam (Unasyn) and doxycycline (PO route) for  pneumonia .    Current Antimicrobial Therapy    Anti-Infectives (From admission, onward)      Ordered     Dose/Rate Route Frequency Start Stop    10/13/24 1908  Pharmacy to Dose ampicillin-sulbactam        Ordering Provider: Jamey Hadley MD     Does not apply Continuous PRN 10/13/24 1908 10/18/24 1907            Microbiology Results (last 10 days)       Procedure Component Value - Date/Time    COVID-19 and FLU A/B PCR, 1 HR TAT - Swab, Nasopharynx [[id number]]  (Normal) Collected: 10/13/24 1319    Lab Status: Final result Specimen: Swab from Nasopharynx Updated: 10/13/24 1342     COVID19 Not Detected     Influenza A PCR Not Detected     Influenza B PCR Not Detected    Narrative:      Fact sheet for providers: https://www.fda.gov/media/618385/download    Fact sheet for patients: https://www.fda.gov/media/379067/download    Test performed by PCR.             Allergies  Phenergan [promethazine hcl], Carbamazepine, Hydrocodone, and Lisinopril    Relevant clinical data and objective history reviewed:  Creatinine   Date Value Ref Range Status   10/13/2024 2.21 (H) 0.76 - 1.27 mg/dL Final     Estimated Creatinine Clearance: 26 mL/min (A) (by C-G formula based on SCr of 2.21 mg/dL (H)).  No intake/output data recorded.  Patient weight: 74 kg (163 lb 2.3 oz)    Asessment/Plan  Initiate ampicillin/sulbactam (Unasyn) 3 gm IV q 12 hrs × 5 days.  Initiate doxycycline 100 mg PO q 12 hrs × 5 days.  Pharmacy will monitor Mr. Davila's renal function and clinical status and adjust the antibiotic dose(s) and/or frequency/-ies as needed.      Thank you for the opportunity to consult on this patient.    Sabino Carlson [facility], Pharm.D.  10/13/24  19:12 EDT

## 2024-10-13 NOTE — H&P
[facility]   HISTORY AND PHYSICAL      Name:  Gama Davila   Age:  84 y.o.  Sex:  male  :  1940  MRN:  [id number]   Visit Number:  [id number]  Admission Date:  10/13/2024  Date Of Service:  10/13/24  Primary Care Physician:  Alex Burk DO    Chief Complaint:     Altered mental status, decreased responsiveness, chest congestion    History Of Presenting Illness:      Patient is an 84 years old male with a past medical history of chronic respiratory failure on 2 L, dementia, nonverbal at baseline, Parkinson disease, diabetes mellitus, CAD, hypertension who presented from nursing home facility with reports that patient was found altered on his mental status with decreased responsiveness and was having chest congestion and increased work of breathing.  Patient is nonverbal at baseline however he would respond to verbal stimuli at baseline and appears to be able to understand some words.  History was provided by ER report.  Patient is unable to contribute any history due to dementia and nonverbal at baseline. His daughter is at bedside providing history on the patient.  No reported fever or chills.  No vomiting or cough.    On ER evaluation, patient was hypertensive with a blood pressure of 165/126, was afebrile on 2 L baseline oxygen requirement.  His workup was significant for venous blood gases with pH of 7.428/HCO3 26.7.  HS Troponin 1 11-95, creatinine 2.3 (baseline creatinine 1.4) BUN 46, glucose 799, sodium 152 (corrected 161) WBC 11.1, lactate 3.7, hemoglobin A1c 10.1.  Pro-Kei, CRP and proBNP WNL.  Urinalysis positive for glucose but otherwise negative for infection.  COVID and flu negative.  Chest x-ray showed Low lung volumes with bibasilar opacity which could represent atelectasis or pneumonia.  CT head without contrast showed 1. No acute intracranial hemorrhage or evidence of acute large cortical  infarct. 2. Atrophy and ventricular enlargement.  Patient started  on Glucomander per [facility] protocol in the ER after he received 1 L bolus of LR.  Hospitalist consulted for admission, further management and treatment.    Review Of Systems:    All systems were reviewed and negative except as mentioned in history of presenting illness, assessment and plan.    Past Medical History: Patient  has a past medical history of Arthritis, Cancer, Cataract, Coronary artery disease, Diabetes mellitus, High cholesterol, [place] (hard of hearing), Hypertension, Irregular heart beat, and Wears dentures.    Past Surgical History: Patient  has a past surgical history that includes Cardiac Ablation (2003); Coronary artery bypass graft (2003); Nose surgery (1970); Hiatal hernia repair (1972); Back surgery (1978); Other surgical history (Left); Other surgical history (1986); Knee Arthroscopy (Left); Other surgical history (1994); Other surgical history (1996); Other surgical history (1998); Other surgical history (2003); Cardiac surgery; Other surgical history (2009); Joint replacement (Left); Cataract extraction w/ intraocular lens implant (Left, 5/24/2017); and Cataract extraction w/ intraocular lens implant (Right, 6/14/2017).    Social History: Patient  reports that he quit smoking about 41 years ago. His smoking use included cigarettes. He has never used smokeless tobacco. He reports that he does not drink alcohol and does not use drugs.    Family History:  Patient's family history has been reviewed and found to be noncontributory.     Allergies:      Phenergan [promethazine hcl], Carbamazepine, Hydrocodone, and Lisinopril    Home Medications:    Prior to Admission Medications       Prescriptions Last Dose Informant Patient Reported? Taking?    acetaminophen (TYLENOL) 325 MG tablet   Yes No    Take 2 tablets by mouth Every 6 (Six) Hours As Needed for Mild Pain or Fever.    aspirin 81 MG EC tablet  Spouse/Significant Other Yes No    Take 1 tablet by mouth Daily.    carbidopa-levodopa (SINEMET)  MG  per tablet   No No    Take 1 tablet by mouth 3 (Three) Times a Day.    Divalproex Sodium (DEPAKOTE SPRINKLE) 125 MG capsule   Yes No    Take 4 capsules by mouth 2 (Two) Times a Day.    docusate sodium (COLACE) 100 MG capsule   Yes No    Take 2 capsules by mouth Daily As Needed for Constipation.    donepezil (ARICEPT) 10 MG tablet   No No    Take 1 tablet by mouth Every Night.    famotidine (PEPCID) 20 MG tablet   Yes No    Take 1 tablet by mouth Daily.    furosemide (LASIX) 20 MG tablet   Yes No    Take 1 tablet by mouth Daily.    gabapentin (NEURONTIN) 100 MG capsule   No No    Take 1 capsule by mouth 3 (Three) Times a Day.    ipratropium-albuterol (DUO-NEB) 0.5-2.5 mg/3 ml nebulizer   Yes No    Take 3 mL by nebulization Every 6 (Six) Hours As Needed for Wheezing.    melatonin 3 MG tablet   Yes No    Take 2 tablets by mouth Every Night.    memantine (NAMENDA) 10 MG tablet   Yes No    Take 1 tablet by mouth 2 (Two) Times a Day.    metoprolol tartrate (LOPRESSOR) 50 MG tablet   Yes No    Take 1 tablet by mouth Daily.    ondansetron (ZOFRAN) 4 MG tablet   Yes No    Take 1 tablet by mouth Every 6 (Six) Hours As Needed for Nausea or Vomiting.    polyethylene glycol (MiraLax) 17 GM/SCOOP powder   Yes No    Take 17 g by mouth Daily As Needed.    tamsulosin (FLOMAX) 0.4 MG capsule 24 hr capsule   Yes No    Take 1 capsule by mouth Daily.          ED Medications:    Medications   sodium chloride 0.9 % flush 10 mL (has no administration in time range)   sodium chloride 0.9 % flush 10 mL (has no administration in time range)   sodium chloride 0.9 % infusion 40 mL (has no administration in time range)   dextrose (GLUTOSE) oral gel 15 g (has no administration in time range)   dextrose (D50W) (25 g/50 mL) IV injection 10-50 mL (has no administration in time range)   glucagon (GLUCAGEN) injection 1 mg (has no administration in time range)   sodium chloride 0.9 % bolus (has no administration in time range)   sodium chloride 0.9 %  infusion (has no administration in time range)   sodium chloride 0.9 % with KCl 20 mEq/L infusion (has no administration in time range)   sodium chloride 0.9 % with KCl 40 mEq/L infusion (has no administration in time range)   dextrose 5 % and sodium chloride 0.9 % infusion (has no administration in time range)   dextrose 5 % and sodium chloride 0.9 % with KCl 20 mEq/L infusion (has no administration in time range)   dextrose 5 % and sodium chloride 0.9 % with KCl 40 mEq/L infusion (has no administration in time range)   sodium chloride 0.45 % infusion (has no administration in time range)   sodium chloride 0.45 % with KCl 20 mEq/L infusion (has no administration in time range)   sodium chloride 0.45 % 1,000 mL with potassium chloride 40 mEq infusion (has no administration in time range)   dextrose 5 % and sodium chloride 0.45 % infusion (has no administration in time range)   dextrose 5 % and sodium chloride 0.45 % with KCl 20 mEq/L infusion (has no administration in time range)   dextrose 5 % and sodium chloride 0.45 % with KCl 40 mEq/L infusion (has no administration in time range)   insulin regular 1 unit/mL in 0.9% sodium chloride (Glucommander) (has no administration in time range)   Potassium Replacement - Follow Nurse / BPA Driven Protocol (has no administration in time range)   Magnesium Standard Dose Replacement - Follow Nurse / BPA Driven Protocol (has no administration in time range)   Phosphorus Replacement - Follow Nurse / BPA Driven Protocol (has no administration in time range)   Calcium Replacement - Follow Nurse / BPA Driven Protocol (has no administration in time range)   lactated ringers bolus 1,000 mL (1,000 mL Intravenous New Bag 10/13/24 1450)     Vital Signs:  Temp:  [98.5 °F (36.9 °C)] 98.5 °F (36.9 °C)  Heart Rate:  [92-94] 94  Resp:  [20] 20  BP: (108-165)/() 108/95        10/13/24  1312   Weight: 81.6 kg (180 lb)     Body mass index is 28.19 kg/m².    Physical Exam:     Most recent  "vital Signs: /95   Pulse 94   Temp 98.5 °F (36.9 °C) (Axillary)   Resp 20   Ht 170.2 cm (67\")   Wt 81.6 kg (180 lb)   SpO2 91%   BMI 28.19 kg/m²     Physical Exam  Vitals and nursing note reviewed.   Constitutional:       General: He is not in acute distress.     Appearance: He is ill-appearing.      Comments: Frail.   HENT:      Head: Normocephalic and atraumatic.      Nose: Nose normal.      Mouth/Throat:      Mouth: Mucous membranes are dry.   Eyes:      Extraocular Movements: Extraocular movements intact.      Conjunctiva/sclera: Conjunctivae normal.      Pupils: Pupils are equal, round, and reactive to light.   Cardiovascular:      Rate and Rhythm: Normal rate and regular rhythm.      Pulses: Normal pulses.      Heart sounds: Normal heart sounds.   Pulmonary:      Effort: Tachypnea and prolonged expiration present.      Breath sounds: Decreased air movement present. Rhonchi present. No wheezing.   Abdominal:      General: Bowel sounds are normal. There is no distension.      Palpations: Abdomen is soft.      Tenderness: There is no abdominal tenderness.   Musculoskeletal:         General: Normal range of motion.      Cervical back: Normal range of motion and neck supple.      Right lower leg: No edema.      Left lower leg: No edema.   Skin:     General: Skin is warm and dry.      Findings: No rash.   Neurological:      General: No focal deficit present.      Mental Status: He is alert.      GCS: GCS eye subscore is 3. GCS verbal subscore is 1. GCS motor subscore is 5.      Cranial Nerves: No cranial nerve deficit or facial asymmetry.      Comments: Nonverbal at baseline with dementia.  Patient following with his eyes. Generalized weakness noted.          Laboratory data:    I have reviewed the labs done in the emergency room.    Results from last 7 days   Lab Units 10/13/24  1349   SODIUM mmol/L 152*   POTASSIUM mmol/L 5.0   CHLORIDE mmol/L 112*   CO2 mmol/L 23.9   BUN mg/dL 46*   CREATININE mg/dL " 2.30*   CALCIUM mg/dL 8.6   BILIRUBIN mg/dL 0.4   ALK PHOS U/L 130*   ALT (SGPT) U/L <5   AST (SGOT) U/L 19   GLUCOSE mg/dL 799*     Results from last 7 days   Lab Units 10/13/24  1325   WBC 10*3/mm3 11.18*   HEMOGLOBIN g/dL 16.0   HEMATOCRIT % 49.5   PLATELETS 10*3/mm3 225         Results from last 7 days   Lab Units 10/13/24  1349   CK TOTAL U/L 248*   HSTROP T ng/L 111*     Results from last 7 days   Lab Units 10/13/24  1349   PROBNP pg/mL 1,017.0                 Results from last 7 days   Lab Units 10/13/24  1450   COLOR UA  Yellow   GLUCOSE UA  >=1000 mg/dL (3+)*   KETONES UA  Negative   BLOOD UA  Trace*   LEUKOCYTES UA  Negative   PH, URINE  <=5.0   BILIRUBIN UA  Negative   UROBILINOGEN UA  0.2 E.U./dL   RBC UA /HPF 0-2   WBC UA /HPF 0-2       Pain Management Panel           No data to display                EKG:      Sinus rhythm, heart rate 97, T wave inversions noted in multiple leads, otherwise nonspecific ST/T wave changes.    Radiology:    CT Head Without Contrast    Result Date: 10/13/2024  PROCEDURE: CT HEAD WO CONTRAST-  INDICATION: Altered mental status; E11.00-Type 2 diabetes mellitus with hyperosmolarity without nonketotic hyperglycemic-hyperosmolar coma (NKHHC)  TECHNIQUE: Multiple axial CT images were performed from the foramen magnum to the vertex without contrast.   Coronal reconstruction images were obtained from the axial data.  COMPARISON: 8/30/2020.  FINDINGS: There is no mass effect or midline shift. Global atrophy is noted. The ventricles are enlarged, similar to the prior exam. There is no intracranial hemorrhage. No acute large cortical infarct. The posterior fossa is without acute abnormality. The basilar cisterns are preserved. No acute soft tissue abnormality. No acute osseous abnormalities are present.      1. No acute intracranial hemorrhage or evidence of acute large cortical infarct. Consider MRI if clinical concern persists. 2. Atrophy and ventricular enlargement.        CTDI:  32.43 mGy DLP:575.47 mGy.cm    This study was performed with techniques to keep radiation doses as low as reasonably achievable (ALARA). Individualized dose reduction techniques using automated exposure control or adjustment of mA and/or kV according to the patient size were employed.   This report was signed and finalized on 10/13/2024 4:43 PM by Michelle Arriaza MD.      XR Chest 1 View    Result Date: 10/13/2024  PROCEDURE: XR CHEST 1 VW-  INDICATION:  Altered mental status, eval pneumonia  FINDINGS:  A portable view of the chest was obtained.  Comparison is made to a prior exam dated 8/28/2024.   Patient is again noted to be status post median sternotomy. Heart size is normal. There are low lung volumes with bibasilar opacity. This could represent atelectasis or pneumonia. No pneumothorax.      Low lung volumes with bibasilar opacity which could represent atelectasis or pneumonia. Recommend radiographic follow-up.   This report was signed and finalized on 10/13/2024 1:46 PM by Michelle Arriaza MD.       Assessment:    Acute metabolic encephalopathy, POA  Diabetes mellitus, uncontrolled with HHS, POA  Acute kidney injury, POA  Hypernatremia, POA  Bilateral pneumonia, unable to specify further, POA  Elevated troponin, likely demand ischemia, POA  Chronic respiratory failure on 2 L  Dementia  Parkinson disease  CAD  Hypertension  Debility    Plan:    Patient is admitted to ICU for further management and treatment.    Acute encephalopathy  -Likely metabolic secondary to HHS, NANCI, hyponatremia and bilateral pneumonia  -Neurochecks, fall precautions  -CT head negative for acute  -Has a history of baseline dementia and Parkinson disease    HHS  Uncontrolled diabetes mellitus  -Started on Glucomander insulin drip per protocol  -Monitor labs and IV fluids per protocol  -Hemoglobin A1c of 10.1  -Needs better control of his diabetes    Bilateral pneumonia  -Coverage with Unasyn and doxycycline  -Requiring 2 L supplemental  oxygen which is his chronic oxygen at baseline.  -Bronchodilators, mucolytics   -Follow-up on blood cultures  -Obtain sputum cultures if able to  -Speech evaluation    Acute kidney injury  Hypernatremia  -Likely severely dehydrated  -Discussed with nephrology, appreciate recommendations  -Dr. Bustamante recommended continuing with Glucomander protocol however discontinuing all normal saline fluids and continuing with half-normal saline.  -Avoid nephrotoxic drugs, holding Lasix  -Monitor kidney function and sodium levels    Elevated troponin  -Likely demand ischemia in settings of NANCI and hypertension  -Low suspicion for ACS with no apparent pain.  -Cardiology consulted, appreciate recommendations    -Continue home meds as warranted.  -Further orders as indicated per clinical course.  -Patient is DNR/DNI per his documents, confirmed with his daughter.    Risk Assessment: High  DVT Prophylaxis: Heparin prophylaxis (benefit> risk)  Code Status: DNR/DNI  Diet: Cardiac once able to swallow safely and more awake.    Advance Care Planning   ACP discussion was held with the patient during this visit. Patient does not have an advance directive, information provided.       Jamey Hadley MD  10/13/24  15:42 EDT    Dictated utilizing Dragon dictation.

## 2024-10-14 ENCOUNTER — APPOINTMENT (OUTPATIENT)
Dept: CARDIOLOGY | Facility: HOSPITAL | Age: 84
End: 2024-10-14
Payer: MEDICARE

## 2024-10-14 LAB
ANION GAP SERPL CALCULATED.3IONS-SCNC: 10.4 MMOL/L (ref 5–15)
ANION GAP SERPL CALCULATED.3IONS-SCNC: 10.5 MMOL/L (ref 5–15)
ANION GAP SERPL CALCULATED.3IONS-SCNC: 10.6 MMOL/L (ref 5–15)
ANION GAP SERPL CALCULATED.3IONS-SCNC: 8.7 MMOL/L (ref 5–15)
B-OH-BUTYR SERPL-SCNC: 0.12 MMOL/L (ref 0.02–0.27)
BH CV ECHO MEAS - AO MAX PG: 3.3 MMHG
BH CV ECHO MEAS - AO MEAN PG: 2 MMHG
BH CV ECHO MEAS - AO ROOT DIAM: 3.3 CM
BH CV ECHO MEAS - AO V2 MAX: 90.4 CM/SEC
BH CV ECHO MEAS - AO V2 VTI: 17.5 CM
BH CV ECHO MEAS - AVA(I,D): 2.6 CM2
BH CV ECHO MEAS - EDV(CUBED): 22.2 ML
BH CV ECHO MEAS - ESV(CUBED): 13.5 ML
BH CV ECHO MEAS - FS: 15.3 %
BH CV ECHO MEAS - IVS/LVPW: 1.03 CM
BH CV ECHO MEAS - IVSD: 1.39 CM
BH CV ECHO MEAS - LA DIMENSION: 2.8 CM
BH CV ECHO MEAS - LV MASS(C)D: 124.4 GRAMS
BH CV ECHO MEAS - LV MAX PG: 2.9 MMHG
BH CV ECHO MEAS - LV MEAN PG: 1 MMHG
BH CV ECHO MEAS - LV V1 MAX: 85.8 CM/SEC
BH CV ECHO MEAS - LV V1 VTI: 16.2 CM
BH CV ECHO MEAS - LVIDD: 2.8 CM
BH CV ECHO MEAS - LVIDS: 2.38 CM
BH CV ECHO MEAS - LVOT AREA: 2.8 CM2
BH CV ECHO MEAS - LVOT DIAM: 1.89 CM
BH CV ECHO MEAS - LVPWD: 1.35 CM
BH CV ECHO MEAS - MV A MAX VEL: 52.8 CM/SEC
BH CV ECHO MEAS - MV DEC SLOPE: 203 CM/SEC2
BH CV ECHO MEAS - MV DEC TIME: 0.24 SEC
BH CV ECHO MEAS - MV E MAX VEL: 48.1 CM/SEC
BH CV ECHO MEAS - MV E/A: 0.91
BH CV ECHO MEAS - MV MAX PG: 2.3 MMHG
BH CV ECHO MEAS - MV MEAN PG: 1 MMHG
BH CV ECHO MEAS - MV V2 VTI: 18.2 CM
BH CV ECHO MEAS - MVA(VTI): 2.5 CM2
BH CV ECHO MEAS - PA ACC TIME: 0.09 SEC
BH CV ECHO MEAS - PA V2 MAX: 96.8 CM/SEC
BH CV ECHO MEAS - RV MAX PG: 1.84 MMHG
BH CV ECHO MEAS - RV V1 MAX: 67.9 CM/SEC
BH CV ECHO MEAS - RV V1 VTI: 10.9 CM
BH CV ECHO MEAS - SV(LVOT): 45.4 ML
BH CV ECHO MEAS - SVI(LVOT): 24.5 ML/M2
BH CV ECHO MEAS - TR MAX PG: 29.8 MMHG
BH CV ECHO MEAS - TR MAX VEL: 273 CM/SEC
BUN SERPL-MCNC: 30 MG/DL (ref 8–23)
BUN SERPL-MCNC: 33 MG/DL (ref 8–23)
BUN SERPL-MCNC: 36 MG/DL (ref 8–23)
BUN SERPL-MCNC: 37 MG/DL (ref 8–23)
BUN/CREAT SERPL: 19.1 (ref 7–25)
BUN/CREAT SERPL: 19.6 (ref 7–25)
BUN/CREAT SERPL: 19.8 (ref 7–25)
BUN/CREAT SERPL: 20.6 (ref 7–25)
CALCIUM SPEC-SCNC: 7.6 MG/DL (ref 8.6–10.5)
CALCIUM SPEC-SCNC: 7.9 MG/DL (ref 8.6–10.5)
CALCIUM SPEC-SCNC: 8 MG/DL (ref 8.6–10.5)
CALCIUM SPEC-SCNC: 8.4 MG/DL (ref 8.6–10.5)
CHLORIDE SERPL-SCNC: 116 MMOL/L (ref 98–107)
CHLORIDE SERPL-SCNC: 118 MMOL/L (ref 98–107)
CHLORIDE SERPL-SCNC: 120 MMOL/L (ref 98–107)
CHLORIDE SERPL-SCNC: 121 MMOL/L (ref 98–107)
CO2 SERPL-SCNC: 22.3 MMOL/L (ref 22–29)
CO2 SERPL-SCNC: 22.6 MMOL/L (ref 22–29)
CO2 SERPL-SCNC: 25.5 MMOL/L (ref 22–29)
CO2 SERPL-SCNC: 26.4 MMOL/L (ref 22–29)
CREAT SERPL-MCNC: 1.57 MG/DL (ref 0.76–1.27)
CREAT SERPL-MCNC: 1.67 MG/DL (ref 0.76–1.27)
CREAT SERPL-MCNC: 1.8 MG/DL (ref 0.76–1.27)
CREAT SERPL-MCNC: 1.84 MG/DL (ref 0.76–1.27)
D-LACTATE SERPL-SCNC: 1.7 MMOL/L (ref 0.5–2)
D-LACTATE SERPL-SCNC: 3.2 MMOL/L (ref 0.5–2)
DEPRECATED RDW RBC AUTO: 49.5 FL (ref 37–54)
EGFRCR SERPLBLD CKD-EPI 2021: 35.7 ML/MIN/1.73
EGFRCR SERPLBLD CKD-EPI 2021: 36.7 ML/MIN/1.73
EGFRCR SERPLBLD CKD-EPI 2021: 40.1 ML/MIN/1.73
EGFRCR SERPLBLD CKD-EPI 2021: 43.2 ML/MIN/1.73
ERYTHROCYTE [DISTWIDTH] IN BLOOD BY AUTOMATED COUNT: 14.3 % (ref 12.3–15.4)
GLUCOSE BLDC GLUCOMTR-MCNC: 103 MG/DL (ref 70–130)
GLUCOSE BLDC GLUCOMTR-MCNC: 113 MG/DL (ref 70–130)
GLUCOSE BLDC GLUCOMTR-MCNC: 117 MG/DL (ref 70–130)
GLUCOSE BLDC GLUCOMTR-MCNC: 117 MG/DL (ref 70–130)
GLUCOSE BLDC GLUCOMTR-MCNC: 120 MG/DL (ref 70–130)
GLUCOSE BLDC GLUCOMTR-MCNC: 124 MG/DL (ref 70–130)
GLUCOSE BLDC GLUCOMTR-MCNC: 125 MG/DL (ref 70–130)
GLUCOSE BLDC GLUCOMTR-MCNC: 135 MG/DL (ref 70–130)
GLUCOSE BLDC GLUCOMTR-MCNC: 152 MG/DL (ref 70–130)
GLUCOSE BLDC GLUCOMTR-MCNC: 165 MG/DL (ref 70–130)
GLUCOSE BLDC GLUCOMTR-MCNC: 174 MG/DL (ref 70–130)
GLUCOSE BLDC GLUCOMTR-MCNC: 207 MG/DL (ref 70–130)
GLUCOSE BLDC GLUCOMTR-MCNC: 208 MG/DL (ref 70–130)
GLUCOSE BLDC GLUCOMTR-MCNC: 210 MG/DL (ref 70–130)
GLUCOSE BLDC GLUCOMTR-MCNC: 236 MG/DL (ref 70–130)
GLUCOSE BLDC GLUCOMTR-MCNC: 240 MG/DL (ref 70–130)
GLUCOSE SERPL-MCNC: 110 MG/DL (ref 65–99)
GLUCOSE SERPL-MCNC: 125 MG/DL (ref 65–99)
GLUCOSE SERPL-MCNC: 141 MG/DL (ref 65–99)
GLUCOSE SERPL-MCNC: 184 MG/DL (ref 65–99)
HCT VFR BLD AUTO: 41.2 % (ref 37.5–51)
HGB BLD-MCNC: 13 G/DL (ref 13–17.7)
MAGNESIUM SERPL-MCNC: 1.5 MG/DL (ref 1.6–2.4)
MAGNESIUM SERPL-MCNC: 1.7 MG/DL (ref 1.6–2.4)
MAGNESIUM SERPL-MCNC: 2.2 MG/DL (ref 1.6–2.4)
MAGNESIUM SERPL-MCNC: 2.7 MG/DL (ref 1.6–2.4)
MCH RBC QN AUTO: 29.5 PG (ref 26.6–33)
MCHC RBC AUTO-ENTMCNC: 31.6 G/DL (ref 31.5–35.7)
MCV RBC AUTO: 93.6 FL (ref 79–97)
OSMOLALITY SERPL: 328 MOSM/KG (ref 280–301)
PHOSPHATE SERPL-MCNC: 2.1 MG/DL (ref 2.5–4.5)
PHOSPHATE SERPL-MCNC: 2.2 MG/DL (ref 2.5–4.5)
PHOSPHATE SERPL-MCNC: 2.8 MG/DL (ref 2.5–4.5)
PHOSPHATE SERPL-MCNC: 2.8 MG/DL (ref 2.5–4.5)
PLATELET # BLD AUTO: 173 10*3/MM3 (ref 140–450)
PMV BLD AUTO: 10.9 FL (ref 6–12)
POTASSIUM SERPL-SCNC: 3.1 MMOL/L (ref 3.5–5.2)
POTASSIUM SERPL-SCNC: 3.1 MMOL/L (ref 3.5–5.2)
POTASSIUM SERPL-SCNC: 3.3 MMOL/L (ref 3.5–5.2)
POTASSIUM SERPL-SCNC: 4.1 MMOL/L (ref 3.5–5.2)
POTASSIUM SERPL-SCNC: 4.5 MMOL/L (ref 3.5–5.2)
RBC # BLD AUTO: 4.4 10*6/MM3 (ref 4.14–5.8)
SODIUM SERPL-SCNC: 147 MMOL/L (ref 136–145)
SODIUM SERPL-SCNC: 154 MMOL/L (ref 136–145)
SODIUM SERPL-SCNC: 154 MMOL/L (ref 136–145)
SODIUM SERPL-SCNC: 157 MMOL/L (ref 136–145)
TROPONIN T SERPL HS-MCNC: 94 NG/L
WBC NRBC COR # BLD AUTO: 10.46 10*3/MM3 (ref 3.4–10.8)

## 2024-10-14 PROCEDURE — 83605 ASSAY OF LACTIC ACID: CPT | Performed by: STUDENT IN AN ORGANIZED HEALTH CARE EDUCATION/TRAINING PROGRAM

## 2024-10-14 PROCEDURE — 80048 BASIC METABOLIC PNL TOTAL CA: CPT | Performed by: FAMILY MEDICINE

## 2024-10-14 PROCEDURE — 0 DEXTROSE 5 % SOLUTION 1,000 ML FLEX CONT: Performed by: INTERNAL MEDICINE

## 2024-10-14 PROCEDURE — 94799 UNLISTED PULMONARY SVC/PX: CPT

## 2024-10-14 PROCEDURE — 25010000002 POTASSIUM CHLORIDE PER 2 MEQ OF POTASSIUM: Performed by: FAMILY MEDICINE

## 2024-10-14 PROCEDURE — 93306 TTE W/DOPPLER COMPLETE: CPT

## 2024-10-14 PROCEDURE — 93306 TTE W/DOPPLER COMPLETE: CPT | Performed by: INTERNAL MEDICINE

## 2024-10-14 PROCEDURE — 63710000001 INSULIN GLARGINE PER 5 UNITS: Performed by: FAMILY MEDICINE

## 2024-10-14 PROCEDURE — 85027 COMPLETE CBC AUTOMATED: CPT | Performed by: FAMILY MEDICINE

## 2024-10-14 PROCEDURE — 84100 ASSAY OF PHOSPHORUS: CPT | Performed by: FAMILY MEDICINE

## 2024-10-14 PROCEDURE — 94761 N-INVAS EAR/PLS OXIMETRY MLT: CPT

## 2024-10-14 PROCEDURE — 92610 EVALUATE SWALLOWING FUNCTION: CPT

## 2024-10-14 PROCEDURE — 99233 SBSQ HOSP IP/OBS HIGH 50: CPT | Performed by: FAMILY MEDICINE

## 2024-10-14 PROCEDURE — 84484 ASSAY OF TROPONIN QUANT: CPT | Performed by: FAMILY MEDICINE

## 2024-10-14 PROCEDURE — 97166 OT EVAL MOD COMPLEX 45 MIN: CPT

## 2024-10-14 PROCEDURE — 82010 KETONE BODYS QUAN: CPT | Performed by: INTERNAL MEDICINE

## 2024-10-14 PROCEDURE — 25010000002 HEPARIN (PORCINE) PER 1000 UNITS: Performed by: FAMILY MEDICINE

## 2024-10-14 PROCEDURE — 83735 ASSAY OF MAGNESIUM: CPT | Performed by: FAMILY MEDICINE

## 2024-10-14 PROCEDURE — 25010000002 AMPICILLIN-SULBACTAM PER 1.5 G: Performed by: FAMILY MEDICINE

## 2024-10-14 PROCEDURE — 3E0G76Z INTRODUCTION OF NUTRITIONAL SUBSTANCE INTO UPPER GI, VIA NATURAL OR ARTIFICIAL OPENING: ICD-10-PCS | Performed by: FAMILY MEDICINE

## 2024-10-14 PROCEDURE — 83930 ASSAY OF BLOOD OSMOLALITY: CPT | Performed by: STUDENT IN AN ORGANIZED HEALTH CARE EDUCATION/TRAINING PROGRAM

## 2024-10-14 PROCEDURE — 84132 ASSAY OF SERUM POTASSIUM: CPT | Performed by: INTERNAL MEDICINE

## 2024-10-14 PROCEDURE — 25010000002 POTASSIUM CHLORIDE PER 2 MEQ OF POTASSIUM: Performed by: INTERNAL MEDICINE

## 2024-10-14 PROCEDURE — 82948 REAGENT STRIP/BLOOD GLUCOSE: CPT

## 2024-10-14 PROCEDURE — 63710000001 INSULIN REGULAR HUMAN PER 5 UNITS: Performed by: FAMILY MEDICINE

## 2024-10-14 PROCEDURE — 97161 PT EVAL LOW COMPLEX 20 MIN: CPT

## 2024-10-14 PROCEDURE — 25010000002 MAGNESIUM SULFATE 2 GM/50ML SOLUTION: Performed by: INTERNAL MEDICINE

## 2024-10-14 RX ORDER — DIVALPROEX SODIUM 125 MG/1
500 CAPSULE, COATED PELLETS ORAL 2 TIMES DAILY
Status: DISCONTINUED | OUTPATIENT
Start: 2024-10-14 | End: 2024-10-16 | Stop reason: HOSPADM

## 2024-10-14 RX ORDER — POTASSIUM CHLORIDE 1.5 G/1.58G
40 POWDER, FOR SOLUTION ORAL EVERY 4 HOURS
Status: COMPLETED | OUTPATIENT
Start: 2024-10-14 | End: 2024-10-14

## 2024-10-14 RX ORDER — DEXTROSE MONOHYDRATE 25 G/50ML
25 INJECTION, SOLUTION INTRAVENOUS
Status: DISCONTINUED | OUTPATIENT
Start: 2024-10-14 | End: 2024-10-16 | Stop reason: HOSPADM

## 2024-10-14 RX ORDER — DOCUSATE SODIUM 50 MG/5 ML
100 LIQUID (ML) ORAL 2 TIMES DAILY PRN
Status: DISCONTINUED | OUTPATIENT
Start: 2024-10-14 | End: 2024-10-16 | Stop reason: HOSPADM

## 2024-10-14 RX ORDER — MAGNESIUM SULFATE HEPTAHYDRATE 40 MG/ML
2 INJECTION, SOLUTION INTRAVENOUS
Status: DISCONTINUED | OUTPATIENT
Start: 2024-10-14 | End: 2024-10-14

## 2024-10-14 RX ORDER — GUAIFENESIN 200 MG/10ML
200 LIQUID ORAL
Status: DISCONTINUED | OUTPATIENT
Start: 2024-10-14 | End: 2024-10-16 | Stop reason: HOSPADM

## 2024-10-14 RX ORDER — NICOTINE POLACRILEX 4 MG
15 LOZENGE BUCCAL
Status: DISCONTINUED | OUTPATIENT
Start: 2024-10-14 | End: 2024-10-16 | Stop reason: HOSPADM

## 2024-10-14 RX ADMIN — MEMANTINE 5 MG: 5 TABLET ORAL at 20:05

## 2024-10-14 RX ADMIN — FAMOTIDINE 20 MG: 20 TABLET, FILM COATED ORAL at 08:42

## 2024-10-14 RX ADMIN — IPRATROPIUM BROMIDE AND ALBUTEROL SULFATE 3 ML: 2.5; .5 SOLUTION RESPIRATORY (INHALATION) at 07:07

## 2024-10-14 RX ADMIN — AMPICILLIN SODIUM AND SULBACTAM SODIUM 3 G: 2; 1 INJECTION, POWDER, FOR SOLUTION INTRAMUSCULAR; INTRAVENOUS at 21:35

## 2024-10-14 RX ADMIN — POTASSIUM CHLORIDE: 2 INJECTION, SOLUTION, CONCENTRATE INTRAVENOUS at 05:54

## 2024-10-14 RX ADMIN — HEPARIN SODIUM 5000 UNITS: 5000 INJECTION INTRAVENOUS; SUBCUTANEOUS at 20:05

## 2024-10-14 RX ADMIN — DOXYCYCLINE 100 MG: 100 CAPSULE ORAL at 08:42

## 2024-10-14 RX ADMIN — DOXYCYCLINE 100 MG: 100 CAPSULE ORAL at 20:05

## 2024-10-14 RX ADMIN — MUPIROCIN 1 APPLICATION: 20 OINTMENT TOPICAL at 20:05

## 2024-10-14 RX ADMIN — CARBIDOPA AND LEVODOPA 1 TABLET: 25; 100 TABLET ORAL at 08:42

## 2024-10-14 RX ADMIN — GABAPENTIN 100 MG: 100 CAPSULE ORAL at 20:06

## 2024-10-14 RX ADMIN — IPRATROPIUM BROMIDE AND ALBUTEROL SULFATE 3 ML: 2.5; .5 SOLUTION RESPIRATORY (INHALATION) at 19:33

## 2024-10-14 RX ADMIN — GABAPENTIN 100 MG: 100 CAPSULE ORAL at 08:42

## 2024-10-14 RX ADMIN — AMPICILLIN SODIUM AND SULBACTAM SODIUM 3 G: 2; 1 INJECTION, POWDER, FOR SOLUTION INTRAMUSCULAR; INTRAVENOUS at 08:41

## 2024-10-14 RX ADMIN — INSULIN HUMAN 1.7 UNITS/HR: 1 INJECTION, SOLUTION INTRAVENOUS at 05:54

## 2024-10-14 RX ADMIN — Medication 10 ML: at 08:42

## 2024-10-14 RX ADMIN — CARBIDOPA AND LEVODOPA 1 TABLET: 25; 100 TABLET ORAL at 16:23

## 2024-10-14 RX ADMIN — MAGNESIUM SULFATE HEPTAHYDRATE 2 G: 40 INJECTION, SOLUTION INTRAVENOUS at 06:27

## 2024-10-14 RX ADMIN — GUAIFENESIN 200 MG: 200 SOLUTION ORAL at 16:23

## 2024-10-14 RX ADMIN — METOPROLOL TARTRATE 50 MG: 50 TABLET, FILM COATED ORAL at 08:42

## 2024-10-14 RX ADMIN — POTASSIUM CHLORIDE 40 MEQ: 1.5 POWDER, FOR SOLUTION ORAL at 04:19

## 2024-10-14 RX ADMIN — GUAIFENESIN 200 MG: 200 SOLUTION ORAL at 20:05

## 2024-10-14 RX ADMIN — DIVALPROEX SODIUM 500 MG: 125 CAPSULE, COATED PELLETS ORAL at 20:05

## 2024-10-14 RX ADMIN — CARBIDOPA AND LEVODOPA 1 TABLET: 25; 100 TABLET ORAL at 20:05

## 2024-10-14 RX ADMIN — HEPARIN SODIUM 5000 UNITS: 5000 INJECTION INTRAVENOUS; SUBCUTANEOUS at 08:42

## 2024-10-14 RX ADMIN — GUAIFENESIN 200 MG: 200 SOLUTION ORAL at 12:45

## 2024-10-14 RX ADMIN — GUAIFENESIN 200 MG: 200 SOLUTION ORAL at 08:42

## 2024-10-14 RX ADMIN — Medication 10 ML: at 20:06

## 2024-10-14 RX ADMIN — MUPIROCIN 1 APPLICATION: 20 OINTMENT TOPICAL at 08:42

## 2024-10-14 RX ADMIN — MEMANTINE 5 MG: 5 TABLET ORAL at 08:42

## 2024-10-14 RX ADMIN — INSULIN GLARGINE 15 UNITS: 100 INJECTION, SOLUTION SUBCUTANEOUS at 12:45

## 2024-10-14 RX ADMIN — INSULIN HUMAN 4 UNITS: 100 INJECTION, SOLUTION PARENTERAL at 18:42

## 2024-10-14 RX ADMIN — GABAPENTIN 100 MG: 100 CAPSULE ORAL at 16:23

## 2024-10-14 RX ADMIN — POTASSIUM CHLORIDE: 2 INJECTION, SOLUTION, CONCENTRATE INTRAVENOUS at 08:41

## 2024-10-14 RX ADMIN — AMPICILLIN SODIUM AND SULBACTAM SODIUM 3 G: 2; 1 INJECTION, POWDER, FOR SOLUTION INTRAMUSCULAR; INTRAVENOUS at 16:23

## 2024-10-14 RX ADMIN — POTASSIUM CHLORIDE 40 MEQ: 1.5 POWDER, FOR SOLUTION ORAL at 06:27

## 2024-10-14 RX ADMIN — IPRATROPIUM BROMIDE AND ALBUTEROL SULFATE 3 ML: 2.5; .5 SOLUTION RESPIRATORY (INHALATION) at 12:27

## 2024-10-14 NOTE — PAYOR COMM NOTE
"Prieto Wagoner (84 y.o. Male)       Date of Birth   1940    Social Security Number       Address   [address] [place] [address]    Home Phone   [phone]    MRN   [id number]       [facility]    Marital Status                               Admission Date   10/13/24    Admission Type   Emergency    Admitting Provider   Jamey Hadley MD    Attending Provider   Jamey Hadley MD    Department, Room/Bed   [facility] INTENSIVE [facility], I02/1       Discharge Date       Discharge Disposition       Discharge Destination                                 Attending Provider: Jamey Hadley MD    Allergies: Phenergan [Promethazine Hcl], Carbamazepine, Hydrocodone, Lisinopril    Isolation: None   Infection: None   Code Status: No CPR    Ht: 170.2 cm (67\")   Wt: 74 kg (163 lb 2.3 oz)    Admission Cmt: None   Principal Problem: None                  Active Insurance as of 10/13/2024       Primary Coverage       Payor Plan Insurance Group Employer/Plan Group    MEDICARE MEDICARE A & B        Payor Plan Address Payor Plan Phone Number Payor Plan Fax Number Effective Dates    [address] [phone]  5/1/2001 - None Entered    [facility] [id number]         Subscriber Name Subscriber Birth Date Member ID       PRIETO WAGONER R 1940 [id number]               Secondary Coverage       Payor Plan Insurance Group Employer/Plan Group    [facility] [id number]       Payor Plan Address Payor Plan Phone Number Payor Plan Fax Number Effective Dates    [address] [address]   2/1/2020 - None Entered    [facility] [id number]         Subscriber Name Subscriber Birth Date Member ID       PRIETO WAGONER R 1940 [id number]               Tertiary Coverage       Payor Plan Insurance Group Employer/Plan Group    [id number]        Payor Plan Address Payor Plan Phone Number Payor Plan Fax Number Effective Dates    [address]   10/13/2024 - None Entered    " [facility] [id number]         Subscriber Name Subscriber Birth Date Member ID       GAMA DAVILA 1940 [id number]                     Emergency Contacts        (Rel.) Home Phone Work Phone Mobile Phone    CEFERINO SEVILLA (Power of ) [phone] -- [phone]    Taryn Davila (Spouse) [phone] -- [phone]                 History & Physical        Jamey Hadley MD at 10/13/24 1542            [facility]   HISTORY AND PHYSICAL      Name:  Gama Davila   Age:  84 y.o.  Sex:  male  :  1940  MRN:  [id number]   Visit Number:  [id number]  Admission Date:  10/13/2024  Date Of Service:  10/13/24  Primary Care Physician:  Alex Burk DO    Chief Complaint:     Altered mental status, decreased responsiveness, chest congestion    History Of Presenting Illness:      Patient is an 84 years old male with a past medical history of chronic respiratory failure on 2 L, dementia, nonverbal at baseline, Parkinson disease, diabetes mellitus, CAD, hypertension who presented from nursing home facility with reports that patient was found altered on his mental status with decreased responsiveness and was having chest congestion and increased work of breathing.  Patient is nonverbal at baseline however he would respond to verbal stimuli at baseline and appears to be able to understand some words.  History was provided by ER report.  Patient is unable to contribute any history due to dementia and nonverbal at baseline. His daughter is at bedside providing history on the patient.  No reported fever or chills.  No vomiting or cough.    On ER evaluation, patient was hypertensive with a blood pressure of 165/126, was afebrile on 2 L baseline oxygen requirement.  His workup was significant for venous blood gases with pH of 7.428/HCO3 26.7.  HS Troponin 1 11-95, creatinine 2.3 (baseline creatinine 1.4) BUN 46, glucose 799, sodium 152 (corrected 161) WBC 11.1, lactate 3.7,  hemoglobin A1c 10.1.  Pro-Kei, CRP and proBNP WNL.  Urinalysis positive for glucose but otherwise negative for infection.  COVID and flu negative.  Chest x-ray showed Low lung volumes with bibasilar opacity which could represent atelectasis or pneumonia.  CT head without contrast showed 1. No acute intracranial hemorrhage or evidence of acute large cortical  infarct. 2. Atrophy and ventricular enlargement.  Patient started on Glucomander per [facility] protocol in the ER after he received 1 L bolus of LR.  Hospitalist consulted for admission, further management and treatment.    Review Of Systems:    All systems were reviewed and negative except as mentioned in history of presenting illness, assessment and plan.    Past Medical History: Patient  has a past medical history of Arthritis, Cancer, Cataract, Coronary artery disease, Diabetes mellitus, High cholesterol, [place] (hard of hearing), Hypertension, Irregular heart beat, and Wears dentures.    Past Surgical History: Patient  has a past surgical history that includes Cardiac Ablation (2003); Coronary artery bypass graft (2003); Nose surgery (1970); Hiatal hernia repair (1972); Back surgery (1978); Other surgical history (Left); Other surgical history (1986); Knee Arthroscopy (Left); Other surgical history (1994); Other surgical history (1996); Other surgical history (1998); Other surgical history (2003); Cardiac surgery; Other surgical history (2009); Joint replacement (Left); Cataract extraction w/ intraocular lens implant (Left, 5/24/2017); and Cataract extraction w/ intraocular lens implant (Right, 6/14/2017).    Social History: Patient  reports that he quit smoking about 41 years ago. His smoking use included cigarettes. He has never used smokeless tobacco. He reports that he does not drink alcohol and does not use drugs.    Family History:  Patient's family history has been reviewed and found to be noncontributory.     Allergies:      Phenergan [promethazine hcl],  Carbamazepine, Hydrocodone, and Lisinopril    Home Medications:    Prior to Admission Medications       Prescriptions Last Dose Informant Patient Reported? Taking?    acetaminophen (TYLENOL) 325 MG tablet   Yes No    Take 2 tablets by mouth Every 6 (Six) Hours As Needed for Mild Pain or Fever.    aspirin 81 MG EC tablet  Spouse/Significant Other Yes No    Take 1 tablet by mouth Daily.    carbidopa-levodopa (SINEMET)  MG per tablet   No No    Take 1 tablet by mouth 3 (Three) Times a Day.    Divalproex Sodium (DEPAKOTE SPRINKLE) 125 MG capsule   Yes No    Take 4 capsules by mouth 2 (Two) Times a Day.    docusate sodium (COLACE) 100 MG capsule   Yes No    Take 2 capsules by mouth Daily As Needed for Constipation.    donepezil (ARICEPT) 10 MG tablet   No No    Take 1 tablet by mouth Every Night.    famotidine (PEPCID) 20 MG tablet   Yes No    Take 1 tablet by mouth Daily.    furosemide (LASIX) 20 MG tablet   Yes No    Take 1 tablet by mouth Daily.    gabapentin (NEURONTIN) 100 MG capsule   No No    Take 1 capsule by mouth 3 (Three) Times a Day.    ipratropium-albuterol (DUO-NEB) 0.5-2.5 mg/3 ml nebulizer   Yes No    Take 3 mL by nebulization Every 6 (Six) Hours As Needed for Wheezing.    melatonin 3 MG tablet   Yes No    Take 2 tablets by mouth Every Night.    memantine (NAMENDA) 10 MG tablet   Yes No    Take 1 tablet by mouth 2 (Two) Times a Day.    metoprolol tartrate (LOPRESSOR) 50 MG tablet   Yes No    Take 1 tablet by mouth Daily.    ondansetron (ZOFRAN) 4 MG tablet   Yes No    Take 1 tablet by mouth Every 6 (Six) Hours As Needed for Nausea or Vomiting.    polyethylene glycol (MiraLax) 17 GM/SCOOP powder   Yes No    Take 17 g by mouth Daily As Needed.    tamsulosin (FLOMAX) 0.4 MG capsule 24 hr capsule   Yes No    Take 1 capsule by mouth Daily.          ED Medications:    Medications   sodium chloride 0.9 % flush 10 mL (has no administration in time range)   sodium chloride 0.9 % flush 10 mL (has no  administration in time range)   sodium chloride 0.9 % infusion 40 mL (has no administration in time range)   dextrose (GLUTOSE) oral gel 15 g (has no administration in time range)   dextrose (D50W) (25 g/50 mL) IV injection 10-50 mL (has no administration in time range)   glucagon (GLUCAGEN) injection 1 mg (has no administration in time range)   sodium chloride 0.9 % bolus (has no administration in time range)   sodium chloride 0.9 % infusion (has no administration in time range)   sodium chloride 0.9 % with KCl 20 mEq/L infusion (has no administration in time range)   sodium chloride 0.9 % with KCl 40 mEq/L infusion (has no administration in time range)   dextrose 5 % and sodium chloride 0.9 % infusion (has no administration in time range)   dextrose 5 % and sodium chloride 0.9 % with KCl 20 mEq/L infusion (has no administration in time range)   dextrose 5 % and sodium chloride 0.9 % with KCl 40 mEq/L infusion (has no administration in time range)   sodium chloride 0.45 % infusion (has no administration in time range)   sodium chloride 0.45 % with KCl 20 mEq/L infusion (has no administration in time range)   sodium chloride 0.45 % 1,000 mL with potassium chloride 40 mEq infusion (has no administration in time range)   dextrose 5 % and sodium chloride 0.45 % infusion (has no administration in time range)   dextrose 5 % and sodium chloride 0.45 % with KCl 20 mEq/L infusion (has no administration in time range)   dextrose 5 % and sodium chloride 0.45 % with KCl 40 mEq/L infusion (has no administration in time range)   insulin regular 1 unit/mL in 0.9% sodium chloride (Glucommander) (has no administration in time range)   Potassium Replacement - Follow Nurse / BPA Driven Protocol (has no administration in time range)   Magnesium Standard Dose Replacement - Follow Nurse / BPA Driven Protocol (has no administration in time range)   Phosphorus Replacement - Follow Nurse / BPA Driven Protocol (has no administration in time  "range)   Calcium Replacement - Follow Nurse / BPA Driven Protocol (has no administration in time range)   lactated ringers bolus 1,000 mL (1,000 mL Intravenous New Bag 10/13/24 1450)     Vital Signs:  Temp:  [98.5 °F (36.9 °C)] 98.5 °F (36.9 °C)  Heart Rate:  [92-94] 94  Resp:  [20] 20  BP: (108-165)/() 108/95        10/13/24  1312   Weight: 81.6 kg (180 lb)     Body mass index is 28.19 kg/m².    Physical Exam:     Most recent vital Signs: /95   Pulse 94   Temp 98.5 °F (36.9 °C) (Axillary)   Resp 20   Ht 170.2 cm (67\")   Wt 81.6 kg (180 lb)   SpO2 91%   BMI 28.19 kg/m²     Physical Exam  Vitals and nursing note reviewed.   Constitutional:       General: He is not in acute distress.     Appearance: He is ill-appearing.      Comments: Frail.   HENT:      Head: Normocephalic and atraumatic.      Nose: Nose normal.      Mouth/Throat:      Mouth: Mucous membranes are dry.   Eyes:      Extraocular Movements: Extraocular movements intact.      Conjunctiva/sclera: Conjunctivae normal.      Pupils: Pupils are equal, round, and reactive to light.   Cardiovascular:      Rate and Rhythm: Normal rate and regular rhythm.      Pulses: Normal pulses.      Heart sounds: Normal heart sounds.   Pulmonary:      Effort: Tachypnea and prolonged expiration present.      Breath sounds: Decreased air movement present. Rhonchi present. No wheezing.   Abdominal:      General: Bowel sounds are normal. There is no distension.      Palpations: Abdomen is soft.      Tenderness: There is no abdominal tenderness.   Musculoskeletal:         General: Normal range of motion.      Cervical back: Normal range of motion and neck supple.      Right lower leg: No edema.      Left lower leg: No edema.   Skin:     General: Skin is warm and dry.      Findings: No rash.   Neurological:      General: No focal deficit present.      Mental Status: He is alert.      GCS: GCS eye subscore is 3. GCS verbal subscore is 1. GCS motor subscore is 5. "      Cranial Nerves: No cranial nerve deficit or facial asymmetry.      Comments: Nonverbal at baseline with dementia.  Patient following with his eyes. Generalized weakness noted.          Laboratory data:    I have reviewed the labs done in the emergency room.    Results from last 7 days   Lab Units 10/13/24  1349   SODIUM mmol/L 152*   POTASSIUM mmol/L 5.0   CHLORIDE mmol/L 112*   CO2 mmol/L 23.9   BUN mg/dL 46*   CREATININE mg/dL 2.30*   CALCIUM mg/dL 8.6   BILIRUBIN mg/dL 0.4   ALK PHOS U/L 130*   ALT (SGPT) U/L <5   AST (SGOT) U/L 19   GLUCOSE mg/dL 799*     Results from last 7 days   Lab Units 10/13/24  1325   WBC 10*3/mm3 11.18*   HEMOGLOBIN g/dL 16.0   HEMATOCRIT % 49.5   PLATELETS 10*3/mm3 225         Results from last 7 days   Lab Units 10/13/24  1349   CK TOTAL U/L 248*   HSTROP T ng/L 111*     Results from last 7 days   Lab Units 10/13/24  1349   PROBNP pg/mL 1,017.0                 Results from last 7 days   Lab Units 10/13/24  1450   COLOR UA  Yellow   GLUCOSE UA  >=1000 mg/dL (3+)*   KETONES UA  Negative   BLOOD UA  Trace*   LEUKOCYTES UA  Negative   PH, URINE  <=5.0   BILIRUBIN UA  Negative   UROBILINOGEN UA  0.2 E.U./dL   RBC UA /HPF 0-2   WBC UA /HPF 0-2       Pain Management Panel           No data to display                EKG:      Sinus rhythm, heart rate 97, T wave inversions noted in multiple leads, otherwise nonspecific ST/T wave changes.    Radiology:    CT Head Without Contrast    Result Date: 10/13/2024  PROCEDURE: CT HEAD WO CONTRAST-  INDICATION: Altered mental status; E11.00-Type 2 diabetes mellitus with hyperosmolarity without nonketotic hyperglycemic-hyperosmolar coma (NKHHC)  TECHNIQUE: Multiple axial CT images were performed from the foramen magnum to the vertex without contrast.   Coronal reconstruction images were obtained from the axial data.  COMPARISON: 8/30/2020.  FINDINGS: There is no mass effect or midline shift. Global atrophy is noted. The ventricles are enlarged,  similar to the prior exam. There is no intracranial hemorrhage. No acute large cortical infarct. The posterior fossa is without acute abnormality. The basilar cisterns are preserved. No acute soft tissue abnormality. No acute osseous abnormalities are present.      1. No acute intracranial hemorrhage or evidence of acute large cortical infarct. Consider MRI if clinical concern persists. 2. Atrophy and ventricular enlargement.        CTDI: 32.43 mGy DLP:575.47 mGy.cm    This study was performed with techniques to keep radiation doses as low as reasonably achievable (ALARA). Individualized dose reduction techniques using automated exposure control or adjustment of mA and/or kV according to the patient size were employed.   This report was signed and finalized on 10/13/2024 4:43 PM by Michelle Arriaza MD.      XR Chest 1 View    Result Date: 10/13/2024  PROCEDURE: XR CHEST 1 VW-  INDICATION:  Altered mental status, eval pneumonia  FINDINGS:  A portable view of the chest was obtained.  Comparison is made to a prior exam dated 8/28/2024.   Patient is again noted to be status post median sternotomy. Heart size is normal. There are low lung volumes with bibasilar opacity. This could represent atelectasis or pneumonia. No pneumothorax.      Low lung volumes with bibasilar opacity which could represent atelectasis or pneumonia. Recommend radiographic follow-up.   This report was signed and finalized on 10/13/2024 1:46 PM by Michelle Arriaza MD.       Assessment:    Acute metabolic encephalopathy, POA  Diabetes mellitus, uncontrolled with HHS, POA  Acute kidney injury, POA  Hypernatremia, POA  Bilateral pneumonia, unable to specify further, POA  Elevated troponin, likely demand ischemia, POA  Chronic respiratory failure on 2 L  Dementia  Parkinson disease  CAD  Hypertension  Debility    Plan:    Patient is admitted to ICU for further management and treatment.    Acute encephalopathy  -Likely metabolic secondary to HHS, NANCI,  hyponatremia and bilateral pneumonia  -Neurochecks, fall precautions  -CT head negative for acute  -Has a history of baseline dementia and Parkinson disease    HHS  Uncontrolled diabetes mellitus  -Started on Glucomander insulin drip per protocol  -Monitor labs and IV fluids per protocol  -Hemoglobin A1c of 10.1  -Needs better control of his diabetes    Bilateral pneumonia  -Coverage with Unasyn and doxycycline  -Requiring 2 L supplemental oxygen which is his chronic oxygen at baseline.  -Bronchodilators, mucolytics   -Follow-up on blood cultures  -Obtain sputum cultures if able to  -Speech evaluation    Acute kidney injury  Hypernatremia  -Likely severely dehydrated  -Discussed with nephrology, appreciate recommendations  -Dr. Bustamante recommended continuing with Glucomander protocol however discontinuing all normal saline fluids and continuing with half-normal saline.  -Avoid nephrotoxic drugs, holding Lasix  -Monitor kidney function and sodium levels    Elevated troponin  -Likely demand ischemia in settings of NANCI and hypertension  -Low suspicion for ACS with no apparent pain.  -Cardiology consulted, appreciate recommendations    -Continue home meds as warranted.  -Further orders as indicated per clinical course.  -Patient is DNR/DNI per his documents, confirmed with his daughter.    Risk Assessment: High  DVT Prophylaxis: Heparin prophylaxis (benefit> risk)  Code Status: DNR/DNI  Diet: Cardiac once able to swallow safely and more awake.    Advance Care Planning  ACP discussion was held with the patient during this visit. Patient does not have an advance directive, information provided.       Jamey Hadley MD  10/13/24  15:42 EDT    Dictated utilizing Dragon dictation.    Electronically signed by Jamey Hadley MD at 10/13/24 1804          Emergency Department Notes        Sonia Krishna RN at 10/13/24 1835          Report called to JANET Black, ICU.    Electronically signed by Sonia Krishna RN at  10/13/24 1835       Dallin Gambino at 10/13/24 1828       Summary:Report                 ICU called for report of pt. Call sent to the primary RN    Electronically signed by Dallin Gambino at 10/13/24 1828       Robbie Olvera MD at 10/13/24 1322        Procedure Orders    1. Critical Care [[id number]] ordered by Robbie Olvera MD                 Subjective:  History of Present Illness:    Patient is an 84-year-old male with history of diabetes mellitus, CAD, hyperlipidemia, hypertension who presents today with altered mental status.  Mass reports patient was found to be altered this morning.  Also was hypoxic at facility.  Patient is normally nonverbal but nursing home staff states that he is normally responsive to painful and verbal stimuli.  They believe he does have understanding of words.  Today was not responsive to their verbal stimulus.  Appears to be maintaining his own airway.  They deny fever.  He has had no cough or congestion prior to arrival but they were concerned he was hypoxic.  No concerns for diabetes management prior to arrival but does have undetectably high glucose on arrival.      Nurses Notes reviewed and agree, including vitals, allergies, social history and prior medical history.     REVIEW OF SYSTEMS: All systems reviewed and not pertinent unless noted.  Review of Systems   Unable to perform ROS: Patient nonverbal       Past Medical History:   Diagnosis Date    Arthritis     Cancer     SKIN     Cataract     Coronary artery disease     Diabetes mellitus     High cholesterol     [place] (hard of hearing)     PATIENT HAS HEARING AIDS    Hypertension     Irregular heart beat     HISTORY OF CARDIAC ABLATION IN 2003    Wears dentures     FULL UPPER PLATE       Allergies:    Phenergan [promethazine hcl], Carbamazepine, Hydrocodone, and Lisinopril      Past Surgical History:   Procedure Laterality Date    BACK SURGERY  1978    THEN AGAIN IN 1982    CARDIAC ABLATION  2003    CARDIAC  "SURGERY      CATARACT EXTRACTION W/ INTRAOCULAR LENS IMPLANT Left 2017    Procedure: CATARACT PHACO EXTRACTION WITH INTRAOCULAR LENS IMPLANT LEFT WITH TORIC LENS;  Surgeon: Alma Benavidez MD;  Location: [facility] OR;  Service:     CATARACT EXTRACTION W/ INTRAOCULAR LENS IMPLANT Right 2017    Procedure: CATARACT PHACO EXTRACTION WITH INTRAOCULAR LENS IMPLANT RIGHT WITH TORIC LENS;  Surgeon: Alma Benavidez MD;  Location: [facility] OR;  Service:     CORONARY ARTERY BYPASS GRAFT      SPOUSE UNSURE OF HOW MANY VESSELS    HIATAL HERNIA REPAIR  1972    JOINT REPLACEMENT Left     HIP - DONE BY DR DALAL    KNEE ARTHROSCOPY Left     NOSE SURGERY  1970    SPOUSE REPORTS FOR A BROKEN NOSE    OTHER SURGICAL HISTORY Left     TENDON TORN LOOSE FROM BONE, LEFT ELBOW    OTHER SURGICAL HISTORY      RUPTURED DISC    OTHER SURGICAL HISTORY      NECK SURGERY FOR RUPTURED DISC    OTHER SURGICAL HISTORY      HAD SECOND NECK SURGERY    OTHER SURGICAL HISTORY      RUPTURED DISC    OTHER SURGICAL HISTORY      RUPTURED DISC    OTHER SURGICAL HISTORY      HARDWARE REMOVAL FROM BACK BY DR HAY         Social History     Socioeconomic History    Marital status:    Tobacco Use    Smoking status: Former     Current packs/day: 0.00     Types: Cigarettes     Quit date:      Years since quittin.8    Smokeless tobacco: Never   Vaping Use    Vaping status: Never Used   Substance and Sexual Activity    Alcohol use: No    Drug use: No    Sexual activity: Defer         History reviewed. No pertinent family history.    Objective  Physical Exam:  /74   Pulse 104   Temp 98.7 °F (37.1 °C) (Axillary)   Resp 20   Ht 170.2 cm (67\")   Wt 74 kg (163 lb 2.3 oz)   SpO2 95%   BMI 25.55 kg/m²      Physical Exam  Constitutional:       General: He is not in acute distress.     Appearance: Normal appearance. He is normal weight. He is ill-appearing. He is not toxic-appearing.   HENT:      " Head: Normocephalic and atraumatic.      Nose: Nose normal. No congestion or rhinorrhea.      Mouth/Throat:      Mouth: Mucous membranes are moist.      Pharynx: Oropharynx is clear.   Eyes:      Extraocular Movements: Extraocular movements intact.      Conjunctiva/sclera: Conjunctivae normal.      Pupils: Pupils are equal, round, and reactive to light.   Cardiovascular:      Rate and Rhythm: Normal rate and regular rhythm.      Pulses: Normal pulses.   Pulmonary:      Effort: Pulmonary effort is normal. No respiratory distress.      Breath sounds: Normal breath sounds.   Abdominal:      General: Abdomen is flat. Bowel sounds are normal. There is no distension.      Palpations: Abdomen is soft.      Tenderness: There is no abdominal tenderness.   Musculoskeletal:         General: No swelling or tenderness. Normal range of motion.      Cervical back: Normal range of motion and neck supple. No rigidity or tenderness.   Skin:     General: Skin is warm and dry.      Capillary Refill: Capillary refill takes less than 2 seconds.   Neurological:      General: No focal deficit present.      Mental Status: He is confused.      GCS: GCS eye subscore is 4. GCS verbal subscore is 1. GCS motor subscore is 5.   Psychiatric:         Mood and Affect: Mood normal.         Behavior: Behavior normal.         Thought Content: Thought content normal.         Judgment: Judgment normal.         Critical Care    Performed by: Robbie Olvera MD  Authorized by: Jamey Hadley MD    Critical care provider statement:     Critical care time (minutes):  30    Critical care time was exclusive of:  Separately billable procedures and treating other patients    Critical care was necessary to treat or prevent imminent or life-threatening deterioration of the following conditions:  Endocrine crisis (Hyperosmolar hyperglycemic state)    Critical care was time spent personally by me on the following activities:  Blood draw for specimens,  development of treatment plan with patient or surrogate, discussions with primary provider, evaluation of patient's response to treatment, examination of patient, obtaining history from patient or surrogate, ordering and performing treatments and interventions, ordering and review of laboratory studies, ordering and review of radiographic studies, pulse oximetry, re-evaluation of patient's condition and review of old charts    Care discussed with: admitting provider        ED Course:    ED Course as of 10/13/24 2306   Sun Oct 13, 2024   1325 EKG interpreted by me, normal sinus rhythm with T wave inversions in leads I to V4, V5, V6 with no concerning ST changes noted, rate of 97, abnormal EKG [JE]   1326 Chest x-ray independently interpreted by me prior to radiology overread showing no acute process [JE]      ED Course User Index  [JE] Robbie Olvera MD       Lab Results (last 24 hours)       Procedure Component Value Units Date/Time    COVID-19 and FLU A/B PCR, 1 HR TAT - Swab, Nasopharynx [[id number]]  (Normal) Collected: 10/13/24 1319    Specimen: Swab from Nasopharynx Updated: 10/13/24 1342     COVID19 Not Detected     Influenza A PCR Not Detected     Influenza B PCR Not Detected    Narrative:      Fact sheet for providers: https://www.fda.gov/media/412044/download    Fact sheet for patients: https://www.fda.gov/media/578152/download    Test performed by PCR.    POC Glucose Once [[id number]]  (Abnormal) Collected: 10/13/24 1319    Specimen: Blood Updated: 10/13/24 1321     Glucose >599 mg/dL      Comment: Serial Number: [id number]:  [id number]       CBC & Differential [[id number]]  (Abnormal) Collected: 10/13/24 1325    Specimen: Blood Updated: 10/13/24 1333    Narrative:      The following orders were created for panel order CBC & Differential.  Procedure                               Abnormality         Status                     ---------                               -----------         ------                      CBC Auto Differential[[id number]]        Abnormal            Final result                 Please view results for these tests on the individual orders.    Lactic Acid, Plasma [[id number]]  (Abnormal) Collected: 10/13/24 1325    Specimen: Blood Updated: 10/13/24 1358     Lactate 3.7 mmol/L     CBC Auto Differential [[id number]]  (Abnormal) Collected: 10/13/24 1325    Specimen: Blood Updated: 10/13/24 1333     WBC 11.18 10*3/mm3      RBC 5.31 10*6/mm3      Hemoglobin 16.0 g/dL      Hematocrit 49.5 %      MCV 93.2 fL      MCH 30.1 pg      MCHC 32.3 g/dL      RDW 14.7 %      RDW-SD 50.9 fl      MPV 10.8 fL      Platelets 225 10*3/mm3      Neutrophil % 72.4 %      Lymphocyte % 13.9 %      Monocyte % 12.8 %      Eosinophil % 0.0 %      Basophil % 0.3 %      Immature Grans % 0.6 %      Neutrophils, Absolute 8.10 10*3/mm3      Lymphocytes, Absolute 1.55 10*3/mm3      Monocytes, Absolute 1.43 10*3/mm3      Eosinophils, Absolute 0.00 10*3/mm3      Basophils, Absolute 0.03 10*3/mm3      Immature Grans, Absolute 0.07 10*3/mm3      nRBC 0.0 /100 WBC     Hemoglobin A1c [[id number]]  (Abnormal) Collected: 10/13/24 1325    Specimen: Blood Updated: 10/13/24 1548     Hemoglobin A1C 10.10 %     Narrative:      Hemoglobin A1C Ranges:    Increased Risk for Diabetes  5.7% to 6.4%  Diabetes                     >= 6.5%  Diabetic Goal                < 7.0%    Blood Gas, Venous With Co-Ox [[id number]]  (Abnormal) Collected: 10/13/24 1331    Specimen: Venous Blood Updated: 10/13/24 1331     Site IVC     pH, Venous 7.428 pH Units      pCO2, Venous 40.4 mm Hg      Comment: 84 Value below reference range        pO2, Venous 43.3 mm Hg      HCO3, Venous 26.7 mmol/L      Base Excess, Venous 2.1 mmol/L      Comment: 83 Value above reference range        O2 Saturation, Venous 78.6 %      Comment: 83 Value above reference range        Oxyhemoglobin Venous 77.3 %      Methemoglobin Venous 0.3 %      Carboxyhemoglobin Venous 1.3 %      Barometric  Pressure for Blood Gas 730 mmHg      Modality Room Air     Ventilator Mode NA     Collected by RN     Comment: Meter: [id number]     :  [id number]       Comprehensive Metabolic Panel [[id number]]  (Abnormal) Collected: 10/13/24 1349    Specimen: Blood Updated: 10/13/24 1435     Glucose 799 mg/dL      Comment: Glucose >180, Hemoglobin A1C recommended.        BUN 46 mg/dL      Creatinine 2.30 mg/dL      Sodium 152 mmol/L      Potassium 5.0 mmol/L      Comment: Specimen hemolyzed.  Result may be falsely elevated.        Chloride 112 mmol/L      CO2 23.9 mmol/L      Calcium 8.6 mg/dL      Total Protein 7.5 g/dL      Albumin 3.6 g/dL      ALT (SGPT) <5 U/L      Comment: Specimen hemolyzed.  Result may  be falsely elevated.        AST (SGOT) 19 U/L      Comment: Specimen hemolyzed.  Result may be falsely elevated.        Alkaline Phosphatase 130 U/L      Total Bilirubin 0.4 mg/dL      Globulin 3.9 gm/dL      A/G Ratio 0.9 g/dL      BUN/Creatinine Ratio 20.0     Anion Gap 16.1 mmol/L      eGFR 27.3 mL/min/1.73     Narrative:      GFR Normal >60  Chronic Kidney Disease <60  Kidney Failure <15    The GFR formula is only valid for adults with stable renal function between ages 18 and 70.    High Sensitivity Troponin T [[id number]]  (Abnormal) Collected: 10/13/24 1349    Specimen: Blood Updated: 10/13/24 1443     HS Troponin T 111 ng/L     Narrative:      High Sensitive Troponin T Reference Range:  <14.0 ng/L- Negative Female for AMI  <22.0 ng/L- Negative Male for AMI  >=14 - Abnormal Female indicating possible myocardial injury.  >=22 - Abnormal Male indicating possible myocardial injury.   Clinicians would have to utilize clinical acumen, EKG, Troponin, and serial changes to determine if it is an Acute Myocardial Infarction or myocardial injury due to an underlying chronic condition.         BNP [[id number]]  (Normal) Collected: 10/13/24 1349    Specimen: Blood Updated: 10/13/24 1423     proBNP 1,017.0 pg/mL      "Narrative:      This assay is used as an aid in the diagnosis of individuals suspected of having heart failure. It can be used as an aid in the diagnosis of acute decompensated heart failure (ADHF) in patients presenting with signs and symptoms of ADHF to the emergency department (ED). In addition, NT-proBNP of <300 pg/mL indicates ADHF is not likely.    Age Range Result Interpretation  NT-proBNP Concentration (pg/mL:      <50             Positive            >450                   Gray                 300-450                    Negative             <300    50-75           Positive            >900                  Gray                300-900                  Negative            <300      >75             Positive            >1800                  Gray                300-1800                  Negative            <300    C-reactive Protein [[id number]]  (Normal) Collected: 10/13/24 1349    Specimen: Blood Updated: 10/13/24 1419     C-Reactive Protein 0.33 mg/dL     Procalcitonin [[id number]]  (Normal) Collected: 10/13/24 1349    Specimen: Blood Updated: 10/13/24 1429     Procalcitonin 0.07 ng/mL     Narrative:      As a Marker for Sepsis (Non-Neonates):    1. <0.5 ng/mL represents a low risk of severe sepsis and/or septic shock.  2. >2 ng/mL represents a high risk of severe sepsis and/or septic shock.    As a Marker for Lower Respiratory Tract Infections that require antibiotic therapy:    PCT on Admission    Antibiotic Therapy       6-12 Hrs later    >0.5                Strongly Recommended  >0.25 - <0.5        Recommended   0.1 - 0.25          Discouraged              Remeasure/reassess PCT  <0.1                Strongly Discouraged     Remeasure/reassess PCT    As 28 day mortality risk marker: \"Change in Procalcitonin Result\" (>80% or <=80%) if Day 0 (or Day 1) and Day 4 values are available. Refer to http://www.[employer].coms-pct-calculator.com    Change in PCT <=80%  A decrease of PCT levels below or equal to 80% defines a " positive change in PCT test result representing a higher risk for 28-day all-cause mortality of patients diagnosed with severe sepsis for septic shock.    Change in PCT >80%  A decrease of PCT levels of more than 80% defines a negative change in PCT result representing a lower risk for 28-day all-cause mortality of patients diagnosed with severe sepsis or septic shock.       Magnesium [[id number]]  (Normal) Collected: 10/13/24 1349    Specimen: Blood Updated: 10/13/24 1435     Magnesium 1.9 mg/dL     CK [[id number]]  (Abnormal) Collected: 10/13/24 1349    Specimen: Blood Updated: 10/13/24 1419     Creatine Kinase 248 U/L     Phosphorus [[id number]]  (Normal) Collected: 10/13/24 1349    Specimen: Blood Updated: 10/13/24 1544     Phosphorus 4.0 mg/dL     Urinalysis With Culture If Indicated - Urine, Catheter [[id number]]  (Abnormal) Collected: 10/13/24 1450    Specimen: Urine, Catheter Updated: 10/13/24 1458     Color, UA Yellow     Appearance, UA Clear     pH, UA <=5.0     Specific Gravity, UA 1.028     Glucose, UA >=1000 mg/dL (3+)     Ketones, UA Negative     Bilirubin, UA Negative     Blood, UA Trace     Protein, UA Negative     Leuk Esterase, UA Negative     Nitrite, UA Negative     Urobilinogen, UA 0.2 E.U./dL    Narrative:      In absence of clinical symptoms, the presence of pyuria, bacteria, and/or nitrites on the urinalysis result does not correlate with infection.    Urinalysis, Microscopic Only - Urine, Catheter [[id number]] Collected: 10/13/24 1450    Specimen: Urine, Catheter Updated: 10/13/24 1509     RBC, UA 0-2 /HPF      WBC, UA 0-2 /HPF      Comment: Urine culture not indicated.        Bacteria, UA None Seen /HPF      Squamous Epithelial Cells, UA None Seen /HPF      Hyaline Casts, UA None Seen /LPF      Methodology Manual Light Microscopy    Beta Hydroxybutyrate Quantitative [[id number]] Collected: 10/13/24 1600    Specimen: Blood Updated: 10/13/24 1903    POC Glucose Once [[id number]]  (Abnormal)  Collected: 10/13/24 1604    Specimen: Blood Updated: 10/13/24 1606     Glucose 577 mg/dL      Comment: Serial Number: [id number]:  [id number]       STAT Lactic Acid, Reflex [[id number]]  (Abnormal) Collected: 10/13/24 1625    Specimen: Blood Updated: 10/13/24 1658     Lactate 3.9 mmol/L     High Sensitivity Troponin T 2Hr [[id number]]  (Abnormal) Collected: 10/13/24 1625    Specimen: Blood Updated: 10/13/24 1706     HS Troponin T 95 ng/L      Comment: Specimen hemolyzed.  Results may be falsely decreased.        Troponin T Delta -16 ng/L     Narrative:      High Sensitive Troponin T Reference Range:  <14.0 ng/L- Negative Female for AMI  <22.0 ng/L- Negative Male for AMI  >=14 - Abnormal Female indicating possible myocardial injury.  >=22 - Abnormal Male indicating possible myocardial injury.   Clinicians would have to utilize clinical acumen, EKG, Troponin, and serial changes to determine if it is an Acute Myocardial Infarction or myocardial injury due to an underlying chronic condition.         Osmolality, Serum [[id number]] Collected: 10/13/24 1625    Specimen: Blood Updated: 10/13/24 1629    Basic Metabolic Panel [[id number]]  (Abnormal) Collected: 10/13/24 1625    Specimen: Blood Updated: 10/13/24 1702     Glucose 669 mg/dL      BUN 42 mg/dL      Creatinine 2.21 mg/dL      Sodium 152 mmol/L      Potassium 5.2 mmol/L      Comment: Specimen hemolyzed.  Result may be falsely elevated.        Chloride 115 mmol/L      CO2 24.5 mmol/L      Calcium 8.2 mg/dL      BUN/Creatinine Ratio 19.0     Anion Gap 12.5 mmol/L      eGFR 28.7 mL/min/1.73     Narrative:      GFR Normal >60  Chronic Kidney Disease <60  Kidney Failure <15    The GFR formula is only valid for adults with stable renal function between ages 18 and 70.    Magnesium [[id number]]  (Normal) Collected: 10/13/24 1625    Specimen: Blood Updated: 10/13/24 1706     Magnesium 1.8 mg/dL     Phosphorus [[id number]]  (Normal) Collected: 10/13/24 1625    Specimen: Blood  Updated: 10/13/24 1706     Phosphorus 3.7 mg/dL     POC Glucose Once [[id number]]  (Abnormal) Collected: 10/13/24 1707    Specimen: Blood Updated: 10/13/24 1710     Glucose 581 mg/dL      Comment: Serial Number: [id number]:  [id number]       Blood Culture With ADRIANE - Blood, Hand, Digit Right [[id number]] Collected: 10/13/24 1750    Specimen: Blood from Hand, Digit Right Updated: 10/13/24 1804    Blood Culture With ADRIANE - Blood, Hand, Right [[id number]] Collected: 10/13/24 1759    Specimen: Blood from Hand, Right Updated: 10/13/24 1804    POC Glucose Once [[id number]]  (Abnormal) Collected: 10/13/24 1816    Specimen: Blood Updated: 10/13/24 1820     Glucose 488 mg/dL      Comment: Serial Number: [id number]:  [id number]       POC Glucose Once [[id number]]  (Abnormal) Collected: 10/13/24 1901    Specimen: Blood Updated: 10/13/24 1903     Glucose 378 mg/dL      Comment: Serial Number: [id number]:  [id number]       POC Glucose Once [[id number]]  (Abnormal) Collected: 10/13/24 1929    Specimen: Blood Updated: 10/13/24 1931     Glucose 327 mg/dL      Comment: Serial Number: [id number]:  [id number]       Basic Metabolic Panel [[id number]]  (Abnormal) Collected: 10/13/24 1943    Specimen: Blood Updated: 10/13/24 2016     Glucose 394 mg/dL      BUN 40 mg/dL      Creatinine 2.13 mg/dL      Sodium 154 mmol/L      Potassium 3.3 mmol/L      Comment: Slight hemolysis detected by analyzer. Result may be falsely elevated.        Chloride 119 mmol/L      CO2 18.2 mmol/L      Calcium 8.3 mg/dL      BUN/Creatinine Ratio 18.8     Anion Gap 16.8 mmol/L      eGFR 30.0 mL/min/1.73     Narrative:      GFR Normal >60  Chronic Kidney Disease <60  Kidney Failure <15    The GFR formula is only valid for adults with stable renal function between ages 18 and 70.    Magnesium [[id number]]  (Normal) Collected: 10/13/24 1943    Specimen: Blood Updated: 10/13/24 2016     Magnesium 1.9 mg/dL     Phosphorus [[id number]]  (Normal) Collected:  10/13/24 1943    Specimen: Blood Updated: 10/13/24 2016     Phosphorus 2.7 mg/dL     Valproic Acid Level, Total [[id number]]  (Abnormal) Collected: 10/13/24 1943    Specimen: Blood Updated: 10/13/24 2010     Valproic Acid 30.2 mcg/mL     Narrative:      Therapeutic Ranges for Valproic Acid    Epilepsy:       mcg/ml  Bipolar/Zulma  up to 125 mcg/ml      STAT Lactic Acid, Reflex [[id number]]  (Abnormal) Collected: 10/13/24 1944    Specimen: Blood Updated: 10/13/24 2015     Lactate 7.2 mmol/L     POC Glucose Once [[id number]]  (Abnormal) Collected: 10/13/24 2034    Specimen: Blood Updated: 10/13/24 2036     Glucose 236 mg/dL      Comment: Serial Number: [id number]:  [id number]       Respiratory Culture - Sputum, Cough [[id number]] Collected: 10/13/24 2113    Specimen: Sputum from Cough Updated: 10/13/24 2123    POC Glucose Once [[id number]]  (Abnormal) Collected: 10/13/24 2133    Specimen: Blood Updated: 10/13/24 2136     Glucose 209 mg/dL      Comment: Serial Number: [id number]:  [id number]       POC Glucose Once [[id number]]  (Abnormal) Collected: 10/13/24 2237    Specimen: Blood Updated: 10/13/24 2238     Glucose 204 mg/dL      Comment: Serial Number: [id number]:  [id number]                XR Abdomen 1 View    Addendum Date: 10/13/2024    ADDENDUM REPORT ADDENDUM: This report was discussed with Charisse SILVA RN on Oct 13, 2024 20:41:00 EDT. Authenticated and Electronically Signed by Thierry Feldman DO on 10/13/2024 08:41:52 PM    Result Date: 10/13/2024  FINAL REPORT TECHNIQUE: null CLINICAL HISTORY: NG placement COMPARISON: null FINDINGS: 1 view abdomen Comparison: CT/SR - CT ANGIOGRAM ABDOMEN PELVIS - 08/28/2024 07:30 PM EDT Findings: The distal tip of the enteric tube overlies the expected stomach lumen. The gastric side port is just distal to the GE junction, recommend advancement by up to 3 cm. Minimal densities within the lung bases. No pneumoperitoneum or pneumatosis. No abnormal calcifications. Median  sternotomy wires. Partially visualized posterior fixation of the lumbar spine.     Impression: IMPRESSION: 1. The distal tip of the enteric tube overlies the expected stomach lumen. The gastric side port is just distal to the GE junction, recommend advancement by up to 3 cm. Authenticated and Electronically Signed by Thierry Feldman DO on 10/13/2024 08:31:31 PM    CT Head Without Contrast    Result Date: 10/13/2024  PROCEDURE: CT HEAD WO CONTRAST-  INDICATION: Altered mental status; E11.00-Type 2 diabetes mellitus with hyperosmolarity without nonketotic hyperglycemic-hyperosmolar coma ([facility])  TECHNIQUE: Multiple axial CT images were performed from the foramen magnum to the vertex without contrast.   Coronal reconstruction images were obtained from the axial data.  COMPARISON: 8/30/2020.  FINDINGS: There is no mass effect or midline shift. Global atrophy is noted. The ventricles are enlarged, similar to the prior exam. There is no intracranial hemorrhage. No acute large cortical infarct. The posterior fossa is without acute abnormality. The basilar cisterns are preserved. No acute soft tissue abnormality. No acute osseous abnormalities are present.      Impression: 1. No acute intracranial hemorrhage or evidence of acute large cortical infarct. Consider MRI if clinical concern persists. 2. Atrophy and ventricular enlargement.        CTDI: 32.43 mGy DLP:575.47 mGy.cm    This study was performed with techniques to keep radiation doses as low as reasonably achievable (ALARA). Individualized dose reduction techniques using automated exposure control or adjustment of mA and/or kV according to the patient size were employed.   This report was signed and finalized on 10/13/2024 4:43 PM by Michelle Arriaza MD.      XR Chest 1 View    Result Date: 10/13/2024  PROCEDURE: XR CHEST 1 VW-  INDICATION:  Altered mental status, eval pneumonia  FINDINGS:  A portable view of the chest was obtained.  Comparison is made to a prior exam  dated 8/28/2024.   Patient is again noted to be status post median sternotomy. Heart size is normal. There are low lung volumes with bibasilar opacity. This could represent atelectasis or pneumonia. No pneumothorax.      Impression: Low lung volumes with bibasilar opacity which could represent atelectasis or pneumonia. Recommend radiographic follow-up.   This report was signed and finalized on 10/13/2024 1:46 PM by Michelle Arriaza MD.          MDM      Initial impression of presenting illness: Altered mental status    DDX: includes but is not limited to: Intracranial hemorrhage, DKA, sepsis, urinary tract infection, bacterial pneumonia    Patient arrives guarded with vitals interpreted by myself.     Pertinent features from physical exam: Patient not responding to verbal stimuli which is not the patient's baseline.  Nonverbal at baseline however.  Clear to auscultation, regular rate and rhythm, no murmur, nontender to abdominal palpation, stable oxygen in emergency department on his home 2 L.    Initial diagnostic plan: CBC, CMP, troponin, BNP, EKG, chest x-ray, CRP, Pro-Kei, lactic acid, magnesium    Results from initial plan were reviewed and interpreted by me revealing concern for HHS given patient without ketones on urine, severely elevated glucose with altered mental status    Diagnostic information from other sources: Discussed EMS on arrival and reviewed past medical records    Interventions / Re-evaluation: Given IV fluids due to concerns for hyponatremia given high sodium count to even with significantly elevated glucose.  No concerns for ketones in the urine and no acidosis on VBG thus ordered HHS protocol for Glucomander    Results/clinical rationale were discussed with hospitalist    Consultations/Discussion of results with other physicians: Given my concern for HHS, discussed with hospitalist, Dr. Hadley for admission.  He request CT head prior to admission.  He also discussed antibiotics.  No obvious  pneumonia on chest x-ray per my depend interpretation.  Radiology read of low lung volumes with possible pneumonia however, given his negative Pro-Kei, I had opted to defer antibiotics at this time.  CT scan of the head is negative and the patient was admitted to the hospitalist service for further management    Disposition plan: Bit  -----  ABDIRAHMAN reviewed by Jamey Hadley MD, Robbie Olvera MD     Final diagnoses:   Hyperosmolar hyperglycemic state (HHS)          Robbie Olvera MD  10/13/24 2306      Electronically signed by Robbie Olvera MD at 10/13/24 2306       Lab Results (last 24 hours)       Procedure Component Value Units Date/Time    POC Glucose Once [[id number]]  (Abnormal) Collected: 10/14/24 1041    Specimen: Blood Updated: 10/14/24 1043     Glucose 210 mg/dL      Comment: Serial Number: [id number]:  [id number]       STAT Lactic Acid, Reflex [[id number]]  (Normal) Collected: 10/14/24 0834    Specimen: Blood Updated: 10/14/24 0902     Lactate 1.7 mmol/L     Basic Metabolic Panel [[id number]]  (Abnormal) Collected: 10/14/24 0732    Specimen: Blood Updated: 10/14/24 0839     Glucose 125 mg/dL      BUN 33 mg/dL      Creatinine 1.67 mg/dL      Sodium 154 mmol/L      Potassium 4.5 mmol/L      Chloride 121 mmol/L      CO2 22.6 mmol/L      Calcium 7.9 mg/dL      BUN/Creatinine Ratio 19.8     Anion Gap 10.4 mmol/L      eGFR 40.1 mL/min/1.73     Narrative:      GFR Normal >60  Chronic Kidney Disease <60  Kidney Failure <15    The GFR formula is only valid for adults with stable renal function between ages 18 and 70.    Magnesium [[id number]]  (Abnormal) Collected: 10/14/24 0732    Specimen: Blood Updated: 10/14/24 0839     Magnesium 2.7 mg/dL     Phosphorus [[id number]]  (Normal) Collected: 10/14/24 0732    Specimen: Blood Updated: 10/14/24 0836     Phosphorus 2.8 mg/dL     POC Glucose Once [[id number]]  (Abnormal) Collected: 10/14/24 0834    Specimen: Blood Updated: 10/14/24 0836     Glucose  174 mg/dL      Comment: Serial Number: [id number]:  [id number]       Osmolality, Serum [[id number]] Collected: 10/14/24 0732    Specimen: Blood Updated: 10/14/24 0829    Beta Hydroxybutyrate Quantitative [[id number]] Collected: 10/14/24 0732    Specimen: Blood Updated: 10/14/24 0829    POC Glucose Once [[id number]]  (Normal) Collected: 10/14/24 0727    Specimen: Blood Updated: 10/14/24 0744     Glucose 117 mg/dL      Comment: Serial Number: [id number]:  [id number]       POC Glucose Once [[id number]]  (Normal) Collected: 10/14/24 0626    Specimen: Blood Updated: 10/14/24 0631     Glucose 120 mg/dL      Comment: Serial Number: [id number]:  [id number]       Blood Culture With ADRIANE - Blood, Hand, Digit Right [[id number]]  (Normal) Collected: 10/13/24 1750    Specimen: Blood from Hand, Digit Right Updated: 10/14/24 0615     Blood Culture No growth at less than 24 hours    Blood Culture With ADRIANE - Blood, Hand, Right [[id number]]  (Normal) Collected: 10/13/24 1759    Specimen: Blood from Hand, Right Updated: 10/14/24 0615     Blood Culture No growth at less than 24 hours    CBC (No Diff) [[id number]]  (Normal) Collected: 10/14/24 0411    Specimen: Blood Updated: 10/14/24 0544     WBC 10.46 10*3/mm3      RBC 4.40 10*6/mm3      Hemoglobin 13.0 g/dL      Hematocrit 41.2 %      MCV 93.6 fL      MCH 29.5 pg      MCHC 31.6 g/dL      RDW 14.3 %      RDW-SD 49.5 fl      MPV 10.9 fL      Platelets 173 10*3/mm3     Phosphorus [[id number]]  (Normal) Collected: 10/14/24 0411    Specimen: Blood Updated: 10/14/24 0540     Phosphorus 2.8 mg/dL     Basic Metabolic Panel [[id number]]  (Abnormal) Collected: 10/14/24 0411    Specimen: Blood Updated: 10/14/24 0538     Glucose 110 mg/dL      BUN 37 mg/dL      Creatinine 1.80 mg/dL      Sodium 157 mmol/L      Potassium 3.1 mmol/L      Chloride 120 mmol/L      CO2 26.4 mmol/L      Calcium 8.0 mg/dL      BUN/Creatinine Ratio 20.6     Anion Gap 10.6 mmol/L      eGFR 36.7 mL/min/1.73      Narrative:      GFR Normal >60  Chronic Kidney Disease <60  Kidney Failure <15    The GFR formula is only valid for adults with stable renal function between ages 18 and 70.    Magnesium [[id number]]  (Abnormal) Collected: 10/14/24 0411    Specimen: Blood Updated: 10/14/24 0538     Magnesium 1.5 mg/dL     POC Glucose Once [[id number]]  (Normal) Collected: 10/14/24 0519    Specimen: Blood Updated: 10/14/24 0521     Glucose 103 mg/dL      Comment: Serial Number: [id number]:  [id number]       POC Glucose Once [[id number]]  (Normal) Collected: 10/14/24 0415    Specimen: Blood Updated: 10/14/24 0417     Glucose 117 mg/dL      Comment: Serial Number: [id number]:  [id number]       POC Glucose Once [[id number]]  (Normal) Collected: 10/14/24 0311    Specimen: Blood Updated: 10/14/24 0313     Glucose 113 mg/dL      Comment: Serial Number: [id number]:  [id number]       Potassium [[id number]]  (Abnormal) Collected: 10/14/24 0246    Specimen: Blood Updated: 10/14/24 0306     Potassium 3.1 mmol/L     POC Glucose Once [[id number]]  (Normal) Collected: 10/14/24 0208    Specimen: Blood Updated: 10/14/24 0212     Glucose 125 mg/dL      Comment: Serial Number: [id number]:  [id number]       STAT Lactic Acid, Reflex [[id number]]  (Abnormal) Collected: 10/14/24 0035    Specimen: Blood Updated: 10/14/24 0122     Lactate 3.2 mmol/L     POC Glucose Once [[id number]]  (Normal) Collected: 10/14/24 0108    Specimen: Blood Updated: 10/14/24 0110     Glucose 124 mg/dL      Comment: Serial Number: [id number]:  [id number]       Basic Metabolic Panel [[id number]]  (Abnormal) Collected: 10/14/24 0035    Specimen: Blood Updated: 10/14/24 0105     Glucose 141 mg/dL      BUN 36 mg/dL      Creatinine 1.84 mg/dL      Sodium 154 mmol/L      Potassium 3.3 mmol/L      Chloride 118 mmol/L      CO2 25.5 mmol/L      Calcium 8.4 mg/dL      BUN/Creatinine Ratio 19.6     Anion Gap 10.5 mmol/L      eGFR 35.7 mL/min/1.73     Narrative:      GFR Normal  >60  Chronic Kidney Disease <60  Kidney Failure <15    The GFR formula is only valid for adults with stable renal function between ages 18 and 70.    Magnesium [[id number]]  (Normal) Collected: 10/14/24 0035    Specimen: Blood Updated: 10/14/24 0105     Magnesium 1.7 mg/dL     Phosphorus [[id number]]  (Abnormal) Collected: 10/14/24 0035    Specimen: Blood Updated: 10/14/24 0105     Phosphorus 2.1 mg/dL     POC Glucose Once [[id number]]  (Abnormal) Collected: 10/14/24 0045    Specimen: Blood Updated: 10/14/24 0047     Glucose 135 mg/dL      Comment: Serial Number: [id number]:  [id number]       POC Glucose Once [[id number]]  (Abnormal) Collected: 10/13/24 2339    Specimen: Blood Updated: 10/13/24 2341     Glucose 181 mg/dL      Comment: Serial Number: [id number]:  [id number]       POC Glucose Once [[id number]]  (Abnormal) Collected: 10/13/24 2237    Specimen: Blood Updated: 10/13/24 2238     Glucose 204 mg/dL      Comment: Serial Number: [id number]:  [id number]       POC Glucose Once [[id number]]  (Abnormal) Collected: 10/13/24 2133    Specimen: Blood Updated: 10/13/24 2136     Glucose 209 mg/dL      Comment: Serial Number: [id number]:  [id number]       Respiratory Culture - Sputum, Cough [[id number]] Collected: 10/13/24 2113    Specimen: Sputum from Cough Updated: 10/13/24 2123    POC Glucose Once [[id number]]  (Abnormal) Collected: 10/13/24 2034    Specimen: Blood Updated: 10/13/24 2036     Glucose 236 mg/dL      Comment: Serial Number: [id number]:  [id number]       Phosphorus [[id number]]  (Normal) Collected: 10/13/24 1943    Specimen: Blood Updated: 10/13/24 2016     Phosphorus 2.7 mg/dL     Basic Metabolic Panel [[id number]]  (Abnormal) Collected: 10/13/24 1943    Specimen: Blood Updated: 10/13/24 2016     Glucose 394 mg/dL      BUN 40 mg/dL      Creatinine 2.13 mg/dL      Sodium 154 mmol/L      Potassium 3.3 mmol/L      Comment: Slight hemolysis detected by analyzer. Result may be falsely elevated.         Chloride 119 mmol/L      CO2 18.2 mmol/L      Calcium 8.3 mg/dL      BUN/Creatinine Ratio 18.8     Anion Gap 16.8 mmol/L      eGFR 30.0 mL/min/1.73     Narrative:      GFR Normal >60  Chronic Kidney Disease <60  Kidney Failure <15    The GFR formula is only valid for adults with stable renal function between ages 18 and 70.    Magnesium [[id number]]  (Normal) Collected: 10/13/24 1943    Specimen: Blood Updated: 10/13/24 2016     Magnesium 1.9 mg/dL     STAT Lactic Acid, Reflex [[id number]]  (Abnormal) Collected: 10/13/24 1944    Specimen: Blood Updated: 10/13/24 2015     Lactate 7.2 mmol/L     Valproic Acid Level, Total [[id number]]  (Abnormal) Collected: 10/13/24 1943    Specimen: Blood Updated: 10/13/24 2010     Valproic Acid 30.2 mcg/mL     Narrative:      Therapeutic Ranges for Valproic Acid    Epilepsy:       mcg/ml  Bipolar/Zulma  up to 125 mcg/ml      POC Glucose Once [[id number]]  (Abnormal) Collected: 10/13/24 1929    Specimen: Blood Updated: 10/13/24 1931     Glucose 327 mg/dL      Comment: Serial Number: [id number]:  [id number]       POC Glucose Once [[id number]]  (Abnormal) Collected: 10/13/24 1901    Specimen: Blood Updated: 10/13/24 1903     Glucose 378 mg/dL      Comment: Serial Number: [id number]:  [id number]       POC Glucose Once [[id number]]  (Abnormal) Collected: 10/13/24 1816    Specimen: Blood Updated: 10/13/24 1820     Glucose 488 mg/dL      Comment: Serial Number: [id number]:  [id number]       POC Glucose Once [[id number]]  (Abnormal) Collected: 10/13/24 1707    Specimen: Blood Updated: 10/13/24 1710     Glucose 581 mg/dL      Comment: Serial Number: [id number]:  [id number]       Magnesium [[id number]]  (Normal) Collected: 10/13/24 1625    Specimen: Blood Updated: 10/13/24 1706     Magnesium 1.8 mg/dL     Phosphorus [[id number]]  (Normal) Collected: 10/13/24 1625    Specimen: Blood Updated: 10/13/24 1706     Phosphorus 3.7 mg/dL     High Sensitivity Troponin T 2Hr  [[id number]]  (Abnormal) Collected: 10/13/24 1625    Specimen: Blood Updated: 10/13/24 1706     HS Troponin T 95 ng/L      Comment: Specimen hemolyzed.  Results may be falsely decreased.        Troponin T Delta -16 ng/L     Narrative:      High Sensitive Troponin T Reference Range:  <14.0 ng/L- Negative Female for AMI  <22.0 ng/L- Negative Male for AMI  >=14 - Abnormal Female indicating possible myocardial injury.  >=22 - Abnormal Male indicating possible myocardial injury.   Clinicians would have to utilize clinical acumen, EKG, Troponin, and serial changes to determine if it is an Acute Myocardial Infarction or myocardial injury due to an underlying chronic condition.         Basic Metabolic Panel [[id number]]  (Abnormal) Collected: 10/13/24 1625    Specimen: Blood Updated: 10/13/24 1702     Glucose 669 mg/dL      BUN 42 mg/dL      Creatinine 2.21 mg/dL      Sodium 152 mmol/L      Potassium 5.2 mmol/L      Comment: Specimen hemolyzed.  Result may be falsely elevated.        Chloride 115 mmol/L      CO2 24.5 mmol/L      Calcium 8.2 mg/dL      BUN/Creatinine Ratio 19.0     Anion Gap 12.5 mmol/L      eGFR 28.7 mL/min/1.73     Narrative:      GFR Normal >60  Chronic Kidney Disease <60  Kidney Failure <15    The GFR formula is only valid for adults with stable renal function between ages 18 and 70.    STAT Lactic Acid, Reflex [[id number]]  (Abnormal) Collected: 10/13/24 1625    Specimen: Blood Updated: 10/13/24 1658     Lactate 3.9 mmol/L     POC Glucose Once [[id number]]  (Abnormal) Collected: 10/13/24 1604    Specimen: Blood Updated: 10/13/24 1606     Glucose 577 mg/dL      Comment: Serial Number: [id number]:  [id number]       Hemoglobin A1c [[id number]]  (Abnormal) Collected: 10/13/24 1325    Specimen: Blood Updated: 10/13/24 1548     Hemoglobin A1C 10.10 %     Narrative:      Hemoglobin A1C Ranges:    Increased Risk for Diabetes  5.7% to 6.4%  Diabetes                     >= 6.5%  Diabetic Goal                < 7.0%     Phosphorus [[id number]]  (Normal) Collected: 10/13/24 1349    Specimen: Blood Updated: 10/13/24 1544     Phosphorus 4.0 mg/dL     Urinalysis, Microscopic Only - Urine, Catheter [[id number]] Collected: 10/13/24 1450    Specimen: Urine, Catheter Updated: 10/13/24 1509     RBC, UA 0-2 /HPF      WBC, UA 0-2 /HPF      Comment: Urine culture not indicated.        Bacteria, UA None Seen /HPF      Squamous Epithelial Cells, UA None Seen /HPF      Hyaline Casts, UA None Seen /LPF      Methodology Manual Light Microscopy    Urinalysis With Culture If Indicated - Urine, Catheter [[id number]]  (Abnormal) Collected: 10/13/24 1450    Specimen: Urine, Catheter Updated: 10/13/24 1458     Color, UA Yellow     Appearance, UA Clear     pH, UA <=5.0     Specific Gravity, UA 1.028     Glucose, UA >=1000 mg/dL (3+)     Ketones, UA Negative     Bilirubin, UA Negative     Blood, UA Trace     Protein, UA Negative     Leuk Esterase, UA Negative     Nitrite, UA Negative     Urobilinogen, UA 0.2 E.U./dL    Narrative:      In absence of clinical symptoms, the presence of pyuria, bacteria, and/or nitrites on the urinalysis result does not correlate with infection.    High Sensitivity Troponin T [[id number]]  (Abnormal) Collected: 10/13/24 1349    Specimen: Blood Updated: 10/13/24 1443     HS Troponin T 111 ng/L     Narrative:      High Sensitive Troponin T Reference Range:  <14.0 ng/L- Negative Female for AMI  <22.0 ng/L- Negative Male for AMI  >=14 - Abnormal Female indicating possible myocardial injury.  >=22 - Abnormal Male indicating possible myocardial injury.   Clinicians would have to utilize clinical acumen, EKG, Troponin, and serial changes to determine if it is an Acute Myocardial Infarction or myocardial injury due to an underlying chronic condition.         Comprehensive Metabolic Panel [[id number]]  (Abnormal) Collected: 10/13/24 1349    Specimen: Blood Updated: 10/13/24 1435     Glucose 799 mg/dL      Comment: Glucose >180,  "Hemoglobin A1C recommended.        BUN 46 mg/dL      Creatinine 2.30 mg/dL      Sodium 152 mmol/L      Potassium 5.0 mmol/L      Comment: Specimen hemolyzed.  Result may be falsely elevated.        Chloride 112 mmol/L      CO2 23.9 mmol/L      Calcium 8.6 mg/dL      Total Protein 7.5 g/dL      Albumin 3.6 g/dL      ALT (SGPT) <5 U/L      Comment: Specimen hemolyzed.  Result may  be falsely elevated.        AST (SGOT) 19 U/L      Comment: Specimen hemolyzed.  Result may be falsely elevated.        Alkaline Phosphatase 130 U/L      Total Bilirubin 0.4 mg/dL      Globulin 3.9 gm/dL      A/G Ratio 0.9 g/dL      BUN/Creatinine Ratio 20.0     Anion Gap 16.1 mmol/L      eGFR 27.3 mL/min/1.73     Narrative:      GFR Normal >60  Chronic Kidney Disease <60  Kidney Failure <15    The GFR formula is only valid for adults with stable renal function between ages 18 and 70.    Magnesium [[id number]]  (Normal) Collected: 10/13/24 1349    Specimen: Blood Updated: 10/13/24 1435     Magnesium 1.9 mg/dL     Procalcitonin [[id number]]  (Normal) Collected: 10/13/24 1349    Specimen: Blood Updated: 10/13/24 1429     Procalcitonin 0.07 ng/mL     Narrative:      As a Marker for Sepsis (Non-Neonates):    1. <0.5 ng/mL represents a low risk of severe sepsis and/or septic shock.  2. >2 ng/mL represents a high risk of severe sepsis and/or septic shock.    As a Marker for Lower Respiratory Tract Infections that require antibiotic therapy:    PCT on Admission    Antibiotic Therapy       6-12 Hrs later    >0.5                Strongly Recommended  >0.25 - <0.5        Recommended   0.1 - 0.25          Discouraged              Remeasure/reassess PCT  <0.1                Strongly Discouraged     Remeasure/reassess PCT    As 28 day mortality risk marker: \"Change in Procalcitonin Result\" (>80% or <=80%) if Day 0 (or Day 1) and Day 4 values are available. Refer to http://www.I Do Now I Don'ts-pct-calculator.com    Change in PCT <=80%  A decrease of PCT levels below " or equal to 80% defines a positive change in PCT test result representing a higher risk for 28-day all-cause mortality of patients diagnosed with severe sepsis for septic shock.    Change in PCT >80%  A decrease of PCT levels of more than 80% defines a negative change in PCT result representing a lower risk for 28-day all-cause mortality of patients diagnosed with severe sepsis or septic shock.       BNP [[id number]]  (Normal) Collected: 10/13/24 1349    Specimen: Blood Updated: 10/13/24 1423     proBNP 1,017.0 pg/mL     Narrative:      This assay is used as an aid in the diagnosis of individuals suspected of having heart failure. It can be used as an aid in the diagnosis of acute decompensated heart failure (ADHF) in patients presenting with signs and symptoms of ADHF to the emergency department (ED). In addition, NT-proBNP of <300 pg/mL indicates ADHF is not likely.    Age Range Result Interpretation  NT-proBNP Concentration (pg/mL:      <50             Positive            >450                   Gray                 300-450                    Negative             <300    50-75           Positive            >900                  Gray                300-900                  Negative            <300      >75             Positive            >1800                  Gray                300-1800                  Negative            <300    CK [[id number]]  (Abnormal) Collected: 10/13/24 1349    Specimen: Blood Updated: 10/13/24 1419     Creatine Kinase 248 U/L     C-reactive Protein [[id number]]  (Normal) Collected: 10/13/24 1349    Specimen: Blood Updated: 10/13/24 1419     C-Reactive Protein 0.33 mg/dL     Lactic Acid, Plasma [[id number]]  (Abnormal) Collected: 10/13/24 1325    Specimen: Blood Updated: 10/13/24 1358     Lactate 3.7 mmol/L     COVID-19 and FLU A/B PCR, 1 HR TAT - Swab, Nasopharynx [[id number]]  (Normal) Collected: 10/13/24 1319    Specimen: Swab from Nasopharynx Updated: 10/13/24 1342     COVID19 Not Detected      Influenza A PCR Not Detected     Influenza B PCR Not Detected    Narrative:      Fact sheet for providers: https://www.fda.gov/media/164417/download    Fact sheet for patients: https://www.fda.gov/media/278936/download    Test performed by PCR.    CBC & Differential [[id number]]  (Abnormal) Collected: 10/13/24 1325    Specimen: Blood Updated: 10/13/24 1333    Narrative:      The following orders were created for panel order CBC & Differential.  Procedure                               Abnormality         Status                     ---------                               -----------         ------                     CBC Auto Differential[[id number]]        Abnormal            Final result                 Please view results for these tests on the individual orders.    CBC Auto Differential [[id number]]  (Abnormal) Collected: 10/13/24 1325    Specimen: Blood Updated: 10/13/24 1333     WBC 11.18 10*3/mm3      RBC 5.31 10*6/mm3      Hemoglobin 16.0 g/dL      Hematocrit 49.5 %      MCV 93.2 fL      MCH 30.1 pg      MCHC 32.3 g/dL      RDW 14.7 %      RDW-SD 50.9 fl      MPV 10.8 fL      Platelets 225 10*3/mm3      Neutrophil % 72.4 %      Lymphocyte % 13.9 %      Monocyte % 12.8 %      Eosinophil % 0.0 %      Basophil % 0.3 %      Immature Grans % 0.6 %      Neutrophils, Absolute 8.10 10*3/mm3      Lymphocytes, Absolute 1.55 10*3/mm3      Monocytes, Absolute 1.43 10*3/mm3      Eosinophils, Absolute 0.00 10*3/mm3      Basophils, Absolute 0.03 10*3/mm3      Immature Grans, Absolute 0.07 10*3/mm3      nRBC 0.0 /100 WBC     Blood Gas, Venous With Co-Ox [[id number]]  (Abnormal) Collected: 10/13/24 1331    Specimen: Venous Blood Updated: 10/13/24 1331     Site IVC     pH, Venous 7.428 pH Units      pCO2, Venous 40.4 mm Hg      Comment: 84 Value below reference range        pO2, Venous 43.3 mm Hg      HCO3, Venous 26.7 mmol/L      Base Excess, Venous 2.1 mmol/L      Comment: 83 Value above reference range        O2 Saturation,  Venous 78.6 %      Comment: 83 Value above reference range        Oxyhemoglobin Venous 77.3 %      Methemoglobin Venous 0.3 %      Carboxyhemoglobin Venous 1.3 %      Barometric Pressure for Blood Gas 730 mmHg      Modality Room Air     Ventilator Mode NA     Collected by RN     Comment: Meter: [id number]     :  [id number]       POC Glucose Once [[id number]]  (Abnormal) Collected: 10/13/24 1319    Specimen: Blood Updated: 10/13/24 1321     Glucose >599 mg/dL      Comment: Serial Number: [id number]:  [id number]             Imaging Results (Last 24 Hours)       Procedure Component Value Units Date/Time    XR Abdomen 1 View [[id number]] Collected: 10/13/24 2031     Updated: 10/13/24 2043    Addenda:        ADDENDUM REPORT    ADDENDUM:  This report was discussed with Charisse SILVA RN on Oct 13, 2024 20:41:00 EDT.    Authenticated and Electronically Signed by Thierry Murcia DO on  10/13/2024 08:41:52 PM  Signed: 10/13/24 2041 by Thierry Murcia DO    Narrative:      FINAL REPORT    TECHNIQUE:  null    CLINICAL HISTORY:  NG placement    COMPARISON:  null    FINDINGS:  1 view abdomen    Comparison: CT/SR - CT ANGIOGRAM ABDOMEN PELVIS - 08/28/2024 07:30 PM EDT    Findings:    The distal tip of the enteric tube overlies the expected stomach lumen. The gastric side port is just distal to the GE junction, recommend advancement by up to 3 cm.    Minimal densities within the lung bases.    No pneumoperitoneum or pneumatosis.    No abnormal calcifications.    Median sternotomy wires. Partially visualized posterior fixation of the lumbar spine.      Impression:      IMPRESSION:    1. The distal tip of the enteric tube overlies the expected stomach lumen. The gastric side port is just distal to the GE junction, recommend advancement by up to 3 cm.    Authenticated and Electronically Signed by Thierry Murcia DO on  10/13/2024 08:31:31 PM    CT Head Without Contrast [[id number]] Collected: 10/13/24 1642     Updated: 10/13/24  1646    Narrative:      PROCEDURE: CT HEAD WO CONTRAST-     INDICATION: Altered mental status; E11.00-Type 2 diabetes mellitus with  hyperosmolarity without nonketotic hyperglycemic-hyperosmolar coma  ([facility])     TECHNIQUE: Multiple axial CT images were performed from the foramen  magnum to the vertex without contrast.   Coronal reconstruction images  were obtained from the axial data.     COMPARISON: 8/30/2020.     FINDINGS: There is no mass effect or midline shift. Global atrophy is  noted. The ventricles are enlarged, similar to the prior exam. There is  no intracranial hemorrhage. No acute large cortical infarct. The  posterior fossa is without acute abnormality. The basilar cisterns are  preserved. No acute soft tissue abnormality. No acute osseous  abnormalities are present.       Impression:      1. No acute intracranial hemorrhage or evidence of acute large cortical  infarct. Consider MRI if clinical concern persists.  2. Atrophy and ventricular enlargement.                       CTDI: 32.43 mGy  DLP:575.47 mGy.cm           This study was performed with techniques to keep radiation doses as low  as reasonably achievable (ALARA). Individualized dose reduction  techniques using automated exposure control or adjustment of mA and/or  kV according to the patient size were employed.        This report was signed and finalized on 10/13/2024 4:43 PM by Michelle Arriaza MD.       XR Chest 1 View [[id number]] Collected: 10/13/24 1331     Updated: 10/13/24 1348    Narrative:      PROCEDURE: XR CHEST 1 VW-     INDICATION:  Altered mental status, eval pneumonia     FINDINGS:  A portable view of the chest was obtained.  Comparison is  made to a prior exam dated 8/28/2024.   Patient is again noted to be  status post median sternotomy. Heart size is normal. There are low lung  volumes with bibasilar opacity. This could represent atelectasis or  pneumonia. No pneumothorax.       Impression:      Low lung volumes with bibasilar  opacity which could  represent atelectasis or pneumonia. Recommend radiographic follow-up.        This report was signed and finalized on 10/13/2024 1:46 PM by Michelle Arriaza MD.

## 2024-10-14 NOTE — PLAN OF CARE
Goal Outcome Evaluation:      Insulin gtt stopped at 1446 this shift. Wounds evaluated by wound ostomy nurse, see orders and recommendations. Evaluated by speech as well, see notes.        Problem: Adult Inpatient Plan of Care  Goal: Plan of Care Review  Outcome: Progressing  Goal: Patient-Specific Goal (Individualized)  Outcome: Progressing  Goal: Absence of Hospital-Acquired Illness or Injury  Outcome: Progressing  Intervention: Identify and Manage Fall Risk  Recent Flowsheet Documentation  Taken 10/14/2024 1600 by Rajesh Carcamo RN  Safety Promotion/Fall Prevention:   room organization consistent   safety round/check completed  Taken 10/14/2024 1400 by Rajesh Carcamo RN  Safety Promotion/Fall Prevention:   room organization consistent   safety round/check completed  Taken 10/14/2024 1200 by Rajesh Carcamo RN  Safety Promotion/Fall Prevention:   room organization consistent   safety round/check completed  Taken 10/14/2024 1000 by Rajesh Carcamo RN  Safety Promotion/Fall Prevention:   room organization consistent   safety round/check completed  Taken 10/14/2024 0800 by Rajesh Carcamo RN  Safety Promotion/Fall Prevention:   activity supervised   assistive device/personal items within reach   clutter free environment maintained   elopement precautions   fall prevention program maintained   gait belt   lighting adjusted   mobility aid in reach   muscle strengthening facilitated   nonskid shoes/slippers when out of bed   room organization consistent   safety round/check completed  Intervention: Prevent Skin Injury  Recent Flowsheet Documentation  Taken 10/14/2024 1600 by Rajesh Carcamo RN  Body Position:   turned   left   legs elevated   upper extremity elevated   weight shifting  Taken 10/14/2024 1400 by Rajesh Carcamo RN  Body Position:   turned   left   legs elevated   upper extremity elevated   weight shifting  Taken 10/14/2024 1200 by Rajesh Carcamo RN  Body Position:   turned   right   legs elevated   upper extremity  elevated   weight shifting  Taken 10/14/2024 0800 by Rajesh Carcamo RN  Body Position:   supine   legs elevated   upper extremity elevated   weight shifting  Skin Protection: incontinence pads utilized  Intervention: Prevent Infection  Recent Flowsheet Documentation  Taken 10/14/2024 1600 by Rajesh Carcamo RN  Infection Prevention: rest/sleep promoted  Taken 10/14/2024 1400 by Rajesh Carcamo RN  Infection Prevention: rest/sleep promoted  Taken 10/14/2024 1200 by Rajesh Carcamo RN  Infection Prevention: rest/sleep promoted  Taken 10/14/2024 1000 by Rajesh Carcamo RN  Infection Prevention: rest/sleep promoted  Taken 10/14/2024 0800 by Rajesh Carcamo RN  Infection Prevention: rest/sleep promoted  Goal: Optimal Comfort and Wellbeing  Outcome: Progressing  Goal: Readiness for Transition of Care  Outcome: Progressing  Intervention: Mutually Develop Transition Plan  Recent Flowsheet Documentation  Taken 10/14/2024 0923 by Rajesh Carcamo RN  Equipment Currently Used at Home: other (see comments)     Problem: Skin Injury Risk Increased  Goal: Skin Health and Integrity  Outcome: Progressing  Intervention: Optimize Skin Protection  Recent Flowsheet Documentation  Taken 10/14/2024 1600 by Rajesh Caracmo RN  Activity Management: bedrest  Head of Bed (HOB) Positioning: HOB elevated  Taken 10/14/2024 1400 by Rajesh Carcamo RN  Activity Management: bedrest  Head of Bed (HOB) Positioning: HOB elevated  Taken 10/14/2024 1200 by Rajesh Carcamo RN  Activity Management: bedrest  Head of Bed (HOB) Positioning: HOB elevated  Taken 10/14/2024 1000 by Rajesh Carcamo RN  Activity Management: bedrest  Head of Bed (HOB) Positioning: HOB elevated  Taken 10/14/2024 0800 by Rajesh Carcamo RN  Activity Management: bedrest  Pressure Reduction Techniques:   heels elevated off bed   positioned off wounds   pressure points protected   weight shift assistance provided  Head of Bed (HOB) Positioning: HOB elevated  Pressure Reduction Devices:   heel offloading  device utilized   positioning supports utilized   pressure-redistributing mattress utilized  Skin Protection: incontinence pads utilized     Problem: Comorbidity Management  Goal: Blood Glucose Level Within Target Range  Outcome: Progressing  Intervention: Monitor and Manage Glycemia  Recent Flowsheet Documentation  Taken 10/14/2024 0800 by Rajesh Carcamo RN  Medication Review/Management:   medications reviewed   high-risk medications identified  Goal: Blood Pressure in Desired Range  Outcome: Progressing  Intervention: Maintain Blood Pressure Management  Recent Flowsheet Documentation  Taken 10/14/2024 0800 by Rajesh Carcamo RN  Medication Review/Management:   medications reviewed   high-risk medications identified     Problem: Fall Injury Risk  Goal: Absence of Fall and Fall-Related Injury  Outcome: Progressing  Intervention: Identify and Manage Contributors  Recent Flowsheet Documentation  Taken 10/14/2024 0800 by Rajesh Carcamo RN  Medication Review/Management:   medications reviewed   high-risk medications identified  Intervention: Promote Injury-Free Environment  Recent Flowsheet Documentation  Taken 10/14/2024 1600 by Rajesh Carcamo RN  Safety Promotion/Fall Prevention:   room organization consistent   safety round/check completed  Taken 10/14/2024 1400 by Rajesh Carcamo RN  Safety Promotion/Fall Prevention:   room organization consistent   safety round/check completed  Taken 10/14/2024 1200 by Rajesh Carcamo RN  Safety Promotion/Fall Prevention:   room organization consistent   safety round/check completed  Taken 10/14/2024 1000 by Rajesh Carcamo RN  Safety Promotion/Fall Prevention:   room organization consistent   safety round/check completed  Taken 10/14/2024 0800 by Rajesh Carcamo RN  Safety Promotion/Fall Prevention:   activity supervised   assistive device/personal items within reach   clutter free environment maintained   elopement precautions   fall prevention program maintained   gait belt   lighting  adjusted   mobility aid in reach   muscle strengthening facilitated   nonskid shoes/slippers when out of bed   room organization consistent   safety round/check completed     Problem: Wound  Goal: Optimal Coping  Outcome: Progressing  Goal: Optimal Functional Ability  Outcome: Progressing  Intervention: Optimize Functional Ability  Recent Flowsheet Documentation  Taken 10/14/2024 1600 by Rajesh Carcamo RN  Activity Management: bedrest  Activity Assistance Provided: assistance, 2 people  Taken 10/14/2024 1400 by Rajesh Carcamo RN  Activity Management: bedrest  Activity Assistance Provided: assistance, 2 people  Taken 10/14/2024 1200 by Rajesh Carcamo RN  Activity Management: bedrest  Activity Assistance Provided: assistance, 2 people  Taken 10/14/2024 1000 by Rajesh Carcamo RN  Activity Management: bedrest  Activity Assistance Provided: assistance, 2 people  Taken 10/14/2024 0800 by Rajesh Carcamo RN  Activity Management: bedrest  Activity Assistance Provided: assistance, 2 people  Goal: Absence of Infection Signs and Symptoms  Outcome: Progressing  Goal: Improved Oral Intake  Outcome: Progressing  Goal: Optimal Pain Control and Function  Outcome: Progressing  Goal: Skin Health and Integrity  Outcome: Progressing  Intervention: Optimize Skin Protection  Recent Flowsheet Documentation  Taken 10/14/2024 1600 by Rajesh Carcamo RN  Activity Management: bedrest  Head of Bed (HOB) Positioning: HOB elevated  Taken 10/14/2024 1400 by Rajesh Carcamo RN  Activity Management: bedrest  Head of Bed (HOB) Positioning: HOB elevated  Taken 10/14/2024 1200 by Rajesh Carcamo RN  Activity Management: bedrest  Head of Bed (HOB) Positioning: HOB elevated  Taken 10/14/2024 1000 by Rajesh Carcamo RN  Activity Management: bedrest  Head of Bed (HOB) Positioning: HOB elevated  Taken 10/14/2024 0800 by Rajesh Carcamo RN  Activity Management: bedrest  Pressure Reduction Techniques:   heels elevated off bed   positioned off wounds   pressure points  protected   weight shift assistance provided  Head of Bed (HOB) Positioning: HOB elevated  Pressure Reduction Devices:   heel offloading device utilized   positioning supports utilized   pressure-redistributing mattress utilized  Goal: Optimal Wound Healing  Outcome: Progressing

## 2024-10-14 NOTE — CONSULTS
[facility] Consult Note      Patient Name: Gama Davila  : 1940  MRN: [id number]  Primary Care Physician:  Alex Burk DO  Referring Physician: No ref. provider found  Date of admission: 10/13/2024    Subjective     Reason for Consult: Hyponatremia with acute kidney injury    HPI:   Gama Davila is a 84 y.o. male with a past medical history of remote history of tobacco abuse,  osteoarthritis, coronary artery disease, status post coronary bypass grafting , gastroesophageal flux disease secondary to hiatal hernia status post hiatal hernia repair, diabetes mellitus type 2, hypertension, impaired mobility, history of supraventricular tachycardia status post cardiac ablation , disc degenerative disease status post spinal surgery , long-term nursing home resident.     Patient brought to the Emergency Department for onset of altered mental status found to have severe hyponatremia and acute kidney injury that require hospital admission fulfilling criteria for HHS with severe hyperglycemia    Nephrology consultation been requested for the management        Review of Systems:   14 point review of systems is otherwise negative except for mentioned above on HPI    Personal History     Past Medical History:   Diagnosis Date    Arthritis     Cancer     SKIN     Cataract     Coronary artery disease     Diabetes mellitus     High cholesterol     [place] (hard of hearing)     PATIENT HAS HEARING AIDS    Hypertension     Impaired mobility     Irregular heart beat     HISTORY OF CARDIAC ABLATION IN     Wears dentures     FULL UPPER PLATE       Past Surgical History:   Procedure Laterality Date    BACK SURGERY      THEN AGAIN IN     CARDIAC ABLATION      CARDIAC SURGERY      CATARACT EXTRACTION W/ INTRAOCULAR LENS IMPLANT Left 2017    Procedure: CATARACT PHACO EXTRACTION WITH INTRAOCULAR LENS IMPLANT LEFT WITH TORIC LENS;  Surgeon: Alma Benavidez MD;   Location: [facility] OR;  Service:     CATARACT EXTRACTION W/ INTRAOCULAR LENS IMPLANT Right 6/14/2017    Procedure: CATARACT PHACO EXTRACTION WITH INTRAOCULAR LENS IMPLANT RIGHT WITH TORIC LENS;  Surgeon: Alma Benavidez MD;  Location: [facility] OR;  Service:     CORONARY ARTERY BYPASS GRAFT  2003    SPOUSE UNSURE OF HOW MANY VESSELS    HIATAL HERNIA REPAIR  1972    JOINT REPLACEMENT Left     HIP - DONE BY DR DALAL    KNEE ARTHROSCOPY Left     NOSE SURGERY  1970    SPOUSE REPORTS FOR A BROKEN NOSE    OTHER SURGICAL HISTORY Left     TENDON TORN LOOSE FROM BONE, LEFT ELBOW    OTHER SURGICAL HISTORY  1986    RUPTURED DISC    OTHER SURGICAL HISTORY  1994    NECK SURGERY FOR RUPTURED DISC    OTHER SURGICAL HISTORY  1996    HAD SECOND NECK SURGERY    OTHER SURGICAL HISTORY  1998    RUPTURED DISC    OTHER SURGICAL HISTORY  2003    RUPTURED DISC    OTHER SURGICAL HISTORY  2009    HARDWARE REMOVAL FROM BACK BY DR HAY       Family History: family history is not on file.    Social History:  reports that he quit smoking about 41 years ago. His smoking use included cigarettes. He has never used smokeless tobacco. He reports that he does not drink alcohol and does not use drugs.    Home Medications:  Prior to Admission medications    Medication Sig Start Date End Date Taking? Authorizing Provider   acetaminophen (TYLENOL) 325 MG tablet Take 2 tablets by mouth Every 6 (Six) Hours As Needed for Mild Pain or Fever.    ProviderLuz MD   aspirin 81 MG EC tablet Take 1 tablet by mouth Daily.    ProviderLuz MD   carbidopa-levodopa (SINEMET)  MG per tablet Take 1 tablet by mouth 3 (Three) Times a Day. 5/13/20   Pavithra Crowell,    Divalproex Sodium (DEPAKOTE SPRINKLE) 125 MG capsule Take 4 capsules by mouth 2 (Two) Times a Day.    ProviderLuz MD   docusate sodium (COLACE) 100 MG capsule Take 2 capsules by mouth Daily As Needed for Constipation.    ProvideruLz MD   donepezil (ARICEPT)  10 MG tablet Take 1 tablet by mouth Every Night. 5/13/20   Pavithra Crowell DO   famotidine (PEPCID) 20 MG tablet Take 1 tablet by mouth Daily.    Luz Monge MD   furosemide (LASIX) 20 MG tablet Take 1 tablet by mouth Daily.    Luz Monge MD   gabapentin (NEURONTIN) 100 MG capsule Take 1 capsule by mouth 3 (Three) Times a Day. 10/4/24   Alex Burk DO   ipratropium-albuterol (DUO-NEB) 0.5-2.5 mg/3 ml nebulizer Take 3 mL by nebulization Every 6 (Six) Hours As Needed for Wheezing.    Luz Monge MD   melatonin 3 MG tablet Take 2 tablets by mouth Every Night.    Luz Monge MD   memantine (NAMENDA) 10 MG tablet Take 1 tablet by mouth 2 (Two) Times a Day.    Luz Monge MD   metoprolol tartrate (LOPRESSOR) 50 MG tablet Take 1 tablet by mouth Daily.    Luz Monge MD   ondansetron (ZOFRAN) 4 MG tablet Take 1 tablet by mouth Every 6 (Six) Hours As Needed for Nausea or Vomiting.    Luz Monge MD   polyethylene glycol (MiraLax) 17 GM/SCOOP powder Take 17 g by mouth Daily As Needed.    Luz Monge MD   tamsulosin (FLOMAX) 0.4 MG capsule 24 hr capsule Take 1 capsule by mouth Daily.    Luz Monge MD       Allergies:  Allergies   Allergen Reactions    Phenergan [Promethazine Hcl] Nausea And Vomiting    Carbamazepine Unknown - Low Severity    Hydrocodone Unknown - Low Severity    Lisinopril Unknown - Low Severity       Objective     Vitals:   Temp:  [97.4 °F (36.3 °C)-99.7 °F (37.6 °C)] 97.8 °F (36.6 °C)  Heart Rate:  [] 84  Resp:  [14-20] 18  BP: ()/(35-89) 139/77  Flow (L/min) (Oxygen Therapy):  [2] 2    Intake/Output Summary (Last 24 hours) at 10/14/2024 6859  Last data filed at 10/14/2024 1400  Gross per 24 hour   Intake 3442 ml   Output --   Net 3442 ml       Physical Exam:   Constitutional: Pleasantly confused NG tube in place   HEENT: Sclera anicteric, no conjunctival injection  Neck: Supple, no thyromegaly,  no lymphadenopathy, trachea at midline, no JVD  Respiratory: Fine crackles bibasal  Cardiovascular: RRR, no murmurs,   Gastrointestinal: Positive bowel sounds, abdomen is soft, nontender and nondistended  : No palpable bladder  Musculoskeletal: No edema, no clubbing or cyanosis  Neurologic: No focalization      Scheduled Meds:     ampicillin-sulbactam, 3 g, Intravenous, Q6H  carbidopa-levodopa, 1 tablet, Oral, TID  Divalproex Sodium, 500 mg, Oral, BID  doxycycline, 100 mg, Oral, Q12H  famotidine, 20 mg, Oral, Daily  gabapentin, 100 mg, Oral, TID  guaifenesin, 200 mg, Oral, Q4H  heparin (porcine), 5,000 Units, Subcutaneous, Q12H  insulin regular, 2-9 Units, Subcutaneous, Q6H  ipratropium-albuterol, 3 mL, Nebulization, Q6H While Awake - RT  memantine, 5 mg, Oral, Q12H  metoprolol tartrate, 50 mg, Oral, Daily  mupirocin, 1 Application, Each Nare, BID  sodium chloride, 10 mL, Intravenous, Q12H      IV Meds:   dextrose 5 % 980 mL with potassium chloride 40 mEq infusion, , Last Rate: 60 mL/hr at 10/14/24 1618  Pharmacy to Dose ampicillin-sulbactam,   Pharmacy Consult - Pharmacy to dose,         Results Reviewed:   I have personally reviewed the results from the time of this admission to 10/14/2024 16:19 EDT     Lab Results   Component Value Date    GLUCOSE 184 (H) 10/14/2024    CALCIUM 7.6 (L) 10/14/2024     (H) 10/14/2024    K 4.1 10/14/2024    CO2 22.3 10/14/2024     (H) 10/14/2024    BUN 30 (H) 10/14/2024    CREATININE 1.57 (H) 10/14/2024    EGFRIFNONA 60 (L) 05/11/2020    BCR 19.1 10/14/2024    ANIONGAP 8.7 10/14/2024      Lab Results   Component Value Date    MG 2.2 10/14/2024    PHOS 2.2 (L) 10/14/2024    ALBUMIN 3.6 10/13/2024           Assessment / Plan       AMS (altered mental status)      ASSESSMENT:  Acute kidney injury peak creatinine of 2.3 secondary to hyponatremia severe volume depletion from prerenal state currently responding to IV fluid challenge.  Monitor urine output and avoid  nephrotoxins    Chronic kidney disease stage III baseline creatinine between 1 and 1.3 secondary to hypertensive nephrosclerosis    Hypernatremia secondary to altered mental status with decreased oral intake with severe water deficit being corrected by D5 and water flushes via NG tube placement 154> 157> 147 with medical management    Coronary disease status post coronary bypass grafting as per primary team    Acute metabolic encephalopathy secondary to severe volume depletion with hypernatremia    Diabetes mellitus type 2, uncontrolled with [facility] upon admission responded to protocol as per primary team    Bilateral pneumonia with chronic respiratory failure followed by primary team    Parkinson disease with dementia    PLAN:  -Agree with [facility] protocol management continue D5 and free water flushes via NG tube sodium is gradually improving with medical management  -Renal function also gradually improving returning to baseline        Thank you for involving us in the care of Gama Aguirreman.  Please feel free to call with any questions.    Juno Bustamante MD  10/14/24  16:19 EDT    [facility]  [phone]      Please note that portions of this note were completed with a voice recognition program.

## 2024-10-14 NOTE — PLAN OF CARE
Goal Outcome Evaluation:  Plan of Care Reviewed With: patient        Progress: no change  Outcome Evaluation: OT eval completed. Pt supine in bed upon OT arrival. Pt is alert but unable to orient due to pt is non-verbal at baseline. Therapy spoke with LTC facility and they stated patient is max to dependent for ADLs at baseline. Pt ROM is WFL and noted mild contracture present in (B) hands, however, pt is very resistant to movement. OT perform (B) hand hygiene with max A and placed wash clothes in (B) hands for contracture mgmt. After completion of eval, pt is at baseline for care and does not require skilled OT services at this time. OT to sign off.

## 2024-10-14 NOTE — PROGRESS NOTES
"  Nephrology progress note     Patient with HSS , hypernatremia and NANCI     /68   Pulse 85   Temp 97.4 °F (36.3 °C) (Axillary)   Resp 18   Ht 170.2 cm (67\")   Wt 74 kg (163 lb 2.3 oz)   SpO2 95%   BMI 25.55 kg/m²       Results from last 7 days   Lab Units 10/14/24  0411 10/14/24  0246 10/14/24  0035 10/13/24  1943 10/13/24  1625 10/13/24  1349   SODIUM mmol/L 157*  --  154* 154*   < > 152*   POTASSIUM mmol/L 3.1* 3.1* 3.3* 3.3*   < > 5.0   CHLORIDE mmol/L 120*  --  118* 119*   < > 112*   CO2 mmol/L 26.4  --  25.5 18.2*   < > 23.9   BUN mg/dL 37*  --  36* 40*   < > 46*   CREATININE mg/dL 1.80*  --  1.84* 2.13*   < > 2.30*   CALCIUM mg/dL 8.0*  --  8.4* 8.3*   < > 8.6   BILIRUBIN mg/dL  --   --   --   --   --  0.4   ALK PHOS U/L  --   --   --   --   --  130*   ALT (SGPT) U/L  --   --   --   --   --  <5   AST (SGOT) U/L  --   --   --   --   --  19   GLUCOSE mg/dL 110*  --  141* 394*   < > 799*    < > = values in this interval not displayed.       Plan   - Changed fluids to D5 w KCL replacement   - Increased free water flushes from 100 to 150 ml/hr via NG     Discussed with nursing by phone   " No

## 2024-10-14 NOTE — CONSULTS
Diabetes Education    Patient Name:  Gama Davila  YOB: 1940  MRN: [id number]  Admit Date:  10/13/2024        DM education referral related to [facility].  I am seeing potential diagnosis of DM, but appears that pt. Has been on no medication that is listed from the nursing facility he resides at.  Pt. Will likely have some limitations on oral medication if renal function does not improve.  Pt.may benefit from a low dose basal insulin in the future.  Pt. Is non-verbal with dementia and in a controlled environment for meals and medication administration.  No family present for education.  I will continue to follow in EHR  and please re-consult for any further needs.      Electronically signed by:  Sofia Mcknight RN  10/14/24 11:09 EDT

## 2024-10-14 NOTE — NURSING NOTE
Seen for wound consult. Cooperative with assessment. Incomprehensible verbalizations.   Deep tissue pressure injury to left gluteal and crossing the intergluteal cleft to the right gluteal.  Right heel unstageable pressure injury with brown dry eschar.    Standing orders for care include a turning schedule, barrier cream twice daily and prn after cleansing, float heels off bed with pillows, encourage increase mobility as appropriate and tolerated to reduce pressure, for dry skin apply lotion/cream and a nutrition consult for dietary needs. Staff to contact provider and re-consult wound nurse for new skin issues or lack of improvement with current orders. Thank you for the consult. If you have questions or concerns do not hesitate to contact me.

## 2024-10-14 NOTE — THERAPY DISCHARGE NOTE
Acute Care - Occupational Therapy Discharge  [facility]    Patient Name: Gama Davila  : 1940    MRN: [id number]                              Today's Date: 10/14/2024       Admit Date: 10/13/2024    Visit Dx:     ICD-10-CM ICD-9-CM   1. Hyperosmolar hyperglycemic state (HHS)  E11.00 250.22     Patient Active Problem List   Diagnosis    Nikolai (hard of hearing)    High cholesterol    Coronary artery disease    Hypertension    Irregular heart beat    Diabetes mellitus    Anemia    Dementia    Acute respiratory failure with hypoxia    Impaired mobility and ADLs    Parkinson's disease (tremor, stiffness, slow motion, unstable posture)    Mood disorder    Bilateral pneumonia    Sepsis due to pneumonia    AMS (altered mental status)     Past Medical History:   Diagnosis Date    Arthritis     Cancer     SKIN     Cataract     Coronary artery disease     Diabetes mellitus     High cholesterol     Nikolai (hard of hearing)     PATIENT HAS HEARING AIDS    Hypertension     Irregular heart beat     HISTORY OF CARDIAC ABLATION IN     Wears dentures     FULL UPPER PLATE     Past Surgical History:   Procedure Laterality Date    BACK SURGERY      THEN AGAIN IN     CARDIAC ABLATION      CARDIAC SURGERY      CATARACT EXTRACTION W/ INTRAOCULAR LENS IMPLANT Left 2017    Procedure: CATARACT PHACO EXTRACTION WITH INTRAOCULAR LENS IMPLANT LEFT WITH TORIC LENS;  Surgeon: Alma Benavidez MD;  [facility];  Service:     CATARACT EXTRACTION W/ INTRAOCULAR LENS IMPLANT Right 2017    Procedure: CATARACT PHACO EXTRACTION WITH INTRAOCULAR LENS IMPLANT RIGHT WITH TORIC LENS;  Surgeon: Alma Benavidez MD;  Location: [facility] OR;  Service:     CORONARY ARTERY BYPASS GRAFT      SPOUSE UNSURE OF HOW MANY VESSELS    HIATAL HERNIA REPAIR      JOINT REPLACEMENT Left     HIP - DONE BY DR DALAL    KNEE ARTHROSCOPY Left     NOSE SURGERY      SPOUSE REPORTS FOR A BROKEN NOSE    OTHER SURGICAL  HISTORY Left     TENDON TORN LOOSE FROM BONE, LEFT ELBOW    OTHER SURGICAL HISTORY  1986    RUPTURED DISC    OTHER SURGICAL HISTORY  1994    NECK SURGERY FOR RUPTURED DISC    OTHER SURGICAL HISTORY  1996    HAD SECOND NECK SURGERY    OTHER SURGICAL HISTORY  1998    RUPTURED DISC    OTHER SURGICAL HISTORY  2003    RUPTURED DISC    OTHER SURGICAL HISTORY  2009    HARDWARE REMOVAL FROM BACK BY DR HAY      General Information       Row Name 10/14/24 1230          OT Time and Intention    Document Type evaluation  -DB     Mode of Treatment occupational therapy  -DB     Total Minutes, Occupational Therapy 45  -DB     Patient Effort fair  -DB       Row Name 10/14/24 1230          General Information    Patient Profile Reviewed yes  -DB     Prior Level of Function max assist:;ADL's;dependent:  -DB     Existing Precautions/Restrictions fall  -DB     Barriers to Rehab medically complex;previous functional deficit  -DB       Row Name 10/14/24 1230          Living Environment    People in Home facility resident  -DB       Row Name 10/14/24 1230          Cognition    Orientation Status (Cognition) unable/difficult to assess  -DB       Row Name 10/14/24 1230          Safety Issues/Impairments Affecting Functional Mobility    Safety Issues Affecting Function (Mobility) ability to follow commands;awareness of need for assistance;insight into deficits/self-awareness;judgment;safety precaution awareness;safety precautions follow-through/compliance;sequencing abilities  -DB     Impairments Affecting Function (Mobility) balance;endurance/activity tolerance;motor planning;pain;postural/trunk control;range of motion (ROM);strength  -DB               User Key  (r) = Recorded By, (t) = Taken By, (c) = Cosigned By      Initials Name Provider Type    DB José Miguel George OT Occupational Therapist                   Mobility/ADL's       Row Name 10/14/24 1232          Bed Mobility    Bed Mobility bed mobility (all) activities  -DB      All Activities, Baldwin (Bed Mobility) maximum assist (25% patient effort)  -DB       Row Name 10/14/24 1232          Functional Mobility    Patient was able to Ambulate no, other medical factors prevent ambulation  -DB     Reason Patient was unable to Ambulate Non-Ambulatory at Baseline  -DB       Row Name 10/14/24 1232          Activities of Daily Living    BADL Assessment/Intervention bathing;upper body dressing;lower body dressing;feeding;toileting;grooming  -DB       Row Name 10/14/24 1232          Bathing Assessment/Intervention    Baldwin Level (Bathing) bathing skills;maximum assist (25% patient effort);dependent (less than 25% patient effort)  -DB       Row Name 10/14/24 1232          Upper Body Dressing Assessment/Training    Baldwin Level (Upper Body Dressing) upper body dressing skills;maximum assist (25% patient effort)  -DB       Row Name 10/14/24 1232          Lower Body Dressing Assessment/Training    Baldwin Level (Lower Body Dressing) lower body dressing skills;dependent (less than 25% patient effort)  -DB       Row Name 10/14/24 1232          Self-Feeding Assessment/Training    Baldwin Level (Feeding) feeding skills;minimum assist (75% patient effort);moderate assist (50% patient effort)  -DB       Row Name 10/14/24 1232          Toileting Assessment/Training    Baldwin Level (Toileting) toileting skills;maximum assist (25% patient effort);dependent (less than 25% patient effort)  -DB       Row Name 10/14/24 1232          Grooming Assessment/Training    Baldwin Level (Grooming) grooming skills;maximum assist (25% patient effort)  -DB               User Key  (r) = Recorded By, (t) = Taken By, (c) = Cosigned By      Initials Name Provider Type    José Miguel Resendiz OT Occupational Therapist                   Obj/Interventions       Row Name 10/14/24 1234          Range of Motion Comprehensive    General Range of Motion bilateral upper extremity ROM WFL  -DB        Row Name 10/14/24 1234          Strength Comprehensive (MMT)    General Manual Muscle Testing (MMT) Assessment upper extremity strength deficits identified  -DB       Row Name 10/14/24 1234          Upper Extremity (Manual Muscle Testing)    Comment, MMT: Upper Extremity BUEs grossly 3+/5  -DB               User Key  (r) = Recorded By, (t) = Taken By, (c) = Cosigned By      Initials Name Provider Type    DB José Miguel George OT Occupational Therapist                   Goals/Plan    No documentation.                  Clinical Impression       Row Name 10/14/24 1235          Plan of Care Review    Plan of Care Reviewed With patient  -DB     Progress no change  -DB     Outcome Evaluation OT eval completed. Pt supine in bed upon OT arrival. Pt is alert but unable to orient due to pt is non-verbal at baseline. Therapy spoke with LTC facility and they stated patient is max to dependent for ADLs at baseline. Pt ROM is WFL and noted mild contracture present in (B) hands, however, pt is very resistant to movement. OT perform (B) hand hygiene with max A and placed wash clothes in (B) hands for contracture mgmt. After completion of eval, pt is at baseline for care and does not require skilled OT services at this time. OT to sign off.  -DB       Row Name 10/14/24 1235          Therapy Assessment/Plan (OT)    Criteria for Skilled Therapeutic Interventions Met (OT) no problems identified which require skilled intervention  -DB       Row Name 10/14/24 1235          Positioning and Restraints    Pre-Treatment Position in bed  -DB     Post Treatment Position bed  -DB     In Bed notified nsg;supine;call light within reach;encouraged to call for assist;exit alarm on  -DB               User Key  (r) = Recorded By, (t) = Taken By, (c) = Cosigned By      Initials Name Provider Type    DB José Miguel George, OT Occupational Therapist                   Outcome Measures       Row Name 10/14/24 1238          How much help from  another is currently needed...    Putting on and taking off regular lower body clothing? 1  -DB     Bathing (including washing, rinsing, and drying) 1  -DB     Toileting (which includes using toilet bed pan or urinal) 1  -DB     Putting on and taking off regular upper body clothing 2  -DB     Taking care of personal grooming (such as brushing teeth) 2  -DB     Eating meals 2  -DB     AM-PAC 6 Clicks Score (OT) 9  -DB       Row Name 10/14/24 0800 10/14/24 0641       How much help from another person do you currently need...    Turning from your back to your side while in flat bed without using bedrails? 1  -AH 1  -BD    Moving from lying on back to sitting on the side of a flat bed without bedrails? 1  -AH 1  -BD    Moving to and from a bed to a chair (including a wheelchair)? 1  -AH 1  -BD    Standing up from a chair using your arms (e.g., wheelchair, bedside chair)? 1  -AH 1  -BD    Climbing 3-5 steps with a railing? 1  -AH 1  -BD    To walk in hospital room? 1  -AH 1  -BD    AM-PAC 6 Clicks Score (PT) 6  -AH 6  -BD      Row Name 10/14/24 1238          Functional Assessment    Outcome Measure Options AM-PAC 6 Clicks Daily Activity (OT)  -DB               User Key  (r) = Recorded By, (t) = Taken By, (c) = Cosigned By      Initials Name Provider Type    Kassidy Emanuel RN Registered Nurse    Rajesh Ruiz RN Registered Nurse    José Miguel Resendiz OT Occupational Therapist                  Occupational Therapy Education       Title: PT OT SLP Therapies (In Progress)       Topic: Occupational Therapy (In Progress)       Point: ADL training (Not Started)       Description:   Instruct learner(s) on proper safety adaptation and remediation techniques during self care or transfers.   Instruct in proper use of assistive devices.                  Learner Progress:  Not documented in this visit.              Point: Home exercise program (Not Started)       Description:   Instruct learner(s) on appropriate  technique for monitoring, assisting and/or progressing therapeutic exercises/activities.                  Learner Progress:  Not documented in this visit.              Point: Precautions (Done)       Description:   Instruct learner(s) on prescribed precautions during self-care and functional transfers.                  Learning Progress Summary            Patient Acceptance, E,TB, VU by DB at 10/14/2024 1239    Comment: bed mobility and ROM                      Point: Body mechanics (Done)       Description:   Instruct learner(s) on proper positioning and spine alignment during self-care, functional mobility activities and/or exercises.                  Learning Progress Summary            Patient Acceptance, E,TB, VU by DB at 10/14/2024 1239    Comment: bed mobility and ROM                                      User Key       Initials Effective Dates Name Provider Type Discipline    DB 07/06/23 -  José Miguel George OT Occupational Therapist OT                  OT Recommendation and Plan     Plan of Care Review  Plan of Care Reviewed With: patient  Progress: no change  Outcome Evaluation: OT eval completed. Pt supine in bed upon OT arrival. Pt is alert but unable to orient due to pt is non-verbal at baseline. Therapy spoke with LTC facility and they stated patient is max to dependent for ADLs at baseline. Pt ROM is WFL and noted mild contracture present in (B) hands, however, pt is very resistant to movement. OT perform (B) hand hygiene with max A and placed wash clothes in (B) hands for contracture mgmt. After completion of eval, pt is at baseline for care and does not require skilled OT services at this time. OT to sign off.  Plan of Care Reviewed With: patient  Outcome Evaluation: OT eval completed. Pt supine in bed upon OT arrival. Pt is alert but unable to orient due to pt is non-verbal at baseline. Therapy spoke with LTC facility and they stated patient is max to dependent for ADLs at baseline. Pt ROM is  WFL and noted mild contracture present in (B) hands, however, pt is very resistant to movement. OT perform (B) hand hygiene with max A and placed wash clothes in (B) hands for contracture mgmt. After completion of eval, pt is at baseline for care and does not require skilled OT services at this time. OT to sign off.     Time Calculation:   Evaluation Complexity (OT)  Review Occupational Profile/Medical/Therapy History Complexity: expanded/moderate complexity  Assessment, Occupational Performance/Identification of Deficit Complexity: 3-5 performance deficits  Clinical Decision Making Complexity (OT): detailed assessment/moderate complexity  Overall Complexity of Evaluation (OT): moderate complexity     Time Calculation- OT       Row Name 10/14/24 1239             Time Calculation- OT    OT Start Time 0935  -DB      OT Received On 10/14/24  -DB      OT Goal Re-Cert Due Date 10/24/24  -DB         Untimed Charges    OT Eval/Re-eval Minutes 45  -DB         Total Minutes    Untimed Charges Total Minutes 45  -DB       Total Minutes 45  -DB                User Key  (r) = Recorded By, (t) = Taken By, (c) = Cosigned By      Initials Name Provider Type    DB José Miguel George OT Occupational Therapist                  Therapy Charges for Today       Code Description Service Date Service Provider Modifiers Qty    [id number] HC OT EVAL MOD COMPLEXITY 3 10/14/2024 José Miguel George OT GO 1               OT Discharge Summary  Anticipated Discharge Disposition (OT): inpatient rehabilitation facility, skilled nursing facility    José Miguel George OT  10/14/2024

## 2024-10-14 NOTE — CASE MANAGEMENT/SOCIAL WORK
Discharge Planning Assessment  [facility]     Patient Name: Gama Davila  MRN: [id number]  Today's Date: 10/14/2024    Admit Date: 10/13/2024    Plan: DCP is to return to LTC at [facility]. Pt is unable to communicate due to hx of dementia. Spoke with pt daughter/POOBI Jones. She confirmed demographics, states he has been in LTC at the [facility] for 3 years. PCP; Dr. Burk, pharmacy is [facility] through the facility. Agreed to meds to bed. All DME provided by facility. Pt is completely dependent for all needs. Pt will need EMS transport at discharge.   Discharge Needs Assessment       Row Name 10/14/24 1155       Living Environment    People in Home facility resident    Name(s) of People in Home LTC resident at [facility]    Current Living Arrangements extended care facility    Duration at Residence 3 years    Potentially Unsafe Housing Conditions none    In the past 12 months has the electric, gas, oil, or water company threatened to shut off services in your home? No    Primary Care Provided by other (see comments)  LTC resident at [facility].    Provides Primary Care For no one, unable/limited ability to care for self    Family Caregiver if Needed child(christiano), adult    Family Caregiver Names daughter/SADIE Waldrop    Quality of Family Relationships helpful;involved;supportive    Able to Return to Prior Arrangements yes       Resource/Environmental Concerns    Resource/Environmental Concerns none    Transportation Concerns none       Transportation Needs    In the past 12 months, has lack of transportation kept you from medical appointments or from getting medications? no    In the past 12 months, has lack of transportation kept you from meetings, work, or from getting things needed for daily living? No       Food Insecurity    Within the past 12 months, you worried that your food would run out before you got the money to buy more. Never true    Within the past 12 months, the food you bought just didn't  last and you didn't have money to get more. Never true       Transition Planning    Patient/Family Anticipates Transition to long-term care facility    Patient/Family Anticipated Services at Transition none    Transportation Anticipated other (see comments)  EMS       Discharge Needs Assessment    Readmission Within the Last 30 Days no previous admission in last 30 days    Equipment Currently Used at Home other (see comments)  LTC resident at [facility]    Concerns to be Addressed no discharge needs identified    Do you want help finding or keeping work or a job? I do not need or want help    Do you want help with school or training? For example, starting or completing job training or getting a high school diploma, GED or equivalent No    Anticipated Changes Related to Illness none    Equipment Needed After Discharge none    Discharge Facility/Level of Care Needs nursing facility, intermediate    Current Discharge Risk chronically ill;dependent with mobility/activities of daily living                   Discharge Plan       Row Name 10/14/24 1155       Plan    Plan DCP is to return to LTC at [facility]. Pt is unable to communicate due to hx of dementia. Spoke with pt daughter/POA Palma Jones. She confirmed demographics, states he has been in LTC at the [facility] for 3 years. PCP; Dr. Burk, pharmacy is [facility] through the facility. Agreed to meds to bed. All DME provided by facility. Pt is completely dependent for all needs. Pt will need EMS transport at discharge.                  Continued Care and Services - Admitted Since 10/13/2024    No active coordination exists for this encounter.       Selected Continued Care - Prior Encounters Includes continued care and service providers with selected services from prior encounters from 7/15/2024 to 10/14/2024      Discharged on 8/30/2024 Admission date: 8/28/2024 - Discharge disposition: Intermediate Care       Destination       Service Provider Services Address Phone  Fax Patient Preferred    [facility] [address] [place] [address] [phone] --                             Demographic Summary       Row Name 10/14/24 1155       General Information    Admission Type inpatient    Arrived From emergency department    Required Notices Provided Important Message from Medicare  POA gave verbal consent prior on 10/13    Referral Source admission list    Reason for Consult discharge planning    Preferred Language English       Contact Information    Permission Granted to Share Info With ;family/designee                   Functional Status       Row Name 10/14/24 1155       Functional Status    Usual Activity Tolerance poor    Current Activity Tolerance poor       Physical Activity    On average, how many days per week do you engage in moderate to strenuous exercise (like a brisk walk)? 0 days    On average, how many minutes do you engage in exercise at this level? 0 min    Number of minutes of exercise per week 0       Assessment of Health Literacy    How often do you have someone help you read hospital materials? Always    How often do you have problems learning about your medical condition because of difficulty understanding written information? Always    How often do you have a problem understanding what is told to you about your medical condition? Always    How confident are you filling out medical forms by yourself? Not at all    Health Literacy Low       Functional Status, IADL    Medications completely dependent    Meal Preparation completely dependent    Housekeeping completely dependent    Laundry completely dependent    Shopping completely dependent    If for any reason you need help with day-to-day activities such as bathing, preparing meals, shopping, managing finances, etc., do you get the help you need? I get all the help I need  Pt is a LTC resident at [facility]       Mental Status     General Appearance WDL WDL       Mental Status Summary    Recent Changes in Mental Status/Cognitive Functioning no changes    Mental Status Comments Pt not decisional, has a POA. LTC resident at [facility]       Employment/    Employment Status retired                   Psychosocial    No documentation.                  Abuse/Neglect    No documentation.                  Legal    No documentation.                  Substance Abuse    No documentation.                  Patient Forms    No documentation.                     Bola Reyes RN

## 2024-10-14 NOTE — PLAN OF CARE
Goal Outcome Evaluation:  Plan of Care Reviewed With: patient        Progress: no change  Outcome Evaluation: Pt participated in PT initial evaluation this date. Pt presents supine in bed, alert but unable to assess orientation as pt is non-verbal at baseline. Pt 0/10 on FACES pain scale. Pt noted to have mild (B) PF contracture, unable to assess (B) knee/hip ROM as pt was resistant to LE movement despite multiple attempts. Pt unable to follow simple commands for LE movement during evaluation. Pt is currently dependent for all bed mobility. Called and spoke with therapy staff at LT facility. Staff reports pt is dependent for all bed mobility and uses a cyn lift for OOB mobility to a gonsalo chair. Pt is non-ambulatory at baseline. At this time, pt appears to be at functional baseline, skilled PT intervention is not indicated. Once medically stable, recommend pt to d/c to SNF. PT will sign off.    Anticipated Discharge Disposition (PT): skilled nursing facility

## 2024-10-14 NOTE — PROGRESS NOTES
"    [facility]    Name:  Gama Davila   Age:  84 y.o.  Sex:  male  :  1940  MRN:  [id number]   Visit Number:  [id number]  Admission Date:  10/13/2024  Date Of Service:  10/14/24  Primary Care Physician:  Alex Burk DO     LOS: 1 day :    Chief Complaint:      Altered mental status, decreased responsiveness, chest congestion     Subjective:    Patient was seen examined bedside today.  Patient laying comfortably in bed with no distress.  He appears more awake and alert and was able to tell me \" feeling better\".  He is otherwise disoriented and with dementia at baseline.  Appears to be clinically improving.  Remains in ICU.  Discussed with nursing at bedside, patient had NG tube placed last night and started on water flushes per nephrology recommendations.  No other acute events overnight.  Vitals are stable, afebrile on room air.    Hospital Course:    Patient is an 84 years old male with a past medical history of chronic respiratory failure on 2 L, dementia, Parkinson disease, diabetes mellitus, CAD, hypertension who presented from nursing home facility with reports that patient was found altered on his mental status with decreased responsiveness and was having chest congestion and increased work of breathing.  Patient is nonverbal at baseline however he would respond to verbal stimuli at baseline and appears to be able to understand some words.  History was provided by ER report.  Patient is unable to contribute any history due to dementia and nonverbal at baseline. His daughter is at bedside providing history on the patient.  No reported fever or chills.  No vomiting or cough.     On ER evaluation, patient was hypertensive with a blood pressure of 165/126, was afebrile on 2 L baseline oxygen requirement.  His workup was significant for venous blood gases with pH of 7.428/HCO3 26.7.  HS Troponin 1 11-95, creatinine 2.3 (baseline creatinine 1.4) BUN 46, " glucose 799, sodium 152 (corrected 161) WBC 11.1, lactate 3.7, hemoglobin A1c 10.1.  Pro-Kei, CRP and proBNP WNL.  Urinalysis positive for glucose but otherwise negative for infection.  COVID and flu negative.  Chest x-ray showed Low lung volumes with bibasilar opacity which could represent atelectasis or pneumonia.  CT head without contrast showed 1. No acute intracranial hemorrhage or evidence of acute large cortical  infarct. 2. Atrophy and ventricular enlargement.  Patient started on Glucomander per [facility] protocol in the ER after he received 1 L bolus of LR.  Hospitalist consulted for admission, further management and treatment.    Review of Systems:     All systems were reviewed and negative except as mentioned in subjective, assessment and plan.    Vital Signs:    Temp:  [97.4 °F (36.3 °C)-99.7 °F (37.6 °C)] 98.2 °F (36.8 °C)  Heart Rate:  [] 95  Resp:  [14-20] 16  BP: ()/() 141/78    Intake and output:    I/O last 3 completed shifts:  In: 2392 [I.V.:2392]  Out: -   I/O this shift:  In: 300 [Other:300]  Out: -     Physical Examination:    General Appearance:  Alert and cooperative.  Chronically ill-appearing.  No acute distress.  Frail.   Head:  Atraumatic and normocephalic.  NG tube in place.   Eyes: Conjunctivae and sclerae normal, no icterus. No pallor.   Throat: No oral lesions, no thrush, oral mucosa moist.   Neck: Supple, trachea midline, no thyromegaly.   Lungs:   Breath sounds heard bilaterally equally.  No wheezing or crackles. No Pleural rub or bronchial breathing.   Heart:  Normal S1 and S2, no murmur, no gallop, no rub. No JVD.   Abdomen:   Normal bowel sounds, no masses, no organomegaly. Soft, nontender, nondistended, no rebound tenderness.   Extremities: Supple, no edema, no cyanosis, no clubbing.   Skin: No bleeding or rash.   Neurologic: Alert and oriented x 1.  Demented. No facial asymmetry. Moves all four limbs. No tremors.      Laboratory results:    Results from last 7 days    Lab Units 10/14/24  0732 10/14/24  0411 10/14/24  0246 10/14/24  0035 10/13/24  1625 10/13/24  1349   SODIUM mmol/L 154* 157*  --  154*   < > 152*   POTASSIUM mmol/L 4.5 3.1* 3.1* 3.3*   < > 5.0   CHLORIDE mmol/L 121* 120*  --  118*   < > 112*   CO2 mmol/L 22.6 26.4  --  25.5   < > 23.9   BUN mg/dL 33* 37*  --  36*   < > 46*   CREATININE mg/dL 1.67* 1.80*  --  1.84*   < > 2.30*   CALCIUM mg/dL 7.9* 8.0*  --  8.4*   < > 8.6   BILIRUBIN mg/dL  --   --   --   --   --  0.4   ALK PHOS U/L  --   --   --   --   --  130*   ALT (SGPT) U/L  --   --   --   --   --  <5   AST (SGOT) U/L  --   --   --   --   --  19   GLUCOSE mg/dL 125* 110*  --  141*   < > 799*    < > = values in this interval not displayed.     Results from last 7 days   Lab Units 10/14/24  0411 10/13/24  1325   WBC 10*3/mm3 10.46 11.18*   HEMOGLOBIN g/dL 13.0 16.0   HEMATOCRIT % 41.2 49.5   PLATELETS 10*3/mm3 173 225         Results from last 7 days   Lab Units 10/13/24  1625 10/13/24  1349   CK TOTAL U/L  --  248*   HSTROP T ng/L 95* 111*     Results from last 7 days   Lab Units 10/13/24  1759 10/13/24  1750   BLOODCX  No growth at less than 24 hours No growth at less than 24 hours     Recent Labs     05/26/24 2059 08/28/24  1651   PHART 7.447 7.432   XPW4ZUS 41.7 40.0   PO2ART 79.3 64.4*   JOD6QGF 28.8* 26.7   BASEEXCESS 4.3* 2.2*      I have reviewed the patient's laboratory results.    Radiology results:    XR Abdomen 1 View    Addendum Date: 10/13/2024    ADDENDUM REPORT ADDENDUM: This report was discussed with Charisse SILVA RN on Oct 13, 2024 20:41:00 EDT. Authenticated and Electronically Signed by Thierry Feldman DO on 10/13/2024 08:41:52 PM    Result Date: 10/13/2024  FINAL REPORT TECHNIQUE: null CLINICAL HISTORY: NG placement COMPARISON: null FINDINGS: 1 view abdomen Comparison: CT/SR - CT ANGIOGRAM ABDOMEN PELVIS - 08/28/2024 07:30 PM EDT Findings: The distal tip of the enteric tube overlies the expected stomach lumen. The gastric side port is just distal  to the GE junction, recommend advancement by up to 3 cm. Minimal densities within the lung bases. No pneumoperitoneum or pneumatosis. No abnormal calcifications. Median sternotomy wires. Partially visualized posterior fixation of the lumbar spine.     Impression: IMPRESSION: 1. The distal tip of the enteric tube overlies the expected stomach lumen. The gastric side port is just distal to the GE junction, recommend advancement by up to 3 cm. Authenticated and Electronically Signed by Thierry Feldman DO on 10/13/2024 08:31:31 PM    CT Head Without Contrast    Result Date: 10/13/2024  PROCEDURE: CT HEAD WO CONTRAST-  INDICATION: Altered mental status; E11.00-Type 2 diabetes mellitus with hyperosmolarity without nonketotic hyperglycemic-hyperosmolar coma (NKHHC)  TECHNIQUE: Multiple axial CT images were performed from the foramen magnum to the vertex without contrast.   Coronal reconstruction images were obtained from the axial data.  COMPARISON: 8/30/2020.  FINDINGS: There is no mass effect or midline shift. Global atrophy is noted. The ventricles are enlarged, similar to the prior exam. There is no intracranial hemorrhage. No acute large cortical infarct. The posterior fossa is without acute abnormality. The basilar cisterns are preserved. No acute soft tissue abnormality. No acute osseous abnormalities are present.      Impression: 1. No acute intracranial hemorrhage or evidence of acute large cortical infarct. Consider MRI if clinical concern persists. 2. Atrophy and ventricular enlargement.        CTDI: 32.43 mGy DLP:575.47 mGy.cm    This study was performed with techniques to keep radiation doses as low as reasonably achievable (ALARA). Individualized dose reduction techniques using automated exposure control or adjustment of mA and/or kV according to the patient size were employed.   This report was signed and finalized on 10/13/2024 4:43 PM by Michelle Arriaza MD.      XR Chest 1 View    Result Date:  10/13/2024  PROCEDURE: XR CHEST 1 VW-  INDICATION:  Altered mental status, eval pneumonia  FINDINGS:  A portable view of the chest was obtained.  Comparison is made to a prior exam dated 8/28/2024.   Patient is again noted to be status post median sternotomy. Heart size is normal. There are low lung volumes with bibasilar opacity. This could represent atelectasis or pneumonia. No pneumothorax.      Impression: Low lung volumes with bibasilar opacity which could represent atelectasis or pneumonia. Recommend radiographic follow-up.   This report was signed and finalized on 10/13/2024 1:46 PM by Michelle Arriaza MD.     I have reviewed the patient's radiology reports.    Medication Review:     I have reviewed the patient's active and prn medications.     Problem List:      AMS (altered mental status)      Assessment:    Acute metabolic encephalopathy, POA  Diabetes mellitus, uncontrolled with HHS, POA  Acute kidney injury, POA  Hypernatremia, POA  Bilateral pneumonia, unable to specify further, POA  Elevated troponin, likely demand ischemia, POA  Chronic respiratory failure on 2 L  Dementia  Parkinson disease  CAD  Hypertension  Debility    Plan:    Acute encephalopathy  -Likely metabolic secondary to HHS, NANCI, hyponatremia and bilateral pneumonia  -Neurochecks, fall precautions  -CT head negative for acute  -Has a history of baseline dementia and Parkinson disease     HHS  Uncontrolled diabetes mellitus  -Started on Glucomander insulin drip per protocol  -Monitor labs and IV fluids per protocol  -Hemoglobin A1c of 10.1  -Needs better control of his diabetes  -Will transition him to subcu insulin, basal with sliding scale     Bilateral pneumonia  -Coverage with Unasyn and doxycycline  -Requiring 2 L supplemental oxygen which is his chronic oxygen at baseline.  -Bronchodilators, mucolytics   -Follow-up on blood cultures  -Obtain sputum cultures if able to  -Speech evaluation     Acute kidney injury  Hypernatremia  -Likely  severely dehydrated  -Discussed with nephrology, appreciate recommendations  -Dr. Bustamante IV fluids switched to D5 Kcl  -NG tube placed, started on free water flushes at 150 mL an hour via NG tube.  -Avoid nephrotoxic drugs, holding Lasix  -Monitor kidney function and sodium levels     Elevated troponin  -Likely demand ischemia in settings of NANCI and hypertension  -Low suspicion for ACS with no apparent pain.  -2D echo ordered   -Trending troponin   -Cardiology consulted, appreciate recommendations     -Continue home meds as warranted.  -Further orders as indicated per clinical course.  -I have reviewed the copied text and it is accurate as of 10/14/2024     DVT Prophylaxis: Heparin prophylaxis (benefit> risk)  Code Status: DNR/DNI  Diet: NPO until more awake and speech eval   Discharge Plan: Likely needs 1 to 2 days    Jamey Hadley MD  10/14/24  10:35 EDT    Dictated utilizing Dragon dictation.

## 2024-10-14 NOTE — THERAPY DISCHARGE NOTE
Patient Name: Gama Davila  : 1940    MRN: [id number]                              Today's Date: 10/14/2024       Admit Date: 10/13/2024    Visit Dx:     ICD-10-CM ICD-9-CM   1. Hyperosmolar hyperglycemic state (HHS)  E11.00 250.22     Patient Active Problem List   Diagnosis    [place] (hard of hearing)    High cholesterol    Coronary artery disease    Hypertension    Irregular heart beat    Diabetes mellitus    Anemia    Dementia    Acute respiratory failure with hypoxia    Impaired mobility and ADLs    Parkinson's disease (tremor, stiffness, slow motion, unstable posture)    Mood disorder    Bilateral pneumonia    Sepsis due to pneumonia    AMS (altered mental status)     Past Medical History:   Diagnosis Date    Arthritis     Cancer     SKIN     Cataract     Coronary artery disease     Diabetes mellitus     High cholesterol     [place] (hard of hearing)     PATIENT HAS HEARING AIDS    Hypertension     Impaired mobility     Irregular heart beat     HISTORY OF CARDIAC ABLATION IN     Wears dentures     FULL UPPER PLATE     Past Surgical History:   Procedure Laterality Date    BACK SURGERY      THEN AGAIN IN     CARDIAC ABLATION      CARDIAC SURGERY      CATARACT EXTRACTION W/ INTRAOCULAR LENS IMPLANT Left 2017    Procedure: CATARACT PHACO EXTRACTION WITH INTRAOCULAR LENS IMPLANT LEFT WITH TORIC LENS;  Surgeon: Alma Benavidez MD;  Location: [facility] OR;  Service:     CATARACT EXTRACTION W/ INTRAOCULAR LENS IMPLANT Right 2017    Procedure: CATARACT PHACO EXTRACTION WITH INTRAOCULAR LENS IMPLANT RIGHT WITH TORIC LENS;  Surgeon: Alma Benavidez MD;  Location: [facility] OR;  Service:     CORONARY ARTERY BYPASS GRAFT      SPOUSE UNSURE OF HOW MANY VESSELS    HIATAL HERNIA REPAIR      JOINT REPLACEMENT Left     HIP - DONE BY DR DALAL    KNEE ARTHROSCOPY Left     NOSE SURGERY      SPOUSE REPORTS FOR A BROKEN NOSE    OTHER SURGICAL HISTORY Left     TENDON TORN LOOSE FROM  BONE, LEFT ELBOW    OTHER SURGICAL HISTORY  1986    RUPTURED DISC    OTHER SURGICAL HISTORY  1994    NECK SURGERY FOR RUPTURED DISC    OTHER SURGICAL HISTORY  1996    HAD SECOND NECK SURGERY    OTHER SURGICAL HISTORY  1998    RUPTURED DISC    OTHER SURGICAL HISTORY  2003    RUPTURED DISC    OTHER SURGICAL HISTORY  2009    HARDWARE REMOVAL FROM BACK BY DR HAY      General Information       Row Name 10/14/24 1249          Physical Therapy Time and Intention    Document Type discharge evaluation/summary  -     Mode of Treatment physical therapy  -       Row Name 10/14/24 1249          General Information    Patient Profile Reviewed yes  -     Prior Level of Function dependent:;bed mobility;transfer  -     Existing Precautions/Restrictions fall  -     Barriers to Rehab medically complex;previous functional deficit  -       Row Name 10/14/24 1249          Living Environment    People in Home facility resident  -       Row Name 10/14/24 1249          Cognition    Orientation Status (Cognition) unable/difficult to assess;other (see comments)  non-verbal at baseline  -       Row Name 10/14/24 1249          Safety Issues/Impairments Affecting Functional Mobility    Safety Issues Affecting Function (Mobility) ability to follow commands;awareness of need for assistance;insight into deficits/self-awareness;safety precaution awareness;safety precautions follow-through/compliance;sequencing abilities  -     Impairments Affecting Function (Mobility) balance;endurance/activity tolerance;motor planning;motor control;muscle tone abnormal;postural/trunk control;range of motion (ROM);strength  -               User Key  (r) = Recorded By, (t) = Taken By, (c) = Cosigned By      Initials Name Provider Type     Justina Do PT Physical Therapist                   Mobility       Row Name 10/14/24 1250          Bed Mobility    Bed Mobility bed mobility (all) activities  -     All Activities, [place] (Bed  Mobility) dependent (less than 25% patient effort)  -[facility] Name 10/14/24 1250          Bed-Chair Transfer    Bed-Chair [place] (Transfers) not tested  -[facility] Name 10/14/24 1250          Sit-Stand Transfer    Sit-Stand [place] (Transfers) not tested  -[facility] Name 10/14/24 1250          Gait/Stairs (Locomotion)    [place] Level (Gait) not tested  -     Patient was able to Ambulate no, other medical factors prevent ambulation  -     Reason Patient was unable to Ambulate Non-Ambulatory at Baseline  -     [place] Level (Stairs) not tested  -               User Key  (r) = Recorded By, (t) = Taken By, (c) = Cosigned By      Initials Name Provider Type    Justina Sellers PT Physical Therapist                   Obj/Interventions       [facility] Name 10/14/24 1251          Range of Motion Comprehensive    General Range of Motion lower extremity range of motion deficits identified  -     Comment, General Range of Motion (B) PF contracture noted; resistant to (B) hip/knee PROM  -HW       Row Name 10/14/24 1251          Strength Comprehensive (MMT)    General Manual Muscle Testing (MMT) Assessment lower extremity strength deficits identified  -     Comment, General Manual Muscle Testing (MMT) Assessment Difficult to formally assess, pt unable to follow simple commands and resistant to movement.  -               User Key  (r) = Recorded By, (t) = Taken By, (c) = Cosigned By      Initials Name Provider Type    Justina Sellers PT Physical Therapist                   Goals/Plan    No documentation.                  Clinical Impression       [facility] Name 10/14/24 1252          Pain    Additional Documentation Pain Scale: FACES Pre/Post-Treatment (Group)  -[facility] Name 10/14/24 1252          Pain Scale: FACES Pre/Post-Treatment    Pain: FACES Scale, Pretreatment 0-->no hurt  -     Posttreatment Pain Rating 0-->no hurt  -[facility] Name 10/14/24 1252          Plan of Care Review     Plan of Care Reviewed With patient  -     Progress no change  -     Outcome Evaluation Pt participated in PT initial evaluation this date. Pt presents supine in bed, alert but unable to assess orientation as pt is non-verbal at baseline. Pt 0/10 on FACES pain scale. Pt noted to have mild (B) PF contracture, unable to assess (B) knee/hip ROM as pt was resistant to LE movement despite multiple attempts. Pt unable to follow simple commands for LE movement during evaluation. Pt is currently dependent for all bed mobility. Called and spoke with therapy staff at [facility] facility. Staff reports pt is dependent for all bed mobility and uses a cyn lift for OOB mobility to a gonsalo chair. Pt is non-ambulatory at baseline. At this time, pt appears to be at functional baseline, skilled PT intervention is not indicated. Once medically stable, recommend pt to d/c to SNF. PT will sign off.  -       Row Name 10/14/24 1252          Therapy Assessment/Plan (PT)    Criteria for Skilled Interventions Met (PT) no;no problems identified which require skilled intervention;does not meet criteria for skilled intervention  -     Therapy Frequency (PT) evaluation only  -       Row Name 10/14/24 1252          Vital Signs    Pre Systolic BP Rehab 141  -HW     Pre Treatment Diastolic BP 76  -HW     Pretreatment Heart Rate (beats/min) 78  -HW     Pre SpO2 (%) 95  -HW     O2 Delivery Pre Treatment room air  -HW     O2 Delivery Intra Treatment room air  -HW     O2 Delivery Post Treatment room air  -HW     Pre Patient Position Supine  -HW     Intra Patient Position Supine  -HW     Post Patient Position Supine  -       Row Name 10/14/24 1252          Positioning and Restraints    Pre-Treatment Position in bed  -HW     Post Treatment Position bed  -HW     In Bed notified nsg;supine;call light within reach;encouraged to call for assist;exit alarm on;patient within staff view  -               User Key  (r) = Recorded By, (t) = Taken By, (c) =  Cosigned By      Initials Name Provider Type     Justina Do, PT Physical Therapist                   Outcome Measures       Row Name 10/14/24 1258 10/14/24 0800       How much help from another person do you currently need...    Turning from your back to your side while in flat bed without using bedrails? 1  -HW 1  -AH    Moving from lying on back to sitting on the side of a flat bed without bedrails? 1  - 1  -AH    Moving to and from a bed to a chair (including a wheelchair)? 1  -HW 1  -AH    Standing up from a chair using your arms (e.g., wheelchair, bedside chair)? 1  - 1  -AH    Climbing 3-5 steps with a railing? 1  - 1  -AH    To walk in hospital room? 1  -HW 1  -AH    AM-PAC 6 Clicks Score (PT) 6  - 6  -AH    Highest Level of Mobility Goal 2 --> Bed activities/dependent transfer  -HW 2 --> Bed activities/dependent transfer  -AH      Row Name 10/14/24 0641          How much help from another person do you currently need...    Turning from your back to your side while in flat bed without using bedrails? 1  -BD     Moving from lying on back to sitting on the side of a flat bed without bedrails? 1  -BD     Moving to and from a bed to a chair (including a wheelchair)? 1  -BD     Standing up from a chair using your arms (e.g., wheelchair, bedside chair)? 1  -BD     Climbing 3-5 steps with a railing? 1  -BD     To walk in hospital room? 1  -BD     AM-PAC 6 Clicks Score (PT) 6  -BD     Highest Level of Mobility Goal 2 --> Bed activities/dependent transfer  -BD       Row Name 10/14/24 1258 10/14/24 1238       Functional Assessment    Outcome Measure Options AM-PAC 6 Clicks Basic Mobility (PT)  - AM-PAC 6 Clicks Daily Activity (OT)  -DB              User Key  (r) = Recorded By, (t) = Taken By, (c) = Cosigned By      Initials Name Provider Type    BD Kassidy Kimble RN Registered Nurse     Rajesh Carcamo RN Registered Nurse    José Miguel Resendiz OT Occupational Therapist     Justina Do, PT  Physical Therapist                  Physical Therapy Education       Title: PT OT SLP Therapies (In Progress)       Topic: Physical Therapy (In Progress)       Point: Mobility training (In Progress)       Learning Progress Summary            Patient Acceptance, E,D, NL by  at 10/14/2024 4204    Comment: role of PT during IP admission                      Point: Home exercise program (Not Started)       Learner Progress:  Not documented in this visit.              Point: Body mechanics (Not Started)       Learner Progress:  Not documented in this visit.              Point: Precautions (Not Started)       Learner Progress:  Not documented in this visit.                              User Key       Initials Effective Dates Name Provider Type Discipline     11/29/23 -  Justina Do, ANEL Physical Therapist PT                  PT Recommendation and Plan     Progress: no change  Outcome Evaluation: Pt participated in PT initial evaluation this date. Pt presents supine in bed, alert but unable to assess orientation as pt is non-verbal at baseline. Pt 0/10 on FACES pain scale. Pt noted to have mild (B) PF contracture, unable to assess (B) knee/hip ROM as pt was resistant to LE movement despite multiple attempts. Pt unable to follow simple commands for LE movement during evaluation. Pt is currently dependent for all bed mobility. Called and spoke with therapy staff at LT facility. Staff reports pt is dependent for all bed mobility and uses a cyn lift for OOB mobility to a gonsalo chair. Pt is non-ambulatory at baseline. At this time, pt appears to be at functional baseline, skilled PT intervention is not indicated. Once medically stable, recommend pt to d/c to SNF. PT will sign off.     Time Calculation:   PT Evaluation Complexity  History, PT Evaluation Complexity: 1-2 personal factors and/or comorbidities  Examination of Body Systems (PT Eval Complexity): 1-2 elements  Clinical Presentation (PT Evaluation Complexity):  stable  Clinical Decision Making (PT Evaluation Complexity): low complexity  Overall Complexity (PT Evaluation Complexity): low complexity     PT Charges       Row Name 10/14/24 1300             Time Calculation    Start Time 0936  -HW      PT Received On 10/14/24  -HW         Untimed Charges    PT Eval/Re-eval Minutes 39  -HW         Total Minutes    Untimed Charges Total Minutes 39  -HW       Total Minutes 39  -HW                User Key  (r) = Recorded By, (t) = Taken By, (c) = Cosigned By      Initials Name Provider Type     Justina Do PT Physical Therapist                  Therapy Charges for Today       Code Description Service Date Service Provider Modifiers Qty    [id number] HC PT EVAL LOW COMPLEXITY 3 10/14/2024 Justina Do PT GP 1            PT G-Codes  Outcome Measure Options: AM-PAC 6 Clicks Basic Mobility (PT)  AM-PAC 6 Clicks Score (PT): 6  AM-PAC 6 Clicks Score (OT): 9    PT Discharge Summary  Anticipated Discharge Disposition (PT): skilled nursing facility    Justina Do PT  10/14/2024

## 2024-10-14 NOTE — THERAPY EVALUATION
Acute Care - Speech Language Pathology   Swallow Initial Evaluation  Dennis     Patient Name: Gama Davila  : 1940  MRN: [id number]  Today's Date: 10/14/2024               Admit Date: 10/13/2024    Visit Dx:     ICD-10-CM ICD-9-CM   1. Hyperosmolar hyperglycemic state (HHS)  E11.00 250.22     Patient Active Problem List   Diagnosis    [place] (hard of hearing)    High cholesterol    Coronary artery disease    Hypertension    Irregular heart beat    Diabetes mellitus    Anemia    Dementia    Acute respiratory failure with hypoxia    Impaired mobility and ADLs    Parkinson's disease (tremor, stiffness, slow motion, unstable posture)    Mood disorder    Bilateral pneumonia    Sepsis due to pneumonia    AMS (altered mental status)     Past Medical History:   Diagnosis Date    Arthritis     Cancer     SKIN     Cataract     Coronary artery disease     Diabetes mellitus     High cholesterol     [place] (hard of hearing)     PATIENT HAS HEARING AIDS    Hypertension     Impaired mobility     Irregular heart beat     HISTORY OF CARDIAC ABLATION IN     Wears dentures     FULL UPPER PLATE     Past Surgical History:   Procedure Laterality Date    BACK SURGERY      THEN AGAIN IN     CARDIAC ABLATION      CARDIAC SURGERY      CATARACT EXTRACTION W/ INTRAOCULAR LENS IMPLANT Left 2017    Procedure: CATARACT PHACO EXTRACTION WITH INTRAOCULAR LENS IMPLANT LEFT WITH TORIC LENS;  Surgeon: Alma Benavidez MD;  Location: [facility];  Service:     CATARACT EXTRACTION W/ INTRAOCULAR LENS IMPLANT Right 2017    Procedure: CATARACT PHACO EXTRACTION WITH INTRAOCULAR LENS IMPLANT RIGHT WITH TORIC LENS;  Surgeon: Alma Benavidez MD;  Location: [facility];  Service:     CORONARY ARTERY BYPASS GRAFT      SPOUSE UNSURE OF HOW MANY VESSELS    HIATAL HERNIA REPAIR      JOINT REPLACEMENT Left     HIP - DONE BY DR DALAL    KNEE ARTHROSCOPY Left     NOSE SURGERY  1970    SPOUSE REPORTS FOR A BROKEN  NOSE    OTHER SURGICAL HISTORY Left     TENDON TORN LOOSE FROM BONE, LEFT ELBOW    OTHER SURGICAL HISTORY  1986    RUPTURED DISC    OTHER SURGICAL HISTORY  1994    NECK SURGERY FOR RUPTURED DISC    OTHER SURGICAL HISTORY  1996    HAD SECOND NECK SURGERY    OTHER SURGICAL HISTORY  1998    RUPTURED DISC    OTHER SURGICAL HISTORY  2003    RUPTURED DISC    OTHER SURGICAL HISTORY  2009    HARDWARE REMOVAL FROM BACK BY DR HAY       SLP Recommendation and Plan  SLP Swallowing Diagnosis: mild-moderate, oral dysphagia, suspected pharyngeal dysphagia, suspected esophageal dysphagia (10/14/24 1302)  SLP Diet Recommendation: puree, nectar thick liquids (10/14/24 1302)  Recommended Precautions and Strategies: upright posture during/after eating, voluntary breath hold technique, general aspiration precautions, reflux precautions, assist with feeding (10/14/24 1302)  SLP Rec. for Method of Medication Administration: meds whole, meds crushed, with puree, as tolerated (10/14/24 1302)     Monitor for Signs of Aspiration: notify SLP if any concerns, cough, yes, gurgly voice, throat clearing, right lower lobe infiltrates, pneumonia (10/14/24 1302)     Swallow Criteria for Skilled Therapeutic Interventions Met: no problems identified which require skilled intervention (10/14/24 1302)  Anticipated Discharge Disposition (SLP): skilled nursing facility (10/14/24 1302)  Rehab Potential/Prognosis, Swallowing: re-evaluate goals as necessary (10/14/24 1302)  Therapy Frequency (Swallow): other (see comments) (will follow for potential needs) (10/14/24 1302)     Oral Care Recommendations: Oral Care BID/PRN, Swab, Toothbrush, Suction toothbrush (10/14/24 1302)     Swallowing Considerations per Physician Discretion: medical management of suspected esophageal dysphagia, as indicated (10/14/24 1302)                  Treatment Assessment (SLP): oral dysphagia, pharyngeal dysphagia (10/14/24 1302)               Outcome Evaluation: Bedside eval of  swallow completed with pt. seated upright in bed for po trials. He was seen for prior bedside eval during admit on 8/29/2023 and was recommended pureed diet with nectar-thick liquids. For today's eval, pt. was cooperative and pleasant, but nonverbal. He was given trials of puree and nectar-thick liquids as was previously recommended. Oral phase was remarkable for slightly extended transit time with pureed consistency. Again, cognitive deficits are likely greatest factor affecting oral phase. No significant change from prior eval with suspected pharyngeal phase dysphagia. He is at risk of aspiration. Also, pt. belched during the eval post po trials. He has dx of GERD which increase his risk of reflux and reflux aspiration if not well managed. Recommend: 1. continue pureed diet with nectar-thick liq as tim, 2. meds crushed in pudding/applesauce as appropriate, 3. small bites/sips, 4. monitor closely for diet tim, 5. aspiration precautions, 6. reflux precautions. D/W RN following eval with verbalized understanding. SLP to follow up for diet tim.      SWALLOW EVALUATION (Last 72 Hours)       SLP Adult Swallow Evaluation       Row Name 10/14/24 5681                   Rehab Evaluation    Document Type evaluation  -TM        Subjective Information no complaints  -TM        Patient Observations alert  -TM        Patient/Family/Caregiver Comments/Observations no family present  -TM        Patient Effort adequate  -TM           General Information    Patient Profile Reviewed yes  -TM        Pertinent History Of Current Problem hx dysphagia, dementia, Parkinson's, DM, suspected refluxate, cardiac  -TM        Current Method of Nutrition NPO  -TM        Precautions/Limitations, Vision difficult to assess  -TM        Precautions/Limitations, Hearing difficult to assess  -TM        Prior Level of Function-Communication cognitive-linguistic impairment;other (see comments)  nonverbal  -TM        Prior Level of Function-Swallowing  puree;nectar thick liquids  -TM        Plans/Goals Discussed with patient;other (see comments)  RN  -TM        Barriers to Rehab previous functional deficit  -TM        Patient's Goals for Discharge patient could not state  -TM           Pain Scale: FACES Pre/Post-Treatment    Pain: FACES Scale, Pretreatment 0-->no hurt  -TM        Posttreatment Pain Rating 0-->no hurt  -TM           Oral Motor Structure and Function    Oral Lesions or Structural Abnormalities and/or variants limited assessment due to cognitive deficits  -TM        Dentition Assessment other (see comments);natural, present and adequate;missing teeth  natural teeth,  -TM        Secretion Management WNL/WFL  -TM        Mucosal Quality dry  -TM           Oral Musculature and Cranial Nerve Assessment    Oral Motor General Assessment unable to assess;other (see comments)  limited assessment due to cognitive deficits  -TM           General Eating/Swallowing Observations    Respiratory Support Currently in Use room air  -TM        Eating/Swallowing Skills fed by SLP;needed assist;unaware of safety concerns;unable to perform self-feeding  -TM        Positioning During Eating upright in bed  -TM        Utensils Used spoon;straw  -TM        Consistencies Trialed nectar/syrup-thick liquids;pureed  -TM        Pre SpO2 (%) 98  -TM        Post SpO2 (%) 96  -TM           Respiratory    Respiratory Status WFL;room air;during swallowing/eating  -TM           Clinical Swallow Eval    Oral Prep Phase impaired  -TM        Oral Transit impaired  -TM        Oral Residue WFL  -TM        Pharyngeal Phase suspected pharyngeal impairment  -TM        Esophageal Phase suspected esophageal impairment  -TM        Clinical Swallow Evaluation Summary Bedside eval of swallow completed with pt. seated upright in bed for po trials.  He was seen for prior bedside eval during admit on 8/29/2023 and was recommended pureed diet with nectar-thick liquids. For today's eval, pt. was  cooperative and pleasant, but nonverbal. He was given trials of puree and nectar-thick liquids as was previously recommended. Oral phase was remarkable for slightly extended transit time with pureed consistency. Again, cognitive deficits are likely greatest factor affecting oral phase. No significant change from prior eval with suspected pharyngeal phase dysphagia. He is at risk of aspiration. Also, pt. belched  during the eval post po trials. He has dx of GERD which increase his risk of reflux and reflux aspiration if not well managed. Recommend: 1. continue pureed diet with nectar-thick liq as tim, 2. meds crushed in pudding/applesauce as appropriate, 3. small bites/sips, 4. monitor closely for diet tim, 5. aspiration precautions, 6. reflux precautions. D/W RN following eval with verbalized understanding. SLP to follow up for diet tim.  -TM           Oral Prep Concerns    Oral Prep Concerns increased prep time  -TM           Oral Transit Concerns    Oral Transit Concerns increased oral transit time  -TM           Esophageal Phase Concerns    Esophageal Phase Concerns belching  -TM           SLP Evaluation Clinical Impression    SLP Swallowing Diagnosis mild-moderate;oral dysphagia;suspected pharyngeal dysphagia;suspected esophageal dysphagia  -TM        Functional Impact risk of aspiration/pneumonia  -TM        Rehab Potential/Prognosis, Swallowing re-evaluate goals as necessary  -TM        Swallow Criteria for Skilled Therapeutic Interventions Met no problems identified which require skilled intervention  -TM           SLP Treatment Clinical Impressions    Treatment Assessment (SLP) oral dysphagia;pharyngeal dysphagia  -TM        Barriers to Overall Progress (SLP) Baseline deficits;Medically complex  -TM           Recommendations    Therapy Frequency (Swallow) other (see comments)  will follow for potential needs  -TM        SLP Diet Recommendation puree;nectar thick liquids  -TM        Recommended Precautions  and Strategies upright posture during/after eating;voluntary breath hold technique;general aspiration precautions;reflux precautions;assist with feeding  -TM        Oral Care Recommendations Oral Care BID/PRN;Swab;Toothbrush;Suction toothbrush  -        SLP Rec. for Method of Medication Administration meds whole;meds crushed;with puree;as tolerated  -TM        Monitor for Signs of Aspiration notify SLP if any concerns;cough;yes;gurgly voice;throat clearing;right lower lobe infiltrates;pneumonia  -        Anticipated Discharge Disposition (SLP) skilled nursing facility  -        Swallowing Considerations per Physician Discretion medical management of suspected esophageal dysphagia, as indicated  -                  User Key  (r) = Recorded By, (t) = Taken By, (c) = Cosigned By      Initials Name Effective Dates    TM Nanette Pack 06/16/21 -                     EDUCATION  The patient has been educated in the following areas:   Dysphagia (Swallowing Impairment) Oral Care/Hydration Modified Diet Instruction.                Time Calculation:    Time Calculation- SLP       Row Name 10/14/24 1341             Time Calculation- SLP    SLP Start Time 1302  -      SLP Received On 10/14/24  -         Untimed Charges    SLP Eval/Re-eval  ST Eval Oral Pharyng Swallow - 85597  -                User Key  (r) = Recorded By, (t) = Taken By, (c) = Cosigned By      Initials Name Provider Type    TM Nanette Pack Speech and Language Pathologist                    Therapy Charges for Today       Code Description Service Date Service Provider Modifiers Qty    [id number] HC ST EVAL ORAL PHARYNG SWALLOW 4 10/14/2024 Nanette Pack GN 1                 Nanette Pack  10/14/2024

## 2024-10-14 NOTE — PLAN OF CARE
Goal Outcome Evaluation:  Plan of Care Reviewed With: patient           Outcome Evaluation: Bedside eval of swallow completed with pt. seated upright in bed for po trials. He was seen for prior bedside eval during admit on 8/29/2023 and was recommended pureed diet with nectar-thick liquids. For today's eval, pt. was cooperative and pleasant, but nonverbal. He was given trials of puree and nectar-thick liquids as was previously recommended. Oral phase was remarkable for slightly extended transit time with pureed consistency. Again, cognitive deficits are likely greatest factor affecting oral phase. No significant change from prior eval with suspected pharyngeal phase dysphagia. He is at risk of aspiration. Also, pt. belched during the eval post po trials. He has dx of GERD which increase his risk of reflux and reflux aspiration if not well managed. Recommend: 1. continue pureed diet with nectar-thick liq as tim, 2. meds crushed in pudding/applesauce as appropriate, 3. small bites/sips, 4. monitor closely for diet tim, 5. aspiration precautions, 6. reflux precautions. D/W RN following eval with verbalized understanding. SLP to follow up for diet tim.    Anticipated Discharge Disposition (SLP): skilled nursing facility          SLP Swallowing Diagnosis: mild-moderate, oral dysphagia, suspected pharyngeal dysphagia, suspected esophageal dysphagia (10/14/24 1302)  Treatment Assessment (SLP): oral dysphagia, pharyngeal dysphagia (10/14/24 1302)

## 2024-10-14 NOTE — CONSULTS
Tube Feeding Assessment    Patient Name: Gama Davila  YOB: 1940  MRN: [id number]  Admission date: 10/13/2024    Comment:      Encounter Information     Trending Narrative 10/14: Pt w/ NG-tube and need for enteral nutrition recommendations.    H&P  Past Medical History:   Diagnosis Date    Arthritis     Cancer     SKIN     Cataract     Coronary artery disease     Diabetes mellitus     High cholesterol     [place] (hard of hearing)     PATIENT HAS HEARING AIDS    Hypertension     Irregular heart beat     HISTORY OF CARDIAC ABLATION IN 2003    Wears dentures     FULL UPPER PLATE         Past Surgical History:   Procedure Laterality Date    BACK SURGERY  1978    THEN AGAIN IN 1982    CARDIAC ABLATION  2003    CARDIAC SURGERY      CATARACT EXTRACTION W/ INTRAOCULAR LENS IMPLANT Left 5/24/2017    Procedure: CATARACT PHACO EXTRACTION WITH INTRAOCULAR LENS IMPLANT LEFT WITH TORIC LENS;  Surgeon: Alma Benavidez MD;  Location: [facility] OR;  Service:     CATARACT EXTRACTION W/ INTRAOCULAR LENS IMPLANT Right 6/14/2017    Procedure: CATARACT PHACO EXTRACTION WITH INTRAOCULAR LENS IMPLANT RIGHT WITH TORIC LENS;  Surgeon: Alma Benavidez MD;  Location: [facility] OR;  Service:     CORONARY ARTERY BYPASS GRAFT  2003    SPOUSE UNSURE OF HOW MANY VESSELS    HIATAL HERNIA REPAIR  1972    JOINT REPLACEMENT Left     HIP - DONE BY DR DALAL    KNEE ARTHROSCOPY Left     NOSE SURGERY  1970    SPOUSE REPORTS FOR A BROKEN NOSE    OTHER SURGICAL HISTORY Left     TENDON TORN LOOSE FROM BONE, LEFT ELBOW    OTHER SURGICAL HISTORY  1986    RUPTURED DISC    OTHER SURGICAL HISTORY  1994    NECK SURGERY FOR RUPTURED DISC    OTHER SURGICAL HISTORY  1996    HAD SECOND NECK SURGERY    OTHER SURGICAL HISTORY  1998    RUPTURED DISC    OTHER SURGICAL HISTORY  2003    RUPTURED DISC    OTHER SURGICAL HISTORY  2009    HARDWARE REMOVAL FROM BACK BY DR HAY        Anthropometrics     Current Height, Weight Height: 170.2 cm  "(67\")  Weight: 74 kg (163 lb 2.3 oz) (10/13/24 1853)     Trending Weight History Wt Readings from Last 30 Encounters:   10/13/24 1853 74 kg (163 lb 2.3 oz)   10/13/24 1312 81.6 kg (180 lb)   09/16/24 0913 82.6 kg (182 lb)   09/05/24 1338 82.1 kg (181 lb)   08/30/24 0400 82.1 kg (181 lb)   08/28/24 2320 81.4 kg (179 lb 7.3 oz)   08/28/24 1656 82.1 kg (181 lb)   07/16/24 0843 82.1 kg (181 lb)   06/06/24 1132 80.7 kg (178 lb)   05/26/24 1916 77.1 kg (170 lb)   05/21/24 0842 78.9 kg (174 lb)   05/15/24 1040 78.9 kg (174 lb)   04/09/24 1103 75.3 kg (166 lb)   04/06/24 1513 75 kg (165 lb 5.5 oz)   03/04/24 0941 74.8 kg (165 lb)   01/16/24 0919 73 kg (161 lb)   11/27/23 0816 72.1 kg (159 lb)   11/07/23 0933 74.4 kg (164 lb)   10/04/23 1201 74.4 kg (164 lb)   09/26/23 0819 75.8 kg (167 lb)   08/09/23 1101 76.7 kg (169 lb)   07/11/23 0912 73.9 kg (163 lb)   05/16/23 0917 77.1 kg (170 lb)   05/02/23 1520 75.3 kg (166 lb)   03/24/23 1534 72.6 kg (160 lb)   03/21/23 0926 72.6 kg (160 lb)   01/24/23 0915 74.4 kg (164 lb)   01/12/23 1138 74.4 kg (164 lb)   11/15/22 1026 72.6 kg (160 lb)   10/31/22 1518 72.6 kg (160 lb)   09/24/22 1135 70.3 kg (155 lb)   09/22/22 1215 72.6 kg (160 lb)   08/29/22 1146 71.2 kg (157 lb)       BMI Body mass index is 25.55 kg/m².     Labs     Pertinent Labs Results from last 7 days   Lab Units 10/14/24  0411 10/14/24  0246 10/14/24  0035 10/13/24  1943 10/13/24  1625 10/13/24  1349   SODIUM mmol/L 157*  --  154* 154*   < > 152*   POTASSIUM mmol/L 3.1* 3.1* 3.3* 3.3*   < > 5.0   CHLORIDE mmol/L 120*  --  118* 119*   < > 112*   CO2 mmol/L 26.4  --  25.5 18.2*   < > 23.9   BUN mg/dL 37*  --  36* 40*   < > 46*   CREATININE mg/dL 1.80*  --  1.84* 2.13*   < > 2.30*   CALCIUM mg/dL 8.0*  --  8.4* 8.3*   < > 8.6   BILIRUBIN mg/dL  --   --   --   --   --  0.4   ALK PHOS U/L  --   --   --   --   --  130*   ALT (SGPT) U/L  --   --   --   --   --  <5   AST (SGOT) U/L  --   --   --   --   --  19   GLUCOSE mg/dL " 110*  --  141* 394*   < > 799*    < > = values in this interval not displayed.        Results from last 7 days   Lab Units 10/14/24  0411 10/14/24  0035 10/13/24  1943   MAGNESIUM mg/dL 1.5* 1.7 1.9   PHOSPHORUS mg/dL 2.8 2.1* 2.7   HEMOGLOBIN g/dL 13.0  --   --    HEMATOCRIT % 41.2  --   --             Lab Results   Component Value Date    HGBA1C 10.10 (H) 10/13/2024        Medications  Schedule Medications ampicillin-sulbactam, 3 g, Intravenous, Q12H  carbidopa-levodopa, 1 tablet, Oral, TID  doxycycline, 100 mg, Oral, Q12H  famotidine, 20 mg, Oral, Daily  gabapentin, 100 mg, Oral, TID  guaifenesin, 200 mg, Oral, Q4H  heparin (porcine), 5,000 Units, Subcutaneous, Q12H  ipratropium-albuterol, 3 mL, Nebulization, Q6H While Awake - RT  memantine, 5 mg, Oral, Q12H  metoprolol tartrate, 50 mg, Oral, Daily  mupirocin, 1 Application, Each Nare, BID  sodium chloride, 10 mL, Intravenous, Q12H  sodium chloride, 10 mL, Intravenous, Q12H       Infusions  dextrose 5 % 980 mL with potassium chloride 40 mEq infusion,   insulin, 0-100 Units/hr, Last Rate: 0.7 Units/hr (10/14/24 0731)  Pharmacy to Dose ampicillin-sulbactam,   Pharmacy Consult - Pharmacy to dose,         PRN Medications    acetaminophen **OR** acetaminophen **OR** acetaminophen    [DISCONTINUED] senna-docusate sodium **AND** polyethylene glycol **AND** bisacodyl **AND** bisacodyl    Calcium Replacement - Follow Nurse / BPA Driven Protocol    dextrose    dextrose    docusate sodium **AND** sennosides    glucagon (human recombinant)    guaiFENesin-dextromethorphan    ipratropium-albuterol    Magnesium Standard Dose Replacement - Follow Nurse / BPA Driven Protocol    melatonin    nitroglycerin    ondansetron    Pharmacy to Dose ampicillin-sulbactam    Pharmacy Consult - Pharmacy to dose    Phosphorus Replacement - Follow Nurse / BPA Driven Protocol    Potassium Replacement - Follow Nurse / BPA Driven Protocol    sodium chloride    sodium chloride    sodium chloride      Physical Findings        Edema  Edema noted   Bowel Function None reported    Tubes NG-tube    Skin L gluteal pressure injury      Estimated/Assessed Needs       Energy Requirements    EST Needs, Method, Wt used 1850-2220kcals/day using 25-30kcals/kg       Protein Requirements    EST Needs, Method, Wt used 74g protein per day using 1g/kg       Fluid Requirements     Estimated Needs (mL/day) 1850mL per day      Current Tube Feed Orders & Evaluation      Enteral Nutrition     Enteral Route NG-tube    Orders, Modulars, Flushes    Residual/Tolerance    Vasopressors    Noninvasive MAP (mmHg) 82   Lactate (mmol/L) 3.2   Propofol (mL/hr)     Tube Feed Observation      Nutrition Diagnosis         Nutrition Dx Problem 1 Needs alternated r/t AMS as evidenced by pt w/ NG-tube and need for enteral nutrition recommendations.       Nutrition Dx Problem 2        Intervention Goal         Intervention Goal(s) Initiate EN  Tolerate EN at goal rate   Maintain current body weight      Nutrition Intervention        RD Action Will provide enteral nutrition recommendations     Enteral Nutrition Prescription     Enteral Prescription Rec#1: Initiate Diabetisource AC @ 20mL/hr and advance 10mL q8hrs to goal rate of 65mL/hr x 22hrs.   Rec#1: FW per nephrology 150mL q1hr  Total TF regimen to provide: 1716kcals, 86g protein, 143g carbohydrates, and 4766mL of fluid per day          Monitor/Evaluation        Monitor Per protocol, Pertinent labs, EN delivery/tolerance, Weight, Skin status, GI status, Symptoms, POC/GOC, Hemodynamic stability     RD to follow-up.     Electronically signed by:  Jacqueline Monahan RD  10/14/24 08:12 EDT

## 2024-10-15 LAB
ALBUMIN SERPL-MCNC: 2.6 G/DL (ref 3.5–5.2)
ANION GAP SERPL CALCULATED.3IONS-SCNC: 11.2 MMOL/L (ref 5–15)
BUN SERPL-MCNC: 25 MG/DL (ref 8–23)
BUN/CREAT SERPL: 17 (ref 7–25)
CALCIUM SPEC-SCNC: 7.7 MG/DL (ref 8.6–10.5)
CHLORIDE SERPL-SCNC: 109 MMOL/L (ref 98–107)
CO2 SERPL-SCNC: 21.8 MMOL/L (ref 22–29)
CREAT SERPL-MCNC: 1.47 MG/DL (ref 0.76–1.27)
DEPRECATED RDW RBC AUTO: 48.7 FL (ref 37–54)
EGFRCR SERPLBLD CKD-EPI 2021: 46.7 ML/MIN/1.73
ERYTHROCYTE [DISTWIDTH] IN BLOOD BY AUTOMATED COUNT: 14 % (ref 12.3–15.4)
GLUCOSE BLDC GLUCOMTR-MCNC: 107 MG/DL (ref 70–130)
GLUCOSE BLDC GLUCOMTR-MCNC: 141 MG/DL (ref 70–130)
GLUCOSE BLDC GLUCOMTR-MCNC: 224 MG/DL (ref 70–130)
GLUCOSE BLDC GLUCOMTR-MCNC: 259 MG/DL (ref 70–130)
GLUCOSE SERPL-MCNC: 246 MG/DL (ref 65–99)
HCT VFR BLD AUTO: 37.4 % (ref 37.5–51)
HGB BLD-MCNC: 12.1 G/DL (ref 13–17.7)
MCH RBC QN AUTO: 30.2 PG (ref 26.6–33)
MCHC RBC AUTO-ENTMCNC: 32.4 G/DL (ref 31.5–35.7)
MCV RBC AUTO: 93.3 FL (ref 79–97)
PHOSPHATE SERPL-MCNC: 2.8 MG/DL (ref 2.5–4.5)
PLATELET # BLD AUTO: 131 10*3/MM3 (ref 140–450)
PMV BLD AUTO: 10.9 FL (ref 6–12)
POTASSIUM SERPL-SCNC: 4.3 MMOL/L (ref 3.5–5.2)
RBC # BLD AUTO: 4.01 10*6/MM3 (ref 4.14–5.8)
SODIUM SERPL-SCNC: 142 MMOL/L (ref 136–145)
WBC NRBC COR # BLD AUTO: 8.57 10*3/MM3 (ref 3.4–10.8)

## 2024-10-15 PROCEDURE — 94799 UNLISTED PULMONARY SVC/PX: CPT

## 2024-10-15 PROCEDURE — 99232 SBSQ HOSP IP/OBS MODERATE 35: CPT | Performed by: FAMILY MEDICINE

## 2024-10-15 PROCEDURE — 82948 REAGENT STRIP/BLOOD GLUCOSE: CPT

## 2024-10-15 PROCEDURE — 63710000001 INSULIN REGULAR HUMAN PER 5 UNITS: Performed by: FAMILY MEDICINE

## 2024-10-15 PROCEDURE — 94761 N-INVAS EAR/PLS OXIMETRY MLT: CPT

## 2024-10-15 PROCEDURE — 80069 RENAL FUNCTION PANEL: CPT | Performed by: FAMILY MEDICINE

## 2024-10-15 PROCEDURE — 63710000001 INSULIN GLARGINE PER 5 UNITS: Performed by: FAMILY MEDICINE

## 2024-10-15 PROCEDURE — 25010000002 AMPICILLIN-SULBACTAM PER 1.5 G: Performed by: FAMILY MEDICINE

## 2024-10-15 PROCEDURE — 85027 COMPLETE CBC AUTOMATED: CPT | Performed by: FAMILY MEDICINE

## 2024-10-15 PROCEDURE — 82948 REAGENT STRIP/BLOOD GLUCOSE: CPT | Performed by: FAMILY MEDICINE

## 2024-10-15 PROCEDURE — 25010000002 HEPARIN (PORCINE) PER 1000 UNITS: Performed by: FAMILY MEDICINE

## 2024-10-15 RX ORDER — INSULIN LISPRO 100 [IU]/ML
2-9 INJECTION, SOLUTION INTRAVENOUS; SUBCUTANEOUS
Status: DISCONTINUED | OUTPATIENT
Start: 2024-10-15 | End: 2024-10-16 | Stop reason: HOSPADM

## 2024-10-15 RX ADMIN — IPRATROPIUM BROMIDE AND ALBUTEROL SULFATE 3 ML: 2.5; .5 SOLUTION RESPIRATORY (INHALATION) at 06:52

## 2024-10-15 RX ADMIN — HEPARIN SODIUM 5000 UNITS: 5000 INJECTION INTRAVENOUS; SUBCUTANEOUS at 20:38

## 2024-10-15 RX ADMIN — CARBIDOPA AND LEVODOPA 1 TABLET: 25; 100 TABLET ORAL at 15:35

## 2024-10-15 RX ADMIN — INSULIN HUMAN 4 UNITS: 100 INJECTION, SOLUTION PARENTERAL at 00:03

## 2024-10-15 RX ADMIN — Medication 10 ML: at 09:03

## 2024-10-15 RX ADMIN — CARBIDOPA AND LEVODOPA 1 TABLET: 25; 100 TABLET ORAL at 20:38

## 2024-10-15 RX ADMIN — MEMANTINE 5 MG: 5 TABLET ORAL at 09:01

## 2024-10-15 RX ADMIN — DOXYCYCLINE 100 MG: 100 CAPSULE ORAL at 20:39

## 2024-10-15 RX ADMIN — FAMOTIDINE 20 MG: 20 TABLET, FILM COATED ORAL at 09:02

## 2024-10-15 RX ADMIN — METOPROLOL TARTRATE 50 MG: 50 TABLET, FILM COATED ORAL at 09:02

## 2024-10-15 RX ADMIN — DIVALPROEX SODIUM 500 MG: 125 CAPSULE, COATED PELLETS ORAL at 09:00

## 2024-10-15 RX ADMIN — IPRATROPIUM BROMIDE AND ALBUTEROL SULFATE 3 ML: 2.5; .5 SOLUTION RESPIRATORY (INHALATION) at 12:32

## 2024-10-15 RX ADMIN — GUAIFENESIN 200 MG: 200 SOLUTION ORAL at 12:28

## 2024-10-15 RX ADMIN — GABAPENTIN 100 MG: 100 CAPSULE ORAL at 20:38

## 2024-10-15 RX ADMIN — AMPICILLIN SODIUM AND SULBACTAM SODIUM 3 G: 2; 1 INJECTION, POWDER, FOR SOLUTION INTRAMUSCULAR; INTRAVENOUS at 03:15

## 2024-10-15 RX ADMIN — AMPICILLIN SODIUM AND SULBACTAM SODIUM 3 G: 2; 1 INJECTION, POWDER, FOR SOLUTION INTRAMUSCULAR; INTRAVENOUS at 20:47

## 2024-10-15 RX ADMIN — GUAIFENESIN 200 MG: 200 SOLUTION ORAL at 00:03

## 2024-10-15 RX ADMIN — GUAIFENESIN 200 MG: 200 SOLUTION ORAL at 03:15

## 2024-10-15 RX ADMIN — GABAPENTIN 100 MG: 100 CAPSULE ORAL at 09:00

## 2024-10-15 RX ADMIN — DIVALPROEX SODIUM 500 MG: 125 CAPSULE, COATED PELLETS ORAL at 20:39

## 2024-10-15 RX ADMIN — AMPICILLIN SODIUM AND SULBACTAM SODIUM 3 G: 2; 1 INJECTION, POWDER, FOR SOLUTION INTRAMUSCULAR; INTRAVENOUS at 15:35

## 2024-10-15 RX ADMIN — INSULIN HUMAN 6 UNITS: 100 INJECTION, SOLUTION PARENTERAL at 06:56

## 2024-10-15 RX ADMIN — MEMANTINE 5 MG: 5 TABLET ORAL at 20:39

## 2024-10-15 RX ADMIN — DOXYCYCLINE 100 MG: 100 CAPSULE ORAL at 09:02

## 2024-10-15 RX ADMIN — INSULIN GLARGINE 15 UNITS: 100 INJECTION, SOLUTION SUBCUTANEOUS at 15:35

## 2024-10-15 RX ADMIN — AMPICILLIN SODIUM AND SULBACTAM SODIUM 3 G: 2; 1 INJECTION, POWDER, FOR SOLUTION INTRAMUSCULAR; INTRAVENOUS at 09:03

## 2024-10-15 RX ADMIN — MUPIROCIN 1 APPLICATION: 20 OINTMENT TOPICAL at 09:02

## 2024-10-15 RX ADMIN — GUAIFENESIN 200 MG: 200 SOLUTION ORAL at 20:38

## 2024-10-15 RX ADMIN — MUPIROCIN 1 APPLICATION: 20 OINTMENT TOPICAL at 20:38

## 2024-10-15 RX ADMIN — GABAPENTIN 100 MG: 100 CAPSULE ORAL at 15:35

## 2024-10-15 RX ADMIN — HEPARIN SODIUM 5000 UNITS: 5000 INJECTION INTRAVENOUS; SUBCUTANEOUS at 09:02

## 2024-10-15 RX ADMIN — INSULIN HUMAN 4 UNITS: 100 INJECTION, SOLUTION PARENTERAL at 12:28

## 2024-10-15 RX ADMIN — GUAIFENESIN 200 MG: 200 SOLUTION ORAL at 15:34

## 2024-10-15 RX ADMIN — IPRATROPIUM BROMIDE AND ALBUTEROL SULFATE 3 ML: 2.5; .5 SOLUTION RESPIRATORY (INHALATION) at 20:32

## 2024-10-15 RX ADMIN — CARBIDOPA AND LEVODOPA 1 TABLET: 25; 100 TABLET ORAL at 09:02

## 2024-10-15 RX ADMIN — GUAIFENESIN 200 MG: 200 SOLUTION ORAL at 09:00

## 2024-10-15 NOTE — PLAN OF CARE
Goal Outcome Evaluation:  Plan of Care Reviewed With: patient        Progress: improving     VSS. Maintaining O2 sat >90% on RA. Pt has a frequent congestive cough. NG tube removed by pt today, however, he is tolerating pureed diet with nectar thick liquids PO. Pt appears to be back at his baseline mental status.

## 2024-10-15 NOTE — CASE MANAGEMENT/SOCIAL WORK
Case Management/Social Work    Patient Name:  Gama Davila  YOB: 1940  MRN: [id number]  Admit Date:  10/13/2024      Sw spoke with Devora/Tony Bello regarding pt returning to facility at discharge.  Devora states pt is LTC and can return when medically ready.  CM notified.       Electronically signed by:  MAXIMO Aggarwal  10/15/24 15:54 EDT

## 2024-10-15 NOTE — PROGRESS NOTES
"Dietitian Follow-up    Patient Name: Gama Davila  YOB: 1940  MRN: [id number]  Admission date: 10/13/2024    Comment:      Clinical Nutrition Follow-up   Encounter Information        Trending Narrative     10/15: Pt did not start on EN and NG tube was d/c. Diet advanced to CCD, low Na, purred, NTL. No PO intake recorded at this time. Will order mighty shake BID.    10/14: Pt w/ NG-tube and need for enteral nutrition recommendations.      Anthropometrics        Current Height, Weight Height: 170.2 cm (67.01\")  Weight: 80.6 kg (177 lb 11.1 oz) (10/15/24 0400)       Trending Weight Hx     This admission:              PTA:     Wt Readings from Last 30 Encounters:   10/15/24 0400 80.6 kg (177 lb 11.1 oz)   10/14/24 1520 74 kg (163 lb 2.3 oz)   10/13/24 1853 74 kg (163 lb 2.3 oz)   10/13/24 1312 81.6 kg (180 lb)   09/16/24 0913 82.6 kg (182 lb)   09/05/24 1338 82.1 kg (181 lb)   08/30/24 0400 82.1 kg (181 lb)   08/28/24 2320 81.4 kg (179 lb 7.3 oz)   08/28/24 1656 82.1 kg (181 lb)   07/16/24 0843 82.1 kg (181 lb)   06/06/24 1132 80.7 kg (178 lb)   05/26/24 1916 77.1 kg (170 lb)   05/21/24 0842 78.9 kg (174 lb)   05/15/24 1040 78.9 kg (174 lb)   04/09/24 1103 75.3 kg (166 lb)   04/06/24 1513 75 kg (165 lb 5.5 oz)   03/04/24 0941 74.8 kg (165 lb)   01/16/24 0919 73 kg (161 lb)   11/27/23 0816 72.1 kg (159 lb)   11/07/23 0933 74.4 kg (164 lb)   10/04/23 1201 74.4 kg (164 lb)   09/26/23 0819 75.8 kg (167 lb)   08/09/23 1101 76.7 kg (169 lb)   07/11/23 0912 73.9 kg (163 lb)   05/16/23 0917 77.1 kg (170 lb)   05/02/23 1520 75.3 kg (166 lb)   03/24/23 1534 72.6 kg (160 lb)   03/21/23 0926 72.6 kg (160 lb)   01/24/23 0915 74.4 kg (164 lb)   01/12/23 1138 74.4 kg (164 lb)   11/15/22 1026 72.6 kg (160 lb)   10/31/22 1518 72.6 kg (160 lb)   09/24/22 1135 70.3 kg (155 lb)   09/22/22 1215 72.6 kg (160 lb)   08/29/22 1146 71.2 kg (157 lb)      BMI kg/m2 Body mass index is 27.82 kg/m².     Labs        Pertinent Labs " Results from last 7 days   Lab Units 10/15/24  0445 10/14/24  1229 10/14/24  0732 10/13/24  1625 10/13/24  1349   SODIUM mmol/L 142 147* 154*   < > 152*   POTASSIUM mmol/L 4.3 4.1 4.5   < > 5.0   CHLORIDE mmol/L 109* 116* 121*   < > 112*   CO2 mmol/L 21.8* 22.3 22.6   < > 23.9   BUN mg/dL 25* 30* 33*   < > 46*   CREATININE mg/dL 1.47* 1.57* 1.67*   < > 2.30*   CALCIUM mg/dL 7.7* 7.6* 7.9*   < > 8.6   BILIRUBIN mg/dL  --   --   --   --  0.4   ALK PHOS U/L  --   --   --   --  130*   ALT (SGPT) U/L  --   --   --   --  <5   AST (SGOT) U/L  --   --   --   --  19   GLUCOSE mg/dL 246* 184* 125*   < > 799*    < > = values in this interval not displayed.     Results from last 7 days   Lab Units 10/15/24  0445 10/14/24  1229 10/14/24  0732 10/14/24  0411   MAGNESIUM mg/dL  --  2.2 2.7* 1.5*   PHOSPHORUS mg/dL 2.8 2.2* 2.8 2.8   HEMOGLOBIN g/dL 12.1*  --   --  13.0   HEMATOCRIT % 37.4*  --   --  41.2         Medications    Scheduled Medications ampicillin-sulbactam, 3 g, Intravenous, Q6H  carbidopa-levodopa, 1 tablet, Oral, TID  Divalproex Sodium, 500 mg, Oral, BID  doxycycline, 100 mg, Oral, Q12H  famotidine, 20 mg, Oral, Daily  gabapentin, 100 mg, Oral, TID  guaifenesin, 200 mg, Oral, Q4H  heparin (porcine), 5,000 Units, Subcutaneous, Q12H  insulin glargine, 15 Units, Subcutaneous, Daily  insulin regular, 2-9 Units, Subcutaneous, Q6H  ipratropium-albuterol, 3 mL, Nebulization, Q6H While Awake - RT  memantine, 5 mg, Oral, Q12H  metoprolol tartrate, 50 mg, Oral, Daily  mupirocin, 1 Application, Each Nare, BID  sodium chloride, 10 mL, Intravenous, Q12H        Infusions Pharmacy to Dose ampicillin-sulbactam,   Pharmacy Consult - Pharmacy to dose,         PRN Medications   acetaminophen **OR** acetaminophen **OR** acetaminophen    [DISCONTINUED] senna-docusate sodium **AND** polyethylene glycol **AND** bisacodyl **AND** bisacodyl    dextrose    dextrose    docusate sodium **AND** sennosides    guaiFENesin-dextromethorphan     ipratropium-albuterol    Magnesium Standard Dose Replacement - Follow Nurse / BPA Driven Protocol    melatonin    nitroglycerin    ondansetron    Pharmacy to Dose ampicillin-sulbactam    Pharmacy Consult - Pharmacy to dose    Potassium Replacement - Follow Nurse / BPA Driven Protocol    sodium chloride    sodium chloride     Physical Findings        Trending Physical   Appearance, NFPE    --  Edema  2+     Bowel Function LBM: PTA     Tubes Peripheral IV     Chewing/Swallowing Requires altered diet     Skin DTPI L gluteal, unstageable PI R posterior heel      --  Current Nutrition Orders & Evaluation of Intake       Oral Nutrition     Food Allergies NKFA   Current PO Diet Diet: Cardiac, Diabetic; Low Sodium (2g); Consistent Carbohydrate; Texture: Pureed (NDD 1); Fluid Consistency: Nectar Thick   Supplement    PO Evaluation     Trending % PO Intake 10/15: No PO intake recorded at this time     Nutrition Diagnosis         Nutrition Dx Problem 1 Increased nutrient needs r/t skin integrity AEB DTPI L gluteal, unstageable PI R posterior heel .      Nutrition Dx Problem 2        Intervention Goal         Intervention Goal(s) Maintain CBW  PO intake meet >50% of estimated needs  Adhere to ONS     Nutrition Intervention        RD Action Will order ONS     Nutrition Prescription          Diet Prescription CCD, HH, Pureeed, NTL   Supplement Prescription Mighty shake BID   Enteral Nutrition Prescription     TPN Prescription       Monitor/Evaluation        Monitor Per protocol, I&O, PO intake, Supplement intake, Pertinent labs, Weight, Skin status, GI status, Symptoms, POC/GOC, Swallow function, Hemodynamic stability     RD to f/up    Electronically signed by:  Eboni Corral RD  10/15/24 13:26 EDT

## 2024-10-15 NOTE — CASE MANAGEMENT/SOCIAL WORK
0950: CM spoke with pt at bedside. Alert and answers some questions coherently. Sat 98% on room air. Has NGT intact in left nares. DCP remains Return back [facility] when medically ready.

## 2024-10-15 NOTE — PROGRESS NOTES
"    [facility]    Name:  Gama Davila   Age:  84 y.o.  Sex:  male  :  1940  MRN:  [id number]   Visit Number:  [id number]  Admission Date:  10/13/2024  Date Of Service:  10/15/24  Primary Care Physician:  Alex Burk DO     LOS: 2 days :    Chief Complaint:      Altered mental status, decreased responsiveness, chest congestion     Subjective:    Patient was seen examined bedside today.  Patient laying comfortably in bed with no distress.  He appears more awake and alert and was able to tell me \" feeling better\".  Patient was also telling me his first name, when I ask him about his last name he replied \" I forgot\".  Patient appears to be at his baseline currently with dementia.  Nursing had confirmed with his daughter who talked to the patient earlier that this is his baseline now.  Remains in ICU. No other acute events overnight.  Vitals are stable, afebrile on room air.    Hospital Course:    Patient is an 84 years old male with a past medical history of chronic respiratory failure on 2 L, dementia, Parkinson disease, diabetes mellitus, CAD, hypertension who presented from nursing home facility with reports that patient was found altered on his mental status with decreased responsiveness and was having chest congestion and increased work of breathing.  Patient is nonverbal at baseline however he would respond to verbal stimuli at baseline and appears to be able to understand some words.  History was provided by ER report.  Patient is unable to contribute any history due to dementia and nonverbal at baseline. His daughter is at bedside providing history on the patient.  No reported fever or chills.  No vomiting or cough.     On ER evaluation, patient was hypertensive with a blood pressure of 165/126, was afebrile on 2 L baseline oxygen requirement.  His workup was significant for venous blood gases with pH of 7.428/HCO3 26.7.  HS Troponin 1 11-95, creatinine " 2.3 (baseline creatinine 1.4) BUN 46, glucose 799, sodium 152 (corrected 161) WBC 11.1, lactate 3.7, hemoglobin A1c 10.1.  Pro-Kei, CRP and proBNP WNL.  Urinalysis positive for glucose but otherwise negative for infection.  COVID and flu negative.  Chest x-ray showed Low lung volumes with bibasilar opacity which could represent atelectasis or pneumonia.  CT head without contrast showed 1. No acute intracranial hemorrhage or evidence of acute large cortical  infarct. 2. Atrophy and ventricular enlargement.  Patient started on Glucomander per HHS protocol in the ER after he received 1 L bolus of LR.  Hospitalist consulted for admission, further management and treatment.    Review of Systems:     All systems were reviewed and negative except as mentioned in subjective, assessment and plan.    Vital Signs:    Temp:  [97.6 °F (36.4 °C)-98.7 °F (37.1 °C)] 98.1 °F (36.7 °C)  Heart Rate:  [65-95] 71  Resp:  [16-18] 16  BP: ()/(48-92) 109/92    Intake and output:    I/O last 3 completed shifts:  In: 6374 [I.V.:3774; Other:2600]  Out: -   No intake/output data recorded.    Physical Examination:    General Appearance:  Alert and cooperative.  Chronically ill-appearing.  No acute distress.  Frail.   Head:  Atraumatic and normocephalic.NG tube in place.    Eyes: Conjunctivae and sclerae normal, no icterus. No pallor.   Throat: No oral lesions, no thrush, oral mucosa moist.   Neck: Supple, trachea midline, no thyromegaly.   Lungs:   Breath sounds heard bilaterally equally.  No wheezing or crackles. No Pleural rub or bronchial breathing.   Heart:  Normal S1 and S2, no murmur, no gallop, no rub. No JVD.   Abdomen:   Normal bowel sounds, no masses, no organomegaly. Soft, nontender, nondistended, no rebound tenderness.   Extremities: Supple, no edema, no cyanosis, no clubbing.   Skin: No bleeding or rash.   Neurologic: Alert and oriented x 1.  Demented. No facial asymmetry. Moves all four limbs. No tremors.      Laboratory  results:    Results from last 7 days   Lab Units 10/15/24  0445 10/14/24  1229 10/14/24  0732 10/13/24  1625 10/13/24  1349   SODIUM mmol/L 142 147* 154*   < > 152*   POTASSIUM mmol/L 4.3 4.1 4.5   < > 5.0   CHLORIDE mmol/L 109* 116* 121*   < > 112*   CO2 mmol/L 21.8* 22.3 22.6   < > 23.9   BUN mg/dL 25* 30* 33*   < > 46*   CREATININE mg/dL 1.47* 1.57* 1.67*   < > 2.30*   CALCIUM mg/dL 7.7* 7.6* 7.9*   < > 8.6   BILIRUBIN mg/dL  --   --   --   --  0.4   ALK PHOS U/L  --   --   --   --  130*   ALT (SGPT) U/L  --   --   --   --  <5   AST (SGOT) U/L  --   --   --   --  19   GLUCOSE mg/dL 246* 184* 125*   < > 799*    < > = values in this interval not displayed.     Results from last 7 days   Lab Units 10/15/24  0445 10/14/24  0411 10/13/24  1325   WBC 10*3/mm3 8.57 10.46 11.18*   HEMOGLOBIN g/dL 12.1* 13.0 16.0   HEMATOCRIT % 37.4* 41.2 49.5   PLATELETS 10*3/mm3 131* 173 225         Results from last 7 days   Lab Units 10/14/24  1229 10/13/24  1625 10/13/24  1349   CK TOTAL U/L  --   --  248*   HSTROP T ng/L 94* 95* 111*     Results from last 7 days   Lab Units 10/13/24  1759 10/13/24  1750   BLOODCX  No growth at 24 hours No growth at 24 hours     Recent Labs     05/26/24 2059 08/28/24  1651   PHART 7.447 7.432   ZPN7BMD 41.7 40.0   PO2ART 79.3 64.4*   [id number] 28.8* 26.7   BASEEXCESS 4.3* 2.2*      I have reviewed the patient's laboratory results.    Radiology results:    Adult Transthoracic Echo Complete W/ Cont if Necessary Per Protocol    Result Date: 10/14/2024    Very poor acoustic windows.  Left ventricular systolic function is probably borderline normal. Left ventricular ejection fraction appears to be 50 - 55%.   Left ventricular wall thickness is consistent with mild concentric hypertrophy.   Right ventricle poorly visualized but appears grossly normal in size and systolic function.   Trace to mild mitral valve regurgitation is present.   Mild tricuspid valve regurgitation is present. Estimated right  ventricular systolic pressure from tricuspid regurgitation is normal (<35 mmHg).     XR Abdomen 1 View    Addendum Date: 10/13/2024    ADDENDUM REPORT ADDENDUM: This report was discussed with Charisse SILVA RN on Oct 13, 2024 20:41:00 EDT. Authenticated and Electronically Signed by Thierry Feldman DO on 10/13/2024 08:41:52 PM    Result Date: 10/13/2024  FINAL REPORT TECHNIQUE: null CLINICAL HISTORY: NG placement COMPARISON: null FINDINGS: 1 view abdomen Comparison: CT/SR - CT ANGIOGRAM ABDOMEN PELVIS - 08/28/2024 07:30 PM EDT Findings: The distal tip of the enteric tube overlies the expected stomach lumen. The gastric side port is just distal to the GE junction, recommend advancement by up to 3 cm. Minimal densities within the lung bases. No pneumoperitoneum or pneumatosis. No abnormal calcifications. Median sternotomy wires. Partially visualized posterior fixation of the lumbar spine.     Impression: IMPRESSION: 1. The distal tip of the enteric tube overlies the expected stomach lumen. The gastric side port is just distal to the GE junction, recommend advancement by up to 3 cm. Authenticated and Electronically Signed by Thierry Feldman DO on 10/13/2024 08:31:31 PM    CT Head Without Contrast    Result Date: 10/13/2024  PROCEDURE: CT HEAD WO CONTRAST-  INDICATION: Altered mental status; E11.00-Type 2 diabetes mellitus with hyperosmolarity without nonketotic hyperglycemic-hyperosmolar coma (NKHHC)  TECHNIQUE: Multiple axial CT images were performed from the foramen magnum to the vertex without contrast.   Coronal reconstruction images were obtained from the axial data.  COMPARISON: 8/30/2020.  FINDINGS: There is no mass effect or midline shift. Global atrophy is noted. The ventricles are enlarged, similar to the prior exam. There is no intracranial hemorrhage. No acute large cortical infarct. The posterior fossa is without acute abnormality. The basilar cisterns are preserved. No acute soft tissue abnormality. No acute osseous  abnormalities are present.      Impression: 1. No acute intracranial hemorrhage or evidence of acute large cortical infarct. Consider MRI if clinical concern persists. 2. Atrophy and ventricular enlargement.        CTDI: 32.43 mGy DLP:575.47 mGy.cm    This study was performed with techniques to keep radiation doses as low as reasonably achievable (ALARA). Individualized dose reduction techniques using automated exposure control or adjustment of mA and/or kV according to the patient size were employed.   This report was signed and finalized on 10/13/2024 4:43 PM by Michelle Arriaza MD.      XR Chest 1 View    Result Date: 10/13/2024  PROCEDURE: XR CHEST 1 VW-  INDICATION:  Altered mental status, eval pneumonia  FINDINGS:  A portable view of the chest was obtained.  Comparison is made to a prior exam dated 8/28/2024.   Patient is again noted to be status post median sternotomy. Heart size is normal. There are low lung volumes with bibasilar opacity. This could represent atelectasis or pneumonia. No pneumothorax.      Impression: Low lung volumes with bibasilar opacity which could represent atelectasis or pneumonia. Recommend radiographic follow-up.   This report was signed and finalized on 10/13/2024 1:46 PM by Michelle Arriaza MD.     I have reviewed the patient's radiology reports.    Medication Review:     I have reviewed the patient's active and prn medications.     Problem List:      AMS (altered mental status)      Assessment:    Acute metabolic encephalopathy, POA  Diabetes mellitus, uncontrolled with HHS, POA  Acute kidney injury, POA  Hypernatremia, POA  Bilateral pneumonia, unable to specify further, POA  Elevated troponin, likely demand ischemia, POA  Chronic respiratory failure on 2 L  Dementia  Parkinson disease  CAD  Hypertension  Debility    Plan:    Acute encephalopathy  -Likely metabolic secondary to HHS, NANCI, hyponatremia and bilateral pneumonia  -Neurochecks, fall precautions  -CT head negative for  acute  -Has a history of baseline dementia and Parkinson disease  -Mental status improved and patient is currently at baseline.     HHS, resolved  Uncontrolled diabetes mellitus  -Started on Glucomander insulin drip per protocol  -Monitor labs and IV fluids per protocol  -Hemoglobin A1c of 10.1  -Needs better control of his diabetes  -Will transition him to subcu insulin, basal with sliding scale     Bilateral pneumonia  -Coverage with Unasyn and doxycycline  -Requiring 2 L supplemental oxygen which is his chronic oxygen at baseline.  -Bronchodilators, mucolytics   -Blood cultures remains negative x 24 hours  -Respiratory cultures in progress, will continue to follow  -Speech evaluation recommended continue pureed diet with nectar-thick liq as tim.      Acute kidney injury  Hypernatremia  -Likely severely dehydrated  -Discussed with nephrology, appreciate recommendations  -Dr. Bustamante IV fluids switched to D5 Kcl  -NG tube placed, and placed on water flushes  -Avoid nephrotoxic drugs, holding Lasix  -Monitor kidney function and sodium levels  -Sodium level normalized, kidney function improving  -Patient pulled his NG tube  -Discontinue D5 IV fluids     Elevated troponin  -Likely demand ischemia in settings of NANCI and hypertension.  -Low suspicion for ACS, patient with no pain  -2D echo with a EF of 50 to 55% trace to mild mitral valve auscultation, mild tricuspid valve regurgitation.  -Troponin trended down with improvement of kidney function.  -Cardiology consulted, I discussed with Dr. Evans who also agreeable that his troponin elevation is likely demand ischemia, recommended patient is not a candidate for any further cardiac ischemic workup at this point.     -Continue home meds as warranted.  -Further orders as indicated per clinical course.  -I have reviewed the copied text and it is accurate as of 10/15/2024     -Will transfer patient to telemetry today    DVT Prophylaxis: Heparin prophylaxis (benefit>  risk)  Code Status: DNR/DNI  Diet: Cardiac/diabetic, puréed with thick nectar liquid  Discharge Plan: [facility] back [facility] to 2 days.    Jamey Hadley MD  10/15/24  08:37 EDT    Dictated utilizing Dragon dictation.

## 2024-10-15 NOTE — PROGRESS NOTES
[facility] Progress Note      Patient Name: Gama Davila  : 1940  MRN: [id number]  Primary Care Physician:  Alex Burk DO  Date of admission: 10/13/2024    Subjective     Interval History:   Overnight no event  Patient closely monitor  Continues to be on supplemental oxygen by nonrebreather  NG tube in place tolerating feeds  No chest discomfort    Review of Systems:   As noted above    Objective     Vitals:   Temp:  [97.6 °F (36.4 °C)-98.7 °F (37.1 °C)] 97.6 °F (36.4 °C)  Heart Rate:  [] 71  Resp:  [16-18] 16  BP: ()/(48-92) 109/92  Flow (L/min) (Oxygen Therapy):  [2] 2    Intake/Output Summary (Last 24 hours) at 10/15/2024 0722  Last data filed at 10/15/2024 0600  Gross per 24 hour   Intake 3982 ml   Output --   Net 3982 ml       Physical Exam:    General Appearance: Pleasantly confused  Skin: warm and dry  HEENT: oral mucosa normal, nonicteric sclera NG tube in place .  Neck: supple, no JVD  Lungs: fine crackles bibasal  Heart: RRR, normal S1 and S2  Abdomen: soft, nontender, nondistended  : no palpable bladder  Extremities: no edema, cyanosis or clubbing  Neuro: Pleasantly confused no focalization    Scheduled Meds:     ampicillin-sulbactam, 3 g, Intravenous, Q6H  carbidopa-levodopa, 1 tablet, Oral, TID  Divalproex Sodium, 500 mg, Oral, BID  doxycycline, 100 mg, Oral, Q12H  famotidine, 20 mg, Oral, Daily  gabapentin, 100 mg, Oral, TID  guaifenesin, 200 mg, Oral, Q4H  heparin (porcine), 5,000 Units, Subcutaneous, Q12H  insulin regular, 2-9 Units, Subcutaneous, Q6H  ipratropium-albuterol, 3 mL, Nebulization, Q6H While Awake - RT  memantine, 5 mg, Oral, Q12H  metoprolol tartrate, 50 mg, Oral, Daily  mupirocin, 1 Application, Each Nare, BID  sodium chloride, 10 mL, Intravenous, Q12H      IV Meds:   Pharmacy to Dose ampicillin-sulbactam,   Pharmacy Consult - Pharmacy to dose,         Results Reviewed:   I have personally reviewed the results from the time  of this admission to 10/15/2024 07:22 EDT     Results from last 7 days   Lab Units 10/15/24  0445 10/14/24  1229 10/14/24  0732 10/13/24  1625 10/13/24  1349   SODIUM mmol/L 142 147* 154*   < > 152*   POTASSIUM mmol/L 4.3 4.1 4.5   < > 5.0   CHLORIDE mmol/L 109* 116* 121*   < > 112*   CO2 mmol/L 21.8* 22.3 22.6   < > 23.9   BUN mg/dL 25* 30* 33*   < > 46*   CREATININE mg/dL 1.47* 1.57* 1.67*   < > 2.30*   CALCIUM mg/dL 7.7* 7.6* 7.9*   < > 8.6   BILIRUBIN mg/dL  --   --   --   --  0.4   ALK PHOS U/L  --   --   --   --  130*   ALT (SGPT) U/L  --   --   --   --  <5   AST (SGOT) U/L  --   --   --   --  19   GLUCOSE mg/dL 246* 184* 125*   < > 799*    < > = values in this interval not displayed.       Estimated Creatinine Clearance: 38 mL/min (A) (by C-G formula based on SCr of 1.47 mg/dL (H)).    Results from last 7 days   Lab Units 10/15/24  0445 10/14/24  1229 10/14/24  0732 10/14/24  0411   MAGNESIUM mg/dL  --  2.2 2.7* 1.5*   PHOSPHORUS mg/dL 2.8 2.2* 2.8 2.8             Results from last 7 days   Lab Units 10/15/24  0445 10/14/24  0411 10/13/24  1325   WBC 10*3/mm3 8.57 10.46 11.18*   HEMOGLOBIN g/dL 12.1* 13.0 16.0   PLATELETS 10*3/mm3 131* 173 225             Assessment / Plan         ASSESSMENT:  Acute kidney injury peak creatinine of 2.3 secondary to hyponatremia severe volume depletion from prerenal state currently responding to IV fluid challenge.  Monitor urine output and avoid nephrotoxins     Chronic kidney disease stage III baseline creatinine between 1 and 1.3 secondary to hypertensive nephrosclerosis     Hypernatremia secondary to altered mental status with decreased oral intake with severe water deficit being corrected by D5 and water flushes via NG tube placement 154> 157> 147 > 142 with medical management     Coronary disease status post coronary bypass grafting as per primary team     Acute metabolic encephalopathy secondary to severe volume depletion with hypernatremia     Diabetes mellitus type 2,  uncontrolled with HHS upon admission responded to protocol as per primary team     Bilateral pneumonia with chronic respiratory failure followed by primary team     Parkinson disease with dementia    PLAN:  Sodium has normalized we will discontinue D5 and will continue water flushes will decrease to 200 mL every 6 hours  Renal function and urine output gradually improving.  Creatinine down to 1.4 from 2.3  Continue surveillance labs    Discussed with nursing staff    Thank you for involving us in the care of Gama Davila.  Please feel free to call with any questions.    Juno Bustamante MD  10/15/24  07:22 EDT    [facility]  [phone]    Please note that portions of this note were completed with a voice recognition program.

## 2024-10-16 VITALS
TEMPERATURE: 97.8 F | HEART RATE: 80 BPM | DIASTOLIC BLOOD PRESSURE: 72 MMHG | HEIGHT: 67 IN | SYSTOLIC BLOOD PRESSURE: 121 MMHG | BODY MASS INDEX: 27.54 KG/M2 | OXYGEN SATURATION: 98 % | WEIGHT: 175.49 LBS | RESPIRATION RATE: 18 BRPM

## 2024-10-16 LAB
ALBUMIN SERPL-MCNC: 2.7 G/DL (ref 3.5–5.2)
ANION GAP SERPL CALCULATED.3IONS-SCNC: 8.7 MMOL/L (ref 5–15)
BUN SERPL-MCNC: 21 MG/DL (ref 8–23)
BUN/CREAT SERPL: 14.3 (ref 7–25)
CALCIUM SPEC-SCNC: 7.9 MG/DL (ref 8.6–10.5)
CHLORIDE SERPL-SCNC: 111 MMOL/L (ref 98–107)
CO2 SERPL-SCNC: 24.3 MMOL/L (ref 22–29)
CREAT SERPL-MCNC: 1.47 MG/DL (ref 0.76–1.27)
DEPRECATED RDW RBC AUTO: 47.2 FL (ref 37–54)
EGFRCR SERPLBLD CKD-EPI 2021: 46.7 ML/MIN/1.73
ERYTHROCYTE [DISTWIDTH] IN BLOOD BY AUTOMATED COUNT: 14.1 % (ref 12.3–15.4)
GLUCOSE BLDC GLUCOMTR-MCNC: 138 MG/DL (ref 70–130)
GLUCOSE BLDC GLUCOMTR-MCNC: 202 MG/DL (ref 70–130)
GLUCOSE SERPL-MCNC: 135 MG/DL (ref 65–99)
HCT VFR BLD AUTO: 35.6 % (ref 37.5–51)
HGB BLD-MCNC: 11.7 G/DL (ref 13–17.7)
MCH RBC QN AUTO: 30.2 PG (ref 26.6–33)
MCHC RBC AUTO-ENTMCNC: 32.9 G/DL (ref 31.5–35.7)
MCV RBC AUTO: 91.8 FL (ref 79–97)
PHOSPHATE SERPL-MCNC: 3.5 MG/DL (ref 2.5–4.5)
PLATELET # BLD AUTO: 133 10*3/MM3 (ref 140–450)
PMV BLD AUTO: 11.4 FL (ref 6–12)
POTASSIUM SERPL-SCNC: 4.3 MMOL/L (ref 3.5–5.2)
RBC # BLD AUTO: 3.88 10*6/MM3 (ref 4.14–5.8)
SODIUM SERPL-SCNC: 144 MMOL/L (ref 136–145)
WBC NRBC COR # BLD AUTO: 6.01 10*3/MM3 (ref 3.4–10.8)

## 2024-10-16 PROCEDURE — 85027 COMPLETE CBC AUTOMATED: CPT | Performed by: FAMILY MEDICINE

## 2024-10-16 PROCEDURE — 99239 HOSP IP/OBS DSCHRG MGMT >30: CPT | Performed by: FAMILY MEDICINE

## 2024-10-16 PROCEDURE — 25010000002 AMPICILLIN-SULBACTAM PER 1.5 G: Performed by: FAMILY MEDICINE

## 2024-10-16 PROCEDURE — 63710000001 INSULIN GLARGINE PER 5 UNITS: Performed by: FAMILY MEDICINE

## 2024-10-16 PROCEDURE — 82948 REAGENT STRIP/BLOOD GLUCOSE: CPT | Performed by: FAMILY MEDICINE

## 2024-10-16 PROCEDURE — 80069 RENAL FUNCTION PANEL: CPT | Performed by: INTERNAL MEDICINE

## 2024-10-16 PROCEDURE — 63710000001 INSULIN LISPRO (HUMAN) PER 5 UNITS: Performed by: FAMILY MEDICINE

## 2024-10-16 PROCEDURE — 25010000002 BUMETANIDE PER 0.5 MG: Performed by: INTERNAL MEDICINE

## 2024-10-16 PROCEDURE — 25010000002 HEPARIN (PORCINE) PER 1000 UNITS: Performed by: FAMILY MEDICINE

## 2024-10-16 RX ORDER — INSULIN LISPRO 100 [IU]/ML
2-9 INJECTION, SOLUTION INTRAVENOUS; SUBCUTANEOUS
Qty: 3 ML | Refills: 0 | Status: SHIPPED | OUTPATIENT
Start: 2024-10-16 | End: 2024-10-24

## 2024-10-16 RX ORDER — LANCETS 30 GAUGE
EACH MISCELLANEOUS
Qty: 100 EACH | Refills: 12 | Status: SHIPPED | OUTPATIENT
Start: 2024-10-16

## 2024-10-16 RX ORDER — METOPROLOL TARTRATE 25 MG/1
25 TABLET, FILM COATED ORAL DAILY
Status: DISCONTINUED | OUTPATIENT
Start: 2024-10-16 | End: 2024-10-16 | Stop reason: HOSPADM

## 2024-10-16 RX ORDER — BUMETANIDE 0.25 MG/ML
2 INJECTION INTRAMUSCULAR; INTRAVENOUS ONCE
Status: COMPLETED | OUTPATIENT
Start: 2024-10-16 | End: 2024-10-16

## 2024-10-16 RX ORDER — IBUPROFEN 600 MG/1
TABLET ORAL
Qty: 1 EACH | Refills: 0 | Status: SHIPPED | OUTPATIENT
Start: 2024-10-16

## 2024-10-16 RX ORDER — DOXYCYCLINE 100 MG/1
100 CAPSULE ORAL EVERY 12 HOURS SCHEDULED
Qty: 8 CAPSULE | Refills: 0 | Status: SHIPPED | OUTPATIENT
Start: 2024-10-16 | End: 2024-10-20

## 2024-10-16 RX ORDER — BLOOD-GLUCOSE METER
KIT MISCELLANEOUS
Qty: 1 EACH | Refills: 0 | Status: SHIPPED | OUTPATIENT
Start: 2024-10-16

## 2024-10-16 RX ORDER — BLOOD SUGAR DIAGNOSTIC
STRIP MISCELLANEOUS
Qty: 100 EACH | Refills: 12 | Status: SHIPPED | OUTPATIENT
Start: 2024-10-16

## 2024-10-16 RX ORDER — GUAIFENESIN 200 MG/10ML
200 LIQUID ORAL
Qty: 437 ML | Refills: 0 | Status: SHIPPED | OUTPATIENT
Start: 2024-10-16 | End: 2024-10-26

## 2024-10-16 RX ORDER — METOPROLOL TARTRATE 25 MG/1
25 TABLET, FILM COATED ORAL DAILY
Qty: 30 TABLET | Refills: 0 | Status: SHIPPED | OUTPATIENT
Start: 2024-10-17 | End: 2024-11-16

## 2024-10-16 RX ORDER — GABAPENTIN 100 MG/1
100 CAPSULE ORAL 3 TIMES DAILY
Qty: 6 CAPSULE | Refills: 0 | Status: SHIPPED | OUTPATIENT
Start: 2024-10-16 | End: 2024-10-18

## 2024-10-16 RX ADMIN — HEPARIN SODIUM 5000 UNITS: 5000 INJECTION INTRAVENOUS; SUBCUTANEOUS at 08:44

## 2024-10-16 RX ADMIN — MEMANTINE 5 MG: 5 TABLET ORAL at 08:44

## 2024-10-16 RX ADMIN — FAMOTIDINE 20 MG: 20 TABLET, FILM COATED ORAL at 08:44

## 2024-10-16 RX ADMIN — Medication 10 ML: at 09:07

## 2024-10-16 RX ADMIN — DOXYCYCLINE 100 MG: 100 CAPSULE ORAL at 08:44

## 2024-10-16 RX ADMIN — CARBIDOPA AND LEVODOPA 1 TABLET: 25; 100 TABLET ORAL at 08:43

## 2024-10-16 RX ADMIN — GABAPENTIN 100 MG: 100 CAPSULE ORAL at 08:44

## 2024-10-16 RX ADMIN — MUPIROCIN 1 APPLICATION: 20 OINTMENT TOPICAL at 08:44

## 2024-10-16 RX ADMIN — AMPICILLIN SODIUM AND SULBACTAM SODIUM 3 G: 2; 1 INJECTION, POWDER, FOR SOLUTION INTRAMUSCULAR; INTRAVENOUS at 03:43

## 2024-10-16 RX ADMIN — AMPICILLIN SODIUM AND SULBACTAM SODIUM 3 G: 2; 1 INJECTION, POWDER, FOR SOLUTION INTRAMUSCULAR; INTRAVENOUS at 08:42

## 2024-10-16 RX ADMIN — GUAIFENESIN 200 MG: 200 SOLUTION ORAL at 11:07

## 2024-10-16 RX ADMIN — INSULIN GLARGINE 15 UNITS: 100 INJECTION, SOLUTION SUBCUTANEOUS at 08:45

## 2024-10-16 RX ADMIN — GUAIFENESIN 200 MG: 200 SOLUTION ORAL at 08:42

## 2024-10-16 RX ADMIN — METOPROLOL TARTRATE 25 MG: 25 TABLET, FILM COATED ORAL at 09:03

## 2024-10-16 RX ADMIN — BUMETANIDE 2 MG: 0.25 INJECTION, SOLUTION INTRAMUSCULAR; INTRAVENOUS at 10:56

## 2024-10-16 RX ADMIN — DIVALPROEX SODIUM 500 MG: 125 CAPSULE, COATED PELLETS ORAL at 08:41

## 2024-10-16 RX ADMIN — INSULIN LISPRO 4 UNITS: 100 INJECTION, SOLUTION INTRAVENOUS; SUBCUTANEOUS at 11:07

## 2024-10-16 NOTE — NURSING NOTE
Pt d/c transferred to [facility] via EMS. Report called to Rivka. Discharge details forwarded with Pt.

## 2024-10-16 NOTE — CASE MANAGEMENT/SOCIAL WORK
DCP; Pt returning to LTC at [facility].. Spoke with joyce/SADIE Jones over the phone. IMM delivered at the time of this conversation.

## 2024-10-16 NOTE — DISCHARGE INSTRUCTIONS
-Continue insulin, close follow-up with PCP for better management of diabetes.  -Continue antibiotics until finished  -Follow-up with nephrology as an outpatient.  -Follow-up with cardiology as an outpatient.

## 2024-10-16 NOTE — DISCHARGE SUMMARY
[facility]   DISCHARGE SUMMARY      Name:  Gama Davila   Age:  84 y.o.  Sex:  male  :  1940  MRN:  [id number]   Visit Number:  [id number]    Admission Date:  10/13/2024  Date of Discharge:  10/16/2024  Primary Care Physician:  Alex Burk,     Important issues to note:    -Discharged on insulin Lantus and sliding scale  -Continue antibiotics with Augmentin and doxycycline to finish treatment  -Decreased metoprolol from 50 mg to 25 mg daily  -Follow-up with nephrology as outpatient  -Close follow-up with PCP for diabetes management, continued care and referral to cardiology as an outpatient.    Discharge Diagnoses:     Acute metabolic encephalopathy, POA  Diabetes mellitus, uncontrolled with HHS, POA  Acute kidney injury, POA  Hypernatremia, POA  Bilateral pneumonia, unable to specify further, POA  Elevated troponin, likely demand ischemia, POA  Chronic respiratory failure on 2 L  Dementia  Parkinson disease  CAD  Hypertension  Debility    Problem List:     Active Hospital Problems    Diagnosis  POA    AMS (altered mental status) [R41.82]  Yes      Resolved Hospital Problems   No resolved problems to display.     Presenting Problem:    Chief Complaint   Patient presents with    Altered Mental Status      Consults:     Consulting Physician(s)         Provider   Role Specialty     Juno Bustamante MD      Consulting Physician Nephrology     Rg Evans MD      Consulting Physician Cardiology          Procedures Performed:        History of presenting illness/Hospital Course:    Patient is an 84 years old male with a past medical history of chronic respiratory failure on 2 L, dementia, Parkinson disease, diabetes mellitus, CAD, hypertension who presented from nursing home facility with reports that patient was found altered on his mental status with decreased responsiveness and was having chest congestion and increased work of breathing.  Patient is nonverbal  at baseline however he would respond to verbal stimuli at baseline and appears to be able to understand some words.  History was provided by ER report.  Patient is unable to contribute any history due to dementia and nonverbal at baseline. His daughter is at bedside providing history on the patient.  No reported fever or chills.  No vomiting or cough.     On ER evaluation, patient was hypertensive with a blood pressure of 165/126, was afebrile on 2 L baseline oxygen requirement.  His workup was significant for venous blood gases with pH of 7.428/HCO3 26.7.  HS Troponin 1 11-95, creatinine 2.3 (baseline creatinine 1.4) BUN 46, glucose 799, sodium 152 (corrected 161) WBC 11.1, lactate 3.7, hemoglobin A1c 10.1.  Pro-Kei, CRP and proBNP WNL.  Urinalysis positive for glucose but otherwise negative for infection.  COVID and flu negative.  Chest x-ray showed Low lung volumes with bibasilar opacity which could represent atelectasis or pneumonia.  CT head without contrast showed 1. No acute intracranial hemorrhage or evidence of acute large cortical  infarct. 2. Atrophy and ventricular enlargement.  Patient started on Glucomander per HHS protocol in the ER after he received 1 L bolus of LR.  Hospitalist consulted for admission, further management and treatment.    Acute encephalopathy, resolved  -Likely metabolic secondary to HHS, NANCI, hyponatremia and bilateral pneumonia  -Neurochecks, fall precautions  -CT head negative for acute  -Has a history of baseline dementia and Parkinson disease  -Mental status improved and patient is currently at baseline.     HHS, resolved  Uncontrolled diabetes mellitus  -Started on Glucomander insulin drip per protocol  -Monitor labs and IV fluids per protocol  -Hemoglobin A1c of 10.1  -Needs better control of his diabetes  -Will transition him to subcu insulin, basal with sliding scale  -Discharged on Lantus with sliding scale insulin  -Close follow-up with PCP for better management of  diabetes     Bilateral pneumonia, improved  -Coverage with Unasyn and doxycycline  -Requiring 2 L supplemental oxygen which is his chronic oxygen at baseline.  -Bronchodilators, mucolytics   -Blood cultures remains negative x 24 hours  -Respiratory cultures in progress, will continue to follow  -Speech evaluation recommended continue pureed diet with nectar-thick liq as tim.   -Discharged on doxycycline and Augmentin to finish treatment of pneumonia     Acute kidney injury, improved  Hypernatremia, improved  -Likely severely dehydrated  -Discussed with nephrology, appreciate recommendations  -Dr. Bustamante IV fluids switched to D5 Kcl  -NG tube initially placed, and placed on water flushes.  NG tube was pulled by the patient but was no longer needed.  -Avoid nephrotoxic drugs, holding Lasix  -Monitor kidney function and sodium levels  -Sodium level normalized, kidney function improved  -Discontinued D5 IV fluids  -Follow-up with nephrology as an outpatient     Elevated troponin  -Likely demand ischemia in settings of NANCI and hypertension.  -Low suspicion for ACS, patient with no pain  -2D echo with a EF of 50 to 55% trace to mild mitral valve auscultation, mild tricuspid valve regurgitation.  -Troponin trended down with improvement of kidney function.  -Cardiology consulted, I discussed with Dr. Evans who also agreeable that his troponin elevation is likely demand ischemia, recommended patient is not a candidate for any further cardiac ischemic workup at this point.  -Follow-up with cardiology as outpatient, referral per PCP    Patient was seen and examined on the day of discharge.  Patient sitting up comfortably in bed with no distress.  Patient appears to be at baseline mental status, interactive and answering some questions.  Patient denies any pain or discomfort.  Nursing at bedside.  No acute events overnight.  He has been tolerating p.o. well per diet recommendations.  Cleared for discharge by  nephrology.    Patient will be discharged back to nursing home for long-term care.  Discharged on antibiotics.  Follow-up with nephrology as an outpatient.  Follow-up with cardiology as an outpatient per PCP. Patient to follow-up with PCP in 2 to 3 days for recheck and continued care. Clear return precautions discussed.  Patient verbalized understanding and agreeable to plan.  All questions were answered to the patient's satisfaction.  Patient has reached maximum medical improvement of inpatient hospital stay. Patient is discharged in stable condition.      Vital Signs:    Temp:  [97 °F (36.1 °C)-99 °F (37.2 °C)] 97 °F (36.1 °C)  Heart Rate:  [62-92] (P) 91  Resp:  [17-22] 18  BP: ()/(57-94) (P) 122/64    Physical Exam:    General Appearance:  Alert and cooperative. Chronically ill-appearing. No acute distress. Frail.    Head:  Atraumatic and normocephalic.   Eyes: Conjunctivae and sclerae normal, no icterus. No pallor.   Ears:  Ears with no abnormalities noted.   Throat: No oral lesions, no thrush, oral mucosa moist.   Neck: Supple, trachea midline, no thyromegaly.   Back:    No tenderness to palpation.   Lungs:   Breath sounds heard bilaterally equally.  No crackles or wheezing. No Pleural rub or bronchial breathing.  Unlabored.  On room air.   Heart:  Normal S1 and S2, no murmur, no gallop, no rub. No JVD.   Abdomen:   Normal bowel sounds, no masses, no organomegaly. Soft, nontender, nondistended, no rebound tenderness.   Extremities: Supple, no edema, no cyanosis, no clubbing.   Pulses: Pulses palpable bilaterally.   Skin: No bleeding or rash.   Neurologic: Alert and oriented x 2. No facial asymmetry. Moves all four limbs. No tremors.  At baseline with dementia.  Interactive and pleasant.     Pertinent Lab Results:     Results from last 7 days   Lab Units 10/16/24  0457 10/15/24  0445 10/14/24  1229 10/13/24  1625 10/13/24  1349   SODIUM mmol/L 144 142 147*   < > 152*   POTASSIUM mmol/L 4.3 4.3 4.1   < >  5.0   CHLORIDE mmol/L 111* 109* 116*   < > 112*   CO2 mmol/L 24.3 21.8* 22.3   < > 23.9   BUN mg/dL 21 25* 30*   < > 46*   CREATININE mg/dL 1.47* 1.47* 1.57*   < > 2.30*   CALCIUM mg/dL 7.9* 7.7* 7.6*   < > 8.6   BILIRUBIN mg/dL  --   --   --   --  0.4   ALK PHOS U/L  --   --   --   --  130*   ALT (SGPT) U/L  --   --   --   --  <5   AST (SGOT) U/L  --   --   --   --  19   GLUCOSE mg/dL 135* 246* 184*   < > 799*    < > = values in this interval not displayed.     Results from last 7 days   Lab Units 10/16/24  0457 10/15/24  0445 10/14/24  0411   WBC 10*3/mm3 6.01 8.57 10.46   HEMOGLOBIN g/dL 11.7* 12.1* 13.0   HEMATOCRIT % 35.6* 37.4* 41.2   PLATELETS 10*3/mm3 133* 131* 173         Results from last 7 days   Lab Units 10/14/24  1229 10/13/24  1625 10/13/24  1349   CK TOTAL U/L  --   --  248*   HSTROP T ng/L 94* 95* 111*     Results from last 7 days   Lab Units 10/13/24  1349   PROBNP pg/mL 1,017.0                 Results from last 7 days   Lab Units 10/13/24  1759 10/13/24  1750   BLOODCX  No growth at 2 days No growth at 2 days       Pertinent Radiology Results:    Imaging Results (All)       Procedure Component Value Units Date/Time    XR Abdomen 1 View [[id number]] Collected: 10/13/24 2031     Updated: 10/13/24 2043    Addenda:        ADDENDUM REPORT    ADDENDUM:  This report was discussed with Charisse SILVA RN on Oct 13, 2024 20:41:00 EDT.    Authenticated and Electronically Signed by Thierry Murcia DO on  10/13/2024 08:41:52 PM  Signed: 10/13/24 2041 by Thierry Murcia DO    Narrative:      FINAL REPORT    TECHNIQUE:  null    CLINICAL HISTORY:  NG placement    COMPARISON:  null    FINDINGS:  1 view abdomen    Comparison: CT/SR - CT ANGIOGRAM ABDOMEN PELVIS - 08/28/2024 07:30 PM EDT    Findings:    The distal tip of the enteric tube overlies the expected stomach lumen. The gastric side port is just distal to the GE junction, recommend advancement by up to 3 cm.    Minimal densities within the lung bases.    No  pneumoperitoneum or pneumatosis.    No abnormal calcifications.    Median sternotomy wires. Partially visualized posterior fixation of the lumbar spine.      Impression:      IMPRESSION:    1. The distal tip of the enteric tube overlies the expected stomach lumen. The gastric side port is just distal to the GE junction, recommend advancement by up to 3 cm.    Authenticated and Electronically Signed by Thierry Feldman DO on  10/13/2024 08:31:31 PM    CT Head Without Contrast [[id number]] Collected: 10/13/24 1642     Updated: 10/13/24 1646    Narrative:      PROCEDURE: CT HEAD WO CONTRAST-     INDICATION: Altered mental status; E11.00-Type 2 diabetes mellitus with  hyperosmolarity without nonketotic hyperglycemic-hyperosmolar coma  ([facility])     TECHNIQUE: Multiple axial CT images were performed from the foramen  magnum to the vertex without contrast.   Coronal reconstruction images  were obtained from the axial data.     COMPARISON: 8/30/2020.     FINDINGS: There is no mass effect or midline shift. Global atrophy is  noted. The ventricles are enlarged, similar to the prior exam. There is  no intracranial hemorrhage. No acute large cortical infarct. The  posterior fossa is without acute abnormality. The basilar cisterns are  preserved. No acute soft tissue abnormality. No acute osseous  abnormalities are present.       Impression:      1. No acute intracranial hemorrhage or evidence of acute large cortical  infarct. Consider MRI if clinical concern persists.  2. Atrophy and ventricular enlargement.                       CTDI: 32.43 mGy  DLP:575.47 mGy.cm           This study was performed with techniques to keep radiation doses as low  as reasonably achievable (ALARA). Individualized dose reduction  techniques using automated exposure control or adjustment of mA and/or  kV according to the patient size were employed.        This report was signed and finalized on 10/13/2024 4:43 PM by Michelle Arriaza MD.       XR Chest 1  View [[id number]] Collected: 10/13/24 1331     Updated: 10/13/24 1348    Narrative:      PROCEDURE: XR CHEST 1 VW-     INDICATION:  Altered mental status, eval pneumonia     FINDINGS:  A portable view of the chest was obtained.  Comparison is  made to a prior exam dated 8/28/2024.   Patient is again noted to be  status post median sternotomy. Heart size is normal. There are low lung  volumes with bibasilar opacity. This could represent atelectasis or  pneumonia. No pneumothorax.       Impression:      Low lung volumes with bibasilar opacity which could  represent atelectasis or pneumonia. Recommend radiographic follow-up.        This report was signed and finalized on 10/13/2024 1:46 PM by Michelle Arriaza MD.               Echo:    Results for orders placed during the hospital encounter of 10/13/24    Adult Transthoracic Echo Complete W/ Cont if Necessary Per Protocol    Interpretation Summary    Very poor acoustic windows.  Left ventricular systolic function is probably borderline normal. Left ventricular ejection fraction appears to be 50 - 55%.    Left ventricular wall thickness is consistent with mild concentric hypertrophy.    Right ventricle poorly visualized but appears grossly normal in size and systolic function.    Trace to mild mitral valve regurgitation is present.    Mild tricuspid valve regurgitation is present. Estimated right ventricular systolic pressure from tricuspid regurgitation is normal (<35 mmHg).    Condition on Discharge:      Stable.    Code status during the hospital stay:    Code Status and Medical Interventions: No CPR (Do Not Attempt to Resuscitate); Limited Support; No intubation (DNI)   Ordered at: 10/13/24 1740     Medical Intervention Limits:    No intubation (DNI)     Code Status (Patient has no pulse and is not breathing):    No CPR (Do Not Attempt to Resuscitate)     Medical Interventions (Patient has pulse or is breathing):    Limited Support     Discharge  Disposition:    Skilled Nursing Facility (DC - External)    Discharge Medications:       Discharge Medications        New Medications        Instructions Start Date   Alcohol Pads 70 % pads   Apply one alcohol swab to injection site of skin immediately prior to insulin injection. Formulary Compliance Approval.      amoxicillin-clavulanate 875-125 MG per tablet  Commonly known as: AUGMENTIN   1 tablet, Oral, 2 Times Daily      doxycycline 100 MG capsule  Commonly known as: MONODOX   100 mg, Oral, Every 12 Hours Scheduled      glucagon 1 MG injection  Commonly known as: GLUCAGEN   Use as directed for emergently low blood glucose level. Formulary Compliance Approval      glucose blood test strip   Use to test blood glucose up to four times daily as needed. Formulary Compliance Approval. Diagnosis: Type 2 Diabetes - Insulin Dependent      Glucose Management tablet   Use as needed for emergently low blood glucose levels. Formulary Compliance Approval      glucose monitor monitoring kit   Use to test blood glucose up to four times daily as needed. Formulary Compliance Approval. Diagnosis: Type 2 Diabetes - Insulin Dependent      guaifenesin 100 MG/5ML liquid  Commonly known as: ROBITUSSIN   200 mg, Oral, Every 4 Hours Scheduled      insulin glargine 100 UNIT/ML injection  Commonly known as: LANTUS, SEMGLEE   10 Units, Subcutaneous, Daily   Start Date: October 17, 2024     Insulin Lispro 100 UNIT/ML injection  Commonly known as: humaLOG   2-9 Units, Subcutaneous, 4 Times Daily Before Meals & Nightly      Lancets misc   Use to test blood glucose up to four times daily as needed. Formulary Compliance Approval. Diagnosis: Type 2 Diabetes - Insulin Dependent             Continue These Medications        Instructions Start Date   acetaminophen 325 MG tablet  Commonly known as: TYLENOL   650 mg, Oral, Every 6 Hours PRN      aspirin 81 MG EC tablet   81 mg, Oral, Daily      carbidopa-levodopa  MG per tablet  Commonly known  as: SINEMET   1 tablet, Oral, 3 Times Daily      Divalproex Sodium 125 MG capsule  Commonly known as: DEPAKOTE SPRINKLE   500 mg, Oral, 2 Times Daily      docusate sodium 100 MG capsule  Commonly known as: COLACE   200 mg, Oral, Daily PRN      donepezil 10 MG tablet  Commonly known as: ARICEPT   10 mg, Oral, Nightly      famotidine 20 MG tablet  Commonly known as: PEPCID   20 mg, Oral, Daily      furosemide 20 MG tablet  Commonly known as: LASIX   20 mg, Oral, Daily      gabapentin 100 MG capsule  Commonly known as: NEURONTIN   100 mg, Oral, 3 Times Daily      ipratropium-albuterol 0.5-2.5 mg/3 ml nebulizer  Commonly known as: DUO-NEB   3 mL, Nebulization, Every 6 Hours PRN      melatonin 3 MG tablet   6 mg, Oral, Nightly      memantine 10 MG tablet  Commonly known as: NAMENDA   10 mg, 2 Times Daily      metoprolol tartrate 50 MG tablet  Commonly known as: LOPRESSOR   50 mg, Oral, Daily      MiraLax 17 GM/SCOOP powder  Generic drug: polyethylene glycol   17 g, Oral, Daily PRN      ondansetron 4 MG tablet  Commonly known as: ZOFRAN   4 mg, Oral, Every 6 Hours PRN      tamsulosin 0.4 MG capsule 24 hr capsule  Commonly known as: FLOMAX   1 capsule, Oral, Daily             Discharge Diet:     Diet Instructions       Diet: Cardiac Diets, Diabetic Diets; Low Sodium (2g); Pureed (NDD 1); [place] Thick; Consistent Carbohydrate      Discharge Diet:  Cardiac Diets  Diabetic Diets       Cardiac Diet: Low Sodium (2g)    Texture: Pureed (NDD 1)    Fluid Consistency: Nectar Thick    Diabetic Diet: Consistent Carbohydrate          Activity at Discharge:   As tolerated    Follow-up Appointments:     Follow-up Information       Alex Burk, DO Follow up in 3 day(s).    Specialty: Internal Medicine  Contact information:  [address]  [facility] [address] [phone]               Charleen Randall, APRN .    Specialties: Nurse Practitioner, Family Medicine  Contact information:  [address] [place]  [address]  [phone]               Aleks Dobbs MD, FASN Follow up in 1 week(s).    Specialty: Nephrology  Contact information:  [facility] [place] [address] [phone]                           No future appointments.  Test Results Pending at Discharge:    Pending Labs       Order Current Status    Blood Culture With ADRIANE - Blood, Hand, Digit Right Preliminary result    Blood Culture With ADRIANE - Blood, Hand, Right Preliminary result    Respiratory Culture - Sputum, Cough Preliminary result               Jamey Hadley MD  10/16/24  09:26 EDT    Time: I spent 34 minutes on this discharge activity which included: face-to-face encounter with the patient, reviewing the data in the system, coordination of the care with the nursing staff as well as consultants, documentation, and entering orders.     Dictated utilizing Dragon dictation.

## 2024-10-16 NOTE — CASE MANAGEMENT/SOCIAL WORK
Case Management Discharge Note      Final Note: Pt discharged back to LTC at [facility] [place] via [facility].         Selected Continued Care - Discharged on 10/16/2024 Admission date: 10/13/2024 - Discharge disposition: Skilled Nursing Facility (DC - External)      Destination Coordination complete.      Service Provider Services Address Phone Fax Patient Preferred    [facility] [address] [place] [address] [phone] --              Durable Medical Equipment    No services have been selected for the patient.                Dialysis/Infusion    No services have been selected for the patient.                Home Medical Care    No services have been selected for the patient.                Therapy    No services have been selected for the patient.                Community Resources    No services have been selected for the patient.                Community & DME    No services have been selected for the patient.                    Selected Continued Care - Prior Encounters Includes continued care and service providers with selected services from prior encounters from 7/15/2024 to 10/16/2024      Discharged on 8/30/2024 Admission date: 8/28/2024 - Discharge disposition: Intermediate Care       Destination       Service Provider Services Address Phone Fax Patient Preferred    [facility] [address] [place] [address] [phone] --                          Transportation Services  Ambulance: [facility]    Final Discharge Disposition Code: 04 - intermediate care facility

## 2024-10-16 NOTE — PROGRESS NOTES
[facility] Progress Note      Patient Name: Gama Davila  : 1940  MRN: [id number]  Primary Care Physician:  Alex Burk DO  Date of admission: 10/13/2024    Subjective     Interval History:   Overnight no event  Patient not eating assisted by nursing staff  Denies any chest pain or shortness of breath  No abdominal pain    Urinating spontaneously    Review of Systems:   As noted above    Objective     Vitals:   Temp:  [97 °F (36.1 °C)-99 °F (37.2 °C)] 97 °F (36.1 °C)  Heart Rate:  [62-92] (P) 91  Resp:  [17-22] 18  BP: ()/(57-94) (P) 122/64    Intake/Output Summary (Last 24 hours) at 10/16/2024 0921  Last data filed at 10/16/2024 0840  Gross per 24 hour   Intake 1305 ml   Output --   Net 1305 ml       Physical Exam:    General Appearance: Pleasantly confused  Skin: warm and dry  HEENT: oral mucosa normal, nonicteric sclera NG tube in place .  Neck: supple, no JVD  Lungs: fine crackles bibasal  Heart: RRR, normal S1 and S2  Abdomen: soft, nontender, nondistended  : no palpable bladder  Extremities: no edema, cyanosis or clubbing  Neuro: Pleasantly confused no focalization    Scheduled Meds:     ampicillin-sulbactam, 3 g, Intravenous, Q6H  bumetanide, 2 mg, Intravenous, Once  carbidopa-levodopa, 1 tablet, Oral, TID  Divalproex Sodium, 500 mg, Oral, BID  doxycycline, 100 mg, Oral, Q12H  famotidine, 20 mg, Oral, Daily  gabapentin, 100 mg, Oral, TID  guaifenesin, 200 mg, Oral, Q4H  heparin (porcine), 5,000 Units, Subcutaneous, Q12H  insulin glargine, 15 Units, Subcutaneous, Daily  insulin lispro, 2-9 Units, Subcutaneous, 4x Daily AC & at Bedtime  memantine, 5 mg, Oral, Q12H  metoprolol tartrate, 25 mg, Oral, Daily  mupirocin, 1 Application, Each Nare, BID  sodium chloride, 10 mL, Intravenous, Q12H      IV Meds:   Pharmacy to Dose ampicillin-sulbactam,   Pharmacy Consult - Pharmacy to dose,         Results Reviewed:   I have personally reviewed the results from the  time of this admission to 10/16/2024 09:21 EDT     Results from last 7 days   Lab Units 10/16/24  0457 10/15/24  0445 10/14/24  1229 10/13/24  1625 10/13/24  1349   SODIUM mmol/L 144 142 147*   < > 152*   POTASSIUM mmol/L 4.3 4.3 4.1   < > 5.0   CHLORIDE mmol/L 111* 109* 116*   < > 112*   CO2 mmol/L 24.3 21.8* 22.3   < > 23.9   BUN mg/dL 21 25* 30*   < > 46*   CREATININE mg/dL 1.47* 1.47* 1.57*   < > 2.30*   CALCIUM mg/dL 7.9* 7.7* 7.6*   < > 8.6   BILIRUBIN mg/dL  --   --   --   --  0.4   ALK PHOS U/L  --   --   --   --  130*   ALT (SGPT) U/L  --   --   --   --  <5   AST (SGOT) U/L  --   --   --   --  19   GLUCOSE mg/dL 135* 246* 184*   < > 799*    < > = values in this interval not displayed.       Estimated Creatinine Clearance: 37.8 mL/min (A) (by C-G formula based on SCr of 1.47 mg/dL (H)).    Results from last 7 days   Lab Units 10/16/24  0457 10/15/24  0445 10/14/24  1229 10/14/24  0732 10/14/24  0411   MAGNESIUM mg/dL  --   --  2.2 2.7* 1.5*   PHOSPHORUS mg/dL 3.5 2.8 2.2* 2.8 2.8             Results from last 7 days   Lab Units 10/16/24  0457 10/15/24  0445 10/14/24  0411 10/13/24  1325   WBC 10*3/mm3 6.01 8.57 10.46 11.18*   HEMOGLOBIN g/dL 11.7* 12.1* 13.0 16.0   PLATELETS 10*3/mm3 133* 131* 173 225             Assessment / Plan         ASSESSMENT:  Acute kidney injury peak creatinine of 2.3 secondary to hyponatremia severe volume depletion from prerenal state currently responding to IV fluid challenge.  Monitor urine output and avoid nephrotoxins     Chronic kidney disease stage III baseline creatinine between 1 and 1.3 secondary to hypertensive nephrosclerosis     Hypernatremia secondary to altered mental status with decreased oral intake with severe water deficit being corrected by D5 and water flushes via NG tube placement 154> 157> 147 > 142 with medical management     Coronary disease status post coronary bypass grafting as per primary team     Acute metabolic encephalopathy secondary to severe  volume depletion with hypernatremia     Diabetes mellitus type 2, uncontrolled with HHS upon admission responded to protocol as per primary team     Bilateral pneumonia with chronic respiratory failure followed by primary team     Parkinson disease with dementia    PLAN:  Order single dose of bumetanide 2 mg once  Decrease dose of metoprolol to 25 mg twice a day due to borderline hypotension   Patient can be followed closely in renal clinic upon discharge    Discussed with  ICU nursing staff    Thank you for involving us in the care of Gama Davila.  Please feel free to call with any questions.    Juno Bustamante MD  10/16/24  09:21 EDT    [facility]  [phone]    Please note that portions of this note were completed with a voice recognition program.

## 2024-10-16 NOTE — PLAN OF CARE
Goal Outcome Evaluation:  Plan of Care Reviewed With: patient        Progress: improving  Outcome Evaluation: Patient alert, interactive with staff, disoriented x4, on room air, tolerating diet, awaiting d/c to NH

## 2024-10-17 ENCOUNTER — NURSING HOME (OUTPATIENT)
Dept: FAMILY MEDICINE CLINIC | Facility: CLINIC | Age: 84
End: 2024-10-17
Payer: MEDICARE

## 2024-10-17 VITALS
TEMPERATURE: 98.6 F | DIASTOLIC BLOOD PRESSURE: 64 MMHG | BODY MASS INDEX: 27.43 KG/M2 | RESPIRATION RATE: 18 BRPM | HEART RATE: 79 BPM | WEIGHT: 175.2 LBS | OXYGEN SATURATION: 94 % | SYSTOLIC BLOOD PRESSURE: 127 MMHG

## 2024-10-17 DIAGNOSIS — N17.9 AKI (ACUTE KIDNEY INJURY): ICD-10-CM

## 2024-10-17 DIAGNOSIS — R79.89 ELEVATED TROPONIN: ICD-10-CM

## 2024-10-17 DIAGNOSIS — E11.00 TYPE 2 DIABETES MELLITUS WITH HYPEROSMOLAR HYPERGLYCEMIC STATE (HHS): ICD-10-CM

## 2024-10-17 DIAGNOSIS — J18.9 PNEUMONIA OF BOTH LUNGS DUE TO INFECTIOUS ORGANISM, UNSPECIFIED PART OF LUNG: ICD-10-CM

## 2024-10-17 DIAGNOSIS — Z78.9 IMPAIRED MOBILITY AND ADLS: ICD-10-CM

## 2024-10-17 DIAGNOSIS — Z74.09 IMPAIRED MOBILITY AND ADLS: ICD-10-CM

## 2024-10-17 DIAGNOSIS — G93.41 ACUTE METABOLIC ENCEPHALOPATHY: Primary | ICD-10-CM

## 2024-10-17 DIAGNOSIS — E87.0 HYPERNATREMIA: ICD-10-CM

## 2024-10-17 LAB
BACTERIA SPEC RESP CULT: ABNORMAL
GRAM STN SPEC: ABNORMAL

## 2024-10-17 PROCEDURE — 99309 SBSQ NF CARE MODERATE MDM 30: CPT | Performed by: NURSE PRACTITIONER

## 2024-10-17 NOTE — PROGRESS NOTES
Nursing Home Follow Up Note      Mitul Stephen DO []   ALESSANDRA Ho [x]  [address] [place] [address]  Phone: [phone]  Fax: [phone] Inés Vega MD []    Alex Burk DO []   [address] [place] [address]  Phone: [phone]  Fax: [phone]     PATIENT NAME: Gama Davila                                                                          YOB: 1940           DATE OF SERVICE: 10/17/2024  FACILITY:  [facility]   [] [place]   [] [facility]   [x] [facility]   [] Other ______________________________________________________________________      CHIEF COMPLAINT:  Hospital follow-up      HISTORY OF PRESENT ILLNESS:   Gama Davila is an 84 year old male being seen today for hospital follow-up.  He was admitted to [facility] from 10/13/2024 until 10/16/2024.  Upon ED evaluation, he was hypertensive at 165/126 and was afebrile while on 2L nasal cannula.  VBG pH of 7.428/HCO3 26.7.  HS Troponin 1 11-95, creatinine 2.3 (baseline creatinine 1.4) BUN 46, glucose 799, sodium 152 (corrected 161) WBC 11.1, lactate 3.7, hemoglobin A1c 10.1.  Pro-Kei, CRP and proBNP WNL.  Urinalysis positive for glucose but otherwise negative for infection.  COVID and flu negative.  Chest x-ray showed Low lung volumes with bibasilar opacity..  CT head without contrast showed 1. No acute intracranial hemorrhage or evidence of acute large cortical infarct. 2. Atrophy and ventricular enlargement.  Patient started on Glucomander per HHS protocol in the ER after he received 1 L bolus of LR.     Diabetes:  He was discharged back to facility on insulin for better glucose control. Will monitor glucose.     BLL pneumonia  --Coverage with Unasyn and doxycycline  -Requiring 2 L supplemental oxygen which is his chronic oxygen at baseline.  -Bronchodilators, mucolytics   -Blood cultures remains negative x 24 hours  -Respiratory cultures in progress, will continue to follow  -Speech  evaluation recommended continue pureed diet with nectar-thick liq as tim.   -Discharged on doxycycline and Augmentin to finish treatment of pneumonia    NANCI/hypernatremia  --Avoid nephrotoxic drugs, holding Lasix  -Monitor kidney function and sodium levels  -Sodium level normalized, kidney function improved with IVFs.  -Recommendations to follow up with nephrology as an outpatient but he is palliative care.     Elevated Troponin  -Likely demand ischemia in settings of NANCI and hypertension.  -Low suspicion for ACS, patient with no pain  -2D echo with a EF of 50 to 55% trace to mild mitral valve auscultation, mild tricuspid valve regurgitation.  -Troponin trended down with improvement of kidney function.  -Cardiology consulted, and  agreeable that his troponin elevation is likely demand ischemia, recommended patient is not a candidate for any further cardiac ischemic workup at this point.  -Recommendation made for follow-up with cardiology as outpatient, but he is Palliative care.     He is resting in bed today. No signs and symptoms of any distress.         PAST MEDICAL & SURGICAL HISTORY:   Past Medical History:   Diagnosis Date    Arthritis     Cancer     SKIN     Cataract     Coronary artery disease     Diabetes mellitus     High cholesterol     [place] (hard of hearing)     PATIENT HAS HEARING AIDS    Hypertension     Impaired mobility     Irregular heart beat     HISTORY OF CARDIAC ABLATION IN 2003    Wears dentures     FULL UPPER PLATE      Past Surgical History:   Procedure Laterality Date    BACK SURGERY  1978    THEN AGAIN IN 1982    CARDIAC ABLATION  2003    CARDIAC SURGERY      CATARACT EXTRACTION W/ INTRAOCULAR LENS IMPLANT Left 5/24/2017    Procedure: CATARACT PHACO EXTRACTION WITH INTRAOCULAR LENS IMPLANT LEFT WITH TORIC LENS;  Surgeon: Alma Benavidez MD;  Location: [facility];  Service:     CATARACT EXTRACTION W/ INTRAOCULAR LENS IMPLANT Right 6/14/2017    Procedure: CATARACT PHACO EXTRACTION  WITH INTRAOCULAR LENS IMPLANT RIGHT WITH TORIC LENS;  Surgeon: Alma Benavidez MD;  Location: [facility];  Service:     CORONARY ARTERY BYPASS GRAFT      SPOUSE UNSURE OF HOW MANY VESSELS    HIATAL HERNIA REPAIR      JOINT REPLACEMENT Left     HIP - DONE BY DR DALAL    KNEE ARTHROSCOPY Left     NOSE SURGERY  1970    SPOUSE REPORTS FOR A BROKEN NOSE    OTHER SURGICAL HISTORY Left     TENDON TORN LOOSE FROM BONE, LEFT ELBOW    OTHER SURGICAL HISTORY      RUPTURED DISC    OTHER SURGICAL HISTORY      NECK SURGERY FOR RUPTURED DISC    OTHER SURGICAL HISTORY      HAD SECOND NECK SURGERY    OTHER SURGICAL HISTORY      RUPTURED DISC    OTHER SURGICAL HISTORY      RUPTURED DISC    OTHER SURGICAL HISTORY      HARDWARE REMOVAL FROM BACK BY DR HAY         MEDICATIONS:  I have reviewed and reconciled the patients medication list in the patients chart at the skilled nursing facility today.      ALLERGIES:    Allergies   Allergen Reactions    Phenergan [Promethazine Hcl] Nausea And Vomiting    Carbamazepine Unknown - Low Severity    Hydrocodone Unknown - Low Severity    Lisinopril Unknown - Low Severity         SOCIAL HISTORY:    Social History     Socioeconomic History    Marital status:    Tobacco Use    Smoking status: Former     Current packs/day: 0.00     Types: Cigarettes     Quit date:      Years since quittin.8    Smokeless tobacco: Never   Vaping Use    Vaping status: Never Used   Substance and Sexual Activity    Alcohol use: No    Drug use: No    Sexual activity: Defer       FAMILY HISTORY:    No family history on file.    REVIEW OF SYSTEMS:    Review of Systems   Reason unable to perform ROS: Dementia (ROS per nursing staff)   Constitutional:  Negative for diaphoresis and fever.   HENT: Negative.     Respiratory:  Negative for apnea and stridor.    Cardiovascular: Negative.    Gastrointestinal: Negative.    Genitourinary:  Negative for decreased urine volume,  difficulty urinating and frequency.   Skin: Negative.    Neurological:  Positive for speech difficulty and confusion.   Psychiatric/Behavioral:  Negative for agitation and behavioral problems.          PHYSICAL EXAMINATION:   VITAL SIGNS:   Vitals:    10/17/24 1118   BP: 127/64   Pulse: 79   Resp: 18   Temp: 98.6 °F (37 °C)   SpO2: 94%   Weight: 79.5 kg (175 lb 3.2 oz)        Physical Exam  Constitutional:       Appearance: Normal appearance.   HENT:      Head: Normocephalic.      Right Ear: External ear normal.      Left Ear: External ear normal.      Nose: Nose normal.      Mouth/Throat:      Mouth: Mucous membranes are moist.      Pharynx: Oropharynx is clear. No oropharyngeal exudate.   Eyes:      General:         Right eye: No discharge.         Left eye: No discharge.   Cardiovascular:      Rate and Rhythm: Normal rate. Rhythm irregular.      Pulses: Normal pulses.      Heart sounds: Normal heart sounds. No murmur heard.  Pulmonary:      Effort: Pulmonary effort is normal.      Breath sounds: Wheezing present.   Abdominal:      General: Abdomen is flat. Bowel sounds are normal. There is no distension.      Palpations: Abdomen is soft.      Tenderness: There is no abdominal tenderness. There is no guarding.   Musculoskeletal:         General: Swelling present.      Right lower leg: Edema present.      Left lower leg: Edema present.   Skin:     General: Skin is dry.      Coloration: Skin is pale.   Neurological:      Mental Status: Mental status is at baseline. He is disoriented and confused.      Motor: Weakness present.   Psychiatric:         Cognition and Memory: Memory is impaired.         RECORDS REVIEW:   I have reviewed [facility] in [facility]    ASSESSMENT     Diagnoses and all orders for this visit:    1. Acute metabolic encephalopathy (Primary)    2. Type 2 diabetes mellitus with hyperosmolar hyperglycemic state (HHS)    3. NANCI (acute kidney injury)    4. Hypernatremia    5. Pneumonia of both lungs due to  infectious organism, unspecified part of lung    6. Elevated troponin    7. Impaired mobility and ADLs        PLAN  Acute metabolic encephalopathy:  -CT of head negative during hospital admission  Has history of dementia and Parkinson's    Type 2 diabetes mellitus with HHS:  -Was started on Glucomander insulin drip at hospital per protocol   -Hgb A1c 10.1 during hospital admission  -Discharged from hospital on Lantus and sliding scale insulin    NANCI:  -Likely dehydrated and was started on IVF  -Lasix was held  -Will continue to  monitor.     Hypernatremia:  -Resolved and will continue to monitor.     Bilateral pneumonia:  -Completed courses of Unasyn and doxycycline   -Received bronchodilators and mucolytics during admission  -Discharged on Doxycycline and Augmentin to complete at nursing home facility     Elevated troponin:  -Likely due to demand ischemia secondary to NANCI and HTN  -Echo performed with EF of 50-55% with trace of mild mitral valve regurgitation and mild tricuspid valve regurgitation   -Recommendation for cardiology referral for outpatient follow-up but he is palliative care.    Impaired mobility and ADLs:  -Continue supportive care for all ADLs    Will continue to monitor and follow-up as needed.    Staff encouraged to notify with any acute changes and/or worsening symptoms        [x]  Discussed Patient in detail with nursing/staff, addressed all needs today.     [x]  Plan of Care Reviewed   []  PT/OT Reviewed   []  Order Changes  [x]  Discharge Plans Reviewed  [x]  Advance Directive on file with Nursing Home.   [x]  POA on file with Nursing Home.   [x]  Code Status listed: []  Full Code   []  DNR          I spent 40 minutes caring for Gama on this date of service. This time includes time spent by me in the following activities:preparing for the visit, reviewing tests, obtaining and/or reviewing a separately obtained history, performing a medically appropriate examination and/or evaluation ,  counseling and educating the patient/family/caregiver, ordering medications, tests, or procedures, referring and communicating with other health care professionals , documenting information in the medical record, independently interpreting results and communicating that information with the patient/family/caregiver, and care coordination    I confirm accuracy of unchanged data/findings which have been carried forward from previous visit, as well as I have updated appropriately those that have changed.     Charleen Randall, APRN.

## 2024-10-18 LAB
BACTERIA SPEC AEROBE CULT: NORMAL
BACTERIA SPEC AEROBE CULT: NORMAL

## 2024-10-25 ENCOUNTER — NURSING HOME (OUTPATIENT)
Dept: FAMILY MEDICINE CLINIC | Facility: CLINIC | Age: 84
End: 2024-10-25
Payer: MEDICARE

## 2024-10-25 VITALS
DIASTOLIC BLOOD PRESSURE: 89 MMHG | WEIGHT: 168 LBS | BODY MASS INDEX: 26.31 KG/M2 | SYSTOLIC BLOOD PRESSURE: 144 MMHG | OXYGEN SATURATION: 94 % | HEART RATE: 106 BPM | TEMPERATURE: 99.7 F | RESPIRATION RATE: 18 BRPM

## 2024-10-25 DIAGNOSIS — E11.65 TYPE 2 DIABETES MELLITUS WITH HYPERGLYCEMIA, WITH LONG-TERM CURRENT USE OF INSULIN: Primary | ICD-10-CM

## 2024-10-25 DIAGNOSIS — Z78.9 IMPAIRED MOBILITY AND ADLS: ICD-10-CM

## 2024-10-25 DIAGNOSIS — Z74.09 IMPAIRED MOBILITY AND ADLS: ICD-10-CM

## 2024-10-25 DIAGNOSIS — G20.A1 MODERATE DEMENTIA DUE TO PARKINSON'S DISEASE, WITH MOOD DISTURBANCE: Chronic | ICD-10-CM

## 2024-10-25 DIAGNOSIS — Z79.4 TYPE 2 DIABETES MELLITUS WITH HYPERGLYCEMIA, WITH LONG-TERM CURRENT USE OF INSULIN: Primary | ICD-10-CM

## 2024-10-25 DIAGNOSIS — F02.B3 MODERATE DEMENTIA DUE TO PARKINSON'S DISEASE, WITH MOOD DISTURBANCE: Chronic | ICD-10-CM

## 2024-10-25 DIAGNOSIS — Z79.899 MEDICATION MANAGEMENT: ICD-10-CM

## 2024-10-25 PROCEDURE — 99309 SBSQ NF CARE MODERATE MDM 30: CPT | Performed by: NURSE PRACTITIONER

## 2024-10-26 PROBLEM — J18.9 BILATERAL PNEUMONIA: Status: RESOLVED | Noted: 2024-08-28 | Resolved: 2024-10-26

## 2024-10-26 PROBLEM — J96.01 ACUTE RESPIRATORY FAILURE WITH HYPOXIA: Status: RESOLVED | Noted: 2020-05-08 | Resolved: 2024-10-26

## 2024-10-27 NOTE — PROGRESS NOTES
Nursing Home Follow Up Note      Mitul Stephen DO []   ALESSANDRA Ho [x]  [address] [place] [address]  Phone: [phone]  Fax: [phone] Inés Vega MD []    Alex Burk DO []   [address] [place] [address]  Phone: [phone]  Fax: [phone]     PATIENT NAME: Gama Davila                                                                          YOB: 1940           DATE OF SERVICE: 10/25/2024  FACILITY:  [][place]   [] [place]   [] [facility]   [x] [facility]   [] Other ______________________________________________________________________      CHIEF COMPLAINT:    Uncontrolled glucose for the past several days.       HISTORY OF PRESENT ILLNESS:     Gama Davila is an 84 year old male being seen today for Uncontrolled DM. He was recently admitted to the hospital and his A1c was 10.1. He was started on insulin. Per nursing, his blood sugars are still not controlled with his current insulin doses.  He is taking Lantus 10 units daily and Novolog sliding scale. Glucose is running from 280-498 on average. He is palliative care. Resting in Jenny chair in [place] during visit, with no signs and symptoms of any distress.    PAST MEDICAL & SURGICAL HISTORY:   Past Medical History:   Diagnosis Date    Arthritis     Cancer     SKIN     Cataract     Coronary artery disease     Diabetes mellitus     High cholesterol     [place] (hard of hearing)     PATIENT HAS HEARING AIDS    Hypertension     Impaired mobility     Irregular heart beat     HISTORY OF CARDIAC ABLATION IN 2003    Wears dentures     FULL UPPER PLATE      Past Surgical History:   Procedure Laterality Date    BACK SURGERY  1978    THEN AGAIN IN 1982    CARDIAC ABLATION  2003    CARDIAC SURGERY      CATARACT EXTRACTION W/ INTRAOCULAR LENS IMPLANT Left 5/24/2017    Procedure: CATARACT PHACO EXTRACTION WITH INTRAOCULAR LENS IMPLANT LEFT WITH TORIC LENS;  Surgeon: Alma Benavidez MD;  Location: [facility] OR;   Service:     CATARACT EXTRACTION W/ INTRAOCULAR LENS IMPLANT Right 2017    Procedure: CATARACT PHACO EXTRACTION WITH INTRAOCULAR LENS IMPLANT RIGHT WITH TORIC LENS;  Surgeon: Alma Benavidez MD;  Location: [facility];  Service:     CORONARY ARTERY BYPASS GRAFT      SPOUSE UNSURE OF HOW MANY VESSELS    HIATAL HERNIA REPAIR      JOINT REPLACEMENT Left     HIP - DONE BY DR DALAL    KNEE ARTHROSCOPY Left     NOSE SURGERY  1970    SPOUSE REPORTS FOR A BROKEN NOSE    OTHER SURGICAL HISTORY Left     TENDON TORN LOOSE FROM BONE, LEFT ELBOW    OTHER SURGICAL HISTORY      RUPTURED DISC    OTHER SURGICAL HISTORY      NECK SURGERY FOR RUPTURED DISC    OTHER SURGICAL HISTORY      HAD SECOND NECK SURGERY    OTHER SURGICAL HISTORY      RUPTURED DISC    OTHER SURGICAL HISTORY      RUPTURED DISC    OTHER SURGICAL HISTORY      HARDWARE REMOVAL FROM BACK BY DR HAY         MEDICATIONS:  I have reviewed and reconciled the patients medication list in the patients chart at the skilled nursing facility today.      ALLERGIES:    Allergies   Allergen Reactions    Phenergan [Promethazine Hcl] Nausea And Vomiting    Carbamazepine Unknown - Low Severity    Hydrocodone Unknown - Low Severity    Lisinopril Unknown - Low Severity         SOCIAL HISTORY:    Social History     Socioeconomic History    Marital status:    Tobacco Use    Smoking status: Former     Current packs/day: 0.00     Types: Cigarettes     Quit date:      Years since quittin.8    Smokeless tobacco: Never   Vaping Use    Vaping status: Never Used   Substance and Sexual Activity    Alcohol use: No    Drug use: No    Sexual activity: Defer       FAMILY HISTORY:    No family history on file.    REVIEW OF SYSTEMS:    Review of Systems   Reason unable to perform ROS: Dementia (ROS per nursing staff). Did nod to some questions.   Constitutional:  Negative for activity change, diaphoresis and fever.   HENT:  Negative for  mouth sores and nosebleeds.    Respiratory:  Negative for apnea, choking and shortness of breath.    Cardiovascular:  Negative for leg swelling.   Gastrointestinal:  Negative for abdominal distention, blood in stool, constipation, diarrhea and vomiting.   Endocrine: Negative for polydipsia, polyphagia and polyuria.   Genitourinary:  Positive for urinary incontinence. Negative for decreased urine volume, difficulty urinating and frequency.   Skin:  Negative for color change, pallor and rash.   Neurological:  Positive for speech difficulty and confusion.   Psychiatric/Behavioral:  Negative for agitation, behavioral problems and sleep disturbance.          PHYSICAL EXAMINATION:   VITAL SIGNS:   Vitals:    10/25/24 1038   BP: 144/89   Pulse: 106   Resp: 18   Temp: 99.7 °F (37.6 °C)   SpO2: 94%   Weight: 76.2 kg (168 lb)        Physical Exam  Constitutional:       Appearance: Normal appearance.   HENT:      Head: Normocephalic.      Right Ear: External ear normal.      Left Ear: External ear normal.      Nose: Nose normal.      Mouth/Throat:      Mouth: Mucous membranes are moist.      Pharynx: Oropharynx is clear. No oropharyngeal exudate.   Eyes:      General:         Right eye: No discharge.         Left eye: No discharge.   Cardiovascular:      Rate and Rhythm: Normal rate. Rhythm irregular.      Pulses: Normal pulses.      Heart sounds: Normal heart sounds. No murmur heard.     Comments: Trace edema BLE  Pulmonary:      Effort: Pulmonary effort is normal.      Breath sounds: Normal breath sounds. No wheezing, rhonchi or rales.   Abdominal:      General: Abdomen is flat. Bowel sounds are normal. There is no distension.      Palpations: Abdomen is soft.      Tenderness: There is no abdominal tenderness. There is no guarding.   Musculoskeletal:      Right lower leg: Edema present.      Left lower leg: Edema present.   Neurological:      Mental Status: Mental status is at baseline. He is disoriented and confused.       Motor: Weakness present.      Comments: Nods head   Psychiatric:         Mood and Affect: Affect is flat.         Behavior: Behavior normal.         Cognition and Memory: Cognition is impaired. Memory is impaired.         RECORDS REVIEW:   I have reviewed records in [facility]    ASSESSMENT     Diagnoses and all orders for this visit:    1. Type 2 diabetes mellitus with hyperglycemia, with long-term current use of insulin (Primary)    2. Medication management    3. Moderate dementia due to Parkinson's disease, with mood disturbance    4. Impaired mobility and ADLs          PLAN    Type 2 diabetes mellitus with hyperglycemia  -Hgb A1c 10.1 during hospital admission  -Will increase Lantus to 15 units daily. Continue sliding scale. Will continue to follow up and adjust insulin accordingly.     Impaired mobility and ADLs:  -Continue supportive care for all ADLs    Will continue to monitor and follow-up as needed.    Staff encouraged to notify with any acute changes and/or worsening symptoms        [x]  Discussed Patient in detail with nursing/staff, addressed all needs today.     [x]  Plan of Care Reviewed   []  PT/OT Reviewed   []  Order Changes  [x]  Discharge Plans Reviewed  [x]  Advance Directive on file with Nursing Home.   [x]  POA on file with Nursing Home.   [x]  Code Status listed: []  Full Code   []  DNR          I spent 30 minutes caring for Gama on this date of service. This time includes time spent by me in the following activities:preparing for the visit, reviewing tests, obtaining and/or reviewing a separately obtained history, performing a medically appropriate examination and/or evaluation , counseling and educating the patient/family/caregiver, ordering medications, tests, or procedures, referring and communicating with other health care professionals , documenting information in the medical record, independently interpreting results and communicating that information with the patient/family/caregiver, and  care coordination    I confirm accuracy of unchanged data/findings which have been carried forward from previous visit, as well as I have updated appropriately those that have changed.     ALESSANDRA Tim.

## 2024-10-28 PROBLEM — J18.9 SEPSIS DUE TO PNEUMONIA: Status: RESOLVED | Noted: 2024-08-28 | Resolved: 2024-10-28

## 2024-10-28 PROBLEM — A41.9 SEPSIS DUE TO PNEUMONIA: Status: RESOLVED | Noted: 2024-08-28 | Resolved: 2024-10-28

## 2024-10-28 NOTE — PROGRESS NOTES
Nursing Home Follow Up Note      Mitul Stephen DO []   ALESSANDRA Ho [x]  [address] [place] [address]  Phone: [phone]  Fax: [phone] Inés Vega MD []    Alex Burk DO []   [address] [place] [address]  Phone: [phone]  Fax: [phone]     PATIENT NAME: Gama Davila                                                                          YOB: 1940           DATE OF SERVICE: 10/28/2024  FACILITY:  [][facility]   [] [facility]   [] [facility]   [x] [facility]   [] Other ______________________________________________________________________      CHIEF COMPLAINT:    Cough, congestion and altered mental status since this morning.       HISTORY OF PRESENT ILLNESS:   Gama Davila is an 84 year old male being seen today for cough, congestion, hypoxia and altered mental status since this morning. He did not eat any breakfast. Concerns for recurrent pneumonia, as he was just hospitalized for this a couple weeks ago. Finished treatment last week. He continues to have recurrent pneumonia. After a hospitalization 2 months ago he was made a DNR and palliative care status. He is on a pureed, thickened liquid diet. No signs and symptoms of any distress during visit. No fever.       PAST MEDICAL & SURGICAL HISTORY:   Past Medical History:   Diagnosis Date    Arthritis     Cancer     SKIN     Cataract     Coronary artery disease     Diabetes mellitus     High cholesterol     [place] (hard of hearing)     PATIENT HAS HEARING AIDS    Hypertension     Impaired mobility     Irregular heart beat     HISTORY OF CARDIAC ABLATION IN 2003    Wears dentures     FULL UPPER PLATE      Past Surgical History:   Procedure Laterality Date    BACK SURGERY  1978    THEN AGAIN IN 1982    CARDIAC ABLATION  2003    CARDIAC SURGERY      CATARACT EXTRACTION W/ INTRAOCULAR LENS IMPLANT Left 5/24/2017    Procedure: CATARACT PHACO EXTRACTION WITH INTRAOCULAR LENS IMPLANT LEFT WITH TORIC LENS;   Surgeon: Alma Benavidez MD;  Location: [facility];  Service:     CATARACT EXTRACTION W/ INTRAOCULAR LENS IMPLANT Right 2017    Procedure: CATARACT PHACO EXTRACTION WITH INTRAOCULAR LENS IMPLANT RIGHT WITH TORIC LENS;  Surgeon: Alma Benavidez MD;  Location: [facility];  Service:     CORONARY ARTERY BYPASS GRAFT      SPOUSE UNSURE OF HOW MANY VESSELS    HIATAL HERNIA REPAIR  1972    JOINT REPLACEMENT Left     HIP - DONE BY DR DALAL    KNEE ARTHROSCOPY Left     NOSE SURGERY  1970    SPOUSE REPORTS FOR A BROKEN NOSE    OTHER SURGICAL HISTORY Left     TENDON TORN LOOSE FROM BONE, LEFT ELBOW    OTHER SURGICAL HISTORY      RUPTURED DISC    OTHER SURGICAL HISTORY      NECK SURGERY FOR RUPTURED DISC    OTHER SURGICAL HISTORY      HAD SECOND NECK SURGERY    OTHER SURGICAL HISTORY      RUPTURED DISC    OTHER SURGICAL HISTORY      RUPTURED DISC    OTHER SURGICAL HISTORY      HARDWARE REMOVAL FROM BACK BY DR HAY         MEDICATIONS:  I have reviewed and reconciled the patients medication list in the patients chart at the skilled nursing facility today.      ALLERGIES:    Allergies   Allergen Reactions    Phenergan [Promethazine Hcl] Nausea And Vomiting    Carbamazepine Unknown - Low Severity    Hydrocodone Unknown - Low Severity    Lisinopril Unknown - Low Severity         SOCIAL HISTORY:    Social History     Socioeconomic History    Marital status:    Tobacco Use    Smoking status: Former     Current packs/day: 0.00     Types: Cigarettes     Quit date:      Years since quittin.8    Smokeless tobacco: Never   Vaping Use    Vaping status: Never Used   Substance and Sexual Activity    Alcohol use: No    Drug use: No    Sexual activity: Defer       FAMILY HISTORY:    No family history on file.    REVIEW OF SYSTEMS:    Review of Systems   Reason unable to perform ROS: Dementia (ROS per nursing staff)   Constitutional:  Positive for activity change, appetite  change and fatigue. Negative for fever.   HENT:  Positive for congestion and trouble swallowing.    Respiratory:  Positive for apnea, cough and wheezing.    Gastrointestinal:  Negative for abdominal distention, constipation, diarrhea and vomiting.   Genitourinary:  Positive for urinary incontinence. Negative for decreased urine volume, difficulty urinating and frequency.   Skin:  Negative for color change.   Neurological:  Positive for speech difficulty, weakness and confusion.   Psychiatric/Behavioral:  Negative for agitation and behavioral problems.          PHYSICAL EXAMINATION:   VITAL SIGNS:   Vitals:    10/28/24 1222   BP: 139/88   Pulse: 74   Resp: 16   Temp: 98 °F (36.7 °C)   Weight: 76.2 kg (168 lb)        Physical Exam  Constitutional:       Appearance: Normal appearance.   Eyes:      General:         Right eye: No discharge.         Left eye: No discharge.   Cardiovascular:      Rate and Rhythm: Normal rate. Rhythm irregular.      Pulses: Normal pulses.      Heart sounds: Normal heart sounds. No murmur heard.  Pulmonary:      Effort: Pulmonary effort is normal.      Breath sounds: Examination of the right-upper field reveals wheezing and rhonchi. Examination of the left-upper field reveals wheezing and rhonchi. Wheezing and rhonchi present.   Abdominal:      General: Abdomen is flat. Bowel sounds are normal. There is no distension.      Palpations: Abdomen is soft.      Tenderness: There is no abdominal tenderness. There is no guarding.   Musculoskeletal:         General: Swelling present.      Right lower leg: Edema present.      Left lower leg: Edema present.   Skin:     General: Skin is dry.      Coloration: Skin is pale.   Neurological:      Mental Status: He is disoriented and confused.      Motor: Weakness present.   Psychiatric:         Mood and Affect: Affect is flat.         Behavior: Behavior is slowed.         Cognition and Memory: Cognition is impaired. Memory is not impaired.         RECORDS  REVIEW:   I have reviewed records in [facility]    ASSESSMENT     Diagnoses and all orders for this visit:    1. Pneumonia symptoms    2. History of pneumonia    3. Palliative care patient    4. Moderate dementia due to Parkinson's disease, with mood disturbance    5. Impaired mobility and ADLs        PLAN    Pneumonia symptoms/history pneumonia  -Will obtain CXR and follow up with results. Get Cefdinir 300 mg po bid x 7 days Will follow up and continue to monitor.     Impaired mobility and ADLs:  -Continue supportive care for all ADLs    Staff encouraged to notify with any acute changes and/or worsening symptoms        [x]  Discussed Patient in detail with nursing/staff, addressed all needs today.     [x]  Plan of Care Reviewed   []  PT/OT Reviewed   []  Order Changes  [x]  Discharge Plans Reviewed  [x]  Advance Directive on file with Nursing Home.   [x]  POA on file with Nursing Home.   [x]  Code Status listed: []  Full Code   []  DNR          I spent 30 minutes caring for Gama on this date of service. This time includes time spent by me in the following activities:preparing for the visit, obtaining and/or reviewing a separately obtained history, performing a medically appropriate examination and/or evaluation , counseling and educating the patient/family/caregiver, ordering medications, tests, or procedures, referring and communicating with other health care professionals , documenting information in the medical record, independently interpreting results and communicating that information with the patient/family/caregiver, and care coordination    I confirm accuracy of unchanged data/findings which have been carried forward from previous visit, as well as I have updated appropriately those that have changed.     Charleen Randall, APRN.

## 2024-10-29 NOTE — TELEPHONE ENCOUNTER
([facility])    FRANCISCO REQUESTING MED REFILLS FOR MORPHINE SULFATE 20 MG/ML AND LORAZEPAM INTENSOL  2 MG/ML.    MORPHINE SULFATE CONCENTRATE 20 MG/ML,GIVE 0.25 ML PO Q 3 HRS PRN.    LORAZEPAM 2 MG/ML, GIVE 0.25 ML PO Q 3 HR PRN.

## 2024-10-30 NOTE — PROGRESS NOTES
Nursing Home Follow Up Note      Mitul Stephen DO []   ALESSANDRA Ho [x]  [address] [place] [address]  Phone: [phone]  Fax: [phone] Inés Vega MD []    Alex Burk DO []   [address] [place] [address]  Phone: [phone]  Fax: [phone]     PATIENT NAME: Gama Davila                                                                          YOB: 1940           DATE OF SERVICE: 10/29/2024  FACILITY:  [][facility]   [] [place]   [] [facility]   [x] [facility]   [] Other ______________________________________________________________________      CHIEF COMPLAINT:    Follow up abnormal CXR results.    Continued declining status.       HISTORY OF PRESENT ILLNESS:   Gama Davila is an 84 year old male being seen today for abnormal CXR results. He had cough and congestion yesterday, so CXR performed. CXR reports LLL pneumonia. He has had recurrent pneumonia several times over the past couple months. He is currently palliative care but family wishes for Hospice consult. Nurse reports he is not eating well but did take his medication this morning.     PAST MEDICAL & SURGICAL HISTORY:   Past Medical History:   Diagnosis Date    Arthritis     Cancer     SKIN     Cataract     Coronary artery disease     Diabetes mellitus     High cholesterol     [place] (hard of hearing)     PATIENT HAS HEARING AIDS    Hypertension     Impaired mobility     Irregular heart beat     HISTORY OF CARDIAC ABLATION IN 2003    Wears dentures     FULL UPPER PLATE      Past Surgical History:   Procedure Laterality Date    BACK SURGERY  1978    THEN AGAIN IN 1982    CARDIAC ABLATION  2003    CARDIAC SURGERY      CATARACT EXTRACTION W/ INTRAOCULAR LENS IMPLANT Left 5/24/2017    Procedure: CATARACT PHACO EXTRACTION WITH INTRAOCULAR LENS IMPLANT LEFT WITH TORIC LENS;  Surgeon: Alma Benavidez MD;  Location: [facility];  Service:     CATARACT EXTRACTION W/ INTRAOCULAR LENS IMPLANT Right  2017    Procedure: CATARACT PHACO EXTRACTION WITH INTRAOCULAR LENS IMPLANT RIGHT WITH TORIC LENS;  Surgeon: Alma Benavidez MD;  Location: [facility];  Service:     CORONARY ARTERY BYPASS GRAFT      SPOUSE UNSURE OF HOW MANY VESSELS    HIATAL HERNIA REPAIR      JOINT REPLACEMENT Left     HIP - DONE BY DR DALAL    KNEE ARTHROSCOPY Left     NOSE SURGERY  1970    SPOUSE REPORTS FOR A BROKEN NOSE    OTHER SURGICAL HISTORY Left     TENDON TORN LOOSE FROM BONE, LEFT ELBOW    OTHER SURGICAL HISTORY      RUPTURED DISC    OTHER SURGICAL HISTORY      NECK SURGERY FOR RUPTURED DISC    OTHER SURGICAL HISTORY      HAD SECOND NECK SURGERY    OTHER SURGICAL HISTORY      RUPTURED DISC    OTHER SURGICAL HISTORY      RUPTURED DISC    OTHER SURGICAL HISTORY      HARDWARE REMOVAL FROM BACK BY DR HAY         MEDICATIONS:  I have reviewed and reconciled the patients medication list in the patients chart at the skilled nursing facility today.      ALLERGIES:    Allergies   Allergen Reactions    Phenergan [Promethazine Hcl] Nausea And Vomiting    Carbamazepine Unknown - Low Severity    Hydrocodone Unknown - Low Severity    Lisinopril Unknown - Low Severity         SOCIAL HISTORY:    Social History     Socioeconomic History    Marital status:    Tobacco Use    Smoking status: Former     Current packs/day: 0.00     Types: Cigarettes     Quit date:      Years since quittin.8    Smokeless tobacco: Never   Vaping Use    Vaping status: Never Used   Substance and Sexual Activity    Alcohol use: No    Drug use: No    Sexual activity: Defer       FAMILY HISTORY:    No family history on file.    REVIEW OF SYSTEMS:    Review of Systems   Reason unable to perform ROS: Dementia (ROS per nursing staff)   Constitutional:  Positive for activity change, appetite change and fatigue. Negative for fever.   HENT:  Positive for congestion and trouble swallowing.    Respiratory:  Positive for apnea,  cough and wheezing.    Gastrointestinal:  Negative for abdominal distention, constipation, diarrhea and vomiting.   Genitourinary:  Positive for urinary incontinence. Negative for decreased urine volume.   Skin:  Negative for color change.   Neurological:  Positive for speech difficulty, weakness and confusion.   Psychiatric/Behavioral:  Negative for agitation and behavioral problems.          PHYSICAL EXAMINATION:   VITAL SIGNS:   Vitals:    10/29/24 0944   BP: 134/82   Pulse: (!) 42   Resp: 16   Temp: 98 °F (36.7 °C)   SpO2: (!) 87%   Weight: 76.2 kg (168 lb)        Physical Exam  Constitutional:       Appearance: Normal appearance.   Eyes:      General:         Right eye: No discharge.         Left eye: No discharge.   Cardiovascular:      Rate and Rhythm: Normal rate. Rhythm irregular.      Pulses: Normal pulses.      Heart sounds: Normal heart sounds. No murmur heard.  Pulmonary:      Effort: Pulmonary effort is normal.      Breath sounds: Examination of the right-upper field reveals wheezing and rhonchi. Examination of the left-upper field reveals wheezing and rhonchi. Wheezing and rhonchi present.   Abdominal:      General: Abdomen is flat. Bowel sounds are normal. There is no distension.      Palpations: Abdomen is soft.      Tenderness: There is no abdominal tenderness. There is no guarding.   Musculoskeletal:         General: Swelling present.      Right lower leg: Edema present.      Left lower leg: Edema present.   Skin:     General: Skin is dry.      Coloration: Skin is pale.   Neurological:      Mental Status: He is disoriented and confused.      Motor: Weakness present.   Psychiatric:         Mood and Affect: Affect is flat.         Behavior: Behavior is slowed.         Cognition and Memory: Cognition is impaired. Memory is not impaired.         RECORDS REVIEW:   I have reviewed records in [facility]    ASSESSMENT     Diagnoses and all orders for this visit:    1. Encounter for hospice care discussion    2.  Medication management    3. Declining functional status    4. Aspiration pneumonia of left lower lobe, unspecified aspiration pneumonia type    5. Impaired mobility and ADLs          PLAN    LLL pneumonia/declining status  -Will stop Cefdinir and start Invanz 1 gm IM x 5 days. Hospice Consult. Will start Roxanol 20 mg/ml 0.25 mg q 3 hours prn and Lorazepam Intensol 2 mg/ml, 0.25 ml q 3 prn.     Impaired mobility and ADLs:  -Continue supportive care for all ADLs    Staff encouraged to notify with any acute changes and/or worsening symptoms        [x]  Discussed Patient in detail with nursing/staff, addressed all needs today.     [x]  Plan of Care Reviewed   []  PT/OT Reviewed   []  Order Changes  [x]  Discharge Plans Reviewed  [x]  Advance Directive on file with Nursing Home.   [x]  POA on file with Nursing Home.   [x]  Code Status listed: []  Full Code   []  DNR          I spent 35 minutes caring for Gama on this date of service. This time includes time spent by me in the following activities:preparing for the visit, reviewing tests, obtaining and/or reviewing a separately obtained history, performing a medically appropriate examination and/or evaluation , counseling and educating the patient/family/caregiver, ordering medications, tests, or procedures, referring and communicating with other health care professionals , documenting information in the medical record, independently interpreting results and communicating that information with the patient/family/caregiver, and care coordination    I confirm accuracy of unchanged data/findings which have been carried forward from previous visit, as well as I have updated appropriately those that have changed.     Charleen Randall, APRN.

## 2024-11-01 ENCOUNTER — TELEPHONE (OUTPATIENT)
Dept: INTERNAL MEDICINE | Facility: CLINIC | Age: 84
End: 2024-11-01
Payer: MEDICARE

## 2025-06-23 NOTE — PROGRESS NOTES
Problem: Pain  Goal: Verbalizes/displays adequate comfort level or baseline comfort level  6/23/2025 0122 by Cuate Kirkland RN  Outcome: Progressing  6/22/2025 1740 by Dulce Daniel RN  Outcome: Progressing  Flowsheets  Taken 6/22/2025 1630  Verbalizes/displays adequate comfort level or baseline comfort level:   Encourage patient to monitor pain and request assistance   Assess pain using appropriate pain scale  Taken 6/22/2025 1200  Verbalizes/displays adequate comfort level or baseline comfort level: Assess pain using appropriate pain scale     Problem: Discharge Planning  Goal: Discharge to home or other facility with appropriate resources  6/23/2025 0122 by Cuate Kirkland RN  Outcome: Progressing  6/22/2025 1740 by Dulce Daniel RN  Outcome: Progressing     Problem: Seizure Precautions  Goal: Remains free of injury related to seizures activity  6/23/2025 0122 by Cuate Kirkland RN  Outcome: Progressing  6/22/2025 1740 by Dulce Daniel RN  Outcome: Progressing     Problem: Neurosensory - Adult  Goal: Achieves stable or improved neurological status  6/23/2025 0122 by Cuate Kirkland RN  Outcome: Progressing  6/22/2025 1740 by Dulce Daniel RN  Outcome: Progressing  Flowsheets (Taken 6/22/2025 0800)  Achieves stable or improved neurological status: Assess for and report changes in neurological status     Problem: Respiratory - Adult  Goal: Achieves optimal ventilation and oxygenation  6/23/2025 0122 by Cuate Kirkland RN  Outcome: Progressing  6/22/2025 1740 by Dulce Daniel RN  Outcome: Progressing     Problem: Cardiovascular - Adult  Goal: Maintains optimal cardiac output and hemodynamic stability  6/23/2025 0122 by Cuate Kirkland RN  Outcome: Progressing  6/22/2025 1740 by Dulce Daniel RN  Outcome: Progressing  Flowsheets (Taken 6/22/2025 0800)  Maintains optimal cardiac output and hemodynamic stability: Monitor blood pressure and heart rate     Problem: Skin/Tissue Integrity - Adult  Goal: Skin integrity    Telemedicine nursing Home Progress Note        Mitul Stephen DO []  [facility] [x]  [address] [place] [address]  Phone: [phone]  Fax: [phone] Inés Vega MD []  [facility] [x]   [address] [place] [address]  Phone: [phone]  Fax: [phone]     PATIENT NAME: Gama Davila                                                                          YOB: 1940           DATE OF SERVICE: 11/9/2020  FACILITY:  [] [place]   [] [place]   [] [facility] [place]   [x] [facility]  []  [facility]  [] Other     ______________________________________________________________________     CHIEF COMPLAINT:    Increased behaviors and mood changes/refusing treatment         HISTORY OF PRESENT ILLNESS:     Per nursing, patient has had increased behaviors and mood changes the past several days, and has been more confused.  He also refused to allow his fingerstick to be performed or his insulin to be given last night.  Nursing unsure if he is just having increased behaviors or he has a UTI.  He is pleasant during visit today.     PAST MEDICAL & SURGICAL HISTORY:   Past Medical History:   Diagnosis Date   • Arthritis    • Cancer (CMS/HCC)     SKIN    • Cataract    • Coronary artery disease    • Diabetes mellitus (CMS/HCC)    • High cholesterol    • Kaw (hard of hearing)     PATIENT HAS HEARING AIDS   • Hypertension    • Irregular heart beat     HISTORY OF CARDIAC ABLATION IN 2003   • Wears dentures     FULL UPPER PLATE      Past Surgical History:   Procedure Laterality Date   • BACK SURGERY  1978    THEN AGAIN IN 1982   • CARDIAC ABLATION  2003   • CARDIAC SURGERY     • CATARACT EXTRACTION W/ INTRAOCULAR LENS IMPLANT Left 5/24/2017    Procedure: CATARACT PHACO EXTRACTION WITH INTRAOCULAR LENS IMPLANT LEFT WITH TORIC LENS;  Surgeon: Alma Benavidez MD;  Location: [facility];  Service:    • CATARACT EXTRACTION W/ INTRAOCULAR LENS IMPLANT Right  2017    Procedure: CATARACT PHACO EXTRACTION WITH INTRAOCULAR LENS IMPLANT RIGHT WITH TORIC LENS;  Surgeon: Alma Benavidez MD;  Location: [facility];  Service:    • CORONARY ARTERY BYPASS GRAFT      SPOUSE UNSURE OF HOW MANY VESSELS   • HIATAL HERNIA REPAIR     • JOINT REPLACEMENT Left     HIP - DONE BY DR DALAL   • KNEE ARTHROSCOPY Left    • NOSE SURGERY      SPOUSE REPORTS FOR A BROKEN NOSE   • OTHER SURGICAL HISTORY Left     TENDON TORN LOOSE FROM BONE, LEFT ELBOW   • OTHER SURGICAL HISTORY      RUPTURED DISC   • OTHER SURGICAL HISTORY      NECK SURGERY FOR RUPTURED DISC   • OTHER SURGICAL HISTORY      HAD SECOND NECK SURGERY   • OTHER SURGICAL HISTORY      RUPTURED DISC   • OTHER SURGICAL HISTORY      RUPTURED DISC   • OTHER SURGICAL HISTORY      HARDWARE REMOVAL FROM BACK BY DR HAY         MEDICATIONS:  I have reviewed and reconciled the patients medication list in the patients chart at the skilled nursing facility today.      ALLERGIES:  Allergies   Allergen Reactions   • Phenergan [Promethazine Hcl] Nausea And Vomiting   • Carbamazepine Unknown - Low Severity   • Hydrocodone Unknown - Low Severity   • Lisinopril Unknown - Low Severity         SOCIAL HISTORY:  Social History     Socioeconomic History   • Marital status:      Spouse name: Not on file   • Number of children: Not on file   • Years of education: Not on file   • Highest education level: Not on file   Tobacco Use   • Smoking status: Former Smoker     Types: Cigarettes     Quit date:      Years since quittin.8   • Smokeless tobacco: Never Used   Substance and Sexual Activity   • Alcohol use: No   • Drug use: No   • Sexual activity: Defer       FAMILY HISTORY:  No family history on file.    REVIEW OF SYSTEMS:  Review of Systems   Unable to perform ROS: Dementia (ROS per nursing and patient)   HENT: Negative for drooling, facial swelling, mouth sores, rhinorrhea and trouble swallowing.     Eyes: Negative for discharge and redness.   Respiratory: Negative for cough, chest tightness and shortness of breath.    Cardiovascular: Negative.  Negative for chest pain and palpitations.   Gastrointestinal: Negative for abdominal distention, anal bleeding, blood in stool, constipation, diarrhea, nausea and vomiting.   Genitourinary: Negative for difficulty urinating, discharge, frequency, penile swelling and scrotal swelling.        Incontinence   Musculoskeletal: Positive for arthralgias.        Trigger finger right 4th digit    contractures   Skin: Negative.    Neurological: Positive for speech difficulty (much improved) and weakness.   Hematological: Negative for adenopathy.   Psychiatric/Behavioral: Positive for behavioral problems and confusion. Negative for suicidal ideas. The patient is not nervous/anxious.           PHYSICAL EXAMINATION:     VITAL SIGNS:  /69   Pulse 64   Temp 97.9 °F (36.6 °C)   Resp 18   Wt 59.9 kg (132 lb)   SpO2 94%   BMI 20.07 kg/m²     Physical Exam   Constitutional: He is oriented to person, place, and time. He is not intubated.   Frail elderly male bedridden not very interactive.   HENT:   Head: Normocephalic and atraumatic.   Eyes: Conjunctivae are normal.   Neck: Normal range of motion. Neck supple.   Pulmonary/Chest: Effort normal and breath sounds normal. He is not intubated.   Musculoskeletal:      Right shoulder: He exhibits decreased range of motion.      Right hand: He exhibits decreased range of motion.        Hands:       Comments: Chronic NM defs, and contractures, s/t Parkinson's   Neurological: He is alert and oriented to person, place, and time.   Skin: No rash noted.   Psychiatric: His behavior is normal.   Nursing note and vitals reviewed.      RECORDS REVIEW:   Most recent labs    ASSESSMENT     Diagnoses and all orders for this visit:    1. Acute confusion    2. Non compliance with medical treatment    3. Behavior concern    4. Mood disorder  (CMS/[facility])    5. Impaired mobility and ADLs        PLAN    Confusion/Mood altered/dementia with behaviors/noncompliance with treatment  -Nursing to check UA with culture in the a.m.  He did allow fingerstick today.  Nursing to continue to educate on the importance of taking insulin.  If UA is insignificant will increase Depakote for better control of his behaviors and moods.  We will continue to monitor.    Nursing encouraged to keep me informed of any acute changes, lack of improvement, or any new concerning symptoms.    Staff to continue supportive care for all ADLs.     [x]  Discussed Patient in detail with nursing/staff, addressed all needs today.     [x]  Plan of Care Reviewed   [x]  PT/OT Reviewed   [x]  Order Changes  []  Discharge Plans Reviewed  [x]  Advance Directive on file with Nursing Home.   [x]  POA on file with Nursing Home.    [x]  Code Status listed and reviewed.         “I confirm accuracy of unchanged data/findings which have been carried forward from previous visit, as well as I have updated appropriately those that have changed.”                 Charleen Randall, APRN.  11/10/2020

## (undated) DEVICE — GLV SURG SENSICARE W/ALOE PF LF 7 STRL

## (undated) DEVICE — SOL IRRIG H2O 1000ML STRL

## (undated) DEVICE — SOL IRRIG NACL 9PCT 1000ML BTL

## (undated) DEVICE — CLEAR CORNEAL KNIFE 2.4MM ANG: Brand: SHARPOINT

## (undated) DEVICE — GLV SURG SENSICARE W/ALOE PF LF 7.5 STRL

## (undated) DEVICE — 0.8MM CLEARPORT PARA KNIFE: Brand: SHARPOINT

## (undated) DEVICE — PREP SOL POVIDONE/IODINE BT 4OZ

## (undated) DEVICE — SKIN MARKER,FINE TIP: Brand: DEVON

## (undated) DEVICE — HP CONCL INTREPID COAX I/A CRV .3MM

## (undated) DEVICE — KT POSTOP CATARACT PT CARE

## (undated) DEVICE — 2.0MM SHARPTOME™ CRESCENT KNIFE ANGLED, BEVEL UP: Brand: SHARPOINT

## (undated) DEVICE — PK CATARACT OPTHAMALOGY